# Patient Record
Sex: MALE | Race: WHITE | NOT HISPANIC OR LATINO | Employment: OTHER | URBAN - METROPOLITAN AREA
[De-identification: names, ages, dates, MRNs, and addresses within clinical notes are randomized per-mention and may not be internally consistent; named-entity substitution may affect disease eponyms.]

---

## 2017-01-27 ENCOUNTER — OFFICE VISIT (OUTPATIENT)
Dept: OTOLARYNGOLOGY | Facility: CLINIC | Age: 57
End: 2017-01-27

## 2017-01-27 ENCOUNTER — OFFICE VISIT (OUTPATIENT)
Dept: FAMILY MEDICINE | Facility: CLINIC | Age: 57
End: 2017-01-27
Payer: COMMERCIAL

## 2017-01-27 VITALS
HEIGHT: 70 IN | OXYGEN SATURATION: 97 % | SYSTOLIC BLOOD PRESSURE: 117 MMHG | WEIGHT: 223 LBS | BODY MASS INDEX: 31.92 KG/M2 | DIASTOLIC BLOOD PRESSURE: 74 MMHG | HEART RATE: 120 BPM | TEMPERATURE: 98.7 F

## 2017-01-27 DIAGNOSIS — N40.1 BENIGN PROSTATIC HYPERPLASIA WITH LOWER URINARY TRACT SYMPTOMS, UNSPECIFIED MORPHOLOGY: Primary | ICD-10-CM

## 2017-01-27 DIAGNOSIS — J38.3 LEUKOPLAKIA OF VOCAL CORDS: ICD-10-CM

## 2017-01-27 DIAGNOSIS — Z85.21 HX OF LARYNGEAL CANCER: ICD-10-CM

## 2017-01-27 DIAGNOSIS — I10 HYPERTENSION GOAL BP (BLOOD PRESSURE) < 140/90: ICD-10-CM

## 2017-01-27 DIAGNOSIS — K59.00 CONSTIPATION, UNSPECIFIED CONSTIPATION TYPE: ICD-10-CM

## 2017-01-27 DIAGNOSIS — R49.0 DYSPHONIA: Primary | ICD-10-CM

## 2017-01-27 PROCEDURE — 99213 OFFICE O/P EST LOW 20 MIN: CPT | Performed by: FAMILY MEDICINE

## 2017-01-27 RX ORDER — TAMSULOSIN HYDROCHLORIDE 0.4 MG/1
0.4 CAPSULE ORAL DAILY
Qty: 30 CAPSULE | Refills: 1 | Status: SHIPPED | OUTPATIENT
Start: 2017-01-27 | End: 2017-03-24

## 2017-01-27 ASSESSMENT — PAIN SCALES - GENERAL
PAINLEVEL: NO PAIN (0)
PAINLEVEL: NO PAIN (0)

## 2017-01-27 NOTE — MR AVS SNAPSHOT
"              After Visit Summary   1/27/2017    Gary Iyer    MRN: [id number]           Patient Information     Date Of Birth          1960        Visit Information        Provider Department      1/27/2017 12:00 PM [facility]al [facility]        Today's Diagnoses     Benign prostatic hyperplasia with lower urinary tract symptoms, unspecified morphology    -  1     Constipation, unspecified constipation type         Hypertension goal BP (blood pressure) < 140/90           Care Instructions    Start the tamsulosin 0.4 mg one pill daily    See us in 1-2 months, sooner if needed     Continue other medications    Keep working on healthy diet/exercise and weight loss          Follow-ups after your visit        Who to contact     If you have questions or need follow up information about today's clinic visit or your schedule please contact [facility] directly at [phone].  Normal or non-critical lab and imaging results will be communicated to you by MyChart, letter or phone within 4 business days after the clinic has received the results. If you do not hear from us within 7 days, please contact the clinic through MyChart or phone. If you have a critical or abnormal lab result, we will notify you by phone as soon as possible.  Submit refill requests through [employer] or call your pharmacy and they will forward the refill request to us. Please allow 3 business days for your refill to be completed.          Additional Information About Your Visit        [employer] Information     The [employer] lets you send messages to your doctor, view your test results, renew your prescriptions, schedule appointments and more. To sign up, go to [email or web address] . Click on \"Log in\" on the left side of the screen, which will take you to the Welcome page. Then click on \"Sign up Now\" on the right side of the page.     You will be asked to enter the access code listed below, as well " "as some personal information. Please follow the directions to create your username and password.     Your access code is: [email or web address]  Expires: 2017  6:31 AM     Your access code will  in 90 days. If you need help or a new code, please call your [facility] clinic or [phone].        Care EveryWhere ID     This is your Care EveryWhere ID. This could be used by other organizations to access your [facility] medical records  [id number]        Your Vitals Were     Pulse Temperature Height BMI (Body Mass Index) Pulse Oximetry       120 98.7  F (37.1  C) (Oral) 5' 10\" (1.778 m) 32.00 kg/m2 97%        Blood Pressure from Last 3 Encounters:   17 117/74   16 112/64   16 133/83    Weight from Last 3 Encounters:   17 223 lb (101.152 kg)   16 227 lb (102.967 kg)   16 225 lb (102.059 kg)              Today, you had the following     No orders found for display         Today's Medication Changes          These changes are accurate as of: 17 12:12 PM.  If you have any questions, ask your nurse or doctor.               Start taking these medicines.        Dose/Directions    tamsulosin 0.4 MG capsule   Commonly known as:  FLOMAX   Used for:  Benign prostatic hyperplasia with lower urinary tract symptoms, unspecified morphology   Started by:  Finesse Beal MD        Dose:  0.4 mg   Take 1 capsule (0.4 mg) by mouth daily   Quantity:  30 capsule   Refills:  1         Stop taking these medicines if you haven't already. Please contact your care team if you have questions.     guaiFENesin-codeine 100-10 MG/5ML Soln solution   Commonly known as:  ROBITUSSIN AC   Stopped by:  Finesse Beal MD                Where to get your medicines      These medications were sent to [facility] [id number]  [place] [place] - [address]  [address]  [address], [place] [address]    Hours:  Test fax successful 02   Phone:  [phone]    - " tamsulosin 0.4 MG capsule             Primary Care Provider Office Phone # Fax #    Finesse Beal [phone]       [facility] [address] [facility] [address]        Thank you!     Thank you for choosing [facility]  for your care. Our goal is always to provide you with excellent care. Hearing back from our patients is one way we can continue to improve our services. Please take a few minutes to complete the written survey that you may receive in the mail after your visit with us. Thank you!             Your Updated Medication List - Protect others around you: Learn how to safely use, store and throw away your medicines at www.disposemymeds.org.          This list is accurate as of: 1/27/17 12:12 PM.  Always use your most recent med list.                   Brand Name Dispense Instructions for use    allopurinol 300 MG tablet    ZYLOPRIM    90 tablet    Take 1 tablet (300 mg) by mouth daily       aspirin 81 MG tablet      Take 1 tablet by mouth daily.       atorvastatin 20 MG tablet    LIPITOR    90 tablet    Take 1 tablet (20 mg) by mouth daily       blood glucose monitoring lancets     1 Box    1 each 2 times daily Use to test blood sugar 2 times daily or as directed.       blood glucose monitoring test strip    ALON CONTOUR NEXT    100 strip    1 strip by In Vitro route 2 times daily Use to test blood sugar 2 times daily or as directed.       DAILY MULTIVITAMIN PO      Take  by mouth daily.       hydrochlorothiazide 50 MG tablet    HYDRODIURIL    90 tablet    Take 1 tablet (50 mg) by mouth every morning       hydrocortisone 1 % ointment     30 g    Apply topically 2 times daily as needed       hydrocortisone 2.5 % cream    ANUSOL-HC    30 g    Place rectally 2 times daily as needed for hemorrhoids       hydrocortisone 25 MG Suppository    ANUSOL-HC    20 suppository    Place 1 suppository (25 mg) rectally 2 times daily as needed for  hemorrhoids       lisinopril 30 MG tablet    PRINIVIL,ZESTRIL    90 tablet    Take 1 tablet (30 mg) by mouth daily       metFORMIN 500 MG tablet    GLUCOPHAGE    180 tablet    Take 1 tablet (500 mg) by mouth 2 times daily (with meals)       * order for DME     1 Container    Place rectally 2 times daily 1.8 oz container of 0.2 % nifedipine suppository if possible as easier to place the medication.   Apply on the anus every 12 hours.  Just get to where the anus is tight .       * order for DME     60 suppository    Place rectally every 12 hours 0.2 % nifedipine suppository Apply on the anus every 12 hours.  Just get to where the anus is tight .       PARoxetine 20 MG tablet    PAXIL    180 tablet    Take 2 tablets (40 mg) by mouth daily       psyllium 0.52 G capsule     100 capsule    Take 1 capsule (0.52 g) by mouth daily       tamsulosin 0.4 MG capsule    FLOMAX    30 capsule    Take 1 capsule (0.4 mg) by mouth daily       triamcinolone 0.1 % cream    KENALOG    30 g    Apply topically 2 times daily as needed for irritation       vitamin D 1000 UNITS capsule      Take 1 capsule by mouth daily.       * Notice:  This list has 2 medication(s) that are the same as other medications prescribed for you. Read the directions carefully, and ask your doctor or other care provider to review them with you.

## 2017-01-27 NOTE — PROGRESS NOTES
Dear Dr. Barger:  Gary Iyer recently returned for follow-up at the [facility]. My clinic note from our visit is enclosed below.     I appreciate the ongoing opportunity to participate in this patient's care.    Please feel free to contact me with any questions.      Sincerely yours,  Mel Fournier M.D., M.P.H.  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     No voice changes, no specific concerns. His leg has gotten much better and he is out of his boot.      MEDICATIONS:     Current Outpatient Prescriptions   Medication Sig Dispense Refill     guaiFENesin-codeine (ROBITUSSIN AC) 100-10 MG/5ML SOLN solution Take 5-10 mLs by mouth every 6 hours as needed 180 mL 0     PARoxetine (PAXIL) 20 MG tablet Take 2 tablets (40 mg) by mouth daily 180 tablet 1     order for DME Place rectally 2 times daily 1.8 oz container of 0.2 % nifedipine suppository if possible as easier to place the medication.    Apply on the anus every 12 hours.  Just get to where the anus is tight . 1 Container 5     order for DME Place rectally every 12 hours 0.2 % nifedipine suppository  Apply on the anus every 12 hours.  Just get to where the anus is tight . 60 suppository 5     psyllium 0.52 G capsule Take 1 capsule (0.52 g) by mouth daily 100 capsule 3     atorvastatin (LIPITOR) 20 MG tablet Take 1 tablet (20 mg) by mouth daily 90 tablet 3     hydrocortisone (ANUSOL-HC) 25 MG suppository Place 1 suppository (25 mg) rectally 2 times daily as needed for hemorrhoids 20 suppository 1     hydrocortisone (ANUSOL-HC) 2.5 % rectal cream Place rectally 2 times daily as needed for hemorrhoids 30 g 1      triamcinolone (KENALOG) 0.1 % cream Apply topically 2 times daily as needed for irritation 30 g 1     hydrocortisone 1 % ointment Apply topically 2 times daily as needed 30 g 1     metFORMIN (GLUCOPHAGE) 500 MG tablet Take 1 tablet (500 mg) by mouth 2 times daily (with meals) 180 tablet 1     allopurinol (ZYLOPRIM) 300 MG tablet Take 1 tablet (300 mg) by mouth daily 90 tablet 3     blood glucose monitoring (ALON MICROLET) lancets 1 each 2 times daily Use to test blood sugar 2 times daily or as directed. 1 Box 3     lisinopril (PRINIVIL,ZESTRIL) 30 MG tablet Take 1 tablet (30 mg) by mouth daily 90 tablet 2     hydrochlorothiazide (HYDRODIURIL) 50 MG tablet Take 1 tablet (50 mg) by mouth every morning 90 tablet 2     blood glucose (ALON CONTOUR NEXT) test strip 1 strip by In Vitro route 2 times daily Use to test blood sugar 2 times daily or as directed. 100 strip 3     Cholecalciferol (VITAMIN D) 1000 UNITS capsule Take 1 capsule by mouth daily.       Multiple Vitamin (DAILY MULTIVITAMIN PO) Take  by mouth daily.       aspirin 81 MG tablet Take 1 tablet by mouth daily.         ALLERGIES:  No Known Allergies    NEW PMH/PSH: None    REVIEW OF SYSTEMS:  The patient completed a comprehensive 11 point review of systems (below), which was reviewed. Positives are as noted below.  Patient Supplied Answers to Review of Systems   ENT ROS 4/22/2016   Ears, Nose, Throat Nasal congestion or drainage       PHYSICAL EXAM:  General: The patient was alert and conversant, and in no acute distress.    Neck: No palpable cervical lymphadenopathy, no significant tenderness to palpation of the thyrohyoid space. No obvious thyroid abnormality.  Resp: Breathing comfortably, no stridor or stertor.  Neuro: Symmetric facial function.  Psych: Normal affect, pleasant and cooperative.  Voice/speech: Mild dysphonia characterized by breathiness, roughness and strain.      Intake scores  Last 2 Scores for Patient-Answered VHI Questionnaire  VHI  Total Score 12/2/2016 1/27/2017   VHI Total Score 9 1      Last 2 Scores for Patient-Answered EAT Questionnaire  EAT Total Score 12/2/2016 1/27/2017   EAT Total Score 0 2      Last 2 Scores for Patient-Answered CSI Questionnaire  CSI Total Score 12/2/2016 1/27/2017   CSI Total Score 5 0       Procedure:   Flexible fiberoptic laryngoscopy and laryngovideostroboscopy  Indications: This procedure was warranted to evaluate the patient's laryngeal function. Risks, benefits, and alternatives were discussed.  Description: After written informed consent was obtained, a time-out was performed to confirm patient identity, procedure, and procedure site. Topical 3% lidocaine with 0.25% phenylephrine was applied to the nasal cavities. I performed the endoscopy and no complications were apparent. Continuous and stroboscopic light were utilized to assess routine phonation and variable frequency phonation.  Performed by: Mel Fournier MD MPH  Findings: Normal nasopharynx. Normal base of tongue, valleculae, and epiglottis. Vocal fold mobility: right: normal; left: normal. Medial edges of the vocal folds: smooth and straight with the exception of scattered leukoplakia left anterior vocal fold. Mild diffuse erythema, left true vocal fold.   Glissade produced appropriate elongation. Mucosa of false vocal folds, aryepiglottic folds, piriform sinuses, and posterior glottis unremarkable. Airway was patent. No additional lesions on NBI, which did demonstrate the leukoplakic regions more clearly.    The addition of stroboscopy allowed evaluation of the mucosal wave.   Amplitude: right: normal; left: mildly decreased. Symmetry: intermittent symmetry. Closure pattern: complete and irregular. Closure plane: at glottic level. Phase distribution: normal.         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 He asked about giving it some time to improve; I explained that we would not want to wait too long, but if he prefers, we could look again in a month before making a final decision. Alternatively we could make a plan now for a return to OR for biopsies, which would reduce the risk of undiagnosed recurrence. He would like to discuss wth his wife and will call us with his decision. I appreciate the opportunity to participate in the care of this pleasant patient.

## 2017-01-27 NOTE — PATIENT INSTRUCTIONS
Plan of care  1. Mr. Iyer will discuss the findings today with his wife and follow up with either Bijal JOHNSON RN to schedule an operation or Mir GUTIERREZ to make a future appointment for 1 month from now.    Bijal Sorto RN  [phone]  [facility]   Head & Neck Surgery

## 2017-01-27 NOTE — Clinical Note
1/27/2017       RE: Gary Iyer  [address]  [place] [address]     Dear Colleague,    Thank you for referring your patient, Gary Iyer, to the [facility] EAR [facility] at [facility]. Please see a copy of my visit note below.    No notes on file    Again, thank you for allowing me to participate in the care of your patient.      Sincerely,    Mel Fournier MD

## 2017-01-27 NOTE — PATIENT INSTRUCTIONS
Start the tamsulosin 0.4 mg one pill daily    See us in 1-2 months, sooner if needed     Continue other medications    Keep working on healthy diet/exercise and weight loss

## 2017-01-27 NOTE — PROGRESS NOTES
SUBJECTIVE:                                                    Gary Iyer is a 56 year old male who presents to clinic today for the following health issues:       Getting up often in the middle of the night to urinate. He states he is having no pain or discomfort with urinating just concerned of how often he is needing to go         Problem list and histories reviewed & adjusted, as indicated.  Additional history: as documented             HPI      ROS  At least 6 months now    Some nights 4-5 x at night.      Usually at least every 2 or 2 1/2 hours    No pain or burning     Urine normal appearing     Patient thinks he is emptying    Stream not great    No increased thirst        Physical Exam    Full physical not done     Mentation and affect are fine    No tremor of speech or extremity    Went over past labs in detail    ASSESSMENT / PLAN:  (N40.1) Benign prostatic hyperplasia with lower urinary tract symptoms, unspecified morphology  (primary encounter diagnosis)  Comment: prudent to try tamsulosin; discussed in detail   Plan: tamsulosin (FLOMAX) 0.4 MG capsule        Call with problems/ questions.  Otherwise see us in 1-2 months    (K59.00) Constipation, unspecified constipation type  Comment: patient asked about stool softeners  Plan: discussed; okay to take prn     (I10) Hypertension goal BP (blood pressure) < 140/90  Comment: at goal   Plan: no change      I reviewed the patient's medications, allergies, medical history, family history, and social history.    Finesse Beal MD

## 2017-01-27 NOTE — NURSING NOTE
Chief Complaint   Patient presents with     RECHECK     laryngeal CA     Yuridia Saldivar Medical Assistant

## 2017-01-30 ENCOUNTER — TELEPHONE (OUTPATIENT)
Dept: OTOLARYNGOLOGY | Facility: CLINIC | Age: 57
End: 2017-01-30

## 2017-01-30 NOTE — TELEPHONE ENCOUNTER
RN called pt with information regarding newly scheduled appointment 2/27/17 at 12:40 with Dr. Fournier.      Bijal Sorto RN  [phone]  [facility]   Head & Neck Surgery

## 2017-01-31 DIAGNOSIS — C32.9 LARYNGEAL CANCER (H): ICD-10-CM

## 2017-01-31 DIAGNOSIS — J38.7 LEUKOPLAKIA OF LARYNX: Primary | ICD-10-CM

## 2017-02-02 NOTE — PATIENT INSTRUCTIONS
Before Your Surgery      Call your surgeon if there is any change in your health. This includes signs of a cold or flu (such as a sore throat, runny nose, cough, rash or fever).    Do not smoke, drink alcohol or take over the counter medicine (unless your surgeon or primary care doctor tells you to) for the 24 hours before and after surgery.    If you take prescribed drugs: Follow your doctor s orders about which medicines to take and which to stop until after surgery.    Eating and drinking prior to surgery: follow the instructions from your surgeon    Take a shower or bath the night before surgery. Use the soap your surgeon gave you to gently clean your skin. If you do not have soap from your surgeon, use your regular soap. Do not shave or scrub the surgery site.  Wear clean pajamas and have clean sheets on your bed.     Medications:  Metformin: Stop 24 hrs before procedure (do not take on the evening of 2/8/2017)  Lisinopril:  Do not take on the morning of the procedure  Aspirin: Recommended to hold 7 days prior to surgery    [facility]    If you have any questions regarding to your visit please contact your care team:       Team Purple:   Clinic Hours Telephone Number   [address] Dr.   7am-7pm  Monday - Thursday   7am-5pm  Fridays  [phone]  (Appointment scheduling available 24/7)    Questions about your Visit?   Team Line:  [phone]   [facility] [place] [facility] 11am-9pm Monday-Friday Saturday-Sunday- 9am-5pm   [place] - 5pm-9pm Monday-Friday Saturday-Sunday- 9am-5pm  [phone] - [place]   [phone] - [place]       What options do I have for visits at the clinic other than the traditional office visit?  To expand how we care for you, many of our providers are utilizing electronic visits (e-visits) and telephone visits, when medically appropriate, for interactions with their patients  rather than a visit in the clinic.   We also offer nurse visits for many medical concerns. Just like any other service, we will bill your insurance company for this type of visit based on time spent on the phone with your provider. Not all insurance companies cover these visits. Please check with your medical insurance if this type of visit is covered. You will be responsible for any charges that are not paid by your insurance.      E-visits via [employer]:  generally incur a $35.00 fee.  Telephone visits:  Time spent on the phone: *charged based on time that is spent on the phone in increments of 10 minutes. Estimated cost:   5-10 mins $30.00   11-20 mins. $59.00   21-30 mins. $85.00     Use [employer] (secure email communication and access to your chart) to send your primary care provider a message or make an appointment. Ask someone on your Team how to sign up for [employer].  For a Price Quote for your services, please call our Consumer Price Line at [phone].  As always, Thank you for trusting us with your health care needs!    Discharged by Renetta Ley CMA

## 2017-02-02 NOTE — PROGRESS NOTES
[facility]  [address] [place] [id number]  [phone]  Dept: [phone]    PRE-OP EVALUATION:  Today's date: 2/3/2017    Gary Iyer (: 1960) presents for pre-operative evaluation assessment as requested by Dr. Fournier.  He requires evaluation and anesthesia risk assessment prior to undergoing surgery/procedure for treatment of Leukoplakia of larynx .  Proposed procedure: laryngoscopy with microscope with possible laser excision/ablation of lesion, possible bx    Date of Surgery/ Procedure: 2017  Time of Surgery/ Procedure: 7:15am  Hospital/Surgical Facility: [facility] same day surgery  Primary Physician: Finesse Beal  Type of Anesthesia Anticipated: General    Patient has a Health Care Directive or Living Will:  NO    1. NO - Do you have a history of heart attack, stroke, stent, bypass or surgery on an artery in the head, neck, heart or legs?  2. NO - Do you ever have any pain or discomfort in your chest?  3. NO - Do you have a history of  Heart Failure?  4. NO - Are you troubled by shortness of breath when: walking on the level, up a slight hill or at night?  5. NO - Do you currently have a cold, bronchitis or other respiratory infection?  6. NO - Do you have a cough, shortness of breath or wheezing?  7. NO - Do you sometimes get pains in the calves of your legs when you walk?  8. NO - Do you or anyone in your family have previous history of blood clots?  9. NO - Do you or does anyone in your family have a serious bleeding problem such as prolonged bleeding following surgeries or cuts?  10. NO - Have you ever had problems with anemia or been told to take iron pills?  11. NO - Have you had any abnormal blood loss such as black, tarry or bloody stools, or abnormal vaginal bleeding?  12. NO - Have you ever had a blood transfusion?  13. NO - Have you or any of your relatives ever had problems with anesthesia?  14. YES - DO YOU HAVE SLEEP APNEA, EXCESSIVE SNORING OR  DAYTIME DROWSINESS? Sleep apnea  15. NO - Do you have any prosthetic heart valves?  16. NO - Do you have prosthetic joints?  17. NO - Is there any chance that you may be pregnant?      HPI:                                                      Brief HPI related to upcoming procedure:       DIABETES - Patient has a longstanding history of DiabetesType Type II . Patient is being treated with oral agents and denies significant side effects. Control has been good. Complicating factors include but are not limited to: HTN.                                                                                                             .  HYPERTENSION - Patient has longstanding history of mod-severe HTN , currently denies any symptoms referable to elevated blood pressure. Specifically denies chest pain, palpitations, dyspnea, orthopnea, PND or peripheral edema. Blood pressure readings have been in normal range. Current medication regimen is as listed below. Patient denies any side effects of medication.                                                                                                                                                                                          .  HYPERLIPIDEMIA - Patient has a long history of significant Hyperlipidemia requiring medication for treatment with recent good control. Patient reports no problems or side effects with the medication.                                                                                                                                                       .  DEPRESSION - Patient has a long history of Depression of moderate severity requiring medication for control with recent symptoms being stable..Current symptoms of depression include none.                                                                                                                                                                                    .  SLEEP PROBLEM - Patient has a  longstanding history of snoring, excessive daytime somnolence and fatigue of moderate severity. Patient has tried OTC medications with limited success.                                                                                                                                         .    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     Priority: Medium     Type 2 diabetes mellitus without complication (H) 12/23/2015     Priority: Medium     Obesity, unspecified obesity severity, unspecified obesity type 12/23/2015     Priority: Medium     Hx of laryngeal cancer 09/26/2014     Priority: Medium     Carotid stenosis 08/07/2014     Priority: Medium     Advanced directives, counseling/discussion 07/23/2013     Priority: Medium     Advance Care Planning:  Information given                Anxiety 06/13/2013     Priority: Medium     Gout, chronic 02/07/2013     Priority: Medium     Hypertension goal BP (blood pressure) < 140/90 11/30/2012     Priority: Medium     Hyperlipidemia LDL goal <70 05/04/2012     Priority: Medium      Past Medical History   Diagnosis Date     HTN (hypertension)      High cholesterol      Malignant neoplasm (H)      larynx     Anxiety      Hypertension      Obstructive sleep apnea      Multinodular goiter (nontoxic) 6/10/2013     Type 2 diabetes mellitus without complication (H) 12/23/2015     Past Surgical History   Procedure Laterality Date     Vasectomy  02/03     Head & neck surgery  1/25/11     callous removal from throat     Colonoscopy       Ent surgery  2008     Vocal cord carcinoma     Laryngoscopy with biopsy(ies)  1-25-11     Colonoscopy  6-22-12     Repeat Colonoscopy in 10 yrs.      Laser co2 laryngoscopy, complex  6/27/2013     Procedure: LASER CO2 LARYNGOSCOPY, COMPLEX;  Micro Direct Laryngoscopy With Micro Flap Excision  Of Lesion Lumenis Co2 Laser ;  Surgeon: Mel Fournier MD;  Location: [facility] OR     Orthopedic surgery  March 2016     left tibia orif     Current Outpatient Prescriptions   Medication Sig Dispense Refill     tamsulosin (FLOMAX) 0.4 MG capsule Take 1 capsule (0.4 mg) by mouth daily 30 capsule 1     PARoxetine (PAXIL) 20 MG tablet Take 2 tablets (40 mg) by mouth daily 180 tablet 1     order for DME Place rectally 2 times daily 1.8 oz container of 0.2 % nifedipine suppository if possible as easier to place the medication.    Apply on the anus every 12 hours.  Just get to where the anus is tight . 1 Container 5     order for DME Place rectally every 12 hours 0.2 % nifedipine suppository  Apply on the anus every 12 hours.  Just get to where the anus is tight . 60 suppository 5     psyllium 0.52 G capsule Take 1 capsule (0.52 g) by mouth daily 100 capsule 3     atorvastatin (LIPITOR) 20 MG tablet Take 1 tablet (20 mg) by mouth daily 90 tablet 3     hydrocortisone (ANUSOL-HC) 25 MG suppository Place 1 suppository (25 mg) rectally 2 times daily as needed for hemorrhoids 20 suppository 1     hydrocortisone (ANUSOL-HC) 2.5 % rectal cream Place rectally 2 times daily as needed for hemorrhoids 30 g 1     triamcinolone (KENALOG) 0.1 % cream Apply topically 2 times daily as needed for irritation 30 g 1     hydrocortisone 1 % ointment Apply topically 2 times daily as needed 30 g 1     metFORMIN (GLUCOPHAGE) 500 MG tablet Take 1 tablet (500 mg) by mouth 2 times daily (with meals) 180 tablet 1     allopurinol (ZYLOPRIM) 300 MG tablet Take 1 tablet (300 mg) by mouth daily 90 tablet 3     blood glucose monitoring (ALON MICROLET) lancets 1 each 2 times daily Use to test blood sugar 2 times daily or as directed. 1 Box 3     lisinopril (PRINIVIL,ZESTRIL) 30 MG tablet Take 1 tablet (30 mg) by mouth daily 90 tablet 2     hydrochlorothiazide (HYDRODIURIL) 50 MG tablet Take 1 tablet (50 mg) by mouth every morning 90 tablet 2      "blood glucose (ALON CONTOUR NEXT) test strip 1 strip by In Vitro route 2 times daily Use to test blood sugar 2 times daily or as directed. 100 strip 3     Cholecalciferol (VITAMIN D) 1000 UNITS capsule Take 1 capsule by mouth daily.       Multiple Vitamin (DAILY MULTIVITAMIN PO) Take  by mouth daily.       aspirin 81 MG tablet Take 1 tablet by mouth daily.       OTC products: None, except as noted above    No Known Allergies   Latex Allergy: NO    Social History   Substance Use Topics     Smoking status: Former Smoker -- 8 years     Quit date: 09/01/2000     Smokeless tobacco: Never Used     Alcohol Use: No      Comment: none     History   Drug Use No       REVIEW OF SYSTEMS:                                                    Constitutional, cardiovascular, pulmonary, gi and gu systems are negative, except as otherwise noted.    EXAM:                                                    /74 mmHg  Pulse 106  Temp(Src) 98.7  F (37.1  C) (Temporal)  Ht 5' 10\" (1.778 m)  Wt 231 lb 9.6 oz (105.053 kg)  BMI 33.23 kg/m2  SpO2 100%    GENERAL APPEARANCE: healthy, alert and no distress     EYES: EOMI, - PERRL     HENT: ear canals and TM's normal and nose and mouth without ulcers or lesions     NECK: no adenopathy, no asymmetry, masses, or scars and thyroid normal to palpation     RESP: lungs clear to auscultation - no rales, rhonchi or wheezes     CV: regular rates and rhythm and no murmur, click or rub -     ABDOMEN:  soft, nontender, no HSM or masses and bowel sounds normal     SKIN: no suspicious lesions or rashes     NEURO: Normal strength and tone, sensory exam grossly normal, mentation intact and speech normal     PSYCH: mentation appears normal. and affect normal/bright     LYMPHATICS: No axillary, cervical, inguinal, or supraclavicular nodes    DIAGNOSTICS:                                                      EKG: Normal Sinus Rhythm, Incomplete Right Bundle Branch Block, unchanged from previous tracings " (same as on EKG from 03/01/2016, 10/18/2013, 06/27/2013).     Labs Drawn and in Process:   Unresulted Labs Ordered in the Past 30 Days of this Admission     No orders found from 12/6/2016 to 2/4/2017.          Recent Labs   Lab Test  09/19/16   0948  03/01/16   1147  12/23/15   0910   07/25/10   1221   HGB  13.3  13.3   --    < >  15.3   PLT  286  439   --    < >  320   INR   --    --    --    --   1.00   NA  136   --   137   < >   --    POTASSIUM  4.3  4.2  4.1   < >   --    CR  0.69  0.78  0.73   < >   --    A1C  6.0   --   6.8*   < >   --     < > = values in this interval not displayed.      IMPRESSION:                                                    Reason for surgery/procedure: Vocal granuloma/Excision/Biopsy  Diagnosis/reason for consult: Vocal Granuloma.Pre OP    The proposed surgical procedure is considered LOW risk.    REVISED CARDIAC RISK INDEX  The patient has the following serious cardiovascular risks for perioperative complications such as (MI, PE, VFib and 3  AV Block):  No serious cardiac risks  INTERPRETATION: 0 risks: Class I (very low risk - 0.4% complication rate)    The patient has the following additional risks for perioperative complications:  No identified additional risks      ICD-10-CM    1. Preop general physical exam Z01.818 EKG 12-lead complete w/read - Clinics     Hemoglobin A1c     Basic metabolic panel   2. Vocal process granuloma J38.3    3. Hx of laryngeal cancer Z85.21    4. Hypertension goal BP (blood pressure) < 140/90 I10    5. Type 2 diabetes mellitus without complication, without long-term current use of insulin (H) E11.9        RECOMMENDATIONS:                                                      EKG Findings:  Has Right Bundle Branch Block; seen in EKG from 2013 (seen on EKGs on 03/01/2016, 10/18/2013, 06/27/2013)  He has no history of coronary artery disease and no suspicious symptoms.    Obstructive Sleep Apnea (or suspected sleep apnea)  Hospital staff are advised to  monitor for sleep related oxygen desaturations due to suspicion of RITO    --Patient is to take all scheduled medications on the day of surgery EXCEPT for modifications listed below.    Diabetes Medication Use    -----Hold usual  oral diabetic meds (e.g. Metformin, Actos, Glipizide) while NPO.     Anticoagulant or Antiplatelet Medication Use  ASPIRIN: Discontinue ASA 7-10 days prior to procedure to reduce bleeding risk.  It should be resumed post-operatively.      ACE Inhibitor (Lisinopril) or Angiotensin Receptor Blocker (ARB) Use  Ace inhibitor or Angiotensin Receptor Blocker (ARB) and should HOLD this medication for the 24 hours prior to surgery.    APPROVAL GIVEN to proceed with proposed procedure, without further diagnostic evaluation       Signed Electronically by: Zachariah Guerrero MD    Copy of this evaluation report is provided to requesting physician.    [place] Preop Guidelines

## 2017-02-03 ENCOUNTER — OFFICE VISIT (OUTPATIENT)
Dept: FAMILY MEDICINE | Facility: CLINIC | Age: 57
End: 2017-02-03
Payer: COMMERCIAL

## 2017-02-03 VITALS
WEIGHT: 231.6 LBS | DIASTOLIC BLOOD PRESSURE: 74 MMHG | TEMPERATURE: 98.7 F | OXYGEN SATURATION: 100 % | BODY MASS INDEX: 33.16 KG/M2 | HEART RATE: 106 BPM | HEIGHT: 70 IN | SYSTOLIC BLOOD PRESSURE: 104 MMHG

## 2017-02-03 DIAGNOSIS — Z01.818 PREOP GENERAL PHYSICAL EXAM: Primary | ICD-10-CM

## 2017-02-03 DIAGNOSIS — Z85.21 HX OF LARYNGEAL CANCER: ICD-10-CM

## 2017-02-03 DIAGNOSIS — J38.3 VOCAL PROCESS GRANULOMA: ICD-10-CM

## 2017-02-03 DIAGNOSIS — I10 HYPERTENSION GOAL BP (BLOOD PRESSURE) < 140/90: ICD-10-CM

## 2017-02-03 DIAGNOSIS — E11.9 TYPE 2 DIABETES MELLITUS WITHOUT COMPLICATION, WITHOUT LONG-TERM CURRENT USE OF INSULIN (H): ICD-10-CM

## 2017-02-03 LAB
ANION GAP SERPL CALCULATED.3IONS-SCNC: 11 MMOL/L (ref 3–14)
BUN SERPL-MCNC: 10 MG/DL (ref 7–30)
CALCIUM SERPL-MCNC: 9.4 MG/DL (ref 8.5–10.1)
CHLORIDE SERPL-SCNC: 98 MMOL/L (ref 94–109)
CO2 SERPL-SCNC: 28 MMOL/L (ref 20–32)
CREAT SERPL-MCNC: 0.67 MG/DL (ref 0.66–1.25)
GFR SERPL CREATININE-BSD FRML MDRD: ABNORMAL ML/MIN/1.7M2
GLUCOSE SERPL-MCNC: 106 MG/DL (ref 70–99)
POTASSIUM SERPL-SCNC: 4.1 MMOL/L (ref 3.4–5.3)
SODIUM SERPL-SCNC: 137 MMOL/L (ref 133–144)

## 2017-02-03 PROCEDURE — 36415 COLL VENOUS BLD VENIPUNCTURE: CPT | Performed by: FAMILY MEDICINE

## 2017-02-03 PROCEDURE — 80048 BASIC METABOLIC PNL TOTAL CA: CPT | Performed by: FAMILY MEDICINE

## 2017-02-03 PROCEDURE — 93000 ELECTROCARDIOGRAM COMPLETE: CPT | Performed by: FAMILY MEDICINE

## 2017-02-03 PROCEDURE — 99214 OFFICE O/P EST MOD 30 MIN: CPT | Performed by: FAMILY MEDICINE

## 2017-02-03 ASSESSMENT — PAIN SCALES - GENERAL: PAINLEVEL: NO PAIN (0)

## 2017-02-03 NOTE — MR AVS SNAPSHOT
After Visit Summary   2/3/2017    Gary Iyer    MRN: [id number]           Patient Information     Date Of Birth          1960        Visit Information        Provider Department      2/3/2017 9:20 AM Zachariah Guerrero MD [facility]        Today's Diagnoses     Preop general physical exam    -  1     Vocal process granuloma         Hx of laryngeal cancer         Hypertension goal BP (blood pressure) < 140/90         Type 2 diabetes mellitus without complication, without long-term current use of insulin (H)           Care Instructions      Before Your Surgery      Call your surgeon if there is any change in your health. This includes signs of a cold or flu (such as a sore throat, runny nose, cough, rash or fever).    Do not smoke, drink alcohol or take over the counter medicine (unless your surgeon or primary care doctor tells you to) for the 24 hours before and after surgery.    If you take prescribed drugs: Follow your doctor s orders about which medicines to take and which to stop until after surgery.    Eating and drinking prior to surgery: follow the instructions from your surgeon    Take a shower or bath the night before surgery. Use the soap your surgeon gave you to gently clean your skin. If you do not have soap from your surgeon, use your regular soap. Do not shave or scrub the surgery site.  Wear clean pajamas and have clean sheets on your bed.     Medications:  Metformin: Stop 24 hrs before procedure (do not take on the evening of 2/8/2017)  Lisinopril:  Do not take on the morning of the procedure  Aspirin: Recommended to hold 7 days prior to surgery    [facility]    If you have any questions regarding to your visit please contact your care team:       Team Purple:   Clinic Hours Telephone Number   [address] Dr.   7am-7pm  Monday - Thursday   7am-5pm  Fridays  [phone]  (Appointment  scheduling available 24/7)    Questions about your Visit?   Team Line:  [phone]   Urgent Care - [place] and [place] - 11am-9pm Monday-Friday Saturday-Sunday- 9am-5pm   [place] - 5pm-9pm Monday-Friday Saturday-Sunday- 9am-5pm  [phone] - [place]   [phone] - [place]       What options do I have for visits at the clinic other than the traditional office visit?  To expand how we care for you, many of our providers are utilizing electronic visits (e-visits) and telephone visits, when medically appropriate, for interactions with their patients rather than a visit in the clinic.   We also offer nurse visits for many medical concerns. Just like any other service, we will bill your insurance company for this type of visit based on time spent on the phone with your provider. Not all insurance companies cover these visits. Please check with your medical insurance if this type of visit is covered. You will be responsible for any charges that are not paid by your insurance.      E-visits via Pocketbookt:  generally incur a $35.00 fee.  Telephone visits:  Time spent on the phone: *charged based on time that is spent on the phone in increments of 10 minutes. Estimated cost:   5-10 mins $30.00   11-20 mins. $59.00   21-30 mins. $85.00     Use Dr. Tariffhart (secure email communication and access to your chart) to send your primary care provider a message or make an appointment. Ask someone on your Team how to sign up for Pocketbookt.  For a Price Quote for your services, please call our Consumer Price Line at [phone].  As always, Thank you for trusting us with your health care needs!    Discharged by Renetta Ley CMA          Follow-ups after your visit        Your next 10 appointments already scheduled     Feb 07, 2017  6:00 PM   (Arrive by 5:45 PM)   PAC PHONE RN ASSESSMENT with Becka Pac Rn   [facility] ([facility])    [address] [place] [address]  [facility] [id number] [phone]           Note: this is not an onsite visit; there is no need to come to the facility.            Feb 09, 2017   Procedure with Mel Fournier MD   [facility] ([facility])    [address] [place] [address]  5th Floor  [facility] [id number] [phone]           Located in the Clinics and Surgery Center at [address] [place] [address], [place] [address].   parking is very convenient and highly recommended.  is a $6 flat rate fee; no tips accepted.  Both  and self parkers should enter the main arrival plaza from [facility]; parking attendants will direct you based on your parking preference.            Feb 27, 2017 12:40 PM   (Arrive by 12:10 PM)   RETURN THROAT with Mel Fournier MD   [facility] ([facility])    [address] [place] [address]  4th Floor  [facility] [id number] [phone]           - This is a multidisciplinary care team visit with an ENT physician and may include a speech language pathologist. - It is important to know that if you are evaluated and/or treated by both a physician and a speech pathologist during your visit, your billing will reflect the input that you receive from both providers as separate professionals. Although most insurance plans do cover these services, we encourage you to contact your insurance company prior to your visit to determine whether there are any coverage limitations that might affect you financially. - Billing/procedure codes that are frequently associated with visits to our clinic include (but are not limited to) the ones listed below. Most patients will not need all of these items, but it may be useful to ask your insurance company's patient . 64412: Flexible fiberoptic laryngoscopy, 67740: Laryngoscopy; flexible or rigid fiberoptic, with stroboscopy, 20957: Flexible endoscopic  "evaluation of swallowing, 31040: Speech pathology evaluation, 71406: Speech pathology treatment for voice, speech, communication, 55850: Speech pathology treatment for swallowing/oral function for feeding - If you have any concerns or questions, or if you would prefer not to have a speech pathologist involved in your visit, please contact our Clinic Coordinator at [phone], at least 24 hours prior to your appointment.              Future tests that were ordered for you today     Open Future Orders        Priority Expected Expires Ordered    Hemoglobin A1c Routine  2/3/2018 2/3/2017            Who to contact     If you have questions or need follow up information about today's clinic visit or your schedule please contact [facility] directly at [phone].  Normal or non-critical lab and imaging results will be communicated to you by KeepTraxhart, letter or phone within 4 business days after the clinic has received the results. If you do not hear from us within 7 days, please contact the clinic through KeepTraxhart or phone. If you have a critical or abnormal lab result, we will notify you by phone as soon as possible.  Submit refill requests through Instapagar or call your pharmacy and they will forward the refill request to us. Please allow 3 business days for your refill to be completed.          Additional Information About Your Visit        Instapagar Information     Instapagar lets you send messages to your doctor, view your test results, renew your prescriptions, schedule appointments and more. To sign up, go to [email or web address] . Click on \"Log in\" on the left side of the screen, which will take you to the Welcome page. Then click on \"Sign up Now\" on the right side of the page.     You will be asked to enter the access code listed below, as well as some personal information. Please follow the directions to create your username and password.     Your access code is: [email or web address]  Expires: 2/22/2017  " "6:31 AM     Your access code will  in 90 days. If you need help or a new code, please call your [place] clinic or [phone].        Care EveryWhere ID     This is your Care EveryWhere ID. This could be used by other organizations to access your [place] medical records  [id number]        Your Vitals Were     Pulse Temperature Height BMI (Body Mass Index) Pulse Oximetry       106 98.7  F (37.1  C) (Temporal) 5' 10\" (1.778 m) 33.23 kg/m2 100%        Blood Pressure from Last 3 Encounters:   17 104/74   17 117/74   16 112/64    Weight from Last 3 Encounters:   17 231 lb 9.6 oz (105.053 kg)   17 223 lb (101.152 kg)   16 227 lb (102.967 kg)              We Performed the Following     Basic metabolic panel     EKG 12-lead complete w/read - Clinics        Primary Care Provider Office Phone # Fax #    Finesse Beal [phone]       [facility] [address] [facility] [address]        Thank you!     Thank you for choosing [facility]  for your care. Our goal is always to provide you with excellent care. Hearing back from our patients is one way we can continue to improve our services. Please take a few minutes to complete the written survey that you may receive in the mail after your visit with us. Thank you!             Your Updated Medication List - Protect others around you: Learn how to safely use, store and throw away your medicines at www.disposemymeds.org.          This list is accurate as of: 2/3/17 10:32 AM.  Always use your most recent med list.                   Brand Name Dispense Instructions for use    allopurinol 300 MG tablet    ZYLOPRIM    90 tablet    Take 1 tablet (300 mg) by mouth daily       aspirin 81 MG tablet      Take 1 tablet by mouth daily.       atorvastatin 20 MG tablet    LIPITOR    90 tablet    Take 1 tablet (20 mg) by mouth daily       blood glucose monitoring lancets     1 Box    1 each " 2 times daily Use to test blood sugar 2 times daily or as directed.       blood glucose monitoring test strip    ALON CONTOUR NEXT    100 strip    1 strip by In Vitro route 2 times daily Use to test blood sugar 2 times daily or as directed.       DAILY MULTIVITAMIN PO      Take  by mouth daily.       hydrochlorothiazide 50 MG tablet    HYDRODIURIL    90 tablet    Take 1 tablet (50 mg) by mouth every morning       hydrocortisone 1 % ointment     30 g    Apply topically 2 times daily as needed       hydrocortisone 2.5 % cream    ANUSOL-HC    30 g    Place rectally 2 times daily as needed for hemorrhoids       hydrocortisone 25 MG Suppository    ANUSOL-HC    20 suppository    Place 1 suppository (25 mg) rectally 2 times daily as needed for hemorrhoids       lisinopril 30 MG tablet    PRINIVIL,ZESTRIL    90 tablet    Take 1 tablet (30 mg) by mouth daily       metFORMIN 500 MG tablet    GLUCOPHAGE    180 tablet    Take 1 tablet (500 mg) by mouth 2 times daily (with meals)       * order for DME     1 Container    Place rectally 2 times daily 1.8 oz container of 0.2 % nifedipine suppository if possible as easier to place the medication.   Apply on the anus every 12 hours.  Just get to where the anus is tight .       * order for DME     60 suppository    Place rectally every 12 hours 0.2 % nifedipine suppository Apply on the anus every 12 hours.  Just get to where the anus is tight .       PARoxetine 20 MG tablet    PAXIL    180 tablet    Take 2 tablets (40 mg) by mouth daily       psyllium 0.52 G capsule     100 capsule    Take 1 capsule (0.52 g) by mouth daily       tamsulosin 0.4 MG capsule    FLOMAX    30 capsule    Take 1 capsule (0.4 mg) by mouth daily       triamcinolone 0.1 % cream    KENALOG    30 g    Apply topically 2 times daily as needed for irritation       vitamin D 1000 UNITS capsule      Take 1 capsule by mouth daily.       * Notice:  This list has 2 medication(s) that are the same as other medications  prescribed for you. Read the directions carefully, and ask your doctor or other care provider to review them with you.

## 2017-02-03 NOTE — NURSING NOTE
"Chief Complaint   Patient presents with     Pre-Op Exam       Initial /74 mmHg  Pulse 106  Temp(Src) 98.7  F (37.1  C) (Temporal)  Ht 5' 10\" (1.778 m)  Wt 231 lb 9.6 oz (105.053 kg)  BMI 33.23 kg/m2  SpO2 100% Estimated body mass index is 33.23 kg/(m^2) as calculated from the following:    Height as of this encounter: 5' 10\" (1.778 m).    Weight as of this encounter: 231 lb 9.6 oz (105.053 kg).  BP completed using cuff size: danielito Ley CMA    "

## 2017-02-03 NOTE — Clinical Note
[address]  [place] [address]    February 7, 2017    Gary Iyer  [address] [place] [address] [place] [id number]          Dear Ryan Vega is a copy of your results. Normal results.    Results for orders placed or performed in visit on 02/03/17   Basic metabolic panel   Result Value Ref Range    Sodium 137 133 - 144 mmol/L    Potassium 4.1 3.4 - 5.3 mmol/L    Chloride 98 94 - 109 mmol/L    Carbon Dioxide 28 20 - 32 mmol/L    Anion Gap 11 3 - 14 mmol/L    Glucose 106 (H) 70 - 99 mg/dL    Urea Nitrogen 10 7 - 30 mg/dL    Creatinine 0.67 0.66 - 1.25 mg/dL    GFR Estimate >90  Non  GFR Calc   >60 mL/min/1.7m2    GFR Estimate If Black >90   GFR Calc   >60 mL/min/1.7m2    Calcium 9.4 8.5 - 10.1 mg/dL       If you have any questions or concerns, please me or my clinic team at [phone].      Sincerely,        Zachariah Guerrero MD/bt

## 2017-02-06 NOTE — OR NURSING
Pt LM with periop coordinator that he had a question about Aspirin before surgery on 2/9/17. (Pre-op physical says to hold Aspirin for 7 days before surgery). I tried unsuccessfully to reach pt by phone.

## 2017-02-07 ENCOUNTER — APPOINTMENT (OUTPATIENT)
Dept: SURGERY | Facility: CLINIC | Age: 57
End: 2017-02-07

## 2017-02-08 ENCOUNTER — TELEPHONE (OUTPATIENT)
Dept: FAMILY MEDICINE | Facility: CLINIC | Age: 57
End: 2017-02-08

## 2017-02-08 ENCOUNTER — ANESTHESIA EVENT (OUTPATIENT)
Dept: SURGERY | Facility: AMBULATORY SURGERY CENTER | Age: 57
End: 2017-02-08

## 2017-02-08 DIAGNOSIS — E11.9 TYPE 2 DIABETES MELLITUS WITHOUT COMPLICATION, WITHOUT LONG-TERM CURRENT USE OF INSULIN (H): Primary | ICD-10-CM

## 2017-02-08 NOTE — TELEPHONE ENCOUNTER
DME for diabetic shoes pended.   Routed to provider to advise.     Cheri Lieberman RN  [facility]

## 2017-02-09 ENCOUNTER — HOSPITAL ENCOUNTER (OUTPATIENT)
Facility: AMBULATORY SURGERY CENTER | Age: 57
End: 2017-02-09
Attending: OTOLARYNGOLOGY

## 2017-02-09 ENCOUNTER — ANESTHESIA (OUTPATIENT)
Dept: SURGERY | Facility: AMBULATORY SURGERY CENTER | Age: 57
End: 2017-02-09

## 2017-02-09 VITALS
RESPIRATION RATE: 12 BRPM | TEMPERATURE: 97.7 F | WEIGHT: 231.6 LBS | HEIGHT: 70 IN | OXYGEN SATURATION: 93 % | SYSTOLIC BLOOD PRESSURE: 146 MMHG | DIASTOLIC BLOOD PRESSURE: 87 MMHG | BODY MASS INDEX: 33.16 KG/M2

## 2017-02-09 DIAGNOSIS — J38.3 VOCAL PROCESS GRANULOMA: Primary | ICD-10-CM

## 2017-02-09 LAB
GLUCOSE BLDC GLUCOMTR-MCNC: 138 MG/DL (ref 70–99)
GLUCOSE BLDC GLUCOMTR-MCNC: 217 MG/DL (ref 70–99)

## 2017-02-09 RX ORDER — ONDANSETRON 2 MG/ML
INJECTION INTRAMUSCULAR; INTRAVENOUS PRN
Status: DISCONTINUED | OUTPATIENT
Start: 2017-02-09 | End: 2017-02-09

## 2017-02-09 RX ORDER — HYDROCODONE BITARTRATE AND ACETAMINOPHEN 5; 325 MG/1; MG/1
2 TABLET ORAL ONCE
Status: COMPLETED | OUTPATIENT
Start: 2017-02-09 | End: 2017-02-09

## 2017-02-09 RX ORDER — ONDANSETRON 2 MG/ML
4 INJECTION INTRAMUSCULAR; INTRAVENOUS EVERY 30 MIN PRN
Status: DISCONTINUED | OUTPATIENT
Start: 2017-02-09 | End: 2017-02-10 | Stop reason: HOSPADM

## 2017-02-09 RX ORDER — LIDOCAINE 40 MG/G
CREAM TOPICAL
Status: DISCONTINUED | OUTPATIENT
Start: 2017-02-09 | End: 2017-02-09 | Stop reason: HOSPADM

## 2017-02-09 RX ORDER — SODIUM CHLORIDE, SODIUM LACTATE, POTASSIUM CHLORIDE, CALCIUM CHLORIDE 600; 310; 30; 20 MG/100ML; MG/100ML; MG/100ML; MG/100ML
INJECTION, SOLUTION INTRAVENOUS CONTINUOUS
Status: DISCONTINUED | OUTPATIENT
Start: 2017-02-09 | End: 2017-02-10 | Stop reason: HOSPADM

## 2017-02-09 RX ORDER — LABETALOL HYDROCHLORIDE 5 MG/ML
10 INJECTION, SOLUTION INTRAVENOUS
Status: DISCONTINUED | OUTPATIENT
Start: 2017-02-09 | End: 2017-02-09 | Stop reason: HOSPADM

## 2017-02-09 RX ORDER — HYDROCODONE BITARTRATE AND ACETAMINOPHEN 5; 325 MG/1; MG/1
1-2 TABLET ORAL EVERY 4 HOURS PRN
Qty: 30 TABLET | Refills: 0 | Status: SHIPPED | OUTPATIENT
Start: 2017-02-09 | End: 2017-02-27

## 2017-02-09 RX ORDER — ONDANSETRON 4 MG/1
4 TABLET, ORALLY DISINTEGRATING ORAL EVERY 30 MIN PRN
Status: DISCONTINUED | OUTPATIENT
Start: 2017-02-09 | End: 2017-02-10 | Stop reason: HOSPADM

## 2017-02-09 RX ORDER — FENTANYL CITRATE 50 UG/ML
INJECTION, SOLUTION INTRAMUSCULAR; INTRAVENOUS PRN
Status: DISCONTINUED | OUTPATIENT
Start: 2017-02-09 | End: 2017-02-09

## 2017-02-09 RX ORDER — FENTANYL CITRATE 50 UG/ML
25-50 INJECTION, SOLUTION INTRAMUSCULAR; INTRAVENOUS EVERY 5 MIN PRN
Status: DISCONTINUED | OUTPATIENT
Start: 2017-02-09 | End: 2017-02-09 | Stop reason: HOSPADM

## 2017-02-09 RX ORDER — MEPERIDINE HYDROCHLORIDE 25 MG/ML
12.5 INJECTION INTRAMUSCULAR; INTRAVENOUS; SUBCUTANEOUS
Status: DISCONTINUED | OUTPATIENT
Start: 2017-02-09 | End: 2017-02-10 | Stop reason: HOSPADM

## 2017-02-09 RX ORDER — NALOXONE HYDROCHLORIDE 0.4 MG/ML
.1-.4 INJECTION, SOLUTION INTRAMUSCULAR; INTRAVENOUS; SUBCUTANEOUS
Status: DISCONTINUED | OUTPATIENT
Start: 2017-02-09 | End: 2017-02-10 | Stop reason: HOSPADM

## 2017-02-09 RX ORDER — LIDOCAINE HYDROCHLORIDE 20 MG/ML
INJECTION, SOLUTION INFILTRATION; PERINEURAL PRN
Status: DISCONTINUED | OUTPATIENT
Start: 2017-02-09 | End: 2017-02-09

## 2017-02-09 RX ORDER — SODIUM CHLORIDE, SODIUM LACTATE, POTASSIUM CHLORIDE, CALCIUM CHLORIDE 600; 310; 30; 20 MG/100ML; MG/100ML; MG/100ML; MG/100ML
INJECTION, SOLUTION INTRAVENOUS CONTINUOUS
Status: DISCONTINUED | OUTPATIENT
Start: 2017-02-09 | End: 2017-02-09 | Stop reason: HOSPADM

## 2017-02-09 RX ORDER — LIDOCAINE HYDROCHLORIDE 40 MG/ML
SOLUTION TOPICAL PRN
Status: DISCONTINUED | OUTPATIENT
Start: 2017-02-09 | End: 2017-02-09 | Stop reason: HOSPADM

## 2017-02-09 RX ORDER — LABETALOL HYDROCHLORIDE 5 MG/ML
2.5 INJECTION, SOLUTION INTRAVENOUS ONCE
Status: COMPLETED | OUTPATIENT
Start: 2017-02-09 | End: 2017-02-09

## 2017-02-09 RX ORDER — PROPOFOL 10 MG/ML
INJECTION, EMULSION INTRAVENOUS PRN
Status: DISCONTINUED | OUTPATIENT
Start: 2017-02-09 | End: 2017-02-09

## 2017-02-09 RX ORDER — DEXAMETHASONE SODIUM PHOSPHATE 10 MG/ML
10 INJECTION, SOLUTION INTRAMUSCULAR; INTRAVENOUS ONCE
Status: DISCONTINUED | OUTPATIENT
Start: 2017-02-09 | End: 2017-02-09 | Stop reason: HOSPADM

## 2017-02-09 RX ORDER — ESMOLOL HYDROCHLORIDE 10 MG/ML
INJECTION INTRAVENOUS PRN
Status: DISCONTINUED | OUTPATIENT
Start: 2017-02-09 | End: 2017-02-09

## 2017-02-09 RX ORDER — AMPICILLIN AND SULBACTAM 2; 1 G/1; G/1
3 INJECTION, POWDER, FOR SOLUTION INTRAMUSCULAR; INTRAVENOUS
Status: COMPLETED | OUTPATIENT
Start: 2017-02-09 | End: 2017-02-09

## 2017-02-09 RX ORDER — GLYCOPYRROLATE 0.2 MG/ML
INJECTION, SOLUTION INTRAMUSCULAR; INTRAVENOUS PRN
Status: DISCONTINUED | OUTPATIENT
Start: 2017-02-09 | End: 2017-02-09

## 2017-02-09 RX ADMIN — ESMOLOL HYDROCHLORIDE 20 MG: 10 INJECTION INTRAVENOUS at 07:51

## 2017-02-09 RX ADMIN — PROPOFOL 200 MG: 10 INJECTION, EMULSION INTRAVENOUS at 07:32

## 2017-02-09 RX ADMIN — Medication 20 MG: at 07:50

## 2017-02-09 RX ADMIN — LIDOCAINE HYDROCHLORIDE 40 MG: 20 INJECTION, SOLUTION INFILTRATION; PERINEURAL at 07:32

## 2017-02-09 RX ADMIN — LABETALOL HYDROCHLORIDE 2.5 MG: 5 INJECTION, SOLUTION INTRAVENOUS at 10:15

## 2017-02-09 RX ADMIN — HYDROCODONE BITARTRATE AND ACETAMINOPHEN 2 TABLET: 5; 325 TABLET ORAL at 10:32

## 2017-02-09 RX ADMIN — ONDANSETRON 4 MG: 2 INJECTION INTRAMUSCULAR; INTRAVENOUS at 09:16

## 2017-02-09 RX ADMIN — ONDANSETRON 4 MG: 2 INJECTION INTRAMUSCULAR; INTRAVENOUS at 11:17

## 2017-02-09 RX ADMIN — AMPICILLIN AND SULBACTAM 3 G: 2; 1 INJECTION, POWDER, FOR SOLUTION INTRAMUSCULAR; INTRAVENOUS at 07:00

## 2017-02-09 RX ADMIN — SODIUM CHLORIDE, SODIUM LACTATE, POTASSIUM CHLORIDE, CALCIUM CHLORIDE: 600; 310; 30; 20 INJECTION, SOLUTION INTRAVENOUS at 07:00

## 2017-02-09 RX ADMIN — Medication 10 MG: at 08:41

## 2017-02-09 RX ADMIN — Medication 20 MG: at 08:12

## 2017-02-09 RX ADMIN — FENTANYL CITRATE 50 MCG: 50 INJECTION, SOLUTION INTRAMUSCULAR; INTRAVENOUS at 07:23

## 2017-02-09 RX ADMIN — GLYCOPYRROLATE 0.2 MG: 0.2 INJECTION, SOLUTION INTRAMUSCULAR; INTRAVENOUS at 07:48

## 2017-02-09 RX ADMIN — FENTANYL CITRATE 50 MCG: 50 INJECTION, SOLUTION INTRAMUSCULAR; INTRAVENOUS at 07:44

## 2017-02-09 RX ADMIN — Medication 0.5 MG: at 08:49

## 2017-02-09 RX ADMIN — Medication 0.5 MG: at 08:05

## 2017-02-09 RX ADMIN — Medication 20 MG: at 09:00

## 2017-02-09 RX ADMIN — Medication 30 MG: at 07:32

## 2017-02-09 NOTE — IP AVS SNAPSHOT
[facility]    [address] [place] [address]    Phone:  [phone]    Fax:  [phone]                                       After Visit Summary   2/9/2017    Gary Iyer    MRN: [id number]           After Visit Summary Signature Page     I have received my discharge instructions, and my questions have been answered. I have discussed any challenges I see with this plan with the nurse or doctor.    ..........................................................................................................................................  Patient/Patient Representative Signature      ..........................................................................................................................................  Patient Representative Print Name and Relationship to Patient    ..................................................               ................................................  Date                                            Time    ..........................................................................................................................................  Reviewed by Signature/Title    ...................................................              ..............................................  Date                                                            Time

## 2017-02-09 NOTE — OP NOTE
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  Operative intervention was recommended. After the risks, benefits, and alternatives had been discussed, the patient wished to proceed and presented for the procedure(s) listed above.    DESCRIPTION OF PROCEDURE:   The patient was brought to the operating room and placed supine. A time-out was called to verify patient identity, operative site, and planned procedure. The operative site was prepped in the usual clean fashion. Moist gauze eye pads were placed and secured. A head wrap was placed, and custom molded thermoplastic tooth guards were placed. Direct laryngoscopy was performed with an Ossoff-Pilling laryngoscope, which was placed in suspension. The vocal folds were examined with 0 and 70 degree telescopes. A saline injection was performed to lift the epithelium off the vocal ligament.    The operating microscope was then brought into position. A laser time out was called. Laser safety precautions were utilized at all times, including eye protection for the patient and staff, use of wet towels, and appropriately minimized FiO2. As needed, moistened cottonoids or laser-safe backstops were used to protect the endotracheal tube from the laser. The [employer] CO2 Accublade laser was attached to the microscope and used at  a depth setting of 1 and 8 Amos. It was first used in a line setting to carefully excise an area of raised irregular mucosa along the left anterior true vocal fold, with approximately a 1 mm margin of grossly normal tissue surrounding the lesion. Some superficial lamina was preserved, and the vocal ligament was kept intact. This was submitted to pathology oriented, with accompanying photos.    Next, the laser was used in a [place] setting to ablate a band of hypervascular thickened tissue along the posterior medial edge of the true vocal fold. This was debrided with suction and with cottonoids.    Cottonoids soaked in 1:10,000 epinephrine were sparingly used to maintain hemostasis. As needed during the procedure and at the conclusion of the procedure, the operating microscope was moved out of position and the 0 and 70 degree rigid endoscopes were again used to examine the vocal folds and confirm completion of the stated objectives of the procedure. After cottonoid and sponge counts were confirmed correct, 2 cc of 4% lidocaine were applied transglottically for laryngotracheal anesthesia. The laryngoscope and tooth guards were removed. The lips, teeth, and tongue were examined and no injuries were noted. Care of the patient was returned to Anesthesia.      FINDINGS:   Broad erythematous irregularity of left superior and medial true vocal fold; focal exophytic portion anteriorly. Tiny tortous vasculature.      SPECIMEN(S):   Left anterior vocal fold lesion    DRAINS: None    ESTIMATED BLOOD LOSS: Minimal    COMPLICATIONS: None    DISPOSITION: Stable to PACU    ATTENDING PRESENCE STATEMENT:  I was present and participating for the entire procedure from beginning to completion.

## 2017-02-09 NOTE — IP AVS SNAPSHOT
MRN:[id number]                      After Visit Summary   2/9/2017    Gary Iyer    MRN: [id number]           Thank you!     Thank you for choosing [facility] for your care. Our goal is always to provide you with excellent care. Hearing back from our patients is one way we can continue to improve our services. Please take a few minutes to complete the written survey that you may receive in the mail after you visit with us. Thank you!        Patient Information     Date Of Birth          1960        About your hospital stay     You were admitted on:  February 9, 2017 You last received care in the:  [facility]    You were discharged on:  February 9, 2017       Who to Call     For medical emergencies, please call 911.  For non-urgent questions about your medical care, please call your primary care provider or clinic, [phone]  For questions related to your surgery, please call your surgery clinic        Attending Provider     Provider    Mel Fournier MD       Primary Care Provider Office Phone # Fax #    Finesse Beal [phone]       [address] [facility] [address]        After Care Instructions     Diet Instructions       Resume pre procedure diet            Discharge Instructions       Strict voice rest- no talking, no whispering, no coughing, no throat clearing- for four days. Then, gradually resume voice use.   Patient to follow up with surgeon on 2/27/2016 as previously scheduled.                  Your next 10 appointments already scheduled     Feb 27, 2017 12:40 PM   (Arrive by 12:10 PM)   RETURN THROAT with Mel Fournier MD   [facility] ([facility])    [address] [address]   [phone]           - This is a multidisciplinary care team visit with an ENT physician and may include a speech  language pathologist. - It is important to know that if you are evaluated and/or treated by both a physician and a speech pathologist during your visit, your billing will reflect the input that you receive from both providers as separate professionals. Although most insurance plans do cover these services, we encourage you to contact your insurance company prior to your visit to determine whether there are any coverage limitations that might affect you financially. - Billing/procedure codes that are frequently associated with visits to our clinic include (but are not limited to) the ones listed below. Most patients will not need all of these items, but it may be useful to ask your insurance company's patient . 23919: Flexible fiberoptic laryngoscopy, 84502: Laryngoscopy; flexible or rigid fiberoptic, with stroboscopy, 86932: Flexible endoscopic evaluation of swallowing, 18818: Speech pathology evaluation, 17191: Speech pathology treatment for voice, speech, communication, 93837: Speech pathology treatment for swallowing/oral function for feeding - If you have any concerns or questions, or if you would prefer not to have a speech pathologist involved in your visit, please contact our Clinic Coordinator at [phone], at least 24 hours prior to your appointment.              Further instructions from your care team       [facility]  Home Care Following Anesthesia  For 24 hours after surgery:  1. Get plenty of rest.  A responsible adult must stay with you for at least 24 hours after you leave the surgery center.  2. Do not drive or use heavy equipment.  If you have weakness or tingling, don't drive or use heavy equipment until this feeling goes away.   3. Do not drink alcohol.   4. Avoid strenuous or risky activities.  Ask for help when climbing stairs.  5. You may feel lightheaded.  IF so, sit for a few minutes before standing.  Have someone help you get  up.   6. If you have nausea (feel sick to your stomach): Drink only clear liquids such as apple juice, ginger ale, broth or 7-Up.  Rest may also help.  Be sure to drink enough fluids.  Move to a regular diet as you feel able.   7. You may have a slight fever.  Call the doctor if your fever is over 100 F (37.7 C) (taken under the tongue) or lasts longer than 24 hours.  8. You may have a dry mouth, a sore throat, muscle aches or trouble sleeping. These should go away after 24 hours.  9. Do not make important or legal decisions.   If you are female and have had general anesthesia you may have received a medication that inhibits the effectiveness of oral contraceptives.  We suggest you use a backup method of contraception for the next 7 days if this applies to you.  Tips for taking pain medications  To get the best pain relief possible, remember these points:    Take pain medications as directed, before pain becomes severe.    Pain medication can upset your stomach: taking it with food may help.    Constipation is a common side effect of pain medication. Drink plenty of  fluids.    Eat foods high in fiber. Take a stool softener if recommended by your doctor or pharmacist.    Do not drink alcohol, drive or operate machinery while taking pain medications.    Ask about other ways to control pain, such as with heat, ice or relaxation.    Call a doctor for any of the followin. Signs of infection (fever, growing tenderness at the surgery site, a large amount of drainage or bleeding, severe pain, foul-smelling drainage, redness, swelling).  2. It has been over 8 to 10 hours since surgery and you are still not able to urinate (pass water).  3. Headache for over 24 hours.  4. Numbness, tingling or weakness the day after surgery (if you had spinal anesthesia).  Your doctor is:   Dr. Tuttle, ENT Otolaryngology: [phone]               Or dial [phone] and ask for the resident on call for:  ENT Otolaryngology  For  "emergency care, call the:  [facility]:  [phone] (TTY for hearing impaired: [phone])                Pending Results     Date and Time Order Name Status Description    2017 09 Surgical pathology exam In process             Admission Information        Provider Department Dept Phone    2017 Mel Fournier MD  [facility] [phone]      Your Vitals Were     Blood Pressure Temperature Respirations    159/92 mmHg 97.2  F (36.2  C) (Temporal) 14    Height Weight BMI (Body Mass Index)    1.778 m (5' 10\") 105.053 kg (231 lb 9.6 oz) 33.23 kg/m2    Pulse Oximetry          97%        MyChart Information     W[employer]I is an electronic gateway that provides easy, online access to your medical records. With WITOI, you can request a clinic appointment, read your test results, renew a prescription or communicate with your care team.     To sign up for [employer] visit the website at [email or web address]   You will be asked to enter the access code listed below, as well as some personal information. Please follow the directions to create your username and password.     Your access code is: [email or web address]  Expires: 2017  6:31 AM     Your access code will  in 90 days. If you need help or a new code, please contact your [facility] or call [phone] for assistance.        Care EveryWhere ID     This is your Care EveryWhere ID. This could be used by other organizations to access your [facility] medical records  [id number]           Review of your medicines      START taking        Dose / Directions    HYDROcodone-acetaminophen 5-325 MG per tablet   Commonly known as:  NORCO   Used for:  Vocal process granuloma        Dose:  1-2 tablet   Take 1-2 tablets by mouth every 4 hours as needed for moderate to severe pain   Quantity:  30 tablet   Refills:  0         CONTINUE these medicines which have NOT CHANGED        Dose / Directions    " allopurinol 300 MG tablet   Commonly known as:  ZYLOPRIM   Used for:  Gout, unspecified        Dose:  300 mg   Take 1 tablet (300 mg) by mouth daily   Quantity:  90 tablet   Refills:  3       aspirin 81 MG tablet        Dose:  1 tablet   Take 1 tablet by mouth daily.   Refills:  0       atorvastatin 20 MG tablet   Commonly known as:  LIPITOR   Used for:  Hyperlipidemia LDL goal <70        Dose:  20 mg   Take 1 tablet (20 mg) by mouth daily   Quantity:  90 tablet   Refills:  3       blood glucose monitoring lancets   Used for:  Type 2 diabetes mellitus without complication (H)        Dose:  1 each   1 each 2 times daily Use to test blood sugar 2 times daily or as directed.   Quantity:  1 Box   Refills:  3       blood glucose monitoring test strip   Commonly known as:  [employer] CONTOUR NEXT   Used for:  Type 2 diabetes, HbA1C goal < 8% (H)        Dose:  1 strip   1 strip by In Vitro route 2 times daily Use to test blood sugar 2 times daily or as directed.   Quantity:  100 strip   Refills:  3       DAILY MULTIVITAMIN PO        Take  by mouth daily.   Refills:  0       hydrochlorothiazide 50 MG tablet   Commonly known as:  HYDRODIURIL   Used for:  Hypertension goal BP (blood pressure) < 140/90        Dose:  50 mg   Take 1 tablet (50 mg) by mouth every morning   Quantity:  90 tablet   Refills:  2       hydrocortisone 1 % ointment   Used for:  Facial dermatitis        Apply topically 2 times daily as needed   Quantity:  30 g   Refills:  1       hydrocortisone 2.5 % cream   Commonly known as:  ANUSOL-HC   Used for:  External hemorrhoids        Place rectally 2 times daily as needed for hemorrhoids   Quantity:  30 g   Refills:  1       hydrocortisone 25 MG Suppository   Commonly known as:  ANUSOL-HC   Used for:  External hemorrhoids        Dose:  25 mg   Place 1 suppository (25 mg) rectally 2 times daily as needed for hemorrhoids   Quantity:  20 suppository   Refills:  1       lisinopril 30 MG tablet   Commonly known as:   PRINIVIL,ZESTRIL   Used for:  Hypertension goal BP (blood pressure) < 140/90        Dose:  30 mg   Take 1 tablet (30 mg) by mouth daily   Quantity:  90 tablet   Refills:  2       metFORMIN 500 MG tablet   Commonly known as:  GLUCOPHAGE   Used for:  Type 2 diabetes mellitus without complication (H)        Dose:  500 mg   Take 1 tablet (500 mg) by mouth 2 times daily (with meals)   Quantity:  180 tablet   Refills:  1       * order for DME   Used for:  Anal fissure        Place rectally 2 times daily 1.8 oz container of 0.2 % nifedipine suppository if possible as easier to place the medication.   Apply on the anus every 12 hours.  Just get to where the anus is tight .   Quantity:  1 Container   Refills:  5       * order for DME   Used for:  Anal fissure        Place rectally every 12 hours 0.2 % nifedipine suppository Apply on the anus every 12 hours.  Just get to where the anus is tight .   Quantity:  60 suppository   Refills:  5       * order for DME   Used for:  Type 2 diabetes mellitus without complication, without long-term current use of insulin (H)        Equipment being ordered: Diabetic Shoes  One pair   Quantity:  1 Device   Refills:  0       PARoxetine 20 MG tablet   Commonly known as:  PAXIL   Used for:  Anxiety        Dose:  40 mg   Take 2 tablets (40 mg) by mouth daily   Quantity:  180 tablet   Refills:  1       psyllium 0.52 G capsule   Used for:  Constipation, unspecified constipation type        Dose:  1 capsule   Take 1 capsule (0.52 g) by mouth daily   Quantity:  100 capsule   Refills:  3       tamsulosin 0.4 MG capsule   Commonly known as:  FLOMAX   Used for:  Benign prostatic hyperplasia with lower urinary tract symptoms, unspecified morphology        Dose:  0.4 mg   Take 1 capsule (0.4 mg) by mouth daily   Quantity:  30 capsule   Refills:  1       triamcinolone 0.1 % cream   Commonly known as:  KENALOG   Used for:  Dermatitis        Apply topically 2 times daily as needed for irritation    Quantity:  30 g   Refills:  1       vitamin D 1000 UNITS capsule        Dose:  1 capsule   Take 1 capsule by mouth daily.   Refills:  0       * Notice:  This list has 3 medication(s) that are the same as other medications prescribed for you. Read the directions carefully, and ask your doctor or other care provider to review them with you.         Where to get your medicines      Some of these will need a paper prescription and others can be bought over the counter. Ask your nurse if you have questions.     Bring a paper prescription for each of these medications    - HYDROcodone-acetaminophen 5-325 MG per tablet             Protect others around you: Learn how to safely use, store and throw away your medicines at www.disposemymeds.org.             Medication List: This is a list of all your medications and when to take them. Check marks below indicate your daily home schedule. Keep this list as a reference.      Medications           Morning Afternoon Evening Bedtime As Needed    allopurinol 300 MG tablet   Commonly known as:  ZYLOPRIM   Take 1 tablet (300 mg) by mouth daily                                aspirin 81 MG tablet   Take 1 tablet by mouth daily.                                atorvastatin 20 MG tablet   Commonly known as:  LIPITOR   Take 1 tablet (20 mg) by mouth daily                                blood glucose monitoring lancets   1 each 2 times daily Use to test blood sugar 2 times daily or as directed.                                blood glucose monitoring test strip   Commonly known as:  [employer] CONTOUR NEXT   1 strip by In Vitro route 2 times daily Use to test blood sugar 2 times daily or as directed.                                DAILY MULTIVITAMIN PO   Take  by mouth daily.                                hydrochlorothiazide 50 MG tablet   Commonly known as:  HYDRODIURIL   Take 1 tablet (50 mg) by mouth every morning                                HYDROcodone-acetaminophen 5-325 MG per tablet    Commonly known as:  NORCO   Take 1-2 tablets by mouth every 4 hours as needed for moderate to severe pain                                hydrocortisone 1 % ointment   Apply topically 2 times daily as needed                                hydrocortisone 2.5 % cream   Commonly known as:  ANUSOL-HC   Place rectally 2 times daily as needed for hemorrhoids                                hydrocortisone 25 MG Suppository   Commonly known as:  ANUSOL-HC   Place 1 suppository (25 mg) rectally 2 times daily as needed for hemorrhoids                                lisinopril 30 MG tablet   Commonly known as:  PRINIVIL,ZESTRIL   Take 1 tablet (30 mg) by mouth daily                                metFORMIN 500 MG tablet   Commonly known as:  GLUCOPHAGE   Take 1 tablet (500 mg) by mouth 2 times daily (with meals)                                * order for DME   Place rectally 2 times daily 1.8 oz container of 0.2 % nifedipine suppository if possible as easier to place the medication.   Apply on the anus every 12 hours.  Just get to where the anus is tight .                                * order for DME   Place rectally every 12 hours 0.2 % nifedipine suppository Apply on the anus every 12 hours.  Just get to where the anus is tight .                                * order for DME   Equipment being ordered: Diabetic Shoes  One pair                                PARoxetine 20 MG tablet   Commonly known as:  PAXIL   Take 2 tablets (40 mg) by mouth daily                                psyllium 0.52 G capsule   Take 1 capsule (0.52 g) by mouth daily                                tamsulosin 0.4 MG capsule   Commonly known as:  FLOMAX   Take 1 capsule (0.4 mg) by mouth daily                                triamcinolone 0.1 % cream   Commonly known as:  KENALOG   Apply topically 2 times daily as needed for irritation                                vitamin D 1000 UNITS capsule   Take 1 capsule by mouth daily.                                 * Notice:  This list has 3 medication(s) that are the same as other medications prescribed for you. Read the directions carefully, and ask your doctor or other care provider to review them with you.              More Information        Direct Laryngoscopy  Direct laryngoscopy is a procedure to look at the vocal cords. A laryngoscope is a rigid, hollow tube with a light attached. Using this tool, your healthcare provider can look behind your tongue and down your throat to your vocal cords. A tissue sample (biopsy) can be taken for study in a lab. Or a growth can be removed. Your healthcare provider can tell you more about your procedure depending on why it s being done. This sheet gives you general information about what to expect.    Getting ready for the procedure  Prepare for the surgery as you have been instructed. Be sure to tell your healthcare provider about all medicines you take. This includes over-the-counter medicines. It also includes herbs and other supplements. You may need to stop taking some or all of them before surgery. Your healthcare provider will let you know. Also follow any directions you re given for not eating or drinking before surgery.  The day of the procedure  The procedure takes 30 to 60 minutes. You will likely go home the same day.  Before the procedure  Here is what to expect before the procedure begins:    An IV line is put into a vein in your arm or hand. This line delivers fluids and medicines.    You will be given medicine (anesthesia) to keep you pain free during surgery. This may be general anesthesia, which puts you in a state like deep sleep. Or you may have sedation, which makes you relaxed and drowsy. Local anesthesia may also be injected or sprayed into your throat to numb it.  During the procedure  Here is what to expect during the procedure:    You will lie on your back on a table. The scope is put into your mouth and passed down into the throat.    Your healthcare  provider examines the larynx and surrounding areas with the scope. If needed, a biopsy is done using small tools put through the scope.    If a growth is found, tools can be put through the scope to remove it.  After the procedure  You will be taken to a room to wake up from the anesthesia. At first, your throat may be sore and scratchy. Your voice may be hoarse, making talking difficult. And it may be hard to swallow. You will receive medicine to control pain. When you are released to go home, have an adult family member or friend ready to drive you.  Recovering at home  Once home, follow any instructions you have been given. Be sure to:    Take pain medicine as directed.    Drink plenty of water as soon as you can swallow normally.    Use throat lozenges as advised by your healthcare provider to help ease throat soreness.    Rest your voice as instructed by your healthcare provider.  When to call your healthcare provider  After you get home, call the healthcare provider if you have any of the following:    Chest pain or trouble breathing (call 911)    Fever of 100.4 F (38 C) or higher, or as directed by your healthcare provider    Problems swallowing that prevent you from eating or drinking    Pain that does not go away even after taking pain medicine    Severe nausea or vomiting    Bloody vomit    Cough that brings up blood   Follow-up  Within a few weeks, you will see your healthcare provider for a follow-up visit. During this visit, your provider will discuss the results of the procedure. Depending on what was found, you may need further evaluation and treatment.  Risks and possible complications   The risks of this procedure include:    Bleeding    Infection    Gagging    Vomiting    Cuts in the mouth or throat    Injury to the teeth    Vocal cord injury    Breathing problems    Risks of anesthesia     8192-5234 [employer]. [address] [place] [address], [place] [address]. All rights reserved. This  information is not intended as a substitute for professional medical care. Always follow your healthcare professional's instructions.

## 2017-02-09 NOTE — DISCHARGE INSTRUCTIONS
[facility]  Home Care Following Anesthesia  For 24 hours after surgery:  1. Get plenty of rest.  A responsible adult must stay with you for at least 24 hours after you leave the surgery center.  2. Do not drive or use heavy equipment.  If you have weakness or tingling, don't drive or use heavy equipment until this feeling goes away.   3. Do not drink alcohol.   4. Avoid strenuous or risky activities.  Ask for help when climbing stairs.  5. You may feel lightheaded.  IF so, sit for a few minutes before standing.  Have someone help you get up.   6. If you have nausea (feel sick to your stomach): Drink only clear liquids such as apple juice, ginger ale, broth or 7-Up.  Rest may also help.  Be sure to drink enough fluids.  Move to a regular diet as you feel able.   7. You may have a slight fever.  Call the doctor if your fever is over 100 F (37.7 C) (taken under the tongue) or lasts longer than 24 hours.  8. You may have a dry mouth, a sore throat, muscle aches or trouble sleeping. These should go away after 24 hours.  9. Do not make important or legal decisions.   If you are female and have had general anesthesia you may have received a medication that inhibits the effectiveness of oral contraceptives.  We suggest you use a backup method of contraception for the next 7 days if this applies to you.  Tips for taking pain medications  To get the best pain relief possible, remember these points:    Take pain medications as directed, before pain becomes severe.    Pain medication can upset your stomach: taking it with food may help.    Constipation is a common side effect of pain medication. Drink plenty of  fluids.    Eat foods high in fiber. Take a stool softener if recommended by your doctor or pharmacist.    Do not drink alcohol, drive or operate machinery while taking pain medications.    Ask about other ways to control pain, such as with heat, ice or relaxation.    Call a doctor  for any of the followin. Signs of infection (fever, growing tenderness at the surgery site, a large amount of drainage or bleeding, severe pain, foul-smelling drainage, redness, swelling).  2. It has been over 8 to 10 hours since surgery and you are still not able to urinate (pass water).  3. Headache for over 24 hours.  4. Numbness, tingling or weakness the day after surgery (if you had spinal anesthesia).  Your doctor is:   Dr. Tuttle, ENT Otolaryngology: [phone]               Or dial [phone] and ask for the resident on call for:  ENT Otolaryngology  For emergency care, call the:  [facility]:  [phone] (TTY for hearing impaired: [phone])

## 2017-02-09 NOTE — BRIEF OP NOTE
[facility]    Brief Operative Note    Pre-operative diagnosis: Laryngeal Lesions  Post-operative diagnosis * No post-op diagnosis entered *  Procedure: Procedure(s):  Microdirect Laryngoscopy with Laser Excision/Ablation of Lesion(s), Biopsies - Wound Class: II-Clean Contaminated   - Wound Class: II-Clean Contaminated   - Wound Class: II-Clean Contaminated  Surgeon: Surgeon(s) and Role:  Panel 1:     * Mel Fournier MD - Primary    Panel 2:     * Mel Fournier MD - Primary  Anesthesia: General   Estimated blood loss: Minimal  Drains: None  Specimens:   ID Type Source Tests Collected by Time Destination   A : Left true vocal fold Tissue Throat SURGICAL PATHOLOGY EXAM Mel Fournier MD 2/9/2017  9:01 AM      Findings:   Mucosal irregularity along left superior vocal fold.  Complications: None.  Implants: None.    Liliana Hammond MD  Otolaryngology- Head and Neck Surgery

## 2017-02-09 NOTE — OR NURSING
Pt arrived from OR  Bp 175/85,  at bedside to evaluate. Cont to monitor. Bp and HR unchanged- 2.5mg labetalol ordered, informed of glucose 217. Will come to bedside to evaluate.

## 2017-02-09 NOTE — ANESTHESIA PREPROCEDURE EVALUATION
Anesthesia Evaluation     .        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 .    Neurologic:  - neg neurologic ROS     Cardiovascular:     (+) Dyslipidemia, hypertension----. : . . . :. . Previous cardiac testing date:results:date: results:ECG reviewed date: results:SR with incomplete RBBB date: results:          METS/Exercise Tolerance:     Hematologic:         Musculoskeletal:         GI/Hepatic:         Renal/Genitourinary:         Endo:     (+) type II DM Last HgA1c: 6.8 Not using insulin Obesity, .      Psychiatric:         Infectious Disease:         Malignancy:         Other:               Physical Exam  Normal systems: cardiovascular and dental    Airway   Mallampati: III  TM distance: >3 FB  Neck ROM: full    Dental     Cardiovascular   Rhythm and rate: regular and normal      Pulmonary    breath sounds clear to auscultation                    Anesthesia Plan      History & Physical Review  History and physical reviewed and following examination; no interval change.    ASA Status:  3 .        Plan for General and ETT with Intravenous and Propofol induction. Maintenance will be TIVA.    PONV prophylaxis:  Ondansetron (or other 5HT-3) and Dexamethasone or Solumedrol  Additional equipment: Videolaryngoscope      Postoperative Care  Postoperative pain management:  Multi-modal analgesia.      Consents  Anesthetic plan, risks, benefits and alternatives discussed with:  Patient.  Use of blood products discussed: No .   .                          .

## 2017-02-09 NOTE — LETTER
[facility]          To Whom It May Concern:    RE:  Gary Iyer was seen and treated today at our surgery center.  Due to recovery related to the procedure, he is required to be relieved from work for the next four days. Additionally, for the next two weeks, he may only return to work if provisions are made that will allow him to continue voice recovery without yelling for two weeks.     Please contact me with any questions or concerns.    Sincerely,        MD Mel Watts MD

## 2017-02-09 NOTE — ANESTHESIA CARE TRANSFER NOTE
Patient: Gary Iyer    Procedure(s):  Microdirect Laryngoscopy with Laser Excision/Ablation of Lesion(s), Biopsies - Wound Class: II-Clean Contaminated   - Wound Class: II-Clean Contaminated   - Wound Class: II-Clean Contaminated    Diagnosis: Laryngeal Lesions  Diagnosis Additional Information: No value filed.    Anesthesia Type:   General, ETT     Note:  Airway :Face Mask  Patient transferred to:PACU  Comments: Arrive PACU, Stable, Airway Intact  175/84, 127,20,98%  All questions answered.        Vitals: (Last set prior to Anesthesia Care Transfer)    CRNA VITALS  2/9/2017 0909 - 2/9/2017 0941      2/9/2017             Pulse: 122    Ht Rate: 126    SpO2: 93 %    Resp Rate (observed): (!) 1                Electronically Signed By: ESPINOZA Ramirez [facility]  February 9, 2017  9:41 AM

## 2017-02-09 NOTE — ANESTHESIA POSTPROCEDURE EVALUATION
Patient: Gary Iyer    Procedure(s):  Microdirect Laryngoscopy with Laser Excision/Ablation of Lesion(s), Biopsies - Wound Class: II-Clean Contaminated   - Wound Class: II-Clean Contaminated   - Wound Class: II-Clean Contaminated    Diagnosis:Laryngeal Lesions  Diagnosis Additional Information: No value filed.    Anesthesia Type:  General, ETT    Note:  Anesthesia Post Evaluation    Patient location during evaluation: Phase 2  Patient participation: Able to fully participate in evaluation  Level of consciousness: awake and alert  Pain management: adequate  Airway patency: patent  Cardiovascular status: acceptable  Respiratory status: acceptable  Hydration status: acceptable  PONV: none     Anesthetic complications: None          Last vitals:  Filed Vitals:    02/09/17 1015 02/09/17 1030 02/09/17 1045   BP: 143/79 142/79 146/87   Temp:  36.3  C (97.3  F) 36.5  C (97.7  F)   Resp: 12 12    SpO2: 96% 95% 93%         Electronically Signed By: Espinoza Paul DO  February 9, 2017  10:52 AM

## 2017-02-13 LAB — COPATH REPORT: NORMAL

## 2017-02-14 ENCOUNTER — TELEPHONE (OUTPATIENT)
Dept: OTOLARYNGOLOGY | Facility: CLINIC | Age: 57
End: 2017-02-14

## 2017-02-14 DIAGNOSIS — E11.9 TYPE 2 DIABETES MELLITUS WITHOUT COMPLICATION, WITHOUT LONG-TERM CURRENT USE OF INSULIN (H): Primary | ICD-10-CM

## 2017-02-14 NOTE — TELEPHONE ENCOUNTER
Emiliano Appiah,   Please let mr Jaylon know that his pathology showed severe dysplasia (i.e. Severely abnormal cells, so pre cancer) but no actual cancer, so it is both good that we took it out and good news.   You may need to talk with his wife as he is likely still on voice restrictions.     I'll plan to see him as scheduled.   Thank you!   HEATHER     RN called home and spoke to pt's wife and informed her of the pathology results.  Pt will follow up with Dr. Allen on Feb 27th.    Bijal Sorto RN  [phone]  [facility]   Head & Neck Surgery

## 2017-02-16 ENCOUNTER — TELEPHONE (OUTPATIENT)
Dept: FAMILY MEDICINE | Facility: CLINIC | Age: 57
End: 2017-02-16

## 2017-02-16 DIAGNOSIS — E11.9 TYPE 2 DIABETES MELLITUS WITHOUT COMPLICATION, WITHOUT LONG-TERM CURRENT USE OF INSULIN (H): Primary | ICD-10-CM

## 2017-02-16 NOTE — TELEPHONE ENCOUNTER
Routed to provider for new Rx for Accu-check diabetic meter and supplies.    Cheri Lieberman RN  [facility]

## 2017-02-16 NOTE — TELEPHONE ENCOUNTER
Reason for Call:  Other prescription    Detailed comments: Patients insurance will not cover Contour diabetic meter or supplies.  Insurance will cover Accu-check. Please call to advise.    Phone Number Patient can be reached at:   [place] (Pharmacy) [phone]       Best Time: anytime    Can we leave a detailed message on this number? YES    Call taken on 2/16/2017 at 2:12 PM by Nae Sherwood

## 2017-02-17 NOTE — TELEPHONE ENCOUNTER
I called pharmacy, they received this fax and will be able to fill meter and supplies as listed.  Juany Feliciano RN  [facility]

## 2017-02-20 ENCOUNTER — TELEPHONE (OUTPATIENT)
Dept: FAMILY MEDICINE | Facility: CLINIC | Age: 57
End: 2017-02-20

## 2017-02-20 NOTE — TELEPHONE ENCOUNTER
Reason for Call:  Request for results:    Name of test or procedure: EKG    Date of test of procedure: 2-3-17    Location of the test or procedure:  [facility]    OK to leave the result message on voice mail or with a family member? YES    Phone number Patient can be reached at:  Home number on file [phone] (home)    Additional comments: Patient had pre-op done with EKG that came back abnormal on 2-3-17 and wants PCP to review.  The patient states that 2011 EKG showed the same thing.  Would like to discuss with provider.    Call taken on 2/20/2017 at 10:58 AM by Mel Dave

## 2017-02-20 NOTE — TELEPHONE ENCOUNTER
Notes Recorded by Zachariah Guerrero MD on 2/3/2017 at 1:15 PM  EKG reviewed with patient will touch base with cardiology    Patient wants PCP to review this latest EKG from 2/3/17, which showed the same thing from the EKG from 2011. Is there anything new that needs to be discussed?     Routed to provider to advise.    Cheri Lieberman RN  [facility]

## 2017-02-21 ENCOUNTER — TELEPHONE (OUTPATIENT)
Dept: FAMILY MEDICINE | Facility: CLINIC | Age: 57
End: 2017-02-21

## 2017-02-21 NOTE — TELEPHONE ENCOUNTER
Called and informed patient of message as below.  He denies further symptoms at this time and will f/up as needed.    Lourdes Zapata RN  [facility]

## 2017-02-21 NOTE — TELEPHONE ENCOUNTER
Agree with Dr. Guerrero.  No change in ekg.    If patient is not having any new symptoms like new chest pain, shortness of breath, or fatigue, no need to do further heart testing.    Please inform patient    Finesse Beal MD

## 2017-02-21 NOTE — TELEPHONE ENCOUNTER
Forms received from: [employer]   Phone number listed: [phone]   Fax listed: [phone]  Date received: 2-21-17  Form description: certificate of medical necessity  Once forms are completed, please return to Vincent via fax.  Is patient requesting to be contacted when forms are completed: n/a  Form placed: provider eugene Dave

## 2017-02-27 ENCOUNTER — OFFICE VISIT (OUTPATIENT)
Dept: OTOLARYNGOLOGY | Facility: CLINIC | Age: 57
End: 2017-02-27

## 2017-02-27 DIAGNOSIS — R49.0 DYSPHONIA: Primary | ICD-10-CM

## 2017-02-27 DIAGNOSIS — C32.9 LARYNX CARCINOMA (H): ICD-10-CM

## 2017-02-27 DIAGNOSIS — J38.7 LEUKOPLAKIA OF LARYNX: ICD-10-CM

## 2017-02-27 ASSESSMENT — PAIN SCALES - GENERAL: PAINLEVEL: NO PAIN (0)

## 2017-02-27 NOTE — MR AVS SNAPSHOT
After Visit Summary   2/27/2017    Gary Iyer    MRN: [id number]           Patient Information     Date Of Birth          1960        Visit Information        Provider Department      2/27/2017 12:40 PM Misty Clark, SLP [facility]        Today's Diagnoses     Dysphonia    -  1       Follow-ups after your visit        Your next 10 appointments already scheduled     Mar 09, 2017  9:00 AM CST   New Sleep Patient with Rene Ochoa MD   [facility]    [address] [place] [address] [id number] [phone]              Who to contact     Please call your clinic at [phone] to:    Ask questions about your health    Make or cancel appointments    Discuss your medicines    Learn about your test results    Speak to your doctor   If you have compliments or concerns about an experience at your clinic, or if you wish to file a complaint, please contact [facility] Patient Relations at [phone] or email us at [email or web address]         Additional Information About Your Visit        MyChart Information     [employer] gives you secure access to your electronic health record. If you see a primary care provider, you can also send messages to your care team and make appointments. If you have questions, please call your primary care clinic.  If you do not have a primary care provider, please call [phone] and they will assist you.      JoMaJa is an electronic gateway that provides easy, online access to your medical records. With JoMaJa, you can request a clinic appointment, read your test results, renew a prescription or communicate with your care team.     To access your existing account, please contact your [facility] or call [phone] for assistance.        Care EveryWhere ID     This is your Care EveryWhere ID. This could be used by other  organizations to access your [facility] medical records  [id number]         Blood Pressure from Last 3 Encounters:   02/09/17 146/87   02/03/17 104/74   01/27/17 117/74    Weight from Last 3 Encounters:   02/09/17 105.1 kg (231 lb 9.6 oz)   02/03/17 105.1 kg (231 lb 9.6 oz)   01/27/17 101.2 kg (223 lb)              We Performed the Following     C BEHAVIORAL & QUALITATIVE ANALYSIS VOICE AND RESONANCE     SPEECH/HEARING THERAPY, INDIVIDUAL        Primary Care Provider Office Phone # Fax #    Finesse Beal [phone]       [facility] [address] [facility] [address]        Thank you!     Thank you for choosing  [facility]  for your care. Our goal is always to provide you with excellent care. Hearing back from our patients is one way we can continue to improve our services. Please take a few minutes to complete the written survey that you may receive in the mail after your visit with us. Thank you!             Your Updated Medication List - Protect others around you: Learn how to safely use, store and throw away your medicines at www.disposemymeds.org.          This list is accurate as of: 2/27/17 11:59 PM.  Always use your most recent med list.                   Brand Name Dispense Instructions for use    allopurinol 300 MG tablet    ZYLOPRIM    90 tablet    Take 1 tablet (300 mg) by mouth daily       aspirin 81 MG tablet      Take 1 tablet by mouth daily.       atorvastatin 20 MG tablet    LIPITOR    90 tablet    Take 1 tablet (20 mg) by mouth daily       blood glucose monitoring lancets     1 Box    1 each 2 times daily Use to test blood sugar 2 times daily or as directed.       blood glucose monitoring meter device kit    no brand specified    1 kit    Use to test blood sugar 2 times daily or as directed.  Accucheck meter please and all associated strips, lancets, and other supplies, 3 month supply with refills for a year.       DAILY MULTIVITAMIN PO      Take   by mouth daily.       hydrochlorothiazide 50 MG tablet    HYDRODIURIL    90 tablet    Take 1 tablet (50 mg) by mouth every morning       hydrocortisone 1 % ointment     30 g    Apply topically 2 times daily as needed       hydrocortisone 2.5 % cream    ANUSOL-HC    30 g    Place rectally 2 times daily as needed for hemorrhoids       hydrocortisone 25 MG Suppository    ANUSOL-HC    20 suppository    Place 1 suppository (25 mg) rectally 2 times daily as needed for hemorrhoids       lisinopril 30 MG tablet    PRINIVIL,ZESTRIL    90 tablet    Take 1 tablet (30 mg) by mouth daily       metFORMIN 500 MG tablet    GLUCOPHAGE    180 tablet    Take 1 tablet (500 mg) by mouth 2 times daily (with meals)       * order for DME     1 Container    Place rectally 2 times daily 1.8 oz container of 0.2 % nifedipine suppository if possible as easier to place the medication.   Apply on the anus every 12 hours.  Just get to where the anus is tight .       * order for DME     60 suppository    Place rectally every 12 hours 0.2 % nifedipine suppository Apply on the anus every 12 hours.  Just get to where the anus is tight .       * order for DME     1 Device    Equipment being ordered: Diabetic Shoes  One pair       PARoxetine 20 MG tablet    PAXIL    180 tablet    Take 2 tablets (40 mg) by mouth daily       psyllium 0.52 G capsule     100 capsule    Take 1 capsule (0.52 g) by mouth daily       tamsulosin 0.4 MG capsule    FLOMAX    30 capsule    Take 1 capsule (0.4 mg) by mouth daily       triamcinolone 0.1 % cream    KENALOG    30 g    Apply topically 2 times daily as needed for irritation       vitamin D 1000 UNITS capsule      Take 1 capsule by mouth daily.       * Notice:  This list has 3 medication(s) that are the same as other medications prescribed for you. Read the directions carefully, and ask your doctor or other care provider to review them with you.

## 2017-02-27 NOTE — LETTER
2/27/2017      RE: Gary Iyer  [address] [facility] [id number]       Dear Dr. Barger:    Gary Iyer recently returned for follow-up at the [facility]. My clinic note from our visit is enclosed below.     I appreciate the ongoing opportunity to participate in this patient's care.    Please feel free to contact me with any questions.      Sincerely yours,  Mel Fournier M.D., M.P.H.  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 Most recently we returned to the Operating Room on 02/09/2017 for an area of new irregularity of the left true vocal fold. Pathology showed severe dysplasia with negative but close margins (for moderate, but not severe dysplasia). He returns in routine follow up today. No major post-operative concerns.      MEDICATIONS:     Current Outpatient Prescriptions   Medication Sig Dispense Refill     blood glucose monitoring (NO BRAND SPECIFIED) meter device kit Use to test blood sugar 2 times daily or as directed.  Accucheck meter please and all associated strips, lancets, and other supplies, 3 month supply with refills for a year. 1 kit 0     order for DME Equipment being ordered: Diabetic Shoes    One pair 1 Device 0     tamsulosin (FLOMAX) 0.4 MG capsule Take 1 capsule (0.4 mg) by mouth daily 30 capsule 1     PARoxetine (PAXIL) 20 MG tablet Take 2 tablets (40 mg) by mouth daily 180 tablet 1     order for DME Place rectally 2 times daily 1.8 oz container of 0.2 % nifedipine suppository if possible as easier to place the medication.    Apply on the anus every 12 hours.  Just get to where the anus is tight . 1 Container 5     order for DME Place rectally every 12 hours 0.2  % nifedipine suppository  Apply on the anus every 12 hours.  Just get to where the anus is tight . 60 suppository 5     psyllium 0.52 G capsule Take 1 capsule (0.52 g) by mouth daily 100 capsule 3     atorvastatin (LIPITOR) 20 MG tablet Take 1 tablet (20 mg) by mouth daily 90 tablet 3     hydrocortisone (ANUSOL-HC) 25 MG suppository Place 1 suppository (25 mg) rectally 2 times daily as needed for hemorrhoids 20 suppository 1     hydrocortisone (ANUSOL-HC) 2.5 % rectal cream Place rectally 2 times daily as needed for hemorrhoids 30 g 1     triamcinolone (KENALOG) 0.1 % cream Apply topically 2 times daily as needed for irritation 30 g 1     hydrocortisone 1 % ointment Apply topically 2 times daily as needed 30 g 1     metFORMIN (GLUCOPHAGE) 500 MG tablet Take 1 tablet (500 mg) by mouth 2 times daily (with meals) 180 tablet 1     allopurinol (ZYLOPRIM) 300 MG tablet Take 1 tablet (300 mg) by mouth daily 90 tablet 3     blood glucose monitoring (Extreme ReachET) lancets 1 each 2 times daily Use to test blood sugar 2 times daily or as directed. 1 Box 3     lisinopril (PRINIVIL,ZESTRIL) 30 MG tablet Take 1 tablet (30 mg) by mouth daily 90 tablet 2     hydrochlorothiazide (HYDRODIURIL) 50 MG tablet Take 1 tablet (50 mg) by mouth every morning 90 tablet 2     Cholecalciferol (VITAMIN D) 1000 UNITS capsule Take 1 capsule by mouth daily.       Multiple Vitamin (DAILY MULTIVITAMIN PO) Take  by mouth daily.       aspirin 81 MG tablet Take 1 tablet by mouth daily.         ALLERGIES:  No Known Allergies    NEW PMH/PSH: None    REVIEW OF SYSTEMS:  The patient completed a comprehensive 11 point review of systems (below), which was reviewed. Positives are as noted below.  Patient Supplied Answers to Review of Systems   ENT ROS 4/22/2016   Ears, Nose, Throat Nasal congestion or drainage       PHYSICAL EXAM:  General: The patient was alert and conversant, and in no acute distress.    Oral cavity/oropharynx: No masses or lesions.  Dentition unchanged since prior. Tongue mobility and palate elevation intact and symmetric.  Neck: No palpable cervical lymphadenopathy, no significant tenderness to palpation of the thyrohyoid space. No obvious thyroid abnormality.  Resp: Breathing comfortably, no stridor or stertor.  Neuro: Symmetric facial function. Other cranial nerve function as documented above.  Psych: Normal affect, pleasant and cooperative.  Voice/speech: Mild dysphonia characterized by breathiness, roughness and strain.      Intake scores  Last 2 Scores for Patient-Answered VHI Questionnaire  VHI Total Score 1/27/2017 2/27/2017   VHI Total Score 1 1      Last 2 Scores for Patient-Answered EAT Questionnaire  EAT Total Score 1/27/2017 2/27/2017   EAT Total Score 2 0      Last 2 Scores for Patient-Answered CSI Questionnaire  CSI Total Score 1/27/2017 2/27/2017   CSI Total Score 0 0       Procedure:   Flexible fiberoptic laryngoscopy and laryngovideostroboscopy  Indications: This procedure was warranted to evaluate the patient's laryngeal function. Risks, benefits, and alternatives were discussed.  Description: After written informed consent was obtained, a time-out was performed to confirm patient identity, procedure, and procedure site. Topical 3% lidocaine with 0.25% phenylephrine was applied to the nasal cavities. I performed the endoscopy and no complications were apparent. Continuous and stroboscopic light were utilized to assess routine phonation and variable frequency phonation.  Performed by: Mel Fournier MD MPH  Findings: Normal nasopharynx. Normal base of tongue, valleculae, and epiglottis. Vocal fold mobility: right: normal; left: normal. Medial edges of the vocal folds: smooth; mild left true vocal fold erythema/leukoplakia at surgical site.   Glissade produced appropriate elongation. There was mild to moderate supraglottic recruitment with connected speech. Mucosa of false vocal folds, aryepiglottic folds, piriform sinuses,  and posterior glottis unremarkable. Airway was patent.   The addition of Narrow Band Imaging (NBI) did not show any additional abnormalities    The addition of stroboscopy allowed evaluation of the mucosal wave.   Amplitude: right: normal; left: moderately decreased. Symmetry: associated asymmetry. Closure pattern: complete. Closure plane: at glottic level. Phase distribution: normal.      Pathology  Larynx, left true vocal fold, biopsy:   - Squamous mucosa with severe dysplasia        - Margins free of high grade dysplasia, but moderate dysplasia   closely approximates the posterior margin (less than 0.1 mm)     IMPRESSION AND PLAN:   Gary Iyer returns with appropriate post-operative changes. His recent excision showed severe dysplasia with clear but close margins.     We will plan continued close surveillance. I recommended that he start speech therapy with a focus on tissue mobilization exercises. He asked many questions today about dysplasia and laryngeal cancer, which I answered to the best of my ability. He will return in two months or sooner as needed. I appreciate the opportunity to participate in the care of this pleasant patient.       Mel Fournier MD

## 2017-02-27 NOTE — PROGRESS NOTES
Dear Dr. Barger:    Gary Iyer recently returned for follow-up at the [facility]. My clinic note from our visit is enclosed below.     I appreciate the ongoing opportunity to participate in this patient's care.    Please feel free to contact me with any questions.      Sincerely yours,  Mel Fournier M.D., M.P.H.  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 Most recently we returned to the Operating Room on 02/09/2017 for an area of new irregularity of the left true vocal fold. Pathology showed severe dysplasia with negative but close margins (for moderate, but not severe dysplasia). He returns in routine follow up today. No major post-operative concerns.      MEDICATIONS:     Current Outpatient Prescriptions   Medication Sig Dispense Refill     blood glucose monitoring (NO BRAND SPECIFIED) meter device kit Use to test blood sugar 2 times daily or as directed.  Accucheck meter please and all associated strips, lancets, and other supplies, 3 month supply with refills for a year. 1 kit 0     order for DME Equipment being ordered: Diabetic Shoes    One pair 1 Device 0     tamsulosin (FLOMAX) 0.4 MG capsule Take 1 capsule (0.4 mg) by mouth daily 30 capsule 1     PARoxetine (PAXIL) 20 MG tablet Take 2 tablets (40 mg) by mouth daily 180 tablet 1     order for DME Place rectally 2 times daily 1.8 oz container of 0.2 % nifedipine suppository if possible as easier to place the medication.    Apply on the anus every 12 hours.  Just get to where the anus is tight . 1 Container 5     order for DME Place rectally every 12 hours 0.2 % nifedipine suppository  Apply on the anus every 12 hours.  Just get to where the  anus is tight . 60 suppository 5     psyllium 0.52 G capsule Take 1 capsule (0.52 g) by mouth daily 100 capsule 3     atorvastatin (LIPITOR) 20 MG tablet Take 1 tablet (20 mg) by mouth daily 90 tablet 3     hydrocortisone (ANUSOL-HC) 25 MG suppository Place 1 suppository (25 mg) rectally 2 times daily as needed for hemorrhoids 20 suppository 1     hydrocortisone (ANUSOL-HC) 2.5 % rectal cream Place rectally 2 times daily as needed for hemorrhoids 30 g 1     triamcinolone (KENALOG) 0.1 % cream Apply topically 2 times daily as needed for irritation 30 g 1     hydrocortisone 1 % ointment Apply topically 2 times daily as needed 30 g 1     metFORMIN (GLUCOPHAGE) 500 MG tablet Take 1 tablet (500 mg) by mouth 2 times daily (with meals) 180 tablet 1     allopurinol (ZYLOPRIM) 300 MG tablet Take 1 tablet (300 mg) by mouth daily 90 tablet 3     blood glucose monitoring (Quosis MICROLET) lancets 1 each 2 times daily Use to test blood sugar 2 times daily or as directed. 1 Box 3     lisinopril (PRINIVIL,ZESTRIL) 30 MG tablet Take 1 tablet (30 mg) by mouth daily 90 tablet 2     hydrochlorothiazide (HYDRODIURIL) 50 MG tablet Take 1 tablet (50 mg) by mouth every morning 90 tablet 2     Cholecalciferol (VITAMIN D) 1000 UNITS capsule Take 1 capsule by mouth daily.       Multiple Vitamin (DAILY MULTIVITAMIN PO) Take  by mouth daily.       aspirin 81 MG tablet Take 1 tablet by mouth daily.         ALLERGIES:  No Known Allergies    NEW PMH/PSH: None    REVIEW OF SYSTEMS:  The patient completed a comprehensive 11 point review of systems (below), which was reviewed. Positives are as noted below.  Patient Supplied Answers to Review of Systems   ENT ROS 4/22/2016   Ears, Nose, Throat Nasal congestion or drainage       PHYSICAL EXAM:  General: The patient was alert and conversant, and in no acute distress.    Oral cavity/oropharynx: No masses or lesions. Dentition unchanged since prior. Tongue mobility and palate elevation intact and  symmetric.  Neck: No palpable cervical lymphadenopathy, no significant tenderness to palpation of the thyrohyoid space. No obvious thyroid abnormality.  Resp: Breathing comfortably, no stridor or stertor.  Neuro: Symmetric facial function. Other cranial nerve function as documented above.  Psych: Normal affect, pleasant and cooperative.  Voice/speech: Mild dysphonia characterized by breathiness, roughness and strain.      Intake scores  Last 2 Scores for Patient-Answered VHI Questionnaire  VHI Total Score 1/27/2017 2/27/2017   VHI Total Score 1 1      Last 2 Scores for Patient-Answered EAT Questionnaire  EAT Total Score 1/27/2017 2/27/2017   EAT Total Score 2 0      Last 2 Scores for Patient-Answered CSI Questionnaire  CSI Total Score 1/27/2017 2/27/2017   CSI Total Score 0 0       Procedure:   Flexible fiberoptic laryngoscopy and laryngovideostroboscopy  Indications: This procedure was warranted to evaluate the patient's laryngeal function. Risks, benefits, and alternatives were discussed.  Description: After written informed consent was obtained, a time-out was performed to confirm patient identity, procedure, and procedure site. Topical 3% lidocaine with 0.25% phenylephrine was applied to the nasal cavities. I performed the endoscopy and no complications were apparent. Continuous and stroboscopic light were utilized to assess routine phonation and variable frequency phonation.  Performed by: Mel Fournier MD MPH  Findings: Normal nasopharynx. Normal base of tongue, valleculae, and epiglottis. Vocal fold mobility: right: normal; left: normal. Medial edges of the vocal folds: smooth; mild left true vocal fold erythema/leukoplakia at surgical site.   Glissade produced appropriate elongation. There was mild to moderate supraglottic recruitment with connected speech. Mucosa of false vocal folds, aryepiglottic folds, piriform sinuses, and posterior glottis unremarkable. Airway was patent.   The addition of Narrow  Band Imaging (NBI) did not show any additional abnormalities    The addition of stroboscopy allowed evaluation of the mucosal wave.   Amplitude: right: normal; left: moderately decreased. Symmetry: associated asymmetry. Closure pattern: complete. Closure plane: at glottic level. Phase distribution: normal.      Pathology  Larynx, left true vocal fold, biopsy:   - Squamous mucosa with severe dysplasia        - Margins free of high grade dysplasia, but moderate dysplasia   closely approximates the posterior margin (less than 0.1 mm)     IMPRESSION AND PLAN:   Gary Iyer returns with appropriate post-operative changes. His recent excision showed severe dysplasia with clear but close margins.     We will plan continued close surveillance. I recommended that he start speech therapy with a focus on tissue mobilization exercises. He asked many questions today about dysplasia and laryngeal cancer, which I answered to the best of my ability. He will return in two months or sooner as needed. I appreciate the opportunity to participate in the care of this pleasant patient.

## 2017-02-27 NOTE — PATIENT INSTRUCTIONS
Plan of Care:  1. Follow up with Dr. Fournier in 2 months     Bijal Sorto RN  [phone]  [facility]   Head & Neck Surgery

## 2017-02-27 NOTE — MR AVS SNAPSHOT
After Visit Summary   2/27/2017    Gary Iyer    MRN: [id number]           Patient Information     Date Of Birth          1960        Visit Information        Provider Department      2/27/2017 12:40 PM Mel Fournier MD [facility] Ear Nose and Throat        Today's Diagnoses     Dysphonia    -  1    Larynx carcinoma (H)        Leukoplakia of larynx          Care Instructions    Plan of Care:  1. Follow up with Dr. Fournier in 2 months     Bijal Sorto RN  [phone]  [facility]   Head & Neck Surgery             Follow-ups after your visit        Additional Services     OTOLARYNGOLOGY REFERRAL       SPEECH-LANGUAGE PATHOLOGY SERVICE(S) REQUESTED:  Evaluate and treat    Misty Clark, Ph.D., CCC/SLP  Speech-Language Pathologist  Director, [facility]  [phone]    Jasmyne Padilla M.M., M.A., CCC/SLP  Speech-Language Pathologist  [facility]  [phone]                  Your next 10 appointments already scheduled     Mar 14, 2017  8:00 PM CDT   PSG Titration with BK BED 1   [facility]    [address] [place] [id number]   [phone]            Mar 28, 2017  9:20 AM CDT   Return Sleep Patient with Rene Ochoa MD   [facility]    [address] [place] [address]   [phone]              Future tests that were ordered for you today     Open Future Orders        Priority Expected Expires Ordered    Comprehensive Sleep Study Routine  9/5/2017 3/9/2017            Who to contact     Please call your clinic at [phone] to:    Ask questions about your health    Make or cancel appointments    Discuss your medicines    Learn about your test results    Speak to your doctor   If you have compliments or concerns about an experience at your clinic, or if you wish to file a complaint,  please contact [facility] Patient Relations at [phone] or email us at [email or web address]         Additional Information About Your Visit        [employer] Information     [employer] gives you secure access to your electronic health record. If you see a primary care provider, you can also send messages to your care team and make appointments. If you have questions, please call your primary care clinic.  If you do not have a primary care provider, please call [phone] and they will assist you.      [employer] is an electronic gateway that provides easy, online access to your medical records. With [employer], you can request a clinic appointment, read your test results, renew a prescription or communicate with your care team.     To access your existing account, please contact your [facility] or call [phone] for assistance.        Care EveryWhere ID     This is your Care EveryWhere ID. This could be used by other organizations to access your [place] medical records  [id number]         Blood Pressure from Last 3 Encounters:   03/09/17 125/84   02/09/17 146/87   02/03/17 104/74    Weight from Last 3 Encounters:   03/09/17 103 kg (227 lb)   02/09/17 105.1 kg (231 lb 9.6 oz)   02/03/17 105.1 kg (231 lb 9.6 oz)              We Performed the Following     IMAGESTREAM RECORDING ORDER     LARYNGOSCOPY FLEX/RIGID W STROBOSCOPY     OTOLARYNGOLOGY REFERRAL        Primary Care Provider Office Phone # Fax #    Finesse Beal [phone]       [address] [facility] [address]        Thank you!     Thank you for choosing [facility]  for your care. Our goal is always to provide you with excellent care. Hearing back from our patients is one way we can continue to improve our services. Please take a few minutes to complete the written survey that you may receive in the mail after your  visit with us. Thank you!             Your Updated Medication List - Protect others around you: Learn how to safely use, store and throw away your medicines at www.disposemymeds.org.          This list is accurate as of: 2/27/17 11:59 PM.  Always use your most recent med list.                   Brand Name Dispense Instructions for use    allopurinol 300 MG tablet    ZYLOPRIM    90 tablet    Take 1 tablet (300 mg) by mouth daily       aspirin 81 MG tablet      Take 1 tablet by mouth daily.       atorvastatin 20 MG tablet    LIPITOR    90 tablet    Take 1 tablet (20 mg) by mouth daily       blood glucose monitoring lancets     1 Box    1 each 2 times daily Use to test blood sugar 2 times daily or as directed.       blood glucose monitoring meter device kit    no brand specified    1 kit    Use to test blood sugar 2 times daily or as directed.  Accucheck meter please and all associated strips, lancets, and other supplies, 3 month supply with refills for a year.       DAILY MULTIVITAMIN PO      Take  by mouth daily.       hydrocortisone 1 % ointment     30 g    Apply topically 2 times daily as needed       hydrocortisone 2.5 % cream    ANUSOL-HC    30 g    Place rectally 2 times daily as needed for hemorrhoids       hydrocortisone 25 MG Suppository    ANUSOL-HC    20 suppository    Place 1 suppository (25 mg) rectally 2 times daily as needed for hemorrhoids       metFORMIN 500 MG tablet    GLUCOPHAGE    180 tablet    Take 1 tablet (500 mg) by mouth 2 times daily (with meals)       * order for DME     1 Container    Place rectally 2 times daily 1.8 oz container of 0.2 % nifedipine suppository if possible as easier to place the medication.   Apply on the anus every 12 hours.  Just get to where the anus is tight .       * order for DME     60 suppository    Place rectally every 12 hours 0.2 % nifedipine suppository Apply on the anus every 12 hours.  Just get to where the anus is tight .       * order for DME     1 Device     Equipment being ordered: Diabetic Shoes  One pair       PARoxetine 20 MG tablet    PAXIL    180 tablet    Take 2 tablets (40 mg) by mouth daily       psyllium 0.52 G capsule     100 capsule    Take 1 capsule (0.52 g) by mouth daily       tamsulosin 0.4 MG capsule    FLOMAX    30 capsule    Take 1 capsule (0.4 mg) by mouth daily       triamcinolone 0.1 % cream    KENALOG    30 g    Apply topically 2 times daily as needed for irritation       vitamin D 1000 UNITS capsule      Take 1 capsule by mouth daily.       * Notice:  This list has 3 medication(s) that are the same as other medications prescribed for you. Read the directions carefully, and ask your doctor or other care provider to review them with you.

## 2017-02-27 NOTE — NURSING NOTE
Procedure: Upper aerodigestive tract endoscopy, Flexible or rigid laryngoscopy, possible stroboscopy, possible flexible endoscopic evaluation of swallowing   Reason: symptoms requiring examination   PREPROCEDURE:   Yes Patient ID verified with 2 identifiers (name and  or MRN)   Yes: Procedure and site verified with patient/designee (when able)   Yes: Accurate consent documentation in medical record   No: Marking not required. Reason: [ Procedure does not require site marking. ][ Provider is in continuous attendance with the patient from consent through completion of procedure. ][ Marking unable or refused by patient (see scanned diagram).   TIME OUT:   Yes: Time-Out performed immediately prior to starting procedure, including verbal and active participation of all team members addressing:   * Correct patient identity   * Confirmed that the correct side and site are marked   * An accurate procedure consent form   * Agreement on the procedure to be done   * Correct patient position   * Relevant images and results are properly labeled and appropriately displayed   * The need to administer antibiotics or fluids for irrigation purposes during the procedure as applicable   * Safety precations based on patient history or medication use   DURING PROCEDURE: Verification of correct person, site, and procedure occurs any time the responsibility for care of the patient is transferred to another member of the care team.   Bijal Sorto RN  P Otolaryngology/Head & Neck Surgery

## 2017-03-02 NOTE — PROGRESS NOTES
[facility] VOICE [facility]  Elijah Baker Jr., M.D., F.A.C.S.  Mel Fournier M.D., M.P.H.  Misty Clark, Ph.D., CCC/SLP  Jasmyne Padilla M.M. (voice), MMC., CCC/SLP  Zack Mcdaniels M.M. (voice), M.SWAPNIL., [facility]/SLP    [facility] VOICE [facility]  VOICE/SPEECH/BREATHING THERAPY  CLINICAL FOLLOW-UP AND LARYNGEAL EXAMINATION REPORT    Patient: Gary Iyer  Date of Service: 2/27/2017    HISTORY  PATIENT INFORMATION  Gary Iyer was seen for follow-up in conjunction with a visit to Dr. Fournier today.  Please also refer to Dr. Fournier's dictation.  He was last seen on 5/07/16.  He was not discharged at that time,  As it was anticipated that he would be seen for continuing follow-up.  Reassessment was deemed appropriate today, to assess the voice after vocal fold biopsy.    PROGRESS SINCE LAST VISIT  Mr. Iyer has had another surgical excision of a new growth on his :    biopsy was 2/9/17; pathology report showed severe dysplasia but no cancer    He followed voice conservation precautions in the weeks following surgery    He is here for routine follow-up, but Dr. Fournier's laryngeal exam suggests that he would benefit from a continuation of tissue mobilization exercises    OBJECTIVE FINDINGS  VOICE AND SPEECH EVALUATION  Mr. Iyer presents today with a voice quality that is characterized by     His typical consistent mild breathiness and mild strained/rough quality    Using gentle technique; this is likely more habitual than volitional     LARYNGEAL EXAMINATION  Dr. Fournier accomplished the endoscopic laryngeal examination today.  I was not available for the procedure, but reviewed it afterwards with Mr. Iyer.  This exam shows:    Marked erythema of the mucosa of the posterior left vocal fold  o The mucosa is more whitish at the anterior portion    Right vocal fold mucosa is normal    stroboscopy shows near normal vibration of the left vocal fold mucosa, but the mucosa is more stiff than the right vocal fold  o Glottic closure  is within normal limits    This examination shows stiffness of the vocal fold mucosa, suggesting that tissue mobilization exercises will be useful in reducing the stiffness of the mucosa.    THERAPEUTIC ACTIVITIES  Today Mr. Iyer participated in the following therapeutic activities:    Pole Ojea the protocol for a regimen of exercises using straw phonation for semi-occluded vocal tract configurations  o He was able to demonstrate pitch glides and the messa di voce exercise.  o He could demonstrate pitch changes of at least an octave; this is an improvement over his last round of functional therapy  o He learned a protocol for the daily exercise regimen  o I provided a written set of instructions    IMPRESSIONS/ RECOMMENDATIONS/ PLAN  IMPRESSIONS / RECOMMENDATIONS  Based on today s laryngeal examination, it appears that:    Mr. Iyer has post-surgical stiffness of the vocal fold mucosa; he may benefit from tissue mobilization exercises to optimize his healing    I taught Mr. Iyer these exercises today, and provided written instruction    He learned more readily than he has in the past, and seems to understand his exercise protocol     I will see Mr. Iyer along with Dr. Fournier, in two moths, for routine follow-up.    TREATMENT PLAN  I will see Mr. Iyer in 2 months, along with Dr. Fournier, to ensure consistent adherence to his therapeutic regimen.      GOALS  Long-term goal(s): In 6 months, Mr. Iyer will:  1) report a week of typical vocal activities, in which dysphonia does not exceed a level of 4 out of 10, 80% of the time  2) demonstrate a 50% imrprovement in the pliability of the left vocal fold, by stroboscopic laryngeal exam    PRIMARY ICD-10 code: R49.0 (Dysphonia)    TOTAL SERVICE TIME: 30 minutes  EVALUATION OF VOICE AND RESONANCE: (98375): 10 minutes    TREATMENT (98208): 20 minutes  NO CHARGE FACILITY FEE (01131)

## 2017-03-07 PROBLEM — E11.9 DIABETES MELLITUS, TYPE 2 (H): Chronic | Status: ACTIVE | Noted: 2017-03-07

## 2017-03-07 PROBLEM — N40.0 BENIGN PROSTATIC HYPERTROPHY: Chronic | Status: ACTIVE | Noted: 2017-03-07

## 2017-03-08 DIAGNOSIS — I10 HYPERTENSION GOAL BP (BLOOD PRESSURE) < 140/90: ICD-10-CM

## 2017-03-08 RX ORDER — HYDROCHLOROTHIAZIDE 50 MG/1
TABLET ORAL
Qty: 90 TABLET | Refills: 0 | Status: SHIPPED | OUTPATIENT
Start: 2017-03-08 | End: 2017-06-12

## 2017-03-08 RX ORDER — LISINOPRIL 30 MG/1
TABLET ORAL
Qty: 90 TABLET | Refills: 0 | Status: SHIPPED | OUTPATIENT
Start: 2017-03-08 | End: 2017-06-12

## 2017-03-08 NOTE — TELEPHONE ENCOUNTER
Prescription approved per [facility] Refill Protocol.     Lupis Aguayo RN   March 8, 2017 3:02 PM  [facility] Triage   [phone]

## 2017-03-08 NOTE — TELEPHONE ENCOUNTER
lisinopril (PRINIVIL,ZESTRIL) 30 MG tablet      Last Written Prescription Date: 6/28/16  Last Fill Quantity: 90, # refills: 2  Last Office Visit with [facility] or [facility] prescribing provider: 2/3/17       Potassium   Date Value Ref Range Status   02/03/2017 4.1 3.4 - 5.3 mmol/L Final     Creatinine   Date Value Ref Range Status   02/03/2017 0.67 0.66 - 1.25 mg/dL Final     BP Readings from Last 3 Encounters:   02/09/17 146/87   02/03/17 104/74   01/27/17 117/74     hydrochlorothiazide (HYDRODIURIL) 50 MG tablet      Last Written Prescription Date: 6/28/16  Last Fill Quantity: 90, # refills: 2  Last Office Visit with [facility] or [facility] prescribing provider: 2/3/17       Potassium   Date Value Ref Range Status   02/03/2017 4.1 3.4 - 5.3 mmol/L Final     Creatinine   Date Value Ref Range Status   02/03/2017 0.67 0.66 - 1.25 mg/dL Final     BP Readings from Last 3 Encounters:   02/09/17 146/87   02/03/17 104/74   01/27/17 117/74

## 2017-03-09 ENCOUNTER — OFFICE VISIT (OUTPATIENT)
Dept: SLEEP MEDICINE | Facility: CLINIC | Age: 57
End: 2017-03-09
Payer: COMMERCIAL

## 2017-03-09 VITALS
DIASTOLIC BLOOD PRESSURE: 84 MMHG | OXYGEN SATURATION: 97 % | SYSTOLIC BLOOD PRESSURE: 125 MMHG | BODY MASS INDEX: 32.5 KG/M2 | HEART RATE: 105 BPM | WEIGHT: 227 LBS | HEIGHT: 70 IN

## 2017-03-09 DIAGNOSIS — F51.04 PSYCHOPHYSIOLOGICAL INSOMNIA: ICD-10-CM

## 2017-03-09 DIAGNOSIS — G47.9 DISTURBANCE IN SLEEP BEHAVIOR: ICD-10-CM

## 2017-03-09 DIAGNOSIS — E66.09 NON MORBID OBESITY DUE TO EXCESS CALORIES: Chronic | ICD-10-CM

## 2017-03-09 DIAGNOSIS — G47.33 OSA (OBSTRUCTIVE SLEEP APNEA): Primary | ICD-10-CM

## 2017-03-09 DIAGNOSIS — G47.33 OBSTRUCTIVE SLEEP APNEA: Primary | Chronic | ICD-10-CM

## 2017-03-09 DIAGNOSIS — R53.81 MALAISE AND FATIGUE: ICD-10-CM

## 2017-03-09 DIAGNOSIS — R53.83 MALAISE AND FATIGUE: ICD-10-CM

## 2017-03-09 PROCEDURE — 99215 OFFICE O/P EST HI 40 MIN: CPT | Performed by: INTERNAL MEDICINE

## 2017-03-09 RX ORDER — ZOLPIDEM TARTRATE 10 MG/1
TABLET ORAL
Qty: 1 TABLET | Refills: 0 | Status: SHIPPED | OUTPATIENT
Start: 2017-03-09 | End: 2017-03-29

## 2017-03-09 NOTE — MR AVS SNAPSHOT
After Visit Summary   3/9/2017    Gary Iyer    MRN: [id number]           Patient Information     Date Of Birth          1960        Visit Information        Provider Department      3/9/2017 9:00 AM Rene Ochoa MD [facility]        Today's Diagnoses     Obstructive sleep apnea- moderate-severe (AHI 25)    -  1    Non morbid obesity due to excess calories        Psychophysiological insomnia        Disturbance in sleep behavior        Malaise and fatigue          Care Instructions    Read the book Say Good Night To Insomnia     Your BMI is Body mass index is 32.57 kg/(m^2).  Weight management is a personal decision.  If you are interested in exploring weight loss strategies, the following discussion covers the approaches that may be successful. Body mass index (BMI) is one way to tell whether you are at a healthy weight, overweight, or obese. It measures your weight in relation to your height.  A BMI of 18.5 to 24.9 is in the healthy range. A person with a BMI of 25 to 29.9 is considered overweight, and someone with a BMI of 30 or greater is considered obese. More than two-thirds of American adults are considered overweight or obese.  Being overweight or obese increases the risk for further weight gain. Excess weight may lead to heart disease and diabetes.  Creating and following plans for healthy eating and physical activity may help you improve your health.  Weight control is part of healthy lifestyle and includes exercise, emotional health, and healthy eating habits. Careful eating habits lifelong are the mainstay of weight control. Though there are significant health benefits from weight loss, long-term weight loss with diet alone may be very difficult to achieve- studies show long-term success with dietary management in less than 10% of people. Attaining a healthy weight may be especially difficult to achieve in those with severe obesity. In some cases, medications,  devices and surgical management might be considered.  What can you do?  If you are overweight or obese and are interested in methods for weight loss, you should discuss this with your provider.     Consider reducing daily calorie intake by 500 calories.     Keep a food journal.     Avoiding skipping meals, consider cutting portions instead.    Diet combined with exercise helps maintain muscle while optimizing fat loss. Strength training is particularly important for building and maintaining muscle mass. Exercise helps reduce stress, increase energy, and improves fitness. Increasing exercise without diet control, however, may not burn enough calories to loose weight.       Start walking three days a week 10-20 minutes at a time    Work towards walking thirty minutes five days a week     Eventually, increase the speed of your walking for 1-2 minutes at time    In addition, we recommend that you review healthy lifestyles and methods for weight loss available through the National Institutes of Health patient information sites:  http://win.niddk.nih.gov/publications/index.htm    And look into health and wellness programs that may be available through your health insurance provider, employer, local community center, or jenifer club.    Weight management plan: Patient was referred to their PCP to discuss a diet and exercise plan.            Follow-ups after your visit        Follow-up notes from your care team     Return in about 2 weeks (around 3/23/2017) for results.      Your next 10 appointments already scheduled     Mar 14, 2017  8:00 PM CDT   PSG Titration with BK BED 1   [facility] [place] [place]    [address] [place] [id number]   [phone]            Mar 28, 2017  9:20 AM CDT   Return Sleep Patient with Rene Ochoa MD   [facility] ([address]  "[id number]   [phone]              Future tests that were ordered for you today     Open Future Orders        Priority Expected Expires Ordered    Comprehensive Sleep Study Routine  9/5/2017 3/9/2017            Who to contact     If you have questions or need follow up information about today's clinic visit or your schedule please contact [facility] directly at [phone].  Normal or non-critical lab and imaging results will be communicated to you by [employer], letter or phone within 4 business days after the clinic has received the results. If you do not hear from us within 7 days, please contact the clinic through [employer]oca[employer] or phone. If you have a critical or abnormal lab result, we will notify you by phone as soon as possible.  Submit refill requests through [employer] or call your pharmacy and they will forward the refill request to us. Please allow 3 business days for your refill to be completed.          Additional Information About Your Visit        [employer] Information     [employer] gives you secure access to your electronic health record. If you see a primary care provider, you can also send messages to your care team and make appointments. If you have questions, please call your primary care clinic.  If you do not have a primary care provider, please call [phone] and they will assist you.        Care EveryWhere ID     This is your Care EveryWhere ID. This could be used by other organizations to access your [place] medical records  [id number]        Your Vitals Were     Pulse Height Pulse Oximetry BMI (Body Mass Index)          105 1.778 m (5' 10\") 97% 32.57 kg/m2         Blood Pressure from Last 3 Encounters:   03/09/17 125/84   02/09/17 146/87   02/03/17 104/74    Weight from Last 3 Encounters:   03/09/17 103 kg (227 lb)   02/09/17 105.1 kg (231 lb 9.6 oz)   02/03/17 105.1 kg (231 lb 9.6 oz)                 Today's Medication Changes          These changes are accurate as of: 3/9/17 " 10:22 AM.  If you have any questions, ask your nurse or doctor.               Start taking these medicines.        Dose/Directions    zolpidem 10 MG tablet   Commonly known as:  AMBIEN   Used for:  Non morbid obesity due to excess calories, Obstructive sleep apnea, Psychophysiological insomnia, Disturbance in sleep behavior, Malaise and fatigue   Started by:  Rene Ochoa MD        Take tablet by mouth 15 minutes prior to sleep, for Sleep Study   Quantity:  1 tablet   Refills:  0            Where to get your medicines      Some of these will need a paper prescription and others can be bought over the counter.  Ask your nurse if you have questions.     Bring a paper prescription for each of these medications     zolpidem 10 MG tablet                Primary Care Provider Office Phone # Fax #    Finesse Beal [phone]       [address] [address]        Thank you!     Thank you for choosing [facility]  for your care. Our goal is always to provide you with excellent care. Hearing back from our patients is one way we can continue to improve our services. Please take a few minutes to complete the written survey that you may receive in the mail after your visit with us. Thank you!             Your Updated Medication List - Protect others around you: Learn how to safely use, store and throw away your medicines at www.disposemymeds.org.          This list is accurate as of: 3/9/17 10:22 AM.  Always use your most recent med list.                   Brand Name Dispense Instructions for use    allopurinol 300 MG tablet    ZYLOPRIM    90 tablet    Take 1 tablet (300 mg) by mouth daily       aspirin 81 MG tablet      Take 1 tablet by mouth daily.       atorvastatin 20 MG tablet    LIPITOR    90 tablet    Take 1 tablet (20 mg) by mouth daily       blood glucose monitoring lancets     1 Box    1 each 2 times daily Use to test blood sugar 2  times daily or as directed.       blood glucose monitoring meter device kit    no brand specified    1 kit    Use to test blood sugar 2 times daily or as directed.  Accucheck meter please and all associated strips, lancets, and other supplies, 3 month supply with refills for a year.       DAILY MULTIVITAMIN PO      Take  by mouth daily.       hydrochlorothiazide 50 MG tablet    HYDRODIURIL    90 tablet    TAKE 1 TABLET(50 MG) BY MOUTH EVERY MORNING       hydrocortisone 1 % ointment     30 g    Apply topically 2 times daily as needed       hydrocortisone 2.5 % cream    ANUSOL-HC    30 g    Place rectally 2 times daily as needed for hemorrhoids       hydrocortisone 25 MG Suppository    ANUSOL-HC    20 suppository    Place 1 suppository (25 mg) rectally 2 times daily as needed for hemorrhoids       lisinopril 30 MG tablet    PRINIVIL,ZESTRIL    90 tablet    TAKE 1 TABLET(30 MG) BY MOUTH DAILY       metFORMIN 500 MG tablet    GLUCOPHAGE    180 tablet    Take 1 tablet (500 mg) by mouth 2 times daily (with meals)       * order for DME     1 Container    Place rectally 2 times daily 1.8 oz container of 0.2 % nifedipine suppository if possible as easier to place the medication.   Apply on the anus every 12 hours.  Just get to where the anus is tight .       * order for DME     60 suppository    Place rectally every 12 hours 0.2 % nifedipine suppository Apply on the anus every 12 hours.  Just get to where the anus is tight .       * order for DME     1 Device    Equipment being ordered: Diabetic Shoes  One pair       PARoxetine 20 MG tablet    PAXIL    180 tablet    Take 2 tablets (40 mg) by mouth daily       psyllium 0.52 G capsule     100 capsule    Take 1 capsule (0.52 g) by mouth daily       tamsulosin 0.4 MG capsule    FLOMAX    30 capsule    Take 1 capsule (0.4 mg) by mouth daily       triamcinolone 0.1 % cream    KENALOG    30 g    Apply topically 2 times daily as needed for irritation       vitamin D 1000 UNITS  capsule      Take 1 capsule by mouth daily.       zolpidem 10 MG tablet    AMBIEN    1 tablet    Take tablet by mouth 15 minutes prior to sleep, for Sleep Study       * Notice:  This list has 3 medication(s) that are the same as other medications prescribed for you. Read the directions carefully, and ask your doctor or other care provider to review them with you.

## 2017-03-09 NOTE — PATIENT INSTRUCTIONS
Read the book Say Good Night To Insomnia     Your BMI is Body mass index is 32.57 kg/(m^2).  Weight management is a personal decision.  If you are interested in exploring weight loss strategies, the following discussion covers the approaches that may be successful. Body mass index (BMI) is one way to tell whether you are at a healthy weight, overweight, or obese. It measures your weight in relation to your height.  A BMI of 18.5 to 24.9 is in the healthy range. A person with a BMI of 25 to 29.9 is considered overweight, and someone with a BMI of 30 or greater is considered obese. More than two-thirds of American adults are considered overweight or obese.  Being overweight or obese increases the risk for further weight gain. Excess weight may lead to heart disease and diabetes.  Creating and following plans for healthy eating and physical activity may help you improve your health.  Weight control is part of healthy lifestyle and includes exercise, emotional health, and healthy eating habits. Careful eating habits lifelong are the mainstay of weight control. Though there are significant health benefits from weight loss, long-term weight loss with diet alone may be very difficult to achieve- studies show long-term success with dietary management in less than 10% of people. Attaining a healthy weight may be especially difficult to achieve in those with severe obesity. In some cases, medications, devices and surgical management might be considered.  What can you do?  If you are overweight or obese and are interested in methods for weight loss, you should discuss this with your provider.     Consider reducing daily calorie intake by 500 calories.     Keep a food journal.     Avoiding skipping meals, consider cutting portions instead.    Diet combined with exercise helps maintain muscle while optimizing fat loss. Strength training is particularly important for building and maintaining muscle mass. Exercise helps reduce  stress, increase energy, and improves fitness. Increasing exercise without diet control, however, may not burn enough calories to loose weight.       Start walking three days a week 10-20 minutes at a time    Work towards walking thirty minutes five days a week     Eventually, increase the speed of your walking for 1-2 minutes at time    In addition, we recommend that you review healthy lifestyles and methods for weight loss available through the National Institutes of Health patient information sites:  http://win.niddk.nih.gov/publications/index.htm    And look into health and wellness programs that may be available through your health insurance provider, employer, local community center, or jenifer club.    Weight management plan: Patient was referred to their PCP to discuss a diet and exercise plan.

## 2017-03-09 NOTE — NURSING NOTE
"Chief Complaint   Patient presents with     Consult     Chronic Apnea       Initial There were no vitals taken for this visit. Estimated body mass index is 33.23 kg/(m^2) as calculated from the following:    Height as of 2/9/17: 1.778 m (5' 10\").    Weight as of 2/9/17: 105.1 kg (231 lb 9.6 oz).  Medication Reconciliation: complete   Neck circumference: 17.5 inches/45 cm      "

## 2017-03-09 NOTE — PROGRESS NOTES
Sleep Consultation:    Date on this visit: 3/9/2017      Primary Physician: Finesse Beal     Chief Complaint   Patient presents with     Consult     Chronic Apnea     He was diagnosed with obstructive sleep apnea, initial sleep study was done > 10 years ago. He cannot recall why first study was done, probably was done for snoring. He tried CPAP for a day or so and then quit. He had another sleep study in 2012 which showed moderate obstructive sleep apnea. He tried for 2-3 days but 'could not sleep' and again stopped using it. He does not have a DOT liscence.    Gary goes to bed at 9:00 PM during the week. He gets up at 6:00 AM with an alarm. Gary has difficulty falling asleep. He usually listens to the news on computer in bed for 60 minutes, then shuts it off. He has had problems falling asleep since he quit drinking 4 years ago. He states his mind seems overactive. He falls asleep while listening to the computer with melatonin. He wakes up >2 times a night for 10 minutes before falling back to sleep.  Gary wakes up to go to the bathroom.     On weekends, Gary goes to bed at 9:30 PM.  He gets up at 9:00 AM without an alarm. He watches the computer at 4:30 AM for 10 minutes. Patient gets an average of >7 hours of sleep per night.     Patient does have a regular bed partner. They never sleep separately.  Patient sleeps on his back << side. He denies no morning headaches or restless legs.     Sometimes at night he has rare (2-3/month) awakenings which seem to be related to left leg is kicking and body is stiff. This has been present for many years (>10 years)    Gary denies any sleep walking, sleep talking and dream enactment.    Gary denies difficulty breathing through his nose and reflux at night.      Gary has gained 10-15 pounds since sleep study 2012.  Patient describes themself as a night person. Patient's Fieldon Sleepiness score 0/24 (rescored 6) inconsistent with excessive  daytime  sleepiness.  He does have some fatigue.     Gary naps 1 times per day for 120 minutes at 10 AM. He takes no inadvertant naps.  He denies dozing while driving.  He uses 2-3 cups/day of tea. Last caffeine intake is usually before 6 PM.      Lab Results   Component Value Date    TSH 1.98 09/19/2016     Recent Labs   Lab Test  02/03/17   1043  09/19/16   0948   NA  137  136   POTASSIUM  4.1  4.3   CHLORIDE  98  99   CO2  28  29   ANIONGAP  11  8   GLC  106*  117*   BUN  10  10   CR  0.67  0.69   TRUMAN  9.4  8.9     Lab Results   Component Value Date    WBC 7.5 09/19/2016     Lab Results   Component Value Date    RBC 4.53 09/19/2016     Lab Results   Component Value Date    HGB 13.3 09/19/2016     Lab Results   Component Value Date    HCT 39.4 09/19/2016     No components found for: MCT  Lab Results   Component Value Date    MCV 87 09/19/2016     Lab Results   Component Value Date    MCH 29.4 09/19/2016     Lab Results   Component Value Date    MCHC 33.8 09/19/2016     Lab Results   Component Value Date    RDW 14.5 09/19/2016     Lab Results   Component Value Date     09/19/2016         Allergies:    No Known Allergies    Medications:    Current Outpatient Prescriptions   Medication Sig Dispense Refill     lisinopril (PRINIVIL,ZESTRIL) 30 MG tablet TAKE 1 TABLET(30 MG) BY MOUTH DAILY 90 tablet 0     hydrochlorothiazide (HYDRODIURIL) 50 MG tablet TAKE 1 TABLET(50 MG) BY MOUTH EVERY MORNING 90 tablet 0     blood glucose monitoring (NO BRAND SPECIFIED) meter device kit Use to test blood sugar 2 times daily or as directed.  Accucheck meter please and all associated strips, lancets, and other supplies, 3 month supply with refills for a year. 1 kit 0     order for DME Equipment being ordered: Diabetic Shoes    One pair 1 Device 0     tamsulosin (FLOMAX) 0.4 MG capsule Take 1 capsule (0.4 mg) by mouth daily 30 capsule 1     PARoxetine (PAXIL) 20 MG tablet Take 2 tablets (40 mg) by mouth daily 180 tablet 1     order for  DME Place rectally 2 times daily 1.8 oz container of 0.2 % nifedipine suppository if possible as easier to place the medication.    Apply on the anus every 12 hours.  Just get to where the anus is tight . 1 Container 5     order for DME Place rectally every 12 hours 0.2 % nifedipine suppository  Apply on the anus every 12 hours.  Just get to where the anus is tight . 60 suppository 5     psyllium 0.52 G capsule Take 1 capsule (0.52 g) by mouth daily 100 capsule 3     atorvastatin (LIPITOR) 20 MG tablet Take 1 tablet (20 mg) by mouth daily 90 tablet 3     hydrocortisone (ANUSOL-HC) 25 MG suppository Place 1 suppository (25 mg) rectally 2 times daily as needed for hemorrhoids 20 suppository 1     hydrocortisone (ANUSOL-HC) 2.5 % rectal cream Place rectally 2 times daily as needed for hemorrhoids 30 g 1     triamcinolone (KENALOG) 0.1 % cream Apply topically 2 times daily as needed for irritation 30 g 1     hydrocortisone 1 % ointment Apply topically 2 times daily as needed 30 g 1     metFORMIN (GLUCOPHAGE) 500 MG tablet Take 1 tablet (500 mg) by mouth 2 times daily (with meals) 180 tablet 1     allopurinol (ZYLOPRIM) 300 MG tablet Take 1 tablet (300 mg) by mouth daily 90 tablet 3     blood glucose monitoring (ALON MICROLET) lancets 1 each 2 times daily Use to test blood sugar 2 times daily or as directed. 1 Box 3     Cholecalciferol (VITAMIN D) 1000 UNITS capsule Take 1 capsule by mouth daily.       Multiple Vitamin (DAILY MULTIVITAMIN PO) Take  by mouth daily.       aspirin 81 MG tablet Take 1 tablet by mouth daily.         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  Sleep study [facility] 5/2012 (216#)  AHI 25, .9, low O2 32% (probably artifact by hypnogram). True low O2 probably upper 70s on CPAP during REM. CPAP 8cm with fragmented sleep during supine REM.   no c-pap use       Anxiety 06/13/2013     Priority: Medium     Hypertension goal BP (blood pressure) < 140/90 11/30/2012     Priority: Medium     Hyperlipidemia LDL goal <70 05/04/2012     Priority: Medium     Benign prostatic hypertrophy 03/07/2017     Priority: Low     Type 2 diabetes mellitus without complication, without long-term current use of insulin (H) 02/08/2017     Priority: Low     Dysphonia 04/24/2016     Priority: Low     Vocal process granuloma 04/24/2016     Priority: Low     Type 2 diabetes mellitus without complication (H) 12/23/2015     Priority: Low     Non morbid obesity due to excess calories 12/23/2015     Priority: Low     Carotid stenosis 08/07/2014     Priority: Low     Ultrasound 2016- Less than 50% diameter stenosis of the right ICA relative to the distal ICA diameter. Less than 50% diameter stenosis of the left ICA relative to the distal ICA diameter.       Advanced directives, counseling/discussion 07/23/2013     Priority: Low     Advance Care Planning:  Information given                Gout, chronic 02/07/2013     Priority: Low        Past Medical/Surgical History:  Past Medical History   Diagnosis Date     Multinodular goiter (nontoxic) 6/10/2013     Past Surgical History   Procedure Laterality Date     Vasectomy  02/03     Head & neck surgery  1/25/11     callous removal from throat     Colonoscopy       Ent surgery  2008     Vocal cord carcinoma     Laryngoscopy with biopsy(ies)  1-25-11     Colonoscopy  6-22-12     Repeat Colonoscopy in 10 yrs.      Laser co2 laryngoscopy, complex  6/27/2013     Procedure: LASER CO2 LARYNGOSCOPY, COMPLEX;  Micro Direct Laryngoscopy With Micro Flap Excision Of  Lesion Lumenis Co2 Laser ;  Surgeon: Mel Fournier MD;  Location: [facility] OR     Orthopedic surgery  March 2016     left tibia orif with abbi 2-2016     Laryngoscopy with microscope N/A 2/9/2017     Procedure: LARYNGOSCOPY WITH MICROSCOPE;  Surgeon: Mel Fournier MD;  Location: [facility] OR     Laryngoscopy with biopsy(ies) N/A 2/9/2017     Procedure: LARYNGOSCOPY WITH BIOPSY(IES);  Surgeon: Mel Fournier MD;  Location: [facility] OR     Laser co2 lesion oral N/A 2/9/2017     Procedure: LASER CO2 LESION ORAL;  Surgeon: Mel Fournier MD;  Location: [facility] OR       Social History:  Social History     Social History     Marital status:      Spouse name: N/A     Number of children: 3     Years of education: N/A     Occupational History     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 No     Social History Narrative       Family History:  Family History   Problem Relation Age of Onset     C.A.D. Father      Hypertension Mother      DIABETES No family hx of      CEREBROVASCULAR DISEASE No family hx of      CANCER No family hx of      Glaucoma No family hx of      Macular Degeneration No family hx of        Review of Systems:  A complete review of systems reviewed by me is negative with the exeption of what has been mentioned in the history of present illness.  CONSTITUTIONAL: NEGATIVE for weight gain/loss, fever, chills, sweats or night sweats, drug allergies.  EYES: NEGATIVE for changes in vision, blind spots, double vision.  ENT: NEGATIVE for ear pain, sore throat, sinus pain, post-nasal drip, runny nose, bloody nose  CARDIAC: NEGATIVE for fast heartbeats or  "fluttering in chest, chest pain or pressure, breathlessness when lying flat, swollen legs or swollen feet.  NEUROLOGIC: NEGATIVE headaches, weakness or numbness in the arms or legs.  DERMATOLOGIC: NEGATIVE for rashes, new moles or change in mole(s)  PULMONARY: NEGATIVE SOB at rest, SOB with activity, dry cough, productive cough, coughing up blood, wheezing or whistling when breathing.    GASTROINTESTINAL: NEGATIVE for nausea or vomitting, loose or watery stools, fat or grease in stools, constipation, abdominal pain, bowel movements black in color or blood noted.  GENITOURINARY: NEGATIVE for pain during urination, blood in urine, urinating more frequently than usual, irregular menstrual periods.  MUSCULOSKELETAL: NEGATIVE for muscle pain, bone or joint pain, swollen joints.  ENDOCRINE: NEGATIVE for increased thirst or urination, diabetes.  LYMPHATIC: NEGATIVE for swollen lymph nodes, lumps or bumps in the breasts or nipple discharge.    Physical Examination:  Vitals: /84  Pulse 105  Ht 1.778 m (5' 10\")  Wt 103 kg (227 lb)  SpO2 97%  BMI 32.57 kg/m2  BMI= Body mass index is 32.57 kg/(m^2).    [place] Total Score 3/9/2017   Total score - [place] 0     GENERAL APPEARANCE: alert and no distress  EYES: Eyes grossly normal to inspection and conjunctivae and sclerae normal  HENT: ear canals and TM's normal, nose and mouth without ulcers or lesions and oropharynx crowded  NECK: no adenopathy, no asymmetry, masses, or scars and thyroid normal to palpation  RESP: lungs clear to auscultation - no rales, rhonchi or wheezes  CV: tachycardia, distant  ABDOMEN: soft, nontender, without hepatosplenomegaly or masses  MS: scar, mild stasis left   NEURO: Normal strength and tone, mentation intact, speech normal and cranial nerves 2-12 intact  PSYCH: mentation appears normal and affect normal/bright  Mallampati Class: II.  Tonsillar Stage: 1  hidden by pillars.    Impression/Plan:    Moderate obstructive sleep apnea by sleep study " 2012. Currents symptoms of fatigue, nocturnal awakenings, leg shaking. Comorbid diabetes mellitus, hypertension. Previously intolerant of CPAP but very short trial and likely complicated by insomnia. Polysomnogram (using 4% desaturation/Medicare/2012 AASM 1B scoring rules) with all-night titration. Ambien if needed.       Sleep onset difficulties. Suspect multifactorial: delayed sleep phase syndrome, Psychophysiologic insomnia, poor sleep hygiene. We briefly discussed stimulus control, circadian rhythms, caffeine use. Recommend regular wake times, avoiding naps in morning, later bed times. Read the book Say Good Night To Insomnia       Nocturnal leg kicking/stiffness. This may be related to his obstructive sleep apnea. Nocturnal seizure disorder could also be considerered.         Literature provided regarding sleep apnea and insomnia.      He will follow up with me in approximately two weeks after his sleep study has been competed to review the results and discuss plan of care.       Polysomnography reviewed.  Obstructive sleep apnea reviewed.  Complications of untreated sleep apnea were reviewed.    Rene Ochoa I spent 75 minutes with patient. More than 1/2 this time spent counselling.     CC: Finesse Beal

## 2017-03-14 ENCOUNTER — THERAPY VISIT (OUTPATIENT)
Dept: SLEEP MEDICINE | Facility: CLINIC | Age: 57
End: 2017-03-14
Payer: COMMERCIAL

## 2017-03-14 DIAGNOSIS — E66.09 NON MORBID OBESITY DUE TO EXCESS CALORIES: Chronic | ICD-10-CM

## 2017-03-14 DIAGNOSIS — R53.81 MALAISE AND FATIGUE: ICD-10-CM

## 2017-03-14 DIAGNOSIS — G47.33 OBSTRUCTIVE SLEEP APNEA: Chronic | ICD-10-CM

## 2017-03-14 DIAGNOSIS — F51.04 PSYCHOPHYSIOLOGICAL INSOMNIA: ICD-10-CM

## 2017-03-14 DIAGNOSIS — G47.9 DISTURBANCE IN SLEEP BEHAVIOR: ICD-10-CM

## 2017-03-14 DIAGNOSIS — R53.83 MALAISE AND FATIGUE: ICD-10-CM

## 2017-03-14 PROCEDURE — 95811 POLYSOM 6/>YRS CPAP 4/> PARM: CPT | Performed by: INTERNAL MEDICINE

## 2017-03-14 NOTE — MR AVS SNAPSHOT
After Visit Summary   3/14/2017    Gary Iyer    MRN: [id number]           Patient Information     Date Of Birth          1960        Visit Information        Provider Department      3/14/2017 8:00 PM [facility] BED [facility]        Today's Diagnoses     Non morbid obesity due to excess calories        Obstructive sleep apnea- moderate-severe (AHI 25)        Psychophysiological insomnia        Disturbance in sleep behavior        Malaise and fatigue           Follow-ups after your visit        Your next 10 appointments already scheduled     Mar 28, 2017  9:20 AM CDT   Return Sleep Patient with Rene Ochoa MD   [facility] ([facility] [place]    [address] [address]   [phone]            May 05, 2017  9:40 AM CDT   (Arrive by 9:10 AM)   RETURN THROAT with Mel Fournier MD   [facility] ([facility] [place])    [address] [address] [phone]           - This is a multidisciplinary care team visit with an ENT physician and may include a speech language pathologist. - It is important to know that if you are evaluated and/or treated by both a physician and a speech pathologist during your visit, your billing will reflect the input that you receive from both providers as separate professionals. Although most insurance plans do cover these services, we encourage you to contact your insurance company prior to your visit to determine whether there are any coverage limitations that might affect you financially. - Billing/procedure codes that are frequently associated with visits to our clinic include (but are not limited to) the ones listed below. Most patients will not need all of these items, but it may be useful to ask your insurance company's patient . 09328: Flexible fiberoptic laryngoscopy, 10052:  Laryngoscopy; flexible or rigid fiberoptic, with stroboscopy, 01024: Flexible endoscopic evaluation of swallowing, 64362: Speech pathology evaluation, 47757: Speech pathology treatment for voice, speech, communication, 67878: Speech pathology treatment for swallowing/oral function for feeding - If you have any concerns or questions, or if you would prefer not to have a speech pathologist involved in your visit, please contact our Clinic Coordinator at [phone], at least 24 hours prior to your appointment.              Who to contact     If you have questions or need follow up information about today's clinic visit or your schedule please contact [facility] directly at [phone].  Normal or non-critical lab and imaging results will be communicated to you by [employer], letter or phone within 4 business days after the clinic has received the results. If you do not hear from us within 7 days, please contact the clinic through [employer] or phone. If you have a critical or abnormal lab result, we will notify you by phone as soon as possible.  Submit refill requests through [employer] or call your pharmacy and they will forward the refill request to us. Please allow 3 business days for your refill to be completed.          Additional Information About Your Visit        [employer] Information     [employer] gives you secure access to your electronic health record. If you see a primary care provider, you can also send messages to your care team and make appointments. If you have questions, please call your primary care clinic.  If you do not have a primary care provider, please call [phone] and they will assist you.        Care EveryWhere ID     This is your Care EveryWhere ID. This could be used by other organizations to access your [place] medical records  [id number]         Blood Pressure from Last 3 Encounters:   03/09/17 125/84   02/09/17 146/87   02/03/17 104/74    Weight from Last 3 Encounters:    03/09/17 103 kg (227 lb)   02/09/17 105.1 kg (231 lb 9.6 oz)   02/03/17 105.1 kg (231 lb 9.6 oz)              We Performed the Following     Comprehensive Sleep Study        Primary Care Provider Office Phone # Fax #    Finesse Beal [phone]       [facility] [address] [facility] [address]        Thank you!     Thank you for choosing [facility]  for your care. Our goal is always to provide you with excellent care. Hearing back from our patients is one way we can continue to improve our services. Please take a few minutes to complete the written survey that you may receive in the mail after your visit with us. Thank you!             Your Updated Medication List - Protect others around you: Learn how to safely use, store and throw away your medicines at www.disposemymeds.org.          This list is accurate as of: 3/14/17 11:59 PM.  Always use your most recent med list.                   Brand Name Dispense Instructions for use    allopurinol 300 MG tablet    ZYLOPRIM    90 tablet    Take 1 tablet (300 mg) by mouth daily       aspirin 81 MG tablet      Take 1 tablet by mouth daily.       atorvastatin 20 MG tablet    LIPITOR    90 tablet    Take 1 tablet (20 mg) by mouth daily       blood glucose monitoring lancets     1 Box    1 each 2 times daily Use to test blood sugar 2 times daily or as directed.       blood glucose monitoring meter device kit    no brand specified    1 kit    Use to test blood sugar 2 times daily or as directed.  Accucheck meter please and all associated strips, lancets, and other supplies, 3 month supply with refills for a year.       DAILY MULTIVITAMIN PO      Take  by mouth daily.       hydrochlorothiazide 50 MG tablet    HYDRODIURIL    90 tablet    TAKE 1 TABLET(50 MG) BY MOUTH EVERY MORNING       hydrocortisone 1 % ointment     30 g    Apply topically 2 times daily as needed       hydrocortisone 2.5 % cream    ANUSOL-HC     30 g    Place rectally 2 times daily as needed for hemorrhoids       hydrocortisone 25 MG Suppository    ANUSOL-HC    20 suppository    Place 1 suppository (25 mg) rectally 2 times daily as needed for hemorrhoids       lisinopril 30 MG tablet    PRINIVIL,ZESTRIL    90 tablet    TAKE 1 TABLET(30 MG) BY MOUTH DAILY       metFORMIN 500 MG tablet    GLUCOPHAGE    180 tablet    Take 1 tablet (500 mg) by mouth 2 times daily (with meals)       * order for DME     1 Container    Place rectally 2 times daily 1.8 oz container of 0.2 % nifedipine suppository if possible as easier to place the medication.   Apply on the anus every 12 hours.  Just get to where the anus is tight .       * order for DME     60 suppository    Place rectally every 12 hours 0.2 % nifedipine suppository Apply on the anus every 12 hours.  Just get to where the anus is tight .       * order for DME     1 Device    Equipment being ordered: Diabetic Shoes  One pair       PARoxetine 20 MG tablet    PAXIL    180 tablet    Take 2 tablets (40 mg) by mouth daily       psyllium 0.52 G capsule     100 capsule    Take 1 capsule (0.52 g) by mouth daily       tamsulosin 0.4 MG capsule    FLOMAX    30 capsule    Take 1 capsule (0.4 mg) by mouth daily       triamcinolone 0.1 % cream    KENALOG    30 g    Apply topically 2 times daily as needed for irritation       vitamin D 1000 UNITS capsule      Take 1 capsule by mouth daily.       zolpidem 10 MG tablet    AMBIEN    1 tablet    Take tablet by mouth 15 minutes prior to sleep, for Sleep Study       * Notice:  This list has 3 medication(s) that are the same as other medications prescribed for you. Read the directions carefully, and ask your doctor or other care provider to review them with you.

## 2017-03-15 NOTE — PROCEDURES
"SLEEP STUDY INTERPRETATION  TITRATION STUDY      Patient: Gary Iyer  YOB: 1960  Study Date: 3/14/2017  MRN: [id number]  Referring Provider: Finesse Beal MD  Ordering Provider: Rene Ochoa MD    Indications for Polysomnography: The patient is a 56 y old Male who is 5' 10\" and weighs 227.0 lbs.  His BMI is 32.9, Thorp sleepiness scale is 0 and neck size is 45.  A diagnostic polysomnogram PAP titration was performed for history of moderate obstructive sleep apnea by sleep study 2012. Symptoms of fatigue, nocturnal awakenings, leg shaking. Comorbid diabetes mellitus, hypertension    Polysomnogram Data:  A full night polysomnogram recorded the standard physiologic parameters including EEG, EOG, EMG, ECG, nasal and oral airflow.  Respiratory parameters of chest and abdominal movements were recorded with respiratory inductance plethysmography.  Oxygen saturation was recorded by pulse oximetry.      Treatment PSG:  Sleep Architecture: Cyclic alternating pattern of arousal with and without body movements and leg movements was seen.   The total recording time of the study was 417.7 minutes.  The total sleep time was 282.5 minutes.  Sleep latency was increased at 26.3 minutes with the use of a sleep aid.  REM latency was 227.0 minutes.  Arousal index was 46.9 arousals per hour.  Sleep efficiency was decreased at 67.6%.  Wake after sleep onset was 108.0 minutes.   The patient spent 23.0% of total sleep time in Stage N1, 54.5% in Stage N2, 13.5% in Stage N3 and 9.0% in REM.     Respiration: titration was complicated by high leaks on several different mask types.     The patient was titrated at pressures ranging from 5 cmH2O up to 15 cmH2O.  Most of titrations were done for RERAs and flow limitations. The final  pressure achieved was 15 cmH2O with a residual AHI of 0 events per hour.  REM supine was seen at 12 cm with good control of events.      Respiratory rate and pattern - was notable for normal " respiratory rate and pattern.    Sustained Sleep Associated Hypoventilation - Transcutaneous carbon dioxide monitoring was not usedSleep Associated Hypoxemia - (Greater than 5 minutes O2 sat below 89%) was not present.  Baseline oxygen saturation was 95.7%. Lowest oxygen saturation was 84.1%.  Time spent less than or equal to 88% was 0.9 minutes.  Time spent less than or equal to 89% was 1.1 minutes.         Movement Activity:     Periodic Limb Activity - There were 342 PLMs during the entire study. The PLM index was 72.6 movements per hour.  The PLM Arousal Index was 24.4 per hour.    REM EMG Activity - Excessive transient / sustained muscle activity was not present.    Nocturnal Behavior - Cyclic alternating pattern of arousal with and without whole body movements and leg movements was seen.     Bruxism - None apparent.    Cardiac Summary:  Arrhythmias were not noted.            Assessment:     Moderate obstructive sleep apnea    Cyclic alternating pattern of arousal, often associated with whole body movements, leg movements    Frequent periodic limb movements, body movements.     Recommendations:    Treatment of RITO with Auto-titrating PAP therapy with a range of 10 - 16 cmH2O.  Recommend clinical follow up with sleep management team, including review of compliance measures.    Close attention to mask fit    If sleep disruption and/or daytime fatigue persists despite adequate treatment with PAP consider a trial of treatment with gabapentin or more directed treatment for PLMs (Mirapex e.g.) or arousals (ambien/clonazepam e.g.) and check iron levels.         _____________________________________   Rene Ochoa MD         Table of Oximetry Distribution    Range(%) Time in range (min) Time in range (%) Time in or below range (min) Time in or below range (%)   0.0 - 88.0 0.9 0.2% 0.9 0.2%   0.0 - 89.0 1.1 0.3% 1.1 0.3%

## 2017-03-15 NOTE — NURSING NOTE
Completed a all night titration PSG per provider order.    Preliminary AHI 30.  A final therapeutic PAP pressure was achieved.    Supine REM was seen on therapeutic pressure.    Patient reports feeling refreshed in AM.

## 2017-03-24 DIAGNOSIS — N40.1 BENIGN PROSTATIC HYPERPLASIA WITH LOWER URINARY TRACT SYMPTOMS, UNSPECIFIED MORPHOLOGY: ICD-10-CM

## 2017-03-24 NOTE — TELEPHONE ENCOUNTER
tamsulosin (FLOMAX) 0.4 MG capsule      Last Written Prescription Date: 1/27/17  Last Fill Quantity: 30, # refills: 1  Last Office Visit with [facility], [facility] or [facility] prescribing provider: 2/3/17       BP Readings from Last 3 Encounters:   03/09/17 125/84   02/09/17 146/87   02/03/17 104/74

## 2017-03-27 RX ORDER — TAMSULOSIN HYDROCHLORIDE 0.4 MG/1
CAPSULE ORAL
Qty: 30 CAPSULE | Refills: 1 | Status: SHIPPED | OUTPATIENT
Start: 2017-03-27 | End: 2017-05-23

## 2017-03-27 NOTE — TELEPHONE ENCOUNTER
Prescription approved per [facility] Refill Protocol.  Juany Feliciano RN  [facility]

## 2017-03-29 ENCOUNTER — OFFICE VISIT (OUTPATIENT)
Dept: SLEEP MEDICINE | Facility: CLINIC | Age: 57
End: 2017-03-29
Payer: COMMERCIAL

## 2017-03-29 VITALS
BODY MASS INDEX: 32.64 KG/M2 | WEIGHT: 228 LBS | DIASTOLIC BLOOD PRESSURE: 77 MMHG | HEIGHT: 70 IN | HEART RATE: 106 BPM | OXYGEN SATURATION: 97 % | SYSTOLIC BLOOD PRESSURE: 123 MMHG

## 2017-03-29 DIAGNOSIS — G47.61 PERIODIC LIMB MOVEMENTS OF SLEEP: Chronic | ICD-10-CM

## 2017-03-29 DIAGNOSIS — Z01.818 PREOP GENERAL PHYSICAL EXAM: ICD-10-CM

## 2017-03-29 DIAGNOSIS — E66.09 NON MORBID OBESITY DUE TO EXCESS CALORIES: ICD-10-CM

## 2017-03-29 DIAGNOSIS — G47.33 OBSTRUCTIVE SLEEP APNEA: Primary | ICD-10-CM

## 2017-03-29 DIAGNOSIS — Z71.89 ADVANCED DIRECTIVES, COUNSELING/DISCUSSION: Chronic | ICD-10-CM

## 2017-03-29 DIAGNOSIS — F51.04 PSYCHOPHYSIOLOGICAL INSOMNIA: ICD-10-CM

## 2017-03-29 LAB
FERRITIN SERPL-MCNC: 46 NG/ML (ref 26–388)
HBA1C MFR BLD: 6.3 % (ref 4.3–6)

## 2017-03-29 PROCEDURE — 36415 COLL VENOUS BLD VENIPUNCTURE: CPT | Performed by: INTERNAL MEDICINE

## 2017-03-29 PROCEDURE — 82728 ASSAY OF FERRITIN: CPT | Performed by: INTERNAL MEDICINE

## 2017-03-29 PROCEDURE — 83036 HEMOGLOBIN GLYCOSYLATED A1C: CPT | Performed by: FAMILY MEDICINE

## 2017-03-29 PROCEDURE — 99214 OFFICE O/P EST MOD 30 MIN: CPT | Performed by: INTERNAL MEDICINE

## 2017-03-29 NOTE — PATIENT INSTRUCTIONS

## 2017-03-29 NOTE — PROGRESS NOTES
Sleep Study Follow-Up Visit:    Date on this visit: 3/29/2017    Gary Iyer comes in today for follow-up of his sleep study done on 3/14/17 at the [facility] for history of moderate obstructive sleep apnea by sleep study 2012, symptoms of fatigue, nocturnal awakenings, leg shaking. Comorbid diabetes mellitus, hypertension. Previously intolerant of CPAP but very short trial and likely complicated by insomnia. Sleep onset difficulties. Suspect multifactorial: delayed sleep phase syndrome, psychophysiologic insomnia, poor sleep hygiene. Nocturnal leg kicking/stiffness, this may be related to his obstructive sleep apnea or PLMS, nocturnal seizure disorder could also be considerered.        Study Date: 3/14/2017  Treatment PSG:  Sleep Architecture: Cyclic alternating pattern of arousal with and without body movements and leg movements was seen.   The total recording time of the study was 417.7 minutes. The total sleep time was 282.5 minutes. Sleep latency was increased at 26.3 minutes with the use of a sleep aid. REM latency was 227.0 minutes. Arousal index was 46.9 arousals per hour. Sleep efficiency was decreased at 67.6%. Wake after sleep onset was 108.0 minutes. The patient spent 23.0% of total sleep time in Stage N1, 54.5% in Stage N2, 13.5% in Stage N3 and 9.0% in REM.      Respiration: titration was complicated by high leaks on several different mask types.     The patient was titrated at pressures ranging from 5 cmH2O up to 15 cmH2O. Most of titrations were done for RERAs and flow limitations. The final pressure achieved was 15 cmH2O with a residual AHI of 0 events per hour. REM supine was seen at 12 cm with good control of events.     Respiratory rate and pattern - was notable for normal respiratory rate and pattern.    Sustained Sleep Associated Hypoventilation - Transcutaneous carbon dioxide monitoring was not usedSleep Associated Hypoxemia - (Greater than 5 minutes O2 sat below  89%) was not present. Baseline oxygen saturation was 95.7%. Lowest oxygen saturation was 84.1%. Time spent less than or equal to 88% was 0.9 minutes. Time spent less than or equal to 89% was 1.1 minutes.      Movement Activity:     Periodic Limb Activity - There were 342 PLMs during the entire study. The PLM index was 72.6 movements per hour. The PLM Arousal Index was 24.4 per hour.    REM EMG Activity - Excessive transient / sustained muscle activity was not present.    Nocturnal Behavior - Cyclic alternating pattern of arousal with and without whole body movements and leg movements was seen.     Bruxism - None apparent.     Cardiac Summary:  Arrhythmias were not noted.    These findings were reviewed with patient.     Past medical/surgical history, family history, social history, medications and allergies were reviewed.      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  Sleep study [facility] 5/2012 (216#)  AHI 25, .9, low O2 32% (probably artifact by hypnogram). True low O2 probably upper 70s on CPAP during REM. CPAP 8cm with fragmented sleep during supine REM.   Study Date: 3/14/2017- (227.0 lbs)- Titration was complicated by high leaks on several different mask types. The patient was titrated at pressures ranging from 5 cmH2O up to 15 cmH2O.  Most of titrations were done for RERAs and flow limitations. The final  pressure achieved was 15 cmH2O with a residual AHI of 0 events per hour.  REM supine was seen at 12 cm with good control of events. Lowest oxygen saturation was  84.1%.  Time spent less than or equal to 88% was 0.9 minutes. PLM index was 72.6 movements per hour.  The PLM Arousal Index was 24.4 per hour. Cyclic alternating pattern of arousal with and without whole body movements and leg movements was seen.        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 Less than 50% diameter stenosis of the left ICA relative to the distal ICA diameter.       Advanced directives, counseling/discussion 07/23/2013     Priority: Low     Advance Care Planning:  Information given                Gout, chronic 02/07/2013     Priority: Low        Impression/Plan:    Moderate obstructive sleep apnea  -Recommend autoCPAP 10-16 cm  -Nasal pillows with chin strap    Insomnia. Patient has mad some behavior changes and bought but not read the book Say Good Night To Insomnia      Frequent disruptive PLMS on Polysomnogram.  Check ferritin, consider trial of mirapex.      He will follow up with me in about 2 month(s).     Twenty-five minutes spent with patient, all of which were spent face-to-face counseling, consulting, coordinating plan of care.      Rene Ochoa

## 2017-03-29 NOTE — MR AVS SNAPSHOT
After Visit Summary   3/29/2017    Gary Iyer    MRN: [id number]           Patient Information     Date Of Birth          1960        Visit Information        Provider Department      3/29/2017 10:20 AM Rene Ochoa MD [facility]        Today's Diagnoses     Obstructive sleep apnea    -  1    Psychophysiological insomnia        Non morbid obesity due to excess calories        Periodic limb movements of sleep        Advanced directives, counseling/discussion          Care Instructions      Your BMI is Body mass index is 32.71 kg/(m^2).  Weight management is a personal decision.  If you are interested in exploring weight loss strategies, the following discussion covers the approaches that may be successful. Body mass index (BMI) is one way to tell whether you are at a healthy weight, overweight, or obese. It measures your weight in relation to your height.  A BMI of 18.5 to 24.9 is in the healthy range. A person with a BMI of 25 to 29.9 is considered overweight, and someone with a BMI of 30 or greater is considered obese. More than two-thirds of American adults are considered overweight or obese.  Being overweight or obese increases the risk for further weight gain. Excess weight may lead to heart disease and diabetes.  Creating and following plans for healthy eating and physical activity may help you improve your health.  Weight control is part of healthy lifestyle and includes exercise, emotional health, and healthy eating habits. Careful eating habits lifelong are the mainstay of weight control. Though there are significant health benefits from weight loss, long-term weight loss with diet alone may be very difficult to achieve- studies show long-term success with dietary management in less than 10% of people. Attaining a healthy weight may be especially difficult to achieve in those with severe obesity. In some cases, medications, devices and surgical management might be  considered.  What can you do?  If you are overweight or obese and are interested in methods for weight loss, you should discuss this with your provider.     Consider reducing daily calorie intake by 500 calories.     Keep a food journal.     Avoiding skipping meals, consider cutting portions instead.    Diet combined with exercise helps maintain muscle while optimizing fat loss. Strength training is particularly important for building and maintaining muscle mass. Exercise helps reduce stress, increase energy, and improves fitness. Increasing exercise without diet control, however, may not burn enough calories to loose weight.       Start walking three days a week 10-20 minutes at a time    Work towards walking thirty minutes five days a week     Eventually, increase the speed of your walking for 1-2 minutes at time    In addition, we recommend that you review healthy lifestyles and methods for weight loss available through the National Institutes of Health patient information sites:  http://win.niddk.nih.gov/publications/index.htm    And look into health and wellness programs that may be available through your health insurance provider, employer, local community center, or jenifer club.    Weight management plan: Patient was referred to their PCP to discuss a diet and exercise plan.            Follow-ups after your visit        Follow-up notes from your care team     Return in about 2 months (around 5/29/2017) for Routine Visit.      Your next 10 appointments already scheduled     Apr 14, 2017 10:00 AM CDT   PAP SETUP with DEN CLAYTON   [facility] ([facility])    [address] [place] [id number] [phone]            May 12, 2017 10:30 AM CDT   (Arrive by 10:00 AM)   RETURN THROAT with Mel Fournier MD   [facility] ([facility])    [address] [address]    [phone]           - This is a multidisciplinary care team visit with an ENT physician and may include a speech language pathologist. - It is important to know that if you are evaluated and/or treated by both a physician and a speech pathologist during your visit, your billing will reflect the input that you receive from both providers as separate professionals. Although most insurance plans do cover these services, we encourage you to contact your insurance company prior to your visit to determine whether there are any coverage limitations that might affect you financially. - Billing/procedure codes that are frequently associated with visits to our clinic include (but are not limited to) the ones listed below. Most patients will not need all of these items, but it may be useful to ask your insurance company's patient . 19237: Flexible fiberoptic laryngoscopy, 17716: Laryngoscopy; flexible or rigid fiberoptic, with stroboscopy, 35968: Flexible endoscopic evaluation of swallowing, 88437: Evaluation of Voice and Resonance, 62893: Speech pathology treatment for voice, speech, communication, 93448: Speech pathology treatment for swallowing/oral function for feeding - If you have any concerns or questions, or if you would prefer not to have a speech pathologist involved in your visit, please contact our Clinic Coordinator at [phone], at least 24 hours prior to your appointment.            Jun 01, 2017 10:30 AM CDT   Return Sleep Patient with Rene Ochoa MD   [facility] ([place])    [address] [place] [address]   [phone]              Future tests that were ordered for you today     Open Future Orders        Priority Expected Expires Ordered    Ferritin Routine  3/29/2018 3/29/2017            Who to contact     If you have questions or need follow up information about today's clinic visit or your  "schedule please contact [facility] directly at [phone].  Normal or non-critical lab and imaging results will be communicated to you by MyChart, letter or phone within 4 business days after the clinic has received the results. If you do not hear from us within 7 days, please contact the clinic through [employer] or phone. If you have a critical or abnormal lab result, we will notify you by phone as soon as possible.  Submit refill requests through [employer] or call your pharmacy and they will forward the refill request to us. Please allow 3 business days for your refill to be completed.          Additional Information About Your Visit        [employer] Information     [employer] gives you secure access to your electronic health record. If you see a primary care provider, you can also send messages to your care team and make appointments. If you have questions, please call your primary care clinic.  If you do not have a primary care provider, please call [phone] and they will assist you.        Care EveryWhere ID     This is your Care EveryWhere ID. This could be used by other organizations to access your [facility] medical records  [id number]        Your Vitals Were     Pulse Height Pulse Oximetry BMI (Body Mass Index)          106 1.778 m (5' 10\") 97% 32.71 kg/m2         Blood Pressure from Last 3 Encounters:   03/29/17 123/77   03/09/17 125/84   02/09/17 146/87    Weight from Last 3 Encounters:   03/29/17 103.4 kg (228 lb)   03/09/17 103 kg (227 lb)   02/09/17 105.1 kg (231 lb 9.6 oz)              We Performed the Following     Comprehensive DME          Today's Medication Changes          These changes are accurate as of: 3/29/17 10:57 AM.  If you have any questions, ask your nurse or doctor.               These medicines have changed or have updated prescriptions.        Dose/Directions    * order for DME   This may have changed:  Another medication with the same name was added. Make sure you " understand how and when to take each.   Used for:  Anal fissure        Place rectally 2 times daily 1.8 oz container of 0.2 % nifedipine suppository if possible as easier to place the medication.   Apply on the anus every 12 hours.  Just get to where the anus is tight .   Quantity:  1 Container   Refills:  5       * order for DME   This may have changed:  Another medication with the same name was added. Make sure you understand how and when to take each.   Used for:  Anal fissure        Place rectally every 12 hours 0.2 % nifedipine suppository Apply on the anus every 12 hours.  Just get to where the anus is tight .   Quantity:  60 suppository   Refills:  5       * order for DME   This may have changed:  Another medication with the same name was added. Make sure you understand how and when to take each.   Used for:  Type 2 diabetes mellitus without complication, without long-term current use of insulin (H)        Equipment being ordered: Diabetic Shoes  One pair   Quantity:  1 Device   Refills:  0       * order for DME   This may have changed:  You were already taking a medication with the same name, and this prescription was added. Make sure you understand how and when to take each.   Used for:  Obstructive sleep apnea        Autocpap 10-16 cm   Quantity:  1 Units   Refills:  0       * Notice:  This list has 4 medication(s) that are the same as other medications prescribed for you. Read the directions carefully, and ask your doctor or other care provider to review them with you.         Where to get your medicines      Information about where to get these medications is not yet available     ! Ask your nurse or doctor about these medications     order for DME                Primary Care Provider Office Phone # Fax #    Finesse Beal [phone]       [address] [address]        Thank you!     Thank you for choosing [facility]   for your care. Our goal is always to provide you with excellent care. Hearing back from our patients is one way we can continue to improve our services. Please take a few minutes to complete the written survey that you may receive in the mail after your visit with us. Thank you!             Your Updated Medication List - Protect others around you: Learn how to safely use, store and throw away your medicines at www.disposemymeds.org.          This list is accurate as of: 3/29/17 10:57 AM.  Always use your most recent med list.                   Brand Name Dispense Instructions for use    allopurinol 300 MG tablet    ZYLOPRIM    90 tablet    Take 1 tablet (300 mg) by mouth daily       aspirin 81 MG tablet      Take 1 tablet by mouth daily.       atorvastatin 20 MG tablet    LIPITOR    90 tablet    Take 1 tablet (20 mg) by mouth daily       blood glucose monitoring lancets     1 Box    1 each 2 times daily Use to test blood sugar 2 times daily or as directed.       blood glucose monitoring meter device kit    no brand specified    1 kit    Use to test blood sugar 2 times daily or as directed.  Accucheck meter please and all associated strips, lancets, and other supplies, 3 month supply with refills for a year.       DAILY MULTIVITAMIN PO      Take  by mouth daily.       hydrochlorothiazide 50 MG tablet    HYDRODIURIL    90 tablet    TAKE 1 TABLET(50 MG) BY MOUTH EVERY MORNING       hydrocortisone 1 % ointment     30 g    Apply topically 2 times daily as needed       hydrocortisone 2.5 % cream    ANUSOL-HC    30 g    Place rectally 2 times daily as needed for hemorrhoids       hydrocortisone 25 MG Suppository    ANUSOL-HC    20 suppository    Place 1 suppository (25 mg) rectally 2 times daily as needed for hemorrhoids       lisinopril 30 MG tablet    PRINIVIL,ZESTRIL    90 tablet    TAKE 1 TABLET(30 MG) BY MOUTH DAILY       metFORMIN 500 MG tablet    GLUCOPHAGE    180 tablet    Take 1 tablet (500 mg) by mouth 2 times  daily (with meals)       * order for DME     1 Container    Place rectally 2 times daily 1.8 oz container of 0.2 % nifedipine suppository if possible as easier to place the medication.   Apply on the anus every 12 hours.  Just get to where the anus is tight .       * order for DME     60 suppository    Place rectally every 12 hours 0.2 % nifedipine suppository Apply on the anus every 12 hours.  Just get to where the anus is tight .       * order for DME     1 Device    Equipment being ordered: Diabetic Shoes  One pair       * order for DME     1 Units    Autocpap 10-16 cm       PARoxetine 20 MG tablet    PAXIL    180 tablet    Take 2 tablets (40 mg) by mouth daily       psyllium 0.52 G capsule     100 capsule    Take 1 capsule (0.52 g) by mouth daily       tamsulosin 0.4 MG capsule    FLOMAX    30 capsule    TAKE 1 CAPSULE(0.4 MG) BY MOUTH DAILY       triamcinolone 0.1 % cream    KENALOG    30 g    Apply topically 2 times daily as needed for irritation       vitamin D 1000 UNITS capsule      Take 1 capsule by mouth daily.       * Notice:  This list has 4 medication(s) that are the same as other medications prescribed for you. Read the directions carefully, and ask your doctor or other care provider to review them with you.

## 2017-03-29 NOTE — NURSING NOTE
"No chief complaint on file.      Initial There were no vitals taken for this visit. Estimated body mass index is 32.57 kg/(m^2) as calculated from the following:    Height as of 3/9/17: 1.778 m (5' 10\").    Weight as of 3/9/17: 103 kg (227 lb).  Medication Reconciliation: complete    "

## 2017-03-30 PROBLEM — R79.0 LOW FERRITIN: Status: ACTIVE | Noted: 2017-03-30

## 2017-03-30 NOTE — PROGRESS NOTES
Iron levels are a bit low, this can cause restless leg symptoms .   Try feso4 (iron) 325mg daily for 4-6 months to see if leg symptoms improve.   May need to add a stool softener if iron causes constipation.   Goal ferritin is >75. Recheck in 4-6 months

## 2017-04-12 DIAGNOSIS — E11.9 TYPE 2 DIABETES MELLITUS WITHOUT COMPLICATION (H): ICD-10-CM

## 2017-04-12 NOTE — TELEPHONE ENCOUNTER
metFORMIN (GLUCOPHAGE) 500 MG tablet         Last Written Prescription Date: 9-19-16  Last Fill Quantity: 180, # refills: 1  Last Office Visit with [facility] [place] or [facility] prescribing provider:  2-3-17        BP Readings from Last 3 Encounters:   03/29/17 123/77   03/09/17 125/84   02/09/17 146/87     Lab Results   Component Value Date    MICROL 12 12/23/2015     Lab Results   Component Value Date    UMALCR 6.37 12/23/2015     Creatinine   Date Value Ref Range Status   02/03/2017 0.67 0.66 - 1.25 mg/dL Final   ]  GFR Estimate   Date Value Ref Range Status   02/03/2017 >90  Non  GFR Calc   >60 mL/min/1.7m2 Final   09/19/2016 >90  Non  GFR Calc   >60 mL/min/1.7m2 Final   03/01/2016 >90  Non  GFR Calc   >60 mL/min/1.7m2 Final     GFR Estimate If Black   Date Value Ref Range Status   02/03/2017 >90   GFR Calc   >60 mL/min/1.7m2 Final   09/19/2016 >90   GFR Calc   >60 mL/min/1.7m2 Final   03/01/2016 >90   GFR Calc   >60 mL/min/1.7m2 Final     Lab Results   Component Value Date    CHOL 164 09/19/2016     Lab Results   Component Value Date    HDL 50 09/19/2016     Lab Results   Component Value Date    LDL 69 09/19/2016     Lab Results   Component Value Date    TRIG 225 09/19/2016     Lab Results   Component Value Date    CHOLHDLRATIO 3.8 06/12/2015     Lab Results   Component Value Date    AST 27 09/19/2016     Lab Results   Component Value Date    ALT 51 09/19/2016     Lab Results   Component Value Date    A1C 6.3 03/29/2017    A1C 6.0 09/19/2016    A1C 6.8 12/23/2015    A1C 7.6 06/12/2015    A1C 6.6 03/19/2014     Potassium   Date Value Ref Range Status   02/03/2017 4.1 3.4 - 5.3 mmol/L Final

## 2017-04-14 ENCOUNTER — DOCUMENTATION ONLY (OUTPATIENT)
Dept: SLEEP MEDICINE | Facility: CLINIC | Age: 57
End: 2017-04-14
Payer: COMMERCIAL

## 2017-04-14 DIAGNOSIS — G47.61 PERIODIC LIMB MOVEMENTS OF SLEEP: ICD-10-CM

## 2017-04-14 DIAGNOSIS — R79.0 LOW FERRITIN: ICD-10-CM

## 2017-04-14 DIAGNOSIS — E66.09 NON MORBID OBESITY DUE TO EXCESS CALORIES: ICD-10-CM

## 2017-04-14 DIAGNOSIS — F51.04 PSYCHOPHYSIOLOGICAL INSOMNIA: ICD-10-CM

## 2017-04-14 DIAGNOSIS — G47.33 OBSTRUCTIVE SLEEP APNEA: ICD-10-CM

## 2017-04-14 PROCEDURE — 99207 ZZC NO BILLABLE SERVICE THIS VISIT: CPT

## 2017-04-14 NOTE — PROGRESS NOTES
Patient was offered choice of vendor and chose [facility].  Patient Gary Iyer was set up at [facility] on April 14, 2017. Patient received a Resmed AirSense 10 Auto. Pressures were set at 10-16 cm H2O.   Patient s ramp is 5 cm H2O for Auto and FLEX/EPR is EPR, 2.  Patient received a Resmed Mask name: Airfit P10  Pillow mask Size Medium, heated tubing and heated humidifier.  Patient is enrolled in the STM Program and does need to meet compliance. Patient has a follow up on 6/1/17 with Dr. Ochoa.    Tessa Palomo

## 2017-04-17 ENCOUNTER — DOCUMENTATION ONLY (OUTPATIENT)
Dept: SLEEP MEDICINE | Facility: CLINIC | Age: 57
End: 2017-04-17

## 2017-04-17 NOTE — PROGRESS NOTES
3 DAY STM VISIT    Patient contacted for 3 day STM visit  Message left for patient to return call    Current settings:  EPAP Min Auto CPAP: 10.0 (The minimum allowable pressure in cmH2O)       EPAP Max Auto CPAP: 16.0 (The maximum allowable pressure in cmH2O)       Assessment: NIghtly usage over four hours.  Action plan: Pt to have f/u 14 day STM visit.

## 2017-04-19 NOTE — PROGRESS NOTES
Pt returned my call and states things are going well.  He has no issues or concerns and is feeling benefit from therapy.

## 2017-04-24 ENCOUNTER — OFFICE VISIT (OUTPATIENT)
Dept: OTOLARYNGOLOGY | Facility: CLINIC | Age: 57
End: 2017-04-24

## 2017-04-24 VITALS — WEIGHT: 225 LBS | HEIGHT: 69 IN | BODY MASS INDEX: 33.33 KG/M2

## 2017-04-24 DIAGNOSIS — R49.0 DYSPHONIA: Primary | ICD-10-CM

## 2017-04-24 DIAGNOSIS — C32.9 LARYNX CARCINOMA (H): Primary | ICD-10-CM

## 2017-04-24 DIAGNOSIS — R49.0 DYSPHONIA: ICD-10-CM

## 2017-04-24 ASSESSMENT — PAIN SCALES - GENERAL: PAINLEVEL: NO PAIN (0)

## 2017-04-24 NOTE — PROGRESS NOTES
Dear Dr. Barger:  Gary Iyer recently returned for follow-up at the [facility]. My clinic note from our visit is enclosed below.     I appreciate the ongoing opportunity to participate in this patient's care.    Please feel free to contact me with any questions.      Sincerely yours,  Mel Fournier M.D., M.P.H.  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 Most recently we returned to the Operating Room on 02/09/2017 for an area of new irregularity of the left true vocal fold. Pathology showed severe dysplasia with negative but close margins (for moderate, but not severe dysplasia).  He returns in routine follow up today. He had a URI recently and feels he is partly but not fully better.      MEDICATIONS:     Current Outpatient Prescriptions   Medication Sig Dispense Refill     metFORMIN (GLUCOPHAGE) 500 MG tablet TAKE 1 TABLET(500 MG) BY MOUTH TWICE DAILY WITH MEALS 180 tablet 1     order for DME Autocpap 10-16 cm 1 Units 0     tamsulosin (FLOMAX) 0.4 MG capsule TAKE 1 CAPSULE(0.4 MG) BY MOUTH DAILY 30 capsule 1     lisinopril (PRINIVIL,ZESTRIL) 30 MG tablet TAKE 1 TABLET(30 MG) BY MOUTH DAILY 90 tablet 0     hydrochlorothiazide (HYDRODIURIL) 50 MG tablet TAKE 1 TABLET(50 MG) BY MOUTH EVERY MORNING 90 tablet 0     blood glucose monitoring (NO BRAND SPECIFIED) meter device kit Use to test blood sugar 2 times daily or as directed.  Accucheck meter please and all associated strips, lancets, and other supplies, 3 month supply with refills for a year. 1 kit 0     order for DME Equipment being ordered: Diabetic Shoes    One pair 1 Device 0     PARoxetine (PAXIL) 20 MG tablet Take 2 tablets (40 mg) by mouth  daily 180 tablet 1     order for DME Place rectally 2 times daily 1.8 oz container of 0.2 % nifedipine suppository if possible as easier to place the medication.    Apply on the anus every 12 hours.  Just get to where the anus is tight . 1 Container 5     order for DME Place rectally every 12 hours 0.2 % nifedipine suppository  Apply on the anus every 12 hours.  Just get to where the anus is tight . 60 suppository 5     psyllium 0.52 G capsule Take 1 capsule (0.52 g) by mouth daily 100 capsule 3     atorvastatin (LIPITOR) 20 MG tablet Take 1 tablet (20 mg) by mouth daily 90 tablet 3     hydrocortisone (ANUSOL-HC) 25 MG suppository Place 1 suppository (25 mg) rectally 2 times daily as needed for hemorrhoids 20 suppository 1     hydrocortisone (ANUSOL-HC) 2.5 % rectal cream Place rectally 2 times daily as needed for hemorrhoids 30 g 1     triamcinolone (KENALOG) 0.1 % cream Apply topically 2 times daily as needed for irritation 30 g 1     hydrocortisone 1 % ointment Apply topically 2 times daily as needed 30 g 1     allopurinol (ZYLOPRIM) 300 MG tablet Take 1 tablet (300 mg) by mouth daily 90 tablet 3     blood glucose monitoring (ALON MICROLET) lancets 1 each 2 times daily Use to test blood sugar 2 times daily or as directed. 1 Box 3     Cholecalciferol (VITAMIN D) 1000 UNITS capsule Take 1 capsule by mouth daily.       Multiple Vitamin (DAILY MULTIVITAMIN PO) Take  by mouth daily.       aspirin 81 MG tablet Take 1 tablet by mouth daily.         ALLERGIES:  No Known Allergies    NEW PMH/PSH: None    REVIEW OF SYSTEMS:  The patient completed a comprehensive 11 point review of systems (below), which was reviewed. Positives are as noted below.  Patient Supplied Answers to Review of Systems   ENT ROS 4/22/2016   Ears, Nose, Throat Nasal congestion or drainage       PHYSICAL EXAM:  General: The patient was alert and conversant, and in no acute distress.    Oral cavity/oropharynx: No masses or lesions. Dentition  unchanged since prior. Tongue mobility and palate elevation intact and symmetric.  Neck: No palpable cervical lymphadenopathy (landmarks somewhat indistinct), no significant tenderness to palpation of the thyrohyoid space. No obvious thyroid abnormality.  Resp: Breathing comfortably, no stridor or stertor.  Neuro: Symmetric facial function. Other cranial nerve function as documented above.  Psych: Normal affect, pleasant and cooperative.  Voice/speech: Mild dysphonia characterized by breathiness and roughness.      Intake scores  Last 2 Scores for Patient-Answered VHI Questionnaire  VHI Total Score 2/27/2017 4/24/2017   VHI Total Score 1 1      Last 2 Scores for Patient-Answered EAT Questionnaire  EAT Total Score 2/27/2017 4/24/2017   EAT Total Score 0 0      Last 2 Scores for Patient-Answered CSI Questionnaire  CSI Total Score 2/27/2017 4/24/2017   CSI Total Score 0 0         Procedure:   Flexible fiberoptic laryngoscopy and laryngovideostroboscopy  Indications: This procedure was warranted to evaluate the patient's laryngeal function. Risks, benefits, and alternatives were discussed.  Description: After written informed consent was obtained, a time-out was performed to confirm patient identity, procedure, and procedure site. Topical 3% lidocaine with 0.25% phenylephrine was applied to the nasal cavities. I performed the endoscopy and no complications were apparent. Continuous and stroboscopic light were utilized to assess routine phonation and variable frequency phonation.  Performed by: Mel Fournier MD MPH  SLP: Misty Clark, PhD, CCC-SLP  Findings: Normal nasopharynx. Normal base of tongue, valleculae, and epiglottis. Vocal fold mobility: right: normal; left: normal. Medial edges of the vocal folds: smooth and straight. Mild diffuse erythema left true vocal fold, somewhat less erythematous than at prior exam. Glissade produced appropriate elongation. There was moderate supraglottic recruitment with  connected speech. Mucosa of false vocal folds, aryepiglottic folds, piriform sinuses, and posterior glottis unremarkable. Airway was patent. No focal lesions on NBI.    The addition of stroboscopy allowed evaluation of the mucosal wave.   Amplitude: right: mildly decreased; left: moderately decreased. Symmetry: intermittent symmetry. Closure pattern: complete. Closure plane: at glottic level. Phase distribution: normal.      IMPRESSION AND PLAN:   Gary Iyer returns with no evidence of disease. I encouraged him to continue with tissue mobilization exercises under the guidance of Misty Clark, PhD, CCC-SLP. He will return in 2 months or sooner with concerns. I appreciate the opportunity to participate in the care of this pleasant patient.

## 2017-04-24 NOTE — NURSING NOTE
"Chief Complaint   Patient presents with     RECHECK     Follow up leukoplakia and laryngeal cancer      Height 1.753 m (5' 9\"), weight 102.1 kg (225 lb).    Davide Murphy    "

## 2017-04-24 NOTE — MR AVS SNAPSHOT
After Visit Summary   4/24/2017    Gary Iyer    MRN: [id number]           Patient Information     Date Of Birth          1960        Visit Information        Provider Department      4/24/2017 10:40 AM Mel Fournier MD [facility] Ear Nose and Throat        Today's Diagnoses     Larynx carcinoma (H)    -  1    Dysphonia          Care Instructions    1.  You were seen in the ENT Clinic today by Dr. Fournier.  If you have any questions or concerns after your appointment, please call [phone].  Press option #1 for scheduling related needs.  Press option #3 for Nurse advice.  2.  Plan is to return to clinic in 2 months for follow up.    Bijal Sorto RN  [facility] ENT   Head & Neck Surgery             Follow-ups after your visit        Additional Services     OTOLARYNGOLOGY REFERRAL       SPEECH-LANGUAGE PATHOLOGY SERVICE(S) REQUESTED:  Evaluate and treat    Misty Clark, Ph.D., CCC/SLP  Speech-Language Pathologist  Director, [facility]  [phone]    Jasmyne Padilla M.M., M.A., CCC/SLP  Speech-Language Pathologist  [facility]  [phone]                  Your next 10 appointments already scheduled     Jun 12, 2017 11:00 AM CDT   Return Sleep Patient with Rene Ochoa MD   [facility] [place] [place]    [address] [place] [id number] [phone]              Who to contact     Please call your clinic at [phone] to:    Ask questions about your health    Make or cancel appointments    Discuss your medicines    Learn about your test results    Speak to your doctor   If you have compliments or concerns about an experience at your clinic, or if you wish to file a complaint, please contact [facility] Patient Relations at [phone] or email us at [email or web address]         Additional Information About Your Visit        [place]  "Information     [facility] gives you secure access to your electronic health record. If you see a primary care provider, you can also send messages to your care team and make appointments. If you have questions, please call your primary care clinic.  If you do not have a primary care provider, please call [phone] and they will assist you.      [employer] is an electronic gateway that provides easy, online access to your medical records. With [employer], you can request a clinic appointment, read your test results, renew a prescription or communicate with your care team.     To access your existing account, please contact your [facility] or call [phone] for assistance.        Care EveryWhere ID     This is your Care EveryWhere ID. This could be used by other organizations to access your [place] medical records  [id number]        Your Vitals Were     Height BMI (Body Mass Index)                1.753 m (5' 9\") 33.23 kg/m2           Blood Pressure from Last 3 Encounters:   03/29/17 123/77   03/09/17 125/84   02/09/17 146/87    Weight from Last 3 Encounters:   04/24/17 102.1 kg (225 lb)   03/29/17 103.4 kg (228 lb)   03/09/17 103 kg (227 lb)              We Performed the Following     IMAGESTREAM RECORDING ORDER     LARYNGOSCOPY FLEX/RIGID W STROBOSCOPY     OTOLARYNGOLOGY REFERRAL        Primary Care Provider Office Phone # Fax #    Finesse Beal [phone]       [address] [address]        Thank you!     Thank you for choosing [facility] EAR NOSE AND THROAT  for your care. Our goal is always to provide you with excellent care. Hearing back from our patients is one way we can continue to improve our services. Please take a few minutes to complete the written survey that you may receive in the mail after your visit with us. Thank you!             Your Updated Medication List - Protect others around you: Learn how to " safely use, store and throw away your medicines at www.disposemymeds.org.          This list is accurate as of: 4/24/17  5:49 PM.  Always use your most recent med list.                   Brand Name Dispense Instructions for use    allopurinol 300 MG tablet    ZYLOPRIM    90 tablet    Take 1 tablet (300 mg) by mouth daily       aspirin 81 MG tablet      Take 1 tablet by mouth daily.       atorvastatin 20 MG tablet    LIPITOR    90 tablet    Take 1 tablet (20 mg) by mouth daily       blood glucose monitoring lancets     1 Box    1 each 2 times daily Use to test blood sugar 2 times daily or as directed.       blood glucose monitoring meter device kit    no brand specified    1 kit    Use to test blood sugar 2 times daily or as directed.  Accucheck meter please and all associated strips, lancets, and other supplies, 3 month supply with refills for a year.       DAILY MULTIVITAMIN PO      Take  by mouth daily.       hydrochlorothiazide 50 MG tablet    HYDRODIURIL    90 tablet    TAKE 1 TABLET(50 MG) BY MOUTH EVERY MORNING       hydrocortisone 1 % ointment     30 g    Apply topically 2 times daily as needed       hydrocortisone 2.5 % cream    ANUSOL-HC    30 g    Place rectally 2 times daily as needed for hemorrhoids       hydrocortisone 25 MG Suppository    ANUSOL-HC    20 suppository    Place 1 suppository (25 mg) rectally 2 times daily as needed for hemorrhoids       lisinopril 30 MG tablet    PRINIVIL,ZESTRIL    90 tablet    TAKE 1 TABLET(30 MG) BY MOUTH DAILY       metFORMIN 500 MG tablet    GLUCOPHAGE    180 tablet    TAKE 1 TABLET(500 MG) BY MOUTH TWICE DAILY WITH MEALS       * order for DME     1 Container    Place rectally 2 times daily 1.8 oz container of 0.2 % nifedipine suppository if possible as easier to place the medication.   Apply on the anus every 12 hours.  Just get to where the anus is tight .       * order for DME     60 suppository    Place rectally every 12 hours 0.2 % nifedipine suppository Apply  on the anus every 12 hours.  Just get to where the anus is tight .       * order for DME     1 Device    Equipment being ordered: Diabetic Shoes  One pair       * order for DME     1 Units    Autocpap 10-16 cm       PARoxetine 20 MG tablet    PAXIL    180 tablet    Take 2 tablets (40 mg) by mouth daily       psyllium 0.52 G capsule     100 capsule    Take 1 capsule (0.52 g) by mouth daily       tamsulosin 0.4 MG capsule    FLOMAX    30 capsule    TAKE 1 CAPSULE(0.4 MG) BY MOUTH DAILY       triamcinolone 0.1 % cream    KENALOG    30 g    Apply topically 2 times daily as needed for irritation       vitamin D 1000 UNITS capsule      Take 1 capsule by mouth daily.       * Notice:  This list has 4 medication(s) that are the same as other medications prescribed for you. Read the directions carefully, and ask your doctor or other care provider to review them with you.

## 2017-04-24 NOTE — MR AVS SNAPSHOT
After Visit Summary   4/24/2017    Gary Iyer    MRN: [id number]           Patient Information     Date Of Birth          1960        Visit Information        Provider Department      4/24/2017 10:40 AM Misty Clark, SLP [facility]        Today's Diagnoses     Dysphonia    -  1       Follow-ups after your visit        Your next 10 appointments already scheduled     Jun 12, 2017 11:00 AM CDT   Return Sleep Patient with Rene Ochoa MD   [facility] [place] [place]    [address] [place] [id number] [phone]              Who to contact     Please call your clinic at [phone] to:    Ask questions about your health    Make or cancel appointments    Discuss your medicines    Learn about your test results    Speak to your doctor   If you have compliments or concerns about an experience at your clinic, or if you wish to file a complaint, please contact [facility] Patient Relations at [phone] or email us at [email or web address]         Additional Information About Your Visit        MyChart Information     [facility] gives you secure access to your electronic health record. If you see a primary care provider, you can also send messages to your care team and make appointments. If you have questions, please call your primary care clinic.  If you do not have a primary care provider, please call [phone] and they will assist you.      [facility] is an electronic gateway that provides easy, online access to your medical records. With [facility], you can request a clinic appointment, read your test results, renew a prescription or communicate with your care team.     To access your existing account, please contact your [facility] or call [phone] for assistance.        Care EveryWhere ID     This is your Care EveryWhere ID. This could be used by  other organizations to access your [place] medical records  [id number]         Blood Pressure from Last 3 Encounters:   03/29/17 123/77   03/09/17 125/84   02/09/17 146/87    Weight from Last 3 Encounters:   04/24/17 102.1 kg (225 lb)   03/29/17 103.4 kg (228 lb)   03/09/17 103 kg (227 lb)              We Performed the Following     SPEECH/HEARING THERAPY, INDIVIDUAL        Primary Care Provider Office Phone # Fax #    Finesse Beal [phone]       [facility] [address] [facility] [address]        Thank you!     Thank you for choosing [facility]  for your care. Our goal is always to provide you with excellent care. Hearing back from our patients is one way we can continue to improve our services. Please take a few minutes to complete the written survey that you may receive in the mail after your visit with us. Thank you!             Your Updated Medication List - Protect others around you: Learn how to safely use, store and throw away your medicines at www.disposemymeds.org.          This list is accurate as of: 4/24/17  3:09 PM.  Always use your most recent med list.                   Brand Name Dispense Instructions for use    allopurinol 300 MG tablet    ZYLOPRIM    90 tablet    Take 1 tablet (300 mg) by mouth daily       aspirin 81 MG tablet      Take 1 tablet by mouth daily.       atorvastatin 20 MG tablet    LIPITOR    90 tablet    Take 1 tablet (20 mg) by mouth daily       blood glucose monitoring lancets     1 Box    1 each 2 times daily Use to test blood sugar 2 times daily or as directed.       blood glucose monitoring meter device kit    no brand specified    1 kit    Use to test blood sugar 2 times daily or as directed.  Accucheck meter please and all associated strips, lancets, and other supplies, 3 month supply with refills for a year.       DAILY MULTIVITAMIN PO      Take  by mouth daily.       hydrochlorothiazide 50 MG tablet    HYDRODIURIL     90 tablet    TAKE 1 TABLET(50 MG) BY MOUTH EVERY MORNING       hydrocortisone 1 % ointment     30 g    Apply topically 2 times daily as needed       hydrocortisone 2.5 % cream    ANUSOL-HC    30 g    Place rectally 2 times daily as needed for hemorrhoids       hydrocortisone 25 MG Suppository    ANUSOL-HC    20 suppository    Place 1 suppository (25 mg) rectally 2 times daily as needed for hemorrhoids       lisinopril 30 MG tablet    PRINIVIL,ZESTRIL    90 tablet    TAKE 1 TABLET(30 MG) BY MOUTH DAILY       metFORMIN 500 MG tablet    GLUCOPHAGE    180 tablet    TAKE 1 TABLET(500 MG) BY MOUTH TWICE DAILY WITH MEALS       * order for DME     1 Container    Place rectally 2 times daily 1.8 oz container of 0.2 % nifedipine suppository if possible as easier to place the medication.   Apply on the anus every 12 hours.  Just get to where the anus is tight .       * order for DME     60 suppository    Place rectally every 12 hours 0.2 % nifedipine suppository Apply on the anus every 12 hours.  Just get to where the anus is tight .       * order for DME     1 Device    Equipment being ordered: Diabetic Shoes  One pair       * order for DME     1 Units    Autocpap 10-16 cm       PARoxetine 20 MG tablet    PAXIL    180 tablet    Take 2 tablets (40 mg) by mouth daily       psyllium 0.52 G capsule     100 capsule    Take 1 capsule (0.52 g) by mouth daily       tamsulosin 0.4 MG capsule    FLOMAX    30 capsule    TAKE 1 CAPSULE(0.4 MG) BY MOUTH DAILY       triamcinolone 0.1 % cream    KENALOG    30 g    Apply topically 2 times daily as needed for irritation       vitamin D 1000 UNITS capsule      Take 1 capsule by mouth daily.       * Notice:  This list has 4 medication(s) that are the same as other medications prescribed for you. Read the directions carefully, and ask your doctor or other care provider to review them with you.

## 2017-04-24 NOTE — PROGRESS NOTES
[facility] VOICE [facility]  Elijah Baker Jr., M.D., F.A.C.S.  Mel Fournier M.D., M.P.H.  Misty Clark, Ph.D., CCC/SLP  Jasmyne Padilla M.M. (voice), M.A., CCC/SLP  Zack Mcdaniels M.M. (voice), M.A., [facility]/SLP    [facility] VOICE [facility]  VOICE/SPEECH/BREATHING THERAPY  CLINICAL FOLLOW-UP AND LARYNGEAL EXAMINATION REPORT    Patient: Gary Iyer  Date of Service: 4/24/2017    HISTORY  PATIENT INFORMATION  Gary Iyer was seen for follow-up in conjunction with a visit to Dr. Fournier today.  Please also refer to Dr. Fournier's dictation.  He was last seen on 2/27/17.    PROGRESS SINCE LAST VISIT  Mr. Iyer reports:    He has been doing his voice exercises fairly regularly, although he does not use the straw    His voice is currently affected by URI    OBJECTIVE FINDINGS  VOICE AND SPEECH EVALUATION  Mr. Iyer presents today with a voice quality that is characterized by     Mild roughness and asthenia, similar to his voice previously    Incoordination between respiration and phonation     He talks past the end of the breathstream, and ends utterances or sustained vowels with increased roughness or strain    LARYNGEAL EXAMINATION  Dr. Fournier accomplished the endoscopic laryngeal examination today.  I provided technical support, and provided the protocol of instructions for the patient.  Type of exam:   Procedure: Flexible endoscopy with chip-tip technology with stroboscopy.   This exam shows:    The erythema of the left vocal fold appears somewhat reduced compared to the last exam    Thickened secretions collect tenaciously on the vocal folds    Glottic closure during phonation is not pressed; technique seems reasonably protective of the glottis    Supraglottic constriction is minimal during speech    The addition of stroboscopy provided the following information:  o The left vocal fold is moderately stiff  o The right vocal fold vibrates normally, with good mucosal wave    Dr. Fournier and I reviewed this laryngeal exam  with Mr. Iyer today, and I provided pertinent explanations, as well as written and oral information:    He is continuing to improve after surgery, but will need continued low impact and tissue mobilization to heal optimally    Dr. Fournier has requested that I work with Mr. Iyer to ensure optimal technique for exercises    THERAPEUTIC ACTIVITIES  Today Mr. Iyer participated in the following therapeutic activities:    Practiced techniques for tissue mobilization  o The importance of using the straw for half his exercises was stressed  o We worked on expanding the pitch range of his pitch glides; this is a difficult concept for him, as he does not seem to hear the difference between low and high pitches in himself, although he can demonstrate some pitch modulation  o He had difficulty learning to increase or decrease loudness on a single pitch  o We worked on optimal inhalation and improving breathflow during the exercises; the straw was facilitating for this  o We video recorded the exercises on his phone, and I provided written instructions.    IMPRESSIONS/ RECOMMENDATIONS/ PLAN  IMPRESSIONS / RECOMMENDATIONS  Based on today s laryngeal examination, it appears that:    Mr. Iyer is improving, but ongoing therapy is warranted in order to promote optimal healing.    We accomplished a brief session of therapy today.    I will continue see Mr. Iyer as Dr. Fournier deems appropriate, most likely at his next session with her in two months.    TREATMENT PLAN    I will see Mr. Iyer in two months, along with Dr. Fournier, to work on education, modification, and carryover of therapeutic activities to more complex tasks.      GOALS  Goals remain the same.    PRIMARY ICD-10 code: R49.0 (Dysphonia)    TOTAL SERVICE TIME: 30 minutes  TREATMENT (77534): 30 minutes  NO CHARGE FACILITY FEE (74758)    Misty Clark, Ph.D., [facility]-SLP  Speech-Language Pathologist  Director, [facility]  [phone]

## 2017-04-24 NOTE — LETTER
4/24/2017      RE: Gary Iyer  [address] [address]       Dear Dr. Barger:  Gary Iyer recently returned for follow-up at the [facility]. My clinic note from our visit is enclosed below.     I appreciate the ongoing opportunity to participate in this patient's care.    Please feel free to contact me with any questions.      Sincerely yours,    Mel Fournier M.D., M.P.H.  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 Most recently we returned to the Operating Room on 02/09/2017 for an area of new irregularity of the left true vocal fold. Pathology showed severe dysplasia with negative but close margins (for moderate, but not severe dysplasia).  He returns in routine follow up today. He had a URI recently and feels he is partly but not fully better.      MEDICATIONS:     Current Outpatient Prescriptions   Medication Sig Dispense Refill     metFORMIN (GLUCOPHAGE) 500 MG tablet TAKE 1 TABLET(500 MG) BY MOUTH TWICE DAILY WITH MEALS 180 tablet 1     order for DME Autocpap 10-16 cm 1 Units 0     tamsulosin (FLOMAX) 0.4 MG capsule TAKE 1 CAPSULE(0.4 MG) BY MOUTH DAILY 30 capsule 1     lisinopril (PRINIVIL,ZESTRIL) 30 MG tablet TAKE 1 TABLET(30 MG) BY MOUTH DAILY 90 tablet 0     hydrochlorothiazide (HYDRODIURIL) 50 MG tablet TAKE 1 TABLET(50 MG) BY MOUTH EVERY MORNING 90 tablet 0     blood glucose monitoring (NO BRAND SPECIFIED) meter device kit Use to test blood sugar 2 times daily or as directed.  Accucheck meter please and all associated strips, lancets, and other supplies, 3 month supply with refills for a year. 1 kit 0     order for DME Equipment being ordered: Diabetic Shoes    One pair 1  Device 0     PARoxetine (PAXIL) 20 MG tablet Take 2 tablets (40 mg) by mouth daily 180 tablet 1     order for DME Place rectally 2 times daily 1.8 oz container of 0.2 % nifedipine suppository if possible as easier to place the medication.    Apply on the anus every 12 hours.  Just get to where the anus is tight . 1 Container 5     order for DME Place rectally every 12 hours 0.2 % nifedipine suppository  Apply on the anus every 12 hours.  Just get to where the anus is tight . 60 suppository 5     psyllium 0.52 G capsule Take 1 capsule (0.52 g) by mouth daily 100 capsule 3     atorvastatin (LIPITOR) 20 MG tablet Take 1 tablet (20 mg) by mouth daily 90 tablet 3     hydrocortisone (ANUSOL-HC) 25 MG suppository Place 1 suppository (25 mg) rectally 2 times daily as needed for hemorrhoids 20 suppository 1     hydrocortisone (ANUSOL-HC) 2.5 % rectal cream Place rectally 2 times daily as needed for hemorrhoids 30 g 1     triamcinolone (KENALOG) 0.1 % cream Apply topically 2 times daily as needed for irritation 30 g 1     hydrocortisone 1 % ointment Apply topically 2 times daily as needed 30 g 1     allopurinol (ZYLOPRIM) 300 MG tablet Take 1 tablet (300 mg) by mouth daily 90 tablet 3     blood glucose monitoring (ALON MICROLET) lancets 1 each 2 times daily Use to test blood sugar 2 times daily or as directed. 1 Box 3     Cholecalciferol (VITAMIN D) 1000 UNITS capsule Take 1 capsule by mouth daily.       Multiple Vitamin (DAILY MULTIVITAMIN PO) Take  by mouth daily.       aspirin 81 MG tablet Take 1 tablet by mouth daily.         ALLERGIES:  No Known Allergies    NEW PMH/PSH: None    REVIEW OF SYSTEMS:  The patient completed a comprehensive 11 point review of systems (below), which was reviewed. Positives are as noted below.  Patient Supplied Answers to Review of Systems   ENT ROS 4/22/2016   Ears, Nose, Throat Nasal congestion or drainage       PHYSICAL EXAM:  General: The patient was alert and conversant, and in no acute  distress.    Oral cavity/oropharynx: No masses or lesions. Dentition unchanged since prior. Tongue mobility and palate elevation intact and symmetric.  Neck: No palpable cervical lymphadenopathy (landmarks somewhat indistinct), no significant tenderness to palpation of the thyrohyoid space. No obvious thyroid abnormality.  Resp: Breathing comfortably, no stridor or stertor.  Neuro: Symmetric facial function. Other cranial nerve function as documented above.  Psych: Normal affect, pleasant and cooperative.  Voice/speech: Mild dysphonia characterized by breathiness and roughness.      Intake scores  Last 2 Scores for Patient-Answered VHI Questionnaire  VHI Total Score 2/27/2017 4/24/2017   VHI Total Score 1 1      Last 2 Scores for Patient-Answered EAT Questionnaire  EAT Total Score 2/27/2017 4/24/2017   EAT Total Score 0 0      Last 2 Scores for Patient-Answered CSI Questionnaire  CSI Total Score 2/27/2017 4/24/2017   CSI Total Score 0 0         Procedure:   Flexible fiberoptic laryngoscopy and laryngovideostroboscopy  Indications: This procedure was warranted to evaluate the patient's laryngeal function. Risks, benefits, and alternatives were discussed.  Description: After written informed consent was obtained, a time-out was performed to confirm patient identity, procedure, and procedure site. Topical 3% lidocaine with 0.25% phenylephrine was applied to the nasal cavities. I performed the endoscopy and no complications were apparent. Continuous and stroboscopic light were utilized to assess routine phonation and variable frequency phonation.  Performed by: Mel Fournier MD MPH  SLP: Misty Clark, PhD, CCC-SLP  Findings: Normal nasopharynx. Normal base of tongue, valleculae, and epiglottis. Vocal fold mobility: right: normal; left: normal. Medial edges of the vocal folds: smooth and straight. Mild diffuse erythema left true vocal fold, somewhat less erythematous than at prior exam. Glissade produced  appropriate elongation. There was moderate supraglottic recruitment with connected speech. Mucosa of false vocal folds, aryepiglottic folds, piriform sinuses, and posterior glottis unremarkable. Airway was patent. No focal lesions on NBI.    The addition of stroboscopy allowed evaluation of the mucosal wave.   Amplitude: right: mildly decreased; left: moderately decreased. Symmetry: intermittent symmetry. Closure pattern: complete. Closure plane: at glottic level. Phase distribution: normal.      IMPRESSION AND PLAN:   Gary Iyer returns with no evidence of disease. I encouraged him to continue with tissue mobilization exercises under the guidance of Misty Clark, PhD, CCC-SLP. He will return in 2 months or sooner with concerns. I appreciate the opportunity to participate in the care of this pleasant patient.         Mel Fournier MD

## 2017-04-24 NOTE — PATIENT INSTRUCTIONS
1.  You were seen in the ENT Clinic today by Dr. Fournier.  If you have any questions or concerns after your appointment, please call [phone].  Press option #1 for scheduling related needs.  Press option #3 for Nurse advice.  2.  Plan is to return to clinic in 2 months for follow up.    Bijal Sorto RN  [facility] ENT   Head & Neck Surgery

## 2017-05-01 ENCOUNTER — DOCUMENTATION ONLY (OUTPATIENT)
Dept: SLEEP MEDICINE | Facility: CLINIC | Age: 57
End: 2017-05-01

## 2017-05-01 NOTE — PROGRESS NOTES
14 DAY STM VISIT    Subjective measures:  Pt states that things are going pretty well.  He does take it off after a few hours some nights because he finds the air cold and he is sometimes congested.  I walked him through how to turn up his humidity so he will try that.    Assessment: Pt not meeting objective benchmarks for compliance  Patient failing following subjective benchmarks: dryness   Action plan: Pt to have 30 day STM visit.   Device settings:    EPAP Min Auto CPAP: 10.0 (The minimum allowable pressure in cmH2O)    EPAP Max Auto CPAP: 16.0 (The maximum allowable pressure in cmH2O)    Target IPAP (95% of Target) 14 day average (Resmed): 13.3cm H20      Objective measures: 14 day rolling measure     % compliance greater than four hours rolling average 14 days: 64.2%     95% OF Leak in litres Rolling Average 14 Days: 24.34 lpm last data upload        AHI Rolling Average 14 Day: 5.39  last data upload        Time mask on face 14 day average: 305 min         Objective measure goal  Compliance   Goal >70%  Leak   Goal < 10%  AHI  Goal < 5  Usage  Goal >240

## 2017-05-15 ENCOUNTER — DOCUMENTATION ONLY (OUTPATIENT)
Dept: SLEEP MEDICINE | Facility: CLINIC | Age: 57
End: 2017-05-15

## 2017-05-15 NOTE — PROGRESS NOTES
30 DAY [facility] VISIT    Message left for patient to return call     Assessment: Pt not meeting objective benchmarks for AHI    Action plan: 2 week STM recheck appt scheduled  Patient has a follow up visit with Dr. Ochoa on 6/12/17.   Device settings:    EPAP Min Auto CPAP: 10.0 (The minimum allowable pressure in cmH2O)    EPAP Max Auto CPAP: 16.0 (The maximum allowable pressure in cmH2O)    Target IPAP (95% of Target) 14 day average (Resmed): 14.2cm H20    Objective measures: 14 day rolling measures      % compliance greater than four hours rolling average 14 days: 71.4%     95% OF Leak in litres Rolling Average 14 Days: 22.71 lpm  last  upload     AHI Rolling Average 14 Day: 9.68 last  upload     Time mask on face 14 day average: 316 min        Objective measure goal  Compliance   Goal >70%  Leak   Goal < 10%  AHI  Goal < 5  Usage  Goal >240

## 2017-05-23 DIAGNOSIS — N40.1 BENIGN PROSTATIC HYPERPLASIA WITH LOWER URINARY TRACT SYMPTOMS, UNSPECIFIED MORPHOLOGY: ICD-10-CM

## 2017-05-23 NOTE — TELEPHONE ENCOUNTER
tamsulosin (FLOMAX) 0.4 MG capsule         Last Written Prescription Date: 3/27/17  Last Fill Quantity: 30, # refills: 1    Last Office Visit with [facility], [facility] or [facility] prescribing provider:  2/3/17   Future Office Visit:      BP Readings from Last 3 Encounters:   03/29/17 123/77   03/09/17 125/84   02/09/17 146/87

## 2017-05-24 RX ORDER — TAMSULOSIN HYDROCHLORIDE 0.4 MG/1
CAPSULE ORAL
Qty: 30 CAPSULE | Refills: 0 | Status: SHIPPED | OUTPATIENT
Start: 2017-05-24 | End: 2017-05-30

## 2017-05-24 NOTE — TELEPHONE ENCOUNTER
Prescription approved per [facility] to the [facility] Refill Protocol.    Diana Klein RN  [facility]

## 2017-05-30 ENCOUNTER — DOCUMENTATION ONLY (OUTPATIENT)
Dept: SLEEP MEDICINE | Facility: CLINIC | Age: 57
End: 2017-05-30

## 2017-05-30 DIAGNOSIS — N40.1 BENIGN PROSTATIC HYPERPLASIA WITH LOWER URINARY TRACT SYMPTOMS, UNSPECIFIED MORPHOLOGY: ICD-10-CM

## 2017-05-30 NOTE — TELEPHONE ENCOUNTER
tamsulosin (FLOMAX) 0.4 MG capsule         Last Written Prescription Date: 5-24-17  Last Fill Quantity: 30, # refills: 0    Last Office Visit with G, P or [facility] prescribing provider:  2-3-17   Future Office Visit:      BP Readings from Last 3 Encounters:   03/29/17 123/77   03/09/17 125/84   02/09/17 146/87

## 2017-05-30 NOTE — PROGRESS NOTES
STM Recheck Visit     Data only recheck    Assessment: Pt meeting objective benchmarks.     Action plan: follow up per provider request   Device settings:    EPAP Min Auto CPAP: 10.0 (The minimum allowable pressure in cmH2O)    EPAP Max Auto CPAP: 16.0 (The maximum allowable pressure in cmH2O)    Target IPAP (95% of Target) 14 day average (Resmed): 14cm H20      Objective measures: 14 day rolling measure      Compliance   (Goal >70%)  --% compliance greater than four hours rolling average 14 days: 85.7%      Leak  (Goal < 24 lpm)  --95% OF Leak in litres Rolling Average 14 Days: 27.92 lpm last data upload         AHI  (Goal < 5)  --AHI Rolling Average 14 Day: 8.08  last data upload         Usage  (Goal >240)  --Time mask on face 14 day average: 289 min

## 2017-05-31 RX ORDER — TAMSULOSIN HYDROCHLORIDE 0.4 MG/1
CAPSULE ORAL
Qty: 90 CAPSULE | Refills: 1 | Status: SHIPPED | OUTPATIENT
Start: 2017-05-31 | End: 2017-12-26

## 2017-06-12 ENCOUNTER — OFFICE VISIT (OUTPATIENT)
Dept: SLEEP MEDICINE | Facility: CLINIC | Age: 57
End: 2017-06-12
Payer: COMMERCIAL

## 2017-06-12 VITALS
SYSTOLIC BLOOD PRESSURE: 137 MMHG | BODY MASS INDEX: 32.64 KG/M2 | HEIGHT: 70 IN | HEART RATE: 110 BPM | OXYGEN SATURATION: 96 % | DIASTOLIC BLOOD PRESSURE: 85 MMHG | WEIGHT: 228 LBS

## 2017-06-12 DIAGNOSIS — G47.61 PERIODIC LIMB MOVEMENTS OF SLEEP: ICD-10-CM

## 2017-06-12 DIAGNOSIS — I10 HYPERTENSION GOAL BP (BLOOD PRESSURE) < 140/90: ICD-10-CM

## 2017-06-12 DIAGNOSIS — R79.0 LOW FERRITIN: ICD-10-CM

## 2017-06-12 DIAGNOSIS — E66.09 NON MORBID OBESITY DUE TO EXCESS CALORIES: ICD-10-CM

## 2017-06-12 DIAGNOSIS — F51.04 PSYCHOPHYSIOLOGICAL INSOMNIA: ICD-10-CM

## 2017-06-12 DIAGNOSIS — G47.33 OBSTRUCTIVE SLEEP APNEA: ICD-10-CM

## 2017-06-12 PROCEDURE — 99214 OFFICE O/P EST MOD 30 MIN: CPT | Performed by: INTERNAL MEDICINE

## 2017-06-12 RX ORDER — LISINOPRIL 30 MG/1
TABLET ORAL
Qty: 90 TABLET | Refills: 2 | Status: SHIPPED | OUTPATIENT
Start: 2017-06-12 | End: 2018-02-16

## 2017-06-12 RX ORDER — HYDROCHLOROTHIAZIDE 50 MG/1
TABLET ORAL
Qty: 90 TABLET | Refills: 2 | Status: SHIPPED | OUTPATIENT
Start: 2017-06-12 | End: 2018-02-16

## 2017-06-12 NOTE — MR AVS SNAPSHOT
After Visit Summary   6/12/2017    Gary Iyer    MRN: [id number]           Patient Information     Date Of Birth          1960        Visit Information        Provider Department      6/12/2017 11:00 AM Rene Ochoa MD [facility]        Today's Diagnoses     Obstructive sleep apnea        Low ferritin        Periodic limb movements of sleep        Psychophysiological insomnia        Non morbid obesity due to excess calories           Follow-ups after your visit        Follow-up notes from your care team     Return in about 1 year (around 6/12/2018) for Routine Visit.      Your next 10 appointments already scheduled     Jul 03, 2017 12:20 PM CDT   (Arrive by 11:50 AM)   RETURN THROAT with Mel Fournier MD   [facility] Ear Nose and Throat ([facility])    [address] [place] [id number] [phone]           - This is a multidisciplinary care team visit with an ENT physician and may include a speech language pathologist. - It is important to know that if you are evaluated and/or treated by both a physician and a speech pathologist during your visit, your billing will reflect the input that you receive from both providers as separate professionals. Although most insurance plans do cover these services, we encourage you to contact your insurance company prior to your visit to determine whether there are any coverage limitations that might affect you financially. - Billing/procedure codes that are frequently associated with visits to our clinic include (but are not limited to) the ones listed below. Most patients will not need all of these items, but it may be useful to ask your insurance company's patient . 04451: Flexible fiberoptic laryngoscopy, 01013: Laryngoscopy; flexible or rigid fiberoptic, with stroboscopy, 88031: Flexible endoscopic evaluation of swallowing, 76395: Evaluation of Voice and  "Resonance, 47961: Speech pathology treatment for voice, speech, communication, 88412: Speech pathology treatment for swallowing/oral function for feeding - If you have any concerns or questions, or if you would prefer not to have a speech pathologist involved in your visit, please contact our Clinic Coordinator at [phone], at least 24 hours prior to your appointment.              Who to contact     If you have questions or need follow up information about today's clinic visit or your schedule please contact [facility] directly at [phone].  Normal or non-critical lab and imaging results will be communicated to you by AdTotumhart, letter or phone within 4 business days after the clinic has received the results. If you do not hear from us within 7 days, please contact the clinic through [place] or phone. If you have a critical or abnormal lab result, we will notify you by phone as soon as possible.  Submit refill requests through Xercise4less or call your pharmacy and they will forward the refill request to us. Please allow 3 business days for your refill to be completed.          Additional Information About Your Visit        AdTotumhart Information     [employer]less gives you secure access to your electronic health record. If you see a primary care provider, you can also send messages to your care team and make appointments. If you have questions, please call your primary care clinic.  If you do not have a primary care provider, please call [phone] and they will assist you.        Care EveryWhere ID     This is your Care EveryWhere ID. This could be used by other organizations to access your [facility] medical records  [id number]        Your Vitals Were     Pulse Height Pulse Oximetry BMI (Body Mass Index)          110 1.778 m (5' 10\") 96% 32.71 kg/m2         Blood Pressure from Last 3 Encounters:   06/12/17 137/85   03/29/17 123/77   03/09/17 125/84    Weight from Last 3 Encounters:   06/12/17 103.4 kg " (228 lb)   04/24/17 102.1 kg (225 lb)   03/29/17 103.4 kg (228 lb)              Today, you had the following     No orders found for display         Today's Medication Changes          These changes are accurate as of: 6/12/17 11:59 PM.  If you have any questions, ask your nurse or doctor.               These medicines have changed or have updated prescriptions.        Dose/Directions    hydrochlorothiazide 50 MG tablet   Commonly known as:  HYDRODIURIL   This may have changed:  See the new instructions.   Used for:  Hypertension goal BP (blood pressure) < 140/90   Changed by:  Finesse Beal MD        TAKE 1 TABLET(50 MG) BY MOUTH EVERY MORNING   Quantity:  90 tablet   Refills:  2       lisinopril 30 MG tablet   Commonly known as:  PRINIVIL,ZESTRIL   This may have changed:  See the new instructions.   Used for:  Hypertension goal BP (blood pressure) < 140/90   Changed by:  Finesse Beal MD        TAKE 1 TABLET(30 MG) BY MOUTH DAILY   Quantity:  90 tablet   Refills:  2            Where to get your medicines      These medications were sent to [facility] [address] [place] [address] [address] [facility] [address], [facility] [address]    Hours:  Test fax successful 12/11/02   Phone:  [phone]     hydrochlorothiazide 50 MG tablet    lisinopril 30 MG tablet                Primary Care Provider Office Phone # Fax #    Finesse Beal [phone]       [place] [address] [facility] [address]        Thank you!     Thank you for choosing [facility]  for your care. Our goal is always to provide you with excellent care. Hearing back from our patients is one way we can continue to improve our services. Please take a few minutes to complete the written survey that you may receive in the mail after your visit with us. Thank you!             Your Updated Medication List - Protect others around you: Learn  how to safely use, store and throw away your medicines at www.disposemymeds.org.          This list is accurate as of: 6/12/17 11:59 PM.  Always use your most recent med list.                   Brand Name Dispense Instructions for use    allopurinol 300 MG tablet    ZYLOPRIM    90 tablet    Take 1 tablet (300 mg) by mouth daily       aspirin 81 MG tablet      Take 1 tablet by mouth daily.       atorvastatin 20 MG tablet    LIPITOR    90 tablet    Take 1 tablet (20 mg) by mouth daily       blood glucose monitoring lancets     1 Box    1 each 2 times daily Use to test blood sugar 2 times daily or as directed.       blood glucose monitoring meter device kit    no brand specified    1 kit    Use to test blood sugar 2 times daily or as directed.  Accucheck meter please and all associated strips, lancets, and other supplies, 3 month supply with refills for a year.       DAILY MULTIVITAMIN PO      Take  by mouth daily.       hydrochlorothiazide 50 MG tablet    HYDRODIURIL    90 tablet    TAKE 1 TABLET(50 MG) BY MOUTH EVERY MORNING       hydrocortisone 1 % ointment     30 g    Apply topically 2 times daily as needed       hydrocortisone 2.5 % cream    ANUSOL-HC    30 g    Place rectally 2 times daily as needed for hemorrhoids       hydrocortisone 25 MG Suppository    ANUSOL-HC    20 suppository    Place 1 suppository (25 mg) rectally 2 times daily as needed for hemorrhoids       lisinopril 30 MG tablet    PRINIVIL,ZESTRIL    90 tablet    TAKE 1 TABLET(30 MG) BY MOUTH DAILY       metFORMIN 500 MG tablet    GLUCOPHAGE    180 tablet    TAKE 1 TABLET(500 MG) BY MOUTH TWICE DAILY WITH MEALS       * order for DME     1 Container    Place rectally 2 times daily 1.8 oz container of 0.2 % nifedipine suppository if possible as easier to place the medication.   Apply on the anus every 12 hours.  Just get to where the anus is tight .       * order for DME     60 suppository    Place rectally every 12 hours 0.2 % nifedipine suppository  Apply on the anus every 12 hours.  Just get to where the anus is tight .       * order for DME     1 Device    Equipment being ordered: Diabetic Shoes  One pair       * order for DME     1 Units    Autocpap 10-16 cm       PARoxetine 20 MG tablet    PAXIL    180 tablet    Take 2 tablets (40 mg) by mouth daily       psyllium 0.52 G capsule     100 capsule    Take 1 capsule (0.52 g) by mouth daily       tamsulosin 0.4 MG capsule    FLOMAX    90 capsule    TAKE 1 CAPSULE(0.4 MG) BY MOUTH DAILY       triamcinolone 0.1 % cream    KENALOG    30 g    Apply topically 2 times daily as needed for irritation       vitamin D 1000 UNITS capsule      Take 1 capsule by mouth daily.       * Notice:  This list has 4 medication(s) that are the same as other medications prescribed for you. Read the directions carefully, and ask your doctor or other care provider to review them with you.

## 2017-06-12 NOTE — TELEPHONE ENCOUNTER
lisinopril (PRINIVIL,ZESTRIL) 30 MG tablet      Last Written Prescription Date: 3-8-17  Last Fill Quantity: 90, # refills: 0  [place] [facility] [facility] or [facility] prescribing provider: 2-3-17       Potassium   Date Value Ref Range Status   02/03/2017 4.1 3.4 - 5.3 mmol/L Final     Creatinine   Date Value Ref Range Status   02/03/2017 0.67 0.66 - 1.25 mg/dL Final     BP Readings from Last 3 Encounters:   03/29/17 123/77   03/09/17 125/84   02/09/17 146/87     hydrochlorothiazide (HYDRODIURIL) 50 MG tablet      Last Written Prescription Date: 3-8-17  Last Fill Quantity: 90, # refills: 0  Last Office Visit [place] [facility] [facility] or [facility] prescribing provider: 2-3-17       Potassium   Date Value Ref Range Status   02/03/2017 4.1 3.4 - 5.3 mmol/L Final     Creatinine   Date Value Ref Range Status   02/03/2017 0.67 0.66 - 1.25 mg/dL Final     BP Readings from Last 3 Encounters:   03/29/17 123/77   03/09/17 125/84   02/09/17 146/87

## 2017-06-12 NOTE — PROGRESS NOTES
Obstructive Sleep Apnea- PAP Follow-Up Visit:    Chief Complaint   Patient presents with     Sleep Problem     pap f/u     EPIC WAS NOT AVAILABLE FOR THIS ENCOUNTER    Gary Iyer comes in today for follow-up of their moderate-severe sleep apnea, managed with CPAP.     Titration sleep study done 3/14/17 at the [facility] for history of moderate obstructive sleep apnea by sleep study 2012, symptoms of fatigue (ESS 6), nocturnal awakenings, leg shaking. Comorbid diabetes mellitus, hypertension. Previously intolerant of CPAP but very short trial and likely complicated by insomnia. Sleep onset difficulties, suspect multifactorial: delayed sleep phase syndrome, psychophysiologic insomnia, poor sleep hygiene. Nocturnal leg kicking/stiffness, this may be related to his obstructive sleep apnea or PLMS, nocturnal seizure disorder could also be considerered.          Study Date: 3/14/2017  Sleep Architecture: Cyclic alternating pattern of arousal with and without body movements and leg movements was seen.     Respiration: titration was complicated by high leaks on several different mask types.     The patient was titrated at pressures ranging from 5 cmH2O up to 15 cmH2O. Most of titrations were done for RERAs and flow limitations. The final pressure achieved was 15 cmH2O with a residual AHI of 0 events per hour. REM supine was seen at 12 cm with good control of events.       Movement Activity:     Periodic Limb Activity - There were 342 PLMs during the entire study. The PLM index was 72.6 movements per hour. The PLM Arousal Index was 24.4 per hour.      Component      Latest Ref Rng & Units 3/29/2017   Ferritin      26 - 388 ng/mL 46       Overall, the patient rates their experience with PAP as 7 (0 poor, 10 great). The mask is comfortable. The mask is leaking, 2 nights per week. They are not snoring with the mask on. They are not having gasp arousals.  They are not having significant oral/nasal  dryness. The pressure settings are comfortable.     Patient uses nasal pillows.    Bedtime is typically 10. Usually it takes about 30 minutes to fall asleep with the mask on. Wake time is typically 5.  Patient is using PAP therapy 7 hours per night. The patient is usually getting 5 hours of sleep per night.      He often takes mask off after logging his 4 hours. He states i morning usage does not seem accurate. He may have some oral leaking at times. Flow seems 'high' at times per wife.     Patient does feel rested in the morning.    [place] Sleepiness Scale: 2/24    ResMed   CPAP 10-16 cmH2O download:  30 total days of use. 0 nonuse days. 21 days with >4 hours use.  Average use 4 hours 39 minutes per day. Median Leak 5.8 L/min. 95%ile Leak 27.4 L/min. AHI 9.2.       AHI >10 on 8/30 nights when leaks are higher        Past medical/surgical history, family history, social history, medications and allergies were reviewed.      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  Sleep study [facility] 5/2012 (216#)  AHI 25, .9, low O2 32% (probably artifact by hypnogram). True low O2 probably upper 70s on CPAP during REM. CPAP 8cm with fragmented sleep during supine REM.   Study Date: 3/14/2017- (227.0 lbs)- Titration was complicated by high leaks on several different mask types. The patient was titrated at pressures ranging from 5 cmH2O up to 15 cmH2O.  Most of titrations were done for RERAs and flow limitations. The final  pressure achieved was 15 cmH2O with a residual AHI of 0  "events per hour.  REM supine was seen at 12 cm with good control of events. Lowest oxygen saturation was 84.1%.  Time spent less than or equal to 88% was 0.9 minutes. PLM index was 72.6 movements per hour.  The PLM Arousal Index was 24.4 per hour. Cyclic alternating pattern of arousal with and without whole body movements and leg movements was seen.        Anxiety 06/13/2013     Priority: Medium     Hypertension goal BP (blood pressure) < 140/90 11/30/2012     Priority: Medium     Hyperlipidemia LDL goal <70 05/04/2012     Priority: Medium     Low ferritin 03/30/2017     Priority: Low     Periodic limb movements of sleep 03/29/2017     Priority: Low     Psychophysiological insomnia 03/09/2017     Priority: Low     Benign prostatic hypertrophy 03/07/2017     Priority: Low     Type 2 diabetes mellitus without complication, without long-term current use of insulin (H) 02/08/2017     Priority: Low     Dysphonia 04/24/2016     Priority: Low     Vocal process granuloma 04/24/2016     Priority: Low     Type 2 diabetes mellitus without complication (H) 12/23/2015     Priority: Low     Non morbid obesity due to excess calories 12/23/2015     Priority: Low     Carotid stenosis 08/07/2014     Priority: Low     Ultrasound 2016- Less than 50% diameter stenosis of the right ICA relative to the distal ICA diameter. Less than 50% diameter stenosis of the left ICA relative to the distal ICA diameter.       Advanced directives, counseling/discussion 07/23/2013     Priority: Low     Advance Care Planning:  Information given        Gout, chronic 02/07/2013     Priority: Low        /85  Pulse 110  Ht 1.778 m (5' 10\")  Wt 103.4 kg (228 lb)  SpO2 96%  BMI 32.71 kg/m2        Impression/Plan:    Moderate-severe obstructive sleep apnea  Tolerating CPAP okay, symptoms seem improved  -See DME about mask fit, likely needs a chin strap  -Use all night long unless there is a problem  -practice supine sleep     Insomnia. Not " addressed     Frequent disruptive PLMS on Polysomnogram with low ferritin. Not addressed.          Gary Iyer will follow up in about 1 year(s).     Twenty-five minutes spent with patient, all of which were spent face-to-face counseling, consulting, coordinating plan of care.

## 2017-06-14 NOTE — PROGRESS NOTES
MARTIN mailed.  Spoke with patient who is not taking Iron.  I read him the note from labs done in March 2017 which was available to him through [employer].

## 2017-06-23 ENCOUNTER — OFFICE VISIT (OUTPATIENT)
Dept: ORTHOPEDICS | Facility: CLINIC | Age: 57
End: 2017-06-23
Payer: COMMERCIAL

## 2017-06-23 ENCOUNTER — RADIANT APPOINTMENT (OUTPATIENT)
Dept: GENERAL RADIOLOGY | Facility: CLINIC | Age: 57
End: 2017-06-23
Attending: ORTHOPAEDIC SURGERY
Payer: COMMERCIAL

## 2017-06-23 VITALS
RESPIRATION RATE: 16 BRPM | SYSTOLIC BLOOD PRESSURE: 139 MMHG | DIASTOLIC BLOOD PRESSURE: 79 MMHG | HEIGHT: 70 IN | BODY MASS INDEX: 32.99 KG/M2 | WEIGHT: 230.4 LBS

## 2017-06-23 DIAGNOSIS — M25.512 ACUTE PAIN OF LEFT SHOULDER: ICD-10-CM

## 2017-06-23 DIAGNOSIS — M25.512 ACUTE PAIN OF LEFT SHOULDER: Primary | ICD-10-CM

## 2017-06-23 DIAGNOSIS — M75.42 IMPINGEMENT SYNDROME, SHOULDER, LEFT: ICD-10-CM

## 2017-06-23 DIAGNOSIS — M75.82 ROTATOR CUFF TENDONITIS, LEFT: ICD-10-CM

## 2017-06-23 PROCEDURE — 99203 OFFICE O/P NEW LOW 30 MIN: CPT | Mod: 25 | Performed by: ORTHOPAEDIC SURGERY

## 2017-06-23 PROCEDURE — 73030 X-RAY EXAM OF SHOULDER: CPT | Mod: LT

## 2017-06-23 PROCEDURE — 20610 DRAIN/INJ JOINT/BURSA W/O US: CPT | Mod: LT | Performed by: ORTHOPAEDIC SURGERY

## 2017-06-23 RX ORDER — TRIAMCINOLONE ACETONIDE 40 MG/ML
40 INJECTION, SUSPENSION INTRA-ARTICULAR; INTRAMUSCULAR ONCE
Qty: 1 ML | Refills: 0 | OUTPATIENT
Start: 2017-06-23 | End: 2017-06-23

## 2017-06-23 ASSESSMENT — PAIN SCALES - GENERAL: PAINLEVEL: SEVERE PAIN (6)

## 2017-06-23 NOTE — PROGRESS NOTES
The patient's left shoulder was prepped with betadine solution after verification of allergies. Area approximately 10 cm x 10 cm prepped in a sterile fashion. After injection, betadine removed with soap and water and band-aids applied.    4cc Lidocaine 1%  NDC 3380-5971-47, LOT -dk,  2019  3cc Bupivacaine 0.25% NDC 6868-0695-62, LOT -dk,  2017  2cc Kenalog 40 NDC 8211-9950-81, LOT [id number],   injected into patient's left subacromial space without resistance using posterolateral approach by:   Jake Turpin PA-C, CAQ (Ortho)  Supervising Physician: Feliciano Aguilar M.D., M.S.  Dept. of Orthopaedic Surgery  [facility]

## 2017-06-23 NOTE — PATIENT INSTRUCTIONS
Please remember to call and schedule a follow up appointment if one was recommended at your earliest convenience.  Orthopedics CLINIC HOURS TELEPHONE NUMBER   Dr. Jeff Jackson  Certified Medical Assistant   Monday & Wednesday   8am - 5pm  Thursday 1pm - 5pm  Friday 8am -11:30am Specialty schedulers:   [phone] to schedule your surgery.  Main Clinic:   [phone] to make an appointment with any provider.    Urgent Care locations:    [facility] Monday-Friday Closed  Saturday-Sunday 9am-5pm      Monday-Friday 12pm - 8pm  Saturday-Sunday 9am-5pm [phone]     If SURGERY has been recommended, please call our Specialty Schedulers at [phone] to schedule your procedure.    If you need a medication refill, please contact your pharmacy. Please allow 3 business days for your refill to be completed.    If an MRI or CT scan has been recommended, please call [facility] Schedulers at [phone] to schedule your appointment.  Use [employer] (secure e-mail communication and access to your chart) to send a message or to make an appointment. Please ask how you can sign up for [employer].  Your care team's suggested websites for health information:   [email or web address] : Up to date and easily searchable information on multiple topics.   [email or web address] : [place] dept of heat, public health issues in [place], N1N1

## 2017-06-23 NOTE — PROGRESS NOTES
CHIEF COMPLAINT:   Chief Complaint   Patient presents with     Shoulder Pain     Left shoulder pain. Onset: 6/19/17. NKI. Pain just came on. He was seen in 12/2014 with Babak for left shoulder pain but this is different pain. Last injection: with Dr. Hilliard 12/16/14. Had some PT at the time. Pain is on the top and anterior of shoulder and upper arm. Pain with lifting arm. No recent treatments.        HISTORY:  Gary Iyer is a 56 year old male, right -hand dominant, who is seen for left shoulder pain that started a few days ago, 6/19/2017. No injury. Original issue of the left shoulder started 3+ years ago, seen and treated by Dr. Hilliard with Physical Therapy and injection. Today he reports severe pain, rated 6/10. Had a cortisone injection at that time. Did physical therapy. Ultrasound physical therapy did help as well. Pain with abduction. No pain at rest, however feels a dull ache. He has some trouble with activities of daily living such as washing the left side of his body. No pain at night. Has had no recent treatments. Denies neck pain or numbness and tingling.     Onset: unknown  Symptoms have been worsening since that time.  Aggravated by: with abduction and other shoulder active range of motion   Relieved by: at rest  Present symptoms: pain with ADL's (dressing),  pain with overhead activities,  pain reaching out or away from body (flexion/ abduction),  Pain location: diffuse with movement  Pain severity: 6/10  Pain quality: dull, aching and sharp  Frequency of symptoms: frequently  Associated symptoms: none    Treatment up to this point: physical therapy and cortisone injection 2014, nothing recent.  Has not tried: surgery  Prior history of related problems: Yes, 3 years ago, was treated with cortisone injection and Physical Therapy with Dr. Hilliard.    Significant Orthopedic past medical history: none  Usual level of recreational activity: sedentary  Usual level of work activity: unemployed    Other  PMH:  has a past medical history of Multinodular goiter (nontoxic) (6/10/2013). He also has no past medical history of Arthritis; Asthma; Blood transfusion; Congestive heart failure, unspecified; COPD (chronic obstructive pulmonary disease) (H); Coronary artery disease; or Unspecified cerebral artery occlusion with cerebral infarction.  Patient Active Problem List   Diagnosis     Hyperlipidemia LDL goal <70     Hypertension goal BP (blood pressure) < 140/90     Gout, chronic     Anxiety     Advanced directives, counseling/discussion     Carotid stenosis     Hx of laryngeal cancer     Type 2 diabetes mellitus without complication (H)     Non morbid obesity due to excess calories     Dysphonia     Vocal process granuloma     Type 2 diabetes mellitus without complication, without long-term current use of insulin (H)     Diabetes mellitus, type 2 (H)     Obstructive sleep apnea- moderate-severe (AHI 25)     Benign prostatic hypertrophy     Psychophysiological insomnia     Periodic limb movements of sleep     Low ferritin       Surgical Hx:  has a past surgical history that includes vasectomy (02/03); Head and neck surgery (1/25/11); colonoscopy; ENT surgery (2008); Laryngoscopy with biopsy(ies) (1-25-11); colonoscopy (6-22-12); Laser CO2 laryngoscopy, complex (6/27/2013); orthopedic surgery (March 2016); Laryngoscopy with microscope (N/A, 2/9/2017); Laryngoscopy with biopsy(ies) (N/A, 2/9/2017); and Laser CO2 lesion oral (N/A, 2/9/2017).    Medications:   Current Outpatient Prescriptions:      lisinopril (PRINIVIL,ZESTRIL) 30 MG tablet, TAKE 1 TABLET(30 MG) BY MOUTH DAILY, Disp: 90 tablet, Rfl: 2     hydrochlorothiazide (HYDRODIURIL) 50 MG tablet, TAKE 1 TABLET(50 MG) BY MOUTH EVERY MORNING, Disp: 90 tablet, Rfl: 2     tamsulosin (FLOMAX) 0.4 MG capsule, TAKE 1 CAPSULE(0.4 MG) BY MOUTH DAILY, Disp: 90 capsule, Rfl: 1     metFORMIN (GLUCOPHAGE) 500 MG tablet, TAKE 1 TABLET(500 MG) BY MOUTH TWICE DAILY WITH MEALS, Disp:  180 tablet, Rfl: 1     order for DME, Autocpap 10-16 cm, Disp: 1 Units, Rfl: 0     blood glucose monitoring (NO BRAND SPECIFIED) meter device kit, Use to test blood sugar 2 times daily or as directed.  Accucheck meter please and all associated strips, lancets, and other supplies, 3 month supply with refills for a year., Disp: 1 kit, Rfl: 0     order for DME, Equipment being ordered: Diabetic Shoes  One pair, Disp: 1 Device, Rfl: 0     PARoxetine (PAXIL) 20 MG tablet, Take 2 tablets (40 mg) by mouth daily, Disp: 180 tablet, Rfl: 1     order for DME, Place rectally 2 times daily 1.8 oz container of 0.2 % nifedipine suppository if possible as easier to place the medication.   Apply on the anus every 12 hours.  Just get to where the anus is tight ., Disp: 1 Container, Rfl: 5     order for DME, Place rectally every 12 hours 0.2 % nifedipine suppository Apply on the anus every 12 hours.  Just get to where the anus is tight ., Disp: 60 suppository, Rfl: 5     psyllium 0.52 G capsule, Take 1 capsule (0.52 g) by mouth daily, Disp: 100 capsule, Rfl: 3     atorvastatin (LIPITOR) 20 MG tablet, Take 1 tablet (20 mg) by mouth daily, Disp: 90 tablet, Rfl: 3     hydrocortisone (ANUSOL-HC) 25 MG suppository, Place 1 suppository (25 mg) rectally 2 times daily as needed for hemorrhoids, Disp: 20 suppository, Rfl: 1     hydrocortisone (ANUSOL-HC) 2.5 % rectal cream, Place rectally 2 times daily as needed for hemorrhoids, Disp: 30 g, Rfl: 1     triamcinolone (KENALOG) 0.1 % cream, Apply topically 2 times daily as needed for irritation, Disp: 30 g, Rfl: 1     hydrocortisone 1 % ointment, Apply topically 2 times daily as needed, Disp: 30 g, Rfl: 1     allopurinol (ZYLOPRIM) 300 MG tablet, Take 1 tablet (300 mg) by mouth daily, Disp: 90 tablet, Rfl: 3     blood glucose monitoring (ALON MICROLET) lancets, 1 each 2 times daily Use to test blood sugar 2 times daily or as directed., Disp: 1 Box, Rfl: 3     Cholecalciferol (VITAMIN D) 1000  "UNITS capsule, Take 1 capsule by mouth daily., Disp: , Rfl:      Multiple Vitamin (DAILY MULTIVITAMIN PO), Take  by mouth daily., Disp: , Rfl:      aspirin 81 MG tablet, Take 1 tablet by mouth daily., Disp: , Rfl:     Allergies: No Known Allergies    Social Hx: school .  reports that he quit smoking about 16 years ago. He quit after 8.00 years of use. He has never used smokeless tobacco. He reports that he drinks alcohol. He reports that he does not use illicit drugs.    Family Hx: family history includes C.A.D. in his father; Hypertension in his mother. There is no history of DIABETES, CEREBROVASCULAR DISEASE, CANCER, Glaucoma, or Macular Degeneration..    REVIEW OF SYSTEMS: 10 point ROS neg other than the symptoms noted above in the HPI and PMH. Notables include  CONSTITUTIONAL:NEGATIVE for fever, chills, change in weight  INTEGUMENTARY/SKIN: NEGATIVE for worrisome rashes, moles or lesions  MUSCULOSKELETAL:See HPI above  NEURO: NEGATIVE for weakness, dizziness or paresthesias    This document serves as a record of the services and decisions personally performed and made by Feliciano Aguilar MD. It was created on his behalf by Edita Vieyra, a trained medical scribe. The creation of this document is based the provider's statements to the medical scribe.    Scribe Edita Vieyra 11:02 AM 6/23/2017     PHYSICAL EXAM:  /79  Resp 16  Ht 1.778 m (5' 10\")  Wt 104.5 kg (230 lb 6.4 oz)  BMI 33.06 kg/m2   GENERAL APPEARANCE: healthy, alert, no distress  SKIN: no suspicious lesions or rashes  NEURO: Normal strength and tone, mentation intact and speech normal  PSYCH:  mentation appears normal and affect normal, not anxious  RESPIRATORY: No increased work of breathing.  VASCULAR: Radial pulses 2+ and brisk cappillary refill     MUSCULOSKELETAL:    NECK:  There is some curvature of the upper thoracic spine.    RIGHT UPPER EXTREMITY:  Sensation intact to light touch in median, radial, ulnar and axillary nerve " distributions  Palpable 2+ radial pulse, brisk capillary refill to all fingers, wwp  Intact epl fpl fdp edc wrist flexion/extension biceps triceps deltoid    RIGHT SHOULDER:  Shoulder Inspection: no swelling, bruising, discoloration, or obvious deformity or asymmetry  Tender: nontender to palpation   Range of Motion:   Active: forward flexion L2 degrees, external rotation 60 degrees, internal rotation L2  Strength: forward flexion 5/5, External rotation 5/5   Impingement: negative   Special tests: Empty Can: Negative    LEFT UPPER EXTREMITY:  Sensation intact to light touch in median, radial, ulnar and axillary nerve distributions  Palpable 2+ radial pulse, brisk capillary refill to all fingers, wwp  Intact epl fpl fdp edc wrist flexion/extension biceps triceps deltoid    LEFT SHOULDER:  Shoulder Inspection: no swelling, bruising, discoloration, noted atrophy the deltoid muscle and supraspinatus   Tender: nontender to palpation   Range of Motion:   Active: forward flexion 160 degrees, external rotation 50 degrees, internal rotation Hip pocket  Strength: forward flexion 4+/5, External rotation 4/5  Impingement: all grade 2 positive  Special tests: Empty can: positive    X-RAY INTERPRETATION: 3 views left  shoulder obtained 6/23/2017 were reviewed personally in clinic today with the patient. On my review, mild acromio-clavicular degenerative changes.    MRI 12/22/2014 [facility]: findings consistent with impingement. Moderate tendinopathy of the supraspinatus and infraspinatus tendons including a small, 5mm nearly 50% thickness superficial partial thickness tear of the distal insertional footprint of the anterior supraspinatus tendon. Mild subacromial bursal thickening/bursitis. Findings of possible coracoid impingement.      ASSESSMENT: Gary Iyer is a 56 year old male, right -hand dominant, bursitis and impingement syndrome, rotator cuff tendinosis, cannot rule out possible rotator cuff tear.    PLAN:   *  Reviewed imaging studies with patient. Also, clinical exam findings. Consistent with bursitis and impingement. Cannot rule out small rotator cuff tear given some weakness and prior MRI findings.  * Patient elects to proceed with non surgical options at this time, as previously with cortisone injections, he had great relief.    Treatment:    * Rest  * Activity modification - avoid activities that aggravate symptoms or started symptoms at onset.  * NSAIDS - regular use for inflammation, with food, as long as no contra-indications. Please discuss with pcp if needed.  * Ice twice daily to three times daily, 15-20 minutes at a time  * heat may be beneficial prior to exercising  * Physical Therapy for strengthening, stretching and range of motion exercises of rotator cuff and periscapular stabilization.  * Tylenol as needed for pain  * Injections: cortisone injections may be beneficial to help decrease swelling and inflammation within the shoulder or bursa, and decrease pain. With decreased pain, Physical Therapy and exercises will be more effective and efficient. Patient elected to proceed.    * Return to clinic as needed  * consider MRI of the shoulder in future if symptoms persist despite the above regimen of treatment.      The information in this document, created by a scribe for me, accurately reflects the services I personally performed and the decisions made by me. I have reviewed and approved this document for accuracy.      Feliciano Aguilar M.D., M.S.  Dept. of Orthopaedic Surgery  [facility]    PROCEDURE NOTE:  The risks, perceived benefits and potential complications (including but not limited to: bleeding, infection, pain, scar, damage to adjacent structures, atrophy or necrosis of soft tissue, skin blanching, failure to relieve symptoms, worsening of symptoms, allergic reaction) of injection were discussed with the patient. Questions were addressed and answered.The patient elected to proceed.  Written informed consent was obtained. The correct procedural site was identified and confirmed. A Left Shoulder subacromial injection was performed using 2mL Kenalog-40 40mg per mL and 7mL (4mL 1% lidocaine, 3mL 0.25% marcaine)  of local anesthetic after sterile prep, to the correct procedural site. Sterile bandaid applied. This was tolerated well by the patient. No apparent complications. Did also discuss that if diabetic, recommend close monitoring of blood sugars over the next week as cortisone injections can temporarily elevate blood sugars.      Feliciano Aguilar M.D., M.S.  Dept. of Orthopaedic Surgery  [facility]

## 2017-06-23 NOTE — MR AVS SNAPSHOT
After Visit Summary   6/23/2017    Gary Iyer    MRN: [id number]           Patient Information     Date Of Birth          1960        Visit Information        Provider Department      6/23/2017 10:30 AM Feliciano Aguilar MD [facility]        Today's Diagnoses     Acute pain of left shoulder    -  1    Impingement syndrome, shoulder, left        Rotator cuff tendonitis, left          Care Instructions    Please remember to call and schedule a follow up appointment if one was recommended at your earliest convenience.  Orthopedics CLINIC HOURS TELEPHONE NUMBER   Dr. Jeff Jackson  Certified Medical Assistant   Monday & Wednesday   8am - 5pm  Thursday 1pm - 5pm  Friday 8am -11:30am Specialty schedulers:   [phone] to schedule your surgery.  Main Clinic:   [phone] to make an appointment with any provider.    Urgent Care locations:    [facility] Monday-Friday Closed  Saturday-Sunday 9am-5pm      Monday-Friday 12pm - 8pm  Saturday-Sunday 9am-5pm [phone]     If SURGERY has been recommended, please call our Specialty Schedulers at [phone] to schedule your procedure.    If you need a medication refill, please contact your pharmacy. Please allow 3 business days for your refill to be completed.    If an MRI or CT scan has been recommended, please call [facility] Schedulers at [phone] to schedule your appointment.  Use [employer] (secure e-mail communication and access to your chart) to send a message or to make an appointment. Please ask how you can sign up for [employer].  Your care team's suggested websites for health information:   [email or web address] : Up to date and easily searchable information on multiple topics.   [email or web address] : [place] dept of heatl, public health issues in [place], N1N1              Follow-ups after your visit        Additional Services     [facility] PT, HAND, AND CHIROPRACTIC  REFERRAL       **This order will print in the [facility] Scheduling Office**    Physical Therapy, Hand Therapy and Chiropractic Care are available through:    *[facility]  *[facility]  *[facility]    Call one number to schedule at any of the above locations: [phone].    Your provider has referred you to: Physical Therapy at [facility] or [facility]    Indication/Reason for Referral: Shoulder Pain  Onset of Illness: unknown  Therapy Orders: Evaluate and Treat  Special Programs: None  Special Request: None    Shirley Watson      Additional Comments for the Therapist or Chiropractor:       Please be aware that coverage of these services is subject to the terms and limitations of your health insurance plan.  Call member services at your health plan with any benefit or coverage questions.      Please bring the following to your appointment:    *Your personal calendar for scheduling future appointments  *Comfortable clothing                  Follow-up notes from your care team     Return if symptoms worsen or fail to improve.      Your next 10 appointments already scheduled     Jun 26, 2017 10:40 AM CDT   (Arrive by 10:10 AM)   RETURN THROAT with Mel Fournier MD   [facility] ([facility] [place])    [address] [place] [id number] [phone]           - This is a multidisciplinary care team visit with an ENT physician and may include a speech language pathologist. - It is important to know that if you are evaluated and/or treated by both a physician and a speech pathologist during your visit, your billing will reflect the input that you receive from both providers as separate professionals. Although most insurance plans do cover these services, we encourage you to contact your insurance company prior to your visit to determine whether there are any coverage limitations that might affect you financially.  - Billing/procedure codes that are frequently associated with visits to our clinic include (but are not limited to) the ones listed below. Most patients will not need all of these items, but it may be useful to ask your insurance company's patient . 48095: Flexible fiberoptic laryngoscopy, 45285: Laryngoscopy; flexible or rigid fiberoptic, with stroboscopy, 46351: Flexible endoscopic evaluation of swallowing, 76477: Evaluation of Voice and Resonance, 53229: Speech pathology treatment for voice, speech, communication, 15833: Speech pathology treatment for swallowing/oral function for feeding - If you have any concerns or questions, or if you would prefer not to have a speech pathologist involved in your visit, please contact our Clinic Coordinator at [phone], at least 24 hours prior to your appointment.              Who to contact     If you have questions or need follow up information about today's clinic visit or your schedule please contact [facility] directly at [phone].  Normal or non-critical lab and imaging results will be communicated to you by [employer], letter or phone within 4 business days after the clinic has received the results. If you do not hear from us within 7 days, please contact the clinic through "[employer] or phone. If you have a critical or abnormal lab result, we will notify you by phone as soon as possible.  Submit refill requests through "[employer] or call your pharmacy and they will forward the refill request to us. Please allow 3 business days for your refill to be completed.          Additional Information About Your Visit        "[employer] Information     "[employer] gives you secure access to your electronic health record. If you see a primary care provider, you can also send messages to your care team and make appointments. If you have questions, please call your primary care clinic.  If you do not have a primary care provider, please call  "[phone] and they will assist you.        Care EveryWhere ID     This is your Care EveryWhere ID. This could be used by other organizations to access your [place] [place] medical records  [id number]        Your Vitals Were     Respirations Height BMI (Body Mass Index)             16 5' 10\" (1.778 m) 33.06 kg/m2          Blood Pressure from Last 3 Encounters:   06/23/17 139/79   06/12/17 137/85   03/29/17 123/77    Weight from Last 3 Encounters:   06/23/17 230 lb 6.4 oz (104.5 kg)   06/12/17 228 lb (103.4 kg)   04/24/17 225 lb (102.1 kg)              We Performed the Following     DRAIN/INJECT LARGE JOINT/BURSA     [facility] PT, HAND, AND CHIROPRACTIC REFERRAL     TRIAMCINOLONE ACET INJ NOS          Today's Medication Changes          These changes are accurate as of: 6/23/17 11:54 AM.  If you have any questions, ask your nurse or doctor.               Start taking these medicines.        Dose/Directions    triamcinolone acetonide 40 MG/ML injection   Commonly known as:  KENALOG-40   Used for:  Impingement syndrome, shoulder, left, Acute pain of left shoulder   Started by:  Feliciano Aguilar MD        Dose:  40 mg   Inject 1 mL (40 mg) into the muscle once for 1 dose   Quantity:  1 mL   Refills:  0            Where to get your medicines      Some of these will need a paper prescription and others can be bought over the counter.  Ask your nurse if you have questions.     You don't need a prescription for these medications     triamcinolone acetonide 40 MG/ML injection                Primary Care Provider Office Phone # Fax #    Finesse Beal [phone]       [facility] [address]  [facility] [address]        Equal Access to Services     IZABEL PORTILLO AH: Deanna Wheatley, waaxda luqadaha, qaybta kaalmada adepierreyada, gregory santos. So [facility] [phone].    ATENCIÓN: Si habla español, tiene a lucero disposición servicios gratuitos de asistencia " lingüísticaGlo Alejandre al [phone].    We comply with applicable federal civil rights laws and [place] laws. We do not discriminate on the basis of race, color, national origin, age, disability sex, sexual orientation or gender identity.            Thank you!     Thank you for choosing [facility]  for your care. Our goal is always to provide you with excellent care. Hearing back from our patients is one way we can continue to improve our services. Please take a few minutes to complete the written survey that you may receive in the mail after your visit with us. Thank you!             Your Updated Medication List - Protect others around you: Learn how to safely use, store and throw away your medicines at www.disposemymeds.org.          This list is accurate as of: 6/23/17 11:54 AM.  Always use your most recent med list.                   Brand Name Dispense Instructions for use Diagnosis    allopurinol 300 MG tablet    ZYLOPRIM    90 tablet    Take 1 tablet (300 mg) by mouth daily    Gout, unspecified       aspirin 81 MG tablet      Take 1 tablet by mouth daily.        atorvastatin 20 MG tablet    LIPITOR    90 tablet    Take 1 tablet (20 mg) by mouth daily    Hyperlipidemia LDL goal <70       blood glucose monitoring lancets     1 Box    1 each 2 times daily Use to test blood sugar 2 times daily or as directed.    Type 2 diabetes mellitus without complication (H)       blood glucose monitoring meter device kit    no brand specified    1 kit    Use to test blood sugar 2 times daily or as directed.  Accucheck meter please and all associated strips, lancets, and other supplies, 3 month supply with refills for a year.    Type 2 diabetes mellitus without complication, without long-term current use of insulin (H)       DAILY MULTIVITAMIN PO      Take  by mouth daily.        hydrochlorothiazide 50 MG tablet    HYDRODIURIL    90 tablet    TAKE 1 TABLET(50 MG) BY MOUTH EVERY MORNING    Hypertension goal BP  (blood pressure) < 140/90       hydrocortisone 1 % ointment     30 g    Apply topically 2 times daily as needed    Facial dermatitis       hydrocortisone 2.5 % cream    ANUSOL-HC    30 g    Place rectally 2 times daily as needed for hemorrhoids    External hemorrhoids       hydrocortisone 25 MG Suppository    ANUSOL-HC    20 suppository    Place 1 suppository (25 mg) rectally 2 times daily as needed for hemorrhoids    External hemorrhoids       lisinopril 30 MG tablet    PRINIVIL,ZESTRIL    90 tablet    TAKE 1 TABLET(30 MG) BY MOUTH DAILY    Hypertension goal BP (blood pressure) < 140/90       metFORMIN 500 MG tablet    GLUCOPHAGE    180 tablet    TAKE 1 TABLET(500 MG) BY MOUTH TWICE DAILY WITH MEALS    Type 2 diabetes mellitus without complication (H)       * order for DME     1 Container    Place rectally 2 times daily 1.8 oz container of 0.2 % nifedipine suppository if possible as easier to place the medication.   Apply on the anus every 12 hours.  Just get to where the anus is tight .    Anal fissure       * order for DME     60 suppository    Place rectally every 12 hours 0.2 % nifedipine suppository Apply on the anus every 12 hours.  Just get to where the anus is tight .    Anal fissure       * order for DME     1 Device    Equipment being ordered: Diabetic Shoes  One pair    Type 2 diabetes mellitus without complication, without long-term current use of insulin (H)       * order for DME     1 Units    Autocpap 10-16 cm    Obstructive sleep apnea       PARoxetine 20 MG tablet    PAXIL    180 tablet    Take 2 tablets (40 mg) by mouth daily    Anxiety       psyllium 0.52 G capsule     100 capsule    Take 1 capsule (0.52 g) by mouth daily    Constipation, unspecified constipation type       tamsulosin 0.4 MG capsule    FLOMAX    90 capsule    TAKE 1 CAPSULE(0.4 MG) BY MOUTH DAILY    Benign prostatic hyperplasia with lower urinary tract symptoms, unspecified morphology       triamcinolone 0.1 % cream    KENALOG     30 g    Apply topically 2 times daily as needed for irritation    Dermatitis       triamcinolone acetonide 40 MG/ML injection    KENALOG-40    1 mL    Inject 1 mL (40 mg) into the muscle once for 1 dose    Impingement syndrome, shoulder, left, Acute pain of left shoulder       vitamin D 1000 UNITS capsule      Take 1 capsule by mouth daily.        * Notice:  This list has 4 medication(s) that are the same as other medications prescribed for you. Read the directions carefully, and ask your doctor or other care provider to review them with you.

## 2017-06-23 NOTE — LETTER
6/23/2017       RE: Gary Iyer  [address] [facility]  [place] [address]           Dear Colleague,    Thank you for referring your patient, Gary Iyer, to the [facility]. Please see a copy of my visit note below.    CHIEF COMPLAINT:   Chief Complaint   Patient presents with     Shoulder Pain     Left shoulder pain. Onset: 6/19/17. NKI. Pain just came on. He was seen in 12/2014 with Babak for left shoulder pain but this is different pain. Last injection: with Dr. Hilliard 12/16/14. Had some PT at the time. Pain is on the top and anterior of shoulder and upper arm. Pain with lifting arm. No recent treatments.        HISTORY:  Gary Iyer is a 56 year old male, right -hand dominant, who is seen for left shoulder pain that started a few days ago, 6/19/2017. No injury. Original issue of the left shoulder started 3+ years ago, seen and treated by Dr. Hilliard with Physical Therapy and injection. Today he reports severe pain, rated 6/10. Had a cortisone injection at that time. Did physical therapy. Ultrasound physical therapy did help as well. Pain with abduction. No pain at rest, however feels a dull ache. He has some trouble with activities of daily living such as washing the left side of his body. No pain at night. Has had no recent treatments. Denies neck pain or numbness and tingling.     Onset: unknown  Symptoms have been worsening since that time.  Aggravated by: with abduction and other shoulder active range of motion   Relieved by: at rest  Present symptoms: pain with ADL's (dressing),  pain with overhead activities,  pain reaching out or away from body (flexion/ abduction),  Pain location: diffuse with movement  Pain severity: 6/10  Pain quality: dull, aching and sharp  Frequency of symptoms: frequently  Associated symptoms: none    Treatment up to this point: physical therapy and cortisone injection 2014, nothing recent.  Has not tried: surgery  Prior history of related problems: Yes, 3  years ago, was treated with cortisone injection and Physical Therapy with Dr. Hilliard.    Significant Orthopedic past medical history: none  Usual level of recreational activity: sedentary  Usual level of work activity: unemployed    Other PMH:  has a past medical history of Multinodular goiter (nontoxic) (6/10/2013). He also has no past medical history of Arthritis; Asthma; Blood transfusion; Congestive heart failure, unspecified; COPD (chronic obstructive pulmonary disease) (H); Coronary artery disease; or Unspecified cerebral artery occlusion with cerebral infarction.  Patient Active Problem List   Diagnosis     Hyperlipidemia LDL goal <70     Hypertension goal BP (blood pressure) < 140/90     Gout, chronic     Anxiety     Advanced directives, counseling/discussion     Carotid stenosis     Hx of laryngeal cancer     Type 2 diabetes mellitus without complication (H)     Non morbid obesity due to excess calories     Dysphonia     Vocal process granuloma     Type 2 diabetes mellitus without complication, without long-term current use of insulin (H)     Diabetes mellitus, type 2 (H)     Obstructive sleep apnea- moderate-severe (AHI 25)     Benign prostatic hypertrophy     Psychophysiological insomnia     Periodic limb movements of sleep     Low ferritin       Surgical Hx:  has a past surgical history that includes vasectomy (02/03); Head and neck surgery (1/25/11); colonoscopy; ENT surgery (2008); Laryngoscopy with biopsy(ies) (1-25-11); colonoscopy (6-22-12); Laser CO2 laryngoscopy, complex (6/27/2013); orthopedic surgery (March 2016); Laryngoscopy with microscope (N/A, 2/9/2017); Laryngoscopy with biopsy(ies) (N/A, 2/9/2017); and Laser CO2 lesion oral (N/A, 2/9/2017).    Medications:   Current Outpatient Prescriptions:      lisinopril (PRINIVIL,ZESTRIL) 30 MG tablet, TAKE 1 TABLET(30 MG) BY MOUTH DAILY, Disp: 90 tablet, Rfl: 2     hydrochlorothiazide (HYDRODIURIL) 50 MG tablet, TAKE 1 TABLET(50 MG) BY MOUTH EVERY  MORNING, Disp: 90 tablet, Rfl: 2     tamsulosin (FLOMAX) 0.4 MG capsule, TAKE 1 CAPSULE(0.4 MG) BY MOUTH DAILY, Disp: 90 capsule, Rfl: 1     metFORMIN (GLUCOPHAGE) 500 MG tablet, TAKE 1 TABLET(500 MG) BY MOUTH TWICE DAILY WITH MEALS, Disp: 180 tablet, Rfl: 1     order for DME, Autocpap 10-16 cm, Disp: 1 Units, Rfl: 0     blood glucose monitoring (NO BRAND SPECIFIED) meter device kit, Use to test blood sugar 2 times daily or as directed.  Accucheck meter please and all associated strips, lancets, and other supplies, 3 month supply with refills for a year., Disp: 1 kit, Rfl: 0     order for DME, Equipment being ordered: Diabetic Shoes  One pair, Disp: 1 Device, Rfl: 0     PARoxetine (PAXIL) 20 MG tablet, Take 2 tablets (40 mg) by mouth daily, Disp: 180 tablet, Rfl: 1     order for DME, Place rectally 2 times daily 1.8 oz container of 0.2 % nifedipine suppository if possible as easier to place the medication.   Apply on the anus every 12 hours.  Just get to where the anus is tight ., Disp: 1 Container, Rfl: 5     order for DME, Place rectally every 12 hours 0.2 % nifedipine suppository Apply on the anus every 12 hours.  Just get to where the anus is tight ., Disp: 60 suppository, Rfl: 5     psyllium 0.52 G capsule, Take 1 capsule (0.52 g) by mouth daily, Disp: 100 capsule, Rfl: 3     atorvastatin (LIPITOR) 20 MG tablet, Take 1 tablet (20 mg) by mouth daily, Disp: 90 tablet, Rfl: 3     hydrocortisone (ANUSOL-HC) 25 MG suppository, Place 1 suppository (25 mg) rectally 2 times daily as needed for hemorrhoids, Disp: 20 suppository, Rfl: 1     hydrocortisone (ANUSOL-HC) 2.5 % rectal cream, Place rectally 2 times daily as needed for hemorrhoids, Disp: 30 g, Rfl: 1     triamcinolone (KENALOG) 0.1 % cream, Apply topically 2 times daily as needed for irritation, Disp: 30 g, Rfl: 1     hydrocortisone 1 % ointment, Apply topically 2 times daily as needed, Disp: 30 g, Rfl: 1     allopurinol (ZYLOPRIM) 300 MG tablet, Take 1 tablet  "(300 mg) by mouth daily, Disp: 90 tablet, Rfl: 3     blood glucose monitoring (ALON MICROLET) lancets, 1 each 2 times daily Use to test blood sugar 2 times daily or as directed., Disp: 1 Box, Rfl: 3     Cholecalciferol (VITAMIN D) 1000 UNITS capsule, Take 1 capsule by mouth daily., Disp: , Rfl:      Multiple Vitamin (DAILY MULTIVITAMIN PO), Take  by mouth daily., Disp: , Rfl:      aspirin 81 MG tablet, Take 1 tablet by mouth daily., Disp: , Rfl:     Allergies: No Known Allergies    Social Hx: school .  reports that he quit smoking about 16 years ago. He quit after 8.00 years of use. He has never used smokeless tobacco. He reports that he drinks alcohol. He reports that he does not use illicit drugs.    Family Hx: family history includes C.A.D. in his father; Hypertension in his mother. There is no history of DIABETES, CEREBROVASCULAR DISEASE, CANCER, Glaucoma, or Macular Degeneration..    REVIEW OF SYSTEMS: 10 point ROS neg other than the symptoms noted above in the HPI and PMH. Notables include  CONSTITUTIONAL:NEGATIVE for fever, chills, change in weight  INTEGUMENTARY/SKIN: NEGATIVE for worrisome rashes, moles or lesions  MUSCULOSKELETAL:See HPI above  NEURO: NEGATIVE for weakness, dizziness or paresthesias    This document serves as a record of the services and decisions personally performed and made by Feliciano Aguilar MD. It was created on his behalf by Edita Vieyra, a trained medical scribe. The creation of this document is based the provider's statements to the medical scribe.    Scribe Edita Vieyra 11:02 AM 6/23/2017     PHYSICAL EXAM:  /79  Resp 16  Ht 1.778 m (5' 10\")  Wt 104.5 kg (230 lb 6.4 oz)  BMI 33.06 kg/m2   GENERAL APPEARANCE: healthy, alert, no distress  SKIN: no suspicious lesions or rashes  NEURO: Normal strength and tone, mentation intact and speech normal  PSYCH:  mentation appears normal and affect normal, not anxious  RESPIRATORY: No increased work of breathing.  VASCULAR: " Radial pulses 2+ and brisk cappillary refill     MUSCULOSKELETAL:    NECK:  There is some curvature of the upper thoracic spine.    RIGHT UPPER EXTREMITY:  Sensation intact to light touch in median, radial, ulnar and axillary nerve distributions  Palpable 2+ radial pulse, brisk capillary refill to all fingers, wwp  Intact epl fpl fdp edc wrist flexion/extension biceps triceps deltoid    RIGHT SHOULDER:  Shoulder Inspection: no swelling, bruising, discoloration, or obvious deformity or asymmetry  Tender: nontender to palpation   Range of Motion:   Active: forward flexion L2 degrees, external rotation 60 degrees, internal rotation L2  Strength: forward flexion 5/5, External rotation 5/5   Impingement: negative   Special tests: Empty Can: Negative    LEFT UPPER EXTREMITY:  Sensation intact to light touch in median, radial, ulnar and axillary nerve distributions  Palpable 2+ radial pulse, brisk capillary refill to all fingers, wwp  Intact epl fpl fdp edc wrist flexion/extension biceps triceps deltoid    LEFT SHOULDER:  Shoulder Inspection: no swelling, bruising, discoloration, noted atrophy the deltoid muscle and supraspinatus   Tender: nontender to palpation   Range of Motion:   Active: forward flexion 160 degrees, external rotation 50 degrees, internal rotation Hip pocket  Strength: forward flexion 4+/5, External rotation 4/5  Impingement: all grade 2 positive  Special tests: Empty can: positive    X-RAY INTERPRETATION: 3 views left  shoulder obtained 6/23/2017 were reviewed personally in clinic today with the patient. On my review, mild acromio-clavicular degenerative changes.    MRI 12/22/2014 [facility]: findings consistent with impingement. Moderate tendinopathy of the supraspinatus and infraspinatus tendons including a small, 5mm nearly 50% thickness superficial partial thickness tear of the distal insertional footprint of the anterior supraspinatus tendon. Mild subacromial bursal thickening/bursitis.  Findings of possible coracoid impingement.      ASSESSMENT: Gary Iyer is a 56 year old male, right -hand dominant, bursitis and impingement syndrome, rotator cuff tendinosis, cannot rule out possible rotator cuff tear.    PLAN:   * Reviewed imaging studies with patient. Also, clinical exam findings. Consistent with bursitis and impingement. Cannot rule out small rotator cuff tear given some weakness and prior MRI findings.  * Patient elects to proceed with non surgical options at this time, as previously with cortisone injections, he had great relief.    Treatment:    * Rest  * Activity modification - avoid activities that aggravate symptoms or started symptoms at onset.  * NSAIDS - regular use for inflammation, with food, as long as no contra-indications. Please discuss with pcp if needed.  * Ice twice daily to three times daily, 15-20 minutes at a time  * heat may be beneficial prior to exercising  * Physical Therapy for strengthening, stretching and range of motion exercises of rotator cuff and periscapular stabilization.  * Tylenol as needed for pain  * Injections: cortisone injections may be beneficial to help decrease swelling and inflammation within the shoulder or bursa, and decrease pain. With decreased pain, Physical Therapy and exercises will be more effective and efficient. Patient elected to proceed.    * Return to clinic as needed  * consider MRI of the shoulder in future if symptoms persist despite the above regimen of treatment.      The information in this document, created by a scribe for me, accurately reflects the services I personally performed and the decisions made by me. I have reviewed and approved this document for accuracy.      Feliciano Aguilar M.D., M.S.  Dept. of Orthopaedic Surgery  [facility]    PROCEDURE NOTE:  The risks, perceived benefits and potential complications (including but not limited to: bleeding, infection, pain, scar, damage to adjacent structures,  atrophy or necrosis of soft tissue, skin blanching, failure to relieve symptoms, worsening of symptoms, allergic reaction) of injection were discussed with the patient. Questions were addressed and answered.The patient elected to proceed. Written informed consent was obtained. The correct procedural site was identified and confirmed. A Left Shoulder subacromial injection was performed using 2mL Kenalog-40 40mg per mL and 7mL (4mL 1% lidocaine, 3mL 0.25% marcaine)  of local anesthetic after sterile prep, to the correct procedural site. Sterile bandaid applied. This was tolerated well by the patient. No apparent complications. Did also discuss that if diabetic, recommend close monitoring of blood sugars over the next week as cortisone injections can temporarily elevate blood sugars.      Feliciano Aguilar M.D., M.S.  Dept. of Orthopaedic Surgery  [facility]      The patient's left shoulder was prepped with betadine solution after verification of allergies. Area approximately 10 cm x 10 cm prepped in a sterile fashion. After injection, betadine removed with soap and water and band-aids applied.    4cc Lidocaine 1%  NDC 8672-6642-73, LOT -dk,  2019  3cc Bupivacaine 0.25% NDC 0670-9119-67, LOT -dk,  [id number]  2cc Kenalog 40 NDC 3770-0855-65, LOT [id number],   injected into patient's left subacromial space without resistance using posterolateral approach by:   Jake Turpin PA-C, CAQ (Ortho)  Supervising Physician: Feliciano Aguilar M.D., M.S.  Dept. of Orthopaedic Surgery  [facility]          Again, thank you for allowing me to participate in the care of your patient.        Sincerely,              Feliciano Aguilar MD

## 2017-06-23 NOTE — NURSING NOTE
"Chief Complaint   Patient presents with     Shoulder Pain     Left shoulder pain. Onset: 6/19/17. NKI. Pain just came on. He was seen in 12/2014 with Babak for left shoulder pain but this is different pain. Last injection: with Dr. Hilliard 12/16/14. Had some PT at the time. Pain is on the top and anterior of shoulder and upper arm. Pain with lifting arm. No recent treatments.        Initial /79  Resp 16  Ht 1.778 m (5' 10\")  Wt 104.5 kg (230 lb 6.4 oz)  BMI 33.06 kg/m2 Estimated body mass index is 33.06 kg/(m^2) as calculated from the following:    Height as of this encounter: 1.778 m (5' 10\").    Weight as of this encounter: 104.5 kg (230 lb 6.4 oz).  Medication Reconciliation: felix Stahl Certified Medical Assistant    "

## 2017-06-26 ENCOUNTER — OFFICE VISIT (OUTPATIENT)
Dept: OTOLARYNGOLOGY | Facility: CLINIC | Age: 57
End: 2017-06-26

## 2017-06-26 DIAGNOSIS — R49.0 DYSPHONIA: Primary | ICD-10-CM

## 2017-06-26 DIAGNOSIS — R49.0 DYSPHONIA: ICD-10-CM

## 2017-06-26 DIAGNOSIS — C32.9 LARYNGEAL CANCER (H): Primary | ICD-10-CM

## 2017-06-26 ASSESSMENT — PAIN SCALES - GENERAL: PAINLEVEL: NO PAIN (0)

## 2017-06-26 NOTE — MR AVS SNAPSHOT
After Visit Summary   6/26/2017    Gary Iyer    MRN: [id number]           Patient Information     Date Of Birth          1960        Visit Information        Provider Department      6/26/2017 10:40 AM Mel Fournier MD [facility] Ear Nose and Throat        Today's Diagnoses     Laryngeal cancer (H)    -  1    Dysphonia          Care Instructions    1.  You were seen in the ENT Clinic today by Dr. Fournier.  If you have any questions or concerns after your appointment, please call [phone].  Press option #1 for scheduling related needs.  Press option #3 for Nurse advice.  2.  Plan is to return to clinic in 2-3 months for repeat exam.     Bijal MELENDEZ, RN  [facility]   Head & Neck Surgery               Follow-ups after your visit        Additional Services     OTOLARYNGOLOGY REFERRAL       SPEECH-LANGUAGE PATHOLOGY SERVICE(S) REQUESTED:  Evaluate and treat    Misty Clark, Ph.D., CCC/SLP  Speech-Language Pathologist  Director, [facility]  [phone]    Jasmyne Padilla M.M., M.A., CCC/SLP  Speech-Language Pathologist  [facility]  [phone]                  Your next 10 appointments already scheduled     Jun 27, 2017 11:00 AM CDT   [facility] Extremity with Ronald Sarkar, PT   [facility] ([facility] [place])    [address] [place] [id number] [phone]              Who to contact     Please call your clinic at [phone] to:    Ask questions about your health    Make or cancel appointments    Discuss your medicines    Learn about your test results    Speak to your doctor   If you have compliments or concerns about an experience at your clinic, or if you wish to file a complaint, please contact [facility] Patient Relations at [phone] or email us at [email or web address]         Additional Information About Your Visit        MyChart Information     [employer]Cloud gives you secure access to  your electronic health record. If you see a primary care provider, you can also send messages to your care team and make appointments. If you have questions, please call your primary care clinic.  If you do not have a primary care provider, please call [phone] and they will assist you.      [employer] is an electronic gateway that provides easy, online access to your medical records. With [employer], you can request a clinic appointment, read your test results, renew a prescription or communicate with your care team.     To access your existing account, please contact your [facility] or call [phone] for assistance.        Care EveryWhere ID     This is your Care EveryWhere ID. This could be used by other organizations to access your [facility] medical records  [id number]         Blood Pressure from Last 3 Encounters:   06/23/17 139/79   06/12/17 137/85   03/29/17 123/77    Weight from Last 3 Encounters:   06/23/17 104.5 kg (230 lb 6.4 oz)   06/12/17 103.4 kg (228 lb)   04/24/17 102.1 kg (225 lb)              We Performed the Following     IMAGESTREAM RECORDING ORDER     LARYNGOSCOPY FLEX/RIGID W STROBOSCOPY     OTOLARYNGOLOGY REFERRAL        Primary Care Provider Office Phone # Fax #    Finesse Beal [phone]       [address] [address]        Equal Access to Services     KIRILL PORTILLO : Hadii aad ku hadasho Soomaali, waaxda luqadaha, qaybta kaalmada adeegyada, gregory gutierrez . So [facility] [phone].    ATENCIÓN: Si habla español, tiene a lucero disposición servicios gratuitos de asistencia lingüística. Pili al [phone].    We comply with applicable federal civil rights laws and [place] laws. We do not discriminate on the basis of race, color, national origin, age, disability sex, sexual orientation or gender identity.            Thank you!     Thank you for choosing  [facility]  for your care. Our goal is always to provide you with excellent care. Hearing back from our patients is one way we can continue to improve our services. Please take a few minutes to complete the written survey that you may receive in the mail after your visit with us. Thank you!             Your Updated Medication List - Protect others around you: Learn how to safely use, store and throw away your medicines at www.disposemymeds.org.          This list is accurate as of: 6/26/17  5:38 PM.  Always use your most recent med list.                   Brand Name Dispense Instructions for use Diagnosis    allopurinol 300 MG tablet    ZYLOPRIM    90 tablet    Take 1 tablet (300 mg) by mouth daily    Gout, unspecified       aspirin 81 MG tablet      Take 1 tablet by mouth daily.        atorvastatin 20 MG tablet    LIPITOR    90 tablet    Take 1 tablet (20 mg) by mouth daily    Hyperlipidemia LDL goal <70       blood glucose monitoring lancets     1 Box    1 each 2 times daily Use to test blood sugar 2 times daily or as directed.    Type 2 diabetes mellitus without complication (H)       blood glucose monitoring meter device kit    no brand specified    1 kit    Use to test blood sugar 2 times daily or as directed.  Accucheck meter please and all associated strips, lancets, and other supplies, 3 month supply with refills for a year.    Type 2 diabetes mellitus without complication, without long-term current use of insulin (H)       DAILY MULTIVITAMIN PO      Take  by mouth daily.        hydrochlorothiazide 50 MG tablet    HYDRODIURIL    90 tablet    TAKE 1 TABLET(50 MG) BY MOUTH EVERY MORNING    Hypertension goal BP (blood pressure) < 140/90       hydrocortisone 1 % ointment     30 g    Apply topically 2 times daily as needed    Facial dermatitis       hydrocortisone 2.5 % cream    ANUSOL-HC    30 g    Place rectally 2 times daily as needed for hemorrhoids    External hemorrhoids       hydrocortisone 25 MG  Suppository    ANUSOL-HC    20 suppository    Place 1 suppository (25 mg) rectally 2 times daily as needed for hemorrhoids    External hemorrhoids       lisinopril 30 MG tablet    PRINIVIL,ZESTRIL    90 tablet    TAKE 1 TABLET(30 MG) BY MOUTH DAILY    Hypertension goal BP (blood pressure) < 140/90       metFORMIN 500 MG tablet    GLUCOPHAGE    180 tablet    TAKE 1 TABLET(500 MG) BY MOUTH TWICE DAILY WITH MEALS    Type 2 diabetes mellitus without complication (H)       * order for DME     1 Container    Place rectally 2 times daily 1.8 oz container of 0.2 % nifedipine suppository if possible as easier to place the medication.   Apply on the anus every 12 hours.  Just get to where the anus is tight .    Anal fissure       * order for DME     60 suppository    Place rectally every 12 hours 0.2 % nifedipine suppository Apply on the anus every 12 hours.  Just get to where the anus is tight .    Anal fissure       * order for DME     1 Device    Equipment being ordered: Diabetic Shoes  One pair    Type 2 diabetes mellitus without complication, without long-term current use of insulin (H)       * order for DME     1 Units    Autocpap 10-16 cm    Obstructive sleep apnea       PARoxetine 20 MG tablet    PAXIL    180 tablet    Take 2 tablets (40 mg) by mouth daily    Anxiety       psyllium 0.52 G capsule     100 capsule    Take 1 capsule (0.52 g) by mouth daily    Constipation, unspecified constipation type       tamsulosin 0.4 MG capsule    FLOMAX    90 capsule    TAKE 1 CAPSULE(0.4 MG) BY MOUTH DAILY    Benign prostatic hyperplasia with lower urinary tract symptoms, unspecified morphology       triamcinolone 0.1 % cream    KENALOG    30 g    Apply topically 2 times daily as needed for irritation    Dermatitis       vitamin D 1000 UNITS capsule      Take 1 capsule by mouth daily.        * Notice:  This list has 4 medication(s) that are the same as other medications prescribed for you. Read the directions carefully, and ask  your doctor or other care provider to review them with you.

## 2017-06-26 NOTE — LETTER
6/26/2017      RE: Gary Iyer  [address] [facility] [address]       Dear Dr Barger:  Gary Iyer recently returned for follow-up at the [facility]. My clinic note from our visit is enclosed below.     I appreciate the ongoing opportunity to participate in this patient's care.    Please feel free to contact me with any questions.      Sincerely yours,  Mel Fournier M.D., M.P.H.  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 Most recently we returned to the Operating Room on 02/09/2017 for an area of new irregularity of the left true vocal fold. Pathology showed severe dysplasia with negative but close margins (for moderate, but not severe dysplasia).  He returns in routine follow up today. No specific concerns; voice is doing well.      MEDICATIONS:     Current Outpatient Prescriptions   Medication Sig Dispense Refill     lisinopril (PRINIVIL,ZESTRIL) 30 MG tablet TAKE 1 TABLET(30 MG) BY MOUTH DAILY 90 tablet 2     hydrochlorothiazide (HYDRODIURIL) 50 MG tablet TAKE 1 TABLET(50 MG) BY MOUTH EVERY MORNING 90 tablet 2     metFORMIN (GLUCOPHAGE) 500 MG tablet TAKE 1 TABLET(500 MG) BY MOUTH TWICE DAILY WITH MEALS 180 tablet 1     order for DME Autocpap 10-16 cm 1 Units 0     blood glucose monitoring (NO BRAND SPECIFIED) meter device kit Use to test blood sugar 2 times daily or as directed.  Accucheck meter please and all associated strips, lancets, and other supplies, 3 month supply with refills for a year. 1 kit 0     order for DME Equipment being ordered: Diabetic Shoes    One pair 1 Device 0     PARoxetine (PAXIL) 20 MG tablet Take 2 tablets (40 mg) by mouth daily 180 tablet 1     order for  DME Place rectally 2 times daily 1.8 oz container of 0.2 % nifedipine suppository if possible as easier to place the medication.    Apply on the anus every 12 hours.  Just get to where the anus is tight . 1 Container 5     order for DME Place rectally every 12 hours 0.2 % nifedipine suppository  Apply on the anus every 12 hours.  Just get to where the anus is tight . 60 suppository 5     atorvastatin (LIPITOR) 20 MG tablet Take 1 tablet (20 mg) by mouth daily 90 tablet 3     triamcinolone (KENALOG) 0.1 % cream Apply topically 2 times daily as needed for irritation 30 g 1     allopurinol (ZYLOPRIM) 300 MG tablet Take 1 tablet (300 mg) by mouth daily 90 tablet 3     blood glucose monitoring (Enh[employer] DecisionsET) lancets 1 each 2 times daily Use to test blood sugar 2 times daily or as directed. 1 Box 3     Cholecalciferol (VITAMIN D) 1000 UNITS capsule Take 1 capsule by mouth daily.       aspirin 81 MG tablet Take 1 tablet by mouth daily.       tamsulosin (FLOMAX) 0.4 MG capsule TAKE 1 CAPSULE(0.4 MG) BY MOUTH DAILY (Patient not taking: Reported on 6/26/2017) 90 capsule 1     psyllium 0.52 G capsule Take 1 capsule (0.52 g) by mouth daily (Patient not taking: Reported on 6/26/2017) 100 capsule 3     hydrocortisone (ANUSOL-HC) 25 MG suppository Place 1 suppository (25 mg) rectally 2 times daily as needed for hemorrhoids (Patient not taking: Reported on 6/26/2017) 20 suppository 1     hydrocortisone (ANUSOL-HC) 2.5 % rectal cream Place rectally 2 times daily as needed for hemorrhoids (Patient not taking: Reported on 6/26/2017) 30 g 1     hydrocortisone 1 % ointment Apply topically 2 times daily as needed (Patient not taking: Reported on 6/26/2017) 30 g 1     Multiple Vitamin (DAILY MULTIVITAMIN PO) Take  by mouth daily.         ALLERGIES:  No Known Allergies    NEW PMH/PSH: None    REVIEW OF SYSTEMS:  The patient completed a comprehensive 11 point review of systems (below), which was reviewed. Positives are as noted  below.  Patient Supplied Answers to Review of Systems   ENT ROS 4/22/2016   Ears, Nose, Throat Nasal congestion or drainage       PHYSICAL EXAM:  General: The patient was alert and conversant, and in no acute distress.    Neck: No palpable cervical lymphadenopathy, no significant tenderness to palpation of the thyrohyoid space, which was narrow. No obvious thyroid abnormality.  Resp: Breathing comfortably, no stridor or stertor.  Neuro: Symmetric facial function. Other cranial nerve function as documented above.  Psych: Normal affect, pleasant and cooperative.  Voice/speech: Mild dysphonia characterized by breathiness, roughness and strain.     Intake scores  Last 2 Scores for Patient-Answered VHI Questionnaire  VHI Total Score 4/24/2017 6/26/2017   VHI Total Score 1 1      Last 2 Scores for Patient-Answered EAT Questionnaire  EAT Total Score 4/24/2017 6/26/2017   EAT Total Score 0 0      Last 2 Scores for Patient-Answered CSI Questionnaire  CSI Total Score 4/24/2017 6/26/2017   CSI Total Score 0 0       Procedure:   Flexible fiberoptic laryngoscopy and laryngovideostroboscopy  Indications: This procedure was warranted to evaluate the patient's laryngeal function. Risks, benefits, and alternatives were discussed.  Description: After written informed consent was obtained, a time-out was performed to confirm patient identity, procedure, and procedure site. Topical 3% lidocaine with 0.25% phenylephrine was applied to the nasal cavities. I performed the endoscopy and no complications were apparent. Continuous and stroboscopic light were utilized to assess routine phonation and variable frequency phonation.  Performed by: Mel Fournier MD MPH  SLP: Jasmyne Padilla MM, MA, CCC-SLP  Findings: Normal nasopharynx. Normal base of tongue, valleculae, and epiglottis. Vocal fold mobility: right: normal; left: normal. Medial edges of the vocal folds: smooth and straight. No focal mucosal lesions were observed on the true  vocal folds. Stable diffuse erythema left true vocal fold, with fibrosis anterolateral true vocal fold, no focal mucosal lesions; stable compared to prior. Glissade produced appropriate elongation. Mucosa of false vocal folds, aryepiglottic folds, piriform sinuses, and posterior glottis unremarkable. Airway was patent. No focal lesions on NBI.    The addition of stroboscopy allowed evaluation of the mucosal wave.   Amplitude: right: normal; left: mildly decreased, but visibly improved compared to prior. Symmetry: associated asymmetry. Closure pattern: complete. Closure plane: at glottic level. Phase distribution: normal.      IMPRESSION AND PLAN:   Gary Iyer returns with a slightly improved exam. We encouraged him to continue tissue mobilization exercises. He will return in 2-3 months or sooner as needed. I appreciate the opportunity to participate in the care of this pleasant patient.          Mel Fournier MD

## 2017-06-26 NOTE — NURSING NOTE
Chief Complaint   Patient presents with     RECHECK     Return Throat Dysphonia Hx: Laryngeal cancer, leukoplakia of vocal cords      Pt states no pain today.    N Rolan HOYT

## 2017-06-26 NOTE — MR AVS SNAPSHOT
After Visit Summary   6/26/2017    Gary Iyer    MRN: [id number]           Patient Information     Date Of Birth          1960        Visit Information        Provider Department      6/26/2017 10:40 AM Jasmyne Padilla, SLP [facility]        Today's Diagnoses     Dysphonia    -  1       Follow-ups after your visit        Your next 10 appointments already scheduled     Jul 10, 2017 10:05 AM CDT   [facility] Ext[facility]ity with Fanta Morataya, PT   [facility] ([facility])    [address]  [place] [id number] [phone]              Who to contact     Please call your clinic at [phone] to:    Ask questions about your health    Make or cancel appointments    Discuss your medicines    Learn about your test results    Speak to your doctor   If you have compliments or concerns about an experience at your clinic, or if you wish to file a complaint, please contact [facility] Patient Relations at [phone] or email us at [email or web address]         Additional Information About Your Visit        MyChart Information     Event[employer]at gives you secure access to your electronic health record. If you see a primary care provider, you can also send messages to your care team and make appointments. If you have questions, please call your primary care clinic.  If you do not have a primary care provider, please call [phone] and they will assist you.      Swifto is an electronic gateway that provides easy, online access to your medical records. With Swifto, you can request a clinic appointment, read your test results, renew a prescription or communicate with your care team.     To access your existing account, please contact your [facility] or call [phone] for assistance.        Care EveryWhere ID     This is your Care EveryWhere ID. This could be used by other organizations to access your [facility]  records  [id number]         Blood Pressure from Last 3 Encounters:   06/23/17 139/79   06/12/17 137/85   03/29/17 123/77    Weight from Last 3 Encounters:   06/23/17 104.5 kg (230 lb 6.4 oz)   06/12/17 103.4 kg (228 lb)   04/24/17 102.1 kg (225 lb)              We Performed the Following     C BEHAVIORAL & QUALITATIVE ANALYSIS VOICE AND RESONANCE        Primary Care Provider Office Phone # Fax #    Finesse FELIX Beal [phone]       [facility] [address] [place] [place] [address]        Equal Access to Services     [facility]: Hadii aad leeann hadasho Sodavonali, waaxda luqadaha, qaybta kaalmada adeegyada, gregory gutierrez . So [facility] [phone].    ATENCIÓN: Si habla español, tiene a lucero disposición servicios gratuitos de asistencia lingüística. [facility] [phone].    We comply with applicable federal civil rights laws and [place] laws. We do not discriminate on the basis of race, color, national origin, age, disability sex, sexual orientation or gender identity.            Thank you!     Thank you for choosing [facility]  for your care. Our goal is always to provide you with excellent care. Hearing back from our patients is one way we can continue to improve our services. Please take a few minutes to complete the written survey that you may receive in the mail after your visit with us. Thank you!             Your Updated Medication List - Protect others around you: Learn how to safely use, store and throw away your medicines at www.disposemymeds.org.          This list is accurate as of: 6/26/17 11:59 PM.  Always use your most recent med list.                   Brand Name Dispense Instructions for use Diagnosis    allopurinol 300 MG tablet    ZYLOPRIM    90 tablet    Take 1 tablet (300 mg) by mouth daily    Gout, unspecified       aspirin 81 MG tablet      Take 1 tablet by mouth daily.        atorvastatin 20 MG tablet    LIPITOR    90 tablet     Take 1 tablet (20 mg) by mouth daily    Hyperlipidemia LDL goal <70       blood glucose monitoring lancets     1 Box    1 each 2 times daily Use to test blood sugar 2 times daily or as directed.    Type 2 diabetes mellitus without complication (H)       blood glucose monitoring meter device kit    no brand specified    1 kit    Use to test blood sugar 2 times daily or as directed.  Accucheck meter please and all associated strips, lancets, and other supplies, 3 month supply with refills for a year.    Type 2 diabetes mellitus without complication, without long-term current use of insulin (H)       DAILY MULTIVITAMIN PO      Take  by mouth daily.        hydrochlorothiazide 50 MG tablet    HYDRODIURIL    90 tablet    TAKE 1 TABLET(50 MG) BY MOUTH EVERY MORNING    Hypertension goal BP (blood pressure) < 140/90       hydrocortisone 1 % ointment     30 g    Apply topically 2 times daily as needed    Facial dermatitis       hydrocortisone 2.5 % cream    ANUSOL-HC    30 g    Place rectally 2 times daily as needed for hemorrhoids    External hemorrhoids       hydrocortisone 25 MG Suppository    ANUSOL-HC    20 suppository    Place 1 suppository (25 mg) rectally 2 times daily as needed for hemorrhoids    External hemorrhoids       lisinopril 30 MG tablet    PRINIVIL,ZESTRIL    90 tablet    TAKE 1 TABLET(30 MG) BY MOUTH DAILY    Hypertension goal BP (blood pressure) < 140/90       metFORMIN 500 MG tablet    GLUCOPHAGE    180 tablet    TAKE 1 TABLET(500 MG) BY MOUTH TWICE DAILY WITH MEALS    Type 2 diabetes mellitus without complication (H)       * order for DME     1 Container    Place rectally 2 times daily 1.8 oz container of 0.2 % nifedipine suppository if possible as easier to place the medication.   Apply on the anus every 12 hours.  Just get to where the anus is tight .    Anal fissure       * order for DME     60 suppository    Place rectally every 12 hours 0.2 % nifedipine suppository Apply on the anus every 12  hours.  Just get to where the anus is tight .    Anal fissure       * order for DME     1 Device    Equipment being ordered: Diabetic Shoes  One pair    Type 2 diabetes mellitus without complication, without long-term current use of insulin (H)       * order for DME     1 Units    Autocpap 10-16 cm    Obstructive sleep apnea       PARoxetine 20 MG tablet    PAXIL    180 tablet    Take 2 tablets (40 mg) by mouth daily    Anxiety       psyllium 0.52 G capsule     100 capsule    Take 1 capsule (0.52 g) by mouth daily    Constipation, unspecified constipation type       tamsulosin 0.4 MG capsule    FLOMAX    90 capsule    TAKE 1 CAPSULE(0.4 MG) BY MOUTH DAILY    Benign prostatic hyperplasia with lower urinary tract symptoms, unspecified morphology       triamcinolone 0.1 % cream    KENALOG    30 g    Apply topically 2 times daily as needed for irritation    Dermatitis       vitamin D 1000 UNITS capsule      Take 1 capsule by mouth daily.        * Notice:  This list has 4 medication(s) that are the same as other medications prescribed for you. Read the directions carefully, and ask your doctor or other care provider to review them with you.

## 2017-06-26 NOTE — LETTER
RE: Gary Iyer  [address] [facility] [address]     Dear Colleague,    Thank you for referring your patient, Gary Iyer, to the [facility]. Please see a copy of my visit note below.    [facility]  Elijah Baker Jr., M.D., F.A.C.S.  Mel Fournier M.D., M.P.H.  Misty Clark, Ph.D., CCC/SLP  Jasmyne Padilla M.M. (voice), M.A., CCC/SLP  Zack Mcdaniels M.M. (voice), MMC., [facility]SLP    [facility]  VOICE/SPEECH/BREATHING THERAPY  CLINICAL FOLLOW-UP AND LARYNGEAL EXAMINATION REPORT    Patient: Gary Iyer  Date of Service: 6/26/2017    HISTORY  PATIENT INFORMATION  Gary Iyer was seen for follow-up in conjunction with a visit to Dr. Fournier today.  Please also refer to Dr. Fournier's dictation.  He was last seen on 4/24/17.    PROGRESS SINCE LAST VISIT  Mr. Iyer reports:    voice quality remains relatively stable    He has been doing his voice exercises fairly regularly, although he does not use the straw    He has been doing his voice exercises fairly regularly; with and without the straw.    OBJECTIVE FINDINGS  VOICE AND SPEECH EVALUATION  Mr. Iyer presents today with a voice quality that is characterized by     Mild roughness and asthenia, similar to his voice previously    Inconsistent coordination between respiration and phonation     He more consistently talks past the end of the breathstream, and ends utterances or sustained vowels with increased roughness or strain    LARYNGEAL EXAMINATION  Dr. Fournier accomplished the endoscopic laryngeal examination today.  I provided technical support, and provided the protocol of instructions for the patient.  Type of exam:   Procedure: Flexible endoscopy with chip-tip technology with stroboscopy.   This exam shows:    The erythema of the left vocal fold appears similar to the last exam    Thickened secretions intermittently collect on the vocal folds    Glottic closure  during phonation is not pressed; technique seems reasonably protective of the glottis    Supraglottic constriction is minimal during speech    The addition of stroboscopy provided the following information:    The left vocal fold is mild to moderately stiff, but present even during the production of higher pitches.    The right vocal fold vibrates normally, with good mucosal wave    Relatively good symmetry    Dr. Fournier and I reviewed this laryngeal exam with Mr. Iyer today, and I provided pertinent explanations, as well as written and oral information:    He continues to improve after surgery, but will need continued low impact and tissue mobilization for optimal healing    Dr. Fournier has requested that I work with Mr. Iyer to ensure optimal technique for exercises     IMPRESSIONS/ RECOMMENDATIONS/ PLAN  IMPRESSIONS / RECOMMENDATIONS  Based on today s laryngeal examination, it appears that:    Mr. Iyer continues to demonstrate progress, but ongoing practice of his therapeutic exercises is warranted.    We will continue see Mr. Iyer as Dr. Fournier deems appropriate, most likely at his next session with her in two months.    TREATMENT PLAN   I will see Mr. Iyer in 2-3 months, along with Dr. Fournier, to work on education, modification, and carryover of therapeutic activities to more complex tasks.      GOALS  Goals remain the same.     PRIMARY ICD-10 code: R49.0 (Dysphonia)    TOTAL SERVICE TIME: 45 minutes  EVALUATION OF VOICE AND RESONANCE: (65504): 45  NO CHARGE FACILITY FEE (12358)    Jasmyne Padilla M.M. (voice), M.A., CCC/SLP  Speech-Language Pathologist  [facility]  [phone]            Again, thank you for allowing me to participate in the care of your patient.      Sincerely,    Jasmyne Padilla, SLP

## 2017-06-26 NOTE — PROGRESS NOTES
Dear Dr Barger:  [facility] My clinic note from our visit is enclosed below.     I appreciate the ongoing opportunity to participate in this patient's care.    Please feel free to contact me with any questions.      Sincerely yours,  Mel Fournier M.D., M.P.H.  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 Most recently we returned to the Operating Room on 02/09/2017 for an area of new irregularity of the left true vocal fold. Pathology showed severe dysplasia with negative but close margins (for moderate, but not severe dysplasia).  He returns in routine follow up today. No specific concerns; voice is doing well.      MEDICATIONS:     Current Outpatient Prescriptions   Medication Sig Dispense Refill     lisinopril (PRINIVIL,ZESTRIL) 30 MG tablet TAKE 1 TABLET(30 MG) BY MOUTH DAILY 90 tablet 2     hydrochlorothiazide (HYDRODIURIL) 50 MG tablet TAKE 1 TABLET(50 MG) BY MOUTH EVERY MORNING 90 tablet 2     metFORMIN (GLUCOPHAGE) 500 MG tablet TAKE 1 TABLET(500 MG) BY MOUTH TWICE DAILY WITH MEALS 180 tablet 1     order for DME Autocpap 10-16 cm 1 Units 0     blood glucose monitoring (NO BRAND SPECIFIED) meter device kit Use to test blood sugar 2 times daily or as directed.  Accucheck meter please and all associated strips, lancets, and other supplies, 3 month supply with refills for a year. 1 kit 0     order for DME Equipment being ordered: Diabetic Shoes    One pair 1 Device 0     PARoxetine (PAXIL) 20 MG tablet Take 2 tablets (40 mg) by mouth daily 180 tablet 1     order for DME Place rectally 2 times daily 1.8 oz container of 0.2 % nifedipine suppository  if possible as easier to place the medication.    Apply on the anus every 12 hours.  Just get to where the anus is tight . 1 Container 5     order for DME Place rectally every 12 hours 0.2 % nifedipine suppository  Apply on the anus every 12 hours.  Just get to where the anus is tight . 60 suppository 5     atorvastatin (LIPITOR) 20 MG tablet Take 1 tablet (20 mg) by mouth daily 90 tablet 3     triamcinolone (KENALOG) 0.1 % cream Apply topically 2 times daily as needed for irritation 30 g 1     allopurinol (ZYLOPRIM) 300 MG tablet Take 1 tablet (300 mg) by mouth daily 90 tablet 3     blood glucose monitoring (Doutor Recomenda MICROLET) lancets 1 each 2 times daily Use to test blood sugar 2 times daily or as directed. 1 Box 3     Cholecalciferol (VITAMIN D) 1000 UNITS capsule Take 1 capsule by mouth daily.       aspirin 81 MG tablet Take 1 tablet by mouth daily.       tamsulosin (FLOMAX) 0.4 MG capsule TAKE 1 CAPSULE(0.4 MG) BY MOUTH DAILY (Patient not taking: Reported on 6/26/2017) 90 capsule 1     psyllium 0.52 G capsule Take 1 capsule (0.52 g) by mouth daily (Patient not taking: Reported on 6/26/2017) 100 capsule 3     hydrocortisone (ANUSOL-HC) 25 MG suppository Place 1 suppository (25 mg) rectally 2 times daily as needed for hemorrhoids (Patient not taking: Reported on 6/26/2017) 20 suppository 1     hydrocortisone (ANUSOL-HC) 2.5 % rectal cream Place rectally 2 times daily as needed for hemorrhoids (Patient not taking: Reported on 6/26/2017) 30 g 1     hydrocortisone 1 % ointment Apply topically 2 times daily as needed (Patient not taking: Reported on 6/26/2017) 30 g 1     Multiple Vitamin (DAILY MULTIVITAMIN PO) Take  by mouth daily.         ALLERGIES:  No Known Allergies    NEW PMH/PSH: None    REVIEW OF SYSTEMS:  The patient completed a comprehensive 11 point review of systems (below), which was reviewed. Positives are as noted below.  Patient Supplied Answers to Review of Systems   ENT ROS 4/22/2016   Ears, Nose,  Throat Nasal congestion or drainage       PHYSICAL EXAM:  General: The patient was alert and conversant, and in no acute distress.    Neck: No palpable cervical lymphadenopathy, no significant tenderness to palpation of the thyrohyoid space, which was narrow. No obvious thyroid abnormality.  Resp: Breathing comfortably, no stridor or stertor.  Neuro: Symmetric facial function. Other cranial nerve function as documented above.  Psych: Normal affect, pleasant and cooperative.  Voice/speech: Mild dysphonia characterized by breathiness, roughness and strain.     Intake scores  Last 2 Scores for Patient-Answered VHI Questionnaire  VHI Total Score 4/24/2017 6/26/2017   VHI Total Score 1 1      Last 2 Scores for Patient-Answered EAT Questionnaire  EAT Total Score 4/24/2017 6/26/2017   EAT Total Score 0 0      Last 2 Scores for Patient-Answered CSI Questionnaire  CSI Total Score 4/24/2017 6/26/2017   CSI Total Score 0 0       Procedure:   Flexible fiberoptic laryngoscopy and laryngovideostroboscopy  Indications: This procedure was warranted to evaluate the patient's laryngeal function. Risks, benefits, and alternatives were discussed.  Description: After written informed consent was obtained, a time-out was performed to confirm patient identity, procedure, and procedure site. Topical 3% lidocaine with 0.25% phenylephrine was applied to the nasal cavities. I performed the endoscopy and no complications were apparent. Continuous and stroboscopic light were utilized to assess routine phonation and variable frequency phonation.  Performed by: Mel Fournier MD MPH  SLP: Jasmyne Padilla MM, MA, CCC-SLP  Findings: Normal nasopharynx. Normal base of tongue, valleculae, and epiglottis. Vocal fold mobility: right: normal; left: normal. Medial edges of the vocal folds: smooth and straight. No focal mucosal lesions were observed on the true vocal folds. Stable diffuse erythema left true vocal fold, with fibrosis anterolateral true  vocal fold, no focal mucosal lesions; stable compared to prior. Glissade produced appropriate elongation. Mucosa of false vocal folds, aryepiglottic folds, piriform sinuses, and posterior glottis unremarkable. Airway was patent. No focal lesions on NBI.    The addition of stroboscopy allowed evaluation of the mucosal wave.   Amplitude: right: normal; left: mildly decreased, but visibly improved compared to prior. Symmetry: associated asymmetry. Closure pattern: complete. Closure plane: at glottic level. Phase distribution: normal.      IMPRESSION AND PLAN:   Gary Iyer returns with a slightly improved exam. We encouraged him to continue tissue mobilization exercises. He will return in 2-3 months or sooner as needed. I appreciate the opportunity to participate in the care of this pleasant patient.

## 2017-06-27 ENCOUNTER — THERAPY VISIT (OUTPATIENT)
Dept: PHYSICAL THERAPY | Facility: CLINIC | Age: 57
End: 2017-06-27
Payer: COMMERCIAL

## 2017-06-27 DIAGNOSIS — M25.512 ACUTE PAIN OF LEFT SHOULDER: Primary | ICD-10-CM

## 2017-06-27 PROCEDURE — 97110 THERAPEUTIC EXERCISES: CPT | Mod: GP | Performed by: PHYSICAL THERAPIST

## 2017-06-27 PROCEDURE — 97161 PT EVAL LOW COMPLEX 20 MIN: CPT | Mod: GP | Performed by: PHYSICAL THERAPIST

## 2017-06-27 PROCEDURE — 97035 APP MDLTY 1+ULTRASOUND EA 15: CPT | Mod: GP | Performed by: PHYSICAL THERAPIST

## 2017-06-27 NOTE — PROGRESS NOTES
[facility] Initial Evaluation    Subjective:    Patient is a 56 year old male presenting with rehab left shoulder hpi.   Gary Iyer is a 56 year old male with a left shoulder condition.  Condition occurred with:  Unknown cause.  Condition occurred: for unknown reasons.  This is a new condition  6/23/17 patient received MD orders for PT for left shoulder pain that started around 6/13/17 without any injury or trauma.  He notes he had similar pain about 2 years ago that got better with PT.  He had an injection on 6/23 and is improving since.    Patient reports pain:  Lateral and upper arm.    Pain is described as aching and is intermittent and reported as 3/10.  Associated symptoms:  Loss of motion/stiffness and loss of strength. Pain is the same all the time.  Symptoms are exacerbated by using arm overhead, using arm at shoulder level, using arm behind back, lifting and lying on extremity   Since onset symptoms are gradually improving.    Previous treatment includes physical therapy and other (injection).  There was significant improvement following previous treatment.  General health as reported by patient is good.  Pertinent medical history includes:  Cancer, diabetes and overweight.  Medical allergies: no.  Other surgeries include:  Orthopedic surgery and cancer surgery.  Current medications:  Anti-inflammatory and other (cholesterol, diabetes, gout).  Current occupation is School .  Patient is working in normal job without restrictions.  Primary job tasks include:  Prolonged sitting and driving.    Barriers include:  None as reported by the patient.    Red flags:  None as reported by the patient.                        Objective:  SHOULDER:    Cervical Screen: forward head and rounded shoulder posture    Shoulder:   PROM L PROM R AROM L AROM R MMT L MMT R   Flex 160 170 150 with pain 170 4/5 5/5   Abd 165 165 165 165 4/5 5/5   Full Can     4/5 5/5   IR 20 60   4/5 5/5   ER 40 80  30 80 4/5 5/5   Ext/IR   Left buttock T10       Scapulothoraic Rhythm: rounded shoulders, anterior scapular tilt    Palpation: mild tenderness about left rotator cuff humeral insertion    Special tests:   L R   Impingement     Neer's - -   Hawkin's-Abdulkadir + -   Biceps      Speed's - -   Rotator Cuff Tear     Drop Arm - -   Belly Press - -   Lift off  nt nt       System    Physical Exam    General     ROS    Assessment/Plan:      Patient is a 56 year old male with left side shoulder complaints.    Patient has the following significant findings with corresponding treatment plan.                Diagnosis 1:  Left shoulder pain    Pain -  hot/cold therapy, US, manual therapy, self management, education and home program  Decreased ROM/flexibility - manual therapy, therapeutic exercise and home program  Decreased strength - therapeutic exercise, therapeutic activities and home program  Decreased proprioception - neuro re-education, therapeutic activities and home program  Decreased function - therapeutic activities and home program  Impaired posture - neuro re-education and home program    Therapy Evaluation Codes:   1) History comprised of:   Personal factors that impact the plan of care:      None.    Comorbidity factors that impact the plan of care are:      Cancer, Diabetes and Overweight.     Medications impacting care: Anti-inflammatory and cholesterol, diabetes, gout.  2) Examination of Body Systems comprised of:   Body structures and functions that impact the plan of care:      Shoulder.   Activity limitations that impact the plan of care are:      Bathing, Dressing and Lifting.  3) Clinical presentation characteristics are:   Stable/Uncomplicated.  4) Decision-Making    Low complexity using standardized patient assessment instrument and/or measureable assessment of functional outcome.  Cumulative Therapy Evaluation is: Low complexity.    Previous and current functional limitations:  (See Goal Flow Sheet for this  information)    Short term and Long term goals: (See Goal Flow Sheet for this information)     Communication ability:  Patient appears to be able to clearly communicate and understand verbal and written communication and follow directions correctly.  Treatment Explanation - The following has been discussed with the patient:   RX ordered/plan of care  Anticipated outcomes  Possible risks and side effects  This patient would benefit from PT intervention to resume normal activities.   Rehab potential is good.    Frequency:  1 X week, once daily  Duration:  for 8 weeks  Discharge Plan:  Achieve all LTG.  Independent in home treatment program.  Reach maximal therapeutic benefit.    Please refer to the daily flowsheet for treatment today, total treatment time and time spent performing 1:1 timed codes.

## 2017-07-03 ENCOUNTER — THERAPY VISIT (OUTPATIENT)
Dept: PHYSICAL THERAPY | Facility: CLINIC | Age: 57
End: 2017-07-03
Payer: COMMERCIAL

## 2017-07-03 DIAGNOSIS — M25.512 ACUTE PAIN OF LEFT SHOULDER: Primary | ICD-10-CM

## 2017-07-03 PROCEDURE — 97110 THERAPEUTIC EXERCISES: CPT | Mod: GP | Performed by: PHYSICAL THERAPIST

## 2017-07-03 PROCEDURE — 97112 NEUROMUSCULAR REEDUCATION: CPT | Mod: GP | Performed by: PHYSICAL THERAPIST

## 2017-07-03 PROCEDURE — 97140 MANUAL THERAPY 1/> REGIONS: CPT | Mod: GP | Performed by: PHYSICAL THERAPIST

## 2017-07-04 NOTE — PROGRESS NOTES
[facility] VOICE [facility]  Elijah Baker Jr., M.D., F.A.C.S.  Mel Fournier M.D., M.P.H.  Misty Clark, Ph.D., CCC/SLP  Jasmyne Padilla M.M. (voice), M.SWAPNIL., CCC/SLP  Zack Mcdaniels M.M. (voice), M.A., [facility]/SLP    [facility] VOICE [facility]  VOICE/SPEECH/BREATHING THERAPY  CLINICAL FOLLOW-UP AND LARYNGEAL EXAMINATION REPORT    Patient: Gary Iyer  Date of Service: 6/26/2017    HISTORY  PATIENT INFORMATION  Gary Iyer was seen for follow-up in conjunction with a visit to Dr. Fournier today.  Please also refer to Dr. Fournier's dictation.  He was last seen on 4/24/17.    PROGRESS SINCE LAST VISIT  Mr. Iyer reports:    voice quality remains relatively stable    He has been doing his voice exercises fairly regularly, although he does not use the straw    OBJECTIVE FINDINGS  VOICE AND SPEECH EVALUATION  Mr. Iyer presents today with a voice quality that is characterized by     Mild roughness and asthenia, similar to his voice previously    Inconsistent coordination between respiration and phonation     He more consistently talks past the end of the breathstream, and ends utterances or sustained vowels with increased roughness or strain    LARYNGEAL EXAMINATION  Dr. Fournier accomplished the endoscopic laryngeal examination today.  I provided technical support, and provided the protocol of instructions for the patient.  Type of exam:   Procedure: Flexible endoscopy with chip-tip technology with stroboscopy.   This exam shows:    The erythema of the left vocal fold appears similar to the last exam    Thickened secretions intermittently collect on the vocal folds    Glottic closure during phonation is not pressed; technique seems reasonably protective of the glottis    Supraglottic constriction is minimal during speech    The addition of stroboscopy provided the following information:    The left vocal fold is mild to moderately stiff, but present even during the production of higher pitches.    The right vocal fold vibrates  normally, with good mucosal wave    Relatively good symmetry    Dr. Fournier and I reviewed this laryngeal exam with Mr. Iyer today, and I provided pertinent explanations, as well as written and oral information:    He continues to improve after surgery, but will need continued low impact and tissue mobilization for optimal healing    Dr. Fournier has requested that I work with Mr. Iyer to ensure optimal technique for exercises     IMPRESSIONS/ RECOMMENDATIONS/ PLAN  IMPRESSIONS / RECOMMENDATIONS  Based on today s laryngeal examination, it appears that:    Mr. Iyer continues to demonstrate progress, but ongoing practice of his therapeutic exercises is warranted.    We will continue see Mr. Iyer as Dr. Fournier deems appropriate, most likely at his next session with her in two months.    TREATMENT PLAN   I will see Mr. Iyer in 2-3 months, along with Dr. Fournier, to work on education, modification, and carryover of therapeutic activities to more complex tasks.      GOALS  Goals remain the same.     PRIMARY ICD-10 code: R49.0 (Dysphonia)    TOTAL SERVICE TIME: 45 minutes  EVALUATION OF VOICE AND RESONANCE: (90164): 45  NO CHARGE FACILITY FEE (56116)    Jasmyne Padilla M.M. (voice), M.A., CCC/SLP  Speech-Language Pathologist  [facility]  [phone]

## 2017-07-31 ENCOUNTER — THERAPY VISIT (OUTPATIENT)
Dept: PHYSICAL THERAPY | Facility: CLINIC | Age: 57
End: 2017-07-31
Payer: COMMERCIAL

## 2017-07-31 DIAGNOSIS — M25.512 ACUTE PAIN OF LEFT SHOULDER: ICD-10-CM

## 2017-07-31 PROCEDURE — 97035 APP MDLTY 1+ULTRASOUND EA 15: CPT | Mod: GP | Performed by: PHYSICAL THERAPIST

## 2017-07-31 PROCEDURE — 97110 THERAPEUTIC EXERCISES: CPT | Mod: GP | Performed by: PHYSICAL THERAPIST

## 2017-08-17 ENCOUNTER — THERAPY VISIT (OUTPATIENT)
Dept: PHYSICAL THERAPY | Facility: CLINIC | Age: 57
End: 2017-08-17
Payer: MEDICAID

## 2017-08-17 DIAGNOSIS — M25.512 ACUTE PAIN OF LEFT SHOULDER: ICD-10-CM

## 2017-08-17 PROCEDURE — 97035 APP MDLTY 1+ULTRASOUND EA 15: CPT | Mod: GP | Performed by: PHYSICAL THERAPIST

## 2017-08-17 PROCEDURE — 97112 NEUROMUSCULAR REEDUCATION: CPT | Mod: GP | Performed by: PHYSICAL THERAPIST

## 2017-08-17 PROCEDURE — 97110 THERAPEUTIC EXERCISES: CPT | Mod: GP | Performed by: PHYSICAL THERAPIST

## 2017-08-17 NOTE — LETTER
[facility]  [address]  [place] [address]  [phone]    2017    Re: Gary Iyer   :   1960  MRN:  [id number]   REFERRING PHYSICIAN:   [facility]    Date of Initial Evaluation:  17  Visits:  Rxs Used: 4  Reason for Referral:  Acute pain of left shoulder    PROGRESS  REPORT  Progress reporting period is from 17 to 17.       SUBJECTIVE  Subjective changes noted by patient:  Gary reports his shoulder feels about 85-90% of normal.  He continues to work on his HEP, which is helping.  He notes some tightness at times and also gets some pain if he moves it just wrong or sleeps on it valdez.  He does not have the pain at rest or with most activities.    Current pain level is 1/10.     Previous pain level was 3/10.   Changes in function:  Yes, see above.  Adverse reaction to treatment or activity: None    OBJECTIVE  Changes noted in objective findings:  Yes  Left shoulder AROM:    flexion 155 degrees (same as right)    abduction 155 (same as right)     ER 55      IR/EXT to T12    Left shoulder strength:    flexion 5/5 (trace weakness)     scaption 5/5 (trace weakness)     ER 5/5 (trace weakness)     IR 5/5 (trace weakness)     ASSESSMENT/PLAN  Updated problem list and treatment plan: Diagnosis 1:  Left shoulder pain    Pain -  home program  Decreased ROM/flexibility - home program  Decreased strength - home program  Decreased proprioception - home program  STG/LTGs have been met or progress has been made towards goals:  Yes (See Goal flow sheet completed today.)  Assessment of Progress: The patient's condition is improving.  The patient's condition has potential to improve.  The patient has met all of their long term goals.  Self Management Plans:  Patient has been instructed in a home treatment program.  Patient  has been instructed in self management of symptoms.    Gary continues to require the following intervention  to meet STG and LTG's:  HEP    Recommendations:  Gary will continue his home program independently and contact PT if he has setbacks over the next 2-3 months. If he has no problems during that time he will be discharged from PT.    Thank you for your referral.    INQUIRIES  Therapist:Ronald Sarkar DPT, SCS   [facility]  [address] [place] [address]  Phone: [phone]  Fax: [phone]

## 2017-08-17 NOTE — LETTER
DEPARTMENT OF HEALTH AND HUMAN SERVICES  CENTERS FOR MEDICARE & MEDICAID SERVICES    PLAN/UPDATED PLAN OF PROGRESS FOR OUTPATIENT REHABILITATION    PATIENTS NAME:  Gary Iyer     : 1960    [facility]BER:    [id number]    [facility]    [facility] NAME[facility]    MEDICAL RECORD NUMBER: [id number]     START OF CARE DATE:  SOC Date: 17   TYPE:  PT    PRIMARY/TREATMENT DIAGNOSIS: (Pertinent Medical Diagnosis)  Acute pain of left shoulder    VISITS FROM START OF CARE:  Rxs Used: 4     PROGRESS  REPORT  Progress reporting period is from 17 to 17.       SUBJECTIVE  Subjective changes noted by patient:  Gary reports his shoulder feels about 85-90% of normal.  He continues to work on his HEP, which is helping.  He notes some tightness at times and also gets some pain if he moves it just wrong or sleeps on it valdez.  He does not have the pain at rest or with most activities.    Current pain level is 1/10.     Previous pain level was 3/10.   Changes in function:  Yes, see above.  Adverse reaction to treatment or activity: None    OBJECTIVE  Changes noted in objective findings:  Yes  Left shoulder AROM:    flexion 155 degrees (same as right)    abduction 155 (same as right)     ER 55      IR/EXT to T12    Left shoulder strength:    flexion 5/5 (trace weakness)     scaption 5/5 (trace weakness)     ER 5/5 (trace weakness)     IR 5/5 (trace weakness)     ASSESSMENT/PLAN  Updated problem list and treatment plan: Diagnosis 1:  Left shoulder pain    Pain -  home program  Decreased ROM/flexibility - home program  Decreased strength - home program  Decreased proprioception - home program  STG/LTGs have been met or progress has been made towards goals:  Yes (See Goal flow sheet completed today.)  Assessment of Progress: The patient's condition is improving.  The patient's condition has potential to improve.  The patient has met all of their long term goals.  Self Management  "Plans:  Patient has been instructed in a home treatment program.  Patient  has been instructed in self management of symptoms.    Gary continues to require the following intervention to meet STG and LTG's:  HEP    Recommendations:  Gary will continue his home program independently and contact PT if he has setbacks over the next 2-3 months. If he has no problems during that time he will be discharged from PT.      Caregiver Signature/Credentials _____________________________ Date ________       Treating Provider: Ascencion Sarkar DPT   I have reviewed and certified the need for these services and plan of treatment while under my care.        PHYSICIAN'S SIGNATURE:   _________________________________________  Date___________   Jake Turpin    Certification period:  Beginning of Cert date period: 06/27/17 to  End of Cert period date: 09/24/17     Functional Level Progress Report: Please see attached \"Goal Flow sheet for Functional level.\"    ____X____ Continue Services or       ________ DC Services                Service dates: From  SOC Date: 06/27/17 date to present                         "

## 2017-08-17 NOTE — PROGRESS NOTES
Subjective:    HPI                    Objective:    System    Physical Exam    General     ROS    Assessment/Plan:      PROGRESS  REPORT    Progress reporting period is from 6/27/17 to 8/17/17.       SUBJECTIVE  Subjective changes noted by patient:  Gary reports his shoulder feels about 85-90% of normal.  He continues to work on his HEP, which is helping.  He notes some tightness at times and also gets some pain if he moves it just wrong or sleeps on it valdez.  He does not have the pain at rest or with most activities.    Current pain level is 1/10.     Previous pain level was 3/10.   Changes in function:  Yes, see above.  Adverse reaction to treatment or activity: None    OBJECTIVE  Changes noted in objective findings:  Yes  Left shoulder AROM:    flexion 155 degrees (same as right)    abduction 155 (same as right)     ER 55      IR/EXT to T12    Left shoulder strength:    flexion 5/5 (trace weakness)     scaption 5/5 (trace weakness)     ER 5/5 (trace weakness)     IR 5/5 (trace weakness)     ASSESSMENT/PLAN  Updated problem list and treatment plan: Diagnosis 1:  Left shoulder pain    Pain -  home program  Decreased ROM/flexibility - home program  Decreased strength - home program  Decreased proprioception - home program  STG/LTGs have been met or progress has been made towards goals:  Yes (See Goal flow sheet completed today.)  Assessment of Progress: The patient's condition is improving.  The patient's condition has potential to improve.  The patient has met all of their long term goals.  Self Management Plans:  Patient has been instructed in a home treatment program.  Patient  has been instructed in self management of symptoms.    Gary continues to require the following intervention to meet STG and LTG's:  HEP    Recommendations:  Gary will continue his home program independently and contact PT if he has setbacks over the next 2-3 months. If he has no problems during that time he will be discharged from  PT.    Please refer to the daily flowsheet for treatment today, total treatment time and time spent performing 1:1 timed codes.

## 2017-08-23 ENCOUNTER — OFFICE VISIT (OUTPATIENT)
Dept: FAMILY MEDICINE | Facility: CLINIC | Age: 57
End: 2017-08-23
Payer: MEDICAID

## 2017-08-23 VITALS
WEIGHT: 226 LBS | DIASTOLIC BLOOD PRESSURE: 84 MMHG | TEMPERATURE: 97.3 F | SYSTOLIC BLOOD PRESSURE: 136 MMHG | BODY MASS INDEX: 32.43 KG/M2 | HEART RATE: 98 BPM | OXYGEN SATURATION: 100 %

## 2017-08-23 DIAGNOSIS — I10 HYPERTENSION GOAL BP (BLOOD PRESSURE) < 140/90: Chronic | ICD-10-CM

## 2017-08-23 DIAGNOSIS — E11.9 TYPE 2 DIABETES MELLITUS WITHOUT COMPLICATION, WITHOUT LONG-TERM CURRENT USE OF INSULIN (H): ICD-10-CM

## 2017-08-23 DIAGNOSIS — F32.1 MODERATE MAJOR DEPRESSION (H): Primary | ICD-10-CM

## 2017-08-23 DIAGNOSIS — E78.5 HYPERLIPIDEMIA LDL GOAL <70: Chronic | ICD-10-CM

## 2017-08-23 DIAGNOSIS — F41.9 ANXIETY: Chronic | ICD-10-CM

## 2017-08-23 DIAGNOSIS — R53.83 FATIGUE, UNSPECIFIED TYPE: ICD-10-CM

## 2017-08-23 LAB
ALBUMIN SERPL-MCNC: 3.7 G/DL (ref 3.4–5)
ALP SERPL-CCNC: 61 U/L (ref 40–150)
ALT SERPL W P-5'-P-CCNC: 34 U/L (ref 0–70)
ANION GAP SERPL CALCULATED.3IONS-SCNC: 9 MMOL/L (ref 3–14)
AST SERPL W P-5'-P-CCNC: 14 U/L (ref 0–45)
BASOPHILS # BLD AUTO: 0 10E9/L (ref 0–0.2)
BASOPHILS NFR BLD AUTO: 0.4 %
BILIRUB SERPL-MCNC: 0.5 MG/DL (ref 0.2–1.3)
BUN SERPL-MCNC: 10 MG/DL (ref 7–30)
CALCIUM SERPL-MCNC: 9.2 MG/DL (ref 8.5–10.1)
CHLORIDE SERPL-SCNC: 96 MMOL/L (ref 94–109)
CHOLEST SERPL-MCNC: 178 MG/DL
CO2 SERPL-SCNC: 30 MMOL/L (ref 20–32)
CREAT SERPL-MCNC: 0.72 MG/DL (ref 0.66–1.25)
CREAT UR-MCNC: 175 MG/DL
DIFFERENTIAL METHOD BLD: ABNORMAL
EOSINOPHIL # BLD AUTO: 0.1 10E9/L (ref 0–0.7)
EOSINOPHIL NFR BLD AUTO: 1.1 %
ERYTHROCYTE [DISTWIDTH] IN BLOOD BY AUTOMATED COUNT: 15.3 % (ref 10–15)
GFR SERPL CREATININE-BSD FRML MDRD: >90 ML/MIN/1.7M2
GLUCOSE SERPL-MCNC: 104 MG/DL (ref 70–99)
HBA1C MFR BLD: 5.9 % (ref 4.3–6)
HCT VFR BLD AUTO: 41.2 % (ref 40–53)
HDLC SERPL-MCNC: 71 MG/DL
HGB BLD-MCNC: 13.9 G/DL (ref 13.3–17.7)
LDLC SERPL CALC-MCNC: 69 MG/DL
LYMPHOCYTES # BLD AUTO: 2.4 10E9/L (ref 0.8–5.3)
LYMPHOCYTES NFR BLD AUTO: 25 %
MCH RBC QN AUTO: 30.4 PG (ref 26.5–33)
MCHC RBC AUTO-ENTMCNC: 33.7 G/DL (ref 31.5–36.5)
MCV RBC AUTO: 90 FL (ref 78–100)
MICROALBUMIN UR-MCNC: 22 MG/L
MICROALBUMIN/CREAT UR: 12.69 MG/G CR (ref 0–17)
MONOCYTES # BLD AUTO: 0.9 10E9/L (ref 0–1.3)
MONOCYTES NFR BLD AUTO: 9.1 %
NEUTROPHILS # BLD AUTO: 6.1 10E9/L (ref 1.6–8.3)
NEUTROPHILS NFR BLD AUTO: 64.4 %
NONHDLC SERPL-MCNC: 107 MG/DL
PLATELET # BLD AUTO: 282 10E9/L (ref 150–450)
POTASSIUM SERPL-SCNC: 4.4 MMOL/L (ref 3.4–5.3)
PROT SERPL-MCNC: 8.4 G/DL (ref 6.8–8.8)
RBC # BLD AUTO: 4.57 10E12/L (ref 4.4–5.9)
SODIUM SERPL-SCNC: 135 MMOL/L (ref 133–144)
TRIGL SERPL-MCNC: 188 MG/DL
TSH SERPL DL<=0.005 MIU/L-ACNC: 1.96 MU/L (ref 0.4–4)
WBC # BLD AUTO: 9.4 10E9/L (ref 4–11)

## 2017-08-23 PROCEDURE — 36415 COLL VENOUS BLD VENIPUNCTURE: CPT | Performed by: FAMILY MEDICINE

## 2017-08-23 PROCEDURE — 80061 LIPID PANEL: CPT | Performed by: FAMILY MEDICINE

## 2017-08-23 PROCEDURE — 83036 HEMOGLOBIN GLYCOSYLATED A1C: CPT | Performed by: FAMILY MEDICINE

## 2017-08-23 PROCEDURE — 99214 OFFICE O/P EST MOD 30 MIN: CPT | Performed by: FAMILY MEDICINE

## 2017-08-23 PROCEDURE — 82043 UR ALBUMIN QUANTITATIVE: CPT | Performed by: FAMILY MEDICINE

## 2017-08-23 PROCEDURE — 80050 GENERAL HEALTH PANEL: CPT | Performed by: FAMILY MEDICINE

## 2017-08-23 ASSESSMENT — PAIN SCALES - GENERAL: PAINLEVEL: NO PAIN (0)

## 2017-08-23 ASSESSMENT — PATIENT HEALTH QUESTIONNAIRE - PHQ9: SUM OF ALL RESPONSES TO PHQ QUESTIONS 1-9: 16

## 2017-08-23 NOTE — LETTER
My Depression Action Plan  Name: Gary Iyer   Date of Birth 1960  Date: 8/23/2017    My doctor: Finesse Beal   My clinic: [address] [address] [phone]          GREEN    ZONE   Good Control    What it looks like:     Things are going generally well. You have normal up s and down s. You may even feel depressed from time to time, but bad moods usually last less than a day.   What you need to do:  1. Continue to care for yourself (see self care plan)  2. Check your depression survival kit and update it as needed  3. Follow your physician s recommendations including any medication.  4. Do not stop taking medication unless you consult with your physician first.           YELLOW         ZONE Getting Worse    What it looks like:     Depression is starting to interfere with your life.     It may be hard to get out of bed; you may be starting to isolate yourself from others.    Symptoms of depression are starting to last most all day and this has happened for several days.     You may have suicidal thoughts but they are not constant.   What you need to do:     1. Call your care team, your response to treatment will improve if you keep your care team informed of your progress. Yellow periods are signs an adjustment may need to be made.     2. Continue your self-care, even if you have to fake it!    3. Talk to someone in your support network    4. Open up your depression survival kit           RED    ZONE Medical Alert - Get Help    What it looks like:     Depression is seriously interfering with your life.     You may experience these or other symptoms: You can t get out of bed most days, can t work or engage in other necessary activities, you have trouble taking care of basic hygiene, or basic responsibilities, thoughts of suicide or death that will not go away, self-injurious behavior.     What you need to do:  1. Call  your care team and request a same-day appointment. If they are not available (weekends or after hours) call your local crisis line, emergency room or 911.      Electronically signed by: Elisa Chandra, August 23, 2017    Depression Self Care Plan / Survival Kit    Self-Care for Depression  Here s the deal. Your body and mind are really not as separate as most people think.  What you do and think affects how you feel and how you feel influences what you do and think. This means if you do things that people who feel good do, it will help you feel better.  Sometimes this is all it takes.  There is also a place for medication and therapy depending on how severe your depression is, so be sure to consult with your medical provider and/ or Behavioral Health Consultant if your symptoms are worsening or not improving.     In order to better manage my stress, I will:    Exercise  Get some form of exercise, every day. This will help reduce pain and release endorphins, the  feel good  chemicals in your brain. This is almost as good as taking antidepressants!  This is not the same as joining a gym and then never going! (they count on that by the way ) It can be as simple as just going for a walk or doing some gardening, anything that will get you moving.      Hygiene   Maintain good hygiene (Get out of bed in the morning, Make your bed, Brush your teeth, Take a shower, and Get dressed like you were going to work, even if you are unemployed).  If your clothes don't fit try to get ones that do.    Diet  I will strive to eat foods that are good for me, drink plenty of water, and avoid excessive sugar, caffeine, alcohol, and other mood-altering substances.  Some foods that are helpful in depression are: complex carbohydrates, B vitamins, flaxseed, fish or fish oil, fresh fruits and vegetables.    Psychotherapy  I agree to participate in Individual Therapy (if recommended).    Medication  If prescribed medications, I agree to take  them.  Missing doses can result in serious side effects.  I understand that drinking alcohol, or other illicit drug use, may cause potential side effects.  I will not stop my medication abruptly without first discussing it with my provider.    Staying Connected With Others  I will stay in touch with my friends, family members, and my primary care provider/team.    Use your imagination  Be creative.  We all have a creative side; it doesn t matter if it s oil painting, sand castles, or mud pies! This will also kick up the endorphins.    Witness Beauty  (AKA stop and smell the roses) Take a look outside, even in mid-winter. Notice colors, textures. Watch the squirrels and birds.     Service to others  Be of service to others.  There is always someone else in need.  By helping others we can  get out of ourselves  and remember the really important things.  This also provides opportunities for practicing all the other parts of the program.    Humor  Laugh and be silly!  Adjust your TV habits for less news and crime-drama and more comedy.    Control your stress  Try breathing deep, massage therapy, biofeedback, and meditation. Find time to relax each day.     My support system    Clinic Contact:  Phone number:    Contact 1:  Phone number:    Contact 2:  Phone number:    Congregation/:  Phone number:    Therapist:  Phone number:    Local crisis center:    Phone number:    Other community support:  Phone number:

## 2017-08-23 NOTE — MR AVS SNAPSHOT
After Visit Summary   8/23/2017    Gary Iyer    MRN: [id number]           Patient Information     Date Of Birth          1960        Visit Information        Provider Department      8/23/2017 10:40 AM Finesse Beal MD [facility]        Today's Diagnoses     Moderate major depression (H)    -  1    Anxiety        Hypertension goal BP (blood pressure) < 140/90        Type 2 diabetes mellitus without complication, without long-term current use of insulin (H)        Fatigue, unspecified type        Hyperlipidemia LDL goal <70          Care Instructions    Increase back to 2 pills daily on the paroxetine    Increase exercise    See counselor/ therapist; call to schedule appointment    We will send you lab results    You can make medical records available to others as part of disability process but we as clinic don't do disability determination or forms          Follow-ups after your visit        Additional Services     MENTAL HEALTH REFERRAL       Your provider has referred you to: [facility]: [facility] - Counseling (Individual/Couples/Family) - [facility] [phone]   [email or web address]/   *Patient will be contacted by [facility] scheduling partner, [facility] (P), to schedule an appointment.  Patients may also call P to schedule.  [facility] [phone] [email or web address]/ *Patient will be contacted by [facility] scheduling partner, [facility] ([facility]), to schedule an appointment. Patients may also call P to schedule.  [facility] [phone]   [email or web address]/   *Patient will be contacted by [facility] scheduling partner, [facility] (P), to schedule an appointment.   Patients may also call [facility] to schedule.    All scheduling is subject to the client's specific insurance plan & benefits, provider/location availability, and provider clinical specialities.  Please arrive 15 minutes early for your first appointment and bring your completed paperwork.    Please be aware that coverage of these services is subject to the terms and limitations of your health insurance plan.  Call member services at your health plan with any benefit or coverage questions.                  Your next 10 appointments already scheduled     Aug 28, 2017 10:40 AM CDT   (Arrive by 10:25 AM)   RETURN THROAT with Mel Fournier MD   [facility] ([facility] [place])    [address] [facility] [address] [phone]           - This is a multidisciplinary care team visit with an ENT physician and may include a speech language pathologist. - It is important to know that if you are evaluated and/or treated by both a physician and a speech pathologist during your visit, your billing will reflect the input that you receive from both providers as separate professionals. Although most insurance plans do cover these services, we encourage you to contact your insurance company prior to your visit to determine whether there are any coverage limitations that might affect you financially. - Billing/procedure codes that are frequently associated with visits to our clinic include (but are not limited to) the ones listed below. Most patients will not need all of these items, but it may be useful to ask your insurance company's patient . 83557: Flexible fiberoptic laryngoscopy, 27159: Laryngoscopy; flexible or rigid fiberoptic, with stroboscopy, 47332: Flexible endoscopic evaluation of swallowing, 93684: Evaluation of Voice and Resonance, 28320: Speech pathology treatment for voice, speech, communication, 19725: Speech pathology treatment  for swallowing/oral function for feeding - If you have any concerns or questions, or if you would prefer not to have a speech pathologist involved in your visit, please contact our Clinic Coordinator at [phone], at least 24 hours prior to your appointment.              Who to contact     If you have questions or need follow up information about today's clinic visit or your schedule please contact [facility] directly at [phone].  Normal or non-critical lab and imaging results will be communicated to you by [employer] [facility], letter or phone within 4 business days after the clinic has received the results. If you do not hear from us within 7 days, please contact the clinic through [facility] or phone. If you have a critical or abnormal lab result, we will notify you by phone as soon as possible.  Submit refill requests through [facility] or call your pharmacy and they will forward the refill request to us. Please allow 3 business days for your refill to be completed.          Additional Information About Your Visit        [employer] [facility] Information     [facility] gives you secure access to your electronic health record. If you see a primary care provider, you can also send messages to your care team and make appointments. If you have questions, please call your primary care clinic.  If you do not have a primary care provider, please call [phone] and they will assist you.        Care EveryWhere ID     This is your Care EveryWhere ID. This could be used by other organizations to access your [facility] medical records  [id number]        Your Vitals Were     Pulse Temperature Pulse Oximetry BMI (Body Mass Index)          98 97.3  F (36.3  C) (Oral) 100% 32.43 kg/m2         Blood Pressure from Last 3 Encounters:   08/23/17 136/84   06/23/17 139/79   06/12/17 137/85    Weight from Last 3 Encounters:   08/23/17 226 lb (102.5 kg)   06/23/17 230 lb 6.4 oz (104.5 kg)   06/12/17 228 lb (103.4 kg)              We  Performed the Following     Albumin Random Urine Quantitative with Creat Ratio     CBC with platelets differential     Comprehensive metabolic panel     Hemoglobin A1c     Lipid panel reflex to direct LDL     MENTAL HEALTH REFERRAL     TSH with free T4 reflex        Primary Care Provider Office Phone # Fax #    Finesse Beal [phone]       [address] [facility] [address]        Equal Access to Services     KIRILL PORTILLO : Hadii aad ku hadasho Soomaali, waaxda luqadaha, qaybta kaalmada adeegyada, waxay antionettein hayaan adeeg kharash lachadn . So [facility] [phone].    ATENCIÓN: Si habla español, tiene a lucero disposición servicios gratuitos de asistencia lingüística. Llame al [phone].    We comply with applicable federal civil rights laws and [place] laws. We do not discriminate on the basis of race, color, national origin, age, disability sex, sexual orientation or gender identity.            Thank you!     Thank you for choosing [facility]  for your care. Our goal is always to provide you with excellent care. Hearing back from our patients is one way we can continue to improve our services. Please take a few minutes to complete the written survey that you may receive in the mail after your visit with us. Thank you!             Your Updated Medication List - Protect others around you: Learn how to safely use, store and throw away your medicines at www.disposemymeds.org.          This list is accurate as of: 8/23/17 11:26 AM.  Always use your most recent med list.                   Brand Name Dispense Instructions for use Diagnosis    allopurinol 300 MG tablet    ZYLOPRIM    90 tablet    Take 1 tablet (300 mg) by mouth daily    Gout, unspecified       aspirin 81 MG tablet      Take 1 tablet by mouth daily.        atorvastatin 20 MG tablet    LIPITOR    90 tablet    Take 1 tablet (20 mg) by mouth daily    Hyperlipidemia LDL goal <70       blood glucose  monitoring lancets     1 Box    1 each 2 times daily Use to test blood sugar 2 times daily or as directed.    Type 2 diabetes mellitus without complication (H)       blood glucose monitoring meter device kit    no brand specified    1 kit    Use to test blood sugar 2 times daily or as directed.  Accucheck meter please and all associated strips, lancets, and other supplies, 3 month supply with refills for a year.    Type 2 diabetes mellitus without complication, without long-term current use of insulin (H)       DAILY MULTIVITAMIN PO      Take  by mouth daily.        hydrochlorothiazide 50 MG tablet    HYDRODIURIL    90 tablet    TAKE 1 TABLET(50 MG) BY MOUTH EVERY MORNING    Hypertension goal BP (blood pressure) < 140/90       hydrocortisone 1 % ointment     30 g    Apply topically 2 times daily as needed    Facial dermatitis       hydrocortisone 2.5 % cream    ANUSOL-HC    30 g    Place rectally 2 times daily as needed for hemorrhoids    External hemorrhoids       hydrocortisone 25 MG Suppository    ANUSOL-HC    20 suppository    Place 1 suppository (25 mg) rectally 2 times daily as needed for hemorrhoids    External hemorrhoids       lisinopril 30 MG tablet    PRINIVIL,ZESTRIL    90 tablet    TAKE 1 TABLET(30 MG) BY MOUTH DAILY    Hypertension goal BP (blood pressure) < 140/90       metFORMIN 500 MG tablet    GLUCOPHAGE    180 tablet    TAKE 1 TABLET(500 MG) BY MOUTH TWICE DAILY WITH MEALS    Type 2 diabetes mellitus without complication (H)       * order for DME     1 Container    Place rectally 2 times daily 1.8 oz container of 0.2 % nifedipine suppository if possible as easier to place the medication.   Apply on the anus every 12 hours.  Just get to where the anus is tight .    Anal fissure       * order for DME     1 Units    Autocpap 10-16 cm    Obstructive sleep apnea       PARoxetine 20 MG tablet    PAXIL    180 tablet    Take 2 tablets (40 mg) by mouth daily    Anxiety       psyllium 0.52 G capsule     100  capsule    Take 1 capsule (0.52 g) by mouth daily    Constipation, unspecified constipation type       tamsulosin 0.4 MG capsule    FLOMAX    90 capsule    TAKE 1 CAPSULE(0.4 MG) BY MOUTH DAILY    Benign prostatic hyperplasia with lower urinary tract symptoms, unspecified morphology       triamcinolone 0.1 % cream    KENALOG    30 g    Apply topically 2 times daily as needed for irritation    Dermatitis       vitamin D 1000 UNITS capsule      Take 1 capsule by mouth daily.        * Notice:  This list has 2 medication(s) that are the same as other medications prescribed for you. Read the directions carefully, and ask your doctor or other care provider to review them with you.

## 2017-08-23 NOTE — NURSING NOTE
"Chief Complaint   Patient presents with     Dizziness     Health Maintenance       Initial /90 (BP Location: Right arm, Patient Position: Chair, Cuff Size: Adult Regular)  Pulse 98  Temp 97.3  F (36.3  C) (Oral)  Wt 226 lb (102.5 kg)  SpO2 100%  BMI 32.43 kg/m2 Estimated body mass index is 32.43 kg/(m^2) as calculated from the following:    Height as of 6/23/17: 5' 10\" (1.778 m).    Weight as of this encounter: 226 lb (102.5 kg).  Medication Reconciliation: complete   Elisa Chandra CMA      "

## 2017-08-23 NOTE — PATIENT INSTRUCTIONS
Increase back to 2 pills daily on the paroxetine    Increase exercise    See counselor/ therapist; call to schedule appointment    We will send you lab results    You can make medical records available to others as part of disability process but we as clinic don't do disability determination or forms

## 2017-08-24 NOTE — PROGRESS NOTES
The triglycerides are a little high, but better than last time we checked.    Other labs are all good.    Finesse Beal MD

## 2017-08-28 ENCOUNTER — OFFICE VISIT (OUTPATIENT)
Dept: OTOLARYNGOLOGY | Facility: CLINIC | Age: 57
End: 2017-08-28

## 2017-08-28 ENCOUNTER — RADIANT APPOINTMENT (OUTPATIENT)
Dept: CT IMAGING | Facility: CLINIC | Age: 57
End: 2017-08-28
Attending: OTOLARYNGOLOGY
Payer: MEDICAID

## 2017-08-28 VITALS — WEIGHT: 226 LBS | BODY MASS INDEX: 32.35 KG/M2 | HEIGHT: 70 IN

## 2017-08-28 DIAGNOSIS — C32.9 LARYNGEAL CANCER (H): Primary | ICD-10-CM

## 2017-08-28 DIAGNOSIS — R49.0 DYSPHONIA: ICD-10-CM

## 2017-08-28 DIAGNOSIS — J38.3 LEUKOPLAKIA OF VOCAL CORDS: ICD-10-CM

## 2017-08-28 DIAGNOSIS — R49.0 DYSPHONIA: Primary | ICD-10-CM

## 2017-08-28 DIAGNOSIS — R59.1 LYMPHADENOPATHY: ICD-10-CM

## 2017-08-28 DIAGNOSIS — C32.9 LARYNGEAL CANCER (H): ICD-10-CM

## 2017-08-28 PROCEDURE — 70491 CT SOFT TISSUE NECK W/DYE: CPT | Mod: TC

## 2017-08-28 RX ORDER — IOPAMIDOL 755 MG/ML
80 INJECTION, SOLUTION INTRAVASCULAR ONCE
Status: COMPLETED | OUTPATIENT
Start: 2017-08-28 | End: 2017-08-28

## 2017-08-28 RX ADMIN — IOPAMIDOL 80 ML: 755 INJECTION, SOLUTION INTRAVASCULAR at 13:49

## 2017-08-28 ASSESSMENT — PAIN SCALES - GENERAL: PAINLEVEL: NO PAIN (0)

## 2017-08-28 NOTE — LETTER
8/28/2017       RE: Gary Iyer  [address] [address]     Dear Colleague,    Thank you for referring your patient, Gary Iyer, to the [facility] at [facility]. Please see a copy of my visit note below.    [facility]  Elijah Baker Jr., M.D., F.A.C.S.  Mel Fournier M.D., M.P.H.  Misty Clark, Ph.D., CCC/SLP  Jasmyne Padilla M.M. (voice), M.A., CCC/SLP  Zack Mcdaniels M.M. (voice), M.A., CCC/SLP    [facility]  VOICE EVALUATION/LARYNGEAL EXAMINATION REPORT    Patient: Gary Iyer  Date of Service: 8/28/2017    HISTORY  PATIENT INFORMATION  Gary Iyer was seen for brief consultation in conjunction with a visit to Dr. Fournier today.  Please refer to Dr. Fournier s dictation for a more complete history and impressions.  I assisted with the evaluation, but we agreed that based on his current symptoms, intervention by speech-language pathology was not necessary today.     DIAGNOSIS/REASON FOR REFERRAL  Dysphonia/ Evaluate, perform laryngeal exam, treat as appropriate    No charge for today s session; charges will be billed at the completion of the evaluation  NO CHARGE FACILITY FEE (16053)    PRIMARY ICD-10 code: R49.0 (Dysphonia)    Jasmyne Padilla M.M. (voice), M.A., CCC/SLP  Speech-Language Pathologist  [facility]  [phone]

## 2017-08-28 NOTE — LETTER
[facility] VOICE [facility]  Elijah Baker Jr., M.D., F.A.C.S.  Mel Fournier M.D., M.P.H.  Misty Clark, Ph.D., CCC/SLP  Jasmyne Padilla M.M. (voice), M.SWAPNIL., CCC/SLP  Zack Mcdaniels M.M. (voice), M.A., CCC/SLP    Date of Visit: 8/28/17    To Whom it May Concern:    RE: Gary Iyer        This letter is in support of accommodation required by my patient, Gary Iyer, who is under the care of Dr. Fournier at [facility] in the Department of Otolaryngology.  The patient was last seen by me on 8/28/17 and was determined to be vocally safe to return to work.       Specific recommendations that are medically necessary Gary Iyer to complete include:    Limit voice use at work and at home.    No projecting or talking over noise.      The next scheduled appointment will be in 2-3 months.  We will provide an update with modifications to the current voice recommendations and restrictions at that time.    Thank you for your consideration in this matter.  If you have any questions, please do not hesitate to contact us at [phone].    Sincerely,     Mel Fournier MD

## 2017-08-28 NOTE — PROGRESS NOTES
[facility]  Elijah Baker Jr., M.D., F.A.C.S.  Mel Fournier M.D., M.P.H.  Misty Clark, Ph.D., CCC/SLP  Jasmyne Padilla M.M. (voice), M.SWAPNIL., CCC/SLP  Zack Mcdaniels M.M. (voice), M.A., [facility]/SLP    [facility]  VOICE EVALUATION/LARYNGEAL EXAMINATION REPORT    Patient: Gary Iyer  Date of Service: 8/28/2017    HISTORY  PATIENT INFORMATION  Gary Iyer was seen for brief consultation in conjunction with a visit to Dr. Fournier today.  Please refer to Dr. Fournier s dictation for a more complete history and impressions.  I assisted with the evaluation, but we agreed that based on his current symptoms, intervention by speech-language pathology was not necessary today.     DIAGNOSIS/REASON FOR REFERRAL  Dysphonia/ Evaluate, perform laryngeal exam, treat as appropriate    No charge for today s session; charges will be billed at the completion of the evaluation  NO CHARGE FACILITY FEE (87325)    PRIMARY ICD-10 code: R49.0 (Dysphonia)    Jasmyne Padilla M.M. (voice), M.A., CCC/SLP  Speech-Language Pathologist  [facility]  [phone]

## 2017-08-28 NOTE — MR AVS SNAPSHOT
After Visit Summary   8/28/2017    Gary Iyer    MRN: [id number]           Patient Information     Date Of Birth          1960        Visit Information        Provider Department      8/28/2017 10:40 AM Jasmyne Padilla, SLP [facility]        Today's Diagnoses     Dysphonia    -  1       Follow-ups after your visit        Your next 10 appointments already scheduled     Aug 30, 2017  1:40 PM CDT   SHORT with Finesse Beal MD   [facility] ([facility])    [address] [place] [id number] [phone]              Who to contact     Please call your clinic at [phone] to:    Ask questions about your health    Make or cancel appointments    Discuss your medicines    Learn about your test results    Speak to your doctor   If you have compliments or concerns about an experience at your clinic, or if you wish to file a complaint, please contact [facility] Patient Relations at [phone] or email us at [email or web address]         Additional Information About Your Visit        MyChart Information     [employer] gives you secure access to your electronic health record. If you see a primary care provider, you can also send messages to your care team and make appointments. If you have questions, please call your primary care clinic.  If you do not have a primary care provider, please call [phone] and they will assist you.      [employer] is an electronic gateway that provides easy, online access to your medical records. With [employer], you can request a clinic appointment, read your test results, renew a prescription or communicate with your care team.     To access your existing account, please contact your [facility] or call [phone] for assistance.        Care EveryWhere ID     This is your Care EveryWhere ID. This could be used by other  organizations to access your [place] medical records  [id number]         Blood Pressure from Last 3 Encounters:   08/23/17 136/84   06/23/17 139/79   06/12/17 137/85    Weight from Last 3 Encounters:   08/28/17 102.5 kg (226 lb)   08/23/17 102.5 kg (226 lb)   06/23/17 104.5 kg (230 lb 6.4 oz)              Today, you had the following     No orders found for display       Primary Care Provider Office Phone # Fax #    Finesse Beal [phone]       [address] [facility] [address]        Equal Access to Services     [facility]: Hadii sloan bradshaw hadasho Soshelli, waaxda luqadaha, qaybta kaalmada evitada, gregory gutierrez . So [facility] [phone].    ATENCIÓN: Si habla español, tiene a lucero disposición servicios gratuitos de asistencia lingüística. Ll[facility] [phone].    We comply with applicable federal civil rights laws and [place] laws. We do not discriminate on the basis of race, color, national origin, age, disability sex, sexual orientation or gender identity.            Thank you!     Thank you for choosing [facility]  for your care. Our goal is always to provide you with excellent care. Hearing back from our patients is one way we can continue to improve our services. Please take a few minutes to complete the written survey that you may receive in the mail after your visit with us. Thank you!             Your Updated Medication List - Protect others around you: Learn how to safely use, store and throw away your medicines at www.disposemymeds.org.          This list is accurate as of: 8/28/17  4:07 PM.  Always use your most recent med list.                   Brand Name Dispense Instructions for use Diagnosis    allopurinol 300 MG tablet    ZYLOPRIM    90 tablet    Take 1 tablet (300 mg) by mouth daily    Gout, unspecified       aspirin 81 MG tablet      Take 1 tablet by mouth daily.        atorvastatin 20 MG tablet    LIPITOR    90 tablet    Take  1 tablet (20 mg) by mouth daily    Hyperlipidemia LDL goal <70       blood glucose monitoring lancets     1 Box    1 each 2 times daily Use to test blood sugar 2 times daily or as directed.    Type 2 diabetes mellitus without complication (H)       blood glucose monitoring meter device kit    no brand specified    1 kit    Use to test blood sugar 2 times daily or as directed.  Accucheck meter please and all associated strips, lancets, and other supplies, 3 month supply with refills for a year.    Type 2 diabetes mellitus without complication, without long-term current use of insulin (H)       DAILY MULTIVITAMIN PO      Take  by mouth daily.        hydrochlorothiazide 50 MG tablet    HYDRODIURIL    90 tablet    TAKE 1 TABLET(50 MG) BY MOUTH EVERY MORNING    Hypertension goal BP (blood pressure) < 140/90       hydrocortisone 1 % ointment     30 g    Apply topically 2 times daily as needed    Facial dermatitis       hydrocortisone 2.5 % cream    ANUSOL-HC    30 g    Place rectally 2 times daily as needed for hemorrhoids    External hemorrhoids       hydrocortisone 25 MG Suppository    ANUSOL-HC    20 suppository    Place 1 suppository (25 mg) rectally 2 times daily as needed for hemorrhoids    External hemorrhoids       lisinopril 30 MG tablet    PRINIVIL,ZESTRIL    90 tablet    TAKE 1 TABLET(30 MG) BY MOUTH DAILY    Hypertension goal BP (blood pressure) < 140/90       metFORMIN 500 MG tablet    GLUCOPHAGE    180 tablet    TAKE 1 TABLET(500 MG) BY MOUTH TWICE DAILY WITH MEALS    Type 2 diabetes mellitus without complication (H)       * order for DME     1 Container    Place rectally 2 times daily 1.8 oz container of 0.2 % nifedipine suppository if possible as easier to place the medication.   Apply on the anus every 12 hours.  Just get to where the anus is tight .    Anal fissure       * order for DME     1 Units    Autocpap 10-16 cm    Obstructive sleep apnea       PARoxetine 20 MG tablet    PAXIL    180 tablet     Take 2 tablets (40 mg) by mouth daily    Anxiety       psyllium 0.52 G capsule     100 capsule    Take 1 capsule (0.52 g) by mouth daily    Constipation, unspecified constipation type       tamsulosin 0.4 MG capsule    FLOMAX    90 capsule    TAKE 1 CAPSULE(0.4 MG) BY MOUTH DAILY    Benign prostatic hyperplasia with lower urinary tract symptoms, unspecified morphology       triamcinolone 0.1 % cream    KENALOG    30 g    Apply topically 2 times daily as needed for irritation    Dermatitis       vitamin D 1000 UNITS capsule      Take 1 capsule by mouth daily.        * Notice:  This list has 2 medication(s) that are the same as other medications prescribed for you. Read the directions carefully, and ask your doctor or other care provider to review them with you.

## 2017-08-28 NOTE — PATIENT INSTRUCTIONS
1.  You were seen in the ENT Clinic today by Dr. Fournier.  If you have any questions or concerns after your appointment, please call [phone].  Press option #1 for scheduling related needs.  Press option #3 for Nurse advice.  2.  Plan is to return to clinic in 2-3 months.  Pt prefers to call and schedule follow up appt.  3.  Pt should schedule CT scan of the neck.  Pt would like to have this scheduled at [facility].  Pt would prefer to call to schedule scan.  RN provided pt with scheduling information.      Bijal LINARESN, RN  [facility] ENT   Head & Neck Surgery     Imaging Services Appointments    [facility]  [phone]

## 2017-08-28 NOTE — LETTER
8/28/2017      RE: Gary Iyer  [address] [address]       Dear Colleague:    Gary Iyer recently returned for follow-up at the [facility]. My clinic note from our visit is enclosed below.     I appreciate the ongoing opportunity to participate in this patient's care.    Please feel free to contact me with any questions.      Sincerely yours,  Mel Fournier M.D., M.P.H.  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 Most recently we returned to the Operating Room on 02/09/2017 for an area of new irregularity of the left true vocal fold. Pathology showed severe dysplasia with negative but close margins (for moderate, but not severe dysplasia).  He returns in routine follow up today. His voice is stable, and he has no specific concerns. He would like to get back to work full time if his laryngeal exam looks good.    MEDICATIONS:     Current Outpatient Prescriptions   Medication Sig Dispense Refill     lisinopril (PRINIVIL,ZESTRIL) 30 MG tablet TAKE 1 TABLET(30 MG) BY MOUTH DAILY 90 tablet 2     hydrochlorothiazide (HYDRODIURIL) 50 MG tablet TAKE 1 TABLET(50 MG) BY MOUTH EVERY MORNING 90 tablet 2     tamsulosin (FLOMAX) 0.4 MG capsule TAKE 1 CAPSULE(0.4 MG) BY MOUTH DAILY 90 capsule 1     metFORMIN (GLUCOPHAGE) 500 MG tablet TAKE 1 TABLET(500 MG) BY MOUTH TWICE DAILY WITH MEALS 180 tablet 1     order for DME Autocpap 10-16 cm 1 Units 0     blood glucose monitoring (NO BRAND SPECIFIED) meter device kit Use to test blood sugar 2 times daily or as directed.  Accucheck meter please and all associated strips, lancets, and other supplies, 3 month supply with refills for a year. 1 kit 0      PARoxetine (PAXIL) 20 MG tablet Take 2 tablets (40 mg) by mouth daily 180 tablet 1     order for DME Place rectally 2 times daily 1.8 oz container of 0.2 % nifedipine suppository if possible as easier to place the medication.    Apply on the anus every 12 hours.  Just get to where the anus is tight . 1 Container 5     psyllium 0.52 G capsule Take 1 capsule (0.52 g) by mouth daily 100 capsule 3     atorvastatin (LIPITOR) 20 MG tablet Take 1 tablet (20 mg) by mouth daily 90 tablet 3     hydrocortisone (ANUSOL-HC) 25 MG suppository Place 1 suppository (25 mg) rectally 2 times daily as needed for hemorrhoids 20 suppository 1     hydrocortisone (ANUSOL-HC) 2.5 % rectal cream Place rectally 2 times daily as needed for hemorrhoids 30 g 1     triamcinolone (KENALOG) 0.1 % cream Apply topically 2 times daily as needed for irritation 30 g 1     hydrocortisone 1 % ointment Apply topically 2 times daily as needed 30 g 1     allopurinol (ZYLOPRIM) 300 MG tablet Take 1 tablet (300 mg) by mouth daily 90 tablet 3     blood glucose monitoring (ALON MICROLET) lancets 1 each 2 times daily Use to test blood sugar 2 times daily or as directed. 1 Box 3     Cholecalciferol (VITAMIN D) 1000 UNITS capsule Take 1 capsule by mouth daily.       Multiple Vitamin (DAILY MULTIVITAMIN PO) Take  by mouth daily.       aspirin 81 MG tablet Take 1 tablet by mouth daily.         ALLERGIES:  No Known Allergies    NEW PMH/PSH: None    REVIEW OF SYSTEMS:  The patient completed a comprehensive 11 point review of systems (below), which was reviewed. Positives are as noted below.  Patient Supplied Answers to Review of Systems   ENT ROS 4/22/2016   Ears, Nose, Throat Nasal congestion or drainage       PHYSICAL EXAM:  General: The patient was alert and conversant, and in no acute distress.    Neck: Possible shotty lymphadenopathy right neck level II/III, though palpation limited by habitus; no significant tenderness to palpation of the thyrohyoid space,  which was narrow.  Resp: Breathing comfortably, no stridor or stertor.  Neuro: Symmetric facial function. Other cranial nerve function as documented above.  Psych: Normal affect, pleasant and cooperative.  Voice/speech: Mild dysphonia characterized by breathiness, roughness and strain.     Intake scores  Last 2 Scores for Patient-Answered VHI Questionnaire  VHI Total Score 6/26/2017 8/28/2017   VHI Total Score 1 15      Last 2 Scores for Patient-Answered EAT Questionnaire  EAT Total Score 6/26/2017 8/28/2017   EAT Total Score 0 0        Last 2 Scores for Patient-Answered CSI Questionnaire  CSI Total Score 6/26/2017 8/28/2017   CSI Total Score 0 10         Procedure:   Flexible fiberoptic laryngoscopy and laryngovideostroboscopy  Indications: This procedure was warranted to evaluate the patient's laryngeal anatomy and function. Risks, benefits, and alternatives were discussed.  Description: After written informed consent was obtained, a time-out was performed to confirm patient identity, procedure, and procedure site. Topical 3% lidocaine with 0.25% phenylephrine was applied to the nasal cavities. I performed the endoscopy and no complications were apparent. Continuous and stroboscopic light were utilized to assess routine phonation and variable frequency phonation.  Performed by: Mel Fournier MD MPH  SLP: Jasmyne Padilla MM, MA, CCC-SLP  Findings: Normal nasopharynx. Normal base of tongue, valleculae, and epiglottis. Vocal fold mobility: right: normal; left: normal. Medial edges of the vocal folds: smooth and straight. 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 Glissade produced appropriate elongation. Mucosa of false vocal folds, aryepiglottic folds, piriform sinuses, and posterior glottis unremarkable. Airway was patent. No further findings on NBI, which did identify area on left superior vocal fold.    The addition of stroboscopy allowed evaluation of the  mucosal wave.   Amplitude: right: normal; left: mildly decreased. Symmetry: intermittent symmetry. Closure pattern: complete. Closure plane: at glottic level. Phase distribution: normal.      IMPRESSION AND PLAN:   Gary Iyer returns with an essentially stable laryngeal exam. There is decreased erythema of the left true vocal fold, and persistent superior surface leukoplakia which will require ongoing surveillance. He has some possible shotty lymphadenopathy of the right neck, and has not been ill recently, so as a precaution we will check a CT neck with contrast given his cancer history. Otherwise I will see him back in two to three months or sooner with any voice concerns. He is vocally safe to return to work. I appreciate the opportunity to participate in the care of this pleasant patient.        Mel Fournier MD

## 2017-08-28 NOTE — NURSING NOTE
Chief Complaint   Patient presents with     RECHECK     dysphonia     Yuridia Saldivar Medical Assistant

## 2017-08-28 NOTE — PROGRESS NOTES
Dear Colleague:    Gary Iyer recently returned for follow-up at the [facility]. My clinic note from our visit is enclosed below.     I appreciate the ongoing opportunity to participate in this patient's care.    Please feel free to contact me with any questions.      Sincerely yours,  Mel Fournier M.D., M.P.H.  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 Most recently we returned to the Operating Room on 02/09/2017 for an area of new irregularity of the left true vocal fold. Pathology showed severe dysplasia with negative but close margins (for moderate, but not severe dysplasia).  He returns in routine follow up today. His voice is stable, and he has no specific concerns. He would like to get back to work full time if his laryngeal exam looks good.    MEDICATIONS:     Current Outpatient Prescriptions   Medication Sig Dispense Refill     lisinopril (PRINIVIL,ZESTRIL) 30 MG tablet TAKE 1 TABLET(30 MG) BY MOUTH DAILY 90 tablet 2     hydrochlorothiazide (HYDRODIURIL) 50 MG tablet TAKE 1 TABLET(50 MG) BY MOUTH EVERY MORNING 90 tablet 2     tamsulosin (FLOMAX) 0.4 MG capsule TAKE 1 CAPSULE(0.4 MG) BY MOUTH DAILY 90 capsule 1     metFORMIN (GLUCOPHAGE) 500 MG tablet TAKE 1 TABLET(500 MG) BY MOUTH TWICE DAILY WITH MEALS 180 tablet 1     order for DME Autocpap 10-16 cm 1 Units 0     blood glucose monitoring (NO BRAND SPECIFIED) meter device kit Use to test blood sugar 2 times daily or as directed.  Accucheck meter please and all associated strips, lancets, and other supplies, 3 month supply with refills for a year. 1 kit 0     PARoxetine (PAXIL) 20 MG tablet Take 2 tablets (40 mg) by mouth daily 180 tablet 1      order for DME Place rectally 2 times daily 1.8 oz container of 0.2 % nifedipine suppository if possible as easier to place the medication.    Apply on the anus every 12 hours.  Just get to where the anus is tight . 1 Container 5     psyllium 0.52 G capsule Take 1 capsule (0.52 g) by mouth daily 100 capsule 3     atorvastatin (LIPITOR) 20 MG tablet Take 1 tablet (20 mg) by mouth daily 90 tablet 3     hydrocortisone (ANUSOL-HC) 25 MG suppository Place 1 suppository (25 mg) rectally 2 times daily as needed for hemorrhoids 20 suppository 1     hydrocortisone (ANUSOL-HC) 2.5 % rectal cream Place rectally 2 times daily as needed for hemorrhoids 30 g 1     triamcinolone (KENALOG) 0.1 % cream Apply topically 2 times daily as needed for irritation 30 g 1     hydrocortisone 1 % ointment Apply topically 2 times daily as needed 30 g 1     allopurinol (ZYLOPRIM) 300 MG tablet Take 1 tablet (300 mg) by mouth daily 90 tablet 3     blood glucose monitoring (ALON MICROLET) lancets 1 each 2 times daily Use to test blood sugar 2 times daily or as directed. 1 Box 3     Cholecalciferol (VITAMIN D) 1000 UNITS capsule Take 1 capsule by mouth daily.       Multiple Vitamin (DAILY MULTIVITAMIN PO) Take  by mouth daily.       aspirin 81 MG tablet Take 1 tablet by mouth daily.         ALLERGIES:  No Known Allergies    NEW PMH/PSH: None    REVIEW OF SYSTEMS:  The patient completed a comprehensive 11 point review of systems (below), which was reviewed. Positives are as noted below.  Patient Supplied Answers to Review of Systems   ENT ROS 4/22/2016   Ears, Nose, Throat Nasal congestion or drainage       PHYSICAL EXAM:  General: The patient was alert and conversant, and in no acute distress.    Neck: Possible shotty lymphadenopathy right neck level II/III, though palpation limited by habitus; no significant tenderness to palpation of the thyrohyoid space, which was narrow.  Resp: Breathing comfortably, no stridor or stertor.  Neuro:  Symmetric facial function. Other cranial nerve function as documented above.  Psych: Normal affect, pleasant and cooperative.  Voice/speech: Mild dysphonia characterized by breathiness, roughness and strain.     Intake scores  Last 2 Scores for Patient-Answered VHI Questionnaire  VHI Total Score 6/26/2017 8/28/2017   VHI Total Score 1 15      Last 2 Scores for Patient-Answered EAT Questionnaire  EAT Total Score 6/26/2017 8/28/2017   EAT Total Score 0 0        Last 2 Scores for Patient-Answered CSI Questionnaire  CSI Total Score 6/26/2017 8/28/2017   CSI Total Score 0 10         Procedure:   Flexible fiberoptic laryngoscopy and laryngovideostroboscopy  Indications: This procedure was warranted to evaluate the patient's laryngeal anatomy and function. Risks, benefits, and alternatives were discussed.  Description: After written informed consent was obtained, a time-out was performed to confirm patient identity, procedure, and procedure site. Topical 3% lidocaine with 0.25% phenylephrine was applied to the nasal cavities. I performed the endoscopy and no complications were apparent. Continuous and stroboscopic light were utilized to assess routine phonation and variable frequency phonation.  Performed by: Mel Fournier MD MPH  SLP: Jasmyne Padilla MM, MA, CCC-SLP  Findings: Normal nasopharynx. Normal base of tongue, valleculae, and epiglottis. Vocal fold mobility: right: normal; left: normal. Medial edges of the vocal folds: smooth and straight. 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 Glissade produced appropriate elongation. Mucosa of false vocal folds, aryepiglottic folds, piriform sinuses, and posterior glottis unremarkable. Airway was patent. No further findings on NBI, which did identify area on left superior vocal fold.    The addition of stroboscopy allowed evaluation of the mucosal wave.   Amplitude: right: normal; left: mildly decreased. Symmetry:  intermittent symmetry. Closure pattern: complete. Closure plane: at glottic level. Phase distribution: normal.          IMPRESSION AND PLAN:   Gary Iyer returns with an essentially stable laryngeal exam. There is decreased erythema of the left true vocal fold, and persistent superior surface leukoplakia which will require ongoing surveillance. He has some possible shotty lymphadenopathy of the right neck, and has not been ill recently, so as a precaution we will check a CT neck with contrast given his cancer history. Otherwise I will see him back in two to three months or sooner with any voice concerns. He is vocally safe to return to work. I appreciate the opportunity to participate in the care of this pleasant patient.

## 2017-08-28 NOTE — LETTER
[facility]  Elijah Baker Jr., M.D., F.A.C.S.  Mel Fournier M.D., M.P.H.  Misty Clark, Ph.D., CCC/SLP  Jasmyne Padilla M.M. (voice), M.SWAPNIL., CCC/SLP  Zack Mcdaniels M.M. (voice), M.A., CCC/SLP    Date of Visit: 8/28/17    To Whom it May Concern:    RE: Gary Iyer        This letter is in support of accommodation required by my patient, Gary Iyer, who is under the care of Dr. Fournier at [facility] in the Department of Otolaryngology.  The patient was last seen by me on 8/28/17 and was determined to be vocally safe to return to work.       Specific recommendations that are medically necessary Gary Iyer to complete include:    Limit voice use at work and at home.    No projecting or talking over noise.    If available, use amplification system on bus.       The next scheduled appointment will be in 2-3 months.  We will provide an update with modifications to the current voice recommendations and restrictions at that time.    Thank you for your consideration in this matter.  If you have any questions, please do not hesitate to contact us at [phone].    Sincerely,           Mel Fournier MD

## 2017-08-28 NOTE — MR AVS SNAPSHOT
After Visit Summary   8/28/2017    Gary Iyer    MRN: [id number]           Patient Information     Date Of Birth          1960        Visit Information        Provider Department      8/28/2017 10:40 AM Mel Fournier MD [facility] Ear Nose and Throat        Today's Diagnoses     Laryngeal cancer (H)    -  1    Dysphonia          Care Instructions    1.  You were seen in the ENT Clinic today by Dr. Fournier.  If you have any questions or concerns after your appointment, please call [phone].  Press option #1 for scheduling related needs.  Press option #3 for Nurse advice.  2.  Plan is to return to clinic in 2-3 months.    3.  Pt should schedule CT scan of the neck.  Pt would like to have this scheduled at [facility].      Bijal LINARESN, RN  [facility] ENT   Head & Neck Surgery     Imaging Services Appointments    [facility]  [phone]              Follow-ups after your visit        Your next 10 appointments already scheduled     Aug 30, 2017  1:40 PM CDT   SHORT with Finesse Beal MD   [facility]    [address] [place] [id number] [phone]              Future tests that were ordered for you today     Open Future Orders        Priority Expected Expires Ordered    CT Soft tissue neck w contrast Routine  8/28/2018 8/28/2017            Who to contact     Please call your clinic at [phone] to:    Ask questions about your health    Make or cancel appointments    Discuss your medicines    Learn about your test results    Speak to your doctor   If you have compliments or concerns about an experience at your clinic, or if you wish to file a complaint, please contact [facility] Patient Relations at [phone] or email us at  [email or web address]         Additional Information About Your Visit        Taazhart Information     Taazhart gives you secure access to your electronic health record. If you see a primary care provider, you can also send messages to your care team and make appointments. If you have questions, please call your primary care clinic.  If you do not have a primary care provider, please call [phone] and they will assist you.      Pumpic is an electronic gateway that provides easy, online access to your medical records. With Pumpic, you can request a clinic appointment, read your test results, renew a prescription or communicate with your care team.     To access your existing account, please contact your [facility] or call [phone] for assistance.        Care EveryWhere ID     This is your Care EveryWhere ID. This could be used by other organizations to access your [place] medical records  [id number]        Your Vitals Were     Height BMI (Body Mass Index)                1.778 m (5' 10\") 32.43 kg/m2           Blood Pressure from Last 3 Encounters:   08/23/17 136/84   06/23/17 139/79   06/12/17 137/85    Weight from Last 3 Encounters:   08/28/17 102.5 kg (226 lb)   08/23/17 102.5 kg (226 lb)   06/23/17 104.5 kg (230 lb 6.4 oz)              We Performed the Following     IMAGESTREAM RECORDING ORDER        Primary Care Provider Office Phone # Fax #    Finesse Beal [phone]       [address] [address]        Equal Access to Services     IZABEL PORTILLO : Hadii aad ku hadasho Soomaali, waaxda luqadaha, qaybta kaalmada adeegyada, gregory gutierrez . So [facility] [phone].    ATENCIÓN: Si habla español, tiene a lucero disposición servicios gratuitos de asistencia lingüística. Llame al [phone].    We comply with applicable federal civil rights laws and [place] laws. We do not discriminate on the basis " of race, color, national origin, age, disability sex, sexual orientation or gender identity.            Thank you!     Thank you for choosing [facility] EAR NOSE AND THROAT  for your care. Our goal is always to provide you with excellent care. Hearing back from our patients is one way we can continue to improve our services. Please take a few minutes to complete the written survey that you may receive in the mail after your visit with us. Thank you!             Your Updated Medication List - Protect others around you: Learn how to safely use, store and throw away your medicines at www.disposemymeds.org.          This list is accurate as of: 8/28/17 11:46 AM.  Always use your most recent med list.                   Brand Name Dispense Instructions for use Diagnosis    allopurinol 300 MG tablet    ZYLOPRIM    90 tablet    Take 1 tablet (300 mg) by mouth daily    Gout, unspecified       aspirin 81 MG tablet      Take 1 tablet by mouth daily.        atorvastatin 20 MG tablet    LIPITOR    90 tablet    Take 1 tablet (20 mg) by mouth daily    Hyperlipidemia LDL goal <70       blood glucose monitoring lancets     1 Box    1 each 2 times daily Use to test blood sugar 2 times daily or as directed.    Type 2 diabetes mellitus without complication (H)       blood glucose monitoring meter device kit    no brand specified    1 kit    Use to test blood sugar 2 times daily or as directed.  Accucheck meter please and all associated strips, lancets, and other supplies, 3 month supply with refills for a year.    Type 2 diabetes mellitus without complication, without long-term current use of insulin (H)       DAILY MULTIVITAMIN PO      Take  by mouth daily.        hydrochlorothiazide 50 MG tablet    HYDRODIURIL    90 tablet    TAKE 1 TABLET(50 MG) BY MOUTH EVERY MORNING    Hypertension goal BP (blood pressure) < 140/90       hydrocortisone 1 % ointment     30 g    Apply topically 2 times daily as needed    Facial dermatitis        hydrocortisone 2.5 % cream    ANUSOL-HC    30 g    Place rectally 2 times daily as needed for hemorrhoids    External hemorrhoids       hydrocortisone 25 MG Suppository    ANUSOL-HC    20 suppository    Place 1 suppository (25 mg) rectally 2 times daily as needed for hemorrhoids    External hemorrhoids       lisinopril 30 MG tablet    PRINIVIL,ZESTRIL    90 tablet    TAKE 1 TABLET(30 MG) BY MOUTH DAILY    Hypertension goal BP (blood pressure) < 140/90       metFORMIN 500 MG tablet    GLUCOPHAGE    180 tablet    TAKE 1 TABLET(500 MG) BY MOUTH TWICE DAILY WITH MEALS    Type 2 diabetes mellitus without complication (H)       * order for DME     1 Container    Place rectally 2 times daily 1.8 oz container of 0.2 % nifedipine suppository if possible as easier to place the medication.   Apply on the anus every 12 hours.  Just get to where the anus is tight .    Anal fissure       * order for DME     1 Units    Autocpap 10-16 cm    Obstructive sleep apnea       PARoxetine 20 MG tablet    PAXIL    180 tablet    Take 2 tablets (40 mg) by mouth daily    Anxiety       psyllium 0.52 G capsule     100 capsule    Take 1 capsule (0.52 g) by mouth daily    Constipation, unspecified constipation type       tamsulosin 0.4 MG capsule    FLOMAX    90 capsule    TAKE 1 CAPSULE(0.4 MG) BY MOUTH DAILY    Benign prostatic hyperplasia with lower urinary tract symptoms, unspecified morphology       triamcinolone 0.1 % cream    KENALOG    30 g    Apply topically 2 times daily as needed for irritation    Dermatitis       vitamin D 1000 UNITS capsule      Take 1 capsule by mouth daily.        * Notice:  This list has 2 medication(s) that are the same as other medications prescribed for you. Read the directions carefully, and ask your doctor or other care provider to review them with you.

## 2017-08-29 ENCOUNTER — TELEPHONE (OUTPATIENT)
Dept: OTOLARYNGOLOGY | Facility: CLINIC | Age: 57
End: 2017-08-29

## 2017-08-29 NOTE — TELEPHONE ENCOUNTER
RN calling pt with results of CT soft tissue neck scan.  The scan does not look concerning for cancer.  Pt's thyroid nodules have increased in size and Dr. Fournier recommends following up with pt's PCP regarding these findings.  Pt states his PCP is within the [employer] system so he will be able to see the CT report.   Pt will follow up with Dr. Fournier in 2-3 months.      IMPRESSION:      1. Multiple thyroid gland nodules, some of them partially visualized  and extending into the mediastinum. The isthmus nodule has increased  in size since 9/29/2014.  2. No other masses visualized within the neck. No glottic,  supraglottic, or infraglottic mass is identified noting that a mucosal  lesion cannot be excluded.  3. No cervical lymphadenopathy.    Bijal LINARESN, RN  [phone]  [facility] ENT   Head & Neck Surgery   8/29/2017 2:22 PM

## 2017-08-30 ENCOUNTER — OFFICE VISIT (OUTPATIENT)
Dept: FAMILY MEDICINE | Facility: CLINIC | Age: 57
End: 2017-08-30
Payer: MEDICAID

## 2017-08-30 VITALS
SYSTOLIC BLOOD PRESSURE: 131 MMHG | WEIGHT: 228 LBS | TEMPERATURE: 99 F | DIASTOLIC BLOOD PRESSURE: 82 MMHG | BODY MASS INDEX: 32.71 KG/M2 | HEART RATE: 109 BPM

## 2017-08-30 DIAGNOSIS — N40.1 BENIGN PROSTATIC HYPERPLASIA WITH LOWER URINARY TRACT SYMPTOMS, SYMPTOM DETAILS UNSPECIFIED: ICD-10-CM

## 2017-08-30 DIAGNOSIS — E04.2 MULTIPLE THYROID NODULES: ICD-10-CM

## 2017-08-30 DIAGNOSIS — I95.1 ORTHOSTATIC HYPOTENSION: Primary | ICD-10-CM

## 2017-08-30 PROCEDURE — 99213 OFFICE O/P EST LOW 20 MIN: CPT | Performed by: FAMILY MEDICINE

## 2017-08-30 NOTE — PATIENT INSTRUCTIONS
Overall exam is reassuring    Do increase fluid intake    Be careful with changing body positions    Increase overall exercise/ activity     If symptoms worsen/ change, return to clinic    Call for endocrinology consult/appt

## 2017-08-30 NOTE — NURSING NOTE
"Chief Complaint   Patient presents with     Dizziness     offf and on and CT discussion        Initial /82  Pulse 109  Temp 99  F (37.2  C) (Oral)  Wt 228 lb (103.4 kg)  BMI 32.71 kg/m2 Estimated body mass index is 32.71 kg/(m^2) as calculated from the following:    Height as of 8/28/17: 5' 10\" (1.778 m).    Weight as of this encounter: 228 lb (103.4 kg).  Medication Reconciliation: complete'Alexis Lopez MA    "

## 2017-08-30 NOTE — PROGRESS NOTES
SUBJECTIVE:   Gary Iyer is a 57 year old male who presents to clinic today for the following health issues:        Dizziness off and on   Onset: x months     Description:   Do you feel faint:  no   Does it feel like the surroundings (bed, room) are moving: no   Unsteady/off balance: YES  Have you passed out or fallen: no     Intensity: 6-7/10    Progression of Symptoms:  same    Accompanying Signs & Symptoms:  Heart palpitations: no   Nausea, vomiting: no   Weakness in arms or legs: no   Fatigue: no   Vision or speech changes: no   Ringing in ears (Tinnitus): no   Hearing Loss: no     History:   Head trauma/concussion hx: no   Previous similar symptoms: no   Recent bleeding history: no     Precipitating factors:   Worse with activity or head movement: no   Any new medications (BP?): no   Alcohol/drug abuse/withdrawal: no     Alleviating factors:   Does staying in a fixed position give relief:  YES    Therapies Tried and outcome: none      CT results discussion            Problem list and histories reviewed & adjusted, as indicated.  Additional history: as documented         Reviewed and updated as needed this visit by clinical staffTobacco  Allergies  Meds  Med Hx  Surg Hx  Fam Hx  Soc Hx      Reviewed and updated as needed this visit by Provider          if sitting for a long time    Then gets up, walks and feels dizzy    Sometimes has to grab something    Has not fallen but almost    Tries to stay well hydrated    Physical Exam   Constitutional: He is oriented to person, place, and time and well-developed, well-nourished, and in no distress. No distress.   HENT:   Head: Normocephalic and atraumatic.   Right Ear: Tympanic membrane, external ear and ear canal normal.   Left Ear: Tympanic membrane, external ear and ear canal normal.   Nose: Nose normal.   Mouth/Throat: Oropharynx is clear and moist.   Eyes: Conjunctivae and EOM are normal. Pupils are equal, round, and reactive to light.   Neck: Neck  supple. Carotid bruit is not present.   Cardiovascular: Normal rate, regular rhythm, normal heart sounds and intact distal pulses.  Exam reveals no gallop and no friction rub.    No murmur heard.  Pulmonary/Chest: Effort normal and breath sounds normal. No respiratory distress. He has no wheezes. He has no rales.   Musculoskeletal: He exhibits no edema.   Lymphadenopathy:     He has no cervical adenopathy.   Neurological: He is alert and oriented to person, place, and time.   Skin: He is not diaphoretic.   Psychiatric: Mood and affect normal.   Sensation/strength in extremities normal.  Romberg, finger nose test, standing on one leg test, heel toe walk all normal.  Cranial nerves normal.    ASSESSMENT / PLAN:  (I95.1) Orthostatic hypotension  (primary encounter diagnosis)  Comment: discussed in detail.  Overall exam is very reassuring.  Plan: stay well hydrated.  Be careful when changing positions etc.  Follow up prn if not resolving.     (N40.1) Benign prostatic hyperplasia with lower urinary tract symptoms, symptom details unspecified  Comment: on the flomax   Plan: continue     (E04.2) Multiple thyroid nodules  Comment: discussed in detail.    Plan: ENDOCRINOLOGY ADULT REFERRAL        Patient to schedule with endocrinology       I reviewed the patient's medications, allergies, medical history, family history, and social history.    Finesse Beal MD

## 2017-08-30 NOTE — MR AVS SNAPSHOT
After Visit Summary   8/30/2017    Gary Iyer    MRN: [id number]           Patient Information     Date Of Birth          1960        Visit Information        Provider Department      8/30/2017 1:40 PM Finesse Beal MD [facility]'s Diagnoses     Orthostatic hypotension    -  1    Benign prostatic hyperplasia with lower urinary tract symptoms, symptom details unspecified        Multiple thyroid nodules          Care Instructions    Overall exam is reassuring    Do increase fluid intake    Be careful with changing body positions    Increase overall exercise/ activity     If symptoms worsen/ change, return to clinic    Call for endocrinology consult/appt                Follow-ups after your visit        Additional Services     ENDOCRINOLOGY ADULT REFERRAL       Your provider has referred you to: FMG: [facility] [phone]   [email or web address]  FMG: [facility] [place] [phone]   [email or web address]  FMG: [facility] [phone]   [email or web address]  UMP: End[facility]ology [facility] Diabetes Clinic [facility] [phone]   [email or web address]  UMP: [facility] [place] [phone]   [email or web address]  [facility]N: Endocrinology Clinic [facility] [phone]   [email or web address]  [facility] [place] [phone]  [place] [phone]  [place] [phone]  [facility] [phone]  [place] [phone]  [place] [phone]      Please be aware that coverage of these services is subject to the terms and limitations of your health insurance plan.  Call member services at your health  plan with any benefit or coverage questions.      Please bring the following to your appointment:    >>   Any x-rays, CTs or MRIs which have been performed.  Contact the facility where they were done to arrange for  prior to your scheduled appointment.    >>   List of current medications   >>   This referral request   >>   Any documents/labs given to you for this referral                  Who to contact     If you have questions or need follow up information about today's clinic visit or your schedule please contact [facility] directly at [phone].  Normal or non-critical lab and imaging results will be communicated to you by We[employer], letter or phone within 4 business days after the clinic has received the results. If you do not hear from us within 7 days, please contact the clinic through [employer] or phone. If you have a critical or abnormal lab result, we will notify you by phone as soon as possible.  Submit refill requests through "[employer] or call your pharmacy and they will forward the refill request to us. Please allow 3 business days for your refill to be completed.          Additional Information About Your Visit        Wealthfronthart Information     "[employer] gives you secure access to your electronic health record. If you see a primary care provider, you can also send messages to your care team and make appointments. If you have questions, please call your primary care clinic.  If you do not have a primary care provider, please call [phone] and they will assist you.        Care EveryWhere ID     This is your Care EveryWhere ID. This could be used by other organizations to access your [place] medical records  [id number]        Your Vitals Were     Pulse Temperature BMI (Body Mass Index)             109 99  F (37.2  C) (Oral) 32.71 kg/m2          Blood Pressure from Last 3 Encounters:   08/30/17 131/82   08/23/17 136/84   06/23/17 139/79    Weight from Last 3 Encounters:    08/30/17 228 lb (103.4 kg)   08/28/17 226 lb (102.5 kg)   08/23/17 226 lb (102.5 kg)              We Performed the Following     ENDOCRINOLOGY ADULT REFERRAL        Primary Care Provider Office Phone # Fax #    Finesse Beal [phone]       [address] [facility] [address]        Equal Access to Services     [facility] : Hadii aad ku hadasho Soomaali, waaxda luqadaha, qaybta kaalmada adeegyada, waxay idiin hayaan adeeg khlonnysh la'aan . So [facility] [phone].    ATENCIÓN: Si habla español, tiene a lucero disposición servicios gratuitos de asistencia lingüística. Llame al [phone].    We comply with applicable federal civil rights laws and [place] laws. We do not discriminate on the basis of race, color, national origin, age, disability sex, sexual orientation or gender identity.            Thank you!     Thank you for choosing [facility]  for your care. Our goal is always to provide you with excellent care. Hearing back from our patients is one way we can continue to improve our services. Please take a few minutes to complete the written survey that you may receive in the mail after your visit with us. Thank you!             Your Updated Medication List - Protect others around you: Learn how to safely use, store and throw away your medicines at www.disposemymeds.org.          This list is accurate as of: 8/30/17  2:21 PM.  Always use your most recent med list.                   Brand Name Dispense Instructions for use Diagnosis    allopurinol 300 MG tablet    ZYLOPRIM    90 tablet    Take 1 tablet (300 mg) by mouth daily    Gout, unspecified       aspirin 81 MG tablet      Take 1 tablet by mouth daily.        atorvastatin 20 MG tablet    LIPITOR    90 tablet    Take 1 tablet (20 mg) by mouth daily    Hyperlipidemia LDL goal <70       blood glucose monitoring lancets     1 Box    1 each 2 times daily Use to test blood sugar 2 times daily or as directed.     Type 2 diabetes mellitus without complication (H)       blood glucose monitoring meter device kit    no brand specified    1 kit    Use to test blood sugar 2 times daily or as directed.  Accucheck meter please and all associated strips, lancets, and other supplies, 3 month supply with refills for a year.    Type 2 diabetes mellitus without complication, without long-term current use of insulin (H)       DAILY MULTIVITAMIN PO      Take  by mouth daily.        hydrochlorothiazide 50 MG tablet    HYDRODIURIL    90 tablet    TAKE 1 TABLET(50 MG) BY MOUTH EVERY MORNING    Hypertension goal BP (blood pressure) < 140/90       hydrocortisone 1 % ointment     30 g    Apply topically 2 times daily as needed    Facial dermatitis       hydrocortisone 2.5 % cream    ANUSOL-HC    30 g    Place rectally 2 times daily as needed for hemorrhoids    External hemorrhoids       hydrocortisone 25 MG Suppository    ANUSOL-HC    20 suppository    Place 1 suppository (25 mg) rectally 2 times daily as needed for hemorrhoids    External hemorrhoids       lisinopril 30 MG tablet    PRINIVIL,ZESTRIL    90 tablet    TAKE 1 TABLET(30 MG) BY MOUTH DAILY    Hypertension goal BP (blood pressure) < 140/90       metFORMIN 500 MG tablet    GLUCOPHAGE    180 tablet    TAKE 1 TABLET(500 MG) BY MOUTH TWICE DAILY WITH MEALS    Type 2 diabetes mellitus without complication (H)       * order for DME     1 Container    Place rectally 2 times daily 1.8 oz container of 0.2 % nifedipine suppository if possible as easier to place the medication.   Apply on the anus every 12 hours.  Just get to where the anus is tight .    Anal fissure       * order for DME     1 Units    Autocpap 10-16 cm    Obstructive sleep apnea       PARoxetine 20 MG tablet    PAXIL    180 tablet    Take 2 tablets (40 mg) by mouth daily    Anxiety       psyllium 0.52 G capsule     100 capsule    Take 1 capsule (0.52 g) by mouth daily    Constipation, unspecified constipation type        tamsulosin 0.4 MG capsule    FLOMAX    90 capsule    TAKE 1 CAPSULE(0.4 MG) BY MOUTH DAILY    Benign prostatic hyperplasia with lower urinary tract symptoms, unspecified morphology       triamcinolone 0.1 % cream    KENALOG    30 g    Apply topically 2 times daily as needed for irritation    Dermatitis       vitamin D 1000 UNITS capsule      Take 1 capsule by mouth daily.        * Notice:  This list has 2 medication(s) that are the same as other medications prescribed for you. Read the directions carefully, and ask your doctor or other care provider to review them with you.

## 2017-08-31 ENCOUNTER — TELEPHONE (OUTPATIENT)
Dept: ENDOCRINOLOGY | Facility: CLINIC | Age: 57
End: 2017-08-31

## 2017-08-31 NOTE — TELEPHONE ENCOUNTER
----- Message from Philly Hernandez sent at 8/31/2017  2:21 PM CDT -----  Regarding: New Referral for Thyroid Nodules  Contact: [phone]  This is a new pt referred by Dr Beal from [facility] for Multiple thyroid nodules.  Please call the pt at [phone] and if no answer, please call his   Cell [phone]    Thanks - Philly    Please DO NOT send this message and/or reply back to sender.  Call Center Representatives DO NOT respond to messages.

## 2017-09-05 ENCOUNTER — TELEPHONE (OUTPATIENT)
Dept: FAMILY MEDICINE | Facility: CLINIC | Age: 57
End: 2017-09-05

## 2017-09-05 ENCOUNTER — TELEPHONE (OUTPATIENT)
Dept: ENDOCRINOLOGY | Facility: CLINIC | Age: 57
End: 2017-09-05

## 2017-09-05 NOTE — TELEPHONE ENCOUNTER
Patient called requesting we fax the order, relative labs and last office visit to [facility] - Endocrinology @ [phone].  Dr. Beal referred patient.

## 2017-09-05 NOTE — TELEPHONE ENCOUNTER
Patient also stated that he also needs these document faxed over to another clinic at [phone], patient also stated that both of the clinics these also need the CT results sent to them as well. Please call back when everything is faxed over, okay to leave message.    Isabela Torrez, Patient Representative

## 2017-09-05 NOTE — TELEPHONE ENCOUNTER
----- Message from Rebeca Ramsay RN sent at 9/4/2017  7:25 PM CDT -----  Regarding: FW: New Referral for Thyroid Nodules  Contact: [phone]  Please contact Gary to schedule with [facility] Thursday AM this week.  ----- Message -----     From: Cheryl Ac MD     Sent: 9/1/2017   7:03 PM       To: Rebeca Ramsay RN, #  Subject: RE: New Referral for Thyroid Nodules             See if he can see Dr Granger on Thursday morning 9/7/17 in his Thursday AM biopsy clinic.   Cheryl Ac  ----- Message -----     From: Rebeca Ramsay RN     Sent: 8/31/2017   3:28 PM       To: Cheryl Ac MD  Subject: FW: New Referral for Thyroid Nodules             New referral thyroid  Nodules had seen [facility] in [place] in the past .  ----- Message -----     From: Philly Hernandez     Sent: 8/31/2017   2:21 PM       To: [facility] Triage-  Subject: New Referral for Thyroid Nodules                 This is a new pt referred by Dr Beal from [facility] for Multiple thyroid nodules.  Please call the pt at [phone] and if no answer, please call his   Cell [phone]    Thanks Faisal Fraga    Please DO NOT send this message and/or reply back to sender.  Call Center Representatives DO NOT respond to messages.

## 2017-09-06 NOTE — TELEPHONE ENCOUNTER
Endo appointment is with [facility] on 9-7-17.  No need to fax within [facility].  Spoke with patient and confirmed.  Closing encounter.

## 2017-09-07 ENCOUNTER — OFFICE VISIT (OUTPATIENT)
Dept: ENDOCRINOLOGY | Facility: CLINIC | Age: 57
End: 2017-09-07

## 2017-09-07 VITALS
BODY MASS INDEX: 32.5 KG/M2 | SYSTOLIC BLOOD PRESSURE: 133 MMHG | WEIGHT: 227 LBS | HEIGHT: 70 IN | DIASTOLIC BLOOD PRESSURE: 90 MMHG | HEART RATE: 106 BPM

## 2017-09-07 DIAGNOSIS — E04.9 RETROSTERNAL GOITER: ICD-10-CM

## 2017-09-07 DIAGNOSIS — E04.9 RETROSTERNAL GOITER: Primary | ICD-10-CM

## 2017-09-07 LAB
T4 FREE SERPL-MCNC: 0.98 NG/DL (ref 0.76–1.46)
THYROPEROXIDASE AB SERPL-ACNC: <10 IU/ML
TSH SERPL DL<=0.005 MIU/L-ACNC: 1.94 MU/L (ref 0.4–4)

## 2017-09-07 PROCEDURE — 86376 MICROSOMAL ANTIBODY EACH: CPT | Performed by: INTERNAL MEDICINE

## 2017-09-07 ASSESSMENT — PAIN SCALES - GENERAL: PAINLEVEL: NO PAIN (0)

## 2017-09-07 NOTE — PROGRESS NOTES
"Endocrine Clinic Consult:     Reason for consult: Thyroid Nodule  Date: September 7, 2017  Referring Physician: Finesse Beal    HPI:   Gary Iyer is a 57 year old male who is seen for initial consultation.  he  has a past medical history of Diabetes (H); Multinodular goiter (nontoxic) (6/10/2013); and Sleep apnea (8yr)..     He has a history of laryngeal cancer, and during CT evaluation for FU, thyroid nodule was discovered. He saw Dr Vincent, and had a FNA of the isthmus nodule, noted to be of 2.3 cm in size. This showed benign result. However, at that time, the retrosternal mass seen on the CT scan was not assessed. CT scan report showed following  \" Large lobulated nodule along the inferior left aspect of the thyroid  gland extending into the superior mediastinum is only partially  visualized. There are calcifications within this nodule suggesting  that it is of thyroid etiology, this was also present on prior. 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 Therefore the referral was made for a consultation      Thyroid Nodule or mass  Nodule was detected incidentally on imaging study   Denies having felt a goiter, neck pain, difficulty in swallowing, difficulty in breathing and changes in voice    Risk Factors:   No history of childhood radiation to head and neck, No history of thyroid cancer in the family, No history of goiter in the family and No history of autoimmune disorders in the family.     Clinical symptom assessment  Temperature: cold intolerance No    General:  No  Tiredness / Fatigue  Sleep remains unchanged, history of sleep apnea.     Weight gain No  Neurological: Difficult with concentration / somnolence    CVS: Palpitation No  History of CAD No  Lipid disorder No    Respiratory "   Shortness of breath No    GI   Constipation No    Skin and hair:  No  Extremity Swelling No    Other ROS:   No eye symptoms  No headache, change in vision, loss of peripheral vision  Complete ROS that were obtained were negative.     Past Medical History:   Diagnosis Date     Diabetes (H)     2yr     Multinodular goiter (nontoxic) 6/10/2013     Sleep apnea 8yr     Past Surgical History:   Procedure Laterality Date     COLONOSCOPY       COLONOSCOPY  6-22-12    Repeat Colonoscopy in 10 yrs.      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     HEAD & NECK SURGERY  1/25/11    callous removal from throat     LARYNGOSCOPY WITH BIOPSY(IES)  1-25-11     LARYNGOSCOPY WITH BIOPSY(IES) N/A 2/9/2017    Procedure: LARYNGOSCOPY WITH BIOPSY(IES);  Surgeon: Mel Fournier MD;  Location: [facility] OR     LARYNGOSCOPY WITH MICROSCOPE N/A 2/9/2017    Procedure: LARYNGOSCOPY WITH MICROSCOPE;  Surgeon: Mel Fournier MD;  Location: [facility] OR     LASER CO2 LARYNGOSCOPY, COMPLEX  6/27/2013    Procedure: LASER CO2 LARYNGOSCOPY, COMPLEX;  Micro Direct Laryngoscopy With Micro Flap Excision Of Lesion Lumenis Co2 Laser ;  Surgeon: Mel Fournier MD;  Location: [facility] OR     LASER CO2 LESION ORAL N/A 2/9/2017    Procedure: LASER CO2 LESION ORAL;  Surgeon: Mel Fournier MD;  Location:  OR     ORTHOPEDIC SURGERY  March 2016    left tibia orif with abbi 2-2016     VASECTOMY  02/03     Family History   Problem Relation Age of Onset     C.A.D. 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 No     Social History Narrative      No Known Allergies  Current Outpatient Prescriptions   Medication     lisinopril (PRINIVIL,ZESTRIL) 30 MG tablet     hydrochlorothiazide (HYDRODIURIL) 50 MG tablet     tamsulosin (FLOMAX) 0.4 MG capsule     metFORMIN (GLUCOPHAGE) 500 MG tablet     order for DME     blood glucose monitoring (NO BRAND SPECIFIED) meter device kit     PARoxetine (PAXIL) 20 MG tablet     order for DME     psyllium 0.52 G capsule     atorvastatin (LIPITOR) 20 MG tablet     hydrocortisone (ANUSOL-HC) 25 MG suppository     hydrocortisone (ANUSOL-HC) 2.5 % rectal cream     triamcinolone (KENALOG) 0.1 % cream     hydrocortisone 1 % ointment     allopurinol (ZYLOPRIM) 300 MG tablet     blood glucose monitoring ([employer] MICROLET) lancets     Cholecalciferol (VITAMIN D) 1000 UNITS capsule     Multiple Vitamin (DAILY MULTIVITAMIN PO)     aspirin 81 MG tablet     No current facility-administered medications for this visit.            Exam:  /90  Pulse 106  Ht 1.778 m (5' 10\")  Wt 103 kg (227 lb)  BMI 32.57 kg/m2   Constitutional: obese male, older than stated age.   Head: Normocephalic. No masses, lesions, tenderness or abnormalities  Neck: Neck supple. No adenopathy. Thyroid is difficult to palpate but enlarged, has a low lying isthmus nodule 2 cm  Carotids without bruits.  ENT: No throat congestion, no neck nodes or sinus tenderness  Cardiovascular: regular rhythm, no tachycardia  Respiratory: Lungs clear  Gastrointestinal: Abdomen soft, non-tender. BS normal. No striae  Musculoskeletal: gait normal and normal muscle tone  Neurologic: Normal speech, reflexes normal.   Psychiatric: mentation appears normal       TSH   Date Value " Ref Range Status   08/23/2017 1.96 0.40 - 4.00 mU/L Final   09/19/2016 1.98 0.40 - 4.00 mU/L Final   06/12/2015 2.31 0.40 - 4.00 mU/L Final   03/19/2014 1.95 0.4 - 5.0 mU/L Final   06/26/2012 1.39 0.4 - 5.0 mU/L Final       No results found for: T4]    CBC RESULTS:   Recent Labs   Lab Test  08/23/17   1136   WBC  9.4   RBC  4.57   HGB  13.9   HCT  41.2   MCV  90   MCH  30.4   MCHC  33.7   RDW  15.3*   PLT  282     Recent Labs   Lab Test  08/23/17   1136  02/03/17   1043   NA  135  137   POTASSIUM  4.4  4.1   CHLORIDE  96  98   CO2  30  28   ANIONGAP  9  11   GLC  104*  106*   BUN  10  10   CR  0.72  0.67   TRUMAN  9.2  9.4     I reviewed the prior CT images. Superior mediastinal mass with deviated trachea is noted.     I discussed the images with radiologist and reviewed the images with him. Possible thyroid, with retrosternal goiter but lower extent of this is not visualized and was recommended a CT images.     I also discussed the case with Dr Ledezma and Dr Ac due to the complexity of further management plan. .     ENDO THYROID LABS-[facility] Latest Ref Rng & Units 8/23/2017 9/19/2016   TSH 0.40 - 4.00 mU/L 1.96 1.98     ENDO THYROID LABS-[facility] Latest Ref Rng & Units 6/12/2015   TSH 0.40 - 4.00 mU/L 2.31     Assessment   Gary Iyer is a 57 year old years old male who is here for evaluation of Thyroid Nodule and a retrosternal goiter that was detected on CT neck performed for laryngeal cancer history. He does not have compressive symptoms and prior FNA of the isthmus nodule was benign but this has grown is size.     Most likely, he has a nodule in isthmus and retrosternal goiter or ectopic thyroid tissue. The plan would be to FNA the isthmus nodule and follow along while retrosternal thyroid tissue most likely needs to be operated on and removed since goiters in this site can cause compression and if the compressive symptoms occur the surgery becomes very difficult.     As a first step, a formal US neck to assess  thyroid and CT chest to see extent of thyroid tissue would be done. This would be followed by FNA of isthmus and cardiothoracic surgery referral.     Thyroid Nodule- Isthmus  We discussed about thyroid nodules and management in great detail. Majority of thyroid nodules are benign. Majority of thyroid cancers are slow growing and have good prognosis but a small number tend to be aggressive.   Thyroid FNA for further evaluation of larger nodules is next best step.   Outcomes at FNA including nondiagnostic, benign, malignant and indeterminate diagnosis were discussed. The nondiagnostic and indeterminate diagnosis may necessitate repeat FNA     Following US, obtain FNA of the nodule.     Retrosternal goiter  -- CT Chest    Tomy Granger MD  [id number]  Endocrinology Service         THYROID ULTRASOUND  9/8/2017 1:20 PM      HISTORY: Nontoxic goiter, unspecified.     FINDINGS: Thyroid ultrasound demonstrates a mildly enlarged left  thyroid lobe. The right lobe measures 3.6 x 1.3 x 1.4 cm. The left  lobe measures 3.7 x 2.0 x 1.4 cm. The isthmus is normal in thickness.  Thyroid parenchyma is mildly heterogeneous in echotexture.  Retrosternal nodules cannot be visualized due to overlying bony  structures.     Thyroid nodules as follows:   Right Lobe:   1. Complex lower pole nodule measures 0.8 x 0.8 x 0.9 cm.     Isthmus:   2. Complex left isthmus nodule measures 2.6 x 1.5 x 2.3 cm.     Left Lobe: None.         IMPRESSION: Mildly enlarged left thyroid lobe with dominant isthmus  nodule is seen on prior CT. The retrosternal nodular densities seen on  prior CT are not visualized on the ultrasound due to overlying bony  structures.     MARVA EL MD    CT CHEST WITH CONTRAST 9/8/2017 12:34 PM      HISTORY: Retrosternal goiter, assess superior mediastinal mass  extension in relation to the brachiocephalic vein. Nontoxic goiter,  unspecified.      COMPARISON: Neck CT 8/28/2017.     TECHNIQUE: Axial images from the thoracic inlet  to the lung bases are  performed with additional coronal reformatted images. 80 mL of Isovue  370 are given intravenously.  Radiation dose for this scan was reduced  using automated exposure control, adjustment of the mA and/or kV  according to patient size, or iterative reconstruction technique.     FINDINGS:      Chest: Calcified granuloma is noted in the lateral left lower lobe on  series 3, image 46. The lungs are otherwise clear. No infiltrate,  consolidation or mass. No pleural or pericardial fluid. Heart is  normal in size. Esophagus is unremarkable. No enlarged axillary lymph  nodes. Large isthmus nodule in the thyroid gland is present on series  2, image 6 as described on prior neck CT exam. This measures up to 2.4  cm. Multiple mediastinal masses which demonstrate heterogeneous  enhancement are present and are below the level of the thyroid gland.  Enhancing mediastinal lymph nodes or ectopic thyroidal tissue is  possible. One of these large nodules measures 2.7 x 1.5 cm on image 11  and another more inferior and lobulated nodule measures 4.4 x 3.3 cm  on image 14. This larger nodule causes mass effect in the superior  mediastinum pushing the great vessels to the left and the trachea to  the right. No obvious local invasion. Anterior mediastinum is  unremarkable. No enlarged hilar or subcarinal lymph nodes. Calcified  subcarinal and left hilar lymph nodes are consistent with old  granulomatous disease. No enlarged axillary or internal mammary lymph  nodes. Coronary artery calcification is present. Limited images upper  abdomen are within normal limits. Bone window examination demonstrates  degenerative spine changes.         IMPRESSION:  1. Large isthmus nodule in the thyroid gland with large enhancing  nodules apparently separate from the thyroid gland in the superior  left paratracheal portion of the mediastinum as described. These are  concerning for ectopic thyroidal tissue. Metastatic disease from  other  site is possible, but with a relatively benign-appearing neck CT with  contrast performed recently. This is thought to be less likely.  2. Lungs are clear. No infiltrate, consolidation or nodule.  3. Evidence of old granulomatous disease.     MARVA EL MD

## 2017-09-07 NOTE — PATIENT INSTRUCTIONS
US thyroid and CT chest ordered for you  - please schedule this at your convenience.     Lab test today.       You have thyroid nodule and retrosternal goiter. This goiter is probably and extension of your normal thyroid gland but if it grows can cause compression of trachea or esophagus causing difficult swallowing or breathing.       ELAYNE MARIE:  [phone]

## 2017-09-07 NOTE — LETTER
"9/7/2017       RE: Gary Iyer  [address] [facility]  [place] [address]     Dear Colleague,    Thank you for referring your patient, Gary Iyer, to the [facility] ENDOCRINOLOGY at [facility]. Please see a copy of my visit note below.    Endocrine Clinic Consult:     Reason for consult: Thyroid Nodule  Date: September 7, 2017  Referring Physician: Finesse Beal    HPI:   Gary Iyer is a 57 year old male who is seen for initial consultation.  he  has a past medical history of Diabetes (H); Multinodular goiter (nontoxic) (6/10/2013); and Sleep apnea (8yr)..     He has a history of laryngeal cancer, and during CT evaluation for FU, thyroid nodule was discovered. He saw Dr Vincent, and had a FNA of the isthmus nodule, noted to be of 2.3 cm in size. This showed benign result. However, at that time, the retrosternal mass seen on the CT scan was not assessed. CT scan report showed following  \" Large lobulated nodule along the inferior left aspect of the thyroid  gland extending into the superior mediastinum is only partially  visualized. There are calcifications within this nodule suggesting  that it is of thyroid etiology, this was also present on prior. 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 Therefore the referral was made for a consultation      Thyroid Nodule or mass  Nodule was detected incidentally on imaging study   Denies having felt a goiter, neck pain, difficulty in swallowing, difficulty in breathing and changes in voice    Risk Factors:   No history of childhood radiation to head and neck, No history of thyroid cancer in the family, No history of goiter in the family and No history of autoimmune disorders in the family.     Clinical symptom " assessment  Temperature: cold intolerance No    General:  No  Tiredness / Fatigue  Sleep remains unchanged, history of sleep apnea.     Weight gain No  Neurological: Difficult with concentration / somnolence    CVS: Palpitation No  History of CAD No  Lipid disorder No    Respiratory   Shortness of breath No    GI   Constipation No    Skin and hair:  No  Extremity Swelling No    Other ROS:   No eye symptoms  No headache, change in vision, loss of peripheral vision  Complete ROS that were obtained were negative.     Past Medical History:   Diagnosis Date     Diabetes (H)     2yr     Multinodular goiter (nontoxic) 6/10/2013     Sleep apnea 8yr     Past Surgical History:   Procedure Laterality Date     COLONOSCOPY       COLONOSCOPY  6-22-12    Repeat Colonoscopy in 10 yrs.      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     HEAD & NECK SURGERY  1/25/11    callous removal from throat     LARYNGOSCOPY WITH BIOPSY(IES)  1-25-11     LARYNGOSCOPY WITH BIOPSY(IES) N/A 2/9/2017    Procedure: LARYNGOSCOPY WITH BIOPSY(IES);  Surgeon: Mel Fournier MD;  Location: [facility] OR     LARYNGOSCOPY WITH MICROSCOPE N/A 2/9/2017    Procedure: LARYNGOSCOPY WITH MICROSCOPE;  Surgeon: Mel Fournier MD;  Location: [facility] OR     LASER CO2 LARYNGOSCOPY, COMPLEX  6/27/2013    Procedure: LASER CO2 LARYNGOSCOPY, COMPLEX;  Micro Direct Laryngoscopy With Micro Flap Excision Of Lesion Lumenis Co2 Laser ;  Surgeon: Mel Fournier MD;  Location: [facility] OR     LASER CO2 LESION ORAL N/A 2/9/2017    Procedure: LASER CO2 LESION ORAL;  Surgeon: Mel Fournier MD;  Location: [facility] OR     ORTHOPEDIC SURGERY  March 2016    left tibia orif with abbi 2-2016     VASECTOMY  02/03     Family History   Problem Relation Age of Onset     C.A.D. 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 No     Social History Narrative      No Known Allergies  Current Outpatient Prescriptions   Medication     lisinopril (PRINIVIL,ZESTRIL) 30 MG tablet     hydrochlorothiazide (HYDRODIURIL) 50 MG tablet     tamsulosin (FLOMAX) 0.4 MG capsule     metFORMIN (GLUCOPHAGE) 500 MG tablet     order for DME     blood glucose monitoring (NO BRAND SPECIFIED) meter device kit     PARoxetine (PAXIL) 20 MG tablet     order for DME     psyllium 0.52 G capsule     atorvastatin (LIPITOR) 20 MG tablet     hydrocortisone (ANUSOL-HC) 25 MG suppository     hydrocortisone (ANUSOL-HC) 2.5 % rectal cream     triamcinolone (KENALOG) 0.1 % cream     hydrocortisone 1 % ointment     allopurinol (ZYLOPRIM) 300 MG tablet     blood glucose monitoring (ALON MICROLET) lancets     Cholecalciferol (VITAMIN D) 1000 UNITS capsule     Multiple Vitamin (DAILY MULTIVITAMIN PO)     aspirin 81 MG tablet     No current facility-administered medications for this visit.            Exam:  /90  Pulse 106  Ht 1.778 m (5' 10\")  Wt 103 kg (227 lb)  BMI 32.57 kg/m2   Constitutional: obese male, older than stated age.   Head: Normocephalic. No masses, lesions, tenderness or abnormalities  Neck: Neck supple. No adenopathy. Thyroid is difficult to palpate but enlarged, has a low lying isthmus nodule 2 cm  Carotids without bruits.  ENT: No throat congestion, no neck nodes or sinus tenderness  Cardiovascular: " regular rhythm, no tachycardia  Respiratory: Lungs clear  Gastrointestinal: Abdomen soft, non-tender. BS normal. No striae  Musculoskeletal: gait normal and normal muscle tone  Neurologic: Normal speech, reflexes normal.   Psychiatric: mentation appears normal       TSH   Date Value Ref Range Status   08/23/2017 1.96 0.40 - 4.00 mU/L Final   09/19/2016 1.98 0.40 - 4.00 mU/L Final   06/12/2015 2.31 0.40 - 4.00 mU/L Final   03/19/2014 1.95 0.4 - 5.0 mU/L Final   06/26/2012 1.39 0.4 - 5.0 mU/L Final       No results found for: T4]    CBC RESULTS:   Recent Labs   Lab Test  08/23/17   1136   WBC  9.4   RBC  4.57   HGB  13.9   HCT  41.2   MCV  90   MCH  30.4   MCHC  33.7   RDW  15.3*   PLT  282     Recent Labs   Lab Test  08/23/17   1136  02/03/17   1043   NA  135  137   POTASSIUM  4.4  4.1   CHLORIDE  96  98   CO2  30  28   ANIONGAP  9  11   GLC  104*  106*   BUN  10  10   CR  0.72  0.67   TRUMAN  9.2  9.4     I reviewed the prior CT images. Superior mediastinal mass with deviated trachea is noted.     I discussed the images with radiologist and reviewed the images with him. Possible thyroid, with retrosternal goiter but lower extent of this is not visualized and was recommended a CT images.     I also discussed the case with Dr Ledezma and Dr Ac due to the complexity of further management plan. .     ENDO THYROID LABS-P Latest Ref Rng & Units 8/23/2017 9/19/2016   TSH 0.40 - 4.00 mU/L 1.96 1.98     ENDO THYROID LABS-UMP Latest Ref Rng & Units 6/12/2015   TSH 0.40 - 4.00 mU/L 2.31     Assessment   aGry Iyer is a 57 year old years old male who is here for evaluation of Thyroid Nodule and a retrosternal goiter that was detected on CT neck performed for laryngeal cancer history. He does not have compressive symptoms and prior FNA of the isthmus nodule was benign but this has grown is size.     Most likely, he has a nodule in isthmus and retrosternal goiter or ectopic thyroid tissue. The plan would be to FNA the  isthmus nodule and follow along while retrosternal thyroid tissue most likely needs to be operated on and removed since goiters in this site can cause compression and if the compressive symptoms occur the surgery becomes very difficult.     As a first step, a formal US neck to assess thyroid and CT chest to see extent of thyroid tissue would be done. This would be followed by FNA of isthmus and cardiothoracic surgery referral.     Thyroid Nodule- Isthmus  We discussed about thyroid nodules and management in great detail. Majority of thyroid nodules are benign. Majority of thyroid cancers are slow growing and have good prognosis but a small number tend to be aggressive.   Thyroid FNA for further evaluation of larger nodules is next best step.   Outcomes at FNA including nondiagnostic, benign, malignant and indeterminate diagnosis were discussed. The nondiagnostic and indeterminate diagnosis may necessitate repeat FNA     Following US, obtain FNA of the nodule.     Retrosternal goiter  -- CT Chest    Tomy Granger MD  [address]  Endocrinology Service         THYROID ULTRASOUND  9/8/2017 1:20 PM      HISTORY: Nontoxic goiter, unspecified.     FINDINGS: Thyroid ultrasound demonstrates a mildly enlarged left  thyroid lobe. The right lobe measures 3.6 x 1.3 x 1.4 cm. The left  lobe measures 3.7 x 2.0 x 1.4 cm. The isthmus is normal in thickness.  Thyroid parenchyma is mildly heterogeneous in echotexture.  Retrosternal nodules cannot be visualized due to overlying bony  structures.     Thyroid nodules as follows:   Right Lobe:   1. Complex lower pole nodule measures 0.8 x 0.8 x 0.9 cm.     Isthmus:   2. Complex left isthmus nodule measures 2.6 x 1.5 x 2.3 cm.     Left Lobe: None.         IMPRESSION: Mildly enlarged left thyroid lobe with dominant isthmus  nodule is seen on prior CT. The retrosternal nodular densities seen on  prior CT are not visualized on the ultrasound due to overlying bony  structures.     MARVA EL  MD    CT CHEST WITH CONTRAST 9/8/2017 12:34 PM      HISTORY: Retrosternal goiter, assess superior mediastinal mass  extension in relation to the brachiocephalic vein. Nontoxic goiter,  unspecified.      COMPARISON: Neck CT 8/28/2017.     TECHNIQUE: Axial images from the thoracic inlet to the lung bases are  performed with additional coronal reformatted images. 80 mL of Isovue  370 are given intravenously.  Radiation dose for this scan was reduced  using automated exposure control, adjustment of the mA and/or kV  according to patient size, or iterative reconstruction technique.     FINDINGS:      Chest: Calcified granuloma is noted in the lateral left lower lobe on  series 3, image 46. The lungs are otherwise clear. No infiltrate,  consolidation or mass. No pleural or pericardial fluid. Heart is  normal in size. Esophagus is unremarkable. No enlarged axillary lymph  nodes. Large isthmus nodule in the thyroid gland is present on series  2, image 6 as described on prior neck CT exam. This measures up to 2.4  cm. Multiple mediastinal masses which demonstrate heterogeneous  enhancement are present and are below the level of the thyroid gland.  Enhancing mediastinal lymph nodes or ectopic thyroidal tissue is  possible. One of these large nodules measures 2.7 x 1.5 cm on image 11  and another more inferior and lobulated nodule measures 4.4 x 3.3 cm  on image 14. This larger nodule causes mass effect in the superior  mediastinum pushing the great vessels to the left and the trachea to  the right. No obvious local invasion. Anterior mediastinum is  unremarkable. No enlarged hilar or subcarinal lymph nodes. Calcified  subcarinal and left hilar lymph nodes are consistent with old  granulomatous disease. No enlarged axillary or internal mammary lymph  nodes. Coronary artery calcification is present. Limited images upper  abdomen are within normal limits. Bone window examination demonstrates  degenerative spine  changes.         IMPRESSION:  1. Large isthmus nodule in the thyroid gland with large enhancing  nodules apparently separate from the thyroid gland in the superior  left paratracheal portion of the mediastinum as described. These are  concerning for ectopic thyroidal tissue. Metastatic disease from other  site is possible, but with a relatively benign-appearing neck CT with  contrast performed recently. This is thought to be less likely.  2. Lungs are clear. No infiltrate, consolidation or nodule.  3. Evidence of old granulomatous disease.     MARVA EL MD        Again, thank you for allowing me to participate in the care of your patient.      Sincerely,    Tomy Granger MD

## 2017-09-07 NOTE — MR AVS SNAPSHOT
After Visit Summary   9/7/2017    Gary Iyer    MRN: [id number]           Patient Information     Date Of Birth          1960        Visit Information        Provider Department      9/7/2017 10:30 AM Tomy Granger MD [facility] Endocrinology        Today's Diagnoses     Retrosternal goiter    -  1      Care Instructions    US thyroid and CT chest ordered for you  - please schedule this at your convenience.     Lab test today.       You have thyroid nodule and retrosternal goiter. This goiter is probably and extension of your normal thyroid gland but if it grows can cause compression of trachea or esophagus causing difficult swallowing or breathing.       [place]:  [phone]          Follow-ups after your visit        Follow-up notes from your care team     Return in about 3 months (around 12/7/2017).      Your next 10 appointments already scheduled     Sep 07, 2017 12:00 PM CDT   LAB with  LAB   [facility] ([facility])    [address] [place] [address] [phone]           Patient must bring picture ID. Patient should be prepared to give a urine specimen  Please do not eat 10-12 hours before your appointment if you are coming in fasting for labs on lipids, cholesterol, or glucose (sugar). Pregnant women should follow their Care Team instructions. Water with medications is okay. Do not drink coffee or other fluids. If you have concerns about taking  your medications, please ask at office or if scheduling via [employer], send a message by clicking on Secure Messaging, Message Your Care Team.            Dec 11, 2017  2:00 PM CST   (Arrive by 1:45 PM)   RETURN ENDOCRINE with Tomy Granger MD   [facility] Endocrinology ([facility])    [address] [place] [address] [phone]              Future tests that were ordered for you today     Open Future  "Orders        Priority Expected Expires Ordered    TSH with free T4 reflex Routine  9/2/2018 9/7/2017    Thyroid peroxidase antibody Routine  9/2/2018 9/7/2017    T4 free Routine  9/2/2018 9/7/2017    US Thyroid Routine  4/5/2018 9/7/2017    CT Chest w & w/o contrast Routine  4/5/2018 9/7/2017            Who to contact     Please call your clinic at [phone] to:    Ask questions about your health    Make or cancel appointments    Discuss your medicines    Learn about your test results    Speak to your doctor   If you have compliments or concerns about an experience at your clinic, or if you wish to file a complaint, please contact [facility] Patient Relations at [phone] or email us at [email or web address]         Additional Information About Your Visit        [employer] Information     [facility] gives you secure access to your electronic health record. If you see a primary care provider, you can also send messages to your care team and make appointments. If you have questions, please call your primary care clinic.  If you do not have a primary care provider, please call [phone] and they will assist you.      [facility] is an electronic gateway that provides easy, online access to your medical records. With [facility], you can request a clinic appointment, read your test results, renew a prescription or communicate with your care team.     To access your existing account, please contact your [facility] or call [phone] for assistance.        Care EveryWhere ID     This is your Care EveryWhere ID. This could be used by other organizations to access your [facility] medical records  [id number]        Your Vitals Were     Pulse Height BMI (Body Mass Index)             106 1.778 m (5' 10\") 32.57 kg/m2          Blood Pressure from Last 3 Encounters:   09/07/17 133/90   08/30/17 131/82   08/23/17 136/84    Weight from Last 3 Encounters:   09/07/17 " 103 kg (227 lb)   08/30/17 103.4 kg (228 lb)   08/28/17 102.5 kg (226 lb)               Primary Care Provider Office Phone # Fax #    Finesse Beal [phone] [address] [facility] [address]        Equal Access to Services     KIRILL PORTILLO : Hadii aad ku hadasho Soomaali, waaxda luqadaha, qaybta kaalmada adeegyada, waxholger antionettein ushan adepierre mcmahan lamario . So [facility] [phone].    ATENCIÓN: Si habla español, tiene a lucero disposición servicios gratuitos de asistencia lingüística. [facility] [phone].    We comply with applicable federal civil rights laws and [place] laws. We do not discriminate on the basis of race, color, national origin, age, disability sex, sexual orientation or gender identity.            Thank you!     Thank you for choosing [facility] ENDOCRINOLOGY  for your care. Our goal is always to provide you with excellent care. Hearing back from our patients is one way we can continue to improve our services. Please take a few minutes to complete the written survey that you may receive in the mail after your visit with us. Thank you!             Your Updated Medication List - Protect others around you: Learn how to safely use, store and throw away your medicines at www.disposemymeds.org.          This list is accurate as of: 9/7/17 11:15 AM.  Always use your most recent med list.                   Brand Name Dispense Instructions for use Diagnosis    allopurinol 300 MG tablet    ZYLOPRIM    90 tablet    Take 1 tablet (300 mg) by mouth daily    Gout, unspecified       aspirin 81 MG tablet      Take 1 tablet by mouth daily.        atorvastatin 20 MG tablet    LIPITOR    90 tablet    Take 1 tablet (20 mg) by mouth daily    Hyperlipidemia LDL goal <70       blood glucose monitoring lancets     1 Box    1 each 2 times daily Use to test blood sugar 2 times daily or as directed.    Type 2 diabetes mellitus without complication (H)       blood glucose monitoring meter  device kit    no brand specified    1 kit    Use to test blood sugar 2 times daily or as directed.  Accucheck meter please and all associated strips, lancets, and other supplies, 3 month supply with refills for a year.    Type 2 diabetes mellitus without complication, without long-term current use of insulin (H)       DAILY MULTIVITAMIN PO      Take  by mouth daily.        hydrochlorothiazide 50 MG tablet    HYDRODIURIL    90 tablet    TAKE 1 TABLET(50 MG) BY MOUTH EVERY MORNING    Hypertension goal BP (blood pressure) < 140/90       hydrocortisone 1 % ointment     30 g    Apply topically 2 times daily as needed    Facial dermatitis       hydrocortisone 2.5 % cream    ANUSOL-HC    30 g    Place rectally 2 times daily as needed for hemorrhoids    External hemorrhoids       hydrocortisone 25 MG Suppository    ANUSOL-HC    20 suppository    Place 1 suppository (25 mg) rectally 2 times daily as needed for hemorrhoids    External hemorrhoids       lisinopril 30 MG tablet    PRINIVIL,ZESTRIL    90 tablet    TAKE 1 TABLET(30 MG) BY MOUTH DAILY    Hypertension goal BP (blood pressure) < 140/90       metFORMIN 500 MG tablet    GLUCOPHAGE    180 tablet    TAKE 1 TABLET(500 MG) BY MOUTH TWICE DAILY WITH MEALS    Type 2 diabetes mellitus without complication (H)       * order for DME     1 Container    Place rectally 2 times daily 1.8 oz container of 0.2 % nifedipine suppository if possible as easier to place the medication.   Apply on the anus every 12 hours.  Just get to where the anus is tight .    Anal fissure       * order for DME     1 Units    Autocpap 10-16 cm    Obstructive sleep apnea       PARoxetine 20 MG tablet    PAXIL    180 tablet    Take 2 tablets (40 mg) by mouth daily    Anxiety       psyllium 0.52 G capsule     100 capsule    Take 1 capsule (0.52 g) by mouth daily    Constipation, unspecified constipation type       tamsulosin 0.4 MG capsule    FLOMAX    90 capsule    TAKE 1 CAPSULE(0.4 MG) BY MOUTH DAILY     Benign prostatic hyperplasia with lower urinary tract symptoms, unspecified morphology       triamcinolone 0.1 % cream    KENALOG    30 g    Apply topically 2 times daily as needed for irritation    Dermatitis       vitamin D 1000 UNITS capsule      Take 1 capsule by mouth daily.        * Notice:  This list has 2 medication(s) that are the same as other medications prescribed for you. Read the directions carefully, and ask your doctor or other care provider to review them with you.

## 2017-09-07 NOTE — Clinical Note
Please schedule Gary for FNA of isthmus nodule in our clinic with me and a referral for RAMOS for retrosternal goiter. Thanks Ny

## 2017-09-07 NOTE — NURSING NOTE
Chief Complaint   Patient presents with     Consult     NEW PATIENT- THYROID NODULES     Danelle Barajas, KANDY  Endocrinology & Diabetes 3G

## 2017-09-08 ENCOUNTER — RADIANT APPOINTMENT (OUTPATIENT)
Dept: CT IMAGING | Facility: CLINIC | Age: 57
End: 2017-09-08
Attending: INTERNAL MEDICINE
Payer: COMMERCIAL

## 2017-09-08 ENCOUNTER — RADIANT APPOINTMENT (OUTPATIENT)
Dept: ULTRASOUND IMAGING | Facility: CLINIC | Age: 57
End: 2017-09-08
Attending: INTERNAL MEDICINE
Payer: COMMERCIAL

## 2017-09-08 DIAGNOSIS — E04.9 RETROSTERNAL GOITER: ICD-10-CM

## 2017-09-08 PROCEDURE — 76536 US EXAM OF HEAD AND NECK: CPT

## 2017-09-08 PROCEDURE — 71260 CT THORAX DX C+: CPT | Mod: TC

## 2017-09-08 RX ORDER — IOPAMIDOL 755 MG/ML
80 INJECTION, SOLUTION INTRAVASCULAR ONCE
Status: COMPLETED | OUTPATIENT
Start: 2017-09-08 | End: 2017-09-08

## 2017-09-08 RX ADMIN — IOPAMIDOL 80 ML: 755 INJECTION, SOLUTION INTRAVASCULAR at 12:33

## 2017-09-13 ENCOUNTER — OFFICE VISIT (OUTPATIENT)
Dept: FAMILY MEDICINE | Facility: CLINIC | Age: 57
End: 2017-09-13
Payer: COMMERCIAL

## 2017-09-13 VITALS — TEMPERATURE: 97.8 F | OXYGEN SATURATION: 97 % | WEIGHT: 225.75 LBS | HEART RATE: 103 BPM | BODY MASS INDEX: 32.39 KG/M2

## 2017-09-13 DIAGNOSIS — R04.0 EPISTAXIS: Primary | ICD-10-CM

## 2017-09-13 DIAGNOSIS — F41.9 ANXIETY: Chronic | ICD-10-CM

## 2017-09-13 DIAGNOSIS — E04.1 THYROID NODULE: ICD-10-CM

## 2017-09-13 LAB — HGB BLD-MCNC: 14 G/DL (ref 13.3–17.7)

## 2017-09-13 PROCEDURE — 99213 OFFICE O/P EST LOW 20 MIN: CPT | Performed by: FAMILY MEDICINE

## 2017-09-13 PROCEDURE — 85018 HEMOGLOBIN: CPT | Performed by: FAMILY MEDICINE

## 2017-09-13 PROCEDURE — 36415 COLL VENOUS BLD VENIPUNCTURE: CPT | Performed by: FAMILY MEDICINE

## 2017-09-13 ASSESSMENT — PATIENT HEALTH QUESTIONNAIRE - PHQ9: SUM OF ALL RESPONSES TO PHQ QUESTIONS 1-9: 5

## 2017-09-13 ASSESSMENT — PAIN SCALES - GENERAL: PAINLEVEL: NO PAIN (0)

## 2017-09-13 NOTE — PROGRESS NOTES
SUBJECTIVE:   Gary Iyer is a 57 year old male who presents to clinic today for the following health issues:       Nose bleeds for the last 2-3 weeks    none    Problem list and histories reviewed & adjusted, as indicated.  Additional history: as documented         Reviewed and updated as needed this visit by clinical staff  Tobacco  Allergies  Meds  Problems  Med Hx  Surg Hx  Fam Hx  Soc Hx        Reviewed and updated as needed this visit by Provider          becoming more frequent    Right side of nose     No trauma    Not sick    No fever/ chills/ cough    History of cauterization    No bleeding anwhere else    Only rare nsaid, none recently    On the low dose 81 mg asa    Has to breathe through mouth sometimes        Physical Exam   Constitutional: He is oriented to person, place, and time and well-developed, well-nourished, and in no distress. No distress.   HENT:   Head: Normocephalic and atraumatic.   Eyes: Conjunctivae are normal.   Neck: Carotid bruit is not present.   Cardiovascular: Normal rate, regular rhythm, normal heart sounds and intact distal pulses.  Exam reveals no gallop and no friction rub.    No murmur heard.  Pulmonary/Chest: Effort normal and breath sounds normal. No respiratory distress. He has no wheezes. He has no rales.   Musculoskeletal: He exhibits no edema.   Neurological: He is alert and oriented to person, place, and time.   Skin: He is not diaphoretic.   Psychiatric: Mood and affect normal.   no sinus/ submandib tenderness  Nasal mucosa moderately red and swollen bilat  Oral mucosa normal      ASSESSMENT / PLAN:  (R04.0) Epistaxis  (primary encounter diagnosis)  Comment: hemoglobin stable.  Patient will schedule ent appointment   Plan: Hemoglobin, OTOLARYNGOLOGY REFERRAL             (F41.9) Anxiety  Comment: ongoing   Plan: mood seems better today     (E04.1) Thyroid nodule  Comment: patient has not heard results from neck and chest CT scans   Plan: I gave him copies and  discussed in detail.  Overall reassuring.  Follow up with endocrinology       I reviewed the patient's medications, allergies, medical history, family history, and social history.    Finesse Beal MD

## 2017-09-13 NOTE — MR AVS SNAPSHOT
After Visit Summary   9/13/2017    Gary Iyer    MRN: [id number]           Patient Information     Date Of Birth          1960        Visit Information        Provider Department      9/13/2017 2:20 PM Finesse Beal MD [facility]        Today's Diagnoses     Epistaxis    -  1      Care Instructions    Use nasal saline spray as needed to keep it moist in the nose     Call to schedule ENT appointment           Follow-ups after your visit        Additional Services     OTOLARYNGOLOGY REFERRAL       Your provider has referred you to: FMG: [facility] [place] [phone]   [email or web address]  UMP: [facility] (Otol[facility]ology) [facility] [phone]  [email or web address]  UMP: [facility] [place] [phone]   [email or web address]    Please be aware that coverage of these services is subject to the terms and limitations of your health insurance plan.  Call member services at your health plan with any benefit or coverage questions.      Please bring the following with you to your appointment:    (1) Any X-Rays, CTs or MRIs which have been performed.  Contact the facility where they were done to arrange for  prior to your scheduled appointment.   (2) List of current medications  (3) This referral request   (4) Any documents/labs given to you for this referral                  Your next 10 appointments already scheduled     Dec 11, 2017  2:00 PM CST   (Arrive by 1:45 PM)   RETURN ENDOCRINE with Tomy Granger MD   [facility] ([facility])    [address] [place] [address]  [address] [facility] [address] [phone]              Who to contact     If you have questions or need follow up information about today's clinic visit or your  schedule please contact [facility] directly at [phone].  Normal or non-critical lab and imaging results will be communicated to you by MyChart, letter or phone within 4 business days after the clinic has received the results. If you do not hear from us within 7 days, please contact the clinic through MyChart or phone. If you have a critical or abnormal lab result, we will notify you by phone as soon as possible.  Submit refill requests through [employer] or call your pharmacy and they will forward the refill request to us. Please allow 3 business days for your refill to be completed.          Additional Information About Your Visit        [employer] Information     [employer] gives you secure access to your electronic health record. If you see a primary care provider, you can also send messages to your care team and make appointments. If you have questions, please call your primary care clinic.  If you do not have a primary care provider, please call [phone] and they will assist you.        Care EveryWhere ID     This is your Care EveryWhere ID. This could be used by other organizations to access your [place] medical records  [id number]        Your Vitals Were     Pulse Temperature Pulse Oximetry BMI (Body Mass Index)          103 97.8  F (36.6  C) (Oral) 97% 32.39 kg/m2         Blood Pressure from Last 3 Encounters:   09/07/17 133/90   08/30/17 131/82   08/23/17 136/84    Weight from Last 3 Encounters:   09/13/17 225 lb 12 oz (102.4 kg)   09/07/17 227 lb (103 kg)   08/30/17 228 lb (103.4 kg)              We Performed the Following     Hemoglobin     OTOLARYNGOLOGY REFERRAL        Primary Care Provider Office Phone # Fax #    Finesse Beal [phone]       [address] [facility] [address]        Equal Access to Services     KIRILL PORTILLO : Deanna Wheatley, ruperto bryant, gregory maradiaga.  [facility] [phone].    ATENCIÓN: Si davidson aranda, tiene a lucero disposición servicios gratuitos de asistencia lingüística. Pili henley [phone].    We comply with applicable federal civil rights laws and [place] laws. We do not discriminate on the basis of race, color, national origin, age, disability sex, sexual orientation or gender identity.            Thank you!     Thank you for choosing [facility]  for your care. Our goal is always to provide you with excellent care. Hearing back from our patients is one way we can continue to improve our services. Please take a few minutes to complete the written survey that you may receive in the mail after your visit with us. Thank you!             Your Updated Medication List - Protect others around you: Learn how to safely use, store and throw away your medicines at www.disposemymeds.org.          This list is accurate as of: 9/13/17  3:14 PM.  Always use your most recent med list.                   Brand Name Dispense Instructions for use Diagnosis    allopurinol 300 MG tablet    ZYLOPRIM    90 tablet    Take 1 tablet (300 mg) by mouth daily    Gout, unspecified       aspirin 81 MG tablet      Take 1 tablet by mouth daily.        atorvastatin 20 MG tablet    LIPITOR    90 tablet    Take 1 tablet (20 mg) by mouth daily    Hyperlipidemia LDL goal <70       blood glucose monitoring lancets     1 Box    1 each 2 times daily Use to test blood sugar 2 times daily or as directed.    Type 2 diabetes mellitus without complication (H)       blood glucose monitoring meter device kit    no brand specified    1 kit    Use to test blood sugar 2 times daily or as directed.  Accucheck meter please and all associated strips, lancets, and other supplies, 3 month supply with refills for a year.    Type 2 diabetes mellitus without complication, without long-term current use of insulin (H)       DAILY MULTIVITAMIN PO      Take  by mouth daily.         hydrochlorothiazide 50 MG tablet    HYDRODIURIL    90 tablet    TAKE 1 TABLET(50 MG) BY MOUTH EVERY MORNING    Hypertension goal BP (blood pressure) < 140/90       hydrocortisone 1 % ointment     30 g    Apply topically 2 times daily as needed    Facial dermatitis       hydrocortisone 2.5 % cream    ANUSOL-HC    30 g    Place rectally 2 times daily as needed for hemorrhoids    External hemorrhoids       hydrocortisone 25 MG Suppository    ANUSOL-HC    20 suppository    Place 1 suppository (25 mg) rectally 2 times daily as needed for hemorrhoids    External hemorrhoids       lisinopril 30 MG tablet    PRINIVIL,ZESTRIL    90 tablet    TAKE 1 TABLET(30 MG) BY MOUTH DAILY    Hypertension goal BP (blood pressure) < 140/90       metFORMIN 500 MG tablet    GLUCOPHAGE    180 tablet    TAKE 1 TABLET(500 MG) BY MOUTH TWICE DAILY WITH MEALS    Type 2 diabetes mellitus without complication (H)       * order for DME     1 Container    Place rectally 2 times daily 1.8 oz container of 0.2 % nifedipine suppository if possible as easier to place the medication.   Apply on the anus every 12 hours.  Just get to where the anus is tight .    Anal fissure       * order for DME     1 Units    Autocpap 10-16 cm    Obstructive sleep apnea       PARoxetine 20 MG tablet    PAXIL    180 tablet    Take 2 tablets (40 mg) by mouth daily    Anxiety       psyllium 0.52 G capsule     100 capsule    Take 1 capsule (0.52 g) by mouth daily    Constipation, unspecified constipation type       tamsulosin 0.4 MG capsule    FLOMAX    90 capsule    TAKE 1 CAPSULE(0.4 MG) BY MOUTH DAILY    Benign prostatic hyperplasia with lower urinary tract symptoms, unspecified morphology       triamcinolone 0.1 % cream    KENALOG    30 g    Apply topically 2 times daily as needed for irritation    Dermatitis       vitamin D 1000 UNITS capsule      Take 1 capsule by mouth daily.        * Notice:  This list has 2 medication(s) that are the same as other medications  prescribed for you. Read the directions carefully, and ask your doctor or other care provider to review them with you.

## 2017-09-13 NOTE — PATIENT INSTRUCTIONS
Use nasal saline spray as needed to keep it moist in the nose     Call to schedule ENT appointment

## 2017-09-15 NOTE — PROGRESS NOTES
Dear Gary,     Here is the report of the CT scan. 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 However, you do not have compressive features such as difficulty swallowing or difficulty in breathing. We discussed your case and all endocrinologist involved agreed that surgery may be the best step for it even if it is not causing problems because it is hard to biopsy this area, it is not easy to operate if it grows and causes symptoms.     For the thyroid nodule, since it is larger, we should plan for a biopsy to see if there is any concern there. At that time, we can also discuss the further management of the retrosternal goiter.     I will have clinic contact you for this.     Tomy Granger MD  [id number]  Endocrinology Service

## 2017-09-18 DIAGNOSIS — E11.9 TYPE 2 DIABETES, HBA1C GOAL < 8% (H): ICD-10-CM

## 2017-09-18 NOTE — TELEPHONE ENCOUNTER
metFORMIN (GLUCOPHAGE) 500 MG tablet         Last Written Prescription Date: 4-13-17  Last Fill Quantity: 180, # refills: 1  Last Office Visit with [facility] or [facility] prescribing provider:  9-13-17        BP Readings from Last 3 Encounters:   09/07/17 133/90   08/30/17 131/82   08/23/17 136/84     Lab Results   Component Value Date    MICROL 22 08/23/2017     Lab Results   Component Value Date    UMALCR 12.69 08/23/2017     Creatinine   Date Value Ref Range Status   08/23/2017 0.72 0.66 - 1.25 mg/dL Final   ]  GFR Estimate   Date Value Ref Range Status   08/23/2017 >90 >60 mL/min/1.7m2 Final     Comment:     Non  GFR Calc   02/03/2017 >90  Non  GFR Calc   >60 mL/min/1.7m2 Final   09/19/2016 >90  Non  GFR Calc   >60 mL/min/1.7m2 Final     GFR Estimate If Black   Date Value Ref Range Status   08/23/2017 >90 >60 mL/min/1.7m2 Final     Comment:      GFR Calc   02/03/2017 >90   GFR Calc   >60 mL/min/1.7m2 Final   09/19/2016 >90   GFR Calc   >60 mL/min/1.7m2 Final     Lab Results   Component Value Date    CHOL 178 08/23/2017     Lab Results   Component Value Date    HDL 71 08/23/2017     Lab Results   Component Value Date    LDL 69 08/23/2017     Lab Results   Component Value Date    TRIG 188 08/23/2017     Lab Results   Component Value Date    CHOLHDLRATIO 3.8 06/12/2015     Lab Results   Component Value Date    AST 14 08/23/2017     Lab Results   Component Value Date    ALT 34 08/23/2017     Lab Results   Component Value Date    A1C 5.9 08/23/2017    A1C 6.3 03/29/2017    A1C 6.0 09/19/2016    A1C 6.8 12/23/2015    A1C 7.6 06/12/2015     Potassium   Date Value Ref Range Status   08/23/2017 4.4 3.4 - 5.3 mmol/L Final

## 2017-09-19 NOTE — TELEPHONE ENCOUNTER
Prescription approved per [facility] Refill Protocol.  Juany Feliciano RN  [facility]

## 2017-09-21 ENCOUNTER — OFFICE VISIT (OUTPATIENT)
Dept: OTOLARYNGOLOGY | Facility: CLINIC | Age: 57
End: 2017-09-21
Payer: COMMERCIAL

## 2017-09-21 VITALS — WEIGHT: 227.6 LBS | RESPIRATION RATE: 20 BRPM | HEIGHT: 70 IN | BODY MASS INDEX: 32.58 KG/M2

## 2017-09-21 DIAGNOSIS — R04.0 EPISTAXIS: Primary | ICD-10-CM

## 2017-09-21 PROCEDURE — 99213 OFFICE O/P EST LOW 20 MIN: CPT | Mod: 25 | Performed by: OTOLARYNGOLOGY

## 2017-09-21 PROCEDURE — 30901 CONTROL OF NOSEBLEED: CPT | Performed by: OTOLARYNGOLOGY

## 2017-09-21 NOTE — PROGRESS NOTES
History of Present Illness - Gary Iyer is a 57 year old male with concerns of epistaxis. He states that it has been bleeding intermittently for about a month. Patient notes that he has gotten his nose cauterized about 3 years ago. His recent bleeding has occurred on the right side and is controlled with digital pressure. He denies any nasal surgery or use of anticoagulants.    Past Medical History -   Patient Active Problem List   Diagnosis     Hyperlipidemia LDL goal <70     Hypertension goal BP (blood pressure) < 140/90     Gout, chronic     Anxiety     Advanced directives, counseling/discussion     Carotid stenosis     Hx of laryngeal cancer     Type 2 diabetes mellitus without complication (H)     Non morbid obesity due to excess calories     Dysphonia     Vocal process granuloma     Type 2 diabetes mellitus without complication, without long-term current use of insulin (H)     Diabetes mellitus, type 2 (H)     Obstructive sleep apnea- moderate-severe (AHI 25)     Benign prostatic hypertrophy     Psychophysiological insomnia     Periodic limb movements of sleep     Low ferritin     Acute pain of left shoulder     Moderate major depression (H)       Current Medications -   Current Outpatient Prescriptions:      metFORMIN (GLUCOPHAGE) 500 MG tablet, TAKE 1 TABLET(500 MG) BY MOUTH TWICE DAILY WITH MEALS, Disp: 180 tablet, Rfl: 1     lisinopril (PRINIVIL,ZESTRIL) 30 MG tablet, TAKE 1 TABLET(30 MG) BY MOUTH DAILY, Disp: 90 tablet, Rfl: 2     hydrochlorothiazide (HYDRODIURIL) 50 MG tablet, TAKE 1 TABLET(50 MG) BY MOUTH EVERY MORNING, Disp: 90 tablet, Rfl: 2     tamsulosin (FLOMAX) 0.4 MG capsule, TAKE 1 CAPSULE(0.4 MG) BY MOUTH DAILY, Disp: 90 capsule, Rfl: 1     metFORMIN (GLUCOPHAGE) 500 MG tablet, TAKE 1 TABLET(500 MG) BY MOUTH TWICE DAILY WITH MEALS, Disp: 180 tablet, Rfl: 1     order for DME, Autocpap 10-16 cm, Disp: 1 Units, Rfl: 0     blood glucose monitoring (NO BRAND SPECIFIED) meter device kit, Use  to test blood sugar 2 times daily or as directed.  Accucheck meter please and all associated strips, lancets, and other supplies, 3 month supply with refills for a year., Disp: 1 kit, Rfl: 0     PARoxetine (PAXIL) 20 MG tablet, Take 2 tablets (40 mg) by mouth daily, Disp: 180 tablet, Rfl: 1     order for DME, Place rectally 2 times daily 1.8 oz container of 0.2 % nifedipine suppository if possible as easier to place the medication.   Apply on the anus every 12 hours.  Just get to where the anus is tight ., Disp: 1 Container, Rfl: 5     psyllium 0.52 G capsule, Take 1 capsule (0.52 g) by mouth daily, Disp: 100 capsule, Rfl: 3     atorvastatin (LIPITOR) 20 MG tablet, Take 1 tablet (20 mg) by mouth daily, Disp: 90 tablet, Rfl: 3     hydrocortisone (ANUSOL-HC) 25 MG suppository, Place 1 suppository (25 mg) rectally 2 times daily as needed for hemorrhoids, Disp: 20 suppository, Rfl: 1     hydrocortisone (ANUSOL-HC) 2.5 % rectal cream, Place rectally 2 times daily as needed for hemorrhoids, Disp: 30 g, Rfl: 1     triamcinolone (KENALOG) 0.1 % cream, Apply topically 2 times daily as needed for irritation, Disp: 30 g, Rfl: 1     hydrocortisone 1 % ointment, Apply topically 2 times daily as needed, Disp: 30 g, Rfl: 1     allopurinol (ZYLOPRIM) 300 MG tablet, Take 1 tablet (300 mg) by mouth daily, Disp: 90 tablet, Rfl: 3     blood glucose monitoring (ALON MICROLET) lancets, 1 each 2 times daily Use to test blood sugar 2 times daily or as directed., Disp: 1 Box, Rfl: 3     Cholecalciferol (VITAMIN D) 1000 UNITS capsule, Take 1 capsule by mouth daily., Disp: , Rfl:      Multiple Vitamin (DAILY MULTIVITAMIN PO), Take  by mouth daily., Disp: , Rfl:      aspirin 81 MG tablet, Take 1 tablet by mouth daily., Disp: , Rfl:     Allergies - No Known Allergies    Social History -   Social History     Social History     Marital status:      Spouse name: N/A     Number of children: 3     Years of education: N/A     Occupational  "History     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 No     Social History Narrative       Family History -   Family History   Problem Relation Age of Onset     C.A.D. Father      Hypertension Mother      DIABETES No family hx of      CEREBROVASCULAR DISEASE No family hx of      CANCER No family hx of      Glaucoma No family hx of      Macular Degeneration No family hx of        Review of Systems - As per HPI and PMHx, otherwise 7 system review of the head and neck negative. 10+ system review negative.    Physical Exam  Resp 20  Ht 1.778 m (5' 10\")  Wt 103.2 kg (227 lb 9.6 oz)  BMI 32.66 kg/m2  General - The patient is well nourished and well developed, and appears to have good nutritional status.  Alert and oriented to person and place, answers questions and cooperates with examination appropriately.   Head and Face - Normocephalic and atraumatic, with no gross asymmetry noted of the contour of the facial features.  The facial nerve is intact, with strong symmetric movements.  Voice and Breathing - The patient was breathing comfortably without the use of accessory muscles. There was no wheezing, stridor, or stertor.  The patients voice was clear and strong, and had appropriate pitch and quality.  Ears - Bilateral pinna and EACs with normal appearing overlying skin. Tympanic membrane intact with good mobility on pneumatic otoscopy bilaterally. Bony landmarks of the ossicular chain are normal. The tympanic membranes are normal in appearance. No retraction, perforation, or masses.  No fluid or purulence was seen in the external canal or the middle ear.   Eyes - Extraocular " movements intact.  Sclera were not icteric or injected, conjunctiva were pink and moist.  Mouth - Examination of the oral cavity showed pink, healthy oral mucosa. No lesions or ulcerations noted.  The tongue was mobile and midline, and the dentition were in good condition.    Throat - The walls of the oropharynx were smooth, pink, moist, symmetric, and had no lesions or ulcerations.  The tonsillar pillars and soft palate were symmetric.  The uvula was midline on elevation.  Neck - Normal midline excursion of the laryngotracheal complex during swallowing.  Full range of motion on passive movement.  Palpation of the occipital, submental, submandibular, internal jugular chain, and supraclavicular nodes did not demonstrate any abnormal lymph nodes or masses.  The carotid pulse was palpable bilaterally.  Palpation of the thyroid was soft and smooth, with no nodules or goiter appreciated.  The trachea was mobile and midline.  Nose - External contour is symmetric, no gross deflection or scars.  Nasal mucosa is pink and moist with no abnormal mucus.  The septum was midline and non-obstructive, turbinates of normal size and position.  No polyps, masses, or purulence noted on examination.        Nasal Cautery - Options were explained to the patient regarding conservative measures versus nasal cautery in the clinic today.  The patient wished to proceed with cautery.  I placed a small piece of cotton soaked in 4% liquid lidocaine in the anterior nasal cavity over the area of prominent vessels.  This was left in place for 10 minutes.  I then proceeded to remove the cotton, and applied silver nitrate to the vessels, starting distally, and working my way back to the vessels  point of entry onto the nasal mucosa.  The patient tolerated the procedure well.        A/P  - Gary Iyer is a 57 year old male with concerns of epistaxis. The patient has been cauterized today for epistaxis.  I counseled them on avoiding trauma to the nose  for the next 3 days. I advised that they can then use saline spray to keep the nose moisturized. If there is any further troublesome bleeding, I recommended they pinch the soft part of their nose with their fingers, and lean forward. If blood escapes from the front, then they should reposition their pinch to better secure the nose. Once the bleeding has been stopped with pinching, hold the pinch for 10 minutes. If bleeding persists despite this, then I recommend proceeding to the Emergency Department. However, if the bleeding is controlled, please arrange a followup appointment with my office 2 weeks or more after the day in which the nose was cauterized.        This document serves as a record of the services and decisions personally performed and made by Dr. De Grover MD. It was created on his behalf by Chrissy Ramirez, a trained medical scribe. The creation of this document is based the provider's statements to the medical scribe.  Chrissy Ramirez 12:35 PM 9/21/2017    Provider:   The information in this document, created by the medical scribe for me, accurately reflects the services I personally performed and the decisions made by me. I have reviewed and approved this document for accuracy prior to leaving the patient care area.  Dr. De Grover MD 12:35 PM 9/21/2017    Dr. De Grover MD  Otolaryngology  [facility]

## 2017-09-21 NOTE — PATIENT INSTRUCTIONS
Scheduling Information      ENT Clinic Locations Clinic Hours Telephone Number     [facility]  [address]  [place] [address]   E/O Thursday:      7:30am -- 4:00pm   To schedule/reschedule an appointment with   Dr. Grover,   please contact our   Specialty Scheduling Department at:     [phone]       [facility]  [address] [place] [address]     Monday:              12:00pm -- 4:00pm    Tuesday:               8:30am -- 4:30pm    Wednesday:        12:00pm -- 4:00pm    E/O Thursday:        8:00am - 4:30pm           Urgent Care Locations Clinic Hours Telephone Numbers     [facility]  [address] [place] [address]     Monday-Friday:     11:00am - 9:00pm    Saturday-Sunday:  9:00am - 5:00pm   [phone]     [facility]  [id number] [place] [address]. [place] [address]     Monday-Friday:      5:00pm - 9:00pm     Saturday-Sunday:  9:00am - 5:00pm   [phone]     Return in 2 weeks for re-cauterization if bleeding becomes a problem again    Avoid trauma to the nose

## 2017-09-21 NOTE — MR AVS SNAPSHOT
After Visit Summary   9/21/2017    Gary Iyer    MRN: [id number]           Patient Information     Date Of Birth          1960        Visit Information        Provider Department      9/21/2017 12:30 PM De Grover MD [facility]        Care Instructions    Scheduling Information      ENT Clinic Locations Clinic Hours Telephone Number     [facility]  [address]  [place] [address]   E/O Thursday:      7:30am -- 4:00pm   To schedule/reschedule an appointment with   Dr. Grover,   please contact our   Specialty Scheduling Department at:     [phone]       [facility]  [address] [place] [address]     Monday:              12:00pm -- 4:00pm    Tuesday:               8:30am -- 4:30pm    Wednesday:        12:00pm -- 4:00pm    E/O Thursday:        8:00am - 4:30pm           Urgent Care Locations Clinic Hours Telephone Numbers     [facility]  [address] [place] [address]     Monday-Friday:     11:00am - 9:00pm    Saturday-Sunday:  9:00am - 5:00pm   [phone]     [facility]  [id number] [place] [address]. [place] [address]     Monday-Friday:      5:00pm - 9:00pm     Saturday-Sunday:  9:00am - 5:00pm   [phone]     Return in 2 weeks for re-cauterization if bleeding becomes a problem again    Avoid trauma to the nose          Follow-ups after your visit        Your next 10 appointments already scheduled     Dec 11, 2017  2:00 PM CST   (Arrive by 1:45 PM)   RETURN ENDOCRINE with Tomy Granger MD   [facility] Endocrinology ([facility] [place])    [address]  [address] [facility] [address] [phone]              Who to contact     If you have questions or need follow up information about today's clinic visit or your schedule please contact [facility] directly at [phone].  Normal or non-critical lab and imaging results will be communicated to you by [place]  "letter or phone within 4 business days after the clinic has received the results. If you do not hear from us within 7 days, please contact the clinic through [employer] or phone. If you have a critical or abnormal lab result, we will notify you by phone as soon as possible.  Submit refill requests through [employer] or call your pharmacy and they will forward the refill request to us. Please allow 3 business days for your refill to be completed.          Additional Information About Your Visit        [employer] Information     [employer] gives you secure access to your electronic health record. If you see a primary care provider, you can also send messages to your care team and make appointments. If you have questions, please call your primary care clinic.  If you do not have a primary care provider, please call [phone] and they will assist you.        Care EveryWhere ID     This is your Care EveryWhere ID. This could be used by other organizations to access your [facility] medical records  [id number]        Your Vitals Were     Respirations Height BMI (Body Mass Index)             20 1.778 m (5' 10\") 32.66 kg/m2          Blood Pressure from Last 3 Encounters:   09/07/17 133/90   08/30/17 131/82   08/23/17 136/84    Weight from Last 3 Encounters:   09/21/17 103.2 kg (227 lb 9.6 oz)   09/13/17 102.4 kg (225 lb 12 oz)   09/07/17 103 kg (227 lb)              Today, you had the following     No orders found for display       Primary Care Provider Office Phone # Fax #    Finesse Beal [phone]       [address] [facility] [address]        Equal Access to Services     [facility]: Hadii aad ku hadasho Soomaali, waaxda luqadaha, qaybta kaalmada gregory devlin. So [facility] [phone].    ATENCIÓN: Si habla español, tiene a lucero disposición servicios gratuitos de asistencia lingüística. Llame al [phone].    We comply with applicable federal civil rights " laws and [place] laws. We do not discriminate on the basis of race, color, national origin, age, disability sex, sexual orientation or gender identity.            Thank you!     Thank you for choosing [facility]  for your care. Our goal is always to provide you with excellent care. Hearing back from our patients is one way we can continue to improve our services. Please take a few minutes to complete the written survey that you may receive in the mail after your visit with us. Thank you!             Your Updated Medication List - Protect others around you: Learn how to safely use, store and throw away your medicines at www.disposemymeds.org.          This list is accurate as of: 9/21/17 12:44 PM.  Always use your most recent med list.                   Brand Name Dispense Instructions for use Diagnosis    allopurinol 300 MG tablet    ZYLOPRIM    90 tablet    Take 1 tablet (300 mg) by mouth daily    Gout, unspecified       aspirin 81 MG tablet      Take 1 tablet by mouth daily.        atorvastatin 20 MG tablet    LIPITOR    90 tablet    Take 1 tablet (20 mg) by mouth daily    Hyperlipidemia LDL goal <70       blood glucose monitoring lancets     1 Box    1 each 2 times daily Use to test blood sugar 2 times daily or as directed.    Type 2 diabetes mellitus without complication (H)       blood glucose monitoring meter device kit    no brand specified    1 kit    Use to test blood sugar 2 times daily or as directed.  Accucheck meter please and all associated strips, lancets, and other supplies, 3 month supply with refills for a year.    Type 2 diabetes mellitus without complication, without long-term current use of insulin (H)       DAILY MULTIVITAMIN PO      Take  by mouth daily.        hydrochlorothiazide 50 MG tablet    HYDRODIURIL    90 tablet    TAKE 1 TABLET(50 MG) BY MOUTH EVERY MORNING    Hypertension goal BP (blood pressure) < 140/90       hydrocortisone 1 % ointment     30 g    Apply topically  2 times daily as needed    Facial dermatitis       hydrocortisone 2.5 % cream    ANUSOL-HC    30 g    Place rectally 2 times daily as needed for hemorrhoids    External hemorrhoids       hydrocortisone 25 MG Suppository    ANUSOL-HC    20 suppository    Place 1 suppository (25 mg) rectally 2 times daily as needed for hemorrhoids    External hemorrhoids       lisinopril 30 MG tablet    PRINIVIL,ZESTRIL    90 tablet    TAKE 1 TABLET(30 MG) BY MOUTH DAILY    Hypertension goal BP (blood pressure) < 140/90       * metFORMIN 500 MG tablet    GLUCOPHAGE    180 tablet    TAKE 1 TABLET(500 MG) BY MOUTH TWICE DAILY WITH MEALS    Type 2 diabetes mellitus without complication (H)       * metFORMIN 500 MG tablet    GLUCOPHAGE    180 tablet    TAKE 1 TABLET(500 MG) BY MOUTH TWICE DAILY WITH MEALS    Type 2 diabetes, HbA1C goal < 8% (H)       * order for DME     1 Container    Place rectally 2 times daily 1.8 oz container of 0.2 % nifedipine suppository if possible as easier to place the medication.   Apply on the anus every 12 hours.  Just get to where the anus is tight .    Anal fissure       * order for DME     1 Units    Autocpap 10-16 cm    Obstructive sleep apnea       PARoxetine 20 MG tablet    PAXIL    180 tablet    Take 2 tablets (40 mg) by mouth daily    Anxiety       psyllium 0.52 G capsule     100 capsule    Take 1 capsule (0.52 g) by mouth daily    Constipation, unspecified constipation type       tamsulosin 0.4 MG capsule    FLOMAX    90 capsule    TAKE 1 CAPSULE(0.4 MG) BY MOUTH DAILY    Benign prostatic hyperplasia with lower urinary tract symptoms, unspecified morphology       triamcinolone 0.1 % cream    KENALOG    30 g    Apply topically 2 times daily as needed for irritation    Dermatitis       vitamin D 1000 UNITS capsule      Take 1 capsule by mouth daily.        * Notice:  This list has 4 medication(s) that are the same as other medications prescribed for you. Read the directions carefully, and ask your  doctor or other care provider to review them with you.

## 2017-09-21 NOTE — NURSING NOTE
"Chief Complaint   Patient presents with     Nose Problem     Nose bleeds       Initial Resp 20  Ht 1.778 m (5' 10\")  Wt 103.2 kg (227 lb 9.6 oz)  BMI 32.66 kg/m2 Estimated body mass index is 32.66 kg/(m^2) as calculated from the following:    Height as of this encounter: 1.778 m (5' 10\").    Weight as of this encounter: 103.2 kg (227 lb 9.6 oz).  Medication Reconciliation: complete\    Paulie Silveira CMA      "

## 2017-09-27 ENCOUNTER — TELEPHONE (OUTPATIENT)
Dept: ENDOCRINOLOGY | Facility: CLINIC | Age: 57
End: 2017-09-27

## 2017-09-27 NOTE — TELEPHONE ENCOUNTER
----- Message from Renetta Dunn sent at 9/27/2017  3:33 PM CDT -----  Regarding: RE: ATTN DR FUENTES Regarding Surgery?  Contact: [phone]  Orders are in and I called him this AM and asked him to call me back to schedule.   ----- Message -----     From: Rebeca Ramsay RN     Sent: 9/27/2017   2:53 PM       To: [id number]  Subject: FW: ATTN DR FUENTES Regarding Surgery?          Do you have any  scheduling orders for  Gary?  He was to see Evasovich  And then  Have  Nodule biopsied in Thursday clinic  But I don't see  Any orders  Or  Time frame and Gary doesn't know   What he is suppose to do. ?  ----- Message -----     From: Tomy Fuentes MD     Sent: 9/26/2017   2:53 PM       To: Rebeca Ramsay RN  Subject: RE: ATTN DR FUENTES Regarding Surgery?          I spoke to Bud about him. Plan is to see Evasovich and have isthmus nodule biopsied in Thursday clinic.      Thanks  Ny  ----- Message -----     From: Rebeca Ramsay RN     Sent: 9/26/2017  11:34 AM       To: Tomy Fuentes MD  Subject: FW: ATTN DR FUENTES Regarding Surgery?          Gary is wondering if you spoke to  the other providers yet about surgery  and if so  what  Can he be told ?   ----- Message -----     From: Marianna Robert     Sent: 9/26/2017  11:14 AM       To: [facility] Triage[facility]  Subject: ATTN DR FUENTES Regarding Surgery?              Pt called back wanting to know if Dr Fuentes spoke to some other  Dr's about pt surgery.    Please call pt back @ [phone]    Thank you!    Marianna Robert  Intake representative   Call Center

## 2017-09-27 NOTE — TELEPHONE ENCOUNTER
Dr Granger left [facility] a message to call back to schedule f/u. Orders have  been entered .

## 2017-10-02 ENCOUNTER — MYC MEDICAL ADVICE (OUTPATIENT)
Dept: ENDOCRINOLOGY | Facility: CLINIC | Age: 57
End: 2017-10-02

## 2017-10-06 PROBLEM — M10.9 GOUT, UNSPECIFIED CAUSE, UNSPECIFIED CHRONICITY, UNSPECIFIED SITE: Status: ACTIVE | Noted: 2017-10-06

## 2017-10-06 PROBLEM — M10.9 GOUT: Status: RESOLVED | Noted: 2017-10-06 | Resolved: 2017-10-06

## 2017-10-06 PROBLEM — M10.9 GOUT: Status: ACTIVE | Noted: 2017-10-06

## 2017-10-09 DIAGNOSIS — E11.9 TYPE 2 DIABETES MELLITUS WITHOUT COMPLICATION (H): ICD-10-CM

## 2017-10-10 NOTE — TELEPHONE ENCOUNTER
"meformin      Last Written Prescription Date: 9/19/17 sent to [facility] in [place]  Last Fill Quantity: 180,  # refills: 1   Last Office Visit with [facility], [facility] or [facility] prescribing provider: 9/13/17                                                 \"Refusal\" routed back to pharmacy with note.  Juany Feliciano, EDGAR  [facility]      "

## 2017-10-12 ENCOUNTER — PRE VISIT (OUTPATIENT)
Dept: OTOLARYNGOLOGY | Facility: CLINIC | Age: 57
End: 2017-10-12

## 2017-10-12 NOTE — TELEPHONE ENCOUNTER
APPT INFORMATION    Date & Time:  10/23/17 at 1PM   Reason for Appt:  Retrosternal goiter   Referring Name/Clinic:  Tomy Granger - [facility] Endocrinology    Yes / No COMMENT / NOTES DATE & ACTION   Patient Contacted?  No  Referred        RECORDS CLINIC NAME  (use N/A if no records ) DATE & ACTION RECEIVED RECS & IMG? Y/N   (may include other helpful notes)   External Clinics:  n/a           Internal Clinics:  [facility] Endocrinology   yes - records and imaging in epic/pacs     [facility] ENT   yes - pt has seen Dr. Fournier - records and imaging  Epic

## 2017-10-19 ENCOUNTER — OFFICE VISIT (OUTPATIENT)
Dept: ENDOCRINOLOGY | Facility: CLINIC | Age: 57
End: 2017-10-19

## 2017-10-19 VITALS — WEIGHT: 228.6 LBS | BODY MASS INDEX: 32.73 KG/M2 | HEIGHT: 70 IN

## 2017-10-19 DIAGNOSIS — E04.1 THYROID NODULE: ICD-10-CM

## 2017-10-19 DIAGNOSIS — E04.1 THYROID NODULE: Primary | ICD-10-CM

## 2017-10-19 DIAGNOSIS — E04.9 RETROSTERNAL GOITER: ICD-10-CM

## 2017-10-19 ASSESSMENT — PAIN SCALES - GENERAL: PAINLEVEL: NO PAIN (0)

## 2017-10-19 NOTE — NURSING NOTE
Chief Complaint   Patient presents with     RECHECK     THYROID BIOPSY     Danelle Barajas, [facility]  Endocrinology & Diabetes 3G

## 2017-10-19 NOTE — PATIENT INSTRUCTIONS
Thyroid Biopsy Clinic     Aftercare instructions for thyroid needle aspiration     1. Remove Band-Aid tonight     2. You may notice mild discomfort, small bruise or swelling at the biopsy site.  This is common. You may use acetaminophen (Tylenol) for discomfort  rather than Aspirin or NSAIDs (such as Motrin or Aleve)     3. You may experience itching or mild redness and biopsy site if the topical  spray was used. This should resolve in a couple of days.     4. The biopsy results are back in about 7-10 days. Please call our clinic if you  have not heard back from us after 10 days.     5. Occasionally you may have a small amount of bleeding from the needle  puncture site. If this happens, you should lie down and apply gentle direct  pressure to the bleeding site for about ten minutes. When the bleeding has  stopped, apply another clean Band-aid. If the bleeding persists, or should  you develop a fever, trouble in breathing, excessive neck swelling, or  unusual pain or discomfort, please call us. . You can reach our clinic at  [phone] during the regular business hours and endocrinologist on-call  at [phone] during the evenings and night.     6. You should refrain from vigorous physical activity, sports, and stress for about 24 hours following your biopsy. In general, it is safe to return to work the day after the procedure. You may shower and get your neck wet the day after the biopsy unless instructed otherwise.

## 2017-10-19 NOTE — LETTER
10/19/2017     RE: Gary Iyer  [address] [address]     Dear Colleague,    Thank you for referring your patient, Gary Iyer, to the [facility] ENDOCRINOLOGY at [facility]. Please see a copy of my visit note below.    FINE NEEDLE ASPIRATION of THYROID NODULE(S):   Procedure Note: Discussed the risks of the procedure and reviewed the benefits. The complications including chances of infection, bruising and bleeding were discussed. Patient consents to the procedure.    Procedure:  The area was cleaned with iodine. US guidance was used to examine the thyroid prior to the biopsy.    3 passes were made, good sample, 3 pass appeared cystic fluid. No complications. No major bleeding.     Aspirate samples were prepared on slides (air dried and alcohol fixation) for each nodule.  Needle washings were done with RPMI solution.    Area was cleaned and dressed with bandaid.    Vital signs post procedure was 140/90    Complications: none    Pt was given instructions on follow-up. The patient should call if questions and seek emergency care if swelling in the neck or difficulty breathing.     Tomy Granger MD  [address]  Endocrinology Service  Again, thank you for allowing me to participate in the care of your patient.      Sincerely,    Tomy Granger MD

## 2017-10-19 NOTE — PROGRESS NOTES
FINE NEEDLE ASPIRATION of THYROID NODULE(S):   Procedure Note: Discussed the risks of the procedure and reviewed the benefits. The complications including chances of infection, bruising and bleeding were discussed. Patient consents to the procedure.    Procedure:  The area was cleaned with iodine. US guidance was used to examine the thyroid prior to the biopsy.    3 passes were made, good sample, 3 pass appeared cystic fluid. No complications. No major bleeding.     Aspirate samples were prepared on slides (air dried and alcohol fixation) for each nodule.  Needle washings were done with RPMI solution.    Area was cleaned and dressed with bandaid.    Vital signs post procedure was 140/90    Complications: none    Pt was given instructions on follow-up. The patient should call if questions and seek emergency care if swelling in the neck or difficulty breathing.     Tomy Granger MD  [address]  Endocrinology Service

## 2017-10-19 NOTE — LETTER
Patient:  Christ Iyer  :   1960  MRN:     [id number]        Mr.Rajeev Iyer  [address] [facility] [address]        2017    Dear ,    We are writing to inform you of your test results. The thyroid nodule was benign on FNA and therefore, with 2 benign biopsies, it is unlikely that we will need any intervention for this nodule.       Resulted Orders   Fine needle aspiration   Result Value Ref Range    Copath Report       Patient Name: CHRIST IYER  MR#: [id number]  Specimen #: [id number]  Collected: 10/19/2017  Received: 10/19/2017  Reported: 10/23/2017 11:26  Ordering Phy(s): RUPENDRA DEV THAKU FUENTES    For improved result formatting, select 'View Enhanced Report Format'  under Linked Documents section.    SPECIMEN/STAIN PROCESS:  FNA-thyroid, isthmus, nodule       Pap-Cyto x 3, Cooper's stain-cyto x 6    ----------------------------------------------------------------    CYTOLOGIC INTERPRETATION:    Thyroid, isthmus, nodule, ultrasound guided fine needle aspiration:  Benign  - Consistent with a benign nodule (includes adenomatoid nodule, colloid  nodule, etc.)  Specimen Adequacy: Satisfactory for evaluation.    The [place] implied risk of malignancy and recommended clinical  management:  Benign has a 0-3% risk of malignancy, recommended management is clinical  follow-up    I have personally reviewed all specimens and/or slides, including the  listed special stains, and used them with my medical  judgement to  determine or confirm the final diagnosis.    Electronically signed out by:  Khadra Masters M.D., [facility]    Processed and screened at [facility]    CLINICAL HISTORY:  Isthmus nodule    ,    GROSS:  FNA-thyroid, isthmus, nodule:  Received are 3 fixed slides, processed  for Pap stain, and 6 air dried slides, processed for Cooper's stain.  Afirma tube held.    MICROSCOPIC:  Microscopic examination is  performed.    Rocío Brown MD, Cytopathology Fellow, Khadra Masters MD.    CPT Codes:  A: 76748-QZAD, 70022-[id number]    TESTING LAB LOCATION:  [facility], [address]   [address]  [phone]    COLLECTION SITE:  Client[facility]  [facility]  [facility]    Resident  SXA2         Tomy Granger MD

## 2017-10-19 NOTE — MR AVS SNAPSHOT
After Visit Summary   10/19/2017    Gary Iyer    MRN: [id number]           Patient Information     Date Of Birth          1960        Visit Information        Provider Department      10/19/2017 10:30 AM Tomy Granger MD [facility]        Today's Diagnoses     Thyroid nodule    -  1    Retrosternal goiter          Care Instructions    Thyroid Biopsy Clinic     Aftercare instructions for thyroid needle aspiration     1. Remove Band-Aid tonight     2. You may notice mild discomfort, small bruise or swelling at the biopsy site.  This is common. You may use acetaminophen (Tylenol) for discomfort  rather than Aspirin or NSAIDs (such as Motrin or Aleve)     3. You may experience itching or mild redness and biopsy site if the topical  spray was used. This should resolve in a couple of days.     4. The biopsy results are back in about 7-10 days. Please call our clinic if you  have not heard back from us after 10 days.     5. Occasionally you may have a small amount of bleeding from the needle  puncture site. If this happens, you should lie down and apply gentle direct  pressure to the bleeding site for about ten minutes. When the bleeding has  stopped, apply another clean Band-aid. If the bleeding persists, or should  you develop a fever, trouble in breathing, excessive neck swelling, or  unusual pain or discomfort, please call us. . You can reach our clinic at  [phone] during the regular business hours and endocrinologist on-call  at [phone] during the evenings and night.     6. You should refrain from vigorous physical activity, sports, and stress for  about 24 hours following your biopsy. In general, it is safe to return to work  the day after the procedure. You may shower and get your neck wet the day  after the biopsy unless instructed otherwise.            Follow-ups after your visit        Follow-up notes from your care team     Return in about 6 months  (around 4/19/2018).      Your next 10 appointments already scheduled     Oct 23, 2017  1:00 PM CDT   (Arrive by 12:45 PM)   New Patient Visit with Lori Bustillos MD   [facility] Nose [facility] Th[facility] ([facility])    [address] [place] [address]  [address] [facility] [address] [phone]            Dec 11, 2017  2:00 PM CST   (Arrive by 1:45 PM)   RETURN ENDOCRINE with Tomy Granger MD   [facility] Endocrinology ([facility])    [address] [place] [address] [phone]              Future tests that were ordered for you today     Open Future Orders        Priority Expected Expires Ordered    Cytology non gyn Routine  10/20/2018 10/19/2017            Who to contact     Please call your clinic at [phone] to:    Ask questions about your health    Make or cancel appointments    Discuss your medicines    Learn about your test results    Speak to your doctor   If you have compliments or concerns about an experience at your clinic, or if you wish to file a complaint, please contact [facility] Patient Relations at [phone] or email us at [email or web address]         Additional Information About Your Visit        [employer] Information     [employer] gives you secure access to your electronic health record. If you see a primary care provider, you can also send messages to your care team and make appointments. If you have questions, please call your primary care clinic.  If you do not have a primary care provider, please call [phone] and they will assist you.      [employer] is an electronic gateway that provides easy, online access to your medical records. With [employer], you can request a clinic appointment, read your test results, renew a prescription or communicate with your care team.     To access your existing account, please contact your [facility] or  "call [phone] for assistance.        Care EveryWhere ID     This is your Care EveryWhere ID. This could be used by other organizations to access your [facility] medical records  [id number]        Your Vitals Were     Height BMI (Body Mass Index)                1.778 m (5' 10\") 32.8 kg/m2           Blood Pressure from Last 3 Encounters:   10/19/17 (P) 135/77   09/07/17 133/90   08/30/17 131/82    Weight from Last 3 Encounters:   10/19/17 103.7 kg (228 lb 9.6 oz)   09/21/17 103.2 kg (227 lb 9.6 oz)   09/13/17 102.4 kg (225 lb 12 oz)              We Performed the Following     US Biopsy Thyroid Fine Needle Aspiration        Primary Care Provider Office Phone # Fax #    Finesse Beal [phone]       [address] [facility] [address]        Equal Access to Services     KIRILL PORTILLO : Hadii sloan ku hadasho Soomaali, waaxda luqadaha, qaybta kaalmada adeegyada, gregory adanin wanda gutierrez . So [facility] [phone].    ATENCIÓN: Si obila espbebe, tiene a lucero disposición servicios gratuitos de asistencia lingüística. Llame al [phone].    We comply with applicable federal civil rights laws and [place] laws. We do not discriminate on the basis of race, color, national origin, age, disability, sex, sexual orientation, or gender identity.            Thank you!     Thank you for choosing [facility] ENDOCRINOLOGY  for your care. Our goal is always to provide you with excellent care. Hearing back from our patients is one way we can continue to improve our services. Please take a few minutes to complete the written survey that you may receive in the mail after your visit with us. Thank you!             Your Updated Medication List - Protect others around you: Learn how to safely use, store and throw away your medicines at www.disposemymeds.org.          This list is accurate as of: 10/19/17 11:40 AM.  Always use your most recent med list.                   Brand Name Dispense " Instructions for use Diagnosis    allopurinol 300 MG tablet    ZYLOPRIM    90 tablet    TAKE 1 TABLET(300 MG) BY MOUTH DAILY    Gout, unspecified cause, unspecified chronicity, unspecified site       aspirin 81 MG tablet      Take 1 tablet by mouth daily.        atorvastatin 20 MG tablet    LIPITOR    90 tablet    TAKE 1 TABLET(20 MG) BY MOUTH DAILY    Hyperlipidemia LDL goal <70       blood glucose monitoring lancets     1 Box    1 each 2 times daily Use to test blood sugar 2 times daily or as directed.    Type 2 diabetes mellitus without complication (H)       blood glucose monitoring meter device kit    no brand specified    1 kit    Use to test blood sugar 2 times daily or as directed.  Accucheck meter please and all associated strips, lancets, and other supplies, 3 month supply with refills for a year.    Type 2 diabetes mellitus without complication, without long-term current use of insulin (H)       DAILY MULTIVITAMIN PO      Take  by mouth daily.        hydrochlorothiazide 50 MG tablet    HYDRODIURIL    90 tablet    TAKE 1 TABLET(50 MG) BY MOUTH EVERY MORNING    Hypertension goal BP (blood pressure) < 140/90       hydrocortisone 1 % ointment     30 g    Apply topically 2 times daily as needed    Facial dermatitis       hydrocortisone 2.5 % cream    ANUSOL-HC    30 g    Place rectally 2 times daily as needed for hemorrhoids    External hemorrhoids       hydrocortisone 25 MG Suppository    ANUSOL-HC    20 suppository    Place 1 suppository (25 mg) rectally 2 times daily as needed for hemorrhoids    External hemorrhoids       lisinopril 30 MG tablet    PRINIVIL,ZESTRIL    90 tablet    TAKE 1 TABLET(30 MG) BY MOUTH DAILY    Hypertension goal BP (blood pressure) < 140/90       metFORMIN 500 MG tablet    GLUCOPHAGE    180 tablet    TAKE 1 TABLET(500 MG) BY MOUTH TWICE DAILY WITH MEALS    Type 2 diabetes, HbA1C goal < 8% (H)       * order for DME     1 Container    Place rectally 2 times daily 1.8 oz container of  0.2 % nifedipine suppository if possible as easier to place the medication.   Apply on the anus every 12 hours.  Just get to where the anus is tight .    Anal fissure       * order for DME     1 Units    Autocpap 10-16 cm    Obstructive sleep apnea       PARoxetine 20 MG tablet    PAXIL    180 tablet    Take 2 tablets (40 mg) by mouth daily    Anxiety       psyllium 0.52 G capsule     100 capsule    Take 1 capsule (0.52 g) by mouth daily    Constipation, unspecified constipation type       tamsulosin 0.4 MG capsule    FLOMAX    90 capsule    TAKE 1 CAPSULE(0.4 MG) BY MOUTH DAILY    Benign prostatic hyperplasia with lower urinary tract symptoms, unspecified morphology       triamcinolone 0.1 % cream    KENALOG    30 g    Apply topically 2 times daily as needed for irritation    Dermatitis       vitamin D 1000 UNITS capsule      Take 1 capsule by mouth daily.        * Notice:  This list has 2 medication(s) that are the same as other medications prescribed for you. Read the directions carefully, and ask your doctor or other care provider to review them with you.

## 2017-10-23 ENCOUNTER — OFFICE VISIT (OUTPATIENT)
Dept: OTOLARYNGOLOGY | Facility: CLINIC | Age: 57
End: 2017-10-23

## 2017-10-23 VITALS — WEIGHT: 227 LBS | BODY MASS INDEX: 33.62 KG/M2 | HEIGHT: 69 IN

## 2017-10-23 DIAGNOSIS — E04.1 THYROID NODULE: Primary | ICD-10-CM

## 2017-10-23 LAB — COPATH REPORT: NORMAL

## 2017-10-23 ASSESSMENT — PAIN SCALES - GENERAL: PAINLEVEL: NO PAIN (0)

## 2017-10-23 NOTE — PATIENT INSTRUCTIONS
Follow up is on an as needed basis. Please call our triage RN at [phone], for questions or concerns.

## 2017-10-23 NOTE — MR AVS SNAPSHOT
After Visit Summary   10/23/2017    Gary Iyer    MRN: [id number]           Patient Information     Date Of Birth          1960        Visit Information        Provider Department      10/23/2017 1:00 PM Lori Bustillos MD [facility]        Today's Diagnoses     Thyroid nodule    -  1      Care Instructions    Follow up is on an as needed basis. Please call our triage RN at [phone], for questions or concerns.            Follow-ups after your visit        Your next 10 appointments already scheduled     Dec 06, 2017 11:00 AM CST   (Arrive by 10:45 AM)   RETURN SLP VOICE with UMM Moore   [facility] Voice ([facility])    [address] [place] [address] [phone]            Dec 08, 2017 10:50 AM CST   (Arrive by 10:35 AM)   Procedure with Mel Fournier MD,  ENT PROCEDURE ROOM   [facility]ose [facility] Throat ([facility])    [address] [place] [address] [phone]           This is a multi-disciplinary care team visit as patients with your type of problem are usually seen by a team of an MD and a Speech-Language Pathologist (who is a specialist in disorders of the voice, throat, and breathing).  Please plan about 2 hours for your visit, which will likely include Laryngeal Function Studies, a Voice/Swallow/Breathing Evaluation, and an Endoscopic Laryngeal Examination to provide a comprehensive evaluation.  Please check with your insurance company to ensure you are covered for these services. - It is important to know that if you are evaluated and/or treated by both a physician and a speech pathologist during your visit, your billing will reflect the input that you receive from both providers as separate professionals. Although most insurance plans do cover these services, we encourage you to contact your insurance company prior to your visit  to determine whether there are any coverage limitations that might affect you financially. - Billing/procedure codes that are frequently associated with visits to our clinic include (but are not limited to) the ones listed below. Most patients will not need all of these items, but it may be useful to ask your insurance company's patient . 89991: Flexible fiberoptic laryngoscopy, 39944: Laryngoscopy; flexible or rigid fiberoptic, with stroboscopy, 88313: Flexible endoscopic evaluation of swallowing, 59266: Laryngeal function aerodynamic evaluation, 47796: Evaluation of Voice and Resonance, 88871: Speech pathology treatment for voice, speech, communication, 94722: Speech pathology treatment for swallowing/oral function for feeding - If you have any concerns or questions, or if you would prefer not to have a speech pathologist involved in your visit, please contact our Clinic Coordinator at [phone], at least 24 hours prior to your appointment.              Who to contact     Please call your clinic at [phone] to:    Ask questions about your health    Make or cancel appointments    Discuss your medicines    Learn about your test results    Speak to your doctor   If you have compliments or concerns about an experience at your clinic, or if you wish to file a complaint, please contact [facility] Patient Relations at [phone] or email us at [email or web address]         Additional Information About Your Visit        MyChart Information     [employer] gives you secure access to your electronic health record. If you see a primary care provider, you can also send messages to your care team and make appointments. If you have questions, please call your primary care clinic.  If you do not have a primary care provider, please call [phone] and they will assist you.      [employer] is an electronic gateway that provides easy, online access to your medical  "records. With [employer], you can request a clinic appointment, read your test results, renew a prescription or communicate with your care team.     To access your existing account, please contact your [facility] or call [phone] for assistance.        Care EveryWhere ID     This is your Care EveryWhere ID. This could be used by other organizations to access your [facility] medical records  [id number]        Your Vitals Were     Height BMI (Body Mass Index)                1.76 m (5' 9.29\") 33.24 kg/m2           Blood Pressure from Last 3 Encounters:   11/16/17 135/79   10/19/17 (P) 135/77   09/07/17 133/90    Weight from Last 3 Encounters:   11/24/17 104.8 kg (231 lb)   11/16/17 104.3 kg (230 lb)   10/23/17 103 kg (227 lb)              Today, you had the following     No orders found for display       Primary Care Provider Office Phone # Fax #    Finesse Beal [phone]       [address] [facility] [address]        Equal Access to Services     [facility]: Hadii aad ku hadasho Soomaali, waaxda luqadaha, qaybta kaalmada adeegyajoceline, gregory gutierrez . So [facility] [phone].    ATENCIÓN: Si habla español, tiene a lucero disposición servicios gratuitos de asistencia lingüística. Pili al [phone].    We comply with applicable federal civil rights laws and [place] laws. We do not discriminate on the basis of race, color, national origin, age, disability, sex, sexual orientation, or gender identity.            Thank you!     Thank you for choosing [facility] NOSE AND TH[facility]AT  for your care. Our goal is always to provide you with excellent care. Hearing back from our patients is one way we can continue to improve our services. Please take a few minutes to complete the written survey that you may receive in the mail after your visit with us. Thank you!             Your Updated Medication List - Protect others around you: Learn how " to safely use, store and throw away your medicines at www.disposemymeds.org.          This list is accurate as of: 10/23/17 11:59 PM.  Always use your most recent med list.                   Brand Name Dispense Instructions for use Diagnosis    allopurinol 300 MG tablet    ZYLOPRIM    90 tablet    TAKE 1 TABLET(300 MG) BY MOUTH DAILY    Gout, unspecified cause, unspecified chronicity, unspecified site       aspirin 81 MG tablet      Take 1 tablet by mouth daily.        atorvastatin 20 MG tablet    LIPITOR    90 tablet    TAKE 1 TABLET(20 MG) BY MOUTH DAILY    Hyperlipidemia LDL goal <70       blood glucose monitoring lancets     1 Box    1 each 2 times daily Use to test blood sugar 2 times daily or as directed.    Type 2 diabetes mellitus without complication (H)       blood glucose monitoring meter device kit    no brand specified    1 kit    Use to test blood sugar 2 times daily or as directed.  Accucheck meter please and all associated strips, lancets, and other supplies, 3 month supply with refills for a year.    Type 2 diabetes mellitus without complication, without long-term current use of insulin (H)       DAILY MULTIVITAMIN PO      Take  by mouth daily.        hydrochlorothiazide 50 MG tablet    HYDRODIURIL    90 tablet    TAKE 1 TABLET(50 MG) BY MOUTH EVERY MORNING    Hypertension goal BP (blood pressure) < 140/90       hydrocortisone 1 % ointment     30 g    Apply topically 2 times daily as needed    Facial dermatitis       hydrocortisone 2.5 % cream    ANUSOL-HC    30 g    Place rectally 2 times daily as needed for hemorrhoids    External hemorrhoids       hydrocortisone 25 MG Suppository    ANUSOL-HC    20 suppository    Place 1 suppository (25 mg) rectally 2 times daily as needed for hemorrhoids    External hemorrhoids       lisinopril 30 MG tablet    PRINIVIL,ZESTRIL    90 tablet    TAKE 1 TABLET(30 MG) BY MOUTH DAILY    Hypertension goal BP (blood pressure) < 140/90       metFORMIN 500 MG tablet     GLUCOPHAGE    180 tablet    TAKE 1 TABLET(500 MG) BY MOUTH TWICE DAILY WITH MEALS    Type 2 diabetes, HbA1C goal < 8% (H)       * order for DME     1 Container    Place rectally 2 times daily 1.8 oz container of 0.2 % nifedipine suppository if possible as easier to place the medication.   Apply on the anus every 12 hours.  Just get to where the anus is tight .    Anal fissure       * order for DME     1 Units    Autocpap 10-16 cm    Obstructive sleep apnea       PARoxetine 20 MG tablet    PAXIL    180 tablet    Take 2 tablets (40 mg) by mouth daily    Anxiety       psyllium 0.52 G capsule     100 capsule    Take 1 capsule (0.52 g) by mouth daily    Constipation, unspecified constipation type       tamsulosin 0.4 MG capsule    FLOMAX    90 capsule    TAKE 1 CAPSULE(0.4 MG) BY MOUTH DAILY    Benign prostatic hyperplasia with lower urinary tract symptoms, unspecified morphology       triamcinolone 0.1 % cream    KENALOG    30 g    Apply topically 2 times daily as needed for irritation    Dermatitis       vitamin D 1000 UNITS capsule      Take 1 capsule by mouth daily.        * Notice:  This list has 2 medication(s) that are the same as other medications prescribed for you. Read the directions carefully, and ask your doctor or other care provider to review them with you.

## 2017-10-23 NOTE — NURSING NOTE
Chief Complaint   Patient presents with     Consult     retrosternal goiter      Isaac Gomez LPN

## 2017-10-23 NOTE — LETTER
10/23/2017       RE: Gary Iyer  [address]  [place] [address]     Dear Colleague,    Thank you for referring your patient, Gary Iyer, to the [facility] at [facility]. Please see a copy of my visit note below.    REASON FOR CONSULTATION:  I was asked by Dr. Granger to see this patient for a substernal goiter.      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  He did not receive head and neck radiation.  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  Regarding the thyroid mass, he has no problems with voice quality, inspiration or swallowing.  He has no symptoms of hypo or hyperthyroidism.  He has no problems with sleeping, lying down on either side.  No head and neck radiation exposure and no thyroid carcinoma risk factors.      PAST MEDICAL HISTORY, SURGICAL HISTORY AND MEDICATIONS:  Have been reviewed and are available in the EMR.      REVIEW OF SYSTEMS:  A 10-point review of systems is pertinent for that noted in the HPI.      PHYSICAL EXAMINATION:  Examining the neck, it is actually very difficult to feel the thyroid gland.  The superior border of the thyroid gland bilaterally is palpable barely.  When the patient swallows, you can get a hint of a nodule within the isthmus but really nothing of concern.  There is no cervical lymphadenopathy.      Ultrasound-guided biopsy of this thyroid nodule was benign.      ASSESSMENT:  A 2.2 cm thyroid nodule along the mid left side to isthmus with benign biopsy.      PLAN:  The patient has no significant risk factors for thyroid carcinoma.  With this benign nodule measuring less than 4 cm and asymptomatic, I would recommend that he repeat a CT scan of the chest in 1-2  years.  There is no surgical indication at this time.         MICHAEL BEASLEY MD             D: 2017 10:30   T: 2017 13:54   MT: GEORGE      Name:     CHRIST HENNING   MRN:      [id number]        Account:      [id number]   :      1960           Service Date: 10/23/2017      Document: [id number]

## 2017-10-24 ENCOUNTER — MYC MEDICAL ADVICE (OUTPATIENT)
Dept: FAMILY MEDICINE | Facility: CLINIC | Age: 57
End: 2017-10-24

## 2017-10-24 NOTE — TELEPHONE ENCOUNTER
Patient has questions about his retrosternal goiter.     Routed to provider to advise.  Cheri Lieberman RN  [facility]

## 2017-10-26 NOTE — TELEPHONE ENCOUNTER
Patient called back.  Discussed in detail.  He saw surgeon who advised rechecking imaging in 6 months.  Patient happy with this plan.  He has no symptoms.    He will notify us if he gets any symptoms.    Finesse Beal MD

## 2017-11-16 ENCOUNTER — OFFICE VISIT (OUTPATIENT)
Dept: FAMILY MEDICINE | Facility: CLINIC | Age: 57
End: 2017-11-16
Payer: COMMERCIAL

## 2017-11-16 VITALS
DIASTOLIC BLOOD PRESSURE: 79 MMHG | SYSTOLIC BLOOD PRESSURE: 135 MMHG | HEART RATE: 108 BPM | OXYGEN SATURATION: 97 % | TEMPERATURE: 98.7 F | WEIGHT: 230 LBS | BODY MASS INDEX: 33.68 KG/M2

## 2017-11-16 DIAGNOSIS — I10 HYPERTENSION GOAL BP (BLOOD PRESSURE) < 140/90: Chronic | ICD-10-CM

## 2017-11-16 DIAGNOSIS — L91.8 SKIN TAG: Primary | ICD-10-CM

## 2017-11-16 DIAGNOSIS — E04.2 MULTIPLE THYROID NODULES: ICD-10-CM

## 2017-11-16 DIAGNOSIS — F41.9 ANXIETY: Chronic | ICD-10-CM

## 2017-11-16 PROCEDURE — 11200 RMVL SKIN TAGS UP TO&INC 15: CPT | Performed by: FAMILY MEDICINE

## 2017-11-16 PROCEDURE — 99213 OFFICE O/P EST LOW 20 MIN: CPT | Mod: 25 | Performed by: FAMILY MEDICINE

## 2017-11-16 ASSESSMENT — PAIN SCALES - GENERAL: PAINLEVEL: NO PAIN (0)

## 2017-11-16 NOTE — MR AVS SNAPSHOT
After Visit Summary   11/16/2017    Gary Iyer    MRN: [id number]           Patient Information     Date Of Birth          1960        Visit Information        Provider Department      11/16/2017 10:00 AM Finesse Beal MD [facility]        Today's Diagnoses     Skin tag    -  1      Care Instructions    Change bandages as needed today    Rest    Stay well hydrated    Talk with surgeon about recent symptoms              Follow-ups after your visit        Your next 10 appointments already scheduled     Nov 24, 2017  8:20 AM CST   (Arrive by 8:05 AM)   Return Visit with Mel Fournier MD   [facility] Ear Nose and Throat ([facility])    [address]  4th Floor  [facility] [address] [phone]           This is a multi-disciplinary care team visit as patients with your type of problem are usually seen by a team of an MD and a Speech-Language Pathologist (who is a specialist in disorders of the voice, throat, and breathing).  Please plan about 2 hours for your visit, which will likely include Laryngeal Function Studies, a Voice/Swallow/Breathing Evaluation, and an Endoscopic Laryngeal Examination to provide a comprehensive evaluation.  Please check with your insurance company to ensure you are covered for these services. - It is important to know that if you are evaluated and/or treated by both a physician and a speech pathologist during your visit, your billing will reflect the input that you receive from both providers as separate professionals. Although most insurance plans do cover these services, we encourage you to contact your insurance company prior to your visit to determine whether there are any coverage limitations that might affect you financially. - Billing/procedure codes that are frequently associated with visits to our clinic include (but are not limited to) the ones listed below. Most patients will not need  all of these items, but it may be useful to ask your insurance company's patient . 99735: Flexible fiberoptic laryngoscopy, 45349: Laryngoscopy; flexible or rigid fiberoptic, with stroboscopy, 29091: Flexible endoscopic evaluation of swallowing, 22701: Laryngeal function aerodynamic evaluation, 59459: Evaluation of Voice and Resonance, 28491: Speech pathology treatment for voice, speech, communication, 61475: Speech pathology treatment for swallowing/oral function for feeding - If you have any concerns or questions, or if you would prefer not to have a speech pathologist involved in your visit, please contact our Clinic Coordinator at [phone], at least 24 hours prior to your appointment.            Dec 11, 2017  2:00 PM CST   (Arrive by 1:45 PM)   RETURN ENDOCRINE with Tomy Granger MD   [facility] Endocrinology ([facility])    [address] [place] [address] [phone]              Who to contact     If you have questions or need follow up information about today's clinic visit or your schedule please contact [facility] directly at [phone].  Normal or non-critical lab and imaging results will be communicated to you by MyChart, letter or phone within 4 business days after the clinic has received the results. If you do not hear from us within 7 days, please contact the clinic through MyChart or phone. If you have a critical or abnormal lab result, we will notify you by phone as soon as possible.  Submit refill requests through [employer] or call your pharmacy and they will forward the refill request to us. Please allow 3 business days for your refill to be completed.          Additional Information About Your Visit        [facility] Information     [employer] gives you secure access to your electronic health record. If you see a primary care provider, you can also send messages to your care team and  make appointments. If you have questions, please call your primary care clinic.  If you do not have a primary care provider, please call [phone] and they will assist you.        Care EveryWhere ID     This is your Care EveryWhere ID. This could be used by other organizations to access your [facility] medical records  [id number]        Your Vitals Were     Pulse Temperature Pulse Oximetry BMI (Body Mass Index)          108 98.7  F (37.1  C) (Oral) 97% 33.68 kg/m2         Blood Pressure from Last 3 Encounters:   11/16/17 135/79   10/19/17 (P) 135/77   09/07/17 133/90    Weight from Last 3 Encounters:   11/16/17 230 lb (104.3 kg)   10/23/17 227 lb (103 kg)   10/19/17 228 lb 9.6 oz (103.7 kg)              We Performed the Following     REMOVAL OF SKIN TAGS, FIRST 15        Primary Care Provider Office Phone # Fax #    Finesse Beal [phone]       [address] [facility] [address]        Equal Access to Services     : Hadii aad ku hadasho Soomaali, waaxda luqadaha, qaybta kaalmada adeegyada, gregory gutierrez . So [facility] [phone].    ATENCIÓN: Si habla español, tiene a lucero disposición servicios gratuitos de asistencia lingüística. Llame al [phone].    We comply with applicable federal civil rights laws and [place] laws. We do not discriminate on the basis of race, color, national origin, age, disability, sex, sexual orientation, or gender identity.            Thank you!     Thank you for choosing [facility]  for your care. Our goal is always to provide you with excellent care. Hearing back from our patients is one way we can continue to improve our services. Please take a few minutes to complete the written survey that you may receive in the mail after your visit with us. Thank you!             Your Updated Medication List - Protect others around you: Learn how to safely use, store and throw away your medicines at  www.disposemymeds.org.          This list is accurate as of: 11/16/17 10:42 AM.  Always use your most recent med list.                   Brand Name Dispense Instructions for use Diagnosis    allopurinol 300 MG tablet    ZYLOPRIM    90 tablet    TAKE 1 TABLET(300 MG) BY MOUTH DAILY    Gout, unspecified cause, unspecified chronicity, unspecified site       aspirin 81 MG tablet      Take 1 tablet by mouth daily.        atorvastatin 20 MG tablet    LIPITOR    90 tablet    TAKE 1 TABLET(20 MG) BY MOUTH DAILY    Hyperlipidemia LDL goal <70       blood glucose monitoring lancets     1 Box    1 each 2 times daily Use to test blood sugar 2 times daily or as directed.    Type 2 diabetes mellitus without complication (H)       blood glucose monitoring meter device kit    no brand specified    1 kit    Use to test blood sugar 2 times daily or as directed.  Accucheck meter please and all associated strips, lancets, and other supplies, 3 month supply with refills for a year.    Type 2 diabetes mellitus without complication, without long-term current use of insulin (H)       DAILY MULTIVITAMIN PO      Take  by mouth daily.        hydrochlorothiazide 50 MG tablet    HYDRODIURIL    90 tablet    TAKE 1 TABLET(50 MG) BY MOUTH EVERY MORNING    Hypertension goal BP (blood pressure) < 140/90       hydrocortisone 1 % ointment     30 g    Apply topically 2 times daily as needed    Facial dermatitis       hydrocortisone 2.5 % cream    ANUSOL-HC    30 g    Place rectally 2 times daily as needed for hemorrhoids    External hemorrhoids       hydrocortisone 25 MG Suppository    ANUSOL-HC    20 suppository    Place 1 suppository (25 mg) rectally 2 times daily as needed for hemorrhoids    External hemorrhoids       lisinopril 30 MG tablet    PRINIVIL,ZESTRIL    90 tablet    TAKE 1 TABLET(30 MG) BY MOUTH DAILY    Hypertension goal BP (blood pressure) < 140/90       metFORMIN 500 MG tablet    GLUCOPHAGE    180 tablet    TAKE 1 TABLET(500 MG) BY  MOUTH TWICE DAILY WITH MEALS    Type 2 diabetes, HbA1C goal < 8% (H)       * order for DME     1 Container    Place rectally 2 times daily 1.8 oz container of 0.2 % nifedipine suppository if possible as easier to place the medication.   Apply on the anus every 12 hours.  Just get to where the anus is tight .    Anal fissure       * order for DME     1 Units    Autocpap 10-16 cm    Obstructive sleep apnea       PARoxetine 20 MG tablet    PAXIL    180 tablet    Take 2 tablets (40 mg) by mouth daily    Anxiety       psyllium 0.52 G capsule     100 capsule    Take 1 capsule (0.52 g) by mouth daily    Constipation, unspecified constipation type       tamsulosin 0.4 MG capsule    FLOMAX    90 capsule    TAKE 1 CAPSULE(0.4 MG) BY MOUTH DAILY    Benign prostatic hyperplasia with lower urinary tract symptoms, unspecified morphology       triamcinolone 0.1 % cream    KENALOG    30 g    Apply topically 2 times daily as needed for irritation    Dermatitis       vitamin D 1000 UNITS capsule      Take 1 capsule by mouth daily.        * Notice:  This list has 2 medication(s) that are the same as other medications prescribed for you. Read the directions carefully, and ask your doctor or other care provider to review them with you.

## 2017-11-16 NOTE — PROGRESS NOTES
SUBJECTIVE:   Gary Iyer is a 57 year old male who presents to clinic today for the following health issues:       Check spot on left leg that if rubbed against something it has started to hurt    none    Problem list and histories reviewed & adjusted, as indicated.  Additional history: as documented         Reviewed and updated as needed this visit by clinical staff       Reviewed and updated as needed this visit by Provider            Had episode last night in the shower of feeling faint    Had  Fast heart rate     Whole episode lasted 5-10 minutes    Physical Exam   Constitutional: He is oriented to person, place, and time and well-developed, well-nourished, and in no distress. No distress.   HENT:   Head: Normocephalic and atraumatic.   Eyes: Conjunctivae are normal.   Neck: Carotid bruit is not present.   Cardiovascular: Normal rate, regular rhythm, normal heart sounds and intact distal pulses.  Exam reveals no gallop and no friction rub.    No murmur heard.  Pulmonary/Chest: Effort normal and breath sounds normal. No respiratory distress. He has no wheezes. He has no rales.   Musculoskeletal: He exhibits no edema.   Neurological: He is alert and oriented to person, place, and time.   Skin: He is not diaphoretic.   Psychiatric: Mood and affect normal.   no sinus/ submandib tenderness present  Reviewed CT and ultrasound reports chest/ thyroid  Patient has a skin tag left upper inner thigh.  He states this has been present for years but just recently became painful.  It is moderately red and tender.  No drainage. With consent, we wiped with alcohol pads and then used small amount lidocaine with epi for local anesthesia.  Then in usual fashion used sterile instruments to snip off lesion.  Typical skin tag.  Bandage applied.    No complications.    ASSESSMENT / PLAN:  (L91.8) Skin tag  (primary encounter diagnosis)  Comment: change bandages at home as needed.   Plan: REMOVAL OF SKIN TAGS, FIRST 15         Follow up prn.     (I10) Hypertension goal BP (blood pressure) < 140/90  Comment: blood pressure okay  Plan: no change in meds. We are not dropping blood pressure too low.  Stay well hydrated.     (E04.2) Multiple thyroid nodules  Comment: patient quite concerned about this.  Wondering if last night's episode related at all.   Plan: he has appointment coming up with ENT and I also encouraged him to see the surgeon sooner than originally planned.      (F41.9) Anxiety  Comment: as above   Plan: discussed.       I reviewed the patient's medications, allergies, medical history, family history, and social history.    Finesse Beal MD

## 2017-11-16 NOTE — PATIENT INSTRUCTIONS
Change bandages as needed today    Rest    Stay well hydrated    Talk with surgeon about recent symptoms

## 2017-11-16 NOTE — NURSING NOTE
"Chief Complaint   Patient presents with     Musculoskeletal Problem     Health Maintenance       Initial /79 (BP Location: Right arm, Patient Position: Chair, Cuff Size: Adult Regular)  Pulse 108  Temp 98.7  F (37.1  C) (Oral)  Wt 230 lb (104.3 kg)  SpO2 97%  BMI 33.68 kg/m2 Estimated body mass index is 33.68 kg/(m^2) as calculated from the following:    Height as of 10/23/17: 5' 9.29\" (1.76 m).    Weight as of this encounter: 230 lb (104.3 kg).  Medication Reconciliation: complete   Elisa Chandra CMA      "

## 2017-11-20 NOTE — PROGRESS NOTES
REASON FOR CONSULTATION:  I was asked by Dr. Granger to see this patient for a substernal goiter.      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  He did not receive head and neck radiation.  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  Regarding the thyroid mass, he has no problems with voice quality, inspiration or swallowing.  He has no symptoms of hypo or hyperthyroidism.  He has no problems with sleeping, lying down on either side.  No head and neck radiation exposure and no thyroid carcinoma risk factors.      PAST MEDICAL HISTORY, SURGICAL HISTORY AND MEDICATIONS:  Have been reviewed and are available in the EMR.      REVIEW OF SYSTEMS:  A 10-point review of systems is pertinent for that noted in the HPI.      PHYSICAL EXAMINATION:  Examining the neck, it is actually very difficult to feel the thyroid gland.  The superior border of the thyroid gland bilaterally is palpable barely.  When the patient swallows, you can get a hint of a nodule within the isthmus but really nothing of concern.  There is no cervical lymphadenopathy.      Ultrasound-guided biopsy of this thyroid nodule was benign.      ASSESSMENT:  A 2.2 cm thyroid nodule along the mid left side to isthmus with benign biopsy.      PLAN:  The patient has no significant risk factors for thyroid carcinoma.  With this benign nodule measuring less than 4 cm and asymptomatic, I would recommend that he repeat a CT scan of the chest in 1-2 years.  There is no surgical indication at this time.         MICHAEL BEASLEY MD             D: 2017 10:30   T: 2017 13:54   MT: GEORGE      Name:     CHRIST HENNING   MRN:      [id number]        Account:      [id number]   :      1960           Service Date:  10/23/2017      Document: [id number]

## 2017-11-24 ENCOUNTER — OFFICE VISIT (OUTPATIENT)
Dept: OTOLARYNGOLOGY | Facility: CLINIC | Age: 57
End: 2017-11-24

## 2017-11-24 VITALS — WEIGHT: 231 LBS | HEIGHT: 69 IN | BODY MASS INDEX: 34.21 KG/M2

## 2017-11-24 DIAGNOSIS — C32.0 VOCAL CORD CANCER (H): Primary | ICD-10-CM

## 2017-11-24 DIAGNOSIS — J38.7 LEUKOPLAKIA OF LARYNX: ICD-10-CM

## 2017-11-24 DIAGNOSIS — K21.9 GASTROESOPHAGEAL REFLUX DISEASE, ESOPHAGITIS PRESENCE NOT SPECIFIED: ICD-10-CM

## 2017-11-24 DIAGNOSIS — R49.0 DYSPHONIA: ICD-10-CM

## 2017-11-24 RX ORDER — NICOTINE POLACRILEX 4 MG/1
GUM, CHEWING ORAL
Qty: 60 TABLET | Refills: 0 | Status: SHIPPED | OUTPATIENT
Start: 2017-11-24 | End: 2018-02-16

## 2017-11-24 ASSESSMENT — PAIN SCALES - GENERAL: PAINLEVEL: NO PAIN (0)

## 2017-11-24 NOTE — NURSING NOTE
Chief Complaint   Patient presents with     RECHECK     Follow up-dysphonia, leukoplakia of vocal cords     Isaac Gomez LPN

## 2017-11-24 NOTE — PATIENT INSTRUCTIONS
1.  You were seen in the ENT Clinic today by Dr. Fournier.  If you have any questions or concerns after your appointment, please call [phone].  Press option #1 for scheduling related needs.  Press option #3 for Nurse advice.  2.  Plan is to return to clinic for biopsy of leukoplakia on 12/8/17.    3. Pt should follow with PCP regarding reflux medications.      Bijal LINARESN, RN  [facility] ENT   Head & Neck Surgery

## 2017-11-24 NOTE — PROGRESS NOTES
Dear Colleague:  Gary Iyer recently returned for follow-up at the [facility]. My clinic note from our visit is enclosed below.     I appreciate the ongoing opportunity to participate in this patient's care.    Please feel free to contact me with any questions.      Sincerely yours,  Mel Fournier M.D., M.P.H.  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 Most recently we returned to the Operating Room on 02/09/2017 for an area of new irregularity of the left true vocal fold. Pathology showed severe dysplasia with negative but close margins (for moderate, but not severe dysplasia).     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 His voice has been fine. No specific concerns today. He does have a dry cough/throat-clear that he says his wife requested he bring up; he was not so aware of this. He has reflux about once a month.    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 He saw Dr Bustillos, who recommended surveillance rather than surgical excision. He states he will work with his primary care provider for imaging surveillance.        MEDICATIONS:     Current Outpatient Prescriptions   Medication Sig Dispense Refill     allopurinol (ZYLOPRIM) 300 MG tablet TAKE 1 TABLET(300 MG) BY MOUTH DAILY 90 tablet 1     atorvastatin (LIPITOR) 20 MG tablet TAKE 1 TABLET(20 MG) BY MOUTH DAILY 90 tablet 3     metFORMIN (GLUCOPHAGE) 500 MG tablet TAKE 1 TABLET(500 MG) BY MOUTH TWICE DAILY WITH MEALS 180 tablet 1     lisinopril (PRINIVIL,ZESTRIL) 30 MG tablet TAKE 1 TABLET(30 MG) BY MOUTH DAILY 90 tablet 2      hydrochlorothiazide (HYDRODIURIL) 50 MG tablet TAKE 1 TABLET(50 MG) BY MOUTH EVERY MORNING 90 tablet 2     tamsulosin (FLOMAX) 0.4 MG capsule TAKE 1 CAPSULE(0.4 MG) BY MOUTH DAILY 90 capsule 1     order for DME Autocpap 10-16 cm 1 Units 0     blood glucose monitoring (NO BRAND SPECIFIED) meter device kit Use to test blood sugar 2 times daily or as directed.  Accucheck meter please and all associated strips, lancets, and other supplies, 3 month supply with refills for a year. 1 kit 0     PARoxetine (PAXIL) 20 MG tablet Take 2 tablets (40 mg) by mouth daily 180 tablet 1     order for DME Place rectally 2 times daily 1.8 oz container of 0.2 % nifedipine suppository if possible as easier to place the medication.    Apply on the anus every 12 hours.  Just get to where the anus is tight . 1 Container 5     psyllium 0.52 G capsule Take 1 capsule (0.52 g) by mouth daily 100 capsule 3     hydrocortisone (ANUSOL-HC) 25 MG suppository Place 1 suppository (25 mg) rectally 2 times daily as needed for hemorrhoids 20 suppository 1     hydrocortisone (ANUSOL-HC) 2.5 % rectal cream Place rectally 2 times daily as needed for hemorrhoids 30 g 1     triamcinolone (KENALOG) 0.1 % cream Apply topically 2 times daily as needed for irritation 30 g 1     hydrocortisone 1 % ointment Apply topically 2 times daily as needed 30 g 1     blood glucose monitoring (ALON MICROLET) lancets 1 each 2 times daily Use to test blood sugar 2 times daily or as directed. 1 Box 3     Cholecalciferol (VITAMIN D) 1000 UNITS capsule Take 1 capsule by mouth daily.       Multiple Vitamin (DAILY MULTIVITAMIN PO) Take  by mouth daily.       aspirin 81 MG tablet Take 1 tablet by mouth daily.         ALLERGIES:  No Known Allergies    NEW PMH/PSH: As above    REVIEW OF SYSTEMS:  The patient completed a comprehensive 11 point review of systems (below), which was reviewed. Positives are as noted below.  Patient Supplied Answers to Review of Systems   ENT ROS 4/22/2016    Ears, Nose, Throat Nasal congestion or drainage       PHYSICAL EXAM:  General: The patient was alert and conversant, and in no acute distress.    Neck: No palpable cervical lymphadenopathy, no significant tenderness to palpation of the thyrohyoid region. No obvious thyroid abnormality. Landmarks somewhat indistinct due to habitus.  Resp: Breathing comfortably, no stridor or stertor.  Neuro: Symmetric facial function.  Psych: Normal affect, pleasant and cooperative.  Voice/speech: Mild dysphonia characterized by breathiness and roughness.      Intake scores  Last 2 Scores for Patient-Answered VHI Questionnaire  VHI Total Score 8/28/2017 11/24/2017   VHI Total Score 15 0      Last 2 Scores for Patient-Answered EAT Questionnaire  EAT Total Score 8/28/2017 11/24/2017   EAT Total Score 0 0        Last 2 Scores for Patient-Answered CSI Questionnaire  CSI Total Score 8/28/2017 11/24/2017   CSI Total Score 10 0           Procedure:   Flexible fiberoptic laryngoscopy and laryngovideostroboscopy  Indications: This procedure was warranted to evaluate the patient's laryngeal anatomy and function. Risks, benefits, and alternatives were discussed.  Description: After written informed consent was obtained, a time-out was performed to confirm patient identity, procedure, and procedure site. Topical 3% lidocaine with 0.25% phenylephrine was applied to the nasal cavities. I performed the endoscopy and no complications were apparent. Continuous and stroboscopic light were utilized to assess routine phonation and variable frequency phonation.  Performed by: Mel Fournier MD MPH  SLP: NA  Findings: Normal nasopharynx. Normal base of tongue, valleculae, and epiglottis. Vocal fold mobility: right: normal; left: normal. Medial edges of the vocal folds: smooth and straight. Stable thin leukoplakic/fibrotic region left superior true vocal fold. Glissade produced appropriate elongation. There was moderate supraglottic recruitment with  connected speech. Mucosa of false vocal folds, aryepiglottic folds, piriform sinuses, and posterior glottis unremarkable with the exception of increased prominence of leukoplakia along medial left arytenoid surface. Airway was patent. No focal lesions on NBI.      The addition of stroboscopy allowed evaluation of the mucosal wave.   Amplitude: right: mildly decreased; left: mildly decreased. Symmetry: intermittent symmetry. Closure pattern: complete. Closure plane: at glottic level. Phase distribution: normal.    Imaging  CT Neck 8/28/17  IMPRESSION:      1. Multiple thyroid gland nodules, some of them partially visualized  and extending into the mediastinum. The isthmus nodule has increased  in size since 9/29/2014.  2. No other masses visualized within the neck. No glottic,  supraglottic, or infraglottic mass is identified noting that a mucosal  lesion cannot be excluded.  3. No cervical lymphadenopathy.               IMPRESSION AND PLAN:   Gary Iyer returns with some interval increase in leukoplakia along the left medial arytenoid. This may be related to his chronic cough/throat-clearing, but given his history we agreed upon an in-clinic biopsy for risk stratification. I also asked him to resume speech therapy with Jasmyne Padilla MM, MA, CCC-SLP for management of his chronic cough/throat clearing. He also plans to ask his primary care provider for advice on what to do regarding his reflux. He has been on omeprazole intermittently in the past. I will see him back over the next few weeks for a biopsy. I appreciate the opportunity to participate in the care of this pleasant patient.

## 2017-11-24 NOTE — MR AVS SNAPSHOT
After Visit Summary   11/24/2017    Gary Iyer    MRN: [id number]           Patient Information     Date Of Birth          1960        Visit Information        Provider Department      11/24/2017 8:20 AM Mel Fournier MD [facility] Nose [facility] Thro[facility]        Today's Diagnoses     Vocal cord cancer (H)    -  1    Dysphonia        Leukoplakia of larynx          Care Instructions    1.  You were seen in the ENT Clinic today by Dr. Fournier.  If you have any questions or concerns after your appointment, please call [phone].  Press option #1 for scheduling related needs.  Press option #3 for Nurse advice.  2.  Plan is to return to clinic for biopsy of leukoplakia.      Bijal LINARESN, RN  [facility]   Head & Neck Surgery               Follow-ups after your visit        Your next 10 appointments already scheduled     Dec 06, 2017 11:00 AM CST   (Arrive by 10:45 AM)   RETURN SLP VOICE with UMM Moore   [facility] ([facility])    [address] [place] [id number] [phone]            Dec 11, 2017  2:00 PM CST   (Arrive by 1:45 PM)   RETURN ENDOCRINE with Tomy Granger MD   [facility] Endocrinology ([facility])    [address] [place] [id number] [phone]              Who to contact     Please call your clinic at [phone] to:    Ask questions about your health    Make or cancel appointments    Discuss your medicines    Learn about your test results    Speak to your doctor   If you have compliments or concerns about an experience at your clinic, or if you wish to file a complaint, please contact [facility] Patient Relations at [phone] or email us at [email or web address]         Additional Information About Your Visit        MyChart Information     [employer] gives you secure access to your  "electronic health record. If you see a primary care provider, you can also send messages to your care team and make appointments. If you have questions, please call your primary care clinic.  If you do not have a primary care provider, please call [phone] and they will assist you.      [employer] is an electronic gateway that provides easy, online access to your medical records. With [employer], you can request a clinic appointment, read your test results, renew a prescription or communicate with your care team.     To access your existing account, please contact your [facility] or call [phone] for assistance.        Care EveryWhere ID     This is your Care EveryWhere ID. This could be used by other organizations to access your [facility] medical records  [id number]        Your Vitals Were     Height BMI (Body Mass Index)                1.74 m (5' 8.5\") 34.61 kg/m2           Blood Pressure from Last 3 Encounters:   11/16/17 135/79   10/19/17 (P) 135/77   09/07/17 133/90    Weight from Last 3 Encounters:   11/24/17 104.8 kg (231 lb)   11/16/17 104.3 kg (230 lb)   10/23/17 103 kg (227 lb)              We Performed the Following     IMAGESTREAM RECORDING ORDER     LARYNGOSCOPY FLEX/RIGID W STROBOSCOPY        Primary Care Provider Office Phone # Fax #    Finesse Bael [phone]       [address] [facility] [address]        Equal Access to Services     : Hadii aad ku hadasho Soomaali, waaxda luqadaha, qaybta kaalmada adeegyada, gregory anthony'zee . So [facility] [phone].    ATENCIÓN: Si habla español, tiene a lucero disposición servicios gratuitos de asistencia lingüística. Llame al [phone].    We comply with applicable federal civil rights laws and [place] laws. We do not discriminate on the basis of race, color, national origin, age, disability, sex, sexual orientation, or gender identity.            Thank you!  "    Thank you for choosing [facility] E[facility] NOSE [facility] TH[facility]AT  for your care. Our goal is always to provide you with excellent care. Hearing back from our patients is one way we can continue to improve our services. Please take a few minutes to complete the written survey that you may receive in the mail after your visit with us. Thank you!             Your Updated Medication List - Protect others around you: Learn how to safely use, store and throw away your medicines at www.disposemymeds.org.          This list is accurate as of: 11/24/17  9:45 AM.  Always use your most recent med list.                   Brand Name Dispense Instructions for use Diagnosis    allopurinol 300 MG tablet    ZYLOPRIM    90 tablet    TAKE 1 TABLET(300 MG) BY MOUTH DAILY    Gout, unspecified cause, unspecified chronicity, unspecified site       aspirin 81 MG tablet      Take 1 tablet by mouth daily.        atorvastatin 20 MG tablet    LIPITOR    90 tablet    TAKE 1 TABLET(20 MG) BY MOUTH DAILY    Hyperlipidemia LDL goal <70       blood glucose monitoring lancets     1 Box    1 each 2 times daily Use to test blood sugar 2 times daily or as directed.    Type 2 diabetes mellitus without complication (H)       blood glucose monitoring meter device kit    no brand specified    1 kit    Use to test blood sugar 2 times daily or as directed.  Accucheck meter please and all associated strips, lancets, and other supplies, 3 month supply with refills for a year.    Type 2 diabetes mellitus without complication, without long-term current use of insulin (H)       DAILY MULTIVITAMIN PO      Take  by mouth daily.        hydrochlorothiazide 50 MG tablet    HYDRODIURIL    90 tablet    TAKE 1 TABLET(50 MG) BY MOUTH EVERY MORNING    Hypertension goal BP (blood pressure) < 140/90       hydrocortisone 1 % ointment     30 g    Apply topically 2 times daily as needed    Facial dermatitis       hydrocortisone 2.5 % cream    ANUSOL-HC    30 g    Place rectally 2 times  daily as needed for hemorrhoids    External hemorrhoids       hydrocortisone 25 MG Suppository    ANUSOL-HC    20 suppository    Place 1 suppository (25 mg) rectally 2 times daily as needed for hemorrhoids    External hemorrhoids       lisinopril 30 MG tablet    PRINIVIL,ZESTRIL    90 tablet    TAKE 1 TABLET(30 MG) BY MOUTH DAILY    Hypertension goal BP (blood pressure) < 140/90       metFORMIN 500 MG tablet    GLUCOPHAGE    180 tablet    TAKE 1 TABLET(500 MG) BY MOUTH TWICE DAILY WITH MEALS    Type 2 diabetes, HbA1C goal < 8% (H)       * order for DME     1 Container    Place rectally 2 times daily 1.8 oz container of 0.2 % nifedipine suppository if possible as easier to place the medication.   Apply on the anus every 12 hours.  Just get to where the anus is tight .    Anal fissure       * order for DME     1 Units    Autocpap 10-16 cm    Obstructive sleep apnea       PARoxetine 20 MG tablet    PAXIL    180 tablet    Take 2 tablets (40 mg) by mouth daily    Anxiety       psyllium 0.52 G capsule     100 capsule    Take 1 capsule (0.52 g) by mouth daily    Constipation, unspecified constipation type       tamsulosin 0.4 MG capsule    FLOMAX    90 capsule    TAKE 1 CAPSULE(0.4 MG) BY MOUTH DAILY    Benign prostatic hyperplasia with lower urinary tract symptoms, unspecified morphology       triamcinolone 0.1 % cream    KENALOG    30 g    Apply topically 2 times daily as needed for irritation    Dermatitis       vitamin D 1000 UNITS capsule      Take 1 capsule by mouth daily.        * Notice:  This list has 2 medication(s) that are the same as other medications prescribed for you. Read the directions carefully, and ask your doctor or other care provider to review them with you.

## 2017-11-24 NOTE — LETTER
11/24/2017       RE: Gary Iyer  [address] [facility] [address]       Dear Colleague:  Gary Iyer recently returned for follow-up at the [facility]. My clinic note from our visit is enclosed below.     I appreciate the ongoing opportunity to participate in this patient's care.    Please feel free to contact me with any questions.      Sincerely yours,  Mel Fournier M.D., M.P.H.  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 Most recently we returned to the Operating Room on 02/09/2017 for an area of new irregularity of the left true vocal fold. Pathology showed severe dysplasia with negative but close margins (for moderate, but not severe dysplasia).     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 His voice has been fine. No specific concerns today. He does have a dry cough/throat-clear that he says his wife requested he bring up; he was not so aware of this. He has reflux about once a month.    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 He saw Dr Bustillos, who recommended surveillance rather than surgical excision. He states he will work with his primary care provider for imaging surveillance.        MEDICATIONS:     Current Outpatient Prescriptions   Medication Sig Dispense Refill     allopurinol (ZYLOPRIM) 300 MG tablet TAKE 1 TABLET(300 MG) BY MOUTH DAILY 90 tablet 1     atorvastatin (LIPITOR) 20 MG tablet TAKE 1 TABLET(20 MG) BY MOUTH DAILY 90 tablet 3     metFORMIN (GLUCOPHAGE) 500 MG tablet TAKE 1 TABLET(500 MG) BY MOUTH TWICE DAILY WITH MEALS 180 tablet 1     lisinopril  (PRINIVIL,ZESTRIL) 30 MG tablet TAKE 1 TABLET(30 MG) BY MOUTH DAILY 90 tablet 2     hydrochlorothiazide (HYDRODIURIL) 50 MG tablet TAKE 1 TABLET(50 MG) BY MOUTH EVERY MORNING 90 tablet 2     tamsulosin (FLOMAX) 0.4 MG capsule TAKE 1 CAPSULE(0.4 MG) BY MOUTH DAILY 90 capsule 1     order for DME Autocpap 10-16 cm 1 Units 0     blood glucose monitoring (NO BRAND SPECIFIED) meter device kit Use to test blood sugar 2 times daily or as directed.  Accucheck meter please and all associated strips, lancets, and other supplies, 3 month supply with refills for a year. 1 kit 0     PARoxetine (PAXIL) 20 MG tablet Take 2 tablets (40 mg) by mouth daily 180 tablet 1     order for DME Place rectally 2 times daily 1.8 oz container of 0.2 % nifedipine suppository if possible as easier to place the medication.    Apply on the anus every 12 hours.  Just get to where the anus is tight . 1 Container 5     psyllium 0.52 G capsule Take 1 capsule (0.52 g) by mouth daily 100 capsule 3     hydrocortisone (ANUSOL-HC) 25 MG suppository Place 1 suppository (25 mg) rectally 2 times daily as needed for hemorrhoids 20 suppository 1     hydrocortisone (ANUSOL-HC) 2.5 % rectal cream Place rectally 2 times daily as needed for hemorrhoids 30 g 1     triamcinolone (KENALOG) 0.1 % cream Apply topically 2 times daily as needed for irritation 30 g 1     hydrocortisone 1 % ointment Apply topically 2 times daily as needed 30 g 1     blood glucose monitoring (ALON MICROLET) lancets 1 each 2 times daily Use to test blood sugar 2 times daily or as directed. 1 Box 3     Cholecalciferol (VITAMIN D) 1000 UNITS capsule Take 1 capsule by mouth daily.       Multiple Vitamin (DAILY MULTIVITAMIN PO) Take  by mouth daily.       aspirin 81 MG tablet Take 1 tablet by mouth daily.         ALLERGIES:  No Known Allergies    NEW PMH/PSH: As above    REVIEW OF SYSTEMS:  The patient completed a comprehensive 11 point review of systems (below), which was reviewed. Positives are  as noted below.  Patient Supplied Answers to Review of Systems   ENT ROS 4/22/2016   Ears, Nose, Throat Nasal congestion or drainage       PHYSICAL EXAM:  General: The patient was alert and conversant, and in no acute distress.    Neck: No palpable cervical lymphadenopathy, no significant tenderness to palpation of the thyrohyoid region. No obvious thyroid abnormality. Landmarks somewhat indistinct due to habitus.  Resp: Breathing comfortably, no stridor or stertor.  Neuro: Symmetric facial function.  Psych: Normal affect, pleasant and cooperative.  Voice/speech: Mild dysphonia characterized by breathiness and roughness.      Intake scores  Last 2 Scores for Patient-Answered VHI Questionnaire  VHI Total Score 8/28/2017 11/24/2017   VHI Total Score 15 0      Last 2 Scores for Patient-Answered EAT Questionnaire  EAT Total Score 8/28/2017 11/24/2017   EAT Total Score 0 0        Last 2 Scores for Patient-Answered CSI Questionnaire  CSI Total Score 8/28/2017 11/24/2017   CSI Total Score 10 0           Procedure:   Flexible fiberoptic laryngoscopy and laryngovideostroboscopy  Indications: This procedure was warranted to evaluate the patient's laryngeal anatomy and function. Risks, benefits, and alternatives were discussed.  Description: After written informed consent was obtained, a time-out was performed to confirm patient identity, procedure, and procedure site. Topical 3% lidocaine with 0.25% phenylephrine was applied to the nasal cavities. I performed the endoscopy and no complications were apparent. Continuous and stroboscopic light were utilized to assess routine phonation and variable frequency phonation.  Performed by: Mel Fournier MD MPH  SLP: NA  Findings: Normal nasopharynx. Normal base of tongue, valleculae, and epiglottis. Vocal fold mobility: right: normal; left: normal. Medial edges of the vocal folds: smooth and straight. Stable thin leukoplakic/fibrotic region left superior true vocal fold.  Glissade produced appropriate elongation. There was moderate supraglottic recruitment with connected speech. Mucosa of false vocal folds, aryepiglottic folds, piriform sinuses, and posterior glottis unremarkable with the exception of increased prominence of leukoplakia along medial left arytenoid surface. Airway was patent. No focal lesions on NBI.      The addition of stroboscopy allowed evaluation of the mucosal wave.   Amplitude: right: mildly decreased; left: mildly decreased. Symmetry: intermittent symmetry. Closure pattern: complete. Closure plane: at glottic level. Phase distribution: normal.    Imaging  CT Neck 8/28/17  IMPRESSION:      1. Multiple thyroid gland nodules, some of them partially visualized  and extending into the mediastinum. The isthmus nodule has increased  in size since 9/29/2014.  2. No other masses visualized within the neck. No glottic,  supraglottic, or infraglottic mass is identified noting that a mucosal  lesion cannot be excluded.  3. No cervical lymphadenopathy.               IMPRESSION AND PLAN:   Gary Iyer returns with some interval increase in leukoplakia along the left medial arytenoid. This may be related to his chronic cough/throat-clearing, but given his history we agreed upon an in-clinic biopsy for risk stratification. I also asked him to resume speech therapy with Jasmyne Padilla MM, MA, CCC-SLP for management of his chronic cough/throat clearing. He also plans to ask his primary care provider for advice on what to do regarding his reflux. He has been on omeprazole intermittently in the past. I will see him back over the next few weeks for a biopsy. I appreciate the opportunity to participate in the care of this pleasant patient.         Mel Fournier MD

## 2017-11-29 ENCOUNTER — TELEPHONE (OUTPATIENT)
Dept: FAMILY MEDICINE | Facility: CLINIC | Age: 57
End: 2017-11-29

## 2017-11-29 NOTE — TELEPHONE ENCOUNTER
Reason for Call:  Other appointment    Detailed comments: Patient is wondering how he can get scheduled for a functional capacity test. He states that Dr Beal had told him to do this as part of his disability.    Phone Number Patient can be reached at: Home number on file [phone] (home)    Best Time: any    Can we leave a detailed message on this number? YES    Call taken on 11/29/2017 at 10:36 AM by Cheryl Saucedo

## 2017-11-29 NOTE — TELEPHONE ENCOUNTER
Patient returned call - relayed below message from PCP - patient verbalized understanding.    Diana Klein RN  [facility]

## 2017-11-29 NOTE — TELEPHONE ENCOUNTER
Please inform patient that we don't do that through our clinics.  His  would likely know of places where it could be done.  It is not usually covered by insurance.    Finesse Beal MD

## 2017-11-29 NOTE — TELEPHONE ENCOUNTER
Called patient at [phone] (home). Left message on voicemail to return phone call to triage.  Lori Mercado RN [facility] Triage.

## 2017-12-06 ENCOUNTER — TELEPHONE (OUTPATIENT)
Dept: OTOLARYNGOLOGY | Facility: CLINIC | Age: 57
End: 2017-12-06

## 2017-12-06 ENCOUNTER — OFFICE VISIT (OUTPATIENT)
Dept: OTOLARYNGOLOGY | Facility: CLINIC | Age: 57
End: 2017-12-06

## 2017-12-06 DIAGNOSIS — R05.3 CHRONIC COUGH: ICD-10-CM

## 2017-12-06 DIAGNOSIS — R49.0 DYSPHONIA: Primary | ICD-10-CM

## 2017-12-06 DIAGNOSIS — J38.7 LESION OF LARYNX: ICD-10-CM

## 2017-12-06 NOTE — MR AVS SNAPSHOT
After Visit Summary   12/6/2017    Gary Iyer    MRN: [id number]           Patient Information     Date Of Birth          1960        Visit Information        Provider Department      12/6/2017 11:00 AM Jasmyne Padilla, SLP [facility]        Today's Diagnoses     Dysphonia    -  1    Chronic cough        Lesion of larynx           Follow-ups after your visit        Your next 10 appointments already scheduled     Dec 15, 2017  4:50 PM CST   [facility] Extremity with Travis Haro, PT   [facility] ([facility])    [address]  [place] [address]   [phone]            Dec 22, 2017 10:50 AM CST   (Arrive by 10:35 AM)   Return Visit with MD ALEX [facility] ([facility])    [address] [place] [address]  4th [facility] [id number] [phone]           This is a multi-disciplinary care team visit as patients with your type of problem are usually seen by a team of an MD and a Speech-Language Pathologist (who is a specialist in disorders of the voice, throat, and breathing).  Please plan about 2 hours for your visit, which will likely include Laryngeal Function Studies, a Voice/Swallow/Breathing Evaluation, and an Endoscopic Laryngeal Examination to provide a comprehensive evaluation.  Please check with your insurance company to ensure you are covered for these services. - It is important to know that if you are evaluated and/or treated by both a physician and a speech pathologist during your visit, your billing will reflect the input that you receive from both providers as separate professionals. Although most insurance plans do cover these services, we encourage you to contact your insurance company prior to your visit to determine whether there are any coverage limitations that might affect you financially. - Billing/procedure codes that are frequently associated with visits to our clinic include (but are  not limited to) the ones listed below. Most patients will not need all of these items, but it may be useful to ask your insurance company's patient . 73695: Flexible fiberoptic laryngoscopy, 04646: Laryngoscopy; flexible or rigid fiberoptic, with stroboscopy, 46054: Flexible endoscopic evaluation of swallowing, 97606: Laryngeal function aerodynamic evaluation, 81315: Evaluation of Voice and Resonance, 46177: Speech pathology treatment for voice, speech, communication, 29271: Speech pathology treatment for swallowing/oral function for feeding - If you have any concerns or questions, or if you would prefer not to have a speech pathologist involved in your visit, please contact our Clinic Coordinator at [phone], at least 24 hours prior to your appointment.              Who to contact     Please call your clinic at [phone] to:    Ask questions about your health    Make or cancel appointments    Discuss your medicines    Learn about your test results    Speak to your doctor   If you have compliments or concerns about an experience at your clinic, or if you wish to file a complaint, please contact [facility] Patient Relations at [phone] or email us at [email or web address]         Additional Information About Your Visit        NeuroPace Information     NeuroPace gives you secure access to your electronic health record. If you see a primary care provider, you can also send messages to your care team and make appointments. If you have questions, please call your primary care clinic.  If you do not have a primary care provider, please call [phone] and they will assist you.      NeuroPace is an electronic gateway that provides easy, online access to your medical records. With [employer]Pace, you can request a clinic appointment, read your test results, renew a prescription or communicate with your care team.     To access your existing account, please  contact your [facility] or call [phone] for assistance.        Care EveryWhere ID     This is your Care EveryWhere ID. This could be used by other organizations to access your [facility] medical records  [id number]         Blood Pressure from Last 3 Encounters:   11/16/17 135/79   10/19/17 (P) 135/77   09/07/17 133/90    Weight from Last 3 Encounters:   11/24/17 104.8 kg (231 lb)   11/16/17 104.3 kg (230 lb)   10/23/17 103 kg (227 lb)              We Performed the Following     C BEHAVIORAL & QUALITATIVE ANALYSIS VOICE AND RESONANCE     SPEECH/HEARING THERAPY, INDIVIDUAL        Primary Care Provider Office Phone # Fax #    Finesse Beal [phone]       [address] [facility] [address]        Equal Access to Services     [facility]: Hadii aad ku hadasho Soomaali, waaxda luqadaha, qaybta kaalmada adeegyada, gregory mercer hayzee gutierrez . So [facility] [phone].    ATENCIÓN: Si habla español, tiene a lucero disposición servicios gratuitos de asistencia lingüística. [facility] [phone].    We comply with applicable federal civil rights laws and [place] laws. We do not discriminate on the basis of race, color, national origin, age, disability, sex, sexual orientation, or gender identity.            Thank you!     Thank you for choosing [facility]  for your care. Our goal is always to provide you with excellent care. Hearing back from our patients is one way we can continue to improve our services. Please take a few minutes to complete the written survey that you may receive in the mail after your visit with us. Thank you!             Your Updated Medication List - Protect others around you: Learn how to safely use, store and throw away your medicines at www.disposemymeds.org.          This list is accurate as of: 12/6/17 11:59 PM.  Always use your most recent med list.                   Brand Name Dispense Instructions for use  Diagnosis    allopurinol 300 MG tablet    ZYLOPRIM    90 tablet    TAKE 1 TABLET(300 MG) BY MOUTH DAILY    Gout, unspecified cause, unspecified chronicity, unspecified site       aspirin 81 MG tablet      Take 1 tablet by mouth daily.        atorvastatin 20 MG tablet    LIPITOR    90 tablet    TAKE 1 TABLET(20 MG) BY MOUTH DAILY    Hyperlipidemia LDL goal <70       blood glucose monitoring lancets     1 Box    1 each 2 times daily Use to test blood sugar 2 times daily or as directed.    Type 2 diabetes mellitus without complication (H)       blood glucose monitoring meter device kit    no brand specified    1 kit    Use to test blood sugar 2 times daily or as directed.  Accucheck meter please and all associated strips, lancets, and other supplies, 3 month supply with refills for a year.    Type 2 diabetes mellitus without complication, without long-term current use of insulin (H)       DAILY MULTIVITAMIN PO      Take  by mouth daily.        hydrochlorothiazide 50 MG tablet    HYDRODIURIL    90 tablet    TAKE 1 TABLET(50 MG) BY MOUTH EVERY MORNING    Hypertension goal BP (blood pressure) < 140/90       hydrocortisone 1 % ointment     30 g    Apply topically 2 times daily as needed    Facial dermatitis       hydrocortisone 2.5 % cream    ANUSOL-HC    30 g    Place rectally 2 times daily as needed for hemorrhoids    External hemorrhoids       hydrocortisone 25 MG Suppository    ANUSOL-HC    20 suppository    Place 1 suppository (25 mg) rectally 2 times daily as needed for hemorrhoids    External hemorrhoids       lisinopril 30 MG tablet    PRINIVIL,ZESTRIL    90 tablet    TAKE 1 TABLET(30 MG) BY MOUTH DAILY    Hypertension goal BP (blood pressure) < 140/90       metFORMIN 500 MG tablet    GLUCOPHAGE    180 tablet    TAKE 1 TABLET(500 MG) BY MOUTH TWICE DAILY WITH MEALS    Type 2 diabetes, HbA1C goal < 8% (H)       omeprazole 20 MG tablet     60 tablet    Take 1 cap BID x 1-2 weeks; then 1 cap daily x 1-2 weeks; then 1  cap QOD x 1-2 weeks.    Dysphonia, Gastroesophageal reflux disease, esophagitis presence not specified, Leukoplakia of larynx       * order for DME     1 Container    Place rectally 2 times daily 1.8 oz container of 0.2 % nifedipine suppository if possible as easier to place the medication.   Apply on the anus every 12 hours.  Just get to where the anus is tight .    Anal fissure       * order for DME     1 Units    Autocpap 10-16 cm    Obstructive sleep apnea       PARoxetine 20 MG tablet    PAXIL    180 tablet    Take 2 tablets (40 mg) by mouth daily    Anxiety       psyllium 0.52 G capsule     100 capsule    Take 1 capsule (0.52 g) by mouth daily    Constipation, unspecified constipation type       tamsulosin 0.4 MG capsule    FLOMAX    90 capsule    TAKE 1 CAPSULE(0.4 MG) BY MOUTH DAILY    Benign prostatic hyperplasia with lower urinary tract symptoms, unspecified morphology       triamcinolone 0.1 % cream    KENALOG    30 g    Apply topically 2 times daily as needed for irritation    Dermatitis       vitamin D 1000 UNITS capsule      Take 1 capsule by mouth daily.        * Notice:  This list has 2 medication(s) that are the same as other medications prescribed for you. Read the directions carefully, and ask your doctor or other care provider to review them with you.

## 2017-12-06 NOTE — PROGRESS NOTES
[facility]  Elijah Baker Jr., M.D., F.A.C.S.  Mel Fournier M.D., M.P.H.  Misty Clark, Ph.D., CCC/SLP  Jasmyne Padilla M.M. (voice), M.A., CCC/SLP  Zack Mcdaniels M.M. (voice), M.A., CCC/SLP    Evaluation report    Clinician: Jasmyne Padilla M.M. (voice), M.A., CCC/SLP     Seen briefly in conjunction with: Dr. Fournier  on 11/24/17.  At that time, Dr. Fournier recommended that he pursue speech therapy to optimize his laryngeal efficiency and learn cough suppression strategies prior to a biopsy procedure, in order to optimize his healing and reduce phonotraumatic behaviors.     Referring physician:  Julito  Patient: Gary Iyer  Date of Visit: 12/6/2017    HISTORY  Chief complaint: Gary Iyer is a 57 year old presenting today for evaluation and treatmen of muscle tension dysphonia and cough.  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  He has completed a course of speech therapy in the past with my colleague, Dr. Misty Clark.    CURRENT SYMPTOMS INCLUDE  VOICE    Reports is WNL.    Recent laryngeal exam demonstrated moderate supraglottic hyperfunction.    COUGH/THROAT CLEARING    He is not as aware of his coughing issue, but his wife has recently reported an increase in the frequency and severity of his cough     SWALLOWING    No issue    ADDITIONAL    Denies breathing issues    OTHER PERTINENT HISTORY    Complex medical history: please also refer to Dr. Fournier's dictation.     Past Medical History:   Diagnosis Date     Diabetes (H)     2yr     Multinodular goiter (nontoxic) 6/10/2013     Sleep apnea 8yr     Past Surgical History:   Procedure Laterality Date     COLONOSCOPY       COLONOSCOPY  6-22-12    Repeat Colonoscopy in 10 yrs.      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     HEAD & NECK SURGERY  1/25/11    callous removal from throat     LARYNGOSCOPY WITH BIOPSY(IES)  1-25-11     LARYNGOSCOPY WITH  "BIOPSY(IES) N/A 2/9/2017    Procedure: LARYNGOSCOPY WITH BIOPSY(IES);  Surgeon: Mel Fournier MD;  Location: [facility] OR     LARYNGOSCOPY WITH MICROSCOPE N/A 2/9/2017    Procedure: LARYNGOSCOPY WITH MICROSCOPE;  Surgeon: Mle Fournier MD;  Location: [facility] OR     LASER CO2 LARYNGOSCOPY, COMPLEX  6/27/2013    Procedure: LASER CO2 LARYNGOSCOPY, COMPLEX;  Micro Direct Laryngoscopy With Micro Flap Excision Of Lesion Lumenis Co2 Laser ;  Surgeon: Mel Fournier MD;  Location: [facility] OR     LASER CO2 LESION ORAL N/A 2/9/2017    Procedure: LASER CO2 LESION ORAL;  Surgeon: Mel Fournier MD;  Location:  OR     ORTHOPEDIC SURGERY  March 2016    left tibia orif with abbi 2-2016     VASECTOMY  02/03       OBJECTIVE  PATIENT REPORTED MEASURES  Patient Supplied Answers To VHI Questionnaire  Voice Handicap Index (VHI-10) 12/8/2017   My voice makes it difficult for people to hear me 1   People have difficulty understanding me in a noisy room 1   My voice difficulties restrict my personal and social life.  0   I feel left out of conversations because of my voice 0   My voice problem causes me to lose income 1   I feel as though I have to strain to produce voice 0   The clarity of my voice is unpredictable 1   My voice problem upsets me 0   My voice makes me feel handicapped 2   People ask, \"What's wrong with your voice?\" 1   VHI-10 7       Patient Supplied Answers To CSI Questionnaire  Cough Severity Index (CSI) 12/9/2015   My cough is worse when I lie down 0   My coughing problem causes me to restrict my personal and social life 0   I tend to avoid places because of my cough problem 0   I feel embarrassed because of my coughing problem 0   People ask, ''What's wrong?'' because I cough a lot 0   I run out of air when I cough 0   My coughing problem affects my voice 0   My coughing problem limits my physical activity 0   My coughing problem upsets me 0   People ask me if I am sick because I cough a " lot 0   CSI Score 0       Patient Supplied Answers To EAT Questionnaire  Eating Assessment Tool (EAT-10) 12/9/2015   My swallowing problem has caused me to lose weight 0   My swallowing problem interferes with my ability to go out for meals 0   Swallowing liquids takes extra effort 0   Swallowing solids takes extra effort 0   Swallowing pills takes extra effort 0   Swallowing is painful 0   The pleasure of eating is affected by my swallowing 0   When I swallow food sticks in my throat 0   I cough when I eat 0   Swallowing is stressful 0   EAT-10 0     PERCEPTUAL EVALUATION (CPT 32705)  POSTURE / TENSION:     neck and shoulders    BREATHING:     clavicular muscle use pattern     LARYNGEAL PALPATION:     firm musculature    VOICE:    Roughness: Mild to moderate Consistent    Breathiness: WNL    Strain: WNL    Loudness    Conversational speech:  WNL    Projected speech:  WNL    Pitch:    Conversational speech:  WNL    Pitch glide: neurologically normal    Resonance:    Conversational speech:  laryngeal pharyngeal resonance    Singing vs. Speech:  n/a    CAPE-V Overall Severity:  25/100    COUGH/THROAT CLEARING:    Occasional    Dry    Locus of cough/ throat clear: sounds consistent with upper airway       Overall within the past week, where would you rate the severity of your cough (0-1= normal, 10 is most severe)    1 -  2  -  3  -  4 -  5 -  6  - 7  -  8  -  9  -  10; awareness is limited    Cough Severity Index  Please fill out this questionnaire if you experience cough as a symptom.  Please give response that indicates how frequently you have these experiences.  0 = never 1 = almost never 2 = sometimes 3 = almost always 4 = always    1. My cough is worse when I lie down. 0  2. My coughing problem causes me to restrict my personal and social life. 1  3. I tend to avoid places because of my cough problem. 0  4. I feel embarrassed because of my coughing problem. 0  5. People ask,   What s wrong?   because I cough a  lot. 0  6. I run out of air when I cough. 0  7. My coughing problem affects my voice. 0  8. My coughing problem limits my physical activity. 0  9. My coughing problem upsets me. 0  10. People ask me if I am sick because I cough a lot. 0  1/40    LARYNGEAL EXAMINATION  Please refer to Dr. Fournier's report from 11/24/17.  She has been watching an area of leukoplakia on the medial left arytenoid, and Mr. Iyer will undergo an in-clinic biopsy on 12/8/17 to determine the pathology    ASSESSMENT / PLAN  IMPRESSIONS: Gary Iyer is a 57 year old , presenting today with R49.0 (Dysphonia), R05 (Chronic Cough) and J38.7 (lesion of the larynx).    RECOMMENDATIONS:     A course of speech therapy is recommended to optimize vocal technique, promote reduced discomfort, effort and fatigue, promote reduction of vocal fold impact to help resolve the lesions, optimize surgical results and help reduce chronic cough and mucosal irritation.    He demonstrates a Good prognosis for improvement given adherence to therapeutic recommendations.     Positive indicators: positive response to therapy probes diagnosis is known to respond to treatment high level of comittment    Negative indicators: Limited awareness of current cough and voice symptoms    DURATION / FREQUENCY: 4 one-hour sessions    GOALS:  Patient goal:   To improve and maintain a healthy voice quality  To understand the problem and fix it as much as possible  To reduce his cough to acceptable levels  To resolve the vocal fold lesions    Short-term goal(s): Within the first 4 sessions, Mr. Iyer:  1. will be able to demonstrate provided cough suppression and substitution strategies from memory independently with 90% accuracy  2. will utilize silent inhalation with good low-respiratory engagement 75% of the time during therapy tasks with minimal clinician support  3. will demonstrate semi-occluded vocal tract (SOVT) exercises with at least 80% accuracy with no  clinician support    Long-term goal(s): In 6 months, Mr. Iyer will:  1. Report a week of typical vocal activities, in which dysphonia and throat discomfort do not exceed a level of 3 out of 10, 80% of the time   2. Report a week with no more than 3 episodes of coughing, that do not last more than 2 seconds  3. Report resolution of symptoms, and use of optimal voice quality and comfort to meet personal, social, and professional needs, 90% of the time during a typical week of vocal activities  This treatment plan was developed with the patient who agreed with the recommendations.    _______________________________________________________________________  THERAPY NOTE (CPT 74049)  Date of Service: 12/6/2017    SUBJECTIVE / OBJECTIVE:  Please refer to my evaluation report from today's encounter for full details regarding subjective data, patient reported measures, and diagnostic findings.    THERAPEUTIC ACTIVITIES  Instructed concepts and techniques for optimal vocal hygiene including:    Systemic hydration, including strategies for increasing daily water intake    Topical hydration - Gargling, saline nasal irrigation, humidification, steam, guaifenesin    Awareness and reduction of phonotraumatic behaviors    Moderating voice use    Substituting non-voice alternative behaviors    Avoiding cough and throat clearing    Chronic cough / throat clearing reduction therapy    Suppression and substitution strategies were instructed including    Swallowing substitution techniques    Breathing suppression techniques to reduce laryngeal tension    Low impact glottic coup and soft cough    Techniques to raise awareness of habitual throat clearing    Additionally he was instructed to keep a log of what circumstances are eliciting cough / throat clear    Exercises to promote optimal respiratory mechanics    I provided explanation of the anatomy and physiology of respiration for speech and singing; he found this to be helpful    He  "demonstrated excessive upper thoracic engagement during inhalation    Demonstrated difficulty allowing abdominal relaxation for inhalation    Practiced in a forward leaning seated posture     With clinician support, patient was able to demonstrate improved abdominal relaxation and engagement on inhalation    Optimal exhalation using inward engagement of the abdominal wall with no corresponding collapse of the upper chest cavity was trained using the pulsed \"sh\" task    Semi-Occluded Vocal Tract (SOVT) exercises instructed to reduce laryngeal tension, promote vocal fold pliability, and coordinate respiration and phonation    Straw with water resistance was found to be most facilitating     Sustained phonation, and voice vs. voiceless productions used to promote easy voicing and raise awareness of laryngeal tension    Ascending and descending glides utilized to promote vocal fold pliability    Instructed to use these exercises as a warm-up / cooldown, and to re-calibrate the voice throughout the day.    Counseling and Education    Asked many questions about the nature of his symptoms, and I answered all of these thoroughly.      Concepts of an optimal regimen for practice were instructed.  o He should use an interval schedule of practice, with brief periods of practice frequently throughout each day  o [place] concepts of volitional practice to facilitate motor learning.  o Modifications were instructed for his post-surgical period of voice rest    I provided an audio recording and handouts of today's therapeutic activities to facilitate practice.    ASSESSMENT/PLAN  PROGRESS TOWARD LONG TERM GOALS:   Minimal at this point, as this is first session, but good learning today    IMPRESSIONS: Gary Iyer is a 57 year old , presenting today with R49.0 (Dysphonia), R05 (Chronic Cough) and J38.7 (lesion of the larynx).  Today, Mr. Iyer learned several new strategies to help optimize his post-surgical " healing and reduce phonotraumatic behaviors.    PLAN: I will see Mr. Iyer in 2-3 weeks, at which time we will re-assess his post surgical status.     TOTAL SERVICE TIME: 60 minutes  EVALUATION OF VOICE AND RESONANCE: (14777): 15 minutes    TREATMENT (14897): 45 minutes  NO CHARGE FACILITY FEE (64674)    Jasmyne Padilla M.M. (voice), M.A., CCC/SLP  Speech-Language Pathologist  [facility]  [phone]

## 2017-12-06 NOTE — PROGRESS NOTES
"  Post Surgical Voice Care  This information details the specific techniques you will need to use to change your activity in order to ensure optimal healing after your vocal fold surgery.  Healing after surgery may take several weeks to months.  Your ability to increase your voice use will be determined by the results of your ongoing laryngeal examinations, and your visits with your speech-language pathologist.                    Voice Use Protocol Following Surgery:  1) 3-4 days of total voice rest (number of days provided at the time of your procedure)  *Be aware that the first couple times you attempt to speak you may not be able to produce a voice.  It is ok. Try again at your next planned opportunity and never try to add force when trying to produce sound.      Twice daily: practice breathing exercises   o Focus on taking deep, diaphragmatic breaths  o Voiceless bubble blows   2) Following total voice rest: minimal voice use that gradually increases and tissue mobilization    Use of  easy, soft  voice following surgery  o Start by taking a deep, diaphragmatic breath and sighing ( hun,   ha,  or through a straw)  o When you speak, maintain this easy, relaxed voicing at a soft volume  - It is NOT a whisper, but the softest voice you can make without whispering  - It should be EASY in your throat, with no tightness or strain  o Your voice may sound hoarse following surgery as your vocal folds heal.  Do NOT try to make your voice sound normal.  Focus on the easy feeling in your throat rather than the sound.  o If your voice becomes more hoarse, fatigues, or becomes more effortful with use, be quiet for 10-15 minutes.     Gradual increase in  easy, soft  voice use (\"Day 5\" = first day of voice use after the period of rest recommended after surgery)     Every 15 - 30 minutes  o DAY 5:  (5 seconds of talking with lots of breath flow)  o DAY 6: ( 10 seconds of talking with lots of breath flow)  o DAY 7: (about 15 " "seconds of talking with lots of breath flow)  o Continue to increase this way until you come for your follow-up appointment    Tissue mobilization exercises (cup of 1\" of water and a straw - can also use a water bottle with straw in 1' of water): practice these for at least 1 minute, 5-8 times per day  o These exercises stretch out your vocal folds to prevent stiffening or scar  1) Start by blowing air through a straw, making sure to get a deep diaphragmatic breath (with or without bubbles in 1 in. of water)  2) Then add voice, making the buzzy  kazoo  sound  - Do this on a downward sigh  - Notice how easy it feels in your throat  3) Glide your voice up and down in pitch, always going for the easy feeling  4) Sing the tune of  Happy Birthday  through the straw    3) Avoid all high-impact vocal fold behaviors during voice rest and the gradual increase in voice use.  In addition to following the protocol above, this means:    NO heavy lifting, grunting, or hard bearing down (nothing that would make your face red by pushing hard). To do this, we need to push the vocal folds together very tightly, which you will want to avoid while there is a wound on your vocal fold after surgery.    NO whispering or whistling. These are also high-impact activities on your vocal folds, and may complicate the healing process.    NO coughing, throat clearing, sneezing, etc. These activities are produced by squeezing the vocal folds together to build up pressure in the lungs and then blasting the vocal folds open again.  This is traumatic to the vocal folds, particularly when you are trying to heal after surgery.  o TO AVOID COUGHING/THROAT CLEARIN. Hard swallow  2. Sip of very hot or cold liquid  3. Suck on ice chips or hard candies (no cough drops with menthol)  4. Inhale through rounded lips, then exhale with repeated \"sh, sh sh______\"  5. Gentle, whispered  eh eh eh eh eh   6. Gargle water with out a voice.  HEAD BACK, AND THEN " "HEAD SIDE TO SIDE  7. Try these for several seconds until the need goes away.  If you absolutely have to cough/sneeze, try to do it as gently as possible  8. Wait/ \"Urge surf\" - focus on your breathing, or try some of the other strategies mentioned above  FOLLOW INSTRUCTIONS ON TIPS FOR TOPICAL HYDRATION HANDOUT FOR GARGLING AND NASAL SALINE RINSE.  ESTABLISH 3-4 STRATAGIES THAT HELP REDUCE THE URGE TO COUGH (EX: TODAY MASSAGE UNDER CHIN, VOICELESS GARGLING, GUM WERE FAVORITES.    Try to journal moments of throat clearing, when possible.      Important Reminders:  1) Stay hydrated    Drink water until your urine is pale or clear    Use steam 1-2 times per day (heat up water in a bowl or pot, put a towel over your head and breathe in the steam)    Consider getting a humidifier for your bedroom  2) Make plans to accommodate for your voice use restrictions following surgery.    Just like surgery on any other part of your body, it may take many weeks/months for your vocal folds to fully heal.  If you had surgery on your knee, you wouldn t be able to walk for a while, and you would ease back into walking rather than running a marathon the first day you are able to use your knee again.  When you have surgery on your vocal folds, you won t be able to use your voice for a while, and you will have to ease back into using your voice rather than fully resuming your vocal activities straight away.    Writing, texting, and e-mail are good alternative forms of communication.    Try to minimize environmental noise when you are using your easy, soft voice.  Position yourself an arm s length distance away from people so it s easier for them to hear you.    Are you able to take time off from work?  Are there tasks you could do at work that don t require you to use your voice?  What are other ways you could accommodate for your voice use restrictions at work?  -   -   -     Where do you anticipate encountering problems in adhering to " the post-surgery protocol?  What are potential solutions?  -   -   -   3) Make sure you have scheduled your follow-up speech therapy sessions BEFORE you go on voice rest.  You will see your speech-language pathologist at your first follow-up visit with your surgeon, but you will need further appointments, according to the schedule you determine at your pre-surgical speech therapy appointment.  Additional Tips/ Reminders:    Plan for non-verbal communication.  o Use a notebook, dry-erase board, texting, or email for immediate conversation  o Change the message on your voice mail to let people know you will not be able to talk  o Plan adequate time off from work and other commitments that require talking    Use proper vocal hygiene. Refer to vocal hygiene handout for more information    During the exercises, your voice may sound hoarse - that is okay as long as you are not forcing your voice to produce sound.    If at any time, you feel fatigue or discomfort, stop immediately and rest your voice.       Jasmyne Padilla M.M. (voice) MGloA., CCC/SLP  Speech-Language Pathologist  [facility]  [phone]

## 2017-12-06 NOTE — LETTER
12/6/2017       RE: Gary Iyer  [address] [place] [address]     Dear Colleague,    Thank you for referring your patient, Gary Iyer, to the [facility] at [facility]. Please see a copy of my visit note below.    [facility]  Elijah Baker Jr., M.D., F.A.C.S.  Mel Fournier M.D., M.P.H.  Misty Clark, Ph.D., CCC/SLP  Jasmyne Padilla M.M. (voice), M.A., CCC/SLP  Zack Mcdaniels M.M. (voice), M.A., CCC/SLP    Evaluation report    Clinician: Jasmyne Padilla M.M. (voice), M.A., CCC/SLP     Seen briefly in conjunction with: Dr. Fournier  on 11/24/17.  At that time, Dr. Fournier recommended that he pursue speech therapy to optimize his laryngeal efficiency and learn cough suppression strategies prior to a biopsy procedure, in order to optimize his healing and reduce phonotraumatic behaviors.     Referring physician:  Self  Patient: Gary Iyer  Date of Visit: 12/6/2017    HISTORY  Chief complaint: Gary Iyer is a 57 year old presenting today for evaluation and treatmen of muscle tension dysphonia and cough.  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  He has completed a course of speech therapy in the past with my colleague, Dr. Misty Clark.    CURRENT SYMPTOMS INCLUDE  VOICE    Reports is WNL.    Recent laryngeal exam demonstrated moderate supraglottic hyperfunction.    COUGH/THROAT CLEARING    He is not as aware of his coughing issue, but his wife has recently reported an increase in the frequency and severity of his cough     SWALLOWING    No issue    ADDITIONAL    Denies breathing issues    OTHER PERTINENT HISTORY    Complex medical history: please also refer to Dr. Fournier's dictation.     Past Medical History:   Diagnosis Date     Diabetes (H)     2yr     Multinodular goiter (nontoxic) 6/10/2013     Sleep apnea 8yr     Past Surgical History:   Procedure  "Laterality Date     COLONOSCOPY       COLONOSCOPY  6-22-12    Repeat Colonoscopy in 10 yrs.      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     HEAD & NECK SURGERY  1/25/11    callous removal from throat     LARYNGOSCOPY WITH BIOPSY(IES)  1-25-11     LARYNGOSCOPY WITH BIOPSY(IES) N/A 2/9/2017    Procedure: LARYNGOSCOPY WITH BIOPSY(IES);  Surgeon: Mel Fournier MD;  Location: [facility] OR     LARYNGOSCOPY WITH MICROSCOPE N/A 2/9/2017    Procedure: LARYNGOSCOPY WITH MICROSCOPE;  Surgeon: Mel Fournier MD;  Location: [facility] OR     LASER CO2 LARYNGOSCOPY, COMPLEX  6/27/2013    Procedure: LASER CO2 LARYNGOSCOPY, COMPLEX;  Micro Direct Laryngoscopy With Micro Flap Excision Of Lesion Lumenis Co2 Laser ;  Surgeon: Mel Fournier MD;  Location: [facility] OR     LASER CO2 LESION ORAL N/A 2/9/2017    Procedure: LASER CO2 LESION ORAL;  Surgeon: Mel Fournier MD;  Location: [facility] OR     ORTHOPEDIC SURGERY  March 2016    left tibia orif with abbi 2-2016     VASECTOMY  02/03       OBJECTIVE  PATIENT REPORTED MEASURES  Patient Supplied Answers To VHI Questionnaire  Voice Handicap Index (VHI-10) 12/8/2017   My voice makes it difficult for people to hear me 1   People have difficulty understanding me in a noisy room 1   My voice difficulties restrict my personal and social life.  0   I feel left out of conversations because of my voice 0   My voice problem causes me to lose income 1   I feel as though I have to strain to produce voice 0   The clarity of my voice is unpredictable 1   My voice problem upsets me 0   My voice makes me feel handicapped 2   People ask, \"What's wrong with your voice?\" 1   VHI-10 7       Patient Supplied Answers To CSI Questionnaire  Cough Severity Index (CSI) 12/9/2015   My cough is worse when I lie down 0   My coughing problem causes me to restrict my personal and social life 0   I tend to avoid places because of my cough problem 0   I feel embarrassed because of my coughing problem " 0   People ask, ''What's wrong?'' because I cough a lot 0   I run out of air when I cough 0   My coughing problem affects my voice 0   My coughing problem limits my physical activity 0   My coughing problem upsets me 0   People ask me if I am sick because I cough a lot 0   CSI Score 0       Patient Supplied Answers To EAT Questionnaire  Eating Assessment Tool (EAT-10) 12/9/2015   My swallowing problem has caused me to lose weight 0   My swallowing problem interferes with my ability to go out for meals 0   Swallowing liquids takes extra effort 0   Swallowing solids takes extra effort 0   Swallowing pills takes extra effort 0   Swallowing is painful 0   The pleasure of eating is affected by my swallowing 0   When I swallow food sticks in my throat 0   I cough when I eat 0   Swallowing is stressful 0   EAT-10 0     PERCEPTUAL EVALUATION (CPT 94357)  POSTURE / TENSION:     neck and shoulders    BREATHING:     clavicular muscle use pattern     LARYNGEAL PALPATION:     firm musculature    VOICE:    Roughness: Mild to moderate Consistent    Breathiness: WNL    Strain: WNL    Loudness    Conversational speech:  WNL    Projected speech:  WNL    Pitch:    Conversational speech:  WNL    Pitch glide: neurologically normal    Resonance:    Conversational speech:  laryngeal pharyngeal resonance    Singing vs. Speech:  n/a    CAPE-V Overall Severity:  25/100    COUGH/THROAT CLEARING:    Occasional    Dry    Locus of cough/ throat clear: sounds consistent with upper airway       Overall within the past week, where would you rate the severity of your cough (0-1= normal, 10 is most severe)    1 -  2  -  3  -  4 -  5 -  6  - 7  -  8  -  9  -  10; awareness is limited    Cough Severity Index  Please fill out this questionnaire if you experience cough as a symptom.  Please give response that indicates how frequently you have these experiences.  0 = never 1 = almost never 2 = sometimes 3 = almost always 4 = always    1. My cough is worse  when I lie down. 0  2. My coughing problem causes me to restrict my personal and social life. 1  3. I tend to avoid places because of my cough problem. 0  4. I feel embarrassed because of my coughing problem. 0  5. People ask,   What s wrong?   because I cough a lot. 0  6. I run out of air when I cough. 0  7. My coughing problem affects my voice. 0  8. My coughing problem limits my physical activity. 0  9. My coughing problem upsets me. 0  10. People ask me if I am sick because I cough a lot. 0  1/40    LARYNGEAL EXAMINATION  Please refer to Dr. Fournier's report from 11/24/17.  She has been watching an area of leukoplakia on the medial left arytenoid, and Mr. Iyer will undergo an in-clinic biopsy on 12/8/17 to determine the pathology    ASSESSMENT / PLAN  IMPRESSIONS: Gary Iyer is a 57 year old , presenting today with R49.0 (Dysphonia), R05 (Chronic Cough) and J38.7 (lesion of the larynx).    RECOMMENDATIONS:     A course of speech therapy is recommended to optimize vocal technique, promote reduced discomfort, effort and fatigue, promote reduction of vocal fold impact to help resolve the lesions, optimize surgical results and help reduce chronic cough and mucosal irritation.    He demonstrates a Good prognosis for improvement given adherence to therapeutic recommendations.     Positive indicators: positive response to therapy probes diagnosis is known to respond to treatment high level of comittment    Negative indicators: Limited awareness of current cough and voice symptoms    DURATION / FREQUENCY: 4 one-hour sessions    GOALS:  Patient goal:   To improve and maintain a healthy voice quality  To understand the problem and fix it as much as possible  To reduce his cough to acceptable levels  To resolve the vocal fold lesions    Short-term goal(s): Within the first 4 sessions, Mr. Iyer:  1. will be able to demonstrate provided cough suppression and substitution strategies from memory  independently with 90% accuracy  2. will utilize silent inhalation with good low-respiratory engagement 75% of the time during therapy tasks with minimal clinician support  3. will demonstrate semi-occluded vocal tract (SOVT) exercises with at least 80% accuracy with no clinician support    Long-term goal(s): In 6 months, Mr. Iyer will:  1. Report a week of typical vocal activities, in which dysphonia and throat discomfort do not exceed a level of 3 out of 10, 80% of the time   2. Report a week with no more than 3 episodes of coughing, that do not last more than 2 seconds  3. Report resolution of symptoms, and use of optimal voice quality and comfort to meet personal, social, and professional needs, 90% of the time during a typical week of vocal activities  This treatment plan was developed with the patient who agreed with the recommendations.    _______________________________________________________________________  THERAPY NOTE (CPT 16237)  Date of Service: 12/6/2017    SUBJECTIVE / OBJECTIVE:  Please refer to my evaluation report from today's encounter for full details regarding subjective data, patient reported measures, and diagnostic findings.    THERAPEUTIC ACTIVITIES  Instructed concepts and techniques for optimal vocal hygiene including:    Systemic hydration, including strategies for increasing daily water intake    Topical hydration - Gargling, saline nasal irrigation, humidification, steam, guaifenesin    Awareness and reduction of phonotraumatic behaviors    Moderating voice use    Substituting non-voice alternative behaviors    Avoiding cough and throat clearing    Chronic cough / throat clearing reduction therapy    Suppression and substitution strategies were instructed including    Swallowing substitution techniques    Breathing suppression techniques to reduce laryngeal tension    Low impact glottic coup and soft cough    Techniques to raise awareness of habitual throat clearing    Additionally  "he was instructed to keep a log of what circumstances are eliciting cough / throat clear    Exercises to promote optimal respiratory mechanics    I provided explanation of the anatomy and physiology of respiration for speech and singing; he found this to be helpful    He demonstrated excessive upper thoracic engagement during inhalation    Demonstrated difficulty allowing abdominal relaxation for inhalation    Practiced in a forward leaning seated posture     With clinician support, patient was able to demonstrate improved abdominal relaxation and engagement on inhalation    Optimal exhalation using inward engagement of the abdominal wall with no corresponding collapse of the upper chest cavity was trained using the pulsed \"sh\" task    Semi-Occluded Vocal Tract (SOVT) exercises instructed to reduce laryngeal tension, promote vocal fold pliability, and coordinate respiration and phonation    Straw with water resistance was found to be most facilitating     Sustained phonation, and voice vs. voiceless productions used to promote easy voicing and raise awareness of laryngeal tension    Ascending and descending glides utilized to promote vocal fold pliability    Instructed to use these exercises as a warm-up / cooldown, and to re-calibrate the voice throughout the day.    Counseling and Education    Asked many questions about the nature of his symptoms, and I answered all of these thoroughly.      Concepts of an optimal regimen for practice were instructed.  o He should use an interval schedule of practice, with brief periods of practice frequently throughout each day  o [place] concepts of volitional practice to facilitate motor learning.  o Modifications were instructed for his post-surgical period of voice rest    I provided an audio recording and handouts of today's therapeutic activities to facilitate practice.    ASSESSMENT/PLAN  PROGRESS TOWARD LONG TERM GOALS:   Minimal at this point, as this is first session, " but good learning today    IMPRESSIONS: Gary Iyer is a 57 year old , presenting today with R49.0 (Dysphonia), R05 (Chronic Cough) and J38.7 (lesion of the larynx).  Today, Mr. Iyer learned several new strategies to help optimize his post-surgical healing and reduce phonotraumatic behaviors.    PLAN: I will see Mr. Iyer in 2-3 weeks, at which time we will re-assess his post surgical status.     TOTAL SERVICE TIME: 60 minutes  EVALUATION OF VOICE AND RESONANCE: (90758): 15 minutes    TREATMENT (99045): 45 minutes  NO CHARGE FACILITY FEE (77008)    Jasmyne Padilla M.M. (voice), M.A., CCC/SLP  Speech-Language Pathologist  [facility]  [phone]                            Post Surgical Voice Care  This information details the specific techniques you will need to use to change your activity in order to ensure optimal healing after your vocal fold surgery.  Healing after surgery may take several weeks to months.  Your ability to increase your voice use will be determined by the results of your ongoing laryngeal examinations, and your visits with your speech-language pathologist.                    Voice Use Protocol Following Surgery:  1) 3-4 days of total voice rest (number of days provided at the time of your procedure)  *Be aware that the first couple times you attempt to speak you may not be able to produce a voice.  It is ok. Try again at your next planned opportunity and never try to add force when trying to produce sound.      Twice daily: practice breathing exercises   o Focus on taking deep, diaphragmatic breaths  o Voiceless bubble blows   2) Following total voice rest: minimal voice use that gradually increases and tissue mobilization    Use of  easy, soft  voice following surgery  o Start by taking a deep, diaphragmatic breath and sighing ( hun,   ha,  or through a straw)  o When you speak, maintain this easy, relaxed voicing at a soft volume  - It is NOT a whisper, but the  "softest voice you can make without whispering  - It should be EASY in your throat, with no tightness or strain  o Your voice may sound hoarse following surgery as your vocal folds heal.  Do NOT try to make your voice sound normal.  Focus on the easy feeling in your throat rather than the sound.  o If your voice becomes more hoarse, fatigues, or becomes more effortful with use, be quiet for 10-15 minutes.     Gradual increase in  easy, soft  voice use (\"Day 5\" = first day of voice use after the period of rest recommended after surgery)     Every 15 - 30 minutes  o DAY 5:  (5 seconds of talking with lots of breath flow)  o DAY 6: ( 10 seconds of talking with lots of breath flow)  o DAY 7: (about 15 seconds of talking with lots of breath flow)  o Continue to increase this way until you come for your follow-up appointment    Tissue mobilization exercises (cup of 1\" of water and a straw - can also use a water bottle with straw in 1' of water): practice these for at least 1 minute, 5-8 times per day  o These exercises stretch out your vocal folds to prevent stiffening or scar  1) Start by blowing air through a straw, making sure to get a deep diaphragmatic breath (with or without bubbles in 1 in. of water)  2) Then add voice, making the buzzy  kazoo  sound  - Do this on a downward sigh  - Notice how easy it feels in your throat  3) Glide your voice up and down in pitch, always going for the easy feeling  4) Sing the tune of  Happy Birthday  through the straw    3) Avoid all high-impact vocal fold behaviors during voice rest and the gradual increase in voice use.  In addition to following the protocol above, this means:    NO heavy lifting, grunting, or hard bearing down (nothing that would make your face red by pushing hard). To do this, we need to push the vocal folds together very tightly, which you will want to avoid while there is a wound on your vocal fold after surgery.    NO whispering or whistling. These are also " "high-impact activities on your vocal folds, and may complicate the healing process.    NO coughing, throat clearing, sneezing, etc. These activities are produced by squeezing the vocal folds together to build up pressure in the lungs and then blasting the vocal folds open again.  This is traumatic to the vocal folds, particularly when you are trying to heal after surgery.  o TO AVOID COUGHING/THROAT CLEARIN. Hard swallow  2. Sip of very hot or cold liquid  3. Suck on ice chips or hard candies (no cough drops with menthol)  4. Inhale through rounded lips, then exhale with repeated \"sh, sh sh______\"  5. Gentle, whispered  eh eh eh eh eh   6. Gargle water with out a voice.  HEAD BACK, AND THEN HEAD SIDE TO SIDE  7. Try these for several seconds until the need goes away.  If you absolutely have to cough/sneeze, try to do it as gently as possible  8. Wait/ \"Urge surf\" - focus on your breathing, or try some of the other strategies mentioned above  FOLLOW INSTRUCTIONS ON TIPS FOR TOPICAL HYDRATION HANDOUT FOR GARGLING AND NASAL SALINE RINSE.  ESTABLISH 3-4 STRATAGIES THAT HELP REDUCE THE URGE TO COUGH (EX: TODAY MASSAGE UNDER CHIN, VOICELESS GARGLING, GUM WERE FAVORITES.    Try to journal moments of throat clearing, when possible.      Important Reminders:  1) Stay hydrated    Drink water until your urine is pale or clear    Use steam 1-2 times per day (heat up water in a bowl or pot, put a towel over your head and breathe in the steam)    Consider getting a humidifier for your bedroom  2) Make plans to accommodate for your voice use restrictions following surgery.    Just like surgery on any other part of your body, it may take many weeks/months for your vocal folds to fully heal.  If you had surgery on your knee, you wouldn t be able to walk for a while, and you would ease back into walking rather than running a marathon the first day you are able to use your knee again.  When you have surgery on your vocal folds, " you won t be able to use your voice for a while, and you will have to ease back into using your voice rather than fully resuming your vocal activities straight away.    Writing, texting, and e-mail are good alternative forms of communication.    Try to minimize environmental noise when you are using your easy, soft voice.  Position yourself an arm s length distance away from people so it s easier for them to hear you.    Are you able to take time off from work?  Are there tasks you could do at work that don t require you to use your voice?  What are other ways you could accommodate for your voice use restrictions at work?  -   -   -     Where do you anticipate encountering problems in adhering to the post-surgery protocol?  What are potential solutions?  -   -   -   3) Make sure you have scheduled your follow-up speech therapy sessions BEFORE you go on voice rest.  You will see your speech-language pathologist at your first follow-up visit with your surgeon, but you will need further appointments, according to the schedule you determine at your pre-surgical speech therapy appointment.  Additional Tips/ Reminders:    Plan for non-verbal communication.  o Use a notebook, dry-erase board, texting, or email for immediate conversation  o Change the message on your voice mail to let people know you will not be able to talk  o Plan adequate time off from work and other commitments that require talking    Use proper vocal hygiene. Refer to vocal hygiene handout for more information    During the exercises, your voice may sound hoarse - that is okay as long as you are not forcing your voice to produce sound.    If at any time, you feel fatigue or discomfort, stop immediately and rest your voice.       Jasmyne Padilla M.M. (voice), M.A., CCC/SLP  Speech-Language Pathologist  [facility]  [phone]        Again, thank you for allowing me to participate in the care of your patient.      Sincerely,    Jasmyne Padilla,  SLP

## 2017-12-06 NOTE — TELEPHONE ENCOUNTER
Pt should hold ASA 81 mg prior to procedure on 12/8/17 if possible.  Pt should double check with physician who prescribes the medication.  RN will hold time for follow up appt on 12/22 @ 10:50.  This will be sent to scheduling after after biopsy on Friday in case changes are needed to be made to the plan of care.  RN left direct number for follow up.    Bijal LINARESN, RN  [phone]  [facility] ENT   Head & Neck Surgery   12/6/2017 2:50 PM

## 2017-12-08 ENCOUNTER — OFFICE VISIT (OUTPATIENT)
Dept: OTOLARYNGOLOGY | Facility: CLINIC | Age: 57
End: 2017-12-08

## 2017-12-08 DIAGNOSIS — Z85.21 HX OF LARYNGEAL CANCER: ICD-10-CM

## 2017-12-08 DIAGNOSIS — J38.7 LESION OF LARYNX: Primary | ICD-10-CM

## 2017-12-08 RX ORDER — LIDOCAINE HYDROCHLORIDE 20 MG/ML
INJECTION, SOLUTION EPIDURAL; INFILTRATION; INTRACAUDAL; PERINEURAL
Start: 2017-12-08 | End: 2018-02-16

## 2017-12-08 ASSESSMENT — PAIN SCALES - GENERAL: PAINLEVEL: NO PAIN (0)

## 2017-12-08 NOTE — PATIENT INSTRUCTIONS
1.  You were seen in the ENT Clinic today by Dr. Fournier.  If you have any questions or concerns after your appointment, please call [phone].  Press option #1 for scheduling related needs.  Press option #3 for Nurse advice.  2.  Plan is to return to clinic on 12/22.  We will call you with the results of your biopsy.      Bijal LINARESN, RN  [facility] ENT   Head & Neck Surgery

## 2017-12-08 NOTE — NURSING NOTE
Chief Complaint   Patient presents with     Procedure     biopsy leukoplakia      Isaac Gomez LPN

## 2017-12-08 NOTE — MR AVS SNAPSHOT
After Visit Summary   12/8/2017    Gary Iyer    MRN: [id number]           Patient Information     Date Of Birth          1960        Visit Information        Provider Department      12/8/2017 10:50 AM Mel Fournier MD;  ENT PROCEDURE ROOM [facility]        Today's Diagnoses     Lesion of larynx    -  1       Follow-ups after your visit        Your next 10 appointments already scheduled     Dec 15, 2017  4:50 PM CST   [facility] Ext[facility]ity with Travis Haro, PT   [facility] ([facility] [place]    [address]  [place] [address]   [phone]            Jan 22, 2018 10:40 AM CST   (Arrive by 10:25 AM)   Return Visit with Mel Fournier MD   [facility] ([facility] [place])    [address] [address] [phone]           This is a multi-disciplinary care team visit as patients with your type of problem are usually seen by a team of an MD and a Speech-Language Pathologist (who is a specialist in disorders of the voice, throat, and breathing).  Please plan about 2 hours for your visit, which will likely include Laryngeal Function Studies, a Voice/Swallow/Breathing Evaluation, and an Endoscopic Laryngeal Examination to provide a comprehensive evaluation.  Please check with your insurance company to ensure you are covered for these services. - It is important to know that if you are evaluated and/or treated by both a physician and a speech pathologist during your visit, your billing will reflect the input that you receive from both providers as separate professionals. Although most insurance plans do cover these services, we encourage you to contact your insurance company prior to your visit to determine whether there are any coverage limitations that might affect you financially. - Billing/procedure codes that are frequently associated with visits to our clinic include  (but are not limited to) the ones listed below. Most patients will not need all of these items, but it may be useful to ask your insurance company's patient . 24576: Flexible fiberoptic laryngoscopy, 31092: Laryngoscopy; flexible or rigid fiberoptic, with stroboscopy, 78496: Flexible endoscopic evaluation of swallowing, 39415: Laryngeal function aerodynamic evaluation, 57752: Evaluation of Voice and Resonance, 19792: Speech pathology treatment for voice, speech, communication, 35389: Speech pathology treatment for swallowing/oral function for feeding - If you have any concerns or questions, or if you would prefer not to have a speech pathologist involved in your visit, please contact our Clinic Coordinator at [phone], at least 24 hours prior to your appointment.              Future tests that were ordered for you today     Open Future Orders        Priority Expected Expires Ordered    Surgical pathology exam Routine  12/9/2018 12/8/2017            Who to contact     Please call your clinic at [phone] to:    Ask questions about your health    Make or cancel appointments    Discuss your medicines    Learn about your test results    Speak to your doctor   If you have compliments or concerns about an experience at your clinic, or if you wish to file a complaint, please contact [facility] Patient Relations at [phone] or email us at [email or web address]         Additional Information About Your Visit        [employer] Information     [employer] gives you secure access to your electronic health record. If you see a primary care provider, you can also send messages to your care team and make appointments. If you have questions, please call your primary care clinic.  If you do not have a primary care provider, please call [phone] and they will assist you.      [employer] is an electronic gateway that provides easy, online access to your medical  records. With [employer], you can request a clinic appointment, read your test results, renew a prescription or communicate with your care team.     To access your existing account, please contact your [facility] or call [phone] for assistance.        Care EveryWhere ID     This is your Care EveryWhere ID. This could be used by other organizations to access your [place] medical records  [id number]         Blood Pressure from Last 3 Encounters:   11/16/17 135/79   10/19/17 (P) 135/77   09/07/17 133/90    Weight from Last 3 Encounters:   11/24/17 104.8 kg (231 lb)   11/16/17 104.3 kg (230 lb)   10/23/17 103 kg (227 lb)              We Performed the Following     IMAGESTREAM RECORDING ORDER        Primary Care Provider Office Phone # Fax #    Finesse Beal [phone]       [address] [facility] [address]        Equal Access to Services     KIRILL PORTILLO : Hadii aad ku hadasho Soomaali, waaxda luqadaha, qaybta kaalmada adeegyada, waxay idiin hayaan yobani kharasinai gutierrez . So [facility] [phone].    ATENCIÓN: Si habla español, tiene a lucero disposición servicios gratuitos de asistencia lingüística. Llame al [phone].    We comply with applicable federal civil rights laws and [place] laws. We do not discriminate on the basis of race, color, national origin, age, disability, sex, sexual orientation, or gender identity.            Thank you!     Thank you for choosing [facility]SE [facility] TH[facility]  for your care. Our goal is always to provide you with excellent care. Hearing back from our patients is one way we can continue to improve our services. Please take a few minutes to complete the written survey that you may receive in the mail after your visit with us. Thank you!             Your Updated Medication List - Protect others around you: Learn how to safely use, store and throw away your medicines at www.disposemymeds.org.          This list is  accurate as of: 12/8/17  1:31 PM.  Always use your most recent med list.                   Brand Name Dispense Instructions for use Diagnosis    allopurinol 300 MG tablet    ZYLOPRIM    90 tablet    TAKE 1 TABLET(300 MG) BY MOUTH DAILY    Gout, unspecified cause, unspecified chronicity, unspecified site       aspirin 81 MG tablet      Take 1 tablet by mouth daily.        atorvastatin 20 MG tablet    LIPITOR    90 tablet    TAKE 1 TABLET(20 MG) BY MOUTH DAILY    Hyperlipidemia LDL goal <70       blood glucose monitoring lancets     1 Box    1 each 2 times daily Use to test blood sugar 2 times daily or as directed.    Type 2 diabetes mellitus without complication (H)       blood glucose monitoring meter device kit    no brand specified    1 kit    Use to test blood sugar 2 times daily or as directed.  Accucheck meter please and all associated strips, lancets, and other supplies, 3 month supply with refills for a year.    Type 2 diabetes mellitus without complication, without long-term current use of insulin (H)       DAILY MULTIVITAMIN PO      Take  by mouth daily.        hydrochlorothiazide 50 MG tablet    HYDRODIURIL    90 tablet    TAKE 1 TABLET(50 MG) BY MOUTH EVERY MORNING    Hypertension goal BP (blood pressure) < 140/90       hydrocortisone 1 % ointment     30 g    Apply topically 2 times daily as needed    Facial dermatitis       hydrocortisone 2.5 % cream    ANUSOL-HC    30 g    Place rectally 2 times daily as needed for hemorrhoids    External hemorrhoids       hydrocortisone 25 MG Suppository    ANUSOL-HC    20 suppository    Place 1 suppository (25 mg) rectally 2 times daily as needed for hemorrhoids    External hemorrhoids       lisinopril 30 MG tablet    PRINIVIL,ZESTRIL    90 tablet    TAKE 1 TABLET(30 MG) BY MOUTH DAILY    Hypertension goal BP (blood pressure) < 140/90       metFORMIN 500 MG tablet    GLUCOPHAGE    180 tablet    TAKE 1 TABLET(500 MG) BY MOUTH TWICE DAILY WITH MEALS    Type 2 diabetes,  HbA1C goal < 8% (H)       omeprazole 20 MG tablet     60 tablet    Take 1 cap BID x 1-2 weeks; then 1 cap daily x 1-2 weeks; then 1 cap QOD x 1-2 weeks.    Dysphonia, Gastroesophageal reflux disease, esophagitis presence not specified, Leukoplakia of larynx       * order for DME     1 Container    Place rectally 2 times daily 1.8 oz container of 0.2 % nifedipine suppository if possible as easier to place the medication.   Apply on the anus every 12 hours.  Just get to where the anus is tight .    Anal fissure       * order for DME     1 Units    Autocpap 10-16 cm    Obstructive sleep apnea       PARoxetine 20 MG tablet    PAXIL    180 tablet    Take 2 tablets (40 mg) by mouth daily    Anxiety       psyllium 0.52 G capsule     100 capsule    Take 1 capsule (0.52 g) by mouth daily    Constipation, unspecified constipation type       tamsulosin 0.4 MG capsule    FLOMAX    90 capsule    TAKE 1 CAPSULE(0.4 MG) BY MOUTH DAILY    Benign prostatic hyperplasia with lower urinary tract symptoms, unspecified morphology       triamcinolone 0.1 % cream    KENALOG    30 g    Apply topically 2 times daily as needed for irritation    Dermatitis       vitamin D 1000 UNITS capsule      Take 1 capsule by mouth daily.        * Notice:  This list has 2 medication(s) that are the same as other medications prescribed for you. Read the directions carefully, and ask your doctor or other care provider to review them with you.

## 2017-12-08 NOTE — LETTER
12/8/2017       RE: Gary Iyer  [address] [address]     Dear Colleague,    Thank you for referring your patient, Gary Iyer, to the [facility] E[facility] TH[facility] at [facility]. Please see a copy of my visit note below.    PROCEDURE: Flexible fiberoptic transnasal laryngoscopy with endoscopic injection, laryngeal biopsy  PREOPERATIVE DIAGNOSIS: Laryngeal leukoplakia  POSTOPERATIVE DIAGNOSIS: Same.   SURGEON: Mel Fournier MD.   ASSISTANT: Bijal Sorto RN.   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 We discussed that this was potentially phonotraumatic in origin, but given his history it was felt to be reasonable to obtain a tissue sample to evaluate further. We discussed options for intervention, and the patient opted for an in-clinic biopsy after hearing about the risks, benefits, and alternatives. The injection was performed under fiberoptic visualization, which was necessary to confirm appropriate biopsy placement.  FINDINGS: Small region of exophytic leukoplakia, left medial arytenoid. This was biopsied. Nearby flatter region of leukoplakia was attempted for biopsy, but this was not successful due to contour and patient tolerance.  DESCRIPTION OF PROCEDURE: After written informed consent was obtained, a time-out was performed to confirm patient identity, procedure, and procedure site. Two-three atomizer puffs of topical 3% lidocaine with 0.25% phenylephrine was applied to the patient's nasal cavities bilaterally, followed by temporary placement of saturated cotton to further ensure topical anesthesia. The oropharynx was sprayedas well The transnasal flexible laryngoscope was then introduced. To achieve adequate laryngeal anesthesia, a total of  18 cc of 2% lidocaine was then applied using laryngeal gargle technique. The  25G endoscopic needle was then introduced through the endoscope sheath. Approximately 0.5 cc of 1% lidocaine with 1:100,000 epinephrine was injected around the anticipated biopsy site given the patient's recent history of aspirin use as well as the commonly encountered difficulty of achieving adequate anesthesia with topical means alone. The endoscopic cup biopsy forceps were then introduced and used to take multiple samples from the area of interest. The airway remained patent.   COMPLICATIONS: None apparent.   DISPOSITION: Stable to home.   PLAN: Voice rest for 4 days followed by gradual resumption of gentle voice use. Plan ongoing surveillance. Return in 2-3 weeks.        Again, thank you for allowing me to participate in the care of your patient.      Sincerely,    Mel Fournier MD

## 2017-12-08 NOTE — NURSING NOTE
Procedure: Flexible laryngoscopy with laryngeal biopsy   Reason: Laryngeal lesions   PREPROCEDURE:   Yes Patient ID verified with 2 identifiers (name and  or MRN)   Yes: Procedure and site verified with patient/designee (when able)   Yes: Accurate consent documentation in medical record   No: Marking not required. Reason: [ Procedure does not require site marking. ][ Provider is in continuous attendance with the patient from consent through completion of procedure. ][ Marking unable or refused by patient (see scanned diagram).   TIME OUT:   Yes: Time-Out performed immediately prior to starting procedure, including verbal and active participation of all team members addressing:   * Correct patient identity   * Confirmed that the correct side and site are marked   * An accurate procedure consent form   * Agreement on the procedure to be done   * Correct patient position   * Relevant images and results are properly labeled and appropriately displayed   * The need to administer antibiotics or fluids for irrigation purposes during the procedure as applicable   * Safety precations based on patient history or medication use   DURING PROCEDURE: Verification of correct person, site, and procedure occurs any time the responsibility for care of the patient is transferred to another member of the care team.   Bijal Sorto RN  P Otolaryngology/Head & Neck Surgery

## 2017-12-11 LAB — COPATH REPORT: NORMAL

## 2017-12-11 NOTE — PROGRESS NOTES
PROCEDURE: Flexible fiberoptic transnasal laryngoscopy with endoscopic injection, laryngeal biopsy  PREOPERATIVE DIAGNOSIS: Laryngeal leukoplakia  POSTOPERATIVE DIAGNOSIS: Same.   SURGEON: Mel Fournier MD.   ASSISTANT: Bijal Sorto RN.   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 We discussed that this was potentially phonotraumatic in origin, but given his history it was felt to be reasonable to obtain a tissue sample to evaluate further. We discussed options for intervention, and the patient opted for an in-clinic biopsy after hearing about the risks, benefits, and alternatives. The injection was performed under fiberoptic visualization, which was necessary to confirm appropriate biopsy placement.  FINDINGS: Small region of exophytic leukoplakia, left medial arytenoid. This was biopsied. Nearby flatter region of leukoplakia was attempted for biopsy, but this was not successful due to contour and patient tolerance.  DESCRIPTION OF PROCEDURE: After written informed consent was obtained, a time-out was performed to confirm patient identity, procedure, and procedure site. Two-three atomizer puffs of topical 3% lidocaine with 0.25% phenylephrine was applied to the patient's nasal cavities bilaterally, followed by temporary placement of saturated cotton to further ensure topical anesthesia. The oropharynx was sprayedas well The transnasal flexible laryngoscope was then introduced. To achieve adequate laryngeal anesthesia, a total of  18 cc of 2% lidocaine was then applied using laryngeal gargle technique. The 25G endoscopic needle was then introduced through the endoscope sheath. Approximately 0.5 cc of 1% lidocaine with 1:100,000 epinephrine was injected around the anticipated biopsy site given the patient's recent history of aspirin use as well as the commonly encountered difficulty of  achieving adequate anesthesia with topical means alone. The endoscopic cup biopsy forceps were then introduced and used to take multiple samples from the area of interest. The airway remained patent.   COMPLICATIONS: None apparent.   DISPOSITION: Stable to home.   PLAN: Voice rest for 4 days followed by gradual resumption of gentle voice use. Plan ongoing surveillance. Return in 2-3 weeks.

## 2017-12-12 ENCOUNTER — TELEPHONE (OUTPATIENT)
Dept: OTOLARYNGOLOGY | Facility: CLINIC | Age: 57
End: 2017-12-12

## 2017-12-12 PROBLEM — J38.7 LESION OF LARYNX: Status: ACTIVE | Noted: 2017-12-12

## 2017-12-12 NOTE — TELEPHONE ENCOUNTER
RN calling pt to discuss pathology results from in clinic biopsy.  Biopsy showed no signs of malignancy.  Tissue did look inflamed so pt was encourage to avoid coughing/throat clearing or other activities that would cause phonotrauma.     Bijal LINARESN, RN  [phone]  [facility]   Head & Neck Surgery   12/12/2017 8:06 AM

## 2017-12-15 ENCOUNTER — THERAPY VISIT (OUTPATIENT)
Dept: PHYSICAL THERAPY | Facility: CLINIC | Age: 57
End: 2017-12-15
Payer: COMMERCIAL

## 2017-12-15 DIAGNOSIS — M25.512 ACUTE PAIN OF LEFT SHOULDER: ICD-10-CM

## 2017-12-15 PROCEDURE — 97110 THERAPEUTIC EXERCISES: CPT | Mod: GP | Performed by: PHYSICAL THERAPIST

## 2017-12-15 PROCEDURE — 97035 APP MDLTY 1+ULTRASOUND EA 15: CPT | Mod: GP | Performed by: PHYSICAL THERAPIST

## 2017-12-15 NOTE — LETTER
DEPARTMENT OF HEALTH AND HUMAN SERVICES  CENTERS FOR MEDICARE & MEDICAID SERVICES    PLAN/UPDATED PLAN OF PROGRESS FOR OUTPATIENT REHABILITATION    PATIENTS NAME:  Gary Iyer     : 1960    [facility]BER:    [id number]    [facility]    [facility]    MEDICAL RECORD NUMBER: [id number]     START OF CARE DATE:  SOC Date: 17   TYPE:  PT    PRIMARY/TREATMENT DIAGNOSIS: (Pertinent Medical Diagnosis)  Acute pain of left shoulder    VISITS FROM START OF CARE:  Rxs Used: 5     PROGRESS  REPORT  Progress reporting period is from 17 to 12/15/17.       SUBJECTIVE  Subjective: Gary reports that he is doing okay but that his left shoulder is acting up again. He does not know why it hurts again but is having 6/10 pain with rasing his arm overhead and behind back.    Current Pain level: 4/10.     Initial Pain level: 3/10.   Changes in function:  Yes (See Goal flowsheet attached for changes in current functional level)  Adverse reaction to treatment or activity: None    OBJECTIVE  Changes noted in objective findings:  Yes,   Objective: Shoulder AROM: flexion 145 on R, 140 on L; IR and extension T10 on the L, L2 on the R. SHoulder strength 5-/5 for L shoulder flexion, abd, and ER; 4+/5 for R shoulder flexion, abd, and ER.     ASSESSMENT/PLAN  Updated problem list and treatment plan: Diagnosis 1:  L shoulder pain  Pain -  hot/cold therapy, US, electric stimulation, manual therapy, splint/taping/bracing/orthotics, self management, education and home program  Decreased ROM/flexibility - manual therapy, therapeutic exercise, therapeutic activity and home program  Decreased strength - therapeutic exercise, therapeutic activities and home program  Impaired muscle performance - electric stimulation, neuro re-education and home program  Decreased function - therapeutic activities and home program  Impaired posture - neuro re-education, therapeutic activities and home  "program  STG/LTGs have been met or progress has been made towards goals:  The patient had made significant progress towards all goals but had an exacerbation of symptoms.  Assessment of Progress: The patient's condition has potential to improve.  The patient has had set backs in their progress.  The patient's condition has exacerbated.  The patient did not return to therapy for an extended period of time due to progress made but returned now due to set backs.  Self Management Plans:  Patient has been instructed in a home treatment program.  Patient is independent in a home treatment program.  I have re-evaluated this patient and find that the nature, scope, duration and intensity of the therapy is appropriate for the medical condition of the patient.  Gary continues to require the following intervention to meet STG and LTG's:  PT    Recommendations:  This patient would benefit from continued therapy.     Frequency:  1 X week, once daily  Duration:  for 6 weeks    Caregiver Signature/Credentials _____________________________ Date ________       Treating Provider: MICHAEL Cisneros     I have reviewed and certified the need for these services and plan of treatment while under my care.        PHYSICIAN'S SIGNATURE:   _________________________________________  Date___________   Jake Turpin MD  Certification period:  Beginning of Cert date period: 12/21/17 to  End of Cert period date: 03/21/18     Functional Level Progress Report: Please see attached \"Goal Flow sheet for Functional level.\"    ____X____ Continue Services or       ________ DC Services                Service dates: From  SOC Date: 09/25/17 date to present                         "

## 2017-12-15 NOTE — LETTER
[facility]  [address]  [place] [address]  [phone]    2017    Re: Gary Iyer   :   1960  MRN:  [id number]   REFERRING PHYSICIAN:   [facility]    Date of Initial Evaluation:  17  Visits:  Rxs Used: 5  Reason for Referral:  Acute pain of left shoulder    PROGRESS  REPORT  Progress reporting period is from 17 to 12/15/17.       SUBJECTIVE  Subjective: Gary reports that he is doing okay but that his left shoulder is acting up again. He does not know why it hurts again but is having 6/10 pain with rasing his arm overhead and behind back.    Current Pain level: 4/10.     Initial Pain level: 3/10.   Changes in function:  Yes (See Goal flowsheet attached for changes in current functional level)  Adverse reaction to treatment or activity: None    OBJECTIVE  Changes noted in objective findings:  Yes,   Objective: Shoulder AROM: flexion 145 on R, 140 on L; IR and extension T10 on the L, L2 on the R. SHoulder strength 5-/5 for L shoulder flexion, abd, and ER; 4+/5 for R shoulder flexion, abd, and ER.     ASSESSMENT/PLAN  Updated problem list and treatment plan: Diagnosis 1:  L shoulder pain  Pain -  hot/cold therapy, US, electric stimulation, manual therapy, splint/taping/bracing/orthotics, self management, education and home program  Decreased ROM/flexibility - manual therapy, therapeutic exercise, therapeutic activity and home program  Decreased strength - therapeutic exercise, therapeutic activities and home program  Impaired muscle performance - electric stimulation, neuro re-education and home program  Decreased function - therapeutic activities and home program  Impaired posture - neuro re-education, therapeutic activities and home program  STG/LTGs have been met or progress has been made towards goals:  The patient had made significant progress towards all goals but had an exacerbation of symptoms.  Assessment of  Progress: The patient's condition has potential to improve.  The patient has had set backs in their progress.  The patient's condition has exacerbated.  The patient did not return to therapy for an extended period of time due to progress made but returned now due to set backs.    Self Management Plans:  Patient has been instructed in a home treatment program.  Patient is independent in a home treatment program.  I have re-evaluated this patient and find that the nature, scope, duration and intensity of the therapy is appropriate for the medical condition of the patient.  Gary continues to require the following intervention to meet STG and LTG's:  PT    Recommendations:  This patient would benefit from continued therapy.     Frequency:  1 X week, once daily  Duration:  for 6 weeks    Thank you for your referral.    INQUIRIES  Therapist: Travis Haro DPT PHYSICAL THERAPY  [address] [place] [address]  Phone: [phone]  Fax: [phone]

## 2017-12-16 NOTE — PROGRESS NOTES
[facility] Evaluation  Subjective:    HPI                    Objective:    System    Physical Exam    General     ROS    Assessment/Plan:      PROGRESS  REPORT    Progress reporting period is from 8/17/17 to 12/15/17.       SUBJECTIVE  Subjective: Gary reports that he is doing okay but that his left shoulder is acting up again. He does not know why it hurts again but is having 6/10 pain with rasing his arm overhead and behind back.    Current Pain level: 4/10.     Initial Pain level: 3/10.   Changes in function:  Yes (See Goal flowsheet attached for changes in current functional level)  Adverse reaction to treatment or activity: None    OBJECTIVE  Changes noted in objective findings:  Yes,   Objective: Shoulder AROM: flexion 145 on R, 140 on L; IR and extension T10 on the L, L2 on the R. SHoulder strength 5-/5 for L shoulder flexion, abd, and ER; 4+/5 for R shoulder flexion, abd, and ER.     ASSESSMENT/PLAN  Updated problem list and treatment plan: Diagnosis 1:  L shoulder pain  Pain -  hot/cold therapy, US, electric stimulation, manual therapy, splint/taping/bracing/orthotics, self management, education and home program  Decreased ROM/flexibility - manual therapy, therapeutic exercise, therapeutic activity and home program  Decreased strength - therapeutic exercise, therapeutic activities and home program  Impaired muscle performance - electric stimulation, neuro re-education and home program  Decreased function - therapeutic activities and home program  Impaired posture - neuro re-education, therapeutic activities and home program  STG/LTGs have been met or progress has been made towards goals:  The patient had made significant progress towards all goals but had an exacerbation of symptoms.  Assessment of Progress: The patient's condition has potential to improve.  The patient has had set backs in their progress.  The patient's condition has exacerbated.  The patient did not return to  therapy for an extended period of time due to progress made but returned now due to set backs.    Self Management Plans:  Patient has been instructed in a home treatment program.  Patient is independent in a home treatment program.  I have re-evaluated this patient and find that the nature, scope, duration and intensity of the therapy is appropriate for the medical condition of the patient.  Gary continues to require the following intervention to meet STG and LTG's:  PT    Recommendations:  This patient would benefit from continued therapy.     Frequency:  1 X week, once daily  Duration:  for 6 weeks          Please refer to the daily flowsheet for treatment today, total treatment time and time spent performing 1:1 timed codes.

## 2017-12-22 ENCOUNTER — OFFICE VISIT (OUTPATIENT)
Dept: OTOLARYNGOLOGY | Facility: CLINIC | Age: 57
End: 2017-12-22
Payer: COMMERCIAL

## 2017-12-22 DIAGNOSIS — R49.0 DYSPHONIA: Primary | ICD-10-CM

## 2017-12-22 DIAGNOSIS — J38.7 LESION OF LARYNX: ICD-10-CM

## 2017-12-22 DIAGNOSIS — J38.7 LEUKOPLAKIA OF LARYNX: Primary | ICD-10-CM

## 2017-12-22 DIAGNOSIS — R05.9 COUGH: ICD-10-CM

## 2017-12-22 NOTE — MR AVS SNAPSHOT
After Visit Summary   12/22/2017    Gary Iyer    MRN: [id number]           Patient Information     Date Of Birth          1960        Visit Information        Provider Department      12/22/2017 10:50 AM Mel Fournier MD  [facility]        Today's Diagnoses     Leukoplakia of larynx    -  1    Cough          Care Instructions    1.  You were seen in the ENT Clinic today by Dr. Fournier.  If you have any questions or concerns after your appointment, please call [phone].  Press option #1 for scheduling related needs.  Press option #3 for Nurse advice.  2.  Plan is to return to clinic in 2 months.  3. Please resume speech therapy at the [facility].     Bijal MELENDEZ, RN  [facility] ENT   Head & Neck Surgery               Follow-ups after your visit        Additional Services     OTOLARYNGOLOGY REFERRAL       SPEECH-LANGUAGE PATHOLOGY SERVICE(S) REQUESTED:  Evaluate and treat    Misty Clark, Ph.D., CCC/SLP  Speech-Language Pathologist  Director, [facility]  [phone]    Jasmyne Padilla M.M., M.A., CCC/SLP  Speech-Language Pathologist  [facility]  [phone]                  Your next 10 appointments already scheduled     Dec 29, 2017 12:50 PM CST   [facility] with Travis Haro, PT   [facility] ([facility] [place])    [address] [place] [id number] [phone]              Who to contact     Please call your clinic at [phone] to:    Ask questions about your health    Make or cancel appointments    Discuss your medicines    Learn about your test results    Speak to your doctor   If you have compliments or concerns about an experience at your clinic, or if you wish to file a complaint, please contact [facility] Patient Relations at [phone] or email us at [email or web address]         Additional Information About Your  Visit        [employer] Information     [employer] gives you secure access to your electronic health record. If you see a primary care provider, you can also send messages to your care team and make appointments. If you have questions, please call your primary care clinic.  If you do not have a primary care provider, please call [phone] and they will assist you.      [employer] is an electronic gateway that provides easy, online access to your medical records. With [employer], you can request a clinic appointment, read your test results, renew a prescription or communicate with your care team.     To access your existing account, please contact your [facility] or call [phone] for assistance.        Care EveryWhere ID     This is your Care EveryWhere ID. This could be used by other organizations to access your [place] medical records  [id number]         Blood Pressure from Last 3 Encounters:   11/16/17 135/79   10/19/17 (P) 135/77   09/07/17 133/90    Weight from Last 3 Encounters:   11/24/17 104.8 kg (231 lb)   11/16/17 104.3 kg (230 lb)   10/23/17 103 kg (227 lb)              We Performed the Following     IMAGESTREAM RECORDING ORDER     LARYNGOSCOPY FLEX/RIGID W STROBOSCOPY     OTOLARYNGOLOGY REFERRAL        Primary Care Provider Office Phone # Fax #    Finesse Beal [phone]       [address] [facility] [address]        Equal Access to Services     [facility]: Hadii aad ku hadasho Soomaali, waaxda luqadaha, qaybta kaalmada adeegyada, gregory gutierrez . So [facility] [phone].    ATENCIÓN: Si habla español, tiene a lucero disposición servicios gratuitos de asistencia lingüística. Llame al [phone].    We comply with applicable federal civil rights laws and [place] laws. We do not discriminate on the basis of race, color, national origin, age, disability, sex, sexual orientation, or gender identity.            Thank you!      Thank you for choosing [facility]  for your care. Our goal is always to provide you with excellent care. Hearing back from our patients is one way we can continue to improve our services. Please take a few minutes to complete the written survey that you may receive in the mail after your visit with us. Thank you!             Your Updated Medication List - Protect others around you: Learn how to safely use, store and throw away your medicines at www.disposemymeds.org.          This list is accurate as of: 12/22/17 11:59 PM.  Always use your most recent med list.                   Brand Name Dispense Instructions for use Diagnosis    allopurinol 300 MG tablet    ZYLOPRIM    90 tablet    TAKE 1 TABLET(300 MG) BY MOUTH DAILY    Gout, unspecified cause, unspecified chronicity, unspecified site       aspirin 81 MG tablet      Take 1 tablet by mouth daily.        atorvastatin 20 MG tablet    LIPITOR    90 tablet    TAKE 1 TABLET(20 MG) BY MOUTH DAILY    Hyperlipidemia LDL goal <70       blood glucose monitoring lancets     1 Box    1 each 2 times daily Use to test blood sugar 2 times daily or as directed.    Type 2 diabetes mellitus without complication (H)       blood glucose monitoring meter device kit    no brand specified    1 kit    Use to test blood sugar 2 times daily or as directed.  Accucheck meter please and all associated strips, lancets, and other supplies, 3 month supply with refills for a year.    Type 2 diabetes mellitus without complication, without long-term current use of insulin (H)       DAILY MULTIVITAMIN PO      Take  by mouth daily.        hydrochlorothiazide 50 MG tablet    HYDRODIURIL    90 tablet    TAKE 1 TABLET(50 MG) BY MOUTH EVERY MORNING    Hypertension goal BP (blood pressure) < 140/90       hydrocortisone 1 % ointment     30 g    Apply topically 2 times daily as needed    Facial dermatitis       hydrocortisone 2.5 % cream    ANUSOL-HC    30 g    Place rectally 2 times  daily as needed for hemorrhoids    External hemorrhoids       hydrocortisone 25 MG Suppository    ANUSOL-HC    20 suppository    Place 1 suppository (25 mg) rectally 2 times daily as needed for hemorrhoids    External hemorrhoids       lidocaine (PF) 2 % Soln injection    XYLOCAINE     The following medication was given:   MEDICATION:  Lidocaine 2% Soln ROUTE: Laryngeal gargle SITE: Larynx DOSE: 10 mL LOT #: [id number] : [employer]  EXPIRATION DATE: 07/21 NDC#: 49617-797-84  Was there drug waste? Yes  Amount of drug waste (mL): 10.  Reason for waste:  Single use vial  Bjial Sorto RN  12/8/2017 1:51 PM    Lesion of larynx       lidocaine-EPINEPHrine 1 %-1:655124 Soln injection     1 mL    The following medication was given:   MEDICATION:  Lidocaine with epinephrine ROUTE: laryngeal  SITE: larynx  DOSE: 4 mL LOT #: [id number] : [employer] EXPIRATION DATE: 08/19 NDC#: 16379-732-12  Was there drug waste? Yes  Amount of drug waste (mL): 16.  Reason for waste:  Single use vial  Bijal Sorto RN  12/8/2017 1:47 PM    Lesion of larynx       lisinopril 30 MG tablet    PRINIVIL,ZESTRIL    90 tablet    TAKE 1 TABLET(30 MG) BY MOUTH DAILY    Hypertension goal BP (blood pressure) < 140/90       metFORMIN 500 MG tablet    GLUCOPHAGE    180 tablet    TAKE 1 TABLET(500 MG) BY MOUTH TWICE DAILY WITH MEALS    Type 2 diabetes, HbA1C goal < 8% (H)       omeprazole 20 MG tablet     60 tablet    Take 1 cap BID x 1-2 weeks; then 1 cap daily x 1-2 weeks; then 1 cap QOD x 1-2 weeks.    Dysphonia, Gastroesophageal reflux disease, esophagitis presence not specified, Leukoplakia of larynx       * order for DME     1 Container    Place rectally 2 times daily 1.8 oz container of 0.2 % nifedipine suppository if possible as easier to place the medication.   Apply on the anus every 12 hours.  Just get to where the anus is tight .    Anal fissure       * order for DME     1 Units    Autocpap 10-16 cm    Obstructive  sleep apnea       PARoxetine 20 MG tablet    PAXIL    180 tablet    Take 2 tablets (40 mg) by mouth daily    Anxiety       psyllium 0.52 G capsule     100 capsule    Take 1 capsule (0.52 g) by mouth daily    Constipation, unspecified constipation type       tamsulosin 0.4 MG capsule    FLOMAX    90 capsule    TAKE 1 CAPSULE(0.4 MG) BY MOUTH DAILY    Benign prostatic hyperplasia with lower urinary tract symptoms, unspecified morphology       triamcinolone 0.1 % cream    KENALOG    30 g    Apply topically 2 times daily as needed for irritation    Dermatitis       vitamin D 1000 UNITS capsule      Take 1 capsule by mouth daily.        * Notice:  This list has 2 medication(s) that are the same as other medications prescribed for you. Read the directions carefully, and ask your doctor or other care provider to review them with you.

## 2017-12-22 NOTE — PROGRESS NOTES
Dear Colleague:    Gary Iyer recently returned for follow-up at the [facility]. My clinic note from our visit is enclosed below.     I appreciate the ongoing opportunity to participate in this patient's care.    Please feel free to contact me with any questions.      Sincerely yours,  Mel Fournier M.D., M.P.H.  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 Most recently we returned to the Operating Room on 02/09/2017 for an area of new irregularity of the left true vocal fold. Pathology showed severe dysplasia with negative but close margins (for moderate, but not severe dysplasia).     He returns in follow up today. 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  He followed instructions to rest his voice post-procedure. He feels he is coughing less. He does saline gargles but not nasal rinses because the latter bothered his nose.    MEDICATIONS:     Current Outpatient Prescriptions   Medication Sig Dispense Refill     lidocaine, PF, (XYLOCAINE) 2 % SOLN injection The following medication was given:     MEDICATION:  Lidocaine 2% Soln  ROUTE: Laryngeal gargle  SITE: Larynx  DOSE: 10 mL  LOT #: [id number]  : [employer]   EXPIRATION DATE: 07/21  NDC#: 29341-779-51   Was there drug waste? Yes  Amount of drug waste (mL): 10.  Reason for waste:  Single use vial    Bijal Sorto RN   12/8/2017 1:51 PM       lidocaine-EPINEPHrine 1 %-1:358192 SOLN injection The following medication was given:     MEDICATION:  Lidocaine with epinephrine  ROUTE: laryngeal   SITE: larynx   DOSE: 4 mL  LOT #: [id number]  : [employer]  EXPIRATION  DATE: 08/19  NDC#: 71845-986-86   Was there drug waste? Yes  Amount of drug waste (mL): 16.  Reason for waste:  Single use vial    Bijal Sorto RN   12/8/2017 1:47 PM 1 mL 0     omeprazole 20 MG tablet Take 1 cap BID x 1-2 weeks; then 1 cap daily x 1-2 weeks; then 1 cap QOD x 1-2 weeks. 60 tablet 0     allopurinol (ZYLOPRIM) 300 MG tablet TAKE 1 TABLET(300 MG) BY MOUTH DAILY 90 tablet 1     atorvastatin (LIPITOR) 20 MG tablet TAKE 1 TABLET(20 MG) BY MOUTH DAILY 90 tablet 3     metFORMIN (GLUCOPHAGE) 500 MG tablet TAKE 1 TABLET(500 MG) BY MOUTH TWICE DAILY WITH MEALS 180 tablet 1     lisinopril (PRINIVIL,ZESTRIL) 30 MG tablet TAKE 1 TABLET(30 MG) BY MOUTH DAILY 90 tablet 2     hydrochlorothiazide (HYDRODIURIL) 50 MG tablet TAKE 1 TABLET(50 MG) BY MOUTH EVERY MORNING 90 tablet 2     tamsulosin (FLOMAX) 0.4 MG capsule TAKE 1 CAPSULE(0.4 MG) BY MOUTH DAILY 90 capsule 1     order for DME Autocpap 10-16 cm 1 Units 0     blood glucose monitoring (NO BRAND SPECIFIED) meter device kit Use to test blood sugar 2 times daily or as directed.  Accucheck meter please and all associated strips, lancets, and other supplies, 3 month supply with refills for a year. 1 kit 0     PARoxetine (PAXIL) 20 MG tablet Take 2 tablets (40 mg) by mouth daily 180 tablet 1     order for DME Place rectally 2 times daily 1.8 oz container of 0.2 % nifedipine suppository if possible as easier to place the medication.    Apply on the anus every 12 hours.  Just get to where the anus is tight . 1 Container 5     psyllium 0.52 G capsule Take 1 capsule (0.52 g) by mouth daily 100 capsule 3     hydrocortisone (ANUSOL-HC) 25 MG suppository Place 1 suppository (25 mg) rectally 2 times daily as needed for hemorrhoids 20 suppository 1     hydrocortisone (ANUSOL-HC) 2.5 % rectal cream Place rectally 2 times daily as needed for hemorrhoids 30 g 1     triamcinolone (KENALOG) 0.1 % cream Apply topically 2 times daily as needed for irritation 30 g 1      hydrocortisone 1 % ointment Apply topically 2 times daily as needed 30 g 1     blood glucose monitoring (ALON MICROLET) lancets 1 each 2 times daily Use to test blood sugar 2 times daily or as directed. 1 Box 3     Cholecalciferol (VITAMIN D) 1000 UNITS capsule Take 1 capsule by mouth daily.       Multiple Vitamin (DAILY MULTIVITAMIN PO) Take  by mouth daily.       aspirin 81 MG tablet Take 1 tablet by mouth daily.         ALLERGIES:  No Known Allergies    NEW PMH/PSH: None    REVIEW OF SYSTEMS:  The patient completed a comprehensive 11 point review of systems (below), which was reviewed. Positives are as noted below.  Patient Supplied Answers to Review of Systems   ENT ROS 4/22/2016   Ears, Nose, Throat Nasal congestion or drainage       PHYSICAL EXAM:  General: The patient was alert and conversant, and in no acute distress.    Oral cavity/oropharynx: No masses or lesions. Dentition unchanged since prior. Tongue mobility and palate elevation intact and symmetric.  Neck: No palpable cervical lymphadenopathy, no significant tenderness to palpation of the thyrohyoid space, which was narrow. No obvious thyroid abnormality.  Resp: Breathing comfortably, no stridor or stertor.  Neuro: Symmetric facial function. Other cranial nerve function as documented above.  Psych: Normal affect, pleasant and cooperative.  Voice/speech: Mild dysphonia characterized by breathiness, roughness and strain.      Intake scores  Last 2 Scores for Patient-Answered VHI Questionnaire  VHI Total Score 12/8/2017 12/22/2017   VHI Total Score 2 0      Last 2 Scores for Patient-Answered EAT Questionnaire  EAT Total Score 12/8/2017 12/22/2017   EAT Total Score 0 0      Last 2 Scores for Patient-Answered CSI Questionnaire  CSI Total Score 12/8/2017 12/22/2017   CSI Total Score 0 0       Procedure:   Flexible fiberoptic laryngoscopy and laryngovideostroboscopy  Indications: This procedure was warranted to evaluate the patient's laryngeal anatomy and  function. Risks, benefits, and alternatives were discussed.  Description: After written informed consent was obtained, a time-out was performed to confirm patient identity, procedure, and procedure site. Topical 3% lidocaine with 0.25% phenylephrine was applied to the nasal cavities. I performed the endoscopy and no complications were apparent. Continuous and stroboscopic light were utilized to assess routine phonation and variable frequency phonation.  Performed by: Mel Fournier MD MPH  SLP: Misty Clark, PhD, CCC-SLP   Findings: Normal nasopharynx. Normal base of tongue, valleculae, and epiglottis. Vocal fold mobility: right: normal; left: normal. Medial edges of the vocal folds: smooth and straight. No focal mucosal lesions were observed on the true vocal folds. Glissade produced appropriate elongation. There was moderate to severe supraglottic recruitment with connected speech. Mucosa of false vocal folds, aryepiglottic folds, piriform sinuses, and posterior glottis unremarkable; slightly increased inflammation left medial arytenoid at biopsy site. Airway was patent.     The addition of stroboscopy allowed evaluation of the mucosal wave.   Amplitude: right: mildly decreased; left: mildly decreased. Symmetry: intermittent symmetry. Closure pattern: complete. Closure plane: at glottic level. Phase distribution: normal.      IMPRESSION AND PLAN:   Gary Iyer returns with persistent inflammation at the left medial arytenoid. Biopsy was fortunately negative, but he continues to demonstrate significant supraglottic hyperfunction that continues to irritate the area.     Given his history, if the leukoplakia persists or worsens, we will be obliged to consider repeat biopsy, but it does appear to receive significant phonotrauma, so I recommended that he return to speech therapy to learn how to reduce impact in that region. My SLP colleague Misty Clark, PhD, CCC-SLP recommended resuming straw  exercises in the meantime.    He will return in two months for a repeat exam, or sooner as needed. I appreciate the opportunity to participate in the care of this pleasant patient.

## 2017-12-22 NOTE — NURSING NOTE
Chief Complaint   Patient presents with     Abnormal Bleeding Problem     return     Yuridia Saldivar Medical Assistant

## 2017-12-22 NOTE — MR AVS SNAPSHOT
After Visit Summary   12/22/2017    Gary Iyer    MRN: [id number]           Patient Information     Date Of Birth          1960        Visit Information        Provider Department      12/22/2017 10:50 AM Misty Clark, SLP [facility]        Today's Diagnoses     Dysphonia    -  1    Lesion of larynx           Follow-ups after your visit        Your next 10 appointments already scheduled     Dec 29, 2017 12:50 PM CST   [facility] Ext[facility] with Travis Himharesh, PT   [facility] ([facility])    [address]  [place] [id number] [phone]              Who to contact     Please call your clinic at [phone] to:    Ask questions about your health    Make or cancel appointments    Discuss your medicines    Learn about your test results    Speak to your doctor   If you have compliments or concerns about an experience at your clinic, or if you wish to file a complaint, please contact [facility] Patient Relations at [phone] or email us at [email or web address]         Additional Information About Your Visit        MyChart Information     [employer] gives you secure access to your electronic health record. If you see a primary care provider, you can also send messages to your care team and make appointments. If you have questions, please call your primary care clinic.  If you do not have a primary care provider, please call [phone] and they will assist you.      [employer] is an electronic gateway that provides easy, online access to your medical records. With [employer], you can request a clinic appointment, read your test results, renew a prescription or communicate with your care team.     To access your existing account, please contact your [facility] or call [phone] for assistance.        Care EveryWhere ID     This is your Care EveryWhere ID. This could be used by other organizations to  access your [place] medical records  [id number]         Blood Pressure from Last 3 Encounters:   11/16/17 135/79   10/19/17 (P) 135/77   09/07/17 133/90    Weight from Last 3 Encounters:   11/24/17 104.8 kg (231 lb)   11/16/17 104.3 kg (230 lb)   10/23/17 103 kg (227 lb)              We Performed the Following     SPEECH/HEARING THERAPY, INDIVIDUAL        Primary Care Provider Office Phone # Fax #    Finesse Beal [phone]       [address] [facility] [address]        Equal Access to Services     [facility]: Hadii aad ku hadasho Soomaali, waaxda luqadaha, qaybta kaalmada adepierreyada, gregory gutierrez . So [facility] [phone].    ATENCIÓN: Si habla español, tiene a lucero disposición servicios gratuitos de asistencia lingüística. [facility] [phone].    We comply with applicable federal civil rights laws and [place] laws. We do not discriminate on the basis of race, color, national origin, age, disability, sex, sexual orientation, or gender identity.            Thank you!     Thank you for choosing [facility]  for your care. Our goal is always to provide you with excellent care. Hearing back from our patients is one way we can continue to improve our services. Please take a few minutes to complete the written survey that you may receive in the mail after your visit with us. Thank you!             Your Updated Medication List - Protect others around you: Learn how to safely use, store and throw away your medicines at www.disposemymeds.org.          This list is accurate as of: 12/22/17 11:59 PM.  Always use your most recent med list.                   Brand Name Dispense Instructions for use Diagnosis    allopurinol 300 MG tablet    ZYLOPRIM    90 tablet    TAKE 1 TABLET(300 MG) BY MOUTH DAILY    Gout, unspecified cause, unspecified chronicity, unspecified site       aspirin 81 MG tablet      Take 1 tablet by mouth daily.        atorvastatin 20 MG  tablet    LIPITOR    90 tablet    TAKE 1 TABLET(20 MG) BY MOUTH DAILY    Hyperlipidemia LDL goal <70       blood glucose monitoring lancets     1 Box    1 each 2 times daily Use to test blood sugar 2 times daily or as directed.    Type 2 diabetes mellitus without complication (H)       blood glucose monitoring meter device kit    no brand specified    1 kit    Use to test blood sugar 2 times daily or as directed.  Accucheck meter please and all associated strips, lancets, and other supplies, 3 month supply with refills for a year.    Type 2 diabetes mellitus without complication, without long-term current use of insulin (H)       DAILY MULTIVITAMIN PO      Take  by mouth daily.        hydrochlorothiazide 50 MG tablet    HYDRODIURIL    90 tablet    TAKE 1 TABLET(50 MG) BY MOUTH EVERY MORNING    Hypertension goal BP (blood pressure) < 140/90       hydrocortisone 1 % ointment     30 g    Apply topically 2 times daily as needed    Facial dermatitis       hydrocortisone 2.5 % cream    ANUSOL-HC    30 g    Place rectally 2 times daily as needed for hemorrhoids    External hemorrhoids       hydrocortisone 25 MG Suppository    ANUSOL-HC    20 suppository    Place 1 suppository (25 mg) rectally 2 times daily as needed for hemorrhoids    External hemorrhoids       lidocaine (PF) 2 % Soln injection    XYLOCAINE     The following medication was given:   MEDICATION:  Lidocaine 2% Soln ROUTE: Laryngeal gargle SITE: Larynx DOSE: 10 mL LOT #: [id number] : [employer]  EXPIRATION DATE: 07/21 NDC#: 24008-388-56  Was there drug waste? Yes  Amount of drug waste (mL): 10.  Reason for waste:  Single use vial  Bijal Sorto RN  12/8/2017 1:51 PM    Lesion of larynx       lidocaine-EPINEPHrine 1 %-1:595667 Soln injection     1 mL    The following medication was given:   MEDICATION:  Lidocaine with epinephrine ROUTE: laryngeal  SITE: larynx  DOSE: 4 mL LOT #: [id number] : [employer] EXPIRATION DATE: 08/19 NDC#:  86582-478-83  Was there drug waste? Yes  Amount of drug waste (mL): 16.  Reason for waste:  Single use vial  Bijal Sorto RN  12/8/2017 1:47 PM    Lesion of larynx       lisinopril 30 MG tablet    PRINIVIL,ZESTRIL    90 tablet    TAKE 1 TABLET(30 MG) BY MOUTH DAILY    Hypertension goal BP (blood pressure) < 140/90       metFORMIN 500 MG tablet    GLUCOPHAGE    180 tablet    TAKE 1 TABLET(500 MG) BY MOUTH TWICE DAILY WITH MEALS    Type 2 diabetes, HbA1C goal < 8% (H)       omeprazole 20 MG tablet     60 tablet    Take 1 cap BID x 1-2 weeks; then 1 cap daily x 1-2 weeks; then 1 cap QOD x 1-2 weeks.    Dysphonia, Gastroesophageal reflux disease, esophagitis presence not specified, Leukoplakia of larynx       * order for DME     1 Container    Place rectally 2 times daily 1.8 oz container of 0.2 % nifedipine suppository if possible as easier to place the medication.   Apply on the anus every 12 hours.  Just get to where the anus is tight .    Anal fissure       * order for DME     1 Units    Autocpap 10-16 cm    Obstructive sleep apnea       PARoxetine 20 MG tablet    PAXIL    180 tablet    Take 2 tablets (40 mg) by mouth daily    Anxiety       psyllium 0.52 G capsule     100 capsule    Take 1 capsule (0.52 g) by mouth daily    Constipation, unspecified constipation type       tamsulosin 0.4 MG capsule    FLOMAX    90 capsule    TAKE 1 CAPSULE(0.4 MG) BY MOUTH DAILY    Benign prostatic hyperplasia with lower urinary tract symptoms, unspecified morphology       triamcinolone 0.1 % cream    KENALOG    30 g    Apply topically 2 times daily as needed for irritation    Dermatitis       vitamin D 1000 UNITS capsule      Take 1 capsule by mouth daily.        * Notice:  This list has 2 medication(s) that are the same as other medications prescribed for you. Read the directions carefully, and ask your doctor or other care provider to review them with you.

## 2017-12-22 NOTE — PATIENT INSTRUCTIONS
1.  You were seen in the ENT Clinic today by Dr. Fournier.  If you have any questions or concerns after your appointment, please call [phone].  Press option #1 for scheduling related needs.  Press option #3 for Nurse advice.  2.  Plan is to return to clinic in 2 months.  3. Please resume speech therapy at the [facility] - [facility].     Bijal LINARESN, RN  [facility] ENT   Head & Neck Surgery

## 2017-12-22 NOTE — LETTER
12/22/2017      RE: Gary Iyer  [address] [address]       Dear Colleague:    Gary Iyer recently returned for follow-up at the [facility]. My clinic note from our visit is enclosed below.     I appreciate the ongoing opportunity to participate in this patient's care.    Please feel free to contact me with any questions.      Sincerely yours,  Mel Fournier M.D., M.P.H.  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 Most recently we returned to the Operating Room on 02/09/2017 for an area of new irregularity of the left true vocal fold. Pathology showed severe dysplasia with negative but close margins (for moderate, but not severe dysplasia).     He returns in follow up today. 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  He followed instructions to rest his voice post-procedure. He feels he is coughing less. He does saline gargles but not nasal rinses because the latter bothered his nose.    MEDICATIONS:     Current Outpatient Prescriptions   Medication Sig Dispense Refill     lidocaine, PF, (XYLOCAINE) 2 % SOLN injection The following medication was given:     MEDICATION:  Lidocaine 2% Soln  ROUTE: Laryngeal gargle  SITE: Larynx  DOSE: 10 mL  LOT #: [id number]  : [employer]   EXPIRATION DATE: 07/21  NDC#: 86861-323-09   Was there drug waste? Yes  Amount of drug waste (mL): 10.  Reason for waste:  Single use vial    Bijal Sorto RN   12/8/2017 1:51 PM       lidocaine-EPINEPHrine 1 %-1:[id number] SOLN injection The following medication was given:     MEDICATION:  Lidocaine with epinephrine  ROUTE: laryngeal    SITE: larynx   DOSE: 4 mL  LOT #: [id number]  : [employer]  EXPIRATION DATE: 08/19  NDC#: 02389-003-08   Was there drug waste? Yes  Amount of drug waste (mL): 16.  Reason for waste:  Single use vial    Bijal Sorto RN   12/8/2017 1:47 PM 1 mL 0     omeprazole 20 MG tablet Take 1 cap BID x 1-2 weeks; then 1 cap daily x 1-2 weeks; then 1 cap QOD x 1-2 weeks. 60 tablet 0     allopurinol (ZYLOPRIM) 300 MG tablet TAKE 1 TABLET(300 MG) BY MOUTH DAILY 90 tablet 1     atorvastatin (LIPITOR) 20 MG tablet TAKE 1 TABLET(20 MG) BY MOUTH DAILY 90 tablet 3     metFORMIN (GLUCOPHAGE) 500 MG tablet TAKE 1 TABLET(500 MG) BY MOUTH TWICE DAILY WITH MEALS 180 tablet 1     lisinopril (PRINIVIL,ZESTRIL) 30 MG tablet TAKE 1 TABLET(30 MG) BY MOUTH DAILY 90 tablet 2     hydrochlorothiazide (HYDRODIURIL) 50 MG tablet TAKE 1 TABLET(50 MG) BY MOUTH EVERY MORNING 90 tablet 2     tamsulosin (FLOMAX) 0.4 MG capsule TAKE 1 CAPSULE(0.4 MG) BY MOUTH DAILY 90 capsule 1     order for DME Autocpap 10-16 cm 1 Units 0     blood glucose monitoring (NO BRAND SPECIFIED) meter device kit Use to test blood sugar 2 times daily or as directed.  Accucheck meter please and all associated strips, lancets, and other supplies, 3 month supply with refills for a year. 1 kit 0     PARoxetine (PAXIL) 20 MG tablet Take 2 tablets (40 mg) by mouth daily 180 tablet 1     order for DME Place rectally 2 times daily 1.8 oz container of 0.2 % nifedipine suppository if possible as easier to place the medication.    Apply on the anus every 12 hours.  Just get to where the anus is tight . 1 Container 5     psyllium 0.52 G capsule Take 1 capsule (0.52 g) by mouth daily 100 capsule 3     hydrocortisone (ANUSOL-HC) 25 MG suppository Place 1 suppository (25 mg) rectally 2 times daily as needed for hemorrhoids 20 suppository 1     hydrocortisone (ANUSOL-HC) 2.5 % rectal cream Place rectally 2 times daily as needed for hemorrhoids 30 g 1     triamcinolone (KENALOG) 0.1  % cream Apply topically 2 times daily as needed for irritation 30 g 1     hydrocortisone 1 % ointment Apply topically 2 times daily as needed 30 g 1     blood glucose monitoring (ALON MICROLET) lancets 1 each 2 times daily Use to test blood sugar 2 times daily or as directed. 1 Box 3     Cholecalciferol (VITAMIN D) 1000 UNITS capsule Take 1 capsule by mouth daily.       Multiple Vitamin (DAILY MULTIVITAMIN PO) Take  by mouth daily.       aspirin 81 MG tablet Take 1 tablet by mouth daily.         ALLERGIES:  No Known Allergies    NEW PMH/PSH: None    REVIEW OF SYSTEMS:  The patient completed a comprehensive 11 point review of systems (below), which was reviewed. Positives are as noted below.  Patient Supplied Answers to Review of Systems   ENT ROS 4/22/2016   Ears, Nose, Throat Nasal congestion or drainage       PHYSICAL EXAM:  General: The patient was alert and conversant, and in no acute distress.    Oral cavity/oropharynx: No masses or lesions. Dentition unchanged since prior. Tongue mobility and palate elevation intact and symmetric.  Neck: No palpable cervical lymphadenopathy, no significant tenderness to palpation of the thyrohyoid space, which was narrow. No obvious thyroid abnormality.  Resp: Breathing comfortably, no stridor or stertor.  Neuro: Symmetric facial function. Other cranial nerve function as documented above.  Psych: Normal affect, pleasant and cooperative.  Voice/speech: Mild dysphonia characterized by breathiness, roughness and strain.      Intake scores  Last 2 Scores for Patient-Answered VHI Questionnaire  VHI Total Score 12/8/2017 12/22/2017   VHI Total Score 2 0      Last 2 Scores for Patient-Answered EAT Questionnaire  EAT Total Score 12/8/2017 12/22/2017   EAT Total Score 0 0      Last 2 Scores for Patient-Answered CSI Questionnaire  CSI Total Score 12/8/2017 12/22/2017   CSI Total Score 0 0       Procedure:   Flexible fiberoptic laryngoscopy and laryngovideostroboscopy  Indications:  This procedure was warranted to evaluate the patient's laryngeal anatomy and function. Risks, benefits, and alternatives were discussed.  Description: After written informed consent was obtained, a time-out was performed to confirm patient identity, procedure, and procedure site. Topical 3% lidocaine with 0.25% phenylephrine was applied to the nasal cavities. I performed the endoscopy and no complications were apparent. Continuous and stroboscopic light were utilized to assess routine phonation and variable frequency phonation.  Performed by: Mel Fournier MD MPH  SLP: Misty Clark, PhD, CCC-SLP   Findings: Normal nasopharynx. Normal base of tongue, valleculae, and epiglottis. Vocal fold mobility: right: normal; left: normal. Medial edges of the vocal folds: smooth and straight. No focal mucosal lesions were observed on the true vocal folds. Glissade produced appropriate elongation. There was moderate to severe supraglottic recruitment with connected speech. Mucosa of false vocal folds, aryepiglottic folds, piriform sinuses, and posterior glottis unremarkable; slightly increased inflammation left medial arytenoid at biopsy site. Airway was patent.     The addition of stroboscopy allowed evaluation of the mucosal wave.   Amplitude: right: mildly decreased; left: mildly decreased. Symmetry: intermittent symmetry. Closure pattern: complete. Closure plane: at glottic level. Phase distribution: normal.      IMPRESSION AND PLAN:   Gary Iyer returns with persistent inflammation at the left medial arytenoid. Biopsy was fortunately negative, but he continues to demonstrate significant supraglottic hyperfunction that continues to irritate the area.     Given his history, if the leukoplakia persists or worsens, we will be obliged to consider repeat biopsy, but it does appear to receive significant phonotrauma, so I recommended that he return to speech therapy to learn how to reduce impact in that region. My SLP  colleague Misty Clark, PhD, CCC-SLP recommended resuming straw exercises in the meantime.    He will return in two months for a repeat exam, or sooner as needed. I appreciate the opportunity to participate in the care of this pleasant patient.     Mel Fournier MD

## 2017-12-26 DIAGNOSIS — N40.1 BENIGN PROSTATIC HYPERPLASIA WITH LOWER URINARY TRACT SYMPTOMS: ICD-10-CM

## 2017-12-26 NOTE — PROGRESS NOTES
[facility]  Elijah Baker Jr., M.D., F.A.C.S.  Mel Fournier M.D., M.P.H.  Misty Clark, Ph.D., CCC/SLP  Jasmyne Padilla M.M. (voice), M.SWAPNIL., CCC/SLP  Zack Mcdaniels M.M. (voice), M.A., [facility]/SLP    [facility]  VOICE/SPEECH/BREATHING THERAPY  CLINICAL FOLLOW-UP AND LARYNGEAL EXAMINATION REPORT    Patient: Gary Iyer  Date of Service: 12/22/2017    HISTORY  PATIENT INFORMATION  Gary Iyer was seen for follow-up in conjunction with a visit to Dr. Fournier today.  Please also refer to 's dictation.  He was last seen on 12/6/17 by my associate Jasmyne Padilla.    PROGRESS SINCE LAST VISIT  Mr. Iyer reports:    voice quality has been the same    He has tried to follow the instructions for reduced cough and reduced vocal fold impact provided by Ms. Padilla  o He believes he is coughing less    He is here for routine follow-up for leukoplakia    OBJECTIVE FINDINGS  VOICE AND SPEECH EVALUATION  Mr. Iyer presents today with a voice quality that is characterized by     The same gentle roughness and leblanc that has characterized his voice in the past    There is no particular strain    He still has difficulty following instructions for changing pitch or volume     LARYNGEAL EXAMINATION    Endoscopic laryngeal examination was performed by:  Mel Fournier MD,   I provided technical support, and provided the protocol of instructions for the patient.  Type of exam:   Flexible endoscopy with chip-tip technology and stroboscopy    The laryngeal and pharyngeal structures were evaluated for gross appearance, mobility, function, and focal lesions / abnormalities of the associated mucosa.  Stroboscopy was warranted to evaluate closure, symmetry, and vibratory characteristics of the vocal folds.  All findings were within normal limits with the exception of the following salient features:     Continued presence of inflammation/leukoplakia on the left vocal process    Impact to the affected site on  all phonation    The left vocal fold is still mildly erythematous, but less inflamed than it has been in the past    Supraglottic constriction with speech    Stroboscopy shows the mucosal wave is mildly irregular, asymmetric, and limited in amplitude due to overall inflamation of the mucosa    Dr. Fournier and I reviewed this laryngeal exam with Mr. Iyer today, and I provided pertinent explanations, as well as written information:    It seems possible that the persistence of the leukoplakia is partly due to the persistent high impact of that area during phonation    Mr. Iyer has worked to reduce his coughing and other phonotraumatic behaviors, but talking may still be problematic    Dr. Fournier urged him to revisit speech therapy techniques      THERAPEUTIC ACTIVITIES  Today Mr. Iyer participated in the following therapeutic activities:    I provided instruction for returning to his straw exercises, as these have been the most reliable and successful for him in the past  o He practiced, and was able to reduce his roughness, ending up with a gentle, but not breathy, voice quality  o He had awareness of this, and agreed that he would try to have his voice sound like that at all times  o We worked out a regimen for frequent practice of straw exercises, to serve as a reminder.    IMPRESSIONS/ RECOMMENDATIONS/ PLAN  IMPRESSIONS / RECOMMENDATIONS  IMPRESSIONS: R49.0 (Dysphonia) in the context of J38.7 (Lesion Of The Larynx).      Based on today s laryngeal examination, it appears that:    The persistent leukoplakia on the left vocal process may be related to continued high impact tot hat area during phonation    Ongoing therapy is warranted in order to reduce impact to the vocal process    We accomplished a short session of therapy to re-introduce straw exercises; Mr. Iyer was able to use these to reduce the roughness of his voice, suggesting less impact    He understands how he should practice    I will see   Philippegiovanni as Dr. Fournier deems appropriate.    TREATMENT PLAN  I will see Mr. Iyer in 2 months, or sooner if necessary, to work on education, modification, and carryover of therapeutic activities to more complex tasks.      GOALS  Goals remain the same as the goals from 12/6 outlined by Ms. Padilla.    PRIMARY ICD-10 code:  R49.0 (Dysphonia)  SECONDARY ICD-10 code:  J38.7 (Lesion Of The Larynx)     TOTAL SERVICE TIME: 30 minutes  TREATMENT (01566): 30 minutes  NO CHARGE FACILITY FEE (87792)      Misty Clark, Ph.D., [facility]-SLP  Speech-Language Pathologist  Director, [facility]  [phone]

## 2017-12-27 ENCOUNTER — TELEPHONE (OUTPATIENT)
Dept: FAMILY MEDICINE | Facility: CLINIC | Age: 57
End: 2017-12-27

## 2017-12-27 DIAGNOSIS — R05.9 COUGH: Primary | ICD-10-CM

## 2017-12-27 NOTE — TELEPHONE ENCOUNTER
Attempt # 1  Called patient at home number.  Was call answered?  Yes, patient states yes he did request the cough syrup - has a cough - nurse can hear nasally sounding voice and sniffling on the phone. Patient states has had about a week now.     Routing to PCP to review and advise.     guaiFENesin-codeine (ROBITUSSIN AC) 100-10 MG/5ML SOLN solution (Discontinued) 180 mL 0 12/23/2016 1/27/2017 No      Sig: Take 5-10 mLs by mouth every 6 hours as needed     Class: Local Print     Route: Oral     Order: [id number]     Pharmacy cued.    Routing refill request to provider for review/approval because:  Drug not active on patient's medication list      Diana Klein RN  [facility]

## 2017-12-27 NOTE — TELEPHONE ENCOUNTER
Pt requested a refill on Guaifenesin/Codeine , #180 mls. He recd from Dr Reyes ib 12/23/16. Not sure if this is an actual request-- it is just in my refill request queue-- someone could have typed in the wrong rx #? This is no longer on patient's profile.  Thanks!  Tanisha Saucedo, [facility]  [phone]

## 2017-12-28 RX ORDER — CODEINE PHOSPHATE AND GUAIFENESIN 10; 100 MG/5ML; MG/5ML
1 SOLUTION ORAL EVERY 4 HOURS PRN
Qty: 120 ML | Refills: 0 | Status: SHIPPED | OUTPATIENT
Start: 2017-12-28 | End: 2018-02-16

## 2017-12-28 RX ORDER — TAMSULOSIN HYDROCHLORIDE 0.4 MG/1
CAPSULE ORAL
Qty: 90 CAPSULE | Refills: 1 | Status: SHIPPED | OUTPATIENT
Start: 2017-12-28 | End: 2018-02-16

## 2017-12-28 NOTE — TELEPHONE ENCOUNTER
Okayed this.  See prescription.    Follow up in clinic if symptoms not resolving.    Finesse Beal MD

## 2017-12-28 NOTE — TELEPHONE ENCOUNTER
"Attempt # 1  Called patient at home number.  Was call answered?  No, left message that \"your prescription has been sent to the [facility]\" call nurse line if has questions.     Diana Klein RN  [facility]      "

## 2017-12-28 NOTE — TELEPHONE ENCOUNTER
Prescription of guaiFENesin-codeine (ROBITUSSIN AC) 100-10 MG/5ML SOLN solution was faxed to pharmacy.

## 2017-12-28 NOTE — TELEPHONE ENCOUNTER
Requested Prescriptions   Pending Prescriptions Disp Refills     tamsulosin (FLOMAX) 0.4 MG capsule [Pharmacy Med Name: TAMSULOSIN 0.4MG CAPSULES] 90 capsule 0     Sig: TAKE 1 CAPSULE(0.4 MG) BY MOUTH DAILY    Alpha Blockers Passed    12/26/2017  3:46 AM       Passed - BP is less than 140/90    BP Readings from Last 3 Encounters:   11/16/17 135/79   10/19/17 (P) 135/77   09/07/17 133/90                Passed - Recent or future visit with authorizing provider's specialty    Patient had office visit in the last year or has a visit in the next 30 days with authorizing provider.  See chart review.              Passed - Patient does not have Tadalafil, Vardenafil, or Sildenafil on their medication list       Passed - Patient is 18 years of age or older        Last Written Prescription Date:  5-31-17  Last Fill Quantity: 90,  # refills: 1   Last Office Visit with [facility], P or [facility] prescribing provider:  11-16-17   Future Office Visit:

## 2017-12-29 ENCOUNTER — THERAPY VISIT (OUTPATIENT)
Dept: PHYSICAL THERAPY | Facility: CLINIC | Age: 57
End: 2017-12-29
Payer: COMMERCIAL

## 2017-12-29 DIAGNOSIS — M25.512 ACUTE PAIN OF LEFT SHOULDER: ICD-10-CM

## 2017-12-29 PROCEDURE — 97112 NEUROMUSCULAR REEDUCATION: CPT | Mod: GP | Performed by: PHYSICAL THERAPIST

## 2017-12-29 PROCEDURE — 97035 APP MDLTY 1+ULTRASOUND EA 15: CPT | Mod: GP | Performed by: PHYSICAL THERAPIST

## 2017-12-29 PROCEDURE — 97110 THERAPEUTIC EXERCISES: CPT | Mod: GP | Performed by: PHYSICAL THERAPIST

## 2017-12-29 NOTE — LETTER
[facility]  [address] [place] [id number] [phone]    2018    Re: Gary Iyer   :   1960  MRN:  [id number]   REFERRING PHYSICIAN:   [facility]  Date of Initial Evaluation:  12/15/17  Visits:  Rxs Used: 6  Reason for Referral:  Acute pain of left shoulder    DISCHARGE REPORT  Progress reporting period is from 12/15/17 to 17.       SUBJECTIVE  Subjective: Gary reports that his shoulder is feeling slightly better. Pain is now intermittent.    Current Pain level: 0/10.     Initial Pain level: 3/10.   Changes in function:  Yes (See Goal flowsheet attached for changes in current functional level)  Adverse reaction to treatment or activity: None    OBJECTIVE  Changes noted in objective findings:  Yes,   Objective: Rpunded shoulder and forward head posture.      ASSESSMENT/PLAN  Updated problem list and treatment plan: Diagnosis 1:  L shoulder pain  STG/LTGs have been met or progress has been made towards goals:  Yes (See Goal flow sheet completed today.)  Assessment of Progress: The patient has not returned to therapy. Current status is unknown.  Self Management Plans:  Patient has been instructed in a home treatment program.  Patient  has been instructed in self management of symptoms.    Recommendations:  Patient will be discharged from physical therapy at this time as he has not returned to therapy.    Thank you for your referral.    INQUIRIES  Therapist: Travis Haro DPT, CSCS  [facility]  [address] [place] [address]  Phone: [phone]  Fax: [phone]

## 2018-01-08 ENCOUNTER — OFFICE VISIT (OUTPATIENT)
Dept: OTOLARYNGOLOGY | Facility: CLINIC | Age: 58
End: 2018-01-08
Payer: COMMERCIAL

## 2018-01-08 DIAGNOSIS — J38.7 LESION OF LARYNX: ICD-10-CM

## 2018-01-08 DIAGNOSIS — R49.0 DYSPHONIA: Primary | ICD-10-CM

## 2018-01-08 NOTE — PROGRESS NOTES
[facility] VOICE [facility]  Elijah Baker Jr., M.D., F.A.C.S.  Mel Viramontes M.D., M.P.H.  Misty Clark, Ph.D., CCC/SLP  Jasmyne Padilla M.M. (voice), M.SWAPNIL., CCC/SLP  Zack Mcdaniels M.M. (voice), MMC., [facility]/SLP    [facility] VOICE [facility]  VOICE/SPEECH/BREATHING THERAPY PROGRESS REPORT    Patient: Gary Iyer  Date of Service: 1/8/2018  Date of Last Service: 12/22/17    I had the pleasure of seeing Mr. Iyer today, for the 3rd  therapy session (CPT code 15443) in this most recent episode of care.  He was referred by Dr. Viramontes, for medically necessary speech therapy to address a diagnosis of:  R49.0 (Dysphonia)   J38.7 (Lesion Of The Larynx).  Please refer to his initial evaluation report of 12/06, with my associate Jasmyne Padilla, for complete details regarding diagnostic findings.    PROGRESS SINCE LAST SESSION  Mr. Iyer was seen along with Dr. Viramontes on 12/22, at which time we determined that he should continue speech therapy to address voice production as well as cough suppression.    Regarding practice, Mr. Iyer reports the following:     practice of the straw exercises several times a day, most days    He thinks the straw exercises help remind him to use his voice gently    He still has difficulty understanding how to improve his airflow without holding his breath    Mr. 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 Iyer presents today with the following:  Voice quality:    Quiet but moderately rough    Inconsistent; there are brief moments of clearer quality, but  "often a gentle but coarse roughness    Pitch is good, at B2 to C3    THERAPEUTIC ACTIVITIES  Today Mr. Iyer participated in the following therapeutic activities:    Asked questions about the nature of his current vocal fold inflammation, and how it compares tot he previous granuloma  o We reviewed several of his past videos, and I provided a more thorough explanation; he found this helpful for understanding the importance of reduced vocal fold impact    In response to his questions, I provided more instruction regarding gargling, saline nasal irrigation, and cough suppression  o He has been coughing in response to eating, and needed guidance on how to avoid this, as it feels out of his control    Demonstrated his airflow technique.  o He tends to take a big breath and hold it, without letting the air flow during phonation  o appropriate redirection provided    Practiced exercises for improved airflow during phonation.  o Practiced alternating between straw exercises and gentle conversational speech  - About 40% successful with improved airflow during speech  - He admits to poor awareness of better and worse productions, but good willingness to practice  - Finally identified a \"sigh\" voice, with several good repetitions  - Good willingness to reduce loud talking and high-impact vocal behavior at home and at work    I provided an after visit summary to help facilitate practice.    IMPRESSIONS/GOALS/PLAN  Mr. Iyer had a productive session of therapy today, working on techniques/strategies/exercises that will help him achieve his goal of reducing  R49.0 (Dysphonia)   J38.7 (Lesion of the larynx)   And achieving acceptable and comfortable voice use, allowing him to meet personal and professional vocal demands and fully engage in activities of daily living.   He will continue to work on his exercises on a daily basis, and work on incorporating the techniques into his daily vocal activities.    Goals for this practice " period:     practice all exercises according to instructions    incorporate techniques into daily vocal activities    maintain vigilance for vocal technique    Plan: I will see Mr. Iyer when he returns to Dr. Fournier to ensure continued use of optimal techniques and strategies.    PRIMARY ICD-10 code:  R49.0 (Dysphonia)  SECONDARY ICD-10 code:  J38.7 (Lesion of the larynx)     TOTAL SERVICE TIME: 45 minutes  TREATMENT (55123): 45 minutes  NO CHARGE FACILITY FEE (26714)    Misty Clark, Ph.D., [facility]-SLP  Speech-Language Pathologist  Director, [facility]  [phone]

## 2018-01-08 NOTE — PROGRESS NOTES
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  You have an area of \"persistent inflammation at the left medial arytenoid.\"     It's important to reduce impact and irritation to your vocal folds.    Avoiding cough is important.    Cough:      Sip of water     Gargle  o More often, but fewer repetitions  o Make it voiceless    Swallow    Suck on lozenges     Take a liquid swallow before eating solids; go slowly and think about not coughing for each bite; alternate liquids and solids      Voice:    hum into the straw, a bunch of times per day    Use the \"sigh\" voice; let out the air; no need to take a really deep breath  Less talking overall  Less volume overall      See Dr. Fournier in mid-to-end of February; call [phone]    To call Dr. Clark with questions:  [phone]  To make an appointment with Dr. Clark: call Chrissy at [phone]             "

## 2018-01-08 NOTE — MR AVS SNAPSHOT
After Visit Summary   1/8/2018    Gary Iyer    MRN: [id number]           Patient Information     Date Of Birth          1960        Visit Information        Provider Department      1/8/2018 10:30 AM Misty Clark, SLP [facility]        Today's Diagnoses     Dysphonia    -  1    Lesion of larynx           Follow-ups after your visit        Who to contact     Please call your clinic at [phone] to:    Ask questions about your health    Make or cancel appointments    Discuss your medicines    Learn about your test results    Speak to your doctor   If you have compliments or concerns about an experience at your clinic, or if you wish to file a complaint, please contact [facility] Patient Relations at [phone] or email us at [email or web address]         Additional Information About Your Visit        MyChart Information     [employer] gives you secure access to your electronic health record. If you see a primary care provider, you can also send messages to your care team and make appointments. If you have questions, please call your primary care clinic.  If you do not have a primary care provider, please call [phone] and they will assist you.      [employer] is an electronic gateway that provides easy, online access to your medical records. With [employer], you can request a clinic appointment, read your test results, renew a prescription or communicate with your care team.     To access your existing account, please contact your [facility] or call [phone] for assistance.        Care EveryWhere ID     This is your Care EveryWhere ID. This could be used by other organizations to access your [facility] medical records  [id number]         Blood Pressure from Last 3 Encounters:   11/16/17 135/79   10/19/17 (P) 135/77   09/07/17 133/90    Weight from Last 3 Encounters:   11/24/17 104.8 kg (231 lb)   11/16/17 104.3 kg  (230 lb)   10/23/17 103 kg (227 lb)              We Performed the Following     SPEECH/HEARING THERAPY, INDIVIDUAL        Primary Care Provider Office Phone # Fax #    Finesse Beal [phone]       [address] [facility] [address]        Equal Access to Services     KIRILL PORTILLO : Hadii aad ku hadasho Soomaali, waaxda luqadaha, qaybta kaalmada adeegyada, waxay idiin haymaxinen yobani martir lamario . So [facility] [phone].    ATENCIÓN: Si habla español, tiene a lucero disposición servicios gratuitos de asistencia lingüística. [facility] [phone].    We comply with applicable federal civil rights laws and [place] laws. We do not discriminate on the basis of race, color, national origin, age, disability, sex, sexual orientation, or gender identity.            Thank you!     Thank you for choosing [facility]  for your care. Our goal is always to provide you with excellent care. Hearing back from our patients is one way we can continue to improve our services. Please take a few minutes to complete the written survey that you may receive in the mail after your visit with us. Thank you!             Your Updated Medication List - Protect others around you: Learn how to safely use, store and throw away your medicines at www.disposemymeds.org.          This list is accurate as of: 1/8/18 11:59 PM.  Always use your most recent med list.                   Brand Name Dispense Instructions for use Diagnosis    allopurinol 300 MG tablet    ZYLOPRIM    90 tablet    TAKE 1 TABLET(300 MG) BY MOUTH DAILY    Gout, unspecified cause, unspecified chronicity, unspecified site       aspirin 81 MG tablet      Take 1 tablet by mouth daily.        atorvastatin 20 MG tablet    LIPITOR    90 tablet    TAKE 1 TABLET(20 MG) BY MOUTH DAILY    Hyperlipidemia LDL goal <70       blood glucose monitoring lancets     1 Box    1 each 2 times daily Use to test blood sugar 2 times daily or as directed.    Type 2  diabetes mellitus without complication (H)       blood glucose monitoring meter device kit    no brand specified    1 kit    Use to test blood sugar 2 times daily or as directed.  Accucheck meter please and all associated strips, lancets, and other supplies, 3 month supply with refills for a year.    Type 2 diabetes mellitus without complication, without long-term current use of insulin (H)       DAILY MULTIVITAMIN PO      Take  by mouth daily.        guaiFENesin-codeine 100-10 MG/5ML Soln solution    ROBITUSSIN AC    120 mL    Take 5 mLs by mouth every 4 hours as needed for cough    Cough       hydrochlorothiazide 50 MG tablet    HYDRODIURIL    90 tablet    TAKE 1 TABLET(50 MG) BY MOUTH EVERY MORNING    Hypertension goal BP (blood pressure) < 140/90       hydrocortisone 1 % ointment     30 g    Apply topically 2 times daily as needed    Facial dermatitis       hydrocortisone 2.5 % cream    ANUSOL-HC    30 g    Place rectally 2 times daily as needed for hemorrhoids    External hemorrhoids       hydrocortisone 25 MG Suppository    ANUSOL-HC    20 suppository    Place 1 suppository (25 mg) rectally 2 times daily as needed for hemorrhoids    External hemorrhoids       lidocaine (PF) 2 % Soln injection    XYLOCAINE     The following medication was given:   MEDICATION:  Lidocaine 2% Soln ROUTE: Laryngeal gargle SITE: Larynx DOSE: 10 mL LOT #: [id number] : [email or web address]  EXPIRATION DATE: 07/21 NDC#: 60018-477-60  Was there drug waste? Yes  Amount of drug waste (mL): 10.  Reason for waste:  Single use vial  Bijal Sorto RN  12/8/2017 1:51 PM    Lesion of larynx       lidocaine-EPINEPHrine 1 %-1:[id number] Soln injection     1 mL    The following medication was given:   MEDICATION:  Lidocaine with epinephrine ROUTE: laryngeal  SITE: larynx  DOSE: 4 mL LOT #: [id number] : [email or web address] EXPIRATION DATE: 08/19 NDC#: 92670-132-54  Was there drug waste? Yes  Amount of drug waste (mL): 16.  Reason for waste:   Single use vial  Bijal Sorto RN  12/8/2017 1:47 PM    Lesion of larynx       lisinopril 30 MG tablet    PRINIVIL,ZESTRIL    90 tablet    TAKE 1 TABLET(30 MG) BY MOUTH DAILY    Hypertension goal BP (blood pressure) < 140/90       metFORMIN 500 MG tablet    GLUCOPHAGE    180 tablet    TAKE 1 TABLET(500 MG) BY MOUTH TWICE DAILY WITH MEALS    Type 2 diabetes, HbA1C goal < 8% (H)       omeprazole 20 MG tablet     60 tablet    Take 1 cap BID x 1-2 weeks; then 1 cap daily x 1-2 weeks; then 1 cap QOD x 1-2 weeks.    Dysphonia, Gastroesophageal reflux disease, esophagitis presence not specified, Leukoplakia of larynx       * order for DME     1 Container    Place rectally 2 times daily 1.8 oz container of 0.2 % nifedipine suppository if possible as easier to place the medication.   Apply on the anus every 12 hours.  Just get to where the anus is tight .    Anal fissure       * order for DME     1 Units    Autocpap 10-16 cm    Obstructive sleep apnea       PARoxetine 20 MG tablet    PAXIL    180 tablet    Take 2 tablets (40 mg) by mouth daily    Anxiety       psyllium 0.52 G capsule     100 capsule    Take 1 capsule (0.52 g) by mouth daily    Constipation, unspecified constipation type       tamsulosin 0.4 MG capsule    FLOMAX    90 capsule    TAKE 1 CAPSULE(0.4 MG) BY MOUTH DAILY    Benign prostatic hyperplasia with lower urinary tract symptoms       triamcinolone 0.1 % cream    KENALOG    30 g    Apply topically 2 times daily as needed for irritation    Dermatitis       vitamin D 1000 UNITS capsule      Take 1 capsule by mouth daily.        * Notice:  This list has 2 medication(s) that are the same as other medications prescribed for you. Read the directions carefully, and ask your doctor or other care provider to review them with you.

## 2018-02-03 DIAGNOSIS — M10.9 GOUT, UNSPECIFIED CAUSE, UNSPECIFIED CHRONICITY, UNSPECIFIED SITE: ICD-10-CM

## 2018-02-05 NOTE — TELEPHONE ENCOUNTER
"Requested Prescriptions   Pending Prescriptions Disp Refills     allopurinol (ZYLOPRIM) 300 MG tablet [Pharmacy Med Name: ALLOPURINOL 300MG TABLETS] 90 tablet 0    Last Written Prescription Date:  10/6/17  Last Fill Quantity: 90,  # refills: 1   Last Office Visit with [facility] provider:  11/16/17   Future Office Visit:      Sig: TAKE 1 TABLET(300 MG) BY MOUTH DAILY    Gout Agents Protocol Failed    2/3/2018  3:46 AM       Failed - Uric acid greater than or equal to 6 on file in past 12 months    Recent Labs   Lab Test  04/26/11   1042   URIC  5.5     If level is 6mg/dL or greater, ok to refill one time and refer to provider.          Passed - CBC on file in past 12 months    Recent Labs   Lab Test  09/13/17   1456  08/23/17   1136   WBC   --   9.4   RBC   --   4.57   HGB  14.0  13.9   HCT   --   41.2   PLT   --   282            Passed - ALT on file in past 12 months    Recent Labs   Lab Test  08/23/17   1136   ALT  34            Passed - Recent or future visit with authorizing provider's specialty    Patient had office visit in the last year or has a visit in the next 30 days with authorizing provider.  See \"Patient Info\" tab in inbasket, or \"Choose Columns\" in Meds & Orders section of the refill encounter.            Passed - Patient is age 18 or older       Passed - Normal serum creatinine on file in the past 12 months    Recent Labs   Lab Test  08/23/17   1136   CR  0.72               "

## 2018-02-06 RX ORDER — ALLOPURINOL 300 MG/1
TABLET ORAL
Qty: 90 TABLET | Refills: 0 | OUTPATIENT
Start: 2018-02-06

## 2018-02-06 NOTE — TELEPHONE ENCOUNTER
"Should not need refill until April?  I called Cecilia, on hold extended period.   \"Refusal\" routed back to pharmacy with note.      Juany Feliciano RN  [facility]            "

## 2018-02-13 ENCOUNTER — TELEPHONE (OUTPATIENT)
Dept: OTOLARYNGOLOGY | Facility: CLINIC | Age: 58
End: 2018-02-13

## 2018-02-13 NOTE — TELEPHONE ENCOUNTER
I received a call from the call center stating the pt wanted to schedule an appt with Dr. Bustillos and needs a CT before the appt.  Dr Bustillos last saw this pt in Nov and she suggested a repeat CT chest in 1-2 years.  I left Gary a message that no CT was recommended at this time and he can call back the scheduling line to schedule an appt with Dr. Bustillos.

## 2018-02-15 ENCOUNTER — OFFICE VISIT (OUTPATIENT)
Dept: OPHTHALMOLOGY | Facility: CLINIC | Age: 58
End: 2018-02-15
Payer: COMMERCIAL

## 2018-02-15 DIAGNOSIS — H52.4 PRESBYOPIA: ICD-10-CM

## 2018-02-15 DIAGNOSIS — E11.9 TYPE 2 DIABETES MELLITUS WITHOUT COMPLICATION, WITHOUT LONG-TERM CURRENT USE OF INSULIN (H): Primary | Chronic | ICD-10-CM

## 2018-02-15 PROCEDURE — 92015 DETERMINE REFRACTIVE STATE: CPT | Performed by: STUDENT IN AN ORGANIZED HEALTH CARE EDUCATION/TRAINING PROGRAM

## 2018-02-15 PROCEDURE — 92004 COMPRE OPH EXAM NEW PT 1/>: CPT | Performed by: STUDENT IN AN ORGANIZED HEALTH CARE EDUCATION/TRAINING PROGRAM

## 2018-02-15 ASSESSMENT — EXTERNAL EXAM - LEFT EYE: OS_EXAM: NORMAL

## 2018-02-15 ASSESSMENT — TONOMETRY
OD_IOP_MMHG: 19
IOP_METHOD: APPLANATION
OS_IOP_MMHG: 18

## 2018-02-15 ASSESSMENT — REFRACTION_MANIFEST
OS_ADD: +2.50
OD_SPHERE: +0.75
OS_CYLINDER: SPHERE
OD_CYLINDER: SPHERE
OD_ADD: +2.50
OS_SPHERE: +0.75

## 2018-02-15 ASSESSMENT — CONF VISUAL FIELD
OD_NORMAL: 1
OS_NORMAL: 1

## 2018-02-15 ASSESSMENT — SLIT LAMP EXAM - LIDS
COMMENTS: NORMAL
COMMENTS: NORMAL

## 2018-02-15 ASSESSMENT — EXTERNAL EXAM - RIGHT EYE: OD_EXAM: NORMAL

## 2018-02-15 ASSESSMENT — CUP TO DISC RATIO
OS_RATIO: 0.35
OD_RATIO: 0.3

## 2018-02-15 ASSESSMENT — VISUAL ACUITY
OD_SC: 20/20-1
OS_SC: 20/20-1
METHOD: SNELLEN - LINEAR

## 2018-02-15 NOTE — LETTER
2/15/2018         RE: Gary Iyer  [address]  [place] [address]        Dear Colleague,    Thank you for referring your patient, Gary Iyer, to the [facility]. No signs of diabetic retinopathy in either eye today.  Please see a copy of my visit note below.     Current Eye Medications:  no     Subjective:  Diabetic eye exam.  Pt reports he sees pretty well and uses otc readers only. No previous eye injuries or surgeries.     Lab Results   Component Value Date    A1C 5.9 08/23/2017    A1C 6.3 03/29/2017    A1C 6.0 09/19/2016    A1C 6.8 12/23/2015    A1C 7.6 06/12/2015     Assessment:  Gary Iyer is a 57 year old male who presents with:     Type 2 diabetes mellitus without complication, without long-term current use of insulin (H) Negative diabetic retinopathy        Plan:  May continue over-the-counter readers     Tessa Oliva MD  [phone]      Again, thank you for allowing me to participate in the care of your patient.        Sincerely,        Tessa Oliva MD

## 2018-02-15 NOTE — MR AVS SNAPSHOT
After Visit Summary   2/15/2018    Gary Iyer    MRN: [id number]           Patient Information     Date Of Birth          1960        Visit Information        Provider Department      2/15/2018 2:00 PM Tessa Oliva MD [facility]        Today's Diagnoses     Encounter for examination of eyes and vision with abnormal findings    -  1    Presbyopia        Type 2 diabetes mellitus without complication, without long-term current use of insulin (H)          Care Instructions    May continue over-the-counter readers     Tessa Oliva MD  [phone]    Diabetes weakens the blood vessels all over the body, including the eyes. Damage to the blood vessels in the eyes can cause swelling or bleeding into part of the eye (called the retina). This is called diabetic retinopathy (ANNA-tin-AH-puh-thee). If not treated, this disease can cause vision loss or blindness.   Symptoms may include blurred or distorted vision, but many people have no symptoms. It's important to see your eye doctor regularly to check for problems.   Early treatment and good control can help protect your vision. Here are the things you can do to help prevent vision loss:      1. Keep your blood sugar levels under tight control.      2. Bring high blood pressure under control.      3. No smoking.      4. Have yearly dilated eye exams.              Follow-ups after your visit        Follow-up notes from your care team     Return in about 1 year (around 2/15/2019) for Complete Exam.      Your next 10 appointments already scheduled     Feb 16, 2018 11:00 AM CST   [facility]YSICAL with Finesse [facility]al [facility]   [facility]    [address] [place] [id number]   [phone]            Feb 26, 2018  2:40 PM CST   (Arrive by 2:25 PM)   Return Visit with Mel Fournier MD   [facility] ([facility]  [place])    [address]  4th Floor  [facility] [address] [phone]           This is a multi-disciplinary care team visit as patients with your type of problem are usually seen by a team of an MD and a Speech-Language Pathologist (who is a specialist in disorders of the voice, throat, and breathing).  Please plan about 2 hours for your visit, which will likely include Laryngeal Function Studies, a Voice/Swallow/Breathing Evaluation, and an Endoscopic Laryngeal Examination to provide a comprehensive evaluation.  Please check with your insurance company to ensure you are covered for these services. - It is important to know that if you are evaluated and/or treated by both a physician and a speech pathologist during your visit, your billing will reflect the input that you receive from both providers as separate professionals. Although most insurance plans do cover these services, we encourage you to contact your insurance company prior to your visit to determine whether there are any coverage limitations that might affect you financially. - Billing/procedure codes that are frequently associated with visits to our clinic include (but are not limited to) the ones listed below. Most patients will not need all of these items, but it may be useful to ask your insurance company's patient . 63342: Flexible fiberoptic laryngoscopy, 95304: Laryngoscopy; flexible or rigid fiberoptic, with stroboscopy, 30361: Flexible endoscopic evaluation of swallowing, 42741: Laryngeal function aerodynamic evaluation, 24731: Evaluation of Voice and Resonance, 63447: Speech pathology treatment for voice, speech, communication, 43220: Speech pathology treatment for swallowing/oral function for feeding - If you have any concerns or questions, or if you would prefer not to have a speech pathologist involved in your visit, please contact our Clinic Coordinator at [phone], at least 24 hours prior to  your appointment.              Who to contact     If you have questions or need follow up information about today's clinic visit or your schedule please contact [facility] directly at [phone].  Normal or non-critical lab and imaging results will be communicated to you by MyChart, letter or phone within 4 business days after the clinic has received the results. If you do not hear from us within 7 days, please contact the clinic through MyChart or phone. If you have a critical or abnormal lab result, we will notify you by phone as soon as possible.  Submit refill requests through [employer] or call your pharmacy and they will forward the refill request to us. Please allow 3 business days for your refill to be completed.          Additional Information About Your Visit        [employer] Information     [employer] gives you secure access to your electronic health record. If you see a primary care provider, you can also send messages to your care team and make appointments. If you have questions, please call your primary care clinic.  If you do not have a primary care provider, please call [phone] and they will assist you.        Care EveryWhere ID     This is your Care EveryWhere ID. This could be used by other organizations to access your [place] medical records  [id number]         Blood Pressure from Last 3 Encounters:   11/16/17 135/79   10/19/17 (P) 135/77   09/07/17 133/90    Weight from Last 3 Encounters:   11/24/17 104.8 kg (231 lb)   11/16/17 104.3 kg (230 lb)   10/23/17 103 kg (227 lb)              Today, you had the following     No orders found for display       Primary Care Provider Office Phone # Fax #    Finesse Beal [phone]       [address] [facility] [address]        Equal Access to Services     [facility] LINDSE[facility] : Deanna Wheatley, ruperto bryant, gregory maradiaga. So [facility]  [phone].    ATENCIÓN: Si davidson aranda, tiene a lucero disposición servicios gratuitos de asistencia lingüística. Pili henley [phone].    We comply with applicable federal civil rights laws and [place] laws. We do not discriminate on the basis of race, color, national origin, age, disability, sex, sexual orientation, or gender identity.            Thank you!     Thank you for choosing [facility]  for your care. Our goal is always to provide you with excellent care. Hearing back from our patients is one way we can continue to improve our services. Please take a few minutes to complete the written survey that you may receive in the mail after your visit with us. Thank you!             Your Updated Medication List - Protect others around you: Learn how to safely use, store and throw away your medicines at www.disposemymeds.org.          This list is accurate as of 2/15/18  2:49 PM.  Always use your most recent med list.                   Brand Name Dispense Instructions for use Diagnosis    allopurinol 300 MG tablet    ZYLOPRIM    90 tablet    TAKE 1 TABLET(300 MG) BY MOUTH DAILY    Gout, unspecified cause, unspecified chronicity, unspecified site       aspirin 81 MG tablet      Take 1 tablet by mouth daily.        atorvastatin 20 MG tablet    LIPITOR    90 tablet    TAKE 1 TABLET(20 MG) BY MOUTH DAILY    Hyperlipidemia LDL goal <70       blood glucose monitoring lancets     1 Box    1 each 2 times daily Use to test blood sugar 2 times daily or as directed.    Type 2 diabetes mellitus without complication (H)       blood glucose monitoring meter device kit    no brand specified    1 kit    Use to test blood sugar 2 times daily or as directed.  Accucheck meter please and all associated strips, lancets, and other supplies, 3 month supply with refills for a year.    Type 2 diabetes mellitus without complication, without long-term current use of insulin (H)       DAILY MULTIVITAMIN PO      Take  by mouth  daily.        guaiFENesin-codeine 100-10 MG/5ML Soln solution    ROBITUSSIN AC    120 mL    Take 5 mLs by mouth every 4 hours as needed for cough    Cough       hydrochlorothiazide 50 MG tablet    HYDRODIURIL    90 tablet    TAKE 1 TABLET(50 MG) BY MOUTH EVERY MORNING    Hypertension goal BP (blood pressure) < 140/90       hydrocortisone 1 % ointment     30 g    Apply topically 2 times daily as needed    Facial dermatitis       hydrocortisone 2.5 % cream    ANUSOL-HC    30 g    Place rectally 2 times daily as needed for hemorrhoids    External hemorrhoids       hydrocortisone 25 MG Suppository    ANUSOL-HC    20 suppository    Place 1 suppository (25 mg) rectally 2 times daily as needed for hemorrhoids    External hemorrhoids       lidocaine (PF) 2 % Soln injection    XYLOCAINE     The following medication was given:   MEDICATION:  Lidocaine 2% Soln ROUTE: Laryngeal gargle SITE: Larynx DOSE: 10 mL LOT #: [id number] : [employer]  EXPIRATION DATE: 07/21 NDC#: 37023-752-84  Was there drug waste? Yes  Amount of drug waste (mL): 10.  Reason for waste:  Single use vial  Bijal Sorto RN  12/8/2017 1:51 PM    Lesion of larynx       lidocaine-EPINEPHrine 1 %-[id number] Soln injection     1 mL    The following medication was given:   MEDICATION:  Lidocaine with epinephrine ROUTE: laryngeal  SITE: larynx  DOSE: 4 mL LOT #: [id number] : [employer] EXPIRATION DATE: 08/19 NDC#: 06567-437-14  Was there drug waste? Yes  Amount of drug waste (mL): 16.  Reason for waste:  Single use vial  Bijal Sorto RN  12/8/2017 1:47 PM    Lesion of larynx       lisinopril 30 MG tablet    PRINIVIL,ZESTRIL    90 tablet    TAKE 1 TABLET(30 MG) BY MOUTH DAILY    Hypertension goal BP (blood pressure) < 140/90       metFORMIN 500 MG tablet    GLUCOPHAGE    180 tablet    TAKE 1 TABLET(500 MG) BY MOUTH TWICE DAILY WITH MEALS    Type 2 diabetes, HbA1C goal < 8% (H)       omeprazole 20 MG tablet     60 tablet    Take 1 cap BID x  1-2 weeks; then 1 cap daily x 1-2 weeks; then 1 cap QOD x 1-2 weeks.    Dysphonia, Gastroesophageal reflux disease, esophagitis presence not specified, Leukoplakia of larynx       * order for DME     1 Container    Place rectally 2 times daily 1.8 oz container of 0.2 % nifedipine suppository if possible as easier to place the medication.   Apply on the anus every 12 hours.  Just get to where the anus is tight .    Anal fissure       * order for DME     1 Units    Autocpap 10-16 cm    Obstructive sleep apnea       PARoxetine 20 MG tablet    PAXIL    180 tablet    TAKE 2 TABLETS(40 MG) BY MOUTH DAILY    Anxiety       psyllium 0.52 G capsule     100 capsule    Take 1 capsule (0.52 g) by mouth daily    Constipation, unspecified constipation type       tamsulosin 0.4 MG capsule    FLOMAX    90 capsule    TAKE 1 CAPSULE(0.4 MG) BY MOUTH DAILY    Benign prostatic hyperplasia with lower urinary tract symptoms       triamcinolone 0.1 % cream    KENALOG    30 g    Apply topically 2 times daily as needed for irritation    Dermatitis       vitamin D 1000 UNITS capsule      Take 1 capsule by mouth daily.        * Notice:  This list has 2 medication(s) that are the same as other medications prescribed for you. Read the directions carefully, and ask your doctor or other care provider to review them with you.

## 2018-02-15 NOTE — PROGRESS NOTES
Current Eye Medications:  no     Subjective:  Diabetic eye exam.  Pt reports he sees pretty well and uses otc readers only. No previous eye injuries or surgeries.     Lab Results   Component Value Date    A1C 5.9 08/23/2017    A1C 6.3 03/29/2017    A1C 6.0 09/19/2016    A1C 6.8 12/23/2015    A1C 7.6 06/12/2015     Assessment:  Gary Iyer is a 57 year old male who presents with:     Type 2 diabetes mellitus without complication, without long-term current use of insulin (H) Negative diabetic retinopathy        Plan:  May continue over-the-counter readers     Tessa Oliva MD  [phone]

## 2018-02-15 NOTE — PATIENT INSTRUCTIONS
May continue over-the-counter readers     Tessa Oliva MD  [phone]    Diabetes weakens the blood vessels all over the body, including the eyes. Damage to the blood vessels in the eyes can cause swelling or bleeding into part of the eye (called the retina). This is called diabetic retinopathy (ANNA-tin--puh-thee). If not treated, this disease can cause vision loss or blindness.   Symptoms may include blurred or distorted vision, but many people have no symptoms. It's important to see your eye doctor regularly to check for problems.   Early treatment and good control can help protect your vision. Here are the things you can do to help prevent vision loss:      1. Keep your blood sugar levels under tight control.      2. Bring high blood pressure under control.      3. No smoking.      4. Have yearly dilated eye exams.

## 2018-02-16 ENCOUNTER — OFFICE VISIT (OUTPATIENT)
Dept: FAMILY MEDICINE | Facility: CLINIC | Age: 58
End: 2018-02-16
Payer: COMMERCIAL

## 2018-02-16 VITALS
TEMPERATURE: 98.5 F | HEIGHT: 68 IN | HEART RATE: 106 BPM | WEIGHT: 227 LBS | OXYGEN SATURATION: 96 % | SYSTOLIC BLOOD PRESSURE: 128 MMHG | DIASTOLIC BLOOD PRESSURE: 68 MMHG | BODY MASS INDEX: 34.4 KG/M2

## 2018-02-16 DIAGNOSIS — R49.0 DYSPHONIA: ICD-10-CM

## 2018-02-16 DIAGNOSIS — E55.9 VITAMIN D DEFICIENCY DISEASE: ICD-10-CM

## 2018-02-16 DIAGNOSIS — F41.9 ANXIETY: ICD-10-CM

## 2018-02-16 DIAGNOSIS — I10 HYPERTENSION GOAL BP (BLOOD PRESSURE) < 140/90: ICD-10-CM

## 2018-02-16 DIAGNOSIS — R05.9 COUGH: ICD-10-CM

## 2018-02-16 DIAGNOSIS — E78.5 HYPERLIPIDEMIA LDL GOAL <70: ICD-10-CM

## 2018-02-16 DIAGNOSIS — Z00.01 ENCOUNTER FOR PREVENTATIVE ADULT HEALTH CARE EXAM WITH ABNORMAL FINDINGS: Primary | ICD-10-CM

## 2018-02-16 DIAGNOSIS — F32.1 MODERATE MAJOR DEPRESSION (H): ICD-10-CM

## 2018-02-16 DIAGNOSIS — K21.9 GASTROESOPHAGEAL REFLUX DISEASE, ESOPHAGITIS PRESENCE NOT SPECIFIED: ICD-10-CM

## 2018-02-16 DIAGNOSIS — N40.1 BENIGN PROSTATIC HYPERPLASIA WITH WEAK URINARY STREAM: ICD-10-CM

## 2018-02-16 DIAGNOSIS — M10.9 GOUT, UNSPECIFIED CAUSE, UNSPECIFIED CHRONICITY, UNSPECIFIED SITE: ICD-10-CM

## 2018-02-16 DIAGNOSIS — R53.83 FATIGUE, UNSPECIFIED TYPE: ICD-10-CM

## 2018-02-16 DIAGNOSIS — E66.9 OBESITY, UNSPECIFIED CLASSIFICATION, UNSPECIFIED OBESITY TYPE, UNSPECIFIED WHETHER SERIOUS COMORBIDITY PRESENT: ICD-10-CM

## 2018-02-16 DIAGNOSIS — E11.9 TYPE 2 DIABETES MELLITUS WITHOUT COMPLICATION, WITHOUT LONG-TERM CURRENT USE OF INSULIN (H): ICD-10-CM

## 2018-02-16 DIAGNOSIS — Z12.5 SCREENING FOR PROSTATE CANCER: ICD-10-CM

## 2018-02-16 DIAGNOSIS — J38.7 LEUKOPLAKIA OF LARYNX: ICD-10-CM

## 2018-02-16 DIAGNOSIS — R39.12 BENIGN PROSTATIC HYPERPLASIA WITH WEAK URINARY STREAM: ICD-10-CM

## 2018-02-16 PROCEDURE — 99213 OFFICE O/P EST LOW 20 MIN: CPT | Mod: 25 | Performed by: FAMILY MEDICINE

## 2018-02-16 PROCEDURE — 99396 PREV VISIT EST AGE 40-64: CPT | Performed by: FAMILY MEDICINE

## 2018-02-16 RX ORDER — CODEINE PHOSPHATE AND GUAIFENESIN 10; 100 MG/5ML; MG/5ML
1 SOLUTION ORAL EVERY 4 HOURS PRN
Qty: 120 ML | Refills: 0 | Status: SHIPPED | OUTPATIENT
Start: 2018-02-16 | End: 2018-06-07

## 2018-02-16 RX ORDER — NICOTINE POLACRILEX 4 MG/1
GUM, CHEWING ORAL
Qty: 90 TABLET | Refills: 3 | Status: SHIPPED | OUTPATIENT
Start: 2018-02-16 | End: 2019-02-25

## 2018-02-16 RX ORDER — HYDROCHLOROTHIAZIDE 50 MG/1
TABLET ORAL
Qty: 90 TABLET | Refills: 3 | Status: SHIPPED | OUTPATIENT
Start: 2018-02-16 | End: 2019-03-15

## 2018-02-16 RX ORDER — ATORVASTATIN CALCIUM 20 MG/1
TABLET, FILM COATED ORAL
Qty: 90 TABLET | Refills: 3 | Status: SHIPPED | OUTPATIENT
Start: 2018-02-16 | End: 2019-03-15

## 2018-02-16 RX ORDER — ALLOPURINOL 300 MG/1
TABLET ORAL
Qty: 90 TABLET | Refills: 3 | Status: SHIPPED | OUTPATIENT
Start: 2018-02-16 | End: 2019-02-13

## 2018-02-16 RX ORDER — LISINOPRIL 30 MG/1
TABLET ORAL
Qty: 90 TABLET | Refills: 3 | Status: SHIPPED | OUTPATIENT
Start: 2018-02-16 | End: 2018-11-06

## 2018-02-16 RX ORDER — TAMSULOSIN HYDROCHLORIDE 0.4 MG/1
CAPSULE ORAL
Qty: 90 CAPSULE | Refills: 3 | Status: SHIPPED | OUTPATIENT
Start: 2018-02-16 | End: 2019-02-13

## 2018-02-16 RX ORDER — PAROXETINE 20 MG/1
TABLET, FILM COATED ORAL
Qty: 180 TABLET | Refills: 3 | Status: SHIPPED | OUTPATIENT
Start: 2018-02-16 | End: 2019-03-11

## 2018-02-16 ASSESSMENT — PAIN SCALES - GENERAL: PAINLEVEL: NO PAIN (0)

## 2018-02-16 NOTE — MR AVS SNAPSHOT
After Visit Summary   2/16/2018    Gary Iyer    MRN: [id number]           Patient Information     Date Of Birth          1960        Visit Information        Provider Department      2/16/2018 11:00 AM Finesse Beal MD [facility]        Today's Diagnoses     Type 2 diabetes mellitus without complication, without long-term current use of insulin (H)    -  1    Anxiety        Benign prostatic hyperplasia with weak urinary stream        Cough        Dysphonia        Gastroesophageal reflux disease, esophagitis presence not specified        Leukoplakia of larynx        Gout, unspecified cause, unspecified chronicity, unspecified site        Hyperlipidemia LDL goal <70        Hypertension goal BP (blood pressure) < 140/90        Screening for prostate cancer        Vitamin D deficiency disease        Fatigue, unspecified type          Care Instructions    Schedule fasting lab appointment     Keep working on healthy diet/exercise and weight loss    Slowly increase intensity and duration of treadmill use; if breathing or other symptoms worsen, let us know    Same meds for now              Follow-ups after your visit        Additional Services     PODIATRY/FOOT & ANKLE SURGERY REFERRAL       Your provider has referred you to: FM[facility]: [facility] [phone]   [email or web address]  FM[facility]: [facility] [phone]   [email or web address]    Please be aware that coverage of these services is subject to the terms and limitations of your health insurance plan.  Call member services at your health plan with any benefit or coverage questions.      Please bring the following to your appointment:  >>   Any x-rays, CTs or MRIs which have been performed.  Contact the facility where they were done to arrange for  prior to your scheduled appointment.    >>   List of current  medications   >>   This referral request   >>   Any documents/labs given to you for this referral                  Your next 10 appointments already scheduled     Feb 26, 2018  2:40 PM CST   (Arrive by 2:25 PM)   Return Visit with Mel Fournier MD   [facility] ([facility])    [address]  [address] [facility] [id number] [phone]           This is a multi-disciplinary care team visit as patients with your type of problem are usually seen by a team of an MD and a Speech-Language Pathologist (who is a specialist in disorders of the voice, throat, and breathing).  Please plan about 2 hours for your visit, which will likely include Laryngeal Function Studies, a Voice/Swallow/Breathing Evaluation, and an Endoscopic Laryngeal Examination to provide a comprehensive evaluation.  Please check with your insurance company to ensure you are covered for these services. - It is important to know that if you are evaluated and/or treated by both a physician and a speech pathologist during your visit, your billing will reflect the input that you receive from both providers as separate professionals. Although most insurance plans do cover these services, we encourage you to contact your insurance company prior to your visit to determine whether there are any coverage limitations that might affect you financially. - Billing/procedure codes that are frequently associated with visits to our clinic include (but are not limited to) the ones listed below. Most patients will not need all of these items, but it may be useful to ask your insurance company's patient . 33412: Flexible fiberoptic laryngoscopy, 89220: Laryngoscopy; flexible or rigid fiberoptic, with stroboscopy, 24832: Flexible endoscopic evaluation of swallowing, 87395: Laryngeal function aerodynamic evaluation, 73927: Evaluation of Voice and Resonance, 56269: Speech pathology  treatment for voice, speech, communication, 28726: Speech pathology treatment for swallowing/oral function for feeding - If you have any concerns or questions, or if you would prefer not to have a speech pathologist involved in your visit, please contact our Clinic Coordinator at [phone], at least 24 hours prior to your appointment.              Future tests that were ordered for you today     Open Future Orders        Priority Expected Expires Ordered    Lipid panel reflex to direct LDL Fasting Routine  7/16/2018 2/16/2018    Comprehensive metabolic panel Routine  7/16/2018 2/16/2018    Vitamin D Deficiency Routine  7/16/2018 2/16/2018    TSH with free T4 reflex Routine  7/16/2018 2/16/2018    Hemoglobin A1c Routine  7/16/2018 2/16/2018    Prostate spec antigen screen Routine  7/16/2018 2/16/2018    CBC with platelets differential Routine  7/16/2018 2/16/2018            Who to contact     If you have questions or need follow up information about today's clinic visit or your schedule please contact [facility] directly at [phone].  Normal or non-critical lab and imaging results will be communicated to you by [employer], letter or phone within 4 business days after the clinic has received the results. If you do not hear from us within 7 days, please contact the clinic through "[employer]t or phone. If you have a critical or abnormal lab result, we will notify you by phone as soon as possible.  Submit refill requests through [employer] or call your pharmacy and they will forward the refill request to us. Please allow 3 business days for your refill to be completed.          Additional Information About Your Visit        [employer] Information     [employer] gives you secure access to your electronic health record. If you see a primary care provider, you can also send messages to your care team and make appointments. If you have questions, please call your primary care clinic.  If you do not have a  "primary care provider, please call [phone] and they will assist you.        Care EveryWhere ID     This is your Care EveryWhere ID. This could be used by other organizations to access your [place] medical records  [id number]        Your Vitals Were     Pulse Temperature Height Pulse Oximetry BMI (Body Mass Index)       106 98.5  F (36.9  C) (Oral) 5' 8\" (1.727 m) 96% 34.52 kg/m2        Blood Pressure from Last 3 Encounters:   02/16/18 128/68   11/16/17 135/79   10/19/17 (P) 135/77    Weight from Last 3 Encounters:   02/16/18 227 lb (103 kg)   11/24/17 231 lb (104.8 kg)   11/16/17 230 lb (104.3 kg)              We Performed the Following     PODIATRY/FOOT & ANKLE SURGERY REFERRAL          Today's Medication Changes          These changes are accurate as of 2/16/18 12:11 PM.  If you have any questions, ask your nurse or doctor.               These medicines have changed or have updated prescriptions.        Dose/Directions    allopurinol 300 MG tablet   Commonly known as:  ZYLOPRIM   This may have changed:  See the new instructions.   Used for:  Gout, unspecified cause, unspecified chronicity, unspecified site   Changed by:  Finesse Beal MD        TAKE 1 TABLET(300 MG) BY MOUTH DAILY   Quantity:  90 tablet   Refills:  3       atorvastatin 20 MG tablet   Commonly known as:  LIPITOR   This may have changed:  See the new instructions.   Used for:  Hyperlipidemia LDL goal <70   Changed by:  Finesse Beal MD        TAKE 1 TABLET(20 MG) BY MOUTH DAILY   Quantity:  90 tablet   Refills:  3       metFORMIN 500 MG tablet   Commonly known as:  GLUCOPHAGE   This may have changed:  See the new instructions.   Used for:  Type 2 diabetes mellitus without complication, without long-term current use of insulin (H)   Changed by:  Finesse Beal MD        TAKE 1 TABLET(500 MG) BY MOUTH TWICE DAILY WITH MEALS   Quantity:  180 tablet   Refills:  1       omeprazole 20 MG tablet   This may have changed:  additional " instructions   Used for:  Dysphonia, Gastroesophageal reflux disease, esophagitis presence not specified, Leukoplakia of larynx   Changed by:  Finesse Beal MD        1 po daily   Quantity:  90 tablet   Refills:  3       PARoxetine 20 MG tablet   Commonly known as:  PAXIL   This may have changed:  See the new instructions.   Used for:  Anxiety   Changed by:  Finesse Beal MD        TAKE 2 TABLETS(40 MG) BY MOUTH DAILY   Quantity:  180 tablet   Refills:  3       tamsulosin 0.4 MG capsule   Commonly known as:  FLOMAX   This may have changed:  See the new instructions.   Used for:  Benign prostatic hyperplasia with weak urinary stream   Changed by:  Finesse Beal MD        TAKE 1 CAPSULE(0.4 MG) BY MOUTH DAILY   Quantity:  90 capsule   Refills:  3         Stop taking these medicines if you haven't already. Please contact your care team if you have questions.     lidocaine (PF) 2 % Soln injection   Commonly known as:  XYLOCAINE   Stopped by:  Finesse Beal MD           lidocaine-EPINEPHrine 1 %-1:919412 Soln injection   Stopped by:  Finesse Beal MD                Where to get your medicines      These medications were sent to [facility] [address] [place] [address] [address] SEC OF [address], [facility] [address]    Hours:  Test fax successful 12/11/02  KR Phone:  [phone]     allopurinol 300 MG tablet    atorvastatin 20 MG tablet    hydrochlorothiazide 50 MG tablet    lisinopril 30 MG tablet    metFORMIN 500 MG tablet    omeprazole 20 MG tablet    PARoxetine 20 MG tablet    tamsulosin 0.4 MG capsule         Some of these will need a paper prescription and others can be bought over the counter.  Ask your nurse if you have questions.     Bring a paper prescription for each of these medications     guaiFENesin-codeine 100-10 MG/5ML Soln solution                Primary Care Provider Office Phone # Fax #    Finesse Beal [phone]  [phone]       [address] [facility] [address]        Equal Access to Services     KIRILL PORTILLO : Hadii aad ku hadlunaosmany Soshelli, waaxda luqadaha, qaybta kaalmada yukokwesi, gregory sylvie ushaolivia huntleypierre calebsujata matt santos. So [facility] [phone].    ATENCIÓN: Si habla español, tiene a lucero disposición servicios gratuitos de asistencia lingüística. Llame al [phone].    We comply with applicable federal civil rights laws and [place] laws. We do not discriminate on the basis of race, color, national origin, age, disability, sex, sexual orientation, or gender identity.            Thank you!     Thank you for choosing [facility]  for your care. Our goal is always to provide you with excellent care. Hearing back from our patients is one way we can continue to improve our services. Please take a few minutes to complete the written survey that you may receive in the mail after your visit with us. Thank you!             Your Updated Medication List - Protect others around you: Learn how to safely use, store and throw away your medicines at www.disposemymeds.org.          This list is accurate as of 2/16/18 12:11 PM.  Always use your most recent med list.                   Brand Name Dispense Instructions for use Diagnosis    allopurinol 300 MG tablet    ZYLOPRIM    90 tablet    TAKE 1 TABLET(300 MG) BY MOUTH DAILY    Gout, unspecified cause, unspecified chronicity, unspecified site       aspirin 81 MG tablet      Take 1 tablet by mouth daily.        atorvastatin 20 MG tablet    LIPITOR    90 tablet    TAKE 1 TABLET(20 MG) BY MOUTH DAILY    Hyperlipidemia LDL goal <70       blood glucose monitoring lancets     1 Box    1 each 2 times daily Use to test blood sugar 2 times daily or as directed.    Type 2 diabetes mellitus without complication (H)       blood glucose monitoring meter device kit    no brand specified    1 kit    Use to test blood sugar 2 times daily or as directed.  Accucheck  meter please and all associated strips, lancets, and other supplies, 3 month supply with refills for a year.    Type 2 diabetes mellitus without complication, without long-term current use of insulin (H)       DAILY MULTIVITAMIN PO      Take  by mouth daily.        guaiFENesin-codeine 100-10 MG/5ML Soln solution    ROBITUSSIN AC    120 mL    Take 5 mLs by mouth every 4 hours as needed for cough    Cough       hydrochlorothiazide 50 MG tablet    HYDRODIURIL    90 tablet    TAKE 1 TABLET(50 MG) BY MOUTH EVERY MORNING    Hypertension goal BP (blood pressure) < 140/90       hydrocortisone 1 % ointment     30 g    Apply topically 2 times daily as needed    Facial dermatitis       hydrocortisone 2.5 % cream    ANUSOL-HC    30 g    Place rectally 2 times daily as needed for hemorrhoids    External hemorrhoids       hydrocortisone 25 MG Suppository    ANUSOL-HC    20 suppository    Place 1 suppository (25 mg) rectally 2 times daily as needed for hemorrhoids    External hemorrhoids       lisinopril 30 MG tablet    PRINIVIL,ZESTRIL    90 tablet    TAKE 1 TABLET(30 MG) BY MOUTH DAILY    Hypertension goal BP (blood pressure) < 140/90       metFORMIN 500 MG tablet    GLUCOPHAGE    180 tablet    TAKE 1 TABLET(500 MG) BY MOUTH TWICE DAILY WITH MEALS    Type 2 diabetes mellitus without complication, without long-term current use of insulin (H)       omeprazole 20 MG tablet     90 tablet    1 po daily    Dysphonia, Gastroesophageal reflux disease, esophagitis presence not specified, Leukoplakia of larynx       * order for DME     1 Container    Place rectally 2 times daily 1.8 oz container of 0.2 % nifedipine suppository if possible as easier to place the medication.   Apply on the anus every 12 hours.  Just get to where the anus is tight .    Anal fissure       * order for DME     1 Units    Autocpap 10-16 cm    Obstructive sleep apnea       PARoxetine 20 MG tablet    PAXIL    180 tablet    TAKE 2 TABLETS(40 MG) BY MOUTH DAILY     Anxiety       psyllium 0.52 G capsule     100 capsule    Take 1 capsule (0.52 g) by mouth daily    Constipation, unspecified constipation type       tamsulosin 0.4 MG capsule    FLOMAX    90 capsule    TAKE 1 CAPSULE(0.4 MG) BY MOUTH DAILY    Benign prostatic hyperplasia with weak urinary stream       triamcinolone 0.1 % cream    KENALOG    30 g    Apply topically 2 times daily as needed for irritation    Dermatitis       vitamin D 1000 UNITS capsule      Take 1 capsule by mouth daily.        * Notice:  This list has 2 medication(s) that are the same as other medications prescribed for you. Read the directions carefully, and ask your doctor or other care provider to review them with you.

## 2018-02-16 NOTE — PATIENT INSTRUCTIONS
Schedule fasting lab appointment     Keep working on healthy diet/exercise and weight loss    Slowly increase intensity and duration of treadmill use; if breathing or other symptoms worsen, let us know    Same meds for now

## 2018-02-16 NOTE — PROGRESS NOTES
SUBJECTIVE:   CC: Gary Iyer is an 57 year old male who presents for preventative health visit.     Physical   Annual:     Getting at least 3 servings of Calcium per day::  Yes    Bi-annual eye exam::  Yes    Dental care twice a year::  NO    Sleep apnea or symptoms of sleep apnea::  Sleep apnea    Diet::  Regular (no restrictions)    Frequency of exercise::  2-3 days/week    Duration of exercise::  30-45 minutes    Taking medications regularly::  Yes    Medication side effects::  None    Additional concerns today::  No                None    No chest pain    Breathing variable    Not pushing self much exercise wise    Sometimes short of breath with walking/ climbing stairs    Some treadmill, breathing okay on that        Today's PHQ-2 Score:   PHQ-2 ( 1999 [employer]) 2/16/2018   Q1: Little interest or pleasure in doing things 0   Q2: Feeling down, depressed or hopeless 0   PHQ-2 Score 0   Q1: Little interest or pleasure in doing things Not at all   Q2: Feeling down, depressed or hopeless Not at all   PHQ-2 Score 0       Abuse: Current or Past(Physical, Sexual or Emotional)- No  Do you feel safe in your environment - Yes    Social History   Substance Use Topics     Smoking status: Former Smoker     Years: 8.00     Quit date: 9/1/2000     Smokeless tobacco: Never Used     Alcohol use 0.0 oz/week      Comment: rare     Alcohol Use 2/16/2018   If you drink alcohol, do you typically have greater than 3 drinks per day OR greater than 7 drinks per week?   No   No flowsheet data found.    Last PSA:   PSA   Date Value Ref Range Status   09/19/2016 0.51 0 - 4 ug/L Final     Comment:     Assay Method:  Chemiluminescence using Siemens Vista analyzer       Reviewed orders with patient. Reviewed health maintenance and updated orders accordingly - Yes       Reviewed and updated as needed this visit by clinical staff  Tobacco  Allergies  Meds  Problems  Med Hx  Surg Hx  Fam Hx  Soc Hx          Reviewed and updated as needed  "this visit by Provider            Review of Systems  C: NEGATIVE for fever, chills, change in weight  I: NEGATIVE for worrisome rashes, moles or lesions  E: NEGATIVE for vision changes or irritation  ENT: followed by ent closely for throat  R: NEGATIVE for significant cough or SOB  CV: NEGATIVE for chest pain, palpitations or peripheral edema  GI: NEGATIVE for nausea, abdominal pain, heartburn, or change in bowel habits   male: negative for dysuria, hematuria, decreased urinary stream, erectile dysfunction, urethral discharge  M: NEGATIVE for significant arthralgias or myalgia  N: NEGATIVE for weakness, dizziness or paresthesias  P: NEGATIVE for changes in mood or affect    OBJECTIVE:   /77 (BP Location: Right arm, Patient Position: Chair, Cuff Size: Adult Regular)  Pulse 106  Temp 98.5  F (36.9  C) (Oral)  Ht 5' 8\" (1.727 m)  Wt 227 lb (103 kg)  SpO2 96%  BMI 34.52 kg/m2    Physical Exam  GENERAL: healthy, alert and no distress  HENT: ear canals and TM's normal, nose and mouth without ulcers or lesions  NECK: no adenopathy, no asymmetry, masses, or scars and thyroid normal to palpation  RESP: lungs clear to auscultation - no rales, rhonchi or wheezes  CV: regular rate and rhythm, normal S1 S2, no S3 or S4, no murmur, click or rub, no peripheral edema and peripheral pulses strong  ABDOMEN: soft, nontender, no hepatosplenomegaly, no masses and bowel sounds normal  MS: no gross musculoskeletal defects noted, no edema  SKIN: no suspicious lesions or rashes  NEURO: Normal strength and tone, mentation intact and speech normal  PSYCH: mentation appears normal, affect normal/bright  Declined genital/ rectal exam      ASSESSMENT/PLAN:   Gary was seen today for physical and health maintenance.    Diagnoses and all orders for this visit:    Encounter for preventative adult health care exam with abnormal findings    Type 2 diabetes mellitus without complication, without long-term current use of insulin (H)  -   "   PODIATRY/FOOT & ANKLE SURGERY REFERRAL  -     metFORMIN (GLUCOPHAGE) 500 MG tablet; TAKE 1 TABLET(500 MG) BY MOUTH TWICE DAILY WITH MEALS  -     Hemoglobin A1c; Future    Moderate major depression (H)    Obesity, unspecified classification, unspecified obesity type, unspecified whether serious comorbidity present    Anxiety  -     PARoxetine (PAXIL) 20 MG tablet; TAKE 2 TABLETS(40 MG) BY MOUTH DAILY    Benign prostatic hyperplasia with weak urinary stream  -     tamsulosin (FLOMAX) 0.4 MG capsule; TAKE 1 CAPSULE(0.4 MG) BY MOUTH DAILY    Cough  -     guaiFENesin-codeine (ROBITUSSIN AC) 100-10 MG/5ML SOLN solution; Take 5 mLs by mouth every 4 hours as needed for cough    Dysphonia  -     omeprazole 20 MG tablet; 1 po daily    Gastroesophageal reflux disease, esophagitis presence not specified  -     omeprazole 20 MG tablet; 1 po daily    Leukoplakia of larynx  -     omeprazole 20 MG tablet; 1 po daily    Gout, unspecified cause, unspecified chronicity, unspecified site  -     allopurinol (ZYLOPRIM) 300 MG tablet; TAKE 1 TABLET(300 MG) BY MOUTH DAILY    Hyperlipidemia LDL goal <70  -     atorvastatin (LIPITOR) 20 MG tablet; TAKE 1 TABLET(20 MG) BY MOUTH DAILY  -     Lipid panel reflex to direct LDL Fasting; Future  -     Comprehensive metabolic panel; Future    Hypertension goal BP (blood pressure) < 140/90  -     lisinopril (PRINIVIL,ZESTRIL) 30 MG tablet; TAKE 1 TABLET(30 MG) BY MOUTH DAILY  -     hydrochlorothiazide (HYDRODIURIL) 50 MG tablet; TAKE 1 TABLET(50 MG) BY MOUTH EVERY MORNING    Screening for prostate cancer  -     Prostate spec antigen screen; Future    Vitamin D deficiency disease  -     Vitamin D Deficiency; Future    Fatigue, unspecified type  -     TSH with free T4 reflex; Future  -     CBC with platelets differential; Future    Other orders  -     Cancel: FOOT EXAM  -     Cancel: Hemoglobin A1c    discussed multiple issues with patient  Wt still a significant problem  Keep working on healthy  "diet/exercise and wt loss  Refill meds  Check labs  Up to date on colonoscopy  Patient to schedule with podiatry  Due to severe ongoing throat issues, good to continue ppi  Patient will schedule fasting lab jareth  No chest pain or breathing problems on treadmill.  Advised he gradually increase this regimen and if any problems arise, let us know    COUNSELING:   Reviewed preventive health counseling, as reflected in patient instructions       Regular exercise       Healthy diet/nutrition       Vision screening       Safe sex practices/STD prevention       Colon cancer screening       Prostate cancer screening         reports that he quit smoking about 17 years ago. He quit after 8.00 years of use. He has never used smokeless tobacco.    Estimated body mass index is 34.52 kg/(m^2) as calculated from the following:    Height as of this encounter: 5' 8\" (1.727 m).    Weight as of this encounter: 227 lb (103 kg).   Weight management plan: Discussed healthy diet and exercise guidelines and patient will follow up in 6 months in clinic to re-evaluate.    Counseling Resources:  ATP IV Guidelines  Pooled Cohorts Equation Calculator  FRAX Risk Assessment  ICSI Preventive Guidelines  Dietary Guidelines for Americans, 2010  USDA's MyPlate  ASA Prophylaxis  Lung CA Screening    Finesse Beal MD  [facility]  Answers for HPI/ROS submitted by the patient on 2/16/2018   PHQ-2 Score: 0    "

## 2018-02-17 ASSESSMENT — PATIENT HEALTH QUESTIONNAIRE - PHQ9: SUM OF ALL RESPONSES TO PHQ QUESTIONS 1-9: 3

## 2018-02-19 DIAGNOSIS — R53.83 FATIGUE, UNSPECIFIED TYPE: ICD-10-CM

## 2018-02-19 DIAGNOSIS — E78.5 HYPERLIPIDEMIA LDL GOAL <70: ICD-10-CM

## 2018-02-19 DIAGNOSIS — E11.9 TYPE 2 DIABETES MELLITUS WITHOUT COMPLICATION, WITHOUT LONG-TERM CURRENT USE OF INSULIN (H): ICD-10-CM

## 2018-02-19 DIAGNOSIS — E55.9 VITAMIN D DEFICIENCY DISEASE: ICD-10-CM

## 2018-02-19 DIAGNOSIS — Z12.5 SCREENING FOR PROSTATE CANCER: ICD-10-CM

## 2018-02-19 LAB
ALBUMIN SERPL-MCNC: 3.8 G/DL (ref 3.4–5)
ALP SERPL-CCNC: 69 U/L (ref 40–150)
ALT SERPL W P-5'-P-CCNC: 71 U/L (ref 0–70)
ANION GAP SERPL CALCULATED.3IONS-SCNC: 9 MMOL/L (ref 3–14)
AST SERPL W P-5'-P-CCNC: 50 U/L (ref 0–45)
BASOPHILS # BLD AUTO: 0 10E9/L (ref 0–0.2)
BASOPHILS NFR BLD AUTO: 0.3 %
BILIRUB SERPL-MCNC: 0.6 MG/DL (ref 0.2–1.3)
BUN SERPL-MCNC: 10 MG/DL (ref 7–30)
CALCIUM SERPL-MCNC: 9.1 MG/DL (ref 8.5–10.1)
CHLORIDE SERPL-SCNC: 96 MMOL/L (ref 94–109)
CHOLEST SERPL-MCNC: 169 MG/DL
CO2 SERPL-SCNC: 29 MMOL/L (ref 20–32)
CREAT SERPL-MCNC: 0.73 MG/DL (ref 0.66–1.25)
DIFFERENTIAL METHOD BLD: NORMAL
EOSINOPHIL # BLD AUTO: 0.2 10E9/L (ref 0–0.7)
EOSINOPHIL NFR BLD AUTO: 2.1 %
ERYTHROCYTE [DISTWIDTH] IN BLOOD BY AUTOMATED COUNT: 14.4 % (ref 10–15)
GFR SERPL CREATININE-BSD FRML MDRD: >90 ML/MIN/1.7M2
GLUCOSE SERPL-MCNC: 124 MG/DL (ref 70–99)
HBA1C MFR BLD: 5.9 % (ref 4.3–6)
HCT VFR BLD AUTO: 42 % (ref 40–53)
HDLC SERPL-MCNC: 61 MG/DL
HGB BLD-MCNC: 14 G/DL (ref 13.3–17.7)
LDLC SERPL CALC-MCNC: 80 MG/DL
LYMPHOCYTES # BLD AUTO: 2.5 10E9/L (ref 0.8–5.3)
LYMPHOCYTES NFR BLD AUTO: 28.1 %
MCH RBC QN AUTO: 30.2 PG (ref 26.5–33)
MCHC RBC AUTO-ENTMCNC: 33.3 G/DL (ref 31.5–36.5)
MCV RBC AUTO: 91 FL (ref 78–100)
MONOCYTES # BLD AUTO: 0.9 10E9/L (ref 0–1.3)
MONOCYTES NFR BLD AUTO: 9.5 %
NEUTROPHILS # BLD AUTO: 5.4 10E9/L (ref 1.6–8.3)
NEUTROPHILS NFR BLD AUTO: 60 %
NONHDLC SERPL-MCNC: 108 MG/DL
PLATELET # BLD AUTO: 310 10E9/L (ref 150–450)
POTASSIUM SERPL-SCNC: 4 MMOL/L (ref 3.4–5.3)
PROT SERPL-MCNC: 8.9 G/DL (ref 6.8–8.8)
PSA SERPL-ACNC: 0.88 UG/L (ref 0–4)
RBC # BLD AUTO: 4.64 10E12/L (ref 4.4–5.9)
SODIUM SERPL-SCNC: 134 MMOL/L (ref 133–144)
TRIGL SERPL-MCNC: 141 MG/DL
TSH SERPL DL<=0.005 MIU/L-ACNC: 2.94 MU/L (ref 0.4–4)
WBC # BLD AUTO: 9 10E9/L (ref 4–11)

## 2018-02-19 PROCEDURE — 83036 HEMOGLOBIN GLYCOSYLATED A1C: CPT | Performed by: FAMILY MEDICINE

## 2018-02-19 PROCEDURE — 82306 VITAMIN D 25 HYDROXY: CPT | Performed by: FAMILY MEDICINE

## 2018-02-19 PROCEDURE — 80061 LIPID PANEL: CPT | Performed by: FAMILY MEDICINE

## 2018-02-19 PROCEDURE — 36415 COLL VENOUS BLD VENIPUNCTURE: CPT | Performed by: FAMILY MEDICINE

## 2018-02-19 PROCEDURE — G0103 PSA SCREENING: HCPCS | Performed by: FAMILY MEDICINE

## 2018-02-19 PROCEDURE — 80050 GENERAL HEALTH PANEL: CPT | Performed by: FAMILY MEDICINE

## 2018-02-20 ENCOUNTER — TELEPHONE (OUTPATIENT)
Dept: FAMILY MEDICINE | Facility: CLINIC | Age: 58
End: 2018-02-20

## 2018-02-20 DIAGNOSIS — E55.9 VITAMIN D DEFICIENCY DISEASE: Primary | ICD-10-CM

## 2018-02-20 LAB — DEPRECATED CALCIDIOL+CALCIFEROL SERPL-MC: 8 UG/L (ref 20–75)

## 2018-02-20 RX ORDER — ERGOCALCIFEROL 1.25 MG/1
CAPSULE, LIQUID FILLED ORAL
Qty: 12 CAPSULE | Refills: 0 | Status: SHIPPED | OUTPATIENT
Start: 2018-02-20 | End: 2018-05-05

## 2018-02-21 NOTE — PROGRESS NOTES
Your vitamin D level is very low.  This is likely causing some fatigue.  Advise taking a high dose prescription vitamin D pill once per week for 3 months.  I sent in a prescription for you to your pharmacy.    See us in clinic in about 3 months.    Other labs are okay.    Finesse Beal MD

## 2018-02-21 NOTE — TELEPHONE ENCOUNTER
Called patient at [phone] (home) to notify him of message below from provider. Unable to reach, left a voicemail to call back to RN triage line.    Cheri Lieberman RN  [facility]

## 2018-02-21 NOTE — TELEPHONE ENCOUNTER
Will call patient regarding below.    Routed to PCP to see if he wants to recheck level after dosage is completed.    Lori Mercado RN [facility] Triage.

## 2018-02-21 NOTE — TELEPHONE ENCOUNTER
Please call patient to inform him that his vitamin D level is very low.  This may be causing some fatigue.      He needs to take a high dose vitamin D pill once per week for 3 months.  I sent a prescription in to his pharmacy.    Other labs were okay.   Result note done.    Finesse Beal MD

## 2018-02-22 NOTE — TELEPHONE ENCOUNTER
Patient returned call - nurse relayed below message from provider. Patient verbalized understanding and agreement with plan  Diana Klein RN  [employer] [facility]

## 2018-02-22 NOTE — TELEPHONE ENCOUNTER
Attempt # 1  Called patient at home [phone]  Was call answered?  Yes, female identifies as wife of patient - nurse sees no consent so left message for patient to call [place] nurse line with number.    Diana Klein RN  [facility]

## 2018-02-26 ENCOUNTER — OFFICE VISIT (OUTPATIENT)
Dept: OTOLARYNGOLOGY | Facility: CLINIC | Age: 58
End: 2018-02-26
Payer: COMMERCIAL

## 2018-02-26 VITALS — BODY MASS INDEX: 34.56 KG/M2 | HEIGHT: 68 IN | WEIGHT: 228 LBS

## 2018-02-26 DIAGNOSIS — C32.0 VOCAL CORD CANCER (H): ICD-10-CM

## 2018-02-26 DIAGNOSIS — J38.7 LARYNGEAL HYPERFUNCTION: ICD-10-CM

## 2018-02-26 DIAGNOSIS — R49.0 DYSPHONIA: Primary | ICD-10-CM

## 2018-02-26 DIAGNOSIS — R05.3 CHRONIC COUGH: ICD-10-CM

## 2018-02-26 DIAGNOSIS — J38.7 LEUKOPLAKIA OF LARYNX: Primary | ICD-10-CM

## 2018-02-26 DIAGNOSIS — J38.7 LARYNGEAL GRANULOMA: ICD-10-CM

## 2018-02-26 ASSESSMENT — PAIN SCALES - GENERAL: PAINLEVEL: NO PAIN (0)

## 2018-02-26 NOTE — PATIENT INSTRUCTIONS
1.  You were seen in the ENT Clinic today by Dr. Fournier.  If you have any questions or concerns after your appointment, please call [phone].  Press option #1 for scheduling related needs.  Press option #3 for Nurse advice.  2.  Plan is to return to clinic in 4-6 weeks for follow up laryngeal exam.      Bijal LINARESN, RN  [facility] ENT   Head & Neck Surgery

## 2018-02-26 NOTE — MR AVS SNAPSHOT
After Visit Summary   2/26/2018    Gary Iyer    MRN: [id number]           Patient Information     Date Of Birth          1960        Visit Information        Provider Department      2/26/2018 2:40 PM Mel Fournier MD [facility] Ear Nose and Throat        Today's Diagnoses     Leukoplakia of larynx    -  1    Chronic cough        Laryngeal granuloma        Laryngeal hyperfunction        Vocal cord cancer (H)          Care Instructions    1.  You were seen in the ENT Clinic today by Dr. Fournier.  If you have any questions or concerns after your appointment, please call [phone].  Press option #1 for scheduling related needs.  Press option #3 for Nurse advice.  2.  Plan is to return to clinic in 4-6 weeks for follow up laryngeal exam.      Bijal MELENDEZ, RN  [facility] ENT   Head & Neck Surgery               Follow-ups after your visit        Your next 10 appointments already scheduled     Mar 08, 2018  5:15 PM CST   [facility] Visit with Jackson Kenny DPM   [facility]    [address] [place] [id number]   [phone]            Mar 30, 2018  8:00 AM CDT   (Arrive by 7:45 AM)   Return Visit with Mel Fournier MD   [facility] Ear Nose and Throat ([facility] [place])    [address] [place] [id number] [phone]           This is a multi-disciplinary care team visit as patients with your type of problem are usually seen by a team of an MD and a Speech-Language Pathologist (who is a specialist in disorders of the voice, throat, and breathing).  Please plan about 2 hours for your visit, which will likely include Laryngeal Function Studies, a Voice/Swallow/Breathing Evaluation, and an Endoscopic Laryngeal Examination to provide a comprehensive evaluation.  Please check with your insurance company to ensure you are covered for these services. - It is important to  know that if you are evaluated and/or treated by both a physician and a speech pathologist during your visit, your billing will reflect the input that you receive from both providers as separate professionals. Although most insurance plans do cover these services, we encourage you to contact your insurance company prior to your visit to determine whether there are any coverage limitations that might affect you financially. - Billing/procedure codes that are frequently associated with visits to our clinic include (but are not limited to) the ones listed below. Most patients will not need all of these items, but it may be useful to ask your insurance company's patient . 53500: Flexible fiberoptic laryngoscopy, 95094: Laryngoscopy; flexible or rigid fiberoptic, with stroboscopy, 72241: Flexible endoscopic evaluation of swallowing, 36739: Laryngeal function aerodynamic evaluation, 40286: Evaluation of Voice and Resonance, 60695: Speech pathology treatment for voice, speech, communication, 50606: Speech pathology treatment for swallowing/oral function for feeding - If you have any concerns or questions, or if you would prefer not to have a speech pathologist involved in your visit, please contact our Clinic Coordinator at [phone], at least 24 hours prior to your appointment.              Who to contact     Please call your clinic at [phone] to:    Ask questions about your health    Make or cancel appointments    Discuss your medicines    Learn about your test results    Speak to your doctor            Additional Information About Your Visit        [employer] Information     [employer] gives you secure access to your electronic health record. If you see a primary care provider, you can also send messages to your care team and make appointments. If you have questions, please call your primary care clinic.  If you do not have a primary care provider, please call [phone] and they will assist  "you.      [employer] is an electronic gateway that provides easy, online access to your medical records. With [employer], you can request a clinic appointment, read your test results, renew a prescription or communicate with your care team.     To access your existing account, please contact your [facility] or call [phone] for assistance.        Care EveryWhere ID     This is your Care EveryWhere ID. This could be used by other organizations to access your [facility] medical records  [id number]        Your Vitals Were     Height BMI (Body Mass Index)                1.727 m (5' 7.99\") 34.68 kg/m2           Blood Pressure from Last 3 Encounters:   02/16/18 128/68   11/16/17 135/79   10/19/17 (P) 135/77    Weight from Last 3 Encounters:   02/26/18 103.4 kg (228 lb)   02/16/18 103 kg (227 lb)   11/24/17 104.8 kg (231 lb)              We Performed the Following     IMAGESTREAM RECORDING ORDER     LARYNGOSCOPY FLEX/RIGID W STROBOSCOPY        Primary Care Provider Office Phone # Fax #    Finesse Beal [phone]       [address] [facility] [address]        Equal Access to Services     [facility] : Hadii sloan bradshaw hadasho Soomaali, waaxda luqadaha, qaybta kaalmada adeegyada, gregory santos. So [facility] [phone].    ATENCIÓN: Si habla español, tiene a lucero disposición servicios gratuitos de asistencia lingüística. Llame al [phone].    We comply with applicable federal civil rights laws and [place] laws. We do not discriminate on the basis of race, color, national origin, age, disability, sex, sexual orientation, or gender identity.            Thank you!     Thank you for choosing [facility] EAR NOSE AND THROAT  for your care. Our goal is always to provide you with excellent care. Hearing back from our patients is one way we can continue to improve our services. Please take a few minutes to complete the written survey that you may " receive in the mail after your visit with us. Thank you!             Your Updated Medication List - Protect others around you: Learn how to safely use, store and throw away your medicines at www.disposemymeds.org.          This list is accurate as of 2/26/18 11:59 PM.  Always use your most recent med list.                   Brand Name Dispense Instructions for use Diagnosis    allopurinol 300 MG tablet    ZYLOPRIM    90 tablet    TAKE 1 TABLET(300 MG) BY MOUTH DAILY    Gout, unspecified cause, unspecified chronicity, unspecified site       aspirin 81 MG tablet      Take 1 tablet by mouth daily.        atorvastatin 20 MG tablet    LIPITOR    90 tablet    TAKE 1 TABLET(20 MG) BY MOUTH DAILY    Hyperlipidemia LDL goal <70       blood glucose monitoring lancets     1 Box    1 each 2 times daily Use to test blood sugar 2 times daily or as directed.    Type 2 diabetes mellitus without complication (H)       blood glucose monitoring meter device kit    no brand specified    1 kit    Use to test blood sugar 2 times daily or as directed.  Accucheck meter please and all associated strips, lancets, and other supplies, 3 month supply with refills for a year.    Type 2 diabetes mellitus without complication, without long-term current use of insulin (H)       DAILY MULTIVITAMIN PO      Take  by mouth daily.        guaiFENesin-codeine 100-10 MG/5ML Soln solution    ROBITUSSIN AC    120 mL    Take 5 mLs by mouth every 4 hours as needed for cough    Cough       hydrochlorothiazide 50 MG tablet    HYDRODIURIL    90 tablet    TAKE 1 TABLET(50 MG) BY MOUTH EVERY MORNING    Hypertension goal BP (blood pressure) < 140/90       hydrocortisone 1 % ointment     30 g    Apply topically 2 times daily as needed    Facial dermatitis       hydrocortisone 2.5 % cream    ANUSOL-HC    30 g    Place rectally 2 times daily as needed for hemorrhoids    External hemorrhoids       hydrocortisone 25 MG Suppository    ANUSOL-HC    20 suppository    Place  1 suppository (25 mg) rectally 2 times daily as needed for hemorrhoids    External hemorrhoids       lisinopril 30 MG tablet    PRINIVIL,ZESTRIL    90 tablet    TAKE 1 TABLET(30 MG) BY MOUTH DAILY    Hypertension goal BP (blood pressure) < 140/90       metFORMIN 500 MG tablet    GLUCOPHAGE    180 tablet    TAKE 1 TABLET(500 MG) BY MOUTH TWICE DAILY WITH MEALS    Type 2 diabetes mellitus without complication, without long-term current use of insulin (H)       omeprazole 20 MG tablet     90 tablet    1 po daily    Dysphonia, Gastroesophageal reflux disease, esophagitis presence not specified, Leukoplakia of larynx       * order for DME     1 Container    Place rectally 2 times daily 1.8 oz container of 0.2 % nifedipine suppository if possible as easier to place the medication.   Apply on the anus every 12 hours.  Just get to where the anus is tight .    Anal fissure       * order for DME     1 Units    Autocpap 10-16 cm    Obstructive sleep apnea       PARoxetine 20 MG tablet    PAXIL    180 tablet    TAKE 2 TABLETS(40 MG) BY MOUTH DAILY    Anxiety       psyllium 0.52 G capsule     100 capsule    Take 1 capsule (0.52 g) by mouth daily    Constipation, unspecified constipation type       tamsulosin 0.4 MG capsule    FLOMAX    90 capsule    TAKE 1 CAPSULE(0.4 MG) BY MOUTH DAILY    Benign prostatic hyperplasia with weak urinary stream       triamcinolone 0.1 % cream    KENALOG    30 g    Apply topically 2 times daily as needed for irritation    Dermatitis       vitamin D 1000 UNITS capsule      Take 1 capsule by mouth daily.        vitamin D 72597 UNIT capsule    ERGOCALCIFEROL    12 capsule    1 po q week    Vitamin D deficiency disease       * Notice:  This list has 2 medication(s) that are the same as other medications prescribed for you. Read the directions carefully, and ask your doctor or other care provider to review them with you.

## 2018-02-26 NOTE — NURSING NOTE
Chief Complaint   Patient presents with     RECHECK     3 month f/u      Yuridia Saldivar Medical Assistant

## 2018-02-26 NOTE — LETTER
2/26/2018      RE: Gary Iyer  [address] [address]       Dear Colleague:    Gary Iyer recently returned for follow-up at the [facility]. My clinic note from our visit is enclosed below.     I appreciate the ongoing opportunity to participate in this patient's care.    Please feel free to contact me with any questions.      Sincerely yours,  Mel Fournier M.D., M.P.H.  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 Most recently we returned to the Operating Room on 02/09/2017 for an area of new irregularity of the left true vocal fold. Pathology showed severe dysplasia with negative, but close margins (for moderate, but not severe dysplasia). 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   He reports having had multiple recent upper respiratory infections with ongoing coughing. He states he has not had much reflux.      MEDICATIONS:     Current Outpatient Prescriptions   Medication Sig Dispense Refill     vitamin D (ERGOCALCIFEROL) 68893 UNIT capsule 1 po q week 12 capsule 0     PARoxetine (PAXIL) 20 MG tablet TAKE 2 TABLETS(40 MG) BY MOUTH DAILY 180 tablet 3     tamsulosin (FLOMAX) 0.4 MG capsule TAKE 1 CAPSULE(0.4 MG) BY MOUTH DAILY 90 capsule 3     guaiFENesin-codeine (ROBITUSSIN AC) 100-10 MG/5ML SOLN solution Take 5 mLs by mouth every 4 hours as needed for cough 120 mL 0     omeprazole 20 MG tablet 1 po daily 90 tablet 3     allopurinol (ZYLOPRIM) 300 MG tablet TAKE 1 TABLET(300 MG) BY MOUTH DAILY 90 tablet 3     atorvastatin (LIPITOR) 20 MG tablet TAKE 1 TABLET(20 MG) BY MOUTH DAILY 90 tablet 3     metFORMIN  (GLUCOPHAGE) 500 MG tablet TAKE 1 TABLET(500 MG) BY MOUTH TWICE DAILY WITH MEALS 180 tablet 1     lisinopril (PRINIVIL,ZESTRIL) 30 MG tablet TAKE 1 TABLET(30 MG) BY MOUTH DAILY 90 tablet 3     hydrochlorothiazide (HYDRODIURIL) 50 MG tablet TAKE 1 TABLET(50 MG) BY MOUTH EVERY MORNING 90 tablet 3     order for DME Autocpap 10-16 cm 1 Units 0     blood glucose monitoring (NO BRAND SPECIFIED) meter device kit Use to test blood sugar 2 times daily or as directed.  Accucheck meter please and all associated strips, lancets, and other supplies, 3 month supply with refills for a year. 1 kit 0     order for DME Place rectally 2 times daily 1.8 oz container of 0.2 % nifedipine suppository if possible as easier to place the medication.    Apply on the anus every 12 hours.  Just get to where the anus is tight . 1 Container 5     psyllium 0.52 G capsule Take 1 capsule (0.52 g) by mouth daily 100 capsule 3     hydrocortisone (ANUSOL-HC) 25 MG suppository Place 1 suppository (25 mg) rectally 2 times daily as needed for hemorrhoids 20 suppository 1     hydrocortisone (ANUSOL-HC) 2.5 % rectal cream Place rectally 2 times daily as needed for hemorrhoids 30 g 1     triamcinolone (KENALOG) 0.1 % cream Apply topically 2 times daily as needed for irritation 30 g 1     hydrocortisone 1 % ointment Apply topically 2 times daily as needed 30 g 1     blood glucose monitoring (ALON MICROLET) lancets 1 each 2 times daily Use to test blood sugar 2 times daily or as directed. 1 Box 3     Cholecalciferol (VITAMIN D) 1000 UNITS capsule Take 1 capsule by mouth daily.       Multiple Vitamin (DAILY MULTIVITAMIN PO) Take  by mouth daily.       aspirin 81 MG tablet Take 1 tablet by mouth daily.         ALLERGIES:  No Known Allergies    NEW PMH/PSH: None    REVIEW OF SYSTEMS:  The patient completed a comprehensive 11 point review of systems (below), which was reviewed. Positives are as noted below.  Patient Supplied Answers to Review of Systems  [facility]  ROS 4/22/2016   Ears, Nose, Throat Nasal congestion or drainage       PHYSICAL EXAM:  General: The patient was alert and conversant, and in no acute distress.    Oral cavity/oropharynx: No masses or lesions. Dentition unchanged since prior. Tongue mobility and palate elevation intact and symmetric.  Neck: No palpable cervical lymphadenopathy, no significant tenderness to palpation of the thyrohyoid space, which was mildly narrow. No obvious thyroid abnormality.  Resp: Breathing comfortably, no stridor or stertor.  Neuro: Symmetric facial function. Other cranial nerve function as documented above.  Psych: Normal affect, pleasant and cooperative.  Voice/speech: Mild dysphonia characterized by breathiness and roughness.      Intake scores  Last 2 Scores for Patient-Answered VHI Questionnaire  VHI Total Score 12/22/2017 2/26/2018   VHI Total Score 0 9      Last 2 Scores for Patient-Answered EAT Questionnaire  EAT Total Score 12/22/2017 2/26/2018   EAT Total Score 0 1      Last 2 Scores for Patient-Answered CSI Questionnaire  CSI Total Score 12/22/2017 2/26/2018   CSI Total Score 0 0       Procedure:   Flexible fiberoptic laryngoscopy and laryngovideostroboscopy  Indications: This procedure was warranted to evaluate the patient's laryngeal anatomy and function. Risks, benefits, and alternatives were discussed.  Description: After written informed consent was obtained, a time-out was performed to confirm patient identity, procedure, and procedure site. Topical 3% lidocaine with 0.25% phenylephrine was applied to the nasal cavities. I performed the endoscopy and no complications were apparent. Continuous and stroboscopic light were utilized to assess routine phonation and variable frequency phonation.  Performed by: Mel Fournier MD MPH  SLP: Misty Clark, PhD, CCC-SLP   Findings: Normal nasopharynx. Normal base of tongue, valleculae, and epiglottis. Vocal fold mobility: right: normal; left: normal. Medial edges  of the vocal folds: smooth and straight. No focal mucosal lesions were observed on the true vocal folds. Decreasing erythema of left true vocal fold. Glissade produced appropriate elongation. There was moderate supraglottic recruitment with connected speech. Mucosa of false vocal folds, aryepiglottic folds, piriform sinuses, and posterior glottis unremarkable. Airway was patent. Response to the therapy probes was good. Slight increase in prominence of left medial arytenoid prominence, is point of contact when patient coughs.    The addition of stroboscopy allowed evaluation of the mucosal wave.   Amplitude: right: mildly decreased; left: mildly decreased. Symmetry: intermittent symmetry. Closure pattern: complete. Closure plane: at glottic level. Phase distribution: normal.               IMPRESSION AND PLAN:   Gary Iyer returns with no evidence of disease of the true vocal folds. 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     He will check in with his primary care physician regarding the cough. He will return in four to six weeks for a repeat exam, sooner as needed. We discussed that if the lesion persists despite resolution of the cough, we may need to biopsy it again, perhaps in the operating room this time. Biopsying it while he has a cough would unfortunately give him a significant risk of granuloma formation/persistence.     I appreciate the opportunity to participate in the care of this pleasant patient.

## 2018-02-26 NOTE — MR AVS SNAPSHOT
After Visit Summary   2/26/2018    Gary Iyer    MRN: [id number]           Patient Information     Date Of Birth          1960        Visit Information        Provider Department      2/26/2018 2:40 PM Misty Clark, SLP [facility]        Today's Diagnoses     Dysphonia    -  1       Follow-ups after your visit        Your next 10 appointments already scheduled     Feb 27, 2018  5:00 PM CST   New Visit with Jackson Kenny DPM   [facility] ([facility])    [address] [address] [phone]              Who to contact     Please call your clinic at [phone] to:    Ask questions about your health    Make or cancel appointments    Discuss your medicines    Learn about your test results    Speak to your doctor            Additional Information About Your Visit        MyChart Information     [employer] gives you secure access to your electronic health record. If you see a primary care provider, you can also send messages to your care team and make appointments. If you have questions, please call your primary care clinic.  If you do not have a primary care provider, please call [phone] and they will assist you.      [employer] is an electronic gateway that provides easy, online access to your medical records. With [employer], you can request a clinic appointment, read your test results, renew a prescription or communicate with your care team.     To access your existing account, please contact your [facility] or call [phone] for assistance.        Care EveryWhere ID     This is your Care EveryWhere ID. This could be used by other organizations to access your [place] medical records  [id number]         Blood Pressure from Last 3 Encounters:   02/16/18 128/68   11/16/17 135/79   10/19/17 (P) 135/77    Weight from Last 3 Encounters:   02/26/18 103.4 kg (228 lb)   02/16/18 103 kg (227 lb)   11/24/17  104.8 kg (231 lb)              Today, you had the following     No orders found for display       Primary Care Provider Office Phone # Fax #    Finesse Beal [phone]       [address] [address]        Equal Access to Services     ULICESIZABEL LINDSEY : Hadii aad ku hadasho Soomaali, waaxda luqadaha, qaybta kaalmada adeegyada, waxay idiin haymaxinen adepierre mcmahan lachadolivia . So [facility] [phone].    ATENCIÓN: Si habla español, tiene a lucero disposición servicios gratuitos de asistencia lingüística. Llame al [phone].    We comply with applicable federal civil rights laws and [place] laws. We do not discriminate on the basis of race, color, national origin, age, disability, sex, sexual orientation, or gender identity.            Thank you!     Thank you for choosing [facility]  for your care. Our goal is always to provide you with excellent care. Hearing back from our patients is one way we can continue to improve our services. Please take a few minutes to complete the written survey that you may receive in the mail after your visit with us. Thank you!             Your Updated Medication List - Protect others around you: Learn how to safely use, store and throw away your medicines at www.disposemymeds.org.          This list is accurate as of 2/26/18 11:59 PM.  Always use your most recent med list.                   Brand Name Dispense Instructions for use Diagnosis    allopurinol 300 MG tablet    ZYLOPRIM    90 tablet    TAKE 1 TABLET(300 MG) BY MOUTH DAILY    Gout, unspecified cause, unspecified chronicity, unspecified site       aspirin 81 MG tablet      Take 1 tablet by mouth daily.        atorvastatin 20 MG tablet    LIPITOR    90 tablet    TAKE 1 TABLET(20 MG) BY MOUTH DAILY    Hyperlipidemia LDL goal <70       blood glucose monitoring lancets     1 Box    1 each 2 times daily Use to test blood sugar 2 times daily or as directed.    Type 2 diabetes mellitus without  complication (H)       blood glucose monitoring meter device kit    no brand specified    1 kit    Use to test blood sugar 2 times daily or as directed.  Accucheck meter please and all associated strips, lancets, and other supplies, 3 month supply with refills for a year.    Type 2 diabetes mellitus without complication, without long-term current use of insulin (H)       DAILY MULTIVITAMIN PO      Take  by mouth daily.        guaiFENesin-codeine 100-10 MG/5ML Soln solution    ROBITUSSIN AC    120 mL    Take 5 mLs by mouth every 4 hours as needed for cough    Cough       hydrochlorothiazide 50 MG tablet    HYDRODIURIL    90 tablet    TAKE 1 TABLET(50 MG) BY MOUTH EVERY MORNING    Hypertension goal BP (blood pressure) < 140/90       hydrocortisone 1 % ointment     30 g    Apply topically 2 times daily as needed    Facial dermatitis       hydrocortisone 2.5 % cream    ANUSOL-HC    30 g    Place rectally 2 times daily as needed for hemorrhoids    External hemorrhoids       hydrocortisone 25 MG Suppository    ANUSOL-HC    20 suppository    Place 1 suppository (25 mg) rectally 2 times daily as needed for hemorrhoids    External hemorrhoids       lisinopril 30 MG tablet    PRINIVIL,ZESTRIL    90 tablet    TAKE 1 TABLET(30 MG) BY MOUTH DAILY    Hypertension goal BP (blood pressure) < 140/90       metFORMIN 500 MG tablet    GLUCOPHAGE    180 tablet    TAKE 1 TABLET(500 MG) BY MOUTH TWICE DAILY WITH MEALS    Type 2 diabetes mellitus without complication, without long-term current use of insulin (H)       omeprazole 20 MG tablet     90 tablet    1 po daily    Dysphonia, Gastroesophageal reflux disease, esophagitis presence not specified, Leukoplakia of larynx       * order for DME     1 Container    Place rectally 2 times daily 1.8 oz container of 0.2 % nifedipine suppository if possible as easier to place the medication.   Apply on the anus every 12 hours.  Just get to where the anus is tight .    Anal fissure       * order  for DME     1 Units    Autocpap 10-16 cm    Obstructive sleep apnea       PARoxetine 20 MG tablet    PAXIL    180 tablet    TAKE 2 TABLETS(40 MG) BY MOUTH DAILY    Anxiety       psyllium 0.52 G capsule     100 capsule    Take 1 capsule (0.52 g) by mouth daily    Constipation, unspecified constipation type       tamsulosin 0.4 MG capsule    FLOMAX    90 capsule    TAKE 1 CAPSULE(0.4 MG) BY MOUTH DAILY    Benign prostatic hyperplasia with weak urinary stream       triamcinolone 0.1 % cream    KENALOG    30 g    Apply topically 2 times daily as needed for irritation    Dermatitis       vitamin D 1000 UNITS capsule      Take 1 capsule by mouth daily.        vitamin D 31064 UNIT capsule    ERGOCALCIFEROL    12 capsule    1 po q week    Vitamin D deficiency disease       * Notice:  This list has 2 medication(s) that are the same as other medications prescribed for you. Read the directions carefully, and ask your doctor or other care provider to review them with you.

## 2018-02-26 NOTE — PROGRESS NOTES
Dear Colleague:    Gary Iyer recently returned for follow-up at the [facility]. My clinic note from our visit is enclosed below.     I appreciate the ongoing opportunity to participate in this patient's care.    Please feel free to contact me with any questions.      Sincerely yours,  Mel Fournier M.D., M.P.H.  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 Most recently we returned to the Operating Room on 02/09/2017 for an area of new irregularity of the left true vocal fold. Pathology showed severe dysplasia with negative, but close margins (for moderate, but not severe dysplasia). 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   He reports having had multiple recent upper respiratory infections with ongoing coughing. He states he has not had much reflux.      MEDICATIONS:     Current Outpatient Prescriptions   Medication Sig Dispense Refill     vitamin D (ERGOCALCIFEROL) 49420 UNIT capsule 1 po q week 12 capsule 0     PARoxetine (PAXIL) 20 MG tablet TAKE 2 TABLETS(40 MG) BY MOUTH DAILY 180 tablet 3     tamsulosin (FLOMAX) 0.4 MG capsule TAKE 1 CAPSULE(0.4 MG) BY MOUTH DAILY 90 capsule 3     guaiFENesin-codeine (ROBITUSSIN AC) 100-10 MG/5ML SOLN solution Take 5 mLs by mouth every 4 hours as needed for cough 120 mL 0     omeprazole 20 MG tablet 1 po daily 90 tablet 3     allopurinol (ZYLOPRIM) 300 MG tablet TAKE 1 TABLET(300 MG) BY MOUTH DAILY 90 tablet 3     atorvastatin (LIPITOR) 20 MG tablet TAKE 1 TABLET(20 MG) BY MOUTH DAILY 90 tablet 3     metFORMIN (GLUCOPHAGE) 500 MG tablet TAKE 1 TABLET(500 MG) BY MOUTH TWICE DAILY WITH MEALS  180 tablet 1     lisinopril (PRINIVIL,ZESTRIL) 30 MG tablet TAKE 1 TABLET(30 MG) BY MOUTH DAILY 90 tablet 3     hydrochlorothiazide (HYDRODIURIL) 50 MG tablet TAKE 1 TABLET(50 MG) BY MOUTH EVERY MORNING 90 tablet 3     order for DME Autocpap 10-16 cm 1 Units 0     blood glucose monitoring (NO BRAND SPECIFIED) meter device kit Use to test blood sugar 2 times daily or as directed.  Accucheck meter please and all associated strips, lancets, and other supplies, 3 month supply with refills for a year. 1 kit 0     order for DME Place rectally 2 times daily 1.8 oz container of 0.2 % nifedipine suppository if possible as easier to place the medication.    Apply on the anus every 12 hours.  Just get to where the anus is tight . 1 Container 5     psyllium 0.52 G capsule Take 1 capsule (0.52 g) by mouth daily 100 capsule 3     hydrocortisone (ANUSOL-HC) 25 MG suppository Place 1 suppository (25 mg) rectally 2 times daily as needed for hemorrhoids 20 suppository 1     hydrocortisone (ANUSOL-HC) 2.5 % rectal cream Place rectally 2 times daily as needed for hemorrhoids 30 g 1     triamcinolone (KENALOG) 0.1 % cream Apply topically 2 times daily as needed for irritation 30 g 1     hydrocortisone 1 % ointment Apply topically 2 times daily as needed 30 g 1     blood glucose monitoring (ALON MICROLET) lancets 1 each 2 times daily Use to test blood sugar 2 times daily or as directed. 1 Box 3     Cholecalciferol (VITAMIN D) 1000 UNITS capsule Take 1 capsule by mouth daily.       Multiple Vitamin (DAILY MULTIVITAMIN PO) Take  by mouth daily.       aspirin 81 MG tablet Take 1 tablet by mouth daily.         ALLERGIES:  No Known Allergies    NEW PMH/PSH: None    REVIEW OF SYSTEMS:  The patient completed a comprehensive 11 point review of systems (below), which was reviewed. Positives are as noted below.  Patient Supplied Answers to Review of Systems   ENT ROS 4/22/2016   Ears, Nose, Throat Nasal congestion or drainage       PHYSICAL  EXAM:  General: The patient was alert and conversant, and in no acute distress.    Oral cavity/oropharynx: No masses or lesions. Dentition unchanged since prior. Tongue mobility and palate elevation intact and symmetric.  Neck: No palpable cervical lymphadenopathy, no significant tenderness to palpation of the thyrohyoid space, which was mildly narrow. No obvious thyroid abnormality.  Resp: Breathing comfortably, no stridor or stertor.  Neuro: Symmetric facial function. Other cranial nerve function as documented above.  Psych: Normal affect, pleasant and cooperative.  Voice/speech: Mild dysphonia characterized by breathiness and roughness.      Intake scores  Last 2 Scores for Patient-Answered VHI Questionnaire  VHI Total Score 12/22/2017 2/26/2018   VHI Total Score 0 9      Last 2 Scores for Patient-Answered EAT Questionnaire  EAT Total Score 12/22/2017 2/26/2018   EAT Total Score 0 1      Last 2 Scores for Patient-Answered CSI Questionnaire  CSI Total Score 12/22/2017 2/26/2018   CSI Total Score 0 0       Procedure:   Flexible fiberoptic laryngoscopy and laryngovideostroboscopy  Indications: This procedure was warranted to evaluate the patient's laryngeal anatomy and function. Risks, benefits, and alternatives were discussed.  Description: After written informed consent was obtained, a time-out was performed to confirm patient identity, procedure, and procedure site. Topical 3% lidocaine with 0.25% phenylephrine was applied to the nasal cavities. I performed the endoscopy and no complications were apparent. Continuous and stroboscopic light were utilized to assess routine phonation and variable frequency phonation.  Performed by: Mel Fournier MD MPH  SLP: Misty Clark, PhD, CCC-SLP   Findings: Normal nasopharynx. Normal base of tongue, valleculae, and epiglottis. Vocal fold mobility: right: normal; left: normal. Medial edges of the vocal folds: smooth and straight. No focal mucosal lesions were observed  on the true vocal folds. Decreasing erythema of left true vocal fold. Glissade produced appropriate elongation. There was moderate supraglottic recruitment with connected speech. Mucosa of false vocal folds, aryepiglottic folds, piriform sinuses, and posterior glottis unremarkable. Airway was patent. Response to the therapy probes was good. Slight increase in prominence of left medial arytenoid prominence, is point of contact when patient coughs.    The addition of stroboscopy allowed evaluation of the mucosal wave.   Amplitude: right: mildly decreased; left: mildly decreased. Symmetry: intermittent symmetry. Closure pattern: complete. Closure plane: at glottic level. Phase distribution: normal.               IMPRESSION AND PLAN:   Gary Iyer returns with no evidence of disease of the true vocal folds. 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     He will check in with his primary care physician regarding the cough. He will return in four to six weeks for a repeat exam, sooner as needed. We discussed that if the lesion persists despite resolution of the cough, we may need to biopsy it again, perhaps in the operating room this time. Biopsying it while he has a cough would unfortunately give him a significant risk of granuloma formation/persistence.     I appreciate the opportunity to participate in the care of this pleasant patient.

## 2018-02-27 NOTE — PROGRESS NOTES
[facility]  Elijah Baker Jr., M.D., F.A.C.S.  Mel Fournier M.D., M.P.H.  Misty Clark, Ph.D., CCC/SLP  Jasmyne Padilla M.M. (voice), M.A., CCC/SLP  Zack Mcdaniels M.M. (voice), M.A., [facility]/SLP    [facility]  VOICE EVALUATION/LARYNGEAL EXAMINATION REPORT    Patient: Gary Iyer  Date of Service: 2/26/2018    HISTORY  PATIENT INFORMATION  Gary Iyer was seen for brief consultation in conjunction with a visit to Dr. Fournier today.  Please refer to Dr. Fournier s dictation for a more complete history and impressions.      Mr. Iyer has been coughing due to URI lately, and his laryngeal exam indicates a worsening of his left vocal granuloma.  I assisted Dr. Fournier with the exam, and very briefly reviewed cough suppression techniques with Mr. Iyer.  I will see him as Dr. Fournier deems appropriate.    DIAGNOSIS/REASON FOR REFERRAL  Dysphonia/ Evaluate, perform laryngeal exam, treat as appropriate    No charge for today s session  NO CHARGE FACILITY FEE (49255)    PRIMARY ICD-10 code: R49.0 (Dysphonia)    Misty Clark, Ph.D., [facility]-SLP  Speech-Language Pathologist  Director, [facility] hospital  [phone]

## 2018-03-02 PROBLEM — M25.512 ACUTE PAIN OF LEFT SHOULDER: Status: RESOLVED | Noted: 2017-06-27 | Resolved: 2018-03-02

## 2018-03-02 NOTE — PROGRESS NOTES
Subjective:  HPI                    Objective:  System    Physical Exam    General     ROS    Assessment/Plan:    DISCHARGE REPORT    Progress reporting period is from 12/15/17 to 12/29/17.       SUBJECTIVE  Subjective: Gary reports that his shoulder is feeling slightly better. Pain is now intermittent.    Current Pain level: 0/10.     Initial Pain level: 3/10.   Changes in function:  Yes (See Goal flowsheet attached for changes in current functional level)  Adverse reaction to treatment or activity: None    OBJECTIVE  Changes noted in objective findings:  Yes,   Objective: Rpunded shoulder and forward head posture.      ASSESSMENT/PLAN  Updated problem list and treatment plan: Diagnosis 1:  L shoulder pain  STG/LTGs have been met or progress has been made towards goals:  Yes (See Goal flow sheet completed today.)  Assessment of Progress: The patient has not returned to therapy. Current status is unknown.  Self Management Plans:  Patient has been instructed in a home treatment program.  Patient  has been instructed in self management of symptoms.    Recommendations:  Patient will be discharged from physical therapy at this time as he has not returned to therapy.    Please refer to the daily flowsheet for treatment today, total treatment time and time spent performing 1:1 timed codes.

## 2018-03-13 ENCOUNTER — OFFICE VISIT (OUTPATIENT)
Dept: PODIATRY | Facility: CLINIC | Age: 58
End: 2018-03-13
Payer: COMMERCIAL

## 2018-03-13 VITALS — OXYGEN SATURATION: 97 % | HEART RATE: 110 BPM | BODY MASS INDEX: 34.68 KG/M2 | WEIGHT: 228 LBS

## 2018-03-13 DIAGNOSIS — E11.51 TYPE II DIABETES MELLITUS WITH PERIPHERAL ARTERY DISEASE (H): Primary | ICD-10-CM

## 2018-03-13 DIAGNOSIS — L84 CORNS AND CALLOSITIES: ICD-10-CM

## 2018-03-13 DIAGNOSIS — B35.1 DERMATOPHYTOSIS OF NAIL: ICD-10-CM

## 2018-03-13 PROCEDURE — 11056 PARNG/CUTG B9 HYPRKR LES 2-4: CPT | Mod: 51 | Performed by: PODIATRIST

## 2018-03-13 PROCEDURE — 99243 OFF/OP CNSLTJ NEW/EST LOW 30: CPT | Mod: 25 | Performed by: PODIATRIST

## 2018-03-13 PROCEDURE — 11721 DEBRIDE NAIL 6 OR MORE: CPT | Mod: 59 | Performed by: PODIATRIST

## 2018-03-13 NOTE — MR AVS SNAPSHOT
After Visit Summary   3/13/2018    Gary Iyer    MRN: [id number]           Patient Information     Date Of Birth          1960        Visit Information        Provider Department      3/13/2018 3:00 PM [facility], [facility]        Today's Diagnoses     Type II diabetes mellitus with peripheral artery disease (H)    -  1    Dermatophytosis of nail        Corns and callosities          Care Instructions    We wish you continued good healing. If you have any questions or concerns, please do not hesitate to contact us at [phone]    Please remember to call and schedule a follow up appointment if one was recommended at your earliest convenience.   PODIATRY CLINIC HOURS  TELEPHONE NUMBER    Dr. Jackson Kenny DGloPLINCOLN FAC FAS    Clinics:  [facility]    Jocelyn Olivarez [facility]   Tuesday 1PM-6PM  [place]  Wednesday 7AM-2PM  [place]  Thursday 10AM-6PM  [place]  Friday 7AM-3PM  [place]  Specialty schedulers:   [phone] to make an appointment with any Specialty Provider.        Urgent Care locations:    [facility] Monday-Friday 5 pm - 9 pm. Saturday-Sunday 9 am -5pm    Monday-Friday 11 am - 9 pm Saturday 9 am - 5 pm     Monday-Sunday 12 noon-8PM [phone]     If you need a medication refill, please contact us you may need lab work and/or a follow up visit prior to your refill (i.e. Antifungal medications).    ikeGPShart (secure e-mail communication and access to your chart) to send a message or to make an appointment.    If MRI needed please call Art Steele at [phone]        Weight management plan: Patient was referred to their PCP to discuss a diet and exercise plan.      Foot Care Tips for People with Diabetes  1. Take care of your diabetes.    Work with your care team to keep your blood glucose as close to your  target range as possible. Your target range is: _____________.  2. Check your feet every day.    Look at your bare feet every day for cuts, blisters, red spots and swelling.    Use a mirror to check the bottoms of your feet or ask a family member for help if you have trouble seeing.  3. Wash your feet every day.    Wash your feet in warm (not hot) water every day. Do not soak your feet.    Dry your feet well. Be sure to dry between the toes.  4. Keep the skin soft and smooth.    Rub a thin coat of lotion over the tops and bottoms of your feet, but not between your toes. Avoid products that contain alcohol.  5. Smooth corns and calluses gently.    If your feet are at low risk for problems, use a pumice stone to smooth corns and calluses.    Do not use over-the-counter products or sharp objects on corns or calluses.  6. If you can see and reach your toenails, trim them each week or when needed.    Cut toenails when they are soft from washing.    Trim toenails straight across and not too short. Smooth the edges with an emery board.    If you cannot see or reach your feet, ask a friend or family member to trim your toenails. You may also visit a foot care specialist or podiatrist (foot doctor).  7. Wear shoes and socks at all times.    Never walk barefoot.    Wear comfortable shoes that fit well and protect your feet. Wear cotton socks that absorb moisture.    Shop for shoes at the end of the day, when your feet are bigger. Ask the salesperson to measure your feet and make sure the shoes are properly fitted. The shoes should not pinch. Your feet should not slide around inside the shoes.    Break in new shoes slowly. Wear them one to two hours each day for the first few weeks.    Feel inside your shoes before putting them on each time. Make sure the lining is smooth and there are no objects inside.    Check your feet after removing your socks and shoes. If you see any red marks, this is a sign that your shoes don t fit  well.  8. Protect your feet from hot and cold.    Wear shoes at the beach or on hot pavement.    Wear socks at night if your feet get cold.    Use your hand or elbow to test bath water instead of your foot.    Do not use hot water bottles, heating pads or electric blankets.  9. Keep the blood flowing to your feet.    Put your feet up when sitting.    Wiggle your toes and flex and rotate your ankles for 5 minutes, two or three times a day. If you will be sitting in a plane or car, you will need to do this at least once every hour.    Do not cross your legs for long periods of time.    Do not smoke.  10. Be active every day. Plan your physical activity program with your care team.  11. Check with your health care team.    Have your doctor or nurse check your bare feet and find out if you are likely to have serious foot problems. Remember that you may not feel the pain of an injury.    Call your care team right away if you find a cut, sore, blister or bruise on your foot that does not begin to heal after one day.    Follow their advice about foot care.    Ask for a yearly foot exam with a monofilament. This is like a hairbrush with a single bristle. It is used to check the nerves in your feet.  12. Get started now.    Begin taking good care of your feet today.    Set a time every day to check your feet.  Custom-made shoes  Special shoes can be made to fit softly around sore feet or feet that have changed shape. These special shoes help protect your feet.   Medicare and other health insurance programs may pay for special shoes. Talk with your doctor (or diabetes educator) about how and where to get them.  Adapted from the National Diabetes Education Program, sponsored by the U.S. Department of Health and Human Services  National Institutes of Health and the Centers for Disease Control and Prevention.  For informational purposes only. Not to replace the advice of your health care provider.   Published by [employer]. [place] [id number] - REV 12/15.            Follow-ups after your visit        Your next 10 appointments already scheduled     Mar 30, 2018  8:00 AM CDT   (Arrive by 7:45 AM)   Return Visit with Mel Fournier MD   [facility] ([facility])    [address]  [address] [facility] [id number] [phone]           This is a multi-disciplinary care team visit as patients with your type of problem are usually seen by a team of an MD and a Speech-Language Pathologist (who is a specialist in disorders of the voice, throat, and breathing).  Please plan about 2 hours for your visit, which will likely include Laryngeal Function Studies, a Voice/Swallow/Breathing Evaluation, and an Endoscopic Laryngeal Examination to provide a comprehensive evaluation.  Please check with your insurance company to ensure you are covered for these services. - It is important to know that if you are evaluated and/or treated by both a physician and a speech pathologist during your visit, your billing will reflect the input that you receive from both providers as separate professionals. Although most insurance plans do cover these services, we encourage you to contact your insurance company prior to your visit to determine whether there are any coverage limitations that might affect you financially. - Billing/procedure codes that are frequently associated with visits to our clinic include (but are not limited to) the ones listed below. Most patients will not need all of these items, but it may be useful to ask your insurance company's patient . 61799: Flexible fiberoptic laryngoscopy, 85481: Laryngoscopy; flexible or rigid fiberoptic, with stroboscopy, 23872: Flexible endoscopic evaluation of swallowing, 38235: Laryngeal function aerodynamic evaluation, 82809: Evaluation of Voice and Resonance, 68538: Speech pathology treatment for voice, speech,  communication, 00664: Speech pathology treatment for swallowing/oral function for feeding - If you have any concerns or questions, or if you would prefer not to have a speech pathologist involved in your visit, please contact our Clinic Coordinator at [phone], at least 24 hours prior to your appointment.              Who to contact     If you have questions or need follow up information about today's clinic visit or your schedule please contact [facility] directly at [phone].  Normal or non-critical lab and imaging results will be communicated to you by Sermohart, letter or phone within 4 business days after the clinic has received the results. If you do not hear from us within 7 days, please contact the clinic through [employer] or phone. If you have a critical or abnormal lab result, we will notify you by phone as soon as possible.  Submit refill requests through [employer] or call your pharmacy and they will forward the refill request to us. Please allow 3 business days for your refill to be completed.          Additional Information About Your Visit        Sermohart Information     [employer] gives you secure access to your electronic health record. If you see a primary care provider, you can also send messages to your care team and make appointments. If you have questions, please call your primary care clinic.  If you do not have a primary care provider, please call [phone] and they will assist you.        Care EveryWhere ID     This is your Care EveryWhere ID. This could be used by other organizations to access your [place] medical records  [id number]        Your Vitals Were     Pulse Pulse Oximetry BMI (Body Mass Index)             110 97% 34.68 kg/m2          Blood Pressure from Last 3 Encounters:   02/16/18 128/68   11/16/17 135/79   10/19/17 (P) 135/77    Weight from Last 3 Encounters:   03/13/18 228 lb (103.4 kg)   02/26/18 228 lb (103.4 kg)   02/16/18 227 lb (103 kg)              We  Performed the Following     DEBRIDEMENT OF NAILS, 6 OR MORE     TRIM HYPERKERATOTIC SKIN LESION,2-4        Primary Care Provider Office Phone # Fax #    Finesse Beal [phone]       [address] [address]        Equal Access to Services     [facility] LINDESY : Hadii aad ku hadasho Soomaali, waaxda luqadaha, qaybta kaalmada adeegyada, waxay sylvie haymaxinen yobani ellislonnysinai gutierrez . So [facility] [phone].    ATENCIÓN: Si habla español, tiene a lucero disposición servicios gratuitos de asistencia lingüística. Llame al [phone].    We comply with applicable federal civil rights laws and [place] laws. We do not discriminate on the basis of race, color, national origin, age, disability, sex, sexual orientation, or gender identity.            Thank you!     Thank you for choosing [facility]  for your care. Our goal is always to provide you with excellent care. Hearing back from our patients is one way we can continue to improve our services. Please take a few minutes to complete the written survey that you may receive in the mail after your visit with us. Thank you!             Your Updated Medication List - Protect others around you: Learn how to safely use, store and throw away your medicines at www.disposemymeds.org.          This list is accurate as of 3/13/18 11:59 PM.  Always use your most recent med list.                   Brand Name Dispense Instructions for use Diagnosis    allopurinol 300 MG tablet    ZYLOPRIM    90 tablet    TAKE 1 TABLET(300 MG) BY MOUTH DAILY    Gout, unspecified cause, unspecified chronicity, unspecified site       aspirin 81 MG tablet      Take 1 tablet by mouth daily.        atorvastatin 20 MG tablet    LIPITOR    90 tablet    TAKE 1 TABLET(20 MG) BY MOUTH DAILY    Hyperlipidemia LDL goal <70       blood glucose monitoring lancets     1 Box    1 each 2 times daily Use to test blood sugar 2 times daily or as directed.    Type 2 diabetes  mellitus without complication (H)       blood glucose monitoring meter device kit    no brand specified    1 kit    Use to test blood sugar 2 times daily or as directed.  Accucheck meter please and all associated strips, lancets, and other supplies, 3 month supply with refills for a year.    Type 2 diabetes mellitus without complication, without long-term current use of insulin (H)       DAILY MULTIVITAMIN PO      Take  by mouth daily.        guaiFENesin-codeine 100-10 MG/5ML Soln solution    ROBITUSSIN AC    120 mL    Take 5 mLs by mouth every 4 hours as needed for cough    Cough       hydrochlorothiazide 50 MG tablet    HYDRODIURIL    90 tablet    TAKE 1 TABLET(50 MG) BY MOUTH EVERY MORNING    Hypertension goal BP (blood pressure) < 140/90       hydrocortisone 1 % ointment     30 g    Apply topically 2 times daily as needed    Facial dermatitis       hydrocortisone 2.5 % cream    ANUSOL-HC    30 g    Place rectally 2 times daily as needed for hemorrhoids    External hemorrhoids       hydrocortisone 25 MG Suppository    ANUSOL-HC    20 suppository    Place 1 suppository (25 mg) rectally 2 times daily as needed for hemorrhoids    External hemorrhoids       lisinopril 30 MG tablet    PRINIVIL,ZESTRIL    90 tablet    TAKE 1 TABLET(30 MG) BY MOUTH DAILY    Hypertension goal BP (blood pressure) < 140/90       metFORMIN 500 MG tablet    GLUCOPHAGE    180 tablet    TAKE 1 TABLET(500 MG) BY MOUTH TWICE DAILY WITH MEALS    Type 2 diabetes mellitus without complication, without long-term current use of insulin (H)       omeprazole 20 MG tablet     90 tablet    1 po daily    Dysphonia, Gastroesophageal reflux disease, esophagitis presence not specified, Leukoplakia of larynx       * order for DME     1 Container    Place rectally 2 times daily 1.8 oz container of 0.2 % nifedipine suppository if possible as easier to place the medication.   Apply on the anus every 12 hours.  Just get to where the anus is tight .    Anal  fissure       * order for DME     1 Units    Autocpap 10-16 cm    Obstructive sleep apnea       PARoxetine 20 MG tablet    PAXIL    180 tablet    TAKE 2 TABLETS(40 MG) BY MOUTH DAILY    Anxiety       psyllium 0.52 G capsule     100 capsule    Take 1 capsule (0.52 g) by mouth daily    Constipation, unspecified constipation type       tamsulosin 0.4 MG capsule    FLOMAX    90 capsule    TAKE 1 CAPSULE(0.4 MG) BY MOUTH DAILY    Benign prostatic hyperplasia with weak urinary stream       triamcinolone 0.1 % cream    KENALOG    30 g    Apply topically 2 times daily as needed for irritation    Dermatitis       vitamin D 1000 UNITS capsule      Take 1 capsule by mouth daily.        vitamin D 00748 UNIT capsule    ERGOCALCIFEROL    12 capsule    1 po q week    Vitamin D deficiency disease       * Notice:  This list has 2 medication(s) that are the same as other medications prescribed for you. Read the directions carefully, and ask your doctor or other care provider to review them with you.

## 2018-03-13 NOTE — PATIENT INSTRUCTIONS
We wish you continued good healing. If you have any questions or concerns, please do not hesitate to contact us at [phone]    Please remember to call and schedule a follow up appointment if one was recommended at your earliest convenience.   PODIATRY CLINIC HOURS  TELEPHONE NUMBER    Dr. Jackson Kenny D.P.M [facility]    Clinics:  [facility]    Jocelyn Olivarez [facility]   Tuesday 1PM-6PM  [place]  Wednesday 7AM-2PM  [facility]  Thursday 10AM-6PM  [place]  Friday 7AM-3PM  [place]  Specialty schedulers:   [phone] to make an appointment with any Specialty Provider.        Urgent Care locations:    [facility] Monday-Friday 5 pm - 9 pm. Saturday-Sunday 9 am -5pm    Monday-Friday 11 am - 9 pm Saturday 9 am - 5 pm     Monday-Sunday 12 noon-8PM [phone]     If you need a medication refill, please contact us you may need lab work and/or a follow up visit prior to your refill (i.e. Antifungal medications).    [employer] (secure e-mail communication and access to your chart) to send a message or to make an appointment.    If MRI needed please call Art Steele at [phone]        Weight management plan: Patient was referred to their PCP to discuss a diet and exercise plan.      Foot Care Tips for People with Diabetes  1. Take care of your diabetes.    Work with your care team to keep your blood glucose as close to your target range as possible. Your target range is: _____________.  2. Check your feet every day.    Look at your bare feet every day for cuts, blisters, red spots and swelling.    Use a mirror to check the bottoms of your feet or ask a family member for help if you have trouble seeing.  3. Wash your feet every day.    Wash your feet in warm (not hot) water every day. Do not soak your feet.    Dry your feet well. Be sure to dry between the toes.  4. Keep the  skin soft and smooth.    Rub a thin coat of lotion over the tops and bottoms of your feet, but not between your toes. Avoid products that contain alcohol.  5. Smooth corns and calluses gently.    If your feet are at low risk for problems, use a pumice stone to smooth corns and calluses.    Do not use over-the-counter products or sharp objects on corns or calluses.  6. If you can see and reach your toenails, trim them each week or when needed.    Cut toenails when they are soft from washing.    Trim toenails straight across and not too short. Smooth the edges with an emery board.    If you cannot see or reach your feet, ask a friend or family member to trim your toenails. You may also visit a foot care specialist or podiatrist (foot doctor).  7. Wear shoes and socks at all times.    Never walk barefoot.    Wear comfortable shoes that fit well and protect your feet. Wear cotton socks that absorb moisture.    Shop for shoes at the end of the day, when your feet are bigger. Ask the salesperson to measure your feet and make sure the shoes are properly fitted. The shoes should not pinch. Your feet should not slide around inside the shoes.    Break in new shoes slowly. Wear them one to two hours each day for the first few weeks.    Feel inside your shoes before putting them on each time. Make sure the lining is smooth and there are no objects inside.    Check your feet after removing your socks and shoes. If you see any red marks, this is a sign that your shoes don t fit well.  8. Protect your feet from hot and cold.    Wear shoes at the beach or on hot pavement.    Wear socks at night if your feet get cold.    Use your hand or elbow to test bath water instead of your foot.    Do not use hot water bottles, heating pads or electric blankets.  9. Keep the blood flowing to your feet.    Put your feet up when sitting.    Wiggle your toes and flex and rotate your ankles for 5 minutes, two or three times a day. If you will be  sitting in a plane or car, you will need to do this at least once every hour.    Do not cross your legs for long periods of time.    Do not smoke.  10. Be active every day. Plan your physical activity program with your care team.  11. Check with your health care team.    Have your doctor or nurse check your bare feet and find out if you are likely to have serious foot problems. Remember that you may not feel the pain of an injury.    Call your care team right away if you find a cut, sore, blister or bruise on your foot that does not begin to heal after one day.    Follow their advice about foot care.    Ask for a yearly foot exam with a monofilament. This is like a hairbrush with a single bristle. It is used to check the nerves in your feet.  12. Get started now.    Begin taking good care of your feet today.    Set a time every day to check your feet.  Custom-made shoes  Special shoes can be made to fit softly around sore feet or feet that have changed shape. These special shoes help protect your feet.   Medicare and other health insurance programs may pay for special shoes. Talk with your doctor (or diabetes educator) about how and where to get them.  Adapted from the National Diabetes Education Program, sponsored by the U.S. Department of Health and Human Services  National Institutes of Health and the Centers for Disease Control and Prevention.  For informational purposes only. Not to replace the advice of your health care provider.   Published by [employer]. [employer] [id number] - REV 12/15.

## 2018-03-13 NOTE — LETTER
3/13/2018         RE: Gary Iyer  [address] [facility] [id number]        Dear Colleague,    Thank you for referring your patient, Gary Iyer, to the [facility]. Please see a copy of my visit note below.    Subjective:    Pt is seen today in consult from Dr. Beal for a diabetic foot exam.  Patient is having a hard time trimming his nails and points to all of them.  This has been getting more difficult over the years.  Sometimes they are painful.  The pain is aggravated by activity and relieved by rest.  Patient also has calluses which are painful.  Describes it as a burning pain which is aggravated by activity and relieved by rest.  He has history of left leg trauma 2016 tibia ORIF and right foot trauma.  Primary care is Dr. Beal last seen 2/16/18.  No history of foot ulcers.  No constant numbness in feet.        ROS:  A 10-point review of systems was performed and is positive for that noted in the HPI and as seen above.  All other areas are negative.        No Known Allergies    Current Outpatient Prescriptions   Medication Sig Dispense Refill     vitamin D (ERGOCALCIFEROL) 59762 UNIT capsule 1 po q week 12 capsule 0     PARoxetine (PAXIL) 20 MG tablet TAKE 2 TABLETS(40 MG) BY MOUTH DAILY 180 tablet 3     tamsulosin (FLOMAX) 0.4 MG capsule TAKE 1 CAPSULE(0.4 MG) BY MOUTH DAILY 90 capsule 3     guaiFENesin-codeine (ROBITUSSIN AC) 100-10 MG/5ML SOLN solution Take 5 mLs by mouth every 4 hours as needed for cough 120 mL 0     omeprazole 20 MG tablet 1 po daily 90 tablet 3     allopurinol (ZYLOPRIM) 300 MG tablet TAKE 1 TABLET(300 MG) BY MOUTH DAILY 90 tablet 3     atorvastatin (LIPITOR) 20 MG tablet TAKE 1 TABLET(20 MG) BY MOUTH DAILY 90 tablet 3     metFORMIN (GLUCOPHAGE) 500 MG tablet TAKE 1 TABLET(500 MG) BY MOUTH TWICE DAILY WITH MEALS 180 tablet 1     lisinopril (PRINIVIL,ZESTRIL) 30 MG tablet TAKE 1 TABLET(30 MG) BY MOUTH DAILY 90 tablet 3     hydrochlorothiazide (HYDRODIURIL) 50  MG tablet TAKE 1 TABLET(50 MG) BY MOUTH EVERY MORNING 90 tablet 3     order for DME Autocpap 10-16 cm 1 Units 0     blood glucose monitoring (NO BRAND SPECIFIED) meter device kit Use to test blood sugar 2 times daily or as directed.  Accucheck meter please and all associated strips, lancets, and other supplies, 3 month supply with refills for a year. 1 kit 0     order for DME Place rectally 2 times daily 1.8 oz container of 0.2 % nifedipine suppository if possible as easier to place the medication.    Apply on the anus every 12 hours.  Just get to where the anus is tight . 1 Container 5     psyllium 0.52 G capsule Take 1 capsule (0.52 g) by mouth daily 100 capsule 3     hydrocortisone (ANUSOL-HC) 25 MG suppository Place 1 suppository (25 mg) rectally 2 times daily as needed for hemorrhoids 20 suppository 1     hydrocortisone (ANUSOL-HC) 2.5 % rectal cream Place rectally 2 times daily as needed for hemorrhoids 30 g 1     triamcinolone (KENALOG) 0.1 % cream Apply topically 2 times daily as needed for irritation 30 g 1     hydrocortisone 1 % ointment Apply topically 2 times daily as needed 30 g 1     blood glucose monitoring (ALON MICROLET) lancets 1 each 2 times daily Use to test blood sugar 2 times daily or as directed. 1 Box 3     Cholecalciferol (VITAMIN D) 1000 UNITS capsule Take 1 capsule by mouth daily.       Multiple Vitamin (DAILY MULTIVITAMIN PO) Take  by mouth daily.       aspirin 81 MG tablet Take 1 tablet by mouth daily.         Patient Active Problem List   Diagnosis     Hyperlipidemia LDL goal <70     Hypertension goal BP (blood pressure) < 140/90     Gout, chronic     Anxiety     Advanced directives, counseling/discussion     Carotid stenosis     Hx of laryngeal cancer     Type 2 diabetes mellitus without complication (H)     Non morbid obesity due to excess calories     Dysphonia     Vocal process granuloma     Type 2 diabetes mellitus without complication, without long-term current use of insulin (H)      Diabetes mellitus, type 2 (H)     Obstructive sleep apnea- moderate-severe (AHI 25)     Benign prostatic hypertrophy     Psychophysiological insomnia     Periodic limb movements of sleep     Low ferritin     Moderate major depression (H)     Gout, unspecified cause, unspecified chronicity, unspecified site     Chronic cough     Lesion of larynx       Past Medical History:   Diagnosis Date     Diabetes (H)     2yr     Multinodular goiter (nontoxic) 6/10/2013     Sleep apnea 8yr       Past Surgical History:   Procedure Laterality Date     COLONOSCOPY       COLONOSCOPY  6-22-12    Repeat Colonoscopy in 10 yrs.      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     HEAD & NECK SURGERY  1/25/11    callous removal from throat     LARYNGOSCOPY WITH BIOPSY(IES)  1-25-11     LARYNGOSCOPY WITH BIOPSY(IES) N/A 2/9/2017    Procedure: LARYNGOSCOPY WITH BIOPSY(IES);  Surgeon: Mel Fournier MD;  Location: [facility]     LARYNGOSCOPY WITH MICROSCOPE N/A 2/9/2017    Procedure: LARYNGOSCOPY WITH MICROSCOPE;  Surgeon: Mel Fournier MD;  Location: [facility]     LASER CO2 LARYNGOSCOPY, COMPLEX  6/27/2013    Procedure: LASER CO2 LARYNGOSCOPY, COMPLEX;  Micro Direct Laryngoscopy With Micro Flap Excision Of Lesion Lumenis Co2 Laser ;  Surgeon: Mel Fournier MD;  Location: [facility] OR     LASER CO2 LESION ORAL N/A 2/9/2017    Procedure: LASER CO2 LESION ORAL;  Surgeon: Mel Fournier MD;  Location: [facility]     ORTHOPEDIC SURGERY  March 2016    left tibia orif with abbi 2-2016     VASECTOMY  02/03       Family History   Problem Relation Age of Onset     C.A.D. Father      Hypertension Mother      DIABETES No family hx of      CEREBROVASCULAR DISEASE No family hx of      CANCER No family hx of      Glaucoma No family hx of      Macular Degeneration No family hx of        Social History   Substance Use Topics     Smoking status: Former Smoker     Years: 8.00     Quit date: 9/1/2000     Smokeless tobacco: Never Used      Alcohol use 0.0 oz/week      Comment: rare         Exam:    Vitals: Pulse 110  Wt 228 lb (103.4 kg)  SpO2 97%  BMI 34.68 kg/m2  BMI: Body mass index is 34.68 kg/(m^2).  Height: Data Unavailable    Constitutional/ general:  Pt is in no apparent distress, appears well-nourished.  Cooperative with history and physical exam.     Psych:  The patient answered questions appropriately.  Normal affect.  Seems to have reasonable expectations, in terms of treatment.     Eyes:  Visual scanning/ tracking without deficit.     Ears:  Response to auditory stimuli is normal.  negative hearing aid devices.  Auricles in proper alignment.     Lymphatic:  Popliteal lymph nodes not enlarged.     Lungs:  Non labored breathing, non labored speech. No cough.  No audible wheezing. Even, quiet breathing.       Vascular:  positive  DP pulse.  negative PT pulse.  CFT < 3 sec.  positive ankle edema.  positive varicosities.  negative pedal hair growth.    Neuro:  Alert and oriented x 3. Coordinated gait.  Light touch sensation is intact to the L4, L5, S1 distributions. No obvious deficits.  No evidence of neurological-based weakness, spasticity, or contracture in the lower extremities.  Monofilament intact on all digits    Derm:  Skin thin, shiny, atrophic, with no hair growth noted.   No erythema, ecchymosis, or cyanosis.  No foot ulcers.  Left lower leg cicatrix form past trauma.  All nails are thickened, elongated, discolored with subungual debris.  Patient has calluses on right sub first met head and right fifth toe.  No masses or breakdown in the skin bilaterally.  No erythema or ecchymosis noted bilateral.     Musculoskeletal:    Lower extremity muscle strength is normal.  Patient is ambulatory without an assistive device or brace.  No gross deformities.  Normal arch.  Right hallux first mtpj is stiff, slightly flail.          A/P  Diabetes mellitus with PAD  Onychomycosis  Calluses x 2  Bilateral LE trauma    Mycotic nails manually  debrided with a .  Calluses debrided with a fifteen blade.    Gave patient instructions on daily foot examination and wearing good shoes at all times.   Despite his history or trauma to left leg and right foot he has palpable pulses and strong DP pulse.  Instructed on wearing good shoes to protect feet.  Good control of sugars at all times.  Discussed with patient  that I do not do routine care in my practice unless patient at significant risk of limb loss.  Will refer to foot care service/nurse.  Return to clinic prn.  Thank you for allowing me participate in the care of this patient.              Jackson Kenny DPM, FACFAS           Again, thank you for allowing me to participate in the care of your patient.        Sincerely,        Jackson Kenny DPM

## 2018-03-14 NOTE — PROGRESS NOTES
Subjective:    Pt is seen today in consult from Dr. Beal for a diabetic foot exam.  Patient is having a hard time trimming his nails and points to all of them.  This has been getting more difficult over the years.  Sometimes they are painful.  The pain is aggravated by activity and relieved by rest.  Patient also has calluses which are painful.  Describes it as a burning pain which is aggravated by activity and relieved by rest.  He has history of left leg trauma 2016 tibia ORIF and right foot trauma.  Primary care is Dr. Beal last seen 2/16/18.  No history of foot ulcers.  No constant numbness in feet.        ROS:  A 10-point review of systems was performed and is positive for that noted in the HPI and as seen above.  All other areas are negative.        No Known Allergies    Current Outpatient Prescriptions   Medication Sig Dispense Refill     vitamin D (ERGOCALCIFEROL) 25169 UNIT capsule 1 po q week 12 capsule 0     PARoxetine (PAXIL) 20 MG tablet TAKE 2 TABLETS(40 MG) BY MOUTH DAILY 180 tablet 3     tamsulosin (FLOMAX) 0.4 MG capsule TAKE 1 CAPSULE(0.4 MG) BY MOUTH DAILY 90 capsule 3     guaiFENesin-codeine (ROBITUSSIN AC) 100-10 MG/5ML SOLN solution Take 5 mLs by mouth every 4 hours as needed for cough 120 mL 0     omeprazole 20 MG tablet 1 po daily 90 tablet 3     allopurinol (ZYLOPRIM) 300 MG tablet TAKE 1 TABLET(300 MG) BY MOUTH DAILY 90 tablet 3     atorvastatin (LIPITOR) 20 MG tablet TAKE 1 TABLET(20 MG) BY MOUTH DAILY 90 tablet 3     metFORMIN (GLUCOPHAGE) 500 MG tablet TAKE 1 TABLET(500 MG) BY MOUTH TWICE DAILY WITH MEALS 180 tablet 1     lisinopril (PRINIVIL,ZESTRIL) 30 MG tablet TAKE 1 TABLET(30 MG) BY MOUTH DAILY 90 tablet 3     hydrochlorothiazide (HYDRODIURIL) 50 MG tablet TAKE 1 TABLET(50 MG) BY MOUTH EVERY MORNING 90 tablet 3     order for DME Autocpap 10-16 cm 1 Units 0     blood glucose monitoring (NO BRAND SPECIFIED) meter device kit Use to test blood sugar 2 times daily or as directed.   Accucheck meter please and all associated strips, lancets, and other supplies, 3 month supply with refills for a year. 1 kit 0     order for DME Place rectally 2 times daily 1.8 oz container of 0.2 % nifedipine suppository if possible as easier to place the medication.    Apply on the anus every 12 hours.  Just get to where the anus is tight . 1 Container 5     psyllium 0.52 G capsule Take 1 capsule (0.52 g) by mouth daily 100 capsule 3     hydrocortisone (ANUSOL-HC) 25 MG suppository Place 1 suppository (25 mg) rectally 2 times daily as needed for hemorrhoids 20 suppository 1     hydrocortisone (ANUSOL-HC) 2.5 % rectal cream Place rectally 2 times daily as needed for hemorrhoids 30 g 1     triamcinolone (KENALOG) 0.1 % cream Apply topically 2 times daily as needed for irritation 30 g 1     hydrocortisone 1 % ointment Apply topically 2 times daily as needed 30 g 1     blood glucose monitoring (ALON MICROLET) lancets 1 each 2 times daily Use to test blood sugar 2 times daily or as directed. 1 Box 3     Cholecalciferol (VITAMIN D) 1000 UNITS capsule Take 1 capsule by mouth daily.       Multiple Vitamin (DAILY MULTIVITAMIN PO) Take  by mouth daily.       aspirin 81 MG tablet Take 1 tablet by mouth daily.         Patient Active Problem List   Diagnosis     Hyperlipidemia LDL goal <70     Hypertension goal BP (blood pressure) < 140/90     Gout, chronic     Anxiety     Advanced directives, counseling/discussion     Carotid stenosis     Hx of laryngeal cancer     Type 2 diabetes mellitus without complication (H)     Non morbid obesity due to excess calories     Dysphonia     Vocal process granuloma     Type 2 diabetes mellitus without complication, without long-term current use of insulin (H)     Diabetes mellitus, type 2 (H)     Obstructive sleep apnea- moderate-severe (AHI 25)     Benign prostatic hypertrophy     Psychophysiological insomnia     Periodic limb movements of sleep     Low ferritin     Moderate major  depression (H)     Gout, unspecified cause, unspecified chronicity, unspecified site     Chronic cough     Lesion of larynx       Past Medical History:   Diagnosis Date     Diabetes (H)     2yr     Multinodular goiter (nontoxic) 6/10/2013     Sleep apnea 8yr       Past Surgical History:   Procedure Laterality Date     COLONOSCOPY       COLONOSCOPY  6-22-12    Repeat Colonoscopy in 10 yrs.      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     HEAD & NECK SURGERY  1/25/11    callous removal from throat     LARYNGOSCOPY WITH BIOPSY(IES)  1-25-11     LARYNGOSCOPY WITH BIOPSY(IES) N/A 2/9/2017    Procedure: LARYNGOSCOPY WITH BIOPSY(IES);  Surgeon: Mel Fournier MD;  Location: [facility]     LARYNGOSCOPY WITH MICROSCOPE N/A 2/9/2017    Procedure: LARYNGOSCOPY WITH MICROSCOPE;  Surgeon: Mel Fournier MD;  Location: [facility] OR     LASER CO2 LARYNGOSCOPY, COMPLEX  6/27/2013    Procedure: LASER CO2 LARYNGOSCOPY, COMPLEX;  Micro Direct Laryngoscopy With Micro Flap Excision Of Lesion Lumenis Co2 Laser ;  Surgeon: Mel Fournier MD;  Location: [facility] OR     LASER CO2 LESION ORAL N/A 2/9/2017    Procedure: LASER CO2 LESION ORAL;  Surgeon: Mel Fournier MD;  Location: [facility]     ORTHOPEDIC SURGERY  March 2016    left tibia orif with abbi 2-2016     VASECTOMY  02/03       Family History   Problem Relation Age of Onset     C.A.D. Father      Hypertension Mother      DIABETES No family hx of      CEREBROVASCULAR DISEASE No family hx of      CANCER No family hx of      Glaucoma No family hx of      Macular Degeneration No family hx of        Social History   Substance Use Topics     Smoking status: Former Smoker     Years: 8.00     Quit date: 9/1/2000     Smokeless tobacco: Never Used     Alcohol use 0.0 oz/week      Comment: rare         Exam:    Vitals: Pulse 110  Wt 228 lb (103.4 kg)  SpO2 97%  BMI 34.68 kg/m2  BMI: Body mass index is 34.68 kg/(m^2).  Height: Data Unavailable    Constitutional/  general:  Pt is in no apparent distress, appears well-nourished.  Cooperative with history and physical exam.     Psych:  The patient answered questions appropriately.  Normal affect.  Seems to have reasonable expectations, in terms of treatment.     Eyes:  Visual scanning/ tracking without deficit.     Ears:  Response to auditory stimuli is normal.  negative hearing aid devices.  Auricles in proper alignment.     Lymphatic:  Popliteal lymph nodes not enlarged.     Lungs:  Non labored breathing, non labored speech. No cough.  No audible wheezing. Even, quiet breathing.       Vascular:  positive  DP pulse.  negative PT pulse.  CFT < 3 sec.  positive ankle edema.  positive varicosities.  negative pedal hair growth.    Neuro:  Alert and oriented x 3. Coordinated gait.  Light touch sensation is intact to the L4, L5, S1 distributions. No obvious deficits.  No evidence of neurological-based weakness, spasticity, or contracture in the lower extremities.  Monofilament intact on all digits    Derm:  Skin thin, shiny, atrophic, with no hair growth noted.   No erythema, ecchymosis, or cyanosis.  No foot ulcers.  Left lower leg cicatrix form past trauma.  All nails are thickened, elongated, discolored with subungual debris.  Patient has calluses on right sub first met head and right fifth toe.  No masses or breakdown in the skin bilaterally.  No erythema or ecchymosis noted bilateral.     Musculoskeletal:    Lower extremity muscle strength is normal.  Patient is ambulatory without an assistive device or brace.  No gross deformities.  Normal arch.  Right hallux first mtpj is stiff, slightly flail.          A/P  Diabetes mellitus with PAD  Onychomycosis  Calluses x 2  Bilateral LE trauma    Mycotic nails manually debrided with a .  Calluses debrided with a fifteen blade.    Gave patient instructions on daily foot examination and wearing good shoes at all times.   Despite his history or trauma to left leg and right foot  he has palpable pulses and strong DP pulse.  Instructed on wearing good shoes to protect feet.  Good control of sugars at all times.  Discussed with patient  that I do not do routine care in my practice unless patient at significant risk of limb loss.  Will refer to foot care service/nurse.  Return to clinic prn.  Thank you for allowing me participate in the care of this patient.              Jackson Kenny DPM, FACFAS

## 2018-03-30 ENCOUNTER — OFFICE VISIT (OUTPATIENT)
Dept: OTOLARYNGOLOGY | Facility: CLINIC | Age: 58
End: 2018-03-30
Payer: COMMERCIAL

## 2018-03-30 VITALS — BODY MASS INDEX: 33.9 KG/M2 | HEIGHT: 68 IN | WEIGHT: 223.7 LBS

## 2018-03-30 DIAGNOSIS — J38.7 LESION OF LARYNX: ICD-10-CM

## 2018-03-30 DIAGNOSIS — R49.0 DYSPHONIA: Primary | ICD-10-CM

## 2018-03-30 DIAGNOSIS — R05.3 CHRONIC COUGH: ICD-10-CM

## 2018-03-30 DIAGNOSIS — C32.0 VOCAL CORD CANCER (H): ICD-10-CM

## 2018-03-30 DIAGNOSIS — J38.3 VOCAL PROCESS GRANULOMA: Primary | ICD-10-CM

## 2018-03-30 ASSESSMENT — PAIN SCALES - GENERAL: PAINLEVEL: NO PAIN (0)

## 2018-03-30 NOTE — PROGRESS NOTES
[facility]  Elijah Baker Jr., M.D., F.A.C.S.  Mel Fournier M.D., M.P.H.  Misty Clark, Ph.D., CCC/SLP  Jasmyne Padilla M.M. (voice), M.A., CCC/SLP  Zack Mcdaniels M.M. (voice), M.A., [facility]/SLP    [facility]  VOICE EVALUATION/LARYNGEAL EXAMINATION REPORT    Patient: Gary Iyer  Date of Service: 3/30/2018    HISTORY  PATIENT INFORMATION  Gary Iyer was seen for brief consultation in conjunction with a visit to Dr. Fournier today.  Please refer to Dr. Fournier s dictation for a more complete history and impressions.      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  He has been coughing less, and today's exam shows the lesion is reduced, though not entirely resolved.  I provided encouragement to continue what he's doing, including some voice exercises.  No speech therapy is warranted at this time.      No charge for today s session  NO CHARGE FACILITY FEE (47249)    PRIMARY ICD-10 code: R49.0 (Dysphonia)    Misty Clark, Ph.D., [facility]-SLP  Speech-Language Pathologist  Director, [facility]  [phone]

## 2018-03-30 NOTE — MR AVS SNAPSHOT
After Visit Summary   3/30/2018    Gary Iyer    MRN: [id number]           Patient Information     Date Of Birth          1960        Visit Information        Provider Department      3/30/2018 8:00 AM Misty Clark, SLP [facility]        Today's Diagnoses     Dysphonia    -  1       Follow-ups after your visit        Who to contact     Please call your clinic at [phone] to:    Ask questions about your health    Make or cancel appointments    Discuss your medicines    Learn about your test results    Speak to your doctor            Additional Information About Your Visit        MyChart Information     [employer] gives you secure access to your electronic health record. If you see a primary care provider, you can also send messages to your care team and make appointments. If you have questions, please call your primary care clinic.  If you do not have a primary care provider, please call [phone] and they will assist you.      [employer] is an electronic gateway that provides easy, online access to your medical records. With [employer], you can request a clinic appointment, read your test results, renew a prescription or communicate with your care team.     To access your existing account, please contact your [facility] or call [phone] for assistance.        Care EveryWhere ID     This is your Care EveryWhere ID. This could be used by other organizations to access your [facility] medical records  [id number]         Blood Pressure from Last 3 Encounters:   02/16/18 128/68   11/16/17 135/79   10/19/17 (P) 135/77    Weight from Last 3 Encounters:   03/30/18 101.5 kg (223 lb 11.2 oz)   03/13/18 103.4 kg (228 lb)   02/26/18 103.4 kg (228 lb)              Today, you had the following     No orders found for display       Primary Care Provider Office Phone # Fax #    Finesse Beal [phone]       [address]  [address]        Equal Access to Services     [facility]: Hadii sloan bradshaw jamie Wheatley, walillyda luqadaha, qaybta kaalmajoceline devlin, gregory santos. So [facility] [phone].    ATENCIÓN: Si habla español, tiene a lucero disposición servicios gratuitos de asistencia lingüística. Mihirame al [phone].    We comply with applicable federal civil rights laws and [place] laws. We do not discriminate on the basis of race, color, national origin, age, disability, sex, sexual orientation, or gender identity.            Thank you!     Thank you for choosing [facility]  for your care. Our goal is always to provide you with excellent care. Hearing back from our patients is one way we can continue to improve our services. Please take a few minutes to complete the written survey that you may receive in the mail after your visit with us. Thank you!             Your Updated Medication List - Protect others around you: Learn how to safely use, store and throw away your medicines at www.disposemymeds.org.          This list is accurate as of 3/30/18 10:00 AM.  Always use your most recent med list.                   Brand Name Dispense Instructions for use Diagnosis    allopurinol 300 MG tablet    ZYLOPRIM    90 tablet    TAKE 1 TABLET(300 MG) BY MOUTH DAILY    Gout, unspecified cause, unspecified chronicity, unspecified site       aspirin 81 MG tablet      Take 1 tablet by mouth daily.        atorvastatin 20 MG tablet    LIPITOR    90 tablet    TAKE 1 TABLET(20 MG) BY MOUTH DAILY    Hyperlipidemia LDL goal <70       blood glucose monitoring lancets     1 Box    1 each 2 times daily Use to test blood sugar 2 times daily or as directed.    Type 2 diabetes mellitus without complication (H)       blood glucose monitoring meter device kit    no brand specified    1 kit    Use to test blood sugar 2 times daily or as directed.  Accucheck meter please and all associated strips, lancets, and other supplies, 3 month  supply with refills for a year.    Type 2 diabetes mellitus without complication, without long-term current use of insulin (H)       DAILY MULTIVITAMIN PO      Take  by mouth daily.        guaiFENesin-codeine 100-10 MG/5ML Soln solution    ROBITUSSIN AC    120 mL    Take 5 mLs by mouth every 4 hours as needed for cough    Cough       * hydrochlorothiazide 50 MG tablet    HYDRODIURIL    90 tablet    TAKE 1 TABLET(50 MG) BY MOUTH EVERY MORNING    Hypertension goal BP (blood pressure) < 140/90       * hydrochlorothiazide 50 MG tablet    HYDRODIURIL    90 tablet    TAKE 1 TABLET(50 MG) BY MOUTH EVERY MORNING    Hypertension goal BP (blood pressure) < 140/90       hydrocortisone 1 % ointment     30 g    Apply topically 2 times daily as needed    Facial dermatitis       hydrocortisone 2.5 % cream    ANUSOL-HC    30 g    Place rectally 2 times daily as needed for hemorrhoids    External hemorrhoids       hydrocortisone 25 MG Suppository    ANUSOL-HC    20 suppository    Place 1 suppository (25 mg) rectally 2 times daily as needed for hemorrhoids    External hemorrhoids       * lisinopril 30 MG tablet    PRINIVIL,ZESTRIL    90 tablet    TAKE 1 TABLET(30 MG) BY MOUTH DAILY    Hypertension goal BP (blood pressure) < 140/90       * lisinopril 30 MG tablet    PRINIVIL,ZESTRIL    90 tablet    TAKE 1 TABLET(30 MG) BY MOUTH DAILY    Hypertension goal BP (blood pressure) < 140/90       metFORMIN 500 MG tablet    GLUCOPHAGE    180 tablet    TAKE 1 TABLET(500 MG) BY MOUTH TWICE DAILY WITH MEALS    Type 2 diabetes mellitus without complication, without long-term current use of insulin (H)       omeprazole 20 MG tablet     90 tablet    1 po daily    Dysphonia, Gastroesophageal reflux disease, esophagitis presence not specified, Leukoplakia of larynx       * order for DME     1 Container    Place rectally 2 times daily 1.8 oz container of 0.2 % nifedipine suppository if possible as easier to place the medication.   Apply on the anus  every 12 hours.  Just get to where the anus is tight .    Anal fissure       * order for DME     1 Units    Autocpap 10-16 cm    Obstructive sleep apnea       PARoxetine 20 MG tablet    PAXIL    180 tablet    TAKE 2 TABLETS(40 MG) BY MOUTH DAILY    Anxiety       psyllium 0.52 G capsule     100 capsule    Take 1 capsule (0.52 g) by mouth daily    Constipation, unspecified constipation type       tamsulosin 0.4 MG capsule    FLOMAX    90 capsule    TAKE 1 CAPSULE(0.4 MG) BY MOUTH DAILY    Benign prostatic hyperplasia with weak urinary stream       triamcinolone 0.1 % cream    KENALOG    30 g    Apply topically 2 times daily as needed for irritation    Dermatitis       vitamin D 1000 UNITS capsule      Take 1 capsule by mouth daily.        vitamin D 99592 UNIT capsule    ERGOCALCIFEROL    12 capsule    1 po q week    Vitamin D deficiency disease       * Notice:  This list has 6 medication(s) that are the same as other medications prescribed for you. Read the directions carefully, and ask your doctor or other care provider to review them with you.

## 2018-03-30 NOTE — PATIENT INSTRUCTIONS
1.  You were seen in the ENT Clinic today by Dr. Fournier.  If you have any questions or concerns after your appointment, please call [phone].  Press option #1 for scheduling related needs.  Press option #3 for Nurse advice.  2.  Plan is to return to clinic in 3 months for repeat laryngeal exam.      Bijal LINARESN, RN  [facility]   Head & Neck Surgery

## 2018-03-30 NOTE — MR AVS SNAPSHOT
After Visit Summary   3/30/2018    Gary Iyer    MRN: [id number]           Patient Information     Date Of Birth          1960        Visit Information        Provider Department      3/30/2018 8:00 AM Mel Fournier MD [facility]        Today's Diagnoses     Vocal process granuloma    -  1    Lesion of larynx        Vocal cord cancer (H)        Chronic cough          Care Instructions    1.  You were seen in the ENT Clinic today by Dr. Fournier.  If you have any questions or concerns after your appointment, please call [phone].  Press option #1 for scheduling related needs.  Press option #3 for Nurse advice.  2.  Plan is to return to clinic in 3 months for repeat laryngeal exam.      Bijal MELENDEZ, RN  [facility]   Head & Neck Surgery               Follow-ups after your visit        Additional Services     OTOLARYNGOLOGY REFERRAL       SPEECH-LANGUAGE PATHOLOGY SERVICE(S) REQUESTED:  Evaluate and treat    Misty Clark, Ph.D., CCC/SLP  Speech-Language Pathologist  Director, [facility]  [phone]    Jasmyne Padilla M.M., M.A., CCC/SLP  Speech-Language Pathologist  [facility]  [phone]                  Follow-up notes from your care team     Return in about 3 months (around 6/30/2018).      Who to contact     Please call your clinic at [phone] to:    Ask questions about your health    Make or cancel appointments    Discuss your medicines    Learn about your test results    Speak to your doctor            Additional Information About Your Visit        [employer] Information     [employer] gives you secure access to your electronic health record. If you see a primary care provider, you can also send messages to your care team and make appointments. If you have questions, please call your primary care clinic.  If you do not have a primary care provider, please call [phone] and they will assist you.      [employer] is an  "electronic gateway that provides easy, online access to your medical records. With [employer], you can request a clinic appointment, read your test results, renew a prescription or communicate with your care team.     To access your existing account, please contact your [facility] or call [phone] for assistance.        Care EveryWhere ID     This is your Care EveryWhere ID. This could be used by other organizations to access your [facility] medical records  [id number]        Your Vitals Were     Height BMI (Body Mass Index)                1.727 m (5' 8\") 34.01 kg/m2           Blood Pressure from Last 3 Encounters:   02/16/18 128/68   11/16/17 135/79   10/19/17 (P) 135/77    Weight from Last 3 Encounters:   03/30/18 101.5 kg (223 lb 11.2 oz)   03/13/18 103.4 kg (228 lb)   02/26/18 103.4 kg (228 lb)              We Performed the Following     IMAGESTREAM RECORDING ORDER     LARYNGOSCOPY FLEX/RIGID W STROBOSCOPY     OTOLARYNGOLOGY REFERRAL        Primary Care Provider Office Phone # Fax #    Finesse Beal [phone]       [address] [address]        Equal Access to Services     KIRILL PORTILLO : Hadii aad ku hadasho Soomaali, waaxda luqadaha, qaybta kaalmada adeegyada, gregory adanin haymaxinen yobani gutierrez . So [facility] [phone].    ATENCIÓN: Si habla español, tiene a lucero disposición servicios gratuitos de asistencia lingüística. Llame al [phone].    We comply with applicable federal civil rights laws and [place] laws. We do not discriminate on the basis of race, color, national origin, age, disability, sex, sexual orientation, or gender identity.            Thank you!     Thank you for choosing [facility]  for your care. Our goal is always to provide you with excellent care. Hearing back from our patients is one way we can continue to improve our services. Please take a few minutes to complete the written survey " that you may receive in the mail after your visit with us. Thank you!             Your Updated Medication List - Protect others around you: Learn how to safely use, store and throw away your medicines at www.disposemymeds.org.          This list is accurate as of 3/30/18  8:51 AM.  Always use your most recent med list.                   Brand Name Dispense Instructions for use Diagnosis    allopurinol 300 MG tablet    ZYLOPRIM    90 tablet    TAKE 1 TABLET(300 MG) BY MOUTH DAILY    Gout, unspecified cause, unspecified chronicity, unspecified site       aspirin 81 MG tablet      Take 1 tablet by mouth daily.        atorvastatin 20 MG tablet    LIPITOR    90 tablet    TAKE 1 TABLET(20 MG) BY MOUTH DAILY    Hyperlipidemia LDL goal <70       blood glucose monitoring lancets     1 Box    1 each 2 times daily Use to test blood sugar 2 times daily or as directed.    Type 2 diabetes mellitus without complication (H)       blood glucose monitoring meter device kit    no brand specified    1 kit    Use to test blood sugar 2 times daily or as directed.  Accucheck meter please and all associated strips, lancets, and other supplies, 3 month supply with refills for a year.    Type 2 diabetes mellitus without complication, without long-term current use of insulin (H)       DAILY MULTIVITAMIN PO      Take  by mouth daily.        guaiFENesin-codeine 100-10 MG/5ML Soln solution    ROBITUSSIN AC    120 mL    Take 5 mLs by mouth every 4 hours as needed for cough    Cough       * hydrochlorothiazide 50 MG tablet    HYDRODIURIL    90 tablet    TAKE 1 TABLET(50 MG) BY MOUTH EVERY MORNING    Hypertension goal BP (blood pressure) < 140/90       * hydrochlorothiazide 50 MG tablet    HYDRODIURIL    90 tablet    TAKE 1 TABLET(50 MG) BY MOUTH EVERY MORNING    Hypertension goal BP (blood pressure) < 140/90       hydrocortisone 1 % ointment     30 g    Apply topically 2 times daily as needed    Facial dermatitis       hydrocortisone 2.5 % cream     ANUSOL-HC    30 g    Place rectally 2 times daily as needed for hemorrhoids    External hemorrhoids       hydrocortisone 25 MG Suppository    ANUSOL-HC    20 suppository    Place 1 suppository (25 mg) rectally 2 times daily as needed for hemorrhoids    External hemorrhoids       * lisinopril 30 MG tablet    PRINIVIL,ZESTRIL    90 tablet    TAKE 1 TABLET(30 MG) BY MOUTH DAILY    Hypertension goal BP (blood pressure) < 140/90       * lisinopril 30 MG tablet    PRINIVIL,ZESTRIL    90 tablet    TAKE 1 TABLET(30 MG) BY MOUTH DAILY    Hypertension goal BP (blood pressure) < 140/90       metFORMIN 500 MG tablet    GLUCOPHAGE    180 tablet    TAKE 1 TABLET(500 MG) BY MOUTH TWICE DAILY WITH MEALS    Type 2 diabetes mellitus without complication, without long-term current use of insulin (H)       omeprazole 20 MG tablet     90 tablet    1 po daily    Dysphonia, Gastroesophageal reflux disease, esophagitis presence not specified, Leukoplakia of larynx       * order for DME     1 Container    Place rectally 2 times daily 1.8 oz container of 0.2 % nifedipine suppository if possible as easier to place the medication.   Apply on the anus every 12 hours.  Just get to where the anus is tight .    Anal fissure       * order for DME     1 Units    Autocpap 10-16 cm    Obstructive sleep apnea       PARoxetine 20 MG tablet    PAXIL    180 tablet    TAKE 2 TABLETS(40 MG) BY MOUTH DAILY    Anxiety       psyllium 0.52 G capsule     100 capsule    Take 1 capsule (0.52 g) by mouth daily    Constipation, unspecified constipation type       tamsulosin 0.4 MG capsule    FLOMAX    90 capsule    TAKE 1 CAPSULE(0.4 MG) BY MOUTH DAILY    Benign prostatic hyperplasia with weak urinary stream       triamcinolone 0.1 % cream    KENALOG    30 g    Apply topically 2 times daily as needed for irritation    Dermatitis       vitamin D 1000 UNITS capsule      Take 1 capsule by mouth daily.        vitamin D 43995 UNIT capsule    ERGOCALCIFEROL    12  capsule    1 po q week    Vitamin D deficiency disease       * Notice:  This list has 6 medication(s) that are the same as other medications prescribed for you. Read the directions carefully, and ask your doctor or other care provider to review them with you.

## 2018-03-30 NOTE — PROGRESS NOTES
Dear Colleague:  Gary Iyer recently returned for follow-up at the [facility]. My clinic note from our visit is enclosed below.     I appreciate the ongoing opportunity to participate in this patient's care.    Please feel free to contact me with any questions.      Sincerely yours,  Mel Fournier M.D., M.P.H.  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 Most recently we returned to the Operating Room on 02/09/2017 for an area of new irregularity of the left true vocal fold. Pathology showed severe dysplasia with negative, but close margins (for moderate, but not severe dysplasia). 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     Over the past few visits he has continued to have irregularity in the area, in the setting of heavy ongoing coughing. He reports that his cough has improved considerably since last time, although he still has a lingering dry cough.             MEDICATIONS:     Current Outpatient Prescriptions   Medication Sig Dispense Refill     lisinopril (PRINIVIL,ZESTRIL) 30 MG tablet TAKE 1 TABLET(30 MG) BY MOUTH DAILY 90 tablet 1     hydrochlorothiazide (HYDRODIURIL) 50 MG tablet TAKE 1 TABLET(50 MG) BY MOUTH EVERY MORNING 90 tablet 1     vitamin D (ERGOCALCIFEROL) 22864 UNIT capsule 1 po q week 12 capsule 0     PARoxetine (PAXIL) 20 MG tablet TAKE 2 TABLETS(40 MG) BY MOUTH DAILY 180 tablet 3     tamsulosin (FLOMAX) 0.4 MG capsule TAKE 1 CAPSULE(0.4 MG) BY MOUTH DAILY 90 capsule 3     omeprazole 20 MG tablet 1 po daily 90 tablet 3     allopurinol (ZYLOPRIM) 300 MG tablet TAKE 1 TABLET(300 MG) BY MOUTH DAILY 90 tablet 3      atorvastatin (LIPITOR) 20 MG tablet TAKE 1 TABLET(20 MG) BY MOUTH DAILY 90 tablet 3     metFORMIN (GLUCOPHAGE) 500 MG tablet TAKE 1 TABLET(500 MG) BY MOUTH TWICE DAILY WITH MEALS 180 tablet 1     lisinopril (PRINIVIL,ZESTRIL) 30 MG tablet TAKE 1 TABLET(30 MG) BY MOUTH DAILY 90 tablet 3     hydrochlorothiazide (HYDRODIURIL) 50 MG tablet TAKE 1 TABLET(50 MG) BY MOUTH EVERY MORNING 90 tablet 3     order for DME Autocpap 10-16 cm 1 Units 0     blood glucose monitoring (NO BRAND SPECIFIED) meter device kit Use to test blood sugar 2 times daily or as directed.  Accucheck meter please and all associated strips, lancets, and other supplies, 3 month supply with refills for a year. 1 kit 0     blood glucose monitoring ([employer]) lancets 1 each 2 times daily Use to test blood sugar 2 times daily or as directed. 1 Box 3     Multiple Vitamin (DAILY MULTIVITAMIN PO) Take  by mouth daily.       aspirin 81 MG tablet Take 1 tablet by mouth daily.       guaiFENesin-codeine (ROBITUSSIN AC) 100-10 MG/5ML SOLN solution Take 5 mLs by mouth every 4 hours as needed for cough (Patient not taking: Reported on 3/30/2018) 120 mL 0     order for DME Place rectally 2 times daily 1.8 oz container of 0.2 % nifedipine suppository if possible as easier to place the medication.    Apply on the anus every 12 hours.  Just get to where the anus is tight . (Patient not taking: Reported on 3/30/2018) 1 Container 5     psyllium 0.52 G capsule Take 1 capsule (0.52 g) by mouth daily 100 capsule 3     hydrocortisone (ANUSOL-HC) 25 MG suppository Place 1 suppository (25 mg) rectally 2 times daily as needed for hemorrhoids (Patient not taking: Reported on 3/30/2018) 20 suppository 1     hydrocortisone (ANUSOL-HC) 2.5 % rectal cream Place rectally 2 times daily as needed for hemorrhoids (Patient not taking: Reported on 3/30/2018) 30 g 1     triamcinolone (KENALOG) 0.1 % cream Apply topically 2 times daily as needed for irritation (Patient not taking:  Reported on 3/30/2018) 30 g 1     hydrocortisone 1 % ointment Apply topically 2 times daily as needed (Patient not taking: Reported on 3/30/2018) 30 g 1     Cholecalciferol (VITAMIN D) 1000 UNITS capsule Take 1 capsule by mouth daily.         ALLERGIES:  No Known Allergies    NEW PMH/PSH: None    REVIEW OF SYSTEMS:  The patient completed a comprehensive 11 point review of systems (below), which was reviewed. Positives are as noted below.  Patient Supplied Answers to Review of Systems  [facility] ROS 4/22/2016   Ears, Nose, Throat Nasal congestion or drainage       PHYSICAL EXAM:  General: The patient was alert and conversant, and in no acute distress.    Neck: No palpable cervical lymphadenopathy, no significant tenderness to palpation of the thyrohyoid space, which was narrow. No obvious thyroid abnormality.  Resp: Breathing comfortably, no stridor or stertor.  Neuro: Symmetric facial function. Other cranial nerve function as documented above.  Psych: Normal affect, pleasant and cooperative.  Voice/speech: Mild dysphonia characterized by breathiness.      Intake scores  Last 2 Scores for Patient-Answered VHI Questionnaire  VHI Total Score 12/22/2017 2/26/2018   VHI Total Score 0 9      Last 2 Scores for Patient-Answered EAT Questionnaire  EAT Total Score 12/22/2017 2/26/2018   EAT Total Score 0 1      Last 2 Scores for Patient-Answered CSI Questionnaire  CSI Total Score 12/22/2017 2/26/2018   CSI Total Score 0 0       Procedure:   Flexible fiberoptic laryngoscopy and laryngovideostroboscopy  Indications: This procedure was warranted to evaluate the patient's laryngeal anatomy and function. Risks, benefits, and alternatives were discussed.  Description: After written informed consent was obtained, a time-out was performed to confirm patient identity, procedure, and procedure site. Topical 3% lidocaine with 0.25% phenylephrine was applied to the nasal cavities. I performed the endoscopy and no complications were  apparent. Continuous and stroboscopic light were utilized to assess routine phonation and variable frequency phonation.  Performed by: Mel Fournier MD MPH  SLP: Misty Clark, PhD, CCC-SLP   Findings: Normal nasopharynx. Normal base of tongue, valleculae, and epiglottis. Vocal fold mobility: right: normal; left: normal. Medial edges of the vocal folds: smooth and straight. No focal mucosal lesions were observed on the true vocal folds.  The left lateral true vocal fold midportion with mildly fibrotic region.  Glissade produced appropriate elongation. Mucosa of false vocal folds, aryepiglottic folds, piriform sinuses, and posterior glottis unremarkable with the exception of moderate interval improvement in the left medial arytenoid granuloma. Airway was patent. No focal findings on NBI.    The addition of stroboscopy allowed evaluation of the mucosal wave.   Amplitude: right: normal; left: mildly decreased. Symmetry: intermittent symmetry. Closure pattern: complete. Closure plane: at glottic level. Phase distribution: normal.             IMPRESSION AND PLAN:   Gary Iyer returns with an improving laryngeal exam.  He does continue to have a mild residual dry cough, and will discuss his lisinopril with his primary care provider.  He will return to see me in 3 months, or sooner with any concerns. I appreciate the opportunity to participate in the care of this pleasant patient.

## 2018-03-30 NOTE — NURSING NOTE
"Chief Complaint   Patient presents with     RECHECK     Follow up for vocal cord granuloma     Height 1.727 m (5' 8\"), weight 101.5 kg (223 lb 11.2 oz).    Davide Murphy    "

## 2018-03-30 NOTE — LETTER
3/30/2018      RE: Gary Iyer  [address] [id number]       Dear Colleague:  Gary Iyer recently returned for follow-up at the [facility]. My clinic note from our visit is enclosed below.     I appreciate the ongoing opportunity to participate in this patient's care.    Please feel free to contact me with any questions.      Sincerely yours,  Mel Fournier M.D., M.P.H.  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 Most recently we returned to the Operating Room on 02/09/2017 for an area of new irregularity of the left true vocal fold. Pathology showed severe dysplasia with negative, but close margins (for moderate, but not severe dysplasia). 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     Over the past few visits he has continued to have irregularity in the area, in the setting of heavy ongoing coughing. He reports that his cough has improved considerably since last time, although he still has a lingering dry cough.             MEDICATIONS:     Current Outpatient Prescriptions   Medication Sig Dispense Refill     lisinopril (PRINIVIL,ZESTRIL) 30 MG tablet TAKE 1 TABLET(30 MG) BY MOUTH DAILY 90 tablet 1     hydrochlorothiazide (HYDRODIURIL) 50 MG tablet TAKE 1 TABLET(50 MG) BY MOUTH EVERY MORNING 90 tablet 1     vitamin D (ERGOCALCIFEROL) 73041 UNIT capsule 1 po q week 12 capsule 0     PARoxetine (PAXIL) 20 MG tablet TAKE 2 TABLETS(40 MG) BY MOUTH DAILY 180 tablet 3     tamsulosin (FLOMAX) 0.4 MG capsule TAKE 1 CAPSULE(0.4 MG) BY MOUTH DAILY 90 capsule 3     omeprazole 20 MG tablet 1 po daily 90 tablet 3      allopurinol (ZYLOPRIM) 300 MG tablet TAKE 1 TABLET(300 MG) BY MOUTH DAILY 90 tablet 3     atorvastatin (LIPITOR) 20 MG tablet TAKE 1 TABLET(20 MG) BY MOUTH DAILY 90 tablet 3     metFORMIN (GLUCOPHAGE) 500 MG tablet TAKE 1 TABLET(500 MG) BY MOUTH TWICE DAILY WITH MEALS 180 tablet 1     lisinopril (PRINIVIL,ZESTRIL) 30 MG tablet TAKE 1 TABLET(30 MG) BY MOUTH DAILY 90 tablet 3     hydrochlorothiazide (HYDRODIURIL) 50 MG tablet TAKE 1 TABLET(50 MG) BY MOUTH EVERY MORNING 90 tablet 3     order for DME Autocpap 10-16 cm 1 Units 0     blood glucose monitoring (NO BRAND SPECIFIED) meter device kit Use to test blood sugar 2 times daily or as directed.  Accucheck meter please and all associated strips, lancets, and other supplies, 3 month supply with refills for a year. 1 kit 0     blood glucose monitoring (ChatterousET) lancets 1 each 2 times daily Use to test blood sugar 2 times daily or as directed. 1 Box 3     Multiple Vitamin (DAILY MULTIVITAMIN PO) Take  by mouth daily.       aspirin 81 MG tablet Take 1 tablet by mouth daily.       guaiFENesin-codeine (ROBITUSSIN AC) 100-10 MG/5ML SOLN solution Take 5 mLs by mouth every 4 hours as needed for cough (Patient not taking: Reported on 3/30/2018) 120 mL 0     order for DME Place rectally 2 times daily 1.8 oz container of 0.2 % nifedipine suppository if possible as easier to place the medication.    Apply on the anus every 12 hours.  Just get to where the anus is tight . (Patient not taking: Reported on 3/30/2018) 1 Container 5     psyllium 0.52 G capsule Take 1 capsule (0.52 g) by mouth daily 100 capsule 3     hydrocortisone (ANUSOL-HC) 25 MG suppository Place 1 suppository (25 mg) rectally 2 times daily as needed for hemorrhoids (Patient not taking: Reported on 3/30/2018) 20 suppository 1     hydrocortisone (ANUSOL-HC) 2.5 % rectal cream Place rectally 2 times daily as needed for hemorrhoids (Patient not taking: Reported on 3/30/2018) 30 g 1     triamcinolone (KENALOG) 0.1  % cream Apply topically 2 times daily as needed for irritation (Patient not taking: Reported on 3/30/2018) 30 g 1     hydrocortisone 1 % ointment Apply topically 2 times daily as needed (Patient not taking: Reported on 3/30/2018) 30 g 1     Cholecalciferol (VITAMIN D) 1000 UNITS capsule Take 1 capsule by mouth daily.         ALLERGIES:  No Known Allergies    NEW PMH/PSH: None    REVIEW OF SYSTEMS:  The patient completed a comprehensive 11 point review of systems (below), which was reviewed. Positives are as noted below.  Patient Supplied Answers to Review of Systems  [facility] ROS 4/22/2016   Ears, Nose, Throat Nasal congestion or drainage       PHYSICAL EXAM:  General: The patient was alert and conversant, and in no acute distress.    Neck: No palpable cervical lymphadenopathy, no significant tenderness to palpation of the thyrohyoid space, which was narrow. No obvious thyroid abnormality.  Resp: Breathing comfortably, no stridor or stertor.  Neuro: Symmetric facial function. Other cranial nerve function as documented above.  Psych: Normal affect, pleasant and cooperative.  Voice/speech: Mild dysphonia characterized by breathiness.      Intake scores  Last 2 Scores for Patient-Answered VHI Questionnaire  VHI Total Score 12/22/2017 2/26/2018   VHI Total Score 0 9      Last 2 Scores for Patient-Answered EAT Questionnaire  EAT Total Score 12/22/2017 2/26/2018   EAT Total Score 0 1      Last 2 Scores for Patient-Answered CSI Questionnaire  CSI Total Score 12/22/2017 2/26/2018   CSI Total Score 0 0       Procedure:   Flexible fiberoptic laryngoscopy and laryngovideostroboscopy  Indications: This procedure was warranted to evaluate the patient's laryngeal anatomy and function. Risks, benefits, and alternatives were discussed.  Description: After written informed consent was obtained, a time-out was performed to confirm patient identity, procedure, and procedure site. Topical 3% lidocaine with 0.25% phenylephrine was applied  to the nasal cavities. I performed the endoscopy and no complications were apparent. Continuous and stroboscopic light were utilized to assess routine phonation and variable frequency phonation.  Performed by: Mel Fournier MD MPH  SLP: Misty Clark, PhD, CCC-SLP   Findings: Normal nasopharynx. Normal base of tongue, valleculae, and epiglottis. Vocal fold mobility: right: normal; left: normal. Medial edges of the vocal folds: smooth and straight. No focal mucosal lesions were observed on the true vocal folds.  The left lateral true vocal fold midportion with mildly fibrotic region.  Glissade produced appropriate elongation. Mucosa of false vocal folds, aryepiglottic folds, piriform sinuses, and posterior glottis unremarkable with the exception of moderate interval improvement in the left medial arytenoid granuloma. Airway was patent. No focal findings on NBI.    The addition of stroboscopy allowed evaluation of the mucosal wave.   Amplitude: right: normal; left: mildly decreased. Symmetry: intermittent symmetry. Closure pattern: complete. Closure plane: at glottic level. Phase distribution: normal.             IMPRESSION AND PLAN:   Gary Iyer returns with an improving laryngeal exam.  He does continue to have a mild residual dry cough, and will discuss his lisinopril with his primary care provider.  He will return to see me in 3 months, or sooner with any concerns. I appreciate the opportunity to participate in the care of this pleasant patient.         Mel Fournier MD

## 2018-04-08 DIAGNOSIS — E11.9 TYPE 2 DIABETES, HBA1C GOAL < 8% (H): ICD-10-CM

## 2018-04-09 NOTE — TELEPHONE ENCOUNTER
"Requested Prescriptions   Pending Prescriptions Disp Refills     metFORMIN (GLUCOPHAGE) 500 MG tablet [Pharmacy Med Name: METFORMIN 500MG TABLETS] 180 tablet 0    Last Written Prescription Date:  2/16/18  Last Fill Quantity: 180,  # refills: 1   Last office visit: 2/16/2018 with prescribing provider:     Future Office Visit:     Sig: TAKE 1 TABLET(500 MG) BY MOUTH TWICE DAILY WITH MEALS    Biguanide Agents Passed    4/8/2018 11:08 AM       Passed - Blood pressure less than 140/90 in past 6 months    BP Readings from Last 3 Encounters:   02/16/18 128/68   11/16/17 135/79   10/19/17 (P) 135/77                Passed - Patient has documented LDL within the past 12 mos.    Recent Labs   Lab Test  02/19/18   0905   LDL  80            Passed - Patient has had a Microalbumin in the past 12 mos.    Recent Labs   Lab Test  08/23/17   1142   MICROL  22   UMALCR  12.69            Passed - Patient is age 10 or older       Passed - Patient has documented A1c within the specified period of time.    Recent Labs   Lab Test  02/19/18   0905   A1C  5.9            Passed - Patient's CR is NOT>1.4 OR Patient's EGFR is NOT<45 within past 12 mos.    Recent Labs   Lab Test  02/19/18   0905   GFRESTIMATED  >90   GFRESTBLACK  >90       Recent Labs   Lab Test  02/19/18   0905   CR  0.73            Passed - Patient does NOT have a diagnosis of CHF.       Passed - Recent (6 mo) or future (30 days) visit within the authorizing provider's specialty    Patient had office visit in the last 6 months or has a visit in the next 30 days with authorizing provider or within the authorizing provider's specialty.  See \"Patient Info\" tab in inbasket, or \"Choose Columns\" in Meds & Orders section of the refill encounter.              "

## 2018-04-30 DIAGNOSIS — L30.9 FACIAL DERMATITIS: ICD-10-CM

## 2018-04-30 DIAGNOSIS — L30.9 DERMATITIS: ICD-10-CM

## 2018-04-30 NOTE — TELEPHONE ENCOUNTER
Reason for Call:  Other call back    Detailed comments: patient calling - wanting to get a refill for his two creams that were prescribed before in the past, one was for hands (triamcinolone (KENALOG) 0.1 % cream), the other was for face (hydrocortisone 1 % ointment).     Phone Number Patient can be reached at: Home number on file [phone] (home)    Best Time: any    Can we leave a detailed message on this number? YES    Call taken on 4/30/2018 at 10:54 AM by RENE JAIN

## 2018-04-30 NOTE — TELEPHONE ENCOUNTER
"Requested Prescriptions   Pending Prescriptions Disp Refills     hydrocortisone 1 % ointment 30 g 1    Last Written Prescription Date:  9-19-16  Last Fill Quantity: 30g,  # refills: 1   Last office visit: 2/16/2018 with prescribing provider:     Future Office Visit:     Sig: Apply topically 2 times daily as needed    There is no refill protocol information for this order        triamcinolone (KENALOG) 0.1 % cream 30 g 1    Last Written Prescription Date:  9-19-16  Last Fill Quantity: 30g,  # refills: 1   Last office visit: 2/16/2018 with prescribing provider:     Future Office Visit:     Sig: Apply topically 2 times daily as needed for irritation    Topical Steroid Protocol Passed    4/30/2018 11:04 AM       Passed - Patient is age 6 or older       Passed - Authorizing prescriber's most recent note related to this medication read.       Passed - High potency steroid not ordered       Passed - Recent (12 mo) or future (30 days) visit within the authorizing provider's specialty    Patient had office visit in the last 12 months or has a visit in the next 30 days with authorizing provider or within the authorizing provider's specialty.  See \"Patient Info\" tab in inbasket, or \"Choose Columns\" in Meds & Orders section of the refill encounter.              "

## 2018-05-02 RX ORDER — TRIAMCINOLONE ACETONIDE 1 MG/G
CREAM TOPICAL 2 TIMES DAILY PRN
Qty: 30 G | Refills: 1 | Status: SHIPPED | OUTPATIENT
Start: 2018-05-02 | End: 2021-06-14

## 2018-05-02 RX ORDER — DIAPER,BRIEF,INFANT-TODD,DISP
EACH MISCELLANEOUS 2 TIMES DAILY PRN
Qty: 30 G | Refills: 1 | Status: SHIPPED | OUTPATIENT
Start: 2018-05-02 | End: 2020-04-23

## 2018-05-02 NOTE — TELEPHONE ENCOUNTER
Prescription approved per [facility] Refill Protocol.    Lourdes Zapata RN  [facility]

## 2018-06-07 ENCOUNTER — OFFICE VISIT (OUTPATIENT)
Dept: FAMILY MEDICINE | Facility: CLINIC | Age: 58
End: 2018-06-07
Payer: COMMERCIAL

## 2018-06-07 VITALS
OXYGEN SATURATION: 96 % | DIASTOLIC BLOOD PRESSURE: 66 MMHG | SYSTOLIC BLOOD PRESSURE: 113 MMHG | HEART RATE: 113 BPM | BODY MASS INDEX: 34.67 KG/M2 | TEMPERATURE: 98 F | WEIGHT: 228 LBS

## 2018-06-07 DIAGNOSIS — L21.9 SEBORRHEIC DERMATITIS: Primary | ICD-10-CM

## 2018-06-07 PROCEDURE — 99213 OFFICE O/P EST LOW 20 MIN: CPT | Performed by: FAMILY MEDICINE

## 2018-06-07 RX ORDER — KETOCONAZOLE 20 MG/G
CREAM TOPICAL 2 TIMES DAILY
Qty: 60 G | Refills: 1 | Status: SHIPPED | OUTPATIENT
Start: 2018-06-07 | End: 2022-04-13

## 2018-06-07 RX ORDER — TRIAMCINOLONE ACETONIDE 1 MG/G
CREAM TOPICAL 2 TIMES DAILY PRN
Qty: 30 G | Refills: 1 | Status: CANCELLED | OUTPATIENT
Start: 2018-06-07

## 2018-06-07 NOTE — PROGRESS NOTES
SUBJECTIVE:   Gary Iyer is a 57 year old male who presents to clinic today for the following health issues:      Concern - Itchy on parts of his body  Onset: 2 weeks    Description:   Does have a rash on his arm. Head, face, arms, legs    Intensity: moderate    Progression of Symptoms:  worsening    Accompanying Signs & Symptoms:  None    Previous history of similar problem:   Yes    Precipitating factors:   Worsened by:     Alleviating factors:  Improved by:     Therapies Tried and outcome: Hydrocortisone 1% ointment.     He has apparently seen his PCP for this and has a prescription for hydrocortisone 1% ointment to use on his face and triamcinolone 0.1% cream to use on his body.  He finds these medicines somewhat helpful at times, but they do not clear up his itching or any rash.  He denies any flaking of his scalp.  He has diabetes and other baseline health conditions as per his chart.    Problem list and histories reviewed & adjusted, as indicated.  Additional history: as documented    Patient Active Problem List   Diagnosis     Hyperlipidemia LDL goal <70     Hypertension goal BP (blood pressure) < 140/90     Gout, chronic     Anxiety     Advanced directives, counseling/discussion     Carotid stenosis     Hx of laryngeal cancer     Type 2 diabetes mellitus without complication (H)     Non morbid obesity due to excess calories     Dysphonia     Vocal process granuloma     Type 2 diabetes mellitus without complication, without long-term current use of insulin (H)     Diabetes mellitus, type 2 (H)     Obstructive sleep apnea- moderate-severe (AHI 25)     Benign prostatic hypertrophy     Psychophysiological insomnia     Periodic limb movements of sleep     Low ferritin     Moderate major depression (H)     Gout, unspecified cause, unspecified chronicity, unspecified site     Chronic cough     Lesion of larynx     Past Surgical History:   Procedure Laterality Date     COLONOSCOPY       COLONOSCOPY  6-22-12     Repeat Colonoscopy in 10 yrs.      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     HEAD & NECK SURGERY  1/25/11    callous removal from throat     LARYNGOSCOPY WITH BIOPSY(IES)  1-25-11     LARYNGOSCOPY WITH BIOPSY(IES) N/A 2/9/2017    Procedure: LARYNGOSCOPY WITH BIOPSY(IES);  Surgeon: Mel Fournier MD;  Location: [facility] OR     LARYNGOSCOPY WITH MICROSCOPE N/A 2/9/2017    Procedure: LARYNGOSCOPY WITH MICROSCOPE;  Surgeon: Mel Fournier MD;  Location: [facility] OR     LASER CO2 LARYNGOSCOPY, COMPLEX  6/27/2013    Procedure: LASER CO2 LARYNGOSCOPY, COMPLEX;  Micro Direct Laryngoscopy With Micro Flap Excision Of Lesion Lumenis Co2 Laser ;  Surgeon: Mel Fournier MD;  Location: [facility] OR     LASER CO2 LESION ORAL N/A 2/9/2017    Procedure: LASER CO2 LESION ORAL;  Surgeon: Mel Fournier MD;  Location: [facility] OR     ORTHOPEDIC SURGERY  March 2016    left tibia orif with abbi 2-2016     VASECTOMY  02/03       Social History   Substance Use Topics     Smoking status: Former Smoker     Years: 8.00     Quit date: 9/1/2000     Smokeless tobacco: Never Used     Alcohol use 0.0 oz/week      Comment: rare     Family History   Problem Relation Age of Onset     C.A.D. Father      Hypertension Mother      DIABETES No family hx of      CEREBROVASCULAR DISEASE No family hx of      CANCER No family hx of      Glaucoma No family hx of      Macular Degeneration No family hx of          Current Outpatient Prescriptions   Medication Sig Dispense Refill     allopurinol (ZYLOPRIM) 300 MG tablet TAKE 1 TABLET(300 MG) BY MOUTH DAILY 90 tablet 3     aspirin 81 MG tablet Take 1 tablet by mouth daily.       atorvastatin (LIPITOR) 20 MG tablet TAKE 1 TABLET(20 MG) BY MOUTH DAILY 90 tablet 3     blood glucose monitoring (ALON MICROLET) lancets 1 each 2 times daily Use to test blood sugar 2 times daily or as directed. 1 Box 3     blood glucose monitoring (NO BRAND SPECIFIED) meter device kit Use to test blood sugar 2  times daily or as directed.  Accucheck meter please and all associated strips, lancets, and other supplies, 3 month supply with refills for a year. 1 kit 0     Cholecalciferol (VITAMIN D) 1000 UNITS capsule Take 1 capsule by mouth daily.       hydrochlorothiazide (HYDRODIURIL) 50 MG tablet TAKE 1 TABLET(50 MG) BY MOUTH EVERY MORNING 90 tablet 3     hydrocortisone (ANUSOL-HC) 2.5 % rectal cream Place rectally 2 times daily as needed for hemorrhoids 30 g 1     hydrocortisone (ANUSOL-HC) 25 MG suppository Place 1 suppository (25 mg) rectally 2 times daily as needed for hemorrhoids 20 suppository 1     hydrocortisone 1 % ointment Apply topically 2 times daily as needed 30 g 1     ketoconazole (NIZORAL) 2 % cream Apply topically 2 times daily for up to 2 weeks as needed for rash 60 g 1     lisinopril (PRINIVIL,ZESTRIL) 30 MG tablet TAKE 1 TABLET(30 MG) BY MOUTH DAILY 90 tablet 3     metFORMIN (GLUCOPHAGE) 500 MG tablet TAKE 1 TABLET(500 MG) BY MOUTH TWICE DAILY WITH MEALS 180 tablet 1     Multiple Vitamin (DAILY MULTIVITAMIN PO) Take  by mouth daily.       omeprazole 20 MG tablet 1 po daily 90 tablet 3     order for DME Autocpap 10-16 cm 1 Units 0     order for DME Place rectally 2 times daily 1.8 oz container of 0.2 % nifedipine suppository if possible as easier to place the medication.    Apply on the anus every 12 hours.  Just get to where the anus is tight . 1 Container 5     PARoxetine (PAXIL) 20 MG tablet TAKE 2 TABLETS(40 MG) BY MOUTH DAILY 180 tablet 3     psyllium 0.52 G capsule Take 1 capsule (0.52 g) by mouth daily (Patient not taking: Reported on 6/7/2018) 100 capsule 3     tamsulosin (FLOMAX) 0.4 MG capsule TAKE 1 CAPSULE(0.4 MG) BY MOUTH DAILY 90 capsule 3     triamcinolone (KENALOG) 0.1 % cream Apply topically 2 times daily as needed for irritation 30 g 1     vitamin D (ERGOCALCIFEROL) 80922 UNIT capsule TAKE 1 CAPSULE BY MOUTH EVERY WEEK 12 capsule 1     [DISCONTINUED] hydrochlorothiazide (HYDRODIURIL) 50  MG tablet TAKE 1 TABLET(50 MG) BY MOUTH EVERY MORNING 90 tablet 1     [DISCONTINUED] lisinopril (PRINIVIL,ZESTRIL) 30 MG tablet TAKE 1 TABLET(30 MG) BY MOUTH DAILY 90 tablet 1     No Known Allergies    Reviewed and updated as needed this visit by clinical staff  Tobacco  Allergies  Meds  Med Hx  Surg Hx  Fam Hx  Soc Hx      Reviewed and updated as needed this visit by Provider         ROS:  Noncontributory except as above    OBJECTIVE:     /66 (BP Location: Right arm, Patient Position: Chair, Cuff Size: Adult Regular)  Pulse 113  Temp 98  F (36.7  C) (Oral)  Wt 228 lb (103.4 kg)  SpO2 96%  BMI 34.67 kg/m2  Body mass index is 34.67 kg/(m^2).  GENERAL: alert, no distress and obese  SKIN: He has an erythematous scaly rash in the nasolabial area of the face and adjacent to his eyebrows typical for seborrheic dermatitis.  He has a few other scattered erythematous areas in the beard area on the dorsum of his hands which also look consistent with seborrheic dermatitis.  I do not see any rash on the top of the scalp.  No flaking of the scalp.  No other obvious rash on the upper arms or legs.    Diagnostic Test Results:  none     ASSESSMENT/PLAN:       ICD-10-CM    1. Seborrheic dermatitis L21.9 DERMATOLOGY REFERRAL     ketoconazole (NIZORAL) 2 % cream     Will use Nizoral 2% cream twice a day to clear up the seborrheic dermatitis of his face and perhaps the dorsum of his hands  He could still use the cortisone ointment/cream prn for any other itching of his skin elsewhere  Discussed possible dermatology consultation if symptoms persist, or he could follow up with his PCP as an alternative    Zackary Reyes MD  [facility]

## 2018-06-07 NOTE — MR AVS SNAPSHOT
After Visit Summary   6/7/2018    Gary Iyer    MRN: [id number]           Patient Information     Date Of Birth          1960        Visit Information        Provider Department      6/7/2018 3:20 PM Zackary Reyes MD [facility]        Today's Diagnoses     Seborrheic dermatitis    -  1       Follow-ups after your visit        Additional Services     DERMATOLOGY REFERRAL       Your provider has referred you to: [facility] (2 locations) [phone]   [email or web address]    Please be aware that coverage of these services is subject to the terms and limitations of your health insurance plan.  Call member services at your health plan with any benefit or coverage questions.      Please bring the following with you to your appointment:    (1) Any X-Rays, CTs or MRIs which have been performed.  Contact the facility where they were done to arrange for  prior to your scheduled appointment.    (2) List of current medications  (3) This referral request   (4) Any documents/labs given to you for this referral                  Follow-up notes from your care team     Return if symptoms worsen or fail to improve.      Who to contact     If you have questions or need follow up information about today's clinic visit or your schedule please contact [facility] directly at [phone].  Normal or non-critical lab and imaging results will be communicated to you by MyChart, letter or phone within 4 business days after the clinic has received the results. If you do not hear from us within 7 days, please contact the clinic through MyChart or phone. If you have a critical or abnormal lab result, we will notify you by phone as soon as possible.  Submit refill requests through [facility] or call your pharmacy and they will forward the refill request to us. Please allow 3 business days for your refill to be completed.           Additional Information About Your Visit        MyChart Information     [employer] gives you secure access to your electronic health record. If you see a primary care provider, you can also send messages to your care team and make appointments. If you have questions, please call your primary care clinic.  If you do not have a primary care provider, please call [phone] and they will assist you.        Care EveryWhere ID     This is your Care EveryWhere ID. This could be used by other organizations to access your [facility] medical records  [id number]        Your Vitals Were     Pulse Temperature Pulse Oximetry BMI (Body Mass Index)          113 98  F (36.7  C) (Oral) 96% 34.67 kg/m2         Blood Pressure from Last 3 Encounters:   06/07/18 113/66   02/16/18 128/68   11/16/17 135/79    Weight from Last 3 Encounters:   06/07/18 228 lb (103.4 kg)   03/30/18 223 lb 11.2 oz (101.5 kg)   03/13/18 228 lb (103.4 kg)              We Performed the Following     DERMATOLOGY REFERRAL          Today's Medication Changes          These changes are accurate as of 6/7/18  3:59 PM.  If you have any questions, ask your nurse or doctor.               Start taking these medicines.        Dose/Directions    ketoconazole 2 % cream   Commonly known as:  NIZORAL   Used for:  Seborrheic dermatitis   Started by:  Zackary Reyes MD        Apply topically 2 times daily for up to 2 weeks as needed for rash   Quantity:  60 g   Refills:  1            Where to get your medicines      These medications were sent to [facility] [address] [place] [address] [address] [place] [address]  [address] [place] [address]    Hours:  Test fax successful 12/11/02   Phone:  [phone]     ketoconazole 2 % cream                Primary Care Provider Office Phone # Fax #    Finesse Beal [phone]       [address] [facility] [address]        Equal Access to Services     KIRILL  GAAR : Hadii aad ku jamie Wheatley, waaxda luqadaha, qaybta kamustaphada quita, gregory sylvie ushaolivia hilton graemesinai gutierrez . So [facility] [phone].    ATENCIÓN: Si habla español, tiene a lucero disposición servicios gratuitos de asistencia lingüística. Llame al [phone].    We comply with applicable federal civil rights laws and [place] laws. We do not discriminate on the basis of race, color, national origin, age, disability, sex, sexual orientation, or gender identity.            Thank you!     Thank you for choosing [facility]  for your care. Our goal is always to provide you with excellent care. Hearing back from our patients is one way we can continue to improve our services. Please take a few minutes to complete the written survey that you may receive in the mail after your visit with us. Thank you!             Your Updated Medication List - Protect others around you: Learn how to safely use, store and throw away your medicines at www.disposemymeds.org.          This list is accurate as of 6/7/18  3:59 PM.  Always use your most recent med list.                   Brand Name Dispense Instructions for use Diagnosis    allopurinol 300 MG tablet    ZYLOPRIM    90 tablet    TAKE 1 TABLET(300 MG) BY MOUTH DAILY    Gout, unspecified cause, unspecified chronicity, unspecified site       aspirin 81 MG tablet      Take 1 tablet by mouth daily.        atorvastatin 20 MG tablet    LIPITOR    90 tablet    TAKE 1 TABLET(20 MG) BY MOUTH DAILY    Hyperlipidemia LDL goal <70       blood glucose monitoring lancets     1 Box    1 each 2 times daily Use to test blood sugar 2 times daily or as directed.    Type 2 diabetes mellitus without complication (H)       blood glucose monitoring meter device kit    no brand specified    1 kit    Use to test blood sugar 2 times daily or as directed.  Accucheck meter please and all associated strips, lancets, and other supplies, 3 month supply with refills for a year.     Type 2 diabetes mellitus without complication, without long-term current use of insulin (H)       DAILY MULTIVITAMIN PO      Take  by mouth daily.        hydrochlorothiazide 50 MG tablet    HYDRODIURIL    90 tablet    TAKE 1 TABLET(50 MG) BY MOUTH EVERY MORNING    Hypertension goal BP (blood pressure) < 140/90       hydrocortisone 1 % ointment     30 g    Apply topically 2 times daily as needed    Facial dermatitis       hydrocortisone 2.5 % cream    ANUSOL-HC    30 g    Place rectally 2 times daily as needed for hemorrhoids    External hemorrhoids       hydrocortisone 25 MG Suppository    ANUSOL-HC    20 suppository    Place 1 suppository (25 mg) rectally 2 times daily as needed for hemorrhoids    External hemorrhoids       ketoconazole 2 % cream    NIZORAL    60 g    Apply topically 2 times daily for up to 2 weeks as needed for rash    Seborrheic dermatitis       lisinopril 30 MG tablet    PRINIVIL,ZESTRIL    90 tablet    TAKE 1 TABLET(30 MG) BY MOUTH DAILY    Hypertension goal BP (blood pressure) < 140/90       metFORMIN 500 MG tablet    GLUCOPHAGE    180 tablet    TAKE 1 TABLET(500 MG) BY MOUTH TWICE DAILY WITH MEALS    Type 2 diabetes mellitus without complication, without long-term current use of insulin (H)       omeprazole 20 MG tablet     90 tablet    1 po daily    Dysphonia, Gastroesophageal reflux disease, esophagitis presence not specified, Leukoplakia of larynx       * order for DME     1 Container    Place rectally 2 times daily 1.8 oz container of 0.2 % nifedipine suppository if possible as easier to place the medication.   Apply on the anus every 12 hours.  Just get to where the anus is tight .    Anal fissure       * order for DME     1 Units    Autocpap 10-16 cm    Obstructive sleep apnea       PARoxetine 20 MG tablet    PAXIL    180 tablet    TAKE 2 TABLETS(40 MG) BY MOUTH DAILY    Anxiety       psyllium 0.52 g capsule     100 capsule    Take 1 capsule (0.52 g) by mouth daily    Constipation,  unspecified constipation type       tamsulosin 0.4 MG capsule    FLOMAX    90 capsule    TAKE 1 CAPSULE(0.4 MG) BY MOUTH DAILY    Benign prostatic hyperplasia with weak urinary stream       triamcinolone 0.1 % cream    KENALOG    30 g    Apply topically 2 times daily as needed for irritation    Dermatitis       vitamin D 1000 units capsule      Take 1 capsule by mouth daily.        vitamin D 80955 UNIT capsule    ERGOCALCIFEROL    12 capsule    TAKE 1 CAPSULE BY MOUTH EVERY WEEK    Vitamin D deficiency disease       * Notice:  This list has 2 medication(s) that are the same as other medications prescribed for you. Read the directions carefully, and ask your doctor or other care provider to review them with you.

## 2018-07-09 ENCOUNTER — OFFICE VISIT (OUTPATIENT)
Dept: OTOLARYNGOLOGY | Facility: CLINIC | Age: 58
End: 2018-07-09
Payer: COMMERCIAL

## 2018-07-09 VITALS
DIASTOLIC BLOOD PRESSURE: 69 MMHG | WEIGHT: 229 LBS | BODY MASS INDEX: 34.82 KG/M2 | SYSTOLIC BLOOD PRESSURE: 117 MMHG | HEART RATE: 114 BPM | OXYGEN SATURATION: 95 %

## 2018-07-09 DIAGNOSIS — R49.0 DYSPHONIA: ICD-10-CM

## 2018-07-09 DIAGNOSIS — J38.3 VOCAL PROCESS GRANULOMA: ICD-10-CM

## 2018-07-09 DIAGNOSIS — C32.0 VOCAL CORD CANCER (H): Primary | ICD-10-CM

## 2018-07-09 ASSESSMENT — PAIN SCALES - GENERAL: PAINLEVEL: NO PAIN (0)

## 2018-07-09 NOTE — MR AVS SNAPSHOT
After Visit Summary   2018    Gary Iyer    MRN: [id number]           Patient Information     Date Of Birth          1960        Visit Information        Provider Department      2018 3:30 PM Mel Fournier MD [facility]        Today's Diagnoses     Vocal cord cancer (H)    -  1    Vocal process granuloma        Dysphonia          Care Instructions    Plan of care:  Follow up with Dr Fournier in 2-3 months  Clinic contact information:  1. To schedule an appointment call [phone], option 1  2. To talk to the Triage RN call [phone], option 1  3. If you need to speak to Tiffanie or get a message to your doctor on a Friday, call the triage RN  4. TiffanieRN: [phone]  5. Surgery scheduling:      Meera Graham: [phone]      Lorie Calvo: [phone]  6. Fax: [phone]  7. [phone]            Follow-ups after your visit        Who to contact     Please call your clinic at [phone] to:    Ask questions about your health    Make or cancel appointments    Discuss your medicines    Learn about your test results    Speak to your doctor            Additional Information About Your Visit        [employer]ber Information     [employer] gives you secure access to your electronic health record. If you see a primary care provider, you can also send messages to your care team and make appointments. If you have questions, please call your primary care clinic.  If you do not have a primary care provider, please call [phone] and they will assist you.      [employer] is an electronic gateway that provides easy, online access to your medical records. With [employer], you can request a clinic appointment, read your test results, renew a prescription or communicate with your care team.     To access your existing account, please contact your [facility] or call [phone] for assistance.        Care EveryWhere ID     This is your  Care EveryWhere ID. This could be used by other organizations to access your [facility] medical records  [id number]        Your Vitals Were     Pulse Pulse Oximetry BMI (Body Mass Index)             114 95% 34.82 kg/m2          Blood Pressure from Last 3 Encounters:   07/09/18 117/69   06/07/18 113/66   02/16/18 128/68    Weight from Last 3 Encounters:   07/09/18 103.9 kg (229 lb)   06/07/18 103.4 kg (228 lb)   03/30/18 101.5 kg (223 lb 11.2 oz)              We Performed the Following     IMAGESTREAM RECORDING ORDER     LARYNGOSCOPY FLEX/RIGID W STROBOSCOPY        Primary Care Provider Office Phone # Fax #    Finesse Beal [phone]       [address] [facility] [place] [place] [address]        Equal Access to Services     [facility]: Hadii sloan bradshaw hadasho Soshelli, waaxda luqadaha, qaybta kaalmada adepierreyajoceline, gregory gutierrez . So [facility] [phone].    ATENCIÓN: Si habla español, tiene a lucero disposición servicios gratuitos de asistencia lingüística. [facility] [phone].    We comply with applicable federal civil rights laws and [place] laws. We do not discriminate on the basis of race, color, national origin, age, disability, sex, sexual orientation, or gender identity.            Thank you!     Thank you for choosing [facility]  for your care. Our goal is always to provide you with excellent care. Hearing back from our patients is one way we can continue to improve our services. Please take a few minutes to complete the written survey that you may receive in the mail after your visit with us. Thank you!             Your Updated Medication List - Protect others around you: Learn how to safely use, store and throw away your medicines at www.disposemymeds.org.          This list is accurate as of 7/9/18  5:57 PM.  Always use your most recent med list.                   Brand Name Dispense Instructions for use Diagnosis    allopurinol 300 MG tablet     ZYLOPRIM    90 tablet    TAKE 1 TABLET(300 MG) BY MOUTH DAILY    Gout, unspecified cause, unspecified chronicity, unspecified site       aspirin 81 MG tablet      Take 1 tablet by mouth daily.        atorvastatin 20 MG tablet    LIPITOR    90 tablet    TAKE 1 TABLET(20 MG) BY MOUTH DAILY    Hyperlipidemia LDL goal <70       blood glucose monitoring lancets     1 Box    1 each 2 times daily Use to test blood sugar 2 times daily or as directed.    Type 2 diabetes mellitus without complication (H)       blood glucose monitoring meter device kit    no brand specified    1 kit    Use to test blood sugar 2 times daily or as directed.  Accucheck meter please and all associated strips, lancets, and other supplies, 3 month supply with refills for a year.    Type 2 diabetes mellitus without complication, without long-term current use of insulin (H)       DAILY MULTIVITAMIN PO      Take  by mouth daily.        hydrochlorothiazide 50 MG tablet    HYDRODIURIL    90 tablet    TAKE 1 TABLET(50 MG) BY MOUTH EVERY MORNING    Hypertension goal BP (blood pressure) < 140/90       hydrocortisone 1 % ointment     30 g    Apply topically 2 times daily as needed    Facial dermatitis       hydrocortisone 2.5 % cream    ANUSOL-HC    30 g    Place rectally 2 times daily as needed for hemorrhoids    External hemorrhoids       hydrocortisone 25 MG Suppository    ANUSOL-HC    20 suppository    Place 1 suppository (25 mg) rectally 2 times daily as needed for hemorrhoids    External hemorrhoids       ketoconazole 2 % cream    NIZORAL    60 g    Apply topically 2 times daily for up to 2 weeks as needed for rash    Seborrheic dermatitis       lisinopril 30 MG tablet    PRINIVIL,ZESTRIL    90 tablet    TAKE 1 TABLET(30 MG) BY MOUTH DAILY    Hypertension goal BP (blood pressure) < 140/90       metFORMIN 500 MG tablet    GLUCOPHAGE    180 tablet    TAKE 1 TABLET(500 MG) BY MOUTH TWICE DAILY WITH MEALS    Type 2 diabetes mellitus without complication,  without long-term current use of insulin (H)       omeprazole 20 MG tablet     90 tablet    1 po daily    Dysphonia, Gastroesophageal reflux disease, esophagitis presence not specified, Leukoplakia of larynx       * order for DME     1 Container    Place rectally 2 times daily 1.8 oz container of 0.2 % nifedipine suppository if possible as easier to place the medication.   Apply on the anus every 12 hours.  Just get to where the anus is tight .    Anal fissure       * order for DME     1 Units    Autocpap 10-16 cm    Obstructive sleep apnea       PARoxetine 20 MG tablet    PAXIL    180 tablet    TAKE 2 TABLETS(40 MG) BY MOUTH DAILY    Anxiety       psyllium 0.52 g capsule     100 capsule    Take 1 capsule (0.52 g) by mouth daily    Constipation, unspecified constipation type       tamsulosin 0.4 MG capsule    FLOMAX    90 capsule    TAKE 1 CAPSULE(0.4 MG) BY MOUTH DAILY    Benign prostatic hyperplasia with weak urinary stream       triamcinolone 0.1 % cream    KENALOG    30 g    Apply topically 2 times daily as needed for irritation    Dermatitis       vitamin D 1000 units capsule      Take 1 capsule by mouth daily.        vitamin D 69853 UNIT capsule    ERGOCALCIFEROL    12 capsule    TAKE 1 CAPSULE BY MOUTH EVERY WEEK    Vitamin D deficiency disease       * Notice:  This list has 2 medication(s) that are the same as other medications prescribed for you. Read the directions carefully, and ask your doctor or other care provider to review them with you.

## 2018-07-09 NOTE — PATIENT INSTRUCTIONS
Plan of care:  Follow up with Dr Fournier in 2-3 months  Clinic contact information:  1. To schedule an appointment call [phone], option 1  2. To talk to the Triage RN call [phone], option 1  3. If you need to speak to Tiffanie or get a message to your doctor on a Friday, call the triage RN  4. TiffanieRN: [phone]  5. Surgery scheduling:      Meera Graham: [phone]      Lorie Calvo: [phone]  6. Fax: [phone]  7. [phone]

## 2018-07-09 NOTE — PROGRESS NOTES
Dear Colleague:  Gary Iyer recently returned for follow-up at the [facility]. My clinic note from our visit is enclosed below.  Speech recognition software may have been used in the documentation below; input is reviewed before signature to the best of my ability.     I appreciate the ongoing opportunity to participate in this patient's care.    Please feel free to contact me with any questions.      Sincerely yours,  Mel Fournier M.D., M.P.H.  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 Most recently we returned to the Operating Room on 02/09/2017 for an area of new irregularity of the left true vocal fold. Pathology showed severe dysplasia with negative, but close margins (for moderate, but not severe dysplasia). 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     Over the following few visits he continued to have irregularity in the area, in the setting of heavy ongoing coughing. He reports that managing his reflux led to improvement of his cough so he did not pursue a replacement for the lisinopril.    Compared to 12 months ago, his voice problem is: about the same      MEDICATIONS:     Current Outpatient Prescriptions   Medication Sig Dispense Refill     allopurinol (ZYLOPRIM) 300 MG tablet TAKE 1 TABLET(300 MG) BY MOUTH DAILY 90 tablet 3     aspirin 81 MG tablet Take 1 tablet by mouth daily.       atorvastatin (LIPITOR) 20 MG tablet TAKE 1 TABLET(20 MG) BY MOUTH DAILY 90 tablet 3     blood glucose monitoring (ALON MICROLET) lancets 1 each 2 times daily Use to test blood sugar 2 times daily or as directed. 1 Box 3     blood  glucose monitoring (NO BRAND SPECIFIED) meter device kit Use to test blood sugar 2 times daily or as directed.  Accucheck meter please and all associated strips, lancets, and other supplies, 3 month supply with refills for a year. 1 kit 0     Cholecalciferol (VITAMIN D) 1000 UNITS capsule Take 1 capsule by mouth daily.       hydrochlorothiazide (HYDRODIURIL) 50 MG tablet TAKE 1 TABLET(50 MG) BY MOUTH EVERY MORNING 90 tablet 3     hydrocortisone (ANUSOL-HC) 2.5 % rectal cream Place rectally 2 times daily as needed for hemorrhoids 30 g 1     hydrocortisone (ANUSOL-HC) 25 MG suppository Place 1 suppository (25 mg) rectally 2 times daily as needed for hemorrhoids 20 suppository 1     hydrocortisone 1 % ointment Apply topically 2 times daily as needed 30 g 1     ketoconazole (NIZORAL) 2 % cream Apply topically 2 times daily for up to 2 weeks as needed for rash 60 g 1     lisinopril (PRINIVIL,ZESTRIL) 30 MG tablet TAKE 1 TABLET(30 MG) BY MOUTH DAILY 90 tablet 3     metFORMIN (GLUCOPHAGE) 500 MG tablet TAKE 1 TABLET(500 MG) BY MOUTH TWICE DAILY WITH MEALS 180 tablet 1     Multiple Vitamin (DAILY MULTIVITAMIN PO) Take  by mouth daily.       omeprazole 20 MG tablet 1 po daily 90 tablet 3     order for DME Autocpap 10-16 cm 1 Units 0     order for DME Place rectally 2 times daily 1.8 oz container of 0.2 % nifedipine suppository if possible as easier to place the medication.    Apply on the anus every 12 hours.  Just get to where the anus is tight . 1 Container 5     PARoxetine (PAXIL) 20 MG tablet TAKE 2 TABLETS(40 MG) BY MOUTH DAILY 180 tablet 3     psyllium 0.52 G capsule Take 1 capsule (0.52 g) by mouth daily 100 capsule 3     tamsulosin (FLOMAX) 0.4 MG capsule TAKE 1 CAPSULE(0.4 MG) BY MOUTH DAILY 90 capsule 3     triamcinolone (KENALOG) 0.1 % cream Apply topically 2 times daily as needed for irritation 30 g 1     vitamin D (ERGOCALCIFEROL) 66008 UNIT capsule TAKE 1 CAPSULE BY MOUTH EVERY WEEK 12 capsule 1        ALLERGIES:  No Known Allergies    NEW PMH/PSH: None    REVIEW OF SYSTEMS:  The patient completed a comprehensive 11 point review of systems (below), which was reviewed. Positives are as noted below.  Patient Supplied Answers to Review of Systems   ENT ROS 4/22/2016   Ears, Nose, Throat Nasal congestion or drainage       PHYSICAL EXAM:  General: The patient was alert and conversant, and in no acute distress.    Oral cavity/oropharynx: No masses or lesions. Dentition unchanged since prior. Tongue mobility and palate elevation intact and symmetric.  Neck: No palpable cervical lymphadenopathy, no significant tenderness to palpation of the thyrohyoid space, which was narrow. No obvious thyroid abnormality.  Resp: Breathing comfortably, no stridor or stertor.  Neuro: Symmetric facial function. Other cranial nerve function as documented above.  Psych: Normal affect, pleasant and cooperative.  Voice/speech: Mild dysphonia characterized by breathiness.      Intake scores  Last 2 Scores for Patient-Answered VHI Questionnaire  VHI Total Score 12/22/2017 2/26/2018   VHI Total Score 0 9      Last 2 Scores for Patient-Answered EAT Questionnaire  EAT Total Score 12/22/2017 2/26/2018   EAT Total Score 0 1      Last 2 Scores for Patient-Answered CSI Questionnaire  CSI Total Score 12/22/2017 2/26/2018   CSI Total Score 0 0       Procedure:   Flexible fiberoptic laryngoscopy and laryngovideostroboscopy  Indications: This procedure was warranted to evaluate the patient's laryngeal anatomy and function. Risks, benefits, and alternatives were discussed.  Description: After written informed consent was obtained, a time-out was performed to confirm patient identity, procedure, and procedure site. Topical 3% lidocaine with 0.25% phenylephrine was applied to the nasal cavities. I performed the endoscopy and no complications were apparent. Continuous and stroboscopic light were utilized to assess routine phonation and variable  frequency phonation.  Performed by: Mel Fournier MD MPH  SLP: Misty Clark, PhD, CCC-SLP   Findings: Normal nasopharynx. Normal base of tongue, valleculae, and epiglottis. Vocal fold mobility: right: normal; left: normal. Medial edges of the vocal folds: smooth and straight. No focal mucosal lesions were observed on the true vocal folds. Glissade produced appropriate elongation. There was mild supraglottic recruitment with connected speech. Mucosa of false vocal folds, aryepiglottic folds, piriform sinuses, and posterior glottis unremarkable with the exception of a small area of leukoplakia left medial superior arytenoid; this is decreased in size since prior. Airway was patent.    The addition of stroboscopy allowed evaluation of the mucosal wave.   Amplitude: right: normal; left: mildly decreased. Symmetry: associated asymmetry. Closure pattern: complete. Closure plane: at glottic level. Phase distribution: normal.            IMPRESSION AND PLAN:   Gary Iyer returns with a slightly improved exam, associated with a reduction in his cough. 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  This has flattened out a bit since his cough has begun to reduce, and therefore we will plan ongoing conservative management.  He will return in 2-3 months or sooner as needed.  If the area progresses in the interim, we may need to consider a biopsy, potentially in clinic versus in the operating room.  We did discuss that the area can be difficult to anesthetize, but he would prefer to avoid the operating room if at all possible.  I appreciate the opportunity to participate in the care of this pleasant patient.

## 2018-07-09 NOTE — NURSING NOTE
Chief Complaint   Patient presents with     RECHECK     3 month follow up -Dysphonia      Blood pressure 117/69, pulse 114, weight 103.9 kg (229 lb), SpO2 95 %.    Isaac Gomez LPN

## 2018-07-29 ENCOUNTER — OFFICE VISIT (OUTPATIENT)
Dept: URGENT CARE | Facility: URGENT CARE | Age: 58
End: 2018-07-29
Payer: COMMERCIAL

## 2018-07-29 ENCOUNTER — NURSE TRIAGE (OUTPATIENT)
Dept: NURSING | Facility: CLINIC | Age: 58
End: 2018-07-29

## 2018-07-29 VITALS
WEIGHT: 229 LBS | OXYGEN SATURATION: 96 % | TEMPERATURE: 97.7 F | BODY MASS INDEX: 34.82 KG/M2 | SYSTOLIC BLOOD PRESSURE: 114 MMHG | HEART RATE: 111 BPM | DIASTOLIC BLOOD PRESSURE: 75 MMHG
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
## 2018-07-29 DIAGNOSIS — Z23 NEED FOR TETANUS BOOSTER: ICD-10-CM

## 2018-07-29 PROCEDURE — 99213 OFFICE O/P EST LOW 20 MIN: CPT | Mod: 25 | Performed by: INTERNAL MEDICINE

## 2018-07-29 PROCEDURE — 90471 IMMUNIZATION ADMIN: CPT | Performed by: INTERNAL MEDICINE

## 2018-07-29 PROCEDURE — 90715 TDAP VACCINE 7 YRS/> IM: CPT | Performed by: INTERNAL MEDICINE

## 2018-07-29 NOTE — TELEPHONE ENCOUNTER
Pt called re: cat bite from last night to his right foot, has two bites, one on the top and one on the side of his foot, states that the bites are puncture marks that are probably greater than 1/8 inch but less than 1/2 inch in size. States the sites bled yesterday but was able to get the bleeding to stop and cleaned the area with hydrogen peroxide and Listerine yesterday.     Protocol and care advice reviewed.  Pt wants to go to [facility], will head in now.  Caller states understanding of the recommended disposition.   Advised to call back if further questions or concerns.      Reason for Disposition    [1] Cut (length > 1/8 inch or 3 mm) or skin tear AND[2] any animal    Additional Information    Negative: [1] Major bleeding (e.g., actively dripping or spurting) AND [2] can't be stopped    Negative: Sounds like a life-threatening emergency to the triager    Negative: Snake bite    Negative: Bite, wound, or sting from fish    Negative: [1] Any break in skin (e.g., cut, puncture or scratch) AND[2] wild animal at risk for RABIES (e.g., bat, raccoon, campbell, skunk, coyote, other carnivores)    Negative: [1] Any break in skin (e.g., cut, puncture or scratch) AND[2] dog, cat, or ferret at risk for RABIES (e.g., sick, stray, unprovoked bite, developing country)    Negative: [1] Any break in skin (e.g., cut, puncture or scratch) AND[2] monkey    Protocols used: [facility]

## 2018-07-29 NOTE — PROGRESS NOTES
SUBJECTIVE:  Gary Iyer is an 58 year old male who presents for cat bite last night.  Bit right foot.  Cat belongs to his family and has had its shots.  Today hurts to touch it, but not hurt if not touched.  Some redness around the bite.  No fevers, chills, sweats.  Hurts more if pushes on it. No medications taken for it. Has h/o DM but denies any foot problems or decreased sensation.         has a past medical history of Diabetes (H); Multinodular goiter (nontoxic) (6/10/2013); and Sleep apnea (8yr).  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 No     Social History Narrative     Family History   Problem Relation Age of Onset     C.A.D. Father      Hypertension Mother      Diabetes No family hx of      Cerebrovascular Disease No family hx of      Cancer No family hx of      Glaucoma No family hx of      Macular Degeneration No family hx of        ALLERGIES:  Review of patient's allergies indicates no known allergies.    Current Outpatient Prescriptions   Medication     allopurinol (ZYLOPRIM) 300 MG tablet     aspirin 81 MG tablet     atorvastatin (LIPITOR) 20 MG tablet     blood glucose monitoring (ALON MICROLET) lancets     blood glucose monitoring (NO BRAND SPECIFIED) meter device kit     Cholecalciferol (VITAMIN D) 1000 UNITS capsule     hydrochlorothiazide (HYDRODIURIL) 50 MG tablet     hydrocortisone (ANUSOL-HC) 2.5 % rectal cream     hydrocortisone (ANUSOL-HC) 25 MG suppository      hydrocortisone 1 % ointment     ketoconazole (NIZORAL) 2 % cream     lisinopril (PRINIVIL,ZESTRIL) 30 MG tablet     metFORMIN (GLUCOPHAGE) 500 MG tablet     Multiple Vitamin (DAILY MULTIVITAMIN PO)     omeprazole 20 MG tablet     order for DME     order for DME     PARoxetine (PAXIL) 20 MG tablet     psyllium 0.52 G capsule     tamsulosin (FLOMAX) 0.4 MG capsule     triamcinolone (KENALOG) 0.1 % cream     vitamin D (ERGOCALCIFEROL) 40580 UNIT capsule     No current facility-administered medications for this visit.          ROS:  ROS is done and is negative for general/constitutional, eye, ENT, Respiratory, cardiovascular, GI, , Skin, musculoskeletal except as noted elsewhere.  All other review of systems negative except as noted elsewhere.      OBJECTIVE:  /75  Pulse 111  Temp 97.7  F (36.5  C) (Tympanic)  Wt 229 lb (103.9 kg)  SpO2 96%  BMI 34.82 kg/m2  GENERAL APPEARANCE: Alert, in no acute distress  EYES: normal  NOSE:normal  RESP: normal and clear to auscultation  CV:regular rate and rhythm and no murmurs, clicks, or gallops  ABDOMEN: Abdomen soft, non-tender. BS normal. No masses, organomegaly  SKIN: top of right foot with two puncture wounds, c/w cat bite.  Small amount of dried blood nearby and mild erythema at puncture sites, no drainage, mildly tender.  No erythema extending from the wounds.  MUSCULOSKELETAL:Musculoskeletal normal      RESULTS  Results for orders placed or performed in visit on 02/19/18   Lipid panel reflex to direct LDL Fasting   Result Value Ref Range    Cholesterol 169 <200 mg/dL    Triglycerides 141 <150 mg/dL    HDL Cholesterol 61 >39 mg/dL    LDL Cholesterol Calculated 80 <100 mg/dL    Non HDL Cholesterol 108 <130 mg/dL   Comprehensive metabolic panel   Result Value Ref Range    Sodium 134 133 - 144 mmol/L    Potassium 4.0 3.4 - 5.3 mmol/L    Chloride 96 94 - 109 mmol/L    Carbon Dioxide 29 20 - 32 mmol/L    Anion Gap 9 3 - 14 mmol/L    Glucose 124 (H) 70 - 99 mg/dL     Urea Nitrogen 10 7 - 30 mg/dL    Creatinine 0.73 0.66 - 1.25 mg/dL    GFR Estimate >90 >60 mL/min/1.7m2    GFR Estimate If Black >90 >60 mL/min/1.7m2    Calcium 9.1 8.5 - 10.1 mg/dL    Bilirubin Total 0.6 0.2 - 1.3 mg/dL    Albumin 3.8 3.4 - 5.0 g/dL    Protein Total 8.9 (H) 6.8 - 8.8 g/dL    Alkaline Phosphatase 69 40 - 150 U/L    ALT 71 (H) 0 - 70 U/L    AST 50 (H) 0 - 45 U/L   Vitamin D Deficiency   Result Value Ref Range    Vitamin D Deficiency screening 8 (L) 20 - 75 ug/L   TSH with free T4 reflex   Result Value Ref Range    TSH 2.94 0.40 - 4.00 mU/L   Hemoglobin A1c   Result Value Ref Range    Hemoglobin A1C 5.9 4.3 - 6.0 %   Prostate spec antigen screen   Result Value Ref Range    PSA 0.88 0 - 4 ug/L   CBC with platelets differential   Result Value Ref Range    WBC 9.0 4.0 - 11.0 10e9/L    RBC Count 4.64 4.4 - 5.9 10e12/L    Hemoglobin 14.0 13.3 - 17.7 g/dL    Hematocrit 42.0 40.0 - 53.0 %    MCV 91 78 - 100 fl    MCH 30.2 26.5 - 33.0 pg    MCHC 33.3 31.5 - 36.5 g/dL    RDW 14.4 10.0 - 15.0 %    Platelet Count 310 150 - 450 10e9/L    Diff Method Automated Method     % Neutrophils 60.0 %    % Lymphocytes 28.1 %    % Monocytes 9.5 %    % Eosinophils 2.1 %    % Basophils 0.3 %    Absolute Neutrophil 5.4 1.6 - 8.3 10e9/L    Absolute Lymphocytes 2.5 0.8 - 5.3 10e9/L    Absolute Monocytes 0.9 0.0 - 1.3 10e9/L    Absolute Eosinophils 0.2 0.0 - 0.7 10e9/L    Absolute Basophils 0.0 0.0 - 0.2 10e9/L   .  No results found for this or any previous visit (from the past 48 hour(s)).    ASSESSMENT/PLAN:    ASSESSMENT / PLAN:  (W55.01XA) Cat bite, initial encounter  (primary encounter diagnosis)  Comment: will update tetanus vaccine and start augmentin.  Cat is a pet and has all its vaccinations current per pt.  As has DM, is at increased risk of infection so reviewed signs and sxs to watch for at length.  Plan: TDAP VACCINE (BOOSTRIX),         amoxicillin-clavulanate (AUGMENTIN) 875-125 MG         per tablet        Reviewed  medication instructions and side effects. Follow up if experiences side effects.. I reviewed supportive care including wound care, expected course, and signs of concern including signs of infection.  Follow up as needed or if he does not improve within 5 day(s) or if worsens in any way.  Reviewed red flag symptoms and is to go to the ER if experiences any of these.  Also advised to monitor his blood sugars more frequently while healing and on abx.    (Z23) Need for tetanus booster  Comment:   Plan: TDAP VACCINE (BOOSTRIX), VACCINE         ADMINISTRATION, INITIAL              See [facility] for orders, medications, letters, patient instructions    Mariza Nolen M.D.

## 2018-07-29 NOTE — MR AVS SNAPSHOT
After Visit Summary   7/29/2018    Gary Iyer    MRN: [id number]           Patient Information     Date Of Birth          1960        Visit Information        Provider Department      7/29/2018 12:05 PM Mariza Nolen MD [facility]        Today's Diagnoses     Cat bite, initial encounter    -  1    Need for tetanus booster           Follow-ups after your visit        Follow-up notes from your care team     Return in about 5 days (around 8/3/2018), or if symptoms worsen or fail to improve, for follow up with primary doctor.      Who to contact     If you have questions or need follow up information about today's clinic visit or your schedule please contact [facility] directly at [phone].  Normal or non-critical lab and imaging results will be communicated to you by MyChart, letter or phone within 4 business days after the clinic has received the results. If you do not hear from us within 7 days, please contact the clinic through adflyerhart or phone. If you have a critical or abnormal lab result, we will notify you by phone as soon as possible.  Submit refill requests through [employer] or call your pharmacy and they will forward the refill request to us. Please allow 3 business days for your refill to be completed.          Additional Information About Your Visit        MyChart Information     [employer] gives you secure access to your electronic health record. If you see a primary care provider, you can also send messages to your care team and make appointments. If you have questions, please call your primary care clinic.  If you do not have a primary care provider, please call [phone] and they will assist you.        Care EveryWhere ID     This is your Care EveryWhere ID. This could be used by other organizations to access your [place] medical records  [id number]        Your Vitals Were     Pulse Temperature Pulse Oximetry BMI (Body Mass Index)          111  97.7  F (36.5  C) (Tympanic) 96% 34.82 kg/m2         Blood Pressure from Last 3 Encounters:   07/29/18 114/75   07/09/18 117/69   06/07/18 113/66    Weight from Last 3 Encounters:   07/29/18 229 lb (103.9 kg)   07/09/18 229 lb (103.9 kg)   06/07/18 228 lb (103.4 kg)              We Performed the Following     TDAP VACCINE (ADACEL)     VACCINE ADMINISTRATION, INITIAL          Today's Medication Changes          These changes are accurate as of 7/29/18 12:36 PM.  If you have any questions, ask your nurse or doctor.               Start taking these medicines.        Dose/Directions    amoxicillin-clavulanate 875-125 MG per tablet   Commonly known as:  AUGMENTIN   Used for:  Cat bite, initial encounter   Started by:  Mariza Nolen MD        Dose:  1 tablet   Take 1 tablet by mouth 2 times daily   Quantity:  20 tablet   Refills:  0            Where to get your medicines      These medications were sent to [facility] [address] [place] [address] [address] [facility] [address] [facility] [address]    Hours:  Test fax successful 12/11/02  KR Phone:  [phone]     amoxicillin-clavulanate 875-125 MG per tablet                Primary Care Provider Office Phone # Fax #    Finesse Beal [phone]       [address] [facility] [address]        Equal Access to Services     [facility] AH: Hadii sloan bradshaw hadasho Soomaali, waaxda luqadaha, qaybta kaalmada adeegyada, waxay sylvie muñoz adepierre santos. So [facility] [phone].    ATENCIÓN: Si habla español, tiene a lucero disposición servicios gratuitos de asistencia lingüística. Pili al [phone].    We comply with applicable federal civil rights laws and [place] laws. We do not discriminate on the basis of race, color, national origin, age, disability, sex, sexual orientation, or gender identity.            Thank you!     Thank you for choosing [facility]  for your care. Our  goal is always to provide you with excellent care. Hearing back from our patients is one way we can continue to improve our services. Please take a few minutes to complete the written survey that you may receive in the mail after your visit with us. Thank you!             Your Updated Medication List - Protect others around you: Learn how to safely use, store and throw away your medicines at www.disposemymeds.org.          This list is accurate as of 7/29/18 12:36 PM.  Always use your most recent med list.                   Brand Name Dispense Instructions for use Diagnosis    allopurinol 300 MG tablet    ZYLOPRIM    90 tablet    TAKE 1 TABLET(300 MG) BY MOUTH DAILY    Gout, unspecified cause, unspecified chronicity, unspecified site       amoxicillin-clavulanate 875-125 MG per tablet    AUGMENTIN    20 tablet    Take 1 tablet by mouth 2 times daily    Cat bite, initial encounter       aspirin 81 MG tablet      Take 1 tablet by mouth daily.        atorvastatin 20 MG tablet    LIPITOR    90 tablet    TAKE 1 TABLET(20 MG) BY MOUTH DAILY    Hyperlipidemia LDL goal <70       blood glucose monitoring lancets     1 Box    1 each 2 times daily Use to test blood sugar 2 times daily or as directed.    Type 2 diabetes mellitus without complication (H)       blood glucose monitoring meter device kit    no brand specified    1 kit    Use to test blood sugar 2 times daily or as directed.  Accucheck meter please and all associated strips, lancets, and other supplies, 3 month supply with refills for a year.    Type 2 diabetes mellitus without complication, without long-term current use of insulin (H)       DAILY MULTIVITAMIN PO      Take  by mouth daily.        hydrochlorothiazide 50 MG tablet    HYDRODIURIL    90 tablet    TAKE 1 TABLET(50 MG) BY MOUTH EVERY MORNING    Hypertension goal BP (blood pressure) < 140/90       hydrocortisone 1 % ointment     30 g    Apply topically 2 times daily as needed    Facial dermatitis        hydrocortisone 2.5 % cream    ANUSOL-HC    30 g    Place rectally 2 times daily as needed for hemorrhoids    External hemorrhoids       hydrocortisone 25 MG Suppository    ANUSOL-HC    20 suppository    Place 1 suppository (25 mg) rectally 2 times daily as needed for hemorrhoids    External hemorrhoids       ketoconazole 2 % cream    NIZORAL    60 g    Apply topically 2 times daily for up to 2 weeks as needed for rash    Seborrheic dermatitis       lisinopril 30 MG tablet    PRINIVIL,ZESTRIL    90 tablet    TAKE 1 TABLET(30 MG) BY MOUTH DAILY    Hypertension goal BP (blood pressure) < 140/90       metFORMIN 500 MG tablet    GLUCOPHAGE    180 tablet    TAKE 1 TABLET(500 MG) BY MOUTH TWICE DAILY WITH MEALS    Type 2 diabetes mellitus without complication, without long-term current use of insulin (H)       omeprazole 20 MG tablet     90 tablet    1 po daily    Dysphonia, Gastroesophageal reflux disease, esophagitis presence not specified, Leukoplakia of larynx       * order for DME     1 Container    Place rectally 2 times daily 1.8 oz container of 0.2 % nifedipine suppository if possible as easier to place the medication.   Apply on the anus every 12 hours.  Just get to where the anus is tight .    Anal fissure       * order for DME     1 Units    Autocpap 10-16 cm    Obstructive sleep apnea       PARoxetine 20 MG tablet    PAXIL    180 tablet    TAKE 2 TABLETS(40 MG) BY MOUTH DAILY    Anxiety       psyllium 0.52 g capsule     100 capsule    Take 1 capsule (0.52 g) by mouth daily    Constipation, unspecified constipation type       tamsulosin 0.4 MG capsule    FLOMAX    90 capsule    TAKE 1 CAPSULE(0.4 MG) BY MOUTH DAILY    Benign prostatic hyperplasia with weak urinary stream       triamcinolone 0.1 % cream    KENALOG    30 g    Apply topically 2 times daily as needed for irritation    Dermatitis       vitamin D 1000 units capsule      Take 1 capsule by mouth daily.        vitamin D 12809 UNIT capsule     ERGOCALCIFEROL    12 capsule    TAKE 1 CAPSULE BY MOUTH EVERY WEEK    Vitamin D deficiency disease       * Notice:  This list has 2 medication(s) that are the same as other medications prescribed for you. Read the directions carefully, and ask your doctor or other care provider to review them with you.

## 2018-08-24 ENCOUNTER — THERAPY VISIT (OUTPATIENT)
Dept: PHYSICAL THERAPY | Facility: CLINIC | Age: 58
End: 2018-08-24
Payer: COMMERCIAL

## 2018-08-24 DIAGNOSIS — M25.512 LEFT SHOULDER PAIN, UNSPECIFIED CHRONICITY: Primary | ICD-10-CM

## 2018-08-24 PROCEDURE — 97161 PT EVAL LOW COMPLEX 20 MIN: CPT | Mod: GP | Performed by: PHYSICAL THERAPIST

## 2018-08-24 PROCEDURE — 97035 APP MDLTY 1+ULTRASOUND EA 15: CPT | Mod: GP | Performed by: PHYSICAL THERAPIST

## 2018-08-24 PROCEDURE — 97110 THERAPEUTIC EXERCISES: CPT | Mod: GP | Performed by: PHYSICAL THERAPIST

## 2018-08-24 NOTE — LETTER
"[facility]  [address]  [place] [address]  [phone]    2018    Re: Gary Iyer   :   1960  MRN:  [id number]   REFERRING PHYSICIAN:   Referred Self    [facility]    Date of Initial Evaluation:  18  Visits:  Rxs Used: 1  Reason for Referral:  Left shoulder pain, unspecified chronicity    [facility] Initial Evaluation    Subjective:  Patient is a 58 year old male presenting with rehab left shoulder hpi. The history is provided by the patient. No  was used.   Gary Iyer is a 58 year old male with a left shoulder condition.  Condition occurred with:  Unknown cause.  Condition occurred: for unknown reasons.  This is a chronic and recurrent condition  Around 18 patient developed increasing left shoulder pain.  He has had the pain off and on for a few years..    Patient reports pain:  Scapular area, lateral and posterior.    Pain is described as aching and is intermittent and reported as 7/10 and 8/10.  Associated symptoms:  Loss of strength. Pain is the same all the time.  Symptoms are exacerbated by lying on extremity, certain positions, using arm overhead and using arm behind back (mowing lawn with push mower, washing hair; lying down to sleep on either side, but worse on left than right) and relieved by rest (partial relief while lying by moving left arm overhead, but not total relief).  Since onset symptoms are unchanged.    Previous treatment includes physical therapy (patient received ~5 visits of treatment from a \"doctor\" in May, described treatment as physical therapy and did not recall name of provider; stated that no injections or surgery with treatment).    General health as reported by patient is good.  Pertinent medical history includes:  Cancer and overweight.  Medical allergies: no.  Other surgeries include:  None reported.  Current medications:  Cardiac medication and high blood " pressure medication.  Current occupation is  (previously ).  Patient is working in normal job without restrictions.  Primary job tasks include:  Prolonged sitting.    Barriers include:  None as reported by the patient.    Red flags:  None as reported by the patient.    Objective:  SHOULDER:   PROM L PROM R AROM L AROM R MMT L MMT R   Flex Pain at 160  160 160 4/5 pain 5/5   Abd 160  110 with pain 160 4-/5 pain 5/5   Full Can     5/5 pain 5/5   Empty Can         IR 70    4/5 5/5   ER 60  20 60 4-/5 5/5   Ext/IR   Reach to left buttock T10       Scapulothoraic Rhythm: poor scapular positioning at rest and poor rhythm during active motion    Palpation: painful to palpation in left infraspinatous muscle and tendon    Special tests:   L R   Impingement     Neer's + -   Hawkin's-Abdulkadir - -   Coracoid Impingement + -   Biceps      Speed's + -   Rotator Cuff Tear     Drop Arm + for pain -   Belly Press nt nt   Lift off  nt nt       Assessment/Plan:    Patient is a 58 year old male with left side shoulder complaints.    Patient has the following significant findings with corresponding treatment plan.                Diagnosis 1:  Left shoulder pain    Pain -  hot/cold therapy, US, manual therapy, self management, education and home program  Decreased ROM/flexibility - manual therapy, therapeutic exercise and home program  Decreased strength - therapeutic exercise, therapeutic activities and home program  Decreased proprioception - neuro re-education, therapeutic activities and home program  Impaired muscle performance - neuro re-education and home program  Decreased function - therapeutic activities and home program  Impaired posture - neuro re-education, therapeutic activities and home program    Therapy Evaluation Codes:   1) History comprised of:   Personal factors that impact the plan of care:      Past/current experiences.    Comorbidity factors that impact the plan of care are:      Cancer, Diabetes,  High blood pressure and Overweight.     Medications impacting care: Cardiac and High blood pressure.  2) Examination of Body Systems comprised of:   Body structures and functions that impact the plan of care:      Shoulder.   Activity limitations that impact the plan of care are:      Bathing, Dressing, Lifting, Sleeping and Laying down.  3) Clinical presentation characteristics are:   Stable/Uncomplicated.  4) Decision-Making    Low complexity using standardized patient assessment instrument and/or measureable assessment of functional outcome.    Cumulative Therapy Evaluation is: Low complexity.  Previous and current functional limitations:  (See Goal Flow Sheet for this information)    Short term and Long term goals: (See Goal Flow Sheet for this information)   Communication ability:  Patient appears to be able to clearly communicate and understand verbal and written communication and follow directions correctly.  Treatment Explanation - The following has been discussed with the patient:   RX ordered/plan of care  Anticipated outcomes  Possible risks and side effects  This patient would benefit from PT intervention to resume normal activities.   Rehab potential is good.    Frequency:  1 X week, once daily  Duration:  for 8 weeks  Discharge Plan:  Achieve all LTG.  Independent in home treatment program.  Reach maximal therapeutic benefit.    Thank you for your referral.    INQUIRIES  Therapist: Ascencion Sarkar, PT, DPT, SCS  [facility]  [address] [place] [address]  Phone: [phone]  Fax: [phone]

## 2018-08-24 NOTE — PROGRESS NOTES
"[facility] Initial Evaluation  Subjective:  Patient is a 58 year old male presenting with rehab left shoulder hpi. The history is provided by the patient. No  was used.   Gary Iyer is a 58 year old male with a left shoulder condition.  Condition occurred with:  Unknown cause.  Condition occurred: for unknown reasons.  This is a chronic and recurrent condition  Around 8/18/18 patient developed increasing left shoulder pain.  He has had the pain off and on for a few years..    Patient reports pain:  Scapular area, lateral and posterior.    Pain is described as aching and is intermittent and reported as 7/10 and 8/10.  Associated symptoms:  Loss of strength. Pain is the same all the time.  Symptoms are exacerbated by lying on extremity, certain positions, using arm overhead and using arm behind back (mowing lawn with push mower, washing hair; lying down to sleep on either side, but worse on left than right) and relieved by rest (partial relief while lying by moving left arm overhead, but not total relief).  Since onset symptoms are unchanged.    Previous treatment includes physical therapy (patient received ~5 visits of treatment from a \"doctor\" in May, described treatment as physical therapy and did not recall name of provider; stated that no injections or surgery with treatment).    General health as reported by patient is good.  Pertinent medical history includes:  Cancer and overweight.  Medical allergies: no.  Other surgeries include:  None reported.  Current medications:  Cardiac medication and high blood pressure medication.  Current occupation is  (previously ).  Patient is working in normal job without restrictions.  Primary job tasks include:  Prolonged sitting.    Barriers include:  None as reported by the patient.    Red flags:  None as reported by the patient.                        Objective:  SHOULDER:   PROM L PROM R AROM L AROM R MMT L MMT " R   Flex Pain at 160  160 160 4/5 pain 5/5   Abd 160  110 with pain 160 4-/5 pain 5/5   Full Can     5/5 pain 5/5   Empty Can         IR 70    4/5 5/5   ER 60  20 60 4-/5 5/5   Ext/IR   Reach to left buttock T10       Scapulothoraic Rhythm: poor scapular positioning at rest and poor rhythm during active motion    Palpation: painful to palpation in left infraspinatous muscle and tendon    Special tests:   L R   Impingement     Neer's + -   Hawkin's-Abdulkadir - -   Coracoid Impingement + -   Biceps      Speed's + -   Rotator Cuff Tear     Drop Arm + for pain -   Belly Press nt nt   Lift off  nt nt       System    Physical Exam    General     ROS    Assessment/Plan:    Patient is a 58 year old male with left side shoulder complaints.    Patient has the following significant findings with corresponding treatment plan.                Diagnosis 1:  Left shoulder pain    Pain -  hot/cold therapy, US, manual therapy, self management, education and home program  Decreased ROM/flexibility - manual therapy, therapeutic exercise and home program  Decreased strength - therapeutic exercise, therapeutic activities and home program  Decreased proprioception - neuro re-education, therapeutic activities and home program  Impaired muscle performance - neuro re-education and home program  Decreased function - therapeutic activities and home program  Impaired posture - neuro re-education, therapeutic activities and home program    Therapy Evaluation Codes:   1) History comprised of:   Personal factors that impact the plan of care:      Past/current experiences.    Comorbidity factors that impact the plan of care are:      Cancer, Diabetes, High blood pressure and Overweight.     Medications impacting care: Cardiac and High blood pressure.  2) Examination of Body Systems comprised of:   Body structures and functions that impact the plan of care:      Shoulder.   Activity limitations that impact the plan of care are:      Bathing, Dressing,  Lifting, Sleeping and Laying down.  3) Clinical presentation characteristics are:   Stable/Uncomplicated.  4) Decision-Making    Low complexity using standardized patient assessment instrument and/or measureable assessment of functional outcome.  Cumulative Therapy Evaluation is: Low complexity.    Previous and current functional limitations:  (See Goal Flow Sheet for this information)    Short term and Long term goals: (See Goal Flow Sheet for this information)     Communication ability:  Patient appears to be able to clearly communicate and understand verbal and written communication and follow directions correctly.  Treatment Explanation - The following has been discussed with the patient:   RX ordered/plan of care  Anticipated outcomes  Possible risks and side effects  This patient would benefit from PT intervention to resume normal activities.   Rehab potential is good.    Frequency:  1 X week, once daily  Duration:  for 8 weeks  Discharge Plan:  Achieve all LTG.  Independent in home treatment program.  Reach maximal therapeutic benefit.    Please refer to the daily flowsheet for treatment today, total treatment time and time spent performing 1:1 timed codes.

## 2018-08-28 ENCOUNTER — THERAPY VISIT (OUTPATIENT)
Dept: PHYSICAL THERAPY | Facility: CLINIC | Age: 58
End: 2018-08-28
Payer: COMMERCIAL

## 2018-08-28 DIAGNOSIS — M25.512 LEFT SHOULDER PAIN, UNSPECIFIED CHRONICITY: ICD-10-CM

## 2018-08-28 PROCEDURE — 97110 THERAPEUTIC EXERCISES: CPT | Mod: GP | Performed by: PHYSICAL THERAPIST

## 2018-08-28 PROCEDURE — 97035 APP MDLTY 1+ULTRASOUND EA 15: CPT | Mod: GP | Performed by: PHYSICAL THERAPIST

## 2018-09-12 ENCOUNTER — THERAPY VISIT (OUTPATIENT)
Dept: PHYSICAL THERAPY | Facility: CLINIC | Age: 58
End: 2018-09-12
Payer: COMMERCIAL

## 2018-09-12 DIAGNOSIS — M25.512 LEFT SHOULDER PAIN, UNSPECIFIED CHRONICITY: ICD-10-CM

## 2018-09-12 PROCEDURE — 97035 APP MDLTY 1+ULTRASOUND EA 15: CPT | Mod: GP | Performed by: PHYSICAL THERAPIST

## 2018-09-12 PROCEDURE — 97112 NEUROMUSCULAR REEDUCATION: CPT | Mod: GP | Performed by: PHYSICAL THERAPIST

## 2018-09-12 PROCEDURE — 97110 THERAPEUTIC EXERCISES: CPT | Mod: GP | Performed by: PHYSICAL THERAPIST

## 2018-09-19 ENCOUNTER — THERAPY VISIT (OUTPATIENT)
Dept: PHYSICAL THERAPY | Facility: CLINIC | Age: 58
End: 2018-09-19
Payer: COMMERCIAL

## 2018-09-19 DIAGNOSIS — M25.512 LEFT SHOULDER PAIN, UNSPECIFIED CHRONICITY: ICD-10-CM

## 2018-09-19 PROCEDURE — 97035 APP MDLTY 1+ULTRASOUND EA 15: CPT | Mod: GP | Performed by: PHYSICAL THERAPIST

## 2018-09-19 PROCEDURE — 97112 NEUROMUSCULAR REEDUCATION: CPT | Mod: GP | Performed by: PHYSICAL THERAPIST

## 2018-09-19 PROCEDURE — 97110 THERAPEUTIC EXERCISES: CPT | Mod: GP | Performed by: PHYSICAL THERAPIST

## 2018-09-26 ENCOUNTER — THERAPY VISIT (OUTPATIENT)
Dept: PHYSICAL THERAPY | Facility: CLINIC | Age: 58
End: 2018-09-26
Payer: COMMERCIAL

## 2018-09-26 DIAGNOSIS — M25.512 LEFT SHOULDER PAIN, UNSPECIFIED CHRONICITY: ICD-10-CM

## 2018-09-26 PROCEDURE — 97110 THERAPEUTIC EXERCISES: CPT | Mod: GP | Performed by: PHYSICAL THERAPIST

## 2018-09-26 PROCEDURE — 97112 NEUROMUSCULAR REEDUCATION: CPT | Mod: GP | Performed by: PHYSICAL THERAPIST

## 2018-10-01 ENCOUNTER — TELEPHONE (OUTPATIENT)
Dept: OTOLARYNGOLOGY | Facility: CLINIC | Age: 58
End: 2018-10-01

## 2018-10-01 DIAGNOSIS — E11.9 TYPE 2 DIABETES MELLITUS WITHOUT COMPLICATION, WITHOUT LONG-TERM CURRENT USE OF INSULIN (H): ICD-10-CM

## 2018-10-01 NOTE — TELEPHONE ENCOUNTER
Medication is being filled for 1 time refill only due to:  Patient needs to be seen because 6 month diabetes..   Yumiko Love RN

## 2018-10-01 NOTE — TELEPHONE ENCOUNTER
"Requested Prescriptions   Pending Prescriptions Disp Refills     metFORMIN (GLUCOPHAGE) 500 MG tablet [Pharmacy Med Name: METFORMIN 500MG TABLETS] 180 tablet 0    Last Written Prescription Date:  2-16-18  Last Fill Quantity: 180,  # refills: 1   Last office visit: 6/7/2018 with prescribing provider:  2-16-18   Future Office Visit:     Sig: TAKE 1 TABLET(500 MG) BY MOUTH TWICE DAILY WITH MEALS    Biguanide Agents Failed    10/1/2018  3:47 AM       Failed - Patient has documented A1c within the specified period of time.    If HgbA1C is 8 or greater, it needs to be on file within the past 3 months.  If less than 8, must be on file within the past 6 months.     Recent Labs   Lab Test  02/19/18   0905   A1C  5.9            Passed - Blood pressure less than 140/90 in past 6 months    BP Readings from Last 3 Encounters:   07/29/18 114/75   07/09/18 117/69   06/07/18 113/66                Passed - Patient has documented LDL within the past 12 mos.    Recent Labs   Lab Test  02/19/18   0905   LDL  80            Passed - Patient has had a Microalbumin in the past 15 mos.    Recent Labs   Lab Test  08/23/17   1142   MICROL  22   UMALCR  12.69            Passed - Patient is age 10 or older       Passed - Patient's CR is NOT>1.4 OR Patient's EGFR is NOT<45 within past 12 mos.    Recent Labs   Lab Test  02/19/18   0905   GFRESTIMATED  >90   GFRESTBLACK  >90       Recent Labs   Lab Test  02/19/18   0905   CR  0.73            Passed - Patient does NOT have a diagnosis of CHF.       Passed - Recent (6 mo) or future (30 days) visit within the authorizing provider's specialty    Patient had office visit in the last 6 months or has a visit in the next 30 days with authorizing provider or within the authorizing provider's specialty.  See \"Patient Info\" tab in inbasket, or \"Choose Columns\" in Meds & Orders section of the refill encounter.              "

## 2018-10-12 ENCOUNTER — OFFICE VISIT (OUTPATIENT)
Dept: OTOLARYNGOLOGY | Facility: CLINIC | Age: 58
End: 2018-10-12
Payer: COMMERCIAL

## 2018-10-12 VITALS — BODY MASS INDEX: 34.82 KG/M2 | WEIGHT: 229 LBS

## 2018-10-12 DIAGNOSIS — R49.0 DYSPHONIA: Primary | ICD-10-CM

## 2018-10-12 DIAGNOSIS — C32.0 VOCAL CORD CANCER (H): ICD-10-CM

## 2018-10-12 NOTE — PROGRESS NOTES
Dear Colleague:    Gary Iyer recently returned for follow-up at the [facility]. My clinic note from our visit is enclosed below.  Speech recognition software may have been used in the documentation below; input is reviewed before signature to the best of my ability.     I appreciate the ongoing opportunity to participate in this patient's care.    Please feel free to contact me with any questions.      Sincerely yours,  Mel Fournier M.D., M.P.H.  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  Most recently returned to the operating room on February 9, 2017 for an area of new irregularity of the left true vocal fold.  Pathology showed severe dysplasia with negative but close margins (for moderate, but not severe dysplasia).  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    Over the following visits, he continued to have irregularity in that area.  His coughing improved after managing his reflux.  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  We elected to continue to observe closely with the possibility of a repeat biopsy in clinic if it continued to progress.     Mr. Iyer had a good summer, his reflux symptoms are under well controlled with omeprazole 20 mg daily.  He has not noticed any cough anymore.  He denies any dysphagia, odynophagia, fevers, unintentional weight loss, head and neck masses, changes to his voice. He denies any nasal drainage or sneezing with the change in seasons.     Compared to 12 months ago, his cough has resolved.  His voice has remained  unchanged.      MEDICATIONS:     Current Outpatient Prescriptions   Medication Sig Dispense Refill     allopurinol (ZYLOPRIM) 300 MG tablet TAKE 1 TABLET(300 MG) BY MOUTH DAILY 90 tablet 3     amoxicillin-clavulanate (AUGMENTIN) 875-125 MG per tablet Take 1 tablet by mouth 2 times daily 20 tablet 0     aspirin 81 MG tablet Take 1 tablet by mouth daily.       atorvastatin (LIPITOR) 20 MG tablet TAKE 1 TABLET(20 MG) BY MOUTH DAILY 90 tablet 3     blood glucose monitoring (ALON MICROLET) lancets 1 each 2 times daily Use to test blood sugar 2 times daily or as directed. 1 Box 3     blood glucose monitoring (NO BRAND SPECIFIED) meter device kit Use to test blood sugar 2 times daily or as directed.  Accucheck meter please and all associated strips, lancets, and other supplies, 3 month supply with refills for a year. 1 kit 0     Cholecalciferol (VITAMIN D) 1000 UNITS capsule Take 1 capsule by mouth daily.       hydrochlorothiazide (HYDRODIURIL) 50 MG tablet TAKE 1 TABLET(50 MG) BY MOUTH EVERY MORNING 90 tablet 3     hydrocortisone (ANUSOL-HC) 2.5 % rectal cream Place rectally 2 times daily as needed for hemorrhoids 30 g 1     hydrocortisone (ANUSOL-HC) 25 MG suppository Place 1 suppository (25 mg) rectally 2 times daily as needed for hemorrhoids 20 suppository 1     hydrocortisone 1 % ointment Apply topically 2 times daily as needed 30 g 1     ketoconazole (NIZORAL) 2 % cream Apply topically 2 times daily for up to 2 weeks as needed for rash 60 g 1     lisinopril (PRINIVIL,ZESTRIL) 30 MG tablet TAKE 1 TABLET(30 MG) BY MOUTH DAILY 90 tablet 3     metFORMIN (GLUCOPHAGE) 500 MG tablet TAKE 1 TABLET(500 MG) BY MOUTH TWICE DAILY WITH MEALS 180 tablet 0     Multiple Vitamin (DAILY MULTIVITAMIN PO) Take  by mouth daily.       omeprazole 20 MG tablet 1 po daily 90 tablet 3     order for DME Autocpap 10-16 cm 1 Units 0     order for DME Place rectally 2 times daily 1.8 oz container of 0.2 % nifedipine suppository if possible as  easier to place the medication.    Apply on the anus every 12 hours.  Just get to where the anus is tight . 1 Container 5     PARoxetine (PAXIL) 20 MG tablet TAKE 2 TABLETS(40 MG) BY MOUTH DAILY 180 tablet 3     psyllium 0.52 G capsule Take 1 capsule (0.52 g) by mouth daily 100 capsule 3     tamsulosin (FLOMAX) 0.4 MG capsule TAKE 1 CAPSULE(0.4 MG) BY MOUTH DAILY 90 capsule 3     triamcinolone (KENALOG) 0.1 % cream Apply topically 2 times daily as needed for irritation 30 g 1     vitamin D (ERGOCALCIFEROL) 70354 UNIT capsule TAKE 1 CAPSULE BY MOUTH EVERY WEEK 12 capsule 1       ALLERGIES:  No Known Allergies    NEW PMH/PSH: None.     REVIEW OF SYSTEMS:  The patient completed a comprehensive 11 point review of systems (below), which was reviewed. Positives are as noted below.  Patient Supplied Answers to Review of Systems   ENT ROS 4/22/2016   Ears, Nose, Throat Nasal congestion or drainage       PHYSICAL EXAM:  General: The patient was alert and conversant, and in no acute distress.    Neck: No palpable cervical lymphadenopathy, no significant tenderness to palpation of the thyrohyoid space, which was narrow. No obvious thyroid abnormality.  Resp: Breathing comfortably, no stridor or stertor.  Neuro: Symmetric facial function. Other cranial nerve function as documented above.  Psych: Normal affect, pleasant and cooperative.  Voice/speech: Mild dysphonia characterized by roughness and breathiness. Frequent throat clearing.      Intake scores  Last 2 Scores for Patient-Answered VHI Questionnaire  VHI Total Score 12/22/2017 2/26/2018   VHI Total Score 0 9      Last 2 Scores for Patient-Answered EAT Questionnaire  EAT Total Score 12/22/2017 2/26/2018   EAT Total Score 0 1      Last 2 Scores for Patient-Answered CSI Questionnaire  CSI Total Score 12/22/2017 2/26/2018   CSI Total Score 0 0       Procedure:   Flexible fiberoptic laryngoscopy and laryngovideostroboscopy  Indications: This procedure was warranted to  evaluate the patient's laryngeal anatomy and function. Risks, benefits, and alternatives were discussed.  Description: After written informed consent was obtained, a time-out was performed to confirm patient identity, procedure, and procedure site. Topical 3% lidocaine with 0.25% phenylephrine was applied to the nasal cavities. I performed the endoscopy and no complications were apparent. Continuous and stroboscopic light were utilized to assess routine phonation and variable frequency phonation.  Performed by: Mel Fournier MD MPH  SLP: Misty Clark, PhD, CCC-SLP   Findings: Normal nasopharynx. Normal base of tongue, valleculae, and epiglottis. Vocal fold mobility: right: normal; left: normal. Medial edges of the vocal folds: smooth and straight. No focal mucosal lesions were observed on the true vocal folds. Mildly pink left true vocal fold, less so than at prior exams. Glissade produced appropriate elongation. There was mild to moderate supraglottic recruitment with connected speech. Mucosa of false vocal folds, aryepiglottic folds, piriform sinuses, and posterior glottis unremarkable with the exception of slightly decreased leukoplakic region left medial arytenoid. Airway was patent.   Findings as above on NBI.    The addition of stroboscopy allowed evaluation of the mucosal wave.   Amplitude: right: mildly decreased; left: mildly decreased. Symmetry: intermittent symmetry. Closure pattern: normal. Closure plane: at glottic level. Phase distribution: normal.            IMPRESSION AND PLAN:   Gary Iyer returns with no evidence of disease. Left medial arytenoid mucosal irritation persists but is slightly less than at prior. He continues to have frequent throat-clearing.     He would like to have a refresher session of speech therapy to help reduce this, and I would encourage him to have additional sessions if needed.     He will return in three months, or sooner as needed. I appreciate the  opportunity to participate in the care of this pleasant patient.

## 2018-10-12 NOTE — MR AVS SNAPSHOT
After Visit Summary   10/12/2018    Gary Iyer    MRN: [id number]           Patient Information     Date Of Birth          1960        Visit Information        Provider Department      10/12/2018 8:30 AM Misty Clark, SLP [facility]        Today's Diagnoses     Dysphonia    -  1       Follow-ups after your visit        Your next 10 appointments already scheduled     Oct 25, 2018  3:30 PM CDT   Return Visit with Feliciano Aguilar MD   [facility] ([facility])    [address]  [place] [address] [phone]              Who to contact     Please call your clinic at [phone] to:    Ask questions about your health    Make or cancel appointments    Discuss your medicines    Learn about your test results    Speak to your doctor            Additional Information About Your Visit        MyChart Information     [employer] gives you secure access to your electronic health record. If you see a primary care provider, you can also send messages to your care team and make appointments. If you have questions, please call your primary care clinic.  If you do not have a primary care provider, please call [phone] and they will assist you.      [employer] is an electronic gateway that provides easy, online access to your medical records. With [employer], you can request a clinic appointment, read your test results, renew a prescription or communicate with your care team.     To access your existing account, please contact your [facility] or call [phone] for assistance.        Care EveryWhere ID     This is your Care EveryWhere ID. This could be used by other organizations to access your [facility] medical records  [id number]         Blood Pressure from Last 3 Encounters:   07/29/18 114/75   07/09/18 117/69   06/07/18 113/66    Weight from Last 3 Encounters:   10/12/18 103.9 kg (229 lb)    07/29/18 103.9 kg (229 lb)   07/09/18 103.9 kg (229 lb)              Today, you had the following     No orders found for display       Primary Care Provider Office Phone # Fax #    Finesse Beal [phone]       [address] [facility] [address]        Equal Access to Services     [facility]: Hadii aad ku hadasho Soomaali, waaxda luqadaha, qaybta kaalmada adeegyada, waxay idiin hayaan adeeg khlonnysinai lachadn . So [facility] [phone].    ATENCIÓN: Si habla español, tiene a lucero disposición servicios gratuitos de asistencia lingüística. [facility] [phone].    We comply with applicable federal civil rights laws and [place] laws. We do not discriminate on the basis of race, color, national origin, age, disability, sex, sexual orientation, or gender identity.            Thank you!     Thank you for choosing  [facility]  for your care. Our goal is always to provide you with excellent care. Hearing back from our patients is one way we can continue to improve our services. Please take a few minutes to complete the written survey that you may receive in the mail after your visit with us. Thank you!             Your Updated Medication List - Protect others around you: Learn how to safely use, store and throw away your medicines at www.disposemymeds.org.          This list is accurate as of 10/12/18 11:59 PM.  Always use your most recent med list.                   Brand Name Dispense Instructions for use Diagnosis    allopurinol 300 MG tablet    ZYLOPRIM    90 tablet    TAKE 1 TABLET(300 MG) BY MOUTH DAILY    Gout, unspecified cause, unspecified chronicity, unspecified site       amoxicillin-clavulanate 875-125 MG per tablet    AUGMENTIN    20 tablet    Take 1 tablet by mouth 2 times daily    Cat bite, initial encounter       aspirin 81 MG tablet      Take 1 tablet by mouth daily.        atorvastatin 20 MG tablet    LIPITOR    90 tablet    TAKE 1 TABLET(20 MG) BY MOUTH DAILY     Hyperlipidemia LDL goal <70       blood glucose monitoring lancets     1 Box    1 each 2 times daily Use to test blood sugar 2 times daily or as directed.    Type 2 diabetes mellitus without complication (H)       blood glucose monitoring meter device kit    no brand specified    1 kit    Use to test blood sugar 2 times daily or as directed.  Accucheck meter please and all associated strips, lancets, and other supplies, 3 month supply with refills for a year.    Type 2 diabetes mellitus without complication, without long-term current use of insulin (H)       DAILY MULTIVITAMIN PO      Take  by mouth daily.        hydrochlorothiazide 50 MG tablet    HYDRODIURIL    90 tablet    TAKE 1 TABLET(50 MG) BY MOUTH EVERY MORNING    Hypertension goal BP (blood pressure) < 140/90       hydrocortisone 1 % ointment     30 g    Apply topically 2 times daily as needed    Facial dermatitis       hydrocortisone 2.5 % cream    ANUSOL-HC    30 g    Place rectally 2 times daily as needed for hemorrhoids    External hemorrhoids       hydrocortisone 25 MG Suppository    ANUSOL-HC    20 suppository    Place 1 suppository (25 mg) rectally 2 times daily as needed for hemorrhoids    External hemorrhoids       ketoconazole 2 % cream    NIZORAL    60 g    Apply topically 2 times daily for up to 2 weeks as needed for rash    Seborrheic dermatitis       lisinopril 30 MG tablet    PRINIVIL,ZESTRIL    90 tablet    TAKE 1 TABLET(30 MG) BY MOUTH DAILY    Hypertension goal BP (blood pressure) < 140/90       metFORMIN 500 MG tablet    GLUCOPHAGE    180 tablet    TAKE 1 TABLET(500 MG) BY MOUTH TWICE DAILY WITH MEALS    Type 2 diabetes mellitus without complication, without long-term current use of insulin (H)       omeprazole 20 MG tablet     90 tablet    1 po daily    Dysphonia, Gastroesophageal reflux disease, esophagitis presence not specified, Leukoplakia of larynx       * order for DME     1 Container    Place rectally 2 times daily 1.8 oz  container of 0.2 % nifedipine suppository if possible as easier to place the medication.   Apply on the anus every 12 hours.  Just get to where the anus is tight .    Anal fissure       * order for DME     1 Units    Autocpap 10-16 cm    Obstructive sleep apnea       PARoxetine 20 MG tablet    PAXIL    180 tablet    TAKE 2 TABLETS(40 MG) BY MOUTH DAILY    Anxiety       psyllium 0.52 g capsule     100 capsule    Take 1 capsule (0.52 g) by mouth daily    Constipation, unspecified constipation type       tamsulosin 0.4 MG capsule    FLOMAX    90 capsule    TAKE 1 CAPSULE(0.4 MG) BY MOUTH DAILY    Benign prostatic hyperplasia with weak urinary stream       triamcinolone 0.1 % cream    KENALOG    30 g    Apply topically 2 times daily as needed for irritation    Dermatitis       vitamin D 1000 units capsule      Take 1 capsule by mouth daily.        vitamin D 14186 UNIT capsule    ERGOCALCIFEROL    12 capsule    TAKE 1 CAPSULE BY MOUTH EVERY WEEK    Vitamin D deficiency disease       * Notice:  This list has 2 medication(s) that are the same as other medications prescribed for you. Read the directions carefully, and ask your doctor or other care provider to review them with you.

## 2018-10-12 NOTE — LETTER
10/12/2018      RE: Gary Iyer  [address] [address]       Dear Colleague:    Gary Iyer recently returned for follow-up at the [facility]. My clinic note from our visit is enclosed below.  Speech recognition software may have been used in the documentation below; input is reviewed before signature to the best of my ability.     I appreciate the ongoing opportunity to participate in this patient's care.    Please feel free to contact me with any questions.      Sincerely yours,  Mel Fournier M.D., M.P.H.  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  Most recently returned to the operating room on February 9, 2017 for an area of new irregularity of the left true vocal fold.  Pathology showed severe dysplasia with negative but close margins (for moderate, but not severe dysplasia).  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    Over the following visits, he continued to have irregularity in that area.  His coughing improved after managing his reflux.  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  We elected to continue to observe closely with the possibility of a repeat biopsy in clinic if it continued to progress.     Mr. Iyer had a good summer, his reflux symptoms are under well controlled with omeprazole 20 mg daily.  He has not noticed any cough anymore.  He denies any dysphagia, odynophagia, fevers, unintentional weight loss, head and neck masses, changes to his voice. He denies any nasal drainage or sneezing with the change in seasons.     Compared  to 12 months ago, his cough has resolved.  His voice has remained unchanged.      MEDICATIONS:     Current Outpatient Prescriptions   Medication Sig Dispense Refill     allopurinol (ZYLOPRIM) 300 MG tablet TAKE 1 TABLET(300 MG) BY MOUTH DAILY 90 tablet 3     amoxicillin-clavulanate (AUGMENTIN) 875-125 MG per tablet Take 1 tablet by mouth 2 times daily 20 tablet 0     aspirin 81 MG tablet Take 1 tablet by mouth daily.       atorvastatin (LIPITOR) 20 MG tablet TAKE 1 TABLET(20 MG) BY MOUTH DAILY 90 tablet 3     blood glucose monitoring (ALON MICROLET) lancets 1 each 2 times daily Use to test blood sugar 2 times daily or as directed. 1 Box 3     blood glucose monitoring (NO BRAND SPECIFIED) meter device kit Use to test blood sugar 2 times daily or as directed.  Accucheck meter please and all associated strips, lancets, and other supplies, 3 month supply with refills for a year. 1 kit 0     Cholecalciferol (VITAMIN D) 1000 UNITS capsule Take 1 capsule by mouth daily.       hydrochlorothiazide (HYDRODIURIL) 50 MG tablet TAKE 1 TABLET(50 MG) BY MOUTH EVERY MORNING 90 tablet 3     hydrocortisone (ANUSOL-HC) 2.5 % rectal cream Place rectally 2 times daily as needed for hemorrhoids 30 g 1     hydrocortisone (ANUSOL-HC) 25 MG suppository Place 1 suppository (25 mg) rectally 2 times daily as needed for hemorrhoids 20 suppository 1     hydrocortisone 1 % ointment Apply topically 2 times daily as needed 30 g 1     ketoconazole (NIZORAL) 2 % cream Apply topically 2 times daily for up to 2 weeks as needed for rash 60 g 1     lisinopril (PRINIVIL,ZESTRIL) 30 MG tablet TAKE 1 TABLET(30 MG) BY MOUTH DAILY 90 tablet 3     metFORMIN (GLUCOPHAGE) 500 MG tablet TAKE 1 TABLET(500 MG) BY MOUTH TWICE DAILY WITH MEALS 180 tablet 0     Multiple Vitamin (DAILY MULTIVITAMIN PO) Take  by mouth daily.       omeprazole 20 MG tablet 1 po daily 90 tablet 3     order for DME Autocpap 10-16 cm 1 Units 0     order for DME Place rectally 2 times daily  1.8 oz container of 0.2 % nifedipine suppository if possible as easier to place the medication.    Apply on the anus every 12 hours.  Just get to where the anus is tight . 1 Container 5     PARoxetine (PAXIL) 20 MG tablet TAKE 2 TABLETS(40 MG) BY MOUTH DAILY 180 tablet 3     psyllium 0.52 G capsule Take 1 capsule (0.52 g) by mouth daily 100 capsule 3     tamsulosin (FLOMAX) 0.4 MG capsule TAKE 1 CAPSULE(0.4 MG) BY MOUTH DAILY 90 capsule 3     triamcinolone (KENALOG) 0.1 % cream Apply topically 2 times daily as needed for irritation 30 g 1     vitamin D (ERGOCALCIFEROL) 45777 UNIT capsule TAKE 1 CAPSULE BY MOUTH EVERY WEEK 12 capsule 1       ALLERGIES:  No Known Allergies    NEW PMH/PSH: None.     REVIEW OF SYSTEMS:  The patient completed a comprehensive 11 point review of systems (below), which was reviewed. Positives are as noted below.  Patient Supplied Answers to Review of Systems   ENT ROS 4/22/2016   Ears, Nose, Throat Nasal congestion or drainage       PHYSICAL EXAM:  General: The patient was alert and conversant, and in no acute distress.    Neck: No palpable cervical lymphadenopathy, no significant tenderness to palpation of the thyrohyoid space, which was narrow. No obvious thyroid abnormality.  Resp: Breathing comfortably, no stridor or stertor.  Neuro: Symmetric facial function. Other cranial nerve function as documented above.  Psych: Normal affect, pleasant and cooperative.  Voice/speech: Mild dysphonia characterized by roughness and breathiness. Frequent throat clearing.      Intake scores  Last 2 Scores for Patient-Answered VHI Questionnaire  VHI Total Score 12/22/2017 2/26/2018   VHI Total Score 0 9      Last 2 Scores for Patient-Answered EAT Questionnaire  EAT Total Score 12/22/2017 2/26/2018   EAT Total Score 0 1      Last 2 Scores for Patient-Answered CSI Questionnaire  CSI Total Score 12/22/2017 2/26/2018   CSI Total Score 0 0       Procedure:   Flexible fiberoptic laryngoscopy and  laryngovideostroboscopy  Indications: This procedure was warranted to evaluate the patient's laryngeal anatomy and function. Risks, benefits, and alternatives were discussed.  Description: After written informed consent was obtained, a time-out was performed to confirm patient identity, procedure, and procedure site. Topical 3% lidocaine with 0.25% phenylephrine was applied to the nasal cavities. I performed the endoscopy and no complications were apparent. Continuous and stroboscopic light were utilized to assess routine phonation and variable frequency phonation.  Performed by: Mel Fournier MD MPH  SLP: Misty Clark, PhD, CCC-SLP   Findings: Normal nasopharynx. Normal base of tongue, valleculae, and epiglottis. Vocal fold mobility: right: normal; left: normal. Medial edges of the vocal folds: smooth and straight. No focal mucosal lesions were observed on the true vocal folds. Mildly pink left true vocal fold, less so than at prior exams. Glissade produced appropriate elongation. There was mild to moderate supraglottic recruitment with connected speech. Mucosa of false vocal folds, aryepiglottic folds, piriform sinuses, and posterior glottis unremarkable with the exception of slightly decreased leukoplakic region left medial arytenoid. Airway was patent.   Findings as above on NBI.    The addition of stroboscopy allowed evaluation of the mucosal wave.   Amplitude: right: mildly decreased; left: mildly decreased. Symmetry: intermittent symmetry. Closure pattern: normal. Closure plane: at glottic level. Phase distribution: normal.            IMPRESSION AND PLAN:   Gary Iyer returns with no evidence of disease. Left medial arytenoid mucosal irritation persists but is slightly less than at prior. He continues to have frequent throat-clearing.     He would like to have a refresher session of speech therapy to help reduce this, and I would encourage him to have additional sessions if needed.     He will  return in three months, or sooner as needed. I appreciate the opportunity to participate in the care of this pleasant patient.         Mel Fournier MD

## 2018-10-12 NOTE — NURSING NOTE
Chief Complaint   Patient presents with     RECHECK     history of laryngeal cancer   Weight 103.9 kg (229 lb).    Brando Yen

## 2018-10-12 NOTE — PATIENT INSTRUCTIONS
1.  You were seen in the ENT Clinic today by Dr. Fournier.  If you have any questions or concerns after your appointment, please call [phone].  Press option #1 for scheduling related needs.  Press option #3 for Nurse advice.  2.  Plan is to return to clinic as needed    EHSAN Tovar  [facility] ENT

## 2018-10-12 NOTE — MR AVS SNAPSHOT
After Visit Summary   10/12/2018    Gary Iyer    MRN: [id number]           Patient Information     Date Of Birth          1960        Visit Information        Provider Department      10/12/2018 8:00 AM Mel Fournier MD [facility]        Today's Diagnoses     Dysphonia    -  1    Vocal cord cancer (H)          Care Instructions    1.  You were seen in the ENT Clinic today by Dr. Fournier.  If you have any questions or concerns after your appointment, please call [phone].  Press option #1 for scheduling related needs.  Press option #3 for Nurse advice.  2.  Plan is to return to clinic as needed    EHSAN Tovar  [facility] ENT            Follow-ups after your visit        Your next 10 appointments already scheduled     Oct 22, 2018 11:00 AM CDT   Return Visit with Feliciano Aguilar MD   [facility]    [address]  [place] [address] [phone]              Who to contact     Please call your clinic at [phone] to:    Ask questions about your health    Make or cancel appointments    Discuss your medicines    Learn about your test results    Speak to your doctor            Additional Information About Your Visit        [employer] Information     [employer] gives you secure access to your electronic health record. If you see a primary care provider, you can also send messages to your care team and make appointments. If you have questions, please call your primary care clinic.  If you do not have a primary care provider, please call [phone] and they will assist you.      [employer] is an electronic gateway that provides easy, online access to your medical records. With [employer], you can request a clinic appointment, read your test results, renew a prescription or communicate with your care team.     To access your existing account, please contact your  [facility] or call [phone] for assistance.        Care EveryWhere ID     This is your Care EveryWhere ID. This could be used by other organizations to access your [facility] medical records  [id number]        Your Vitals Were     BMI (Body Mass Index)                   34.82 kg/m2            Blood Pressure from Last 3 Encounters:   07/29/18 114/75   07/09/18 117/69   06/07/18 113/66    Weight from Last 3 Encounters:   10/12/18 103.9 kg (229 lb)   07/29/18 103.9 kg (229 lb)   07/09/18 103.9 kg (229 lb)              We Performed the Following     IMAGESTREAM RECORDING ORDER     LARYNGOSCOPY FLEX/RIGID W STROBOSCOPY        Primary Care Provider Office Phone # Fax #    Finesse Beal [phone]       [address] [address]        Equal Access to Services     [facility]: Hadii aad ku hadasho Soomaali, waaxda luqadaha, qaybta kaalmada adeegyada, waxay antionettein hayaan yobani gutierrez . So [facility] [place] [facility] [phone].    ATENCIÓN: Si habla español, tiene a lucero disposición servicios gratuitos de asistencia lingüística. Llame al [phone].    We comply with applicable federal civil rights laws and [place] laws. We do not discriminate on the basis of race, color, national origin, age, disability, sex, sexual orientation, or gender identity.            Thank you!     Thank you for choosing [facility]  for your care. Our goal is always to provide you with excellent care. Hearing back from our patients is one way we can continue to improve our services. Please take a few minutes to complete the written survey that you may receive in the mail after your visit with us. Thank you!             Your Updated Medication List - Protect others around you: Learn how to safely use, store and throw away your medicines at www.disposemymeds.org.          This list is accurate as of 10/12/18 11:59 PM.  Always use your most recent med list.                    Brand Name Dispense Instructions for use Diagnosis    allopurinol 300 MG tablet    ZYLOPRIM    90 tablet    TAKE 1 TABLET(300 MG) BY MOUTH DAILY    Gout, unspecified cause, unspecified chronicity, unspecified site       amoxicillin-clavulanate 875-125 MG per tablet    AUGMENTIN    20 tablet    Take 1 tablet by mouth 2 times daily    Cat bite, initial encounter       aspirin 81 MG tablet      Take 1 tablet by mouth daily.        atorvastatin 20 MG tablet    LIPITOR    90 tablet    TAKE 1 TABLET(20 MG) BY MOUTH DAILY    Hyperlipidemia LDL goal <70       blood glucose monitoring lancets     1 Box    1 each 2 times daily Use to test blood sugar 2 times daily or as directed.    Type 2 diabetes mellitus without complication (H)       blood glucose monitoring meter device kit    no brand specified    1 kit    Use to test blood sugar 2 times daily or as directed.  Accucheck meter please and all associated strips, lancets, and other supplies, 3 month supply with refills for a year.    Type 2 diabetes mellitus without complication, without long-term current use of insulin (H)       DAILY MULTIVITAMIN PO      Take  by mouth daily.        hydrochlorothiazide 50 MG tablet    HYDRODIURIL    90 tablet    TAKE 1 TABLET(50 MG) BY MOUTH EVERY MORNING    Hypertension goal BP (blood pressure) < 140/90       hydrocortisone 1 % ointment     30 g    Apply topically 2 times daily as needed    Facial dermatitis       hydrocortisone 2.5 % cream    ANUSOL-HC    30 g    Place rectally 2 times daily as needed for hemorrhoids    External hemorrhoids       hydrocortisone 25 MG Suppository    ANUSOL-HC    20 suppository    Place 1 suppository (25 mg) rectally 2 times daily as needed for hemorrhoids    External hemorrhoids       ketoconazole 2 % cream    NIZORAL    60 g    Apply topically 2 times daily for up to 2 weeks as needed for rash    Seborrheic dermatitis       lisinopril 30 MG tablet    PRINIVIL,ZESTRIL    90 tablet    TAKE 1  TABLET(30 MG) BY MOUTH DAILY    Hypertension goal BP (blood pressure) < 140/90       metFORMIN 500 MG tablet    GLUCOPHAGE    180 tablet    TAKE 1 TABLET(500 MG) BY MOUTH TWICE DAILY WITH MEALS    Type 2 diabetes mellitus without complication, without long-term current use of insulin (H)       omeprazole 20 MG tablet     90 tablet    1 po daily    Dysphonia, Gastroesophageal reflux disease, esophagitis presence not specified, Leukoplakia of larynx       * order for DME     1 Container    Place rectally 2 times daily 1.8 oz container of 0.2 % nifedipine suppository if possible as easier to place the medication.   Apply on the anus every 12 hours.  Just get to where the anus is tight .    Anal fissure       * order for DME     1 Units    Autocpap 10-16 cm    Obstructive sleep apnea       PARoxetine 20 MG tablet    PAXIL    180 tablet    TAKE 2 TABLETS(40 MG) BY MOUTH DAILY    Anxiety       psyllium 0.52 g capsule     100 capsule    Take 1 capsule (0.52 g) by mouth daily    Constipation, unspecified constipation type       tamsulosin 0.4 MG capsule    FLOMAX    90 capsule    TAKE 1 CAPSULE(0.4 MG) BY MOUTH DAILY    Benign prostatic hyperplasia with weak urinary stream       triamcinolone 0.1 % cream    KENALOG    30 g    Apply topically 2 times daily as needed for irritation    Dermatitis       vitamin D 1000 units capsule      Take 1 capsule by mouth daily.        vitamin D 48234 UNIT capsule    ERGOCALCIFEROL    12 capsule    TAKE 1 CAPSULE BY MOUTH EVERY WEEK    Vitamin D deficiency disease       * Notice:  This list has 2 medication(s) that are the same as other medications prescribed for you. Read the directions carefully, and ask your doctor or other care provider to review them with you.

## 2018-10-15 NOTE — PROGRESS NOTES
[facility]  Elijah Baker Jr., M.D., F.A.C.S.  Mel Fournier M.D., M.P.H.  Misty Clark, Ph.D., CCC/SLP  Jasmyne Padilla M.M. (voice), MMC., CCC/SLP  Zack Mcdaniels M.M. (voice), M.A., [facility]/SLP    [facility]  VOICE EVALUATION/LARYNGEAL EXAMINATION REPORT    Patient: Gary Iyer  Date of Service: 10/12/2018    HISTORY  PATIENT INFORMATION  Gary Iyer was seen for brief consultation in conjunction with a visit to Dr. Fournier today.  Please refer to Dr. Fournier s dictation for a more complete history and impressions.      Mr. Iyer was seen for routine follow-up.  I have followed him in the past for management of dysphonia.  Today I assisted Dr. Fournier with the laryngeal examination, and reminded Mr. 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  He will make an appointment if he is not successful with the strategies.    DIAGNOSIS/REASON FOR REFERRAL  Dysphonia/ Evaluate, perform laryngeal exam, treat as appropriate    No charge for today s session; charges will be billed at the completion of the evaluation  NO CHARGE FACILITY FEE (31553)    PRIMARY ICD-10 code: R49.0 (Dysphonia)    Misty Clark, Ph.D., CCC-SLP  Speech-Language Pathologist  Director, [facility]  [phone]

## 2018-10-22 ENCOUNTER — OFFICE VISIT (OUTPATIENT)
Dept: ORTHOPEDICS | Facility: CLINIC | Age: 58
End: 2018-10-22
Payer: COMMERCIAL

## 2018-10-22 VITALS
OXYGEN SATURATION: 96 % | DIASTOLIC BLOOD PRESSURE: 93 MMHG | BODY MASS INDEX: 31.21 KG/M2 | SYSTOLIC BLOOD PRESSURE: 159 MMHG | HEART RATE: 111 BPM | WEIGHT: 218 LBS | HEIGHT: 70 IN

## 2018-10-22 DIAGNOSIS — M75.42 IMPINGEMENT SYNDROME, SHOULDER, LEFT: ICD-10-CM

## 2018-10-22 DIAGNOSIS — M25.512 CHRONIC LEFT SHOULDER PAIN: Primary | ICD-10-CM

## 2018-10-22 DIAGNOSIS — G89.29 CHRONIC LEFT SHOULDER PAIN: Primary | ICD-10-CM

## 2018-10-22 PROCEDURE — 99213 OFFICE O/P EST LOW 20 MIN: CPT | Performed by: ORTHOPAEDIC SURGERY

## 2018-10-22 RX ORDER — DIAZEPAM 10 MG
10 TABLET ORAL EVERY 6 HOURS PRN
Qty: 1 TABLET | Refills: 0 | Status: SHIPPED | OUTPATIENT
Start: 2018-10-22 | End: 2018-11-06

## 2018-10-22 ASSESSMENT — PAIN SCALES - GENERAL: PAINLEVEL: SEVERE PAIN (6)

## 2018-10-22 NOTE — MR AVS SNAPSHOT
After Visit Summary   10/22/2018    Gary Iyer    MRN: [id number]           Patient Information     Date Of Birth          1960        Visit Information        Provider Department      10/22/2018 11:00 AM Feliciano Aguilar MD [facility] [place]        Today's Diagnoses     Chronic left shoulder pain    -  1    Impingement syndrome, shoulder, left          Care Instructions    Patient to follow up with Primary Care provider regarding elevated blood pressure.            Follow-ups after your visit        Follow-up notes from your care team     Return if symptoms worsen or fail to improve, for clinical recheck.      Future tests that were ordered for you today     Open Future Orders        Priority Expected Expires Ordered    MR Shoulder Left w/o Contrast Routine  10/22/2019 10/22/2018            Who to contact     If you have questions or need follow up information about today's clinic visit or your schedule please contact [facility] [place] directly at [phone].  Normal or non-critical lab and imaging results will be communicated to you by MyChart, letter or phone within 4 business days after the clinic has received the results. If you do not hear from us within 7 days, please contact the clinic through [employer] or phone. If you have a critical or abnormal lab result, we will notify you by phone as soon as possible.  Submit refill requests through [facility] or call your pharmacy and they will forward the refill request to us. Please allow 3 business days for your refill to be completed.          Additional Information About Your Visit        MyChart Information     [facility] gives you secure access to your electronic health record. If you see a primary care provider, you can also send messages to your care team and make appointments. If you have questions, please call your primary care clinic.  If you do not have a primary care provider, please  "call [phone] and they will assist you.        Care EveryWhere ID     This is your Care EveryWhere ID. This could be used by other organizations to access your [facility] medical records  [id number]        Your Vitals Were     Pulse Height Pulse Oximetry BMI (Body Mass Index)          111 5' 10\" (1.778 m) 96% 31.28 kg/m2         Blood Pressure from Last 3 Encounters:   10/22/18 (!) 159/93   07/29/18 114/75   07/09/18 117/69    Weight from Last 3 Encounters:   10/22/18 218 lb (98.9 kg)   10/12/18 229 lb (103.9 kg)   07/29/18 229 lb (103.9 kg)                 Today's Medication Changes          These changes are accurate as of 10/22/18  5:07 PM.  If you have any questions, ask your nurse or doctor.               Start taking these medicines.        Dose/Directions    diazepam 10 MG tablet   Commonly known as:  VALIUM   Used for:  Chronic left shoulder pain   Started by:  Feliciano Aguilar MD        Dose:  10 mg   Take 1 tablet (10 mg) by mouth every 6 hours as needed for anxiety Take 30-60 minutes before procedure.  Do not operate a vehicle after taking this medication.   Quantity:  1 tablet   Refills:  0            Where to get your medicines      Some of these will need a paper prescription and others can be bought over the counter.  Ask your nurse if you have questions.     Bring a paper prescription for each of these medications     diazepam 10 MG tablet                Primary Care Provider Office Phone # Fax #    Finesse Beal [phone]       [address] [place] [address]        Equal Access to Services     [facility] : Hadii sloan ayala Soshelli, waaxda luqadaha, qaybta kaalmagregory gilmore. So [facility] [phone].    ATENCIÓN: Si habla español, tiene a lucero disposición servicios gratuitos de asistencia lingüística. Llame al [phone].    We comply with applicable federal civil rights laws and [place] laws. We do not " discriminate on the basis of race, color, national origin, age, disability, sex, sexual orientation, or gender identity.            Thank you!     Thank you for choosing [facility]  for your care. Our goal is always to provide you with excellent care. Hearing back from our patients is one way we can continue to improve our services. Please take a few minutes to complete the written survey that you may receive in the mail after your visit with us. Thank you!             Your Updated Medication List - Protect others around you: Learn how to safely use, store and throw away your medicines at www.disposemymeds.org.          This list is accurate as of 10/22/18  5:07 PM.  Always use your most recent med list.                   Brand Name Dispense Instructions for use Diagnosis    allopurinol 300 MG tablet    ZYLOPRIM    90 tablet    TAKE 1 TABLET(300 MG) BY MOUTH DAILY    Gout, unspecified cause, unspecified chronicity, unspecified site       amoxicillin-clavulanate 875-125 MG per tablet    AUGMENTIN    20 tablet    Take 1 tablet by mouth 2 times daily    Cat bite, initial encounter       aspirin 81 MG tablet      Take 1 tablet by mouth daily.        atorvastatin 20 MG tablet    LIPITOR    90 tablet    TAKE 1 TABLET(20 MG) BY MOUTH DAILY    Hyperlipidemia LDL goal <70       blood glucose monitoring lancets     1 Box    1 each 2 times daily Use to test blood sugar 2 times daily or as directed.    Type 2 diabetes mellitus without complication (H)       blood glucose monitoring meter device kit    no brand specified    1 kit    Use to test blood sugar 2 times daily or as directed.  Accucheck meter please and all associated strips, lancets, and other supplies, 3 month supply with refills for a year.    Type 2 diabetes mellitus without complication, without long-term current use of insulin (H)       DAILY MULTIVITAMIN PO      Take  by mouth daily.        diazepam 10 MG tablet    VALIUM    1  tablet    Take 1 tablet (10 mg) by mouth every 6 hours as needed for anxiety Take 30-60 minutes before procedure.  Do not operate a vehicle after taking this medication.    Chronic left shoulder pain       hydrochlorothiazide 50 MG tablet    HYDRODIURIL    90 tablet    TAKE 1 TABLET(50 MG) BY MOUTH EVERY MORNING    Hypertension goal BP (blood pressure) < 140/90       hydrocortisone 1 % ointment     30 g    Apply topically 2 times daily as needed    Facial dermatitis       hydrocortisone 2.5 % cream    ANUSOL-HC    30 g    Place rectally 2 times daily as needed for hemorrhoids    External hemorrhoids       hydrocortisone 25 MG Suppository    ANUSOL-HC    20 suppository    Place 1 suppository (25 mg) rectally 2 times daily as needed for hemorrhoids    External hemorrhoids       ketoconazole 2 % cream    NIZORAL    60 g    Apply topically 2 times daily for up to 2 weeks as needed for rash    Seborrheic dermatitis       lisinopril 30 MG tablet    PRINIVIL,ZESTRIL    90 tablet    TAKE 1 TABLET(30 MG) BY MOUTH DAILY    Hypertension goal BP (blood pressure) < 140/90       metFORMIN 500 MG tablet    GLUCOPHAGE    180 tablet    TAKE 1 TABLET(500 MG) BY MOUTH TWICE DAILY WITH MEALS    Type 2 diabetes mellitus without complication, without long-term current use of insulin (H)       omeprazole 20 MG tablet     90 tablet    1 po daily    Dysphonia, Gastroesophageal reflux disease, esophagitis presence not specified, Leukoplakia of larynx       * order for DME     1 Container    Place rectally 2 times daily 1.8 oz container of 0.2 % nifedipine suppository if possible as easier to place the medication.   Apply on the anus every 12 hours.  Just get to where the anus is tight .    Anal fissure       * order for DME     1 Units    Autocpap 10-16 cm    Obstructive sleep apnea       PARoxetine 20 MG tablet    PAXIL    180 tablet    TAKE 2 TABLETS(40 MG) BY MOUTH DAILY    Anxiety       psyllium 0.52 g capsule     100 capsule    Take 1  capsule (0.52 g) by mouth daily    Constipation, unspecified constipation type       tamsulosin 0.4 MG capsule    FLOMAX    90 capsule    TAKE 1 CAPSULE(0.4 MG) BY MOUTH DAILY    Benign prostatic hyperplasia with weak urinary stream       triamcinolone 0.1 % cream    KENALOG    30 g    Apply topically 2 times daily as needed for irritation    Dermatitis       vitamin D 1000 units capsule      Take 1 capsule by mouth daily.        vitamin D 01576 UNIT capsule    ERGOCALCIFEROL    12 capsule    TAKE 1 CAPSULE BY MOUTH EVERY WEEK    Vitamin D deficiency disease       * Notice:  This list has 2 medication(s) that are the same as other medications prescribed for you. Read the directions carefully, and ask your doctor or other care provider to review them with you.

## 2018-10-22 NOTE — LETTER
10/22/2018         RE: Gary Iyer  [address]  [place] [address]        Dear Colleague,    Thank you for referring your patient, Gary Iyer, to the [facility] [place]. Please see a copy of my visit note below.    CHIEF COMPLAINT:   Chief Complaint   Patient presents with     Left Shoulder - Pain     Shoulder Pain     on going left shoulder pain for more than 2-3 years, pain rate at a 6. pain starts in the back of the shoulder radiating to the side and front of the shoulder. Last cortisone shot was a few years back.       HISTORY:  Gary Iyer is a 58 year old male, right -hand dominant, who is seen for left shoulder pain that started over 1 year ago, 6/19/2017. No injury. Original issue of the left shoulder started 4+ years ago, seen and treated by Dr. Hilliard with Physical Therapy and injection. Pain used to be posterior shoulder, but now is located over the anterior and lateral shoulder. He has severe pain today, 6/10. He had an injection with us on 6/23/2017. This injection worked great, lasting until recently.      Onset: unknown  Symptoms have been worsening since that time.  Aggravated by: with abduction and other shoulder active range of motion   Relieved by: at rest  Present symptoms: pain with ADL's (dressing),  pain with overhead activities,  pain reaching out or away from body (flexion/ abduction),  Pain location: diffuse with movement  Pain severity: 6/10  Pain quality: dull, aching and sharp  Frequency of symptoms: frequently  Associated symptoms: none    Treatment up to this point: physical therapy and cortisone injection.  Has not tried: surgery  Prior history of related problems: Yes, 6/23/2018 with good relief.     Significant Orthopedic past medical history: none  Usual level of recreational activity: sedentary  Usual level of work activity: unemployed    Other PMH:  has a past medical history of Diabetes (H); Multinodular goiter (nontoxic) (6/10/2013);  and Sleep apnea (8yr). He also has no past medical history of Arthritis; Asthma; Blood transfusion; Congestive heart failure, unspecified; COPD (chronic obstructive pulmonary disease) (H); Coronary artery disease; or Unspecified cerebral artery occlusion with cerebral infarction.  Patient Active Problem List   Diagnosis     Hyperlipidemia LDL goal <70     Hypertension goal BP (blood pressure) < 140/90     Gout, chronic     Anxiety     Advanced directives, counseling/discussion     Carotid stenosis     Hx of laryngeal cancer     Type 2 diabetes mellitus without complication (H)     Non morbid obesity due to excess calories     Dysphonia     Vocal process granuloma     Type 2 diabetes mellitus without complication, without long-term current use of insulin (H)     Diabetes mellitus, type 2 (H)     Obstructive sleep apnea- moderate-severe (AHI 25)     Benign prostatic hypertrophy     Psychophysiological insomnia     Periodic limb movements of sleep     Low ferritin     Moderate major depression (H)     Gout, unspecified cause, unspecified chronicity, unspecified site     Chronic cough     Lesion of larynx     Left shoulder pain, unspecified chronicity       Surgical Hx:  has a past surgical history that includes vasectomy (02/03); Head and neck surgery (1/25/11); colonoscopy; ENT surgery (2008); Laryngoscopy with biopsy(ies) (1-25-11); colonoscopy (6-22-12); Laser CO2 laryngoscopy, complex (6/27/2013); orthopedic surgery (March 2016); Laryngoscopy with microscope (N/A, 2/9/2017); Laryngoscopy with biopsy(ies) (N/A, 2/9/2017); and Laser CO2 lesion oral (N/A, 2/9/2017).    Medications:   Current Outpatient Prescriptions:      allopurinol (ZYLOPRIM) 300 MG tablet, TAKE 1 TABLET(300 MG) BY MOUTH DAILY, Disp: 90 tablet, Rfl: 3     amoxicillin-clavulanate (AUGMENTIN) 875-125 MG per tablet, Take 1 tablet by mouth 2 times daily, Disp: 20 tablet, Rfl: 0     aspirin 81 MG tablet, Take 1 tablet by mouth daily., Disp: , Rfl:       atorvastatin (LIPITOR) 20 MG tablet, TAKE 1 TABLET(20 MG) BY MOUTH DAILY, Disp: 90 tablet, Rfl: 3     blood glucose monitoring (ALON MICROLET) lancets, 1 each 2 times daily Use to test blood sugar 2 times daily or as directed., Disp: 1 Box, Rfl: 3     blood glucose monitoring (NO BRAND SPECIFIED) meter device kit, Use to test blood sugar 2 times daily or as directed.  Accucheck meter please and all associated strips, lancets, and other supplies, 3 month supply with refills for a year., Disp: 1 kit, Rfl: 0     Cholecalciferol (VITAMIN D) 1000 UNITS capsule, Take 1 capsule by mouth daily., Disp: , Rfl:      hydrochlorothiazide (HYDRODIURIL) 50 MG tablet, TAKE 1 TABLET(50 MG) BY MOUTH EVERY MORNING, Disp: 90 tablet, Rfl: 3     hydrocortisone (ANUSOL-HC) 2.5 % rectal cream, Place rectally 2 times daily as needed for hemorrhoids, Disp: 30 g, Rfl: 1     hydrocortisone (ANUSOL-HC) 25 MG suppository, Place 1 suppository (25 mg) rectally 2 times daily as needed for hemorrhoids, Disp: 20 suppository, Rfl: 1     hydrocortisone 1 % ointment, Apply topically 2 times daily as needed, Disp: 30 g, Rfl: 1     ketoconazole (NIZORAL) 2 % cream, Apply topically 2 times daily for up to 2 weeks as needed for rash, Disp: 60 g, Rfl: 1     lisinopril (PRINIVIL,ZESTRIL) 30 MG tablet, TAKE 1 TABLET(30 MG) BY MOUTH DAILY, Disp: 90 tablet, Rfl: 3     metFORMIN (GLUCOPHAGE) 500 MG tablet, TAKE 1 TABLET(500 MG) BY MOUTH TWICE DAILY WITH MEALS, Disp: 180 tablet, Rfl: 0     Multiple Vitamin (DAILY MULTIVITAMIN PO), Take  by mouth daily., Disp: , Rfl:      omeprazole 20 MG tablet, 1 po daily, Disp: 90 tablet, Rfl: 3     order for DME, Autocpap 10-16 cm, Disp: 1 Units, Rfl: 0     order for DME, Place rectally 2 times daily 1.8 oz container of 0.2 % nifedipine suppository if possible as easier to place the medication.   Apply on the anus every 12 hours.  Just get to where the anus is tight ., Disp: 1 Container, Rfl: 5     PARoxetine (PAXIL) 20 MG  "tablet, TAKE 2 TABLETS(40 MG) BY MOUTH DAILY, Disp: 180 tablet, Rfl: 3     psyllium 0.52 G capsule, Take 1 capsule (0.52 g) by mouth daily, Disp: 100 capsule, Rfl: 3     tamsulosin (FLOMAX) 0.4 MG capsule, TAKE 1 CAPSULE(0.4 MG) BY MOUTH DAILY, Disp: 90 capsule, Rfl: 3     triamcinolone (KENALOG) 0.1 % cream, Apply topically 2 times daily as needed for irritation, Disp: 30 g, Rfl: 1     vitamin D (ERGOCALCIFEROL) 16174 UNIT capsule, TAKE 1 CAPSULE BY MOUTH EVERY WEEK, Disp: 12 capsule, Rfl: 1    Allergies: No Known Allergies    Social Hx: school .  reports that he quit smoking about 18 years ago. He has a 8.00 pack-year smoking history. He has never used smokeless tobacco. He reports that he drinks alcohol. He reports that he does not use illicit drugs.    Family Hx: family history includes C.A.D. in his father; Hypertension in his mother. There is no history of Diabetes, Cerebrovascular Disease, Cancer, Glaucoma, or Macular Degeneration..    REVIEW OF SYSTEMS:  CONSTITUTIONAL:NEGATIVE for fever, chills, change in weight  INTEGUMENTARY/SKIN: NEGATIVE for worrisome rashes, moles or lesions  MUSCULOSKELETAL:See HPI above  NEURO: NEGATIVE for weakness, dizziness or paresthesias    This document serves as a record of the services and decisions personally performed and made by Feliciano Aguilar MD. It was created on his behalf by Edita Vieyra, a trained medical scribe. The creation of this document is based the provider's statements to the medical scribe.    Scribsamantha Vieyra 11:18 AM 10/22/2018     PHYSICAL EXAM:  BP (!) 159/93  Pulse 111  Ht 5' 10\" (1.778 m)  Wt 218 lb (98.9 kg)  SpO2 96%  BMI 31.28 kg/m2   GENERAL APPEARANCE: healthy, alert, no distress  SKIN: no suspicious lesions or rashes  NEURO: Normal strength and tone, mentation intact and speech normal  PSYCH:  mentation appears normal and affect normal, not anxious  RESPIRATORY: No increased work of breathing.  VASCULAR: Radial pulses 2+ and brisk " capillary refill     MUSCULOSKELETAL:    NECK:  There is some curvature of the upper thoracic spine.    RIGHT UPPER EXTREMITY:  Sensation intact to light touch in median, radial, ulnar and axillary nerve distributions  Palpable 2+ radial pulse, brisk capillary refill to all fingers, wwp  Intact epl fpl fdp edc wrist flexion/extension biceps triceps deltoid    RIGHT SHOULDER:  Shoulder Inspection: no swelling, bruising, discoloration, or obvious deformity or asymmetry  Tender: nontender to palpation   Range of Motion:   Active: forward flexion 160+ degrees, external rotation 60 degrees, internal rotation L2  Strength: forward flexion 5/5, External rotation 5/5   Impingement: negative   Special tests: Empty Can: Negative    LEFT UPPER EXTREMITY:  Sensation intact to light touch in median, radial, ulnar and axillary nerve distributions  Palpable 2+ radial pulse, brisk capillary refill to all fingers, wwp  Intact epl fpl fdp edc wrist flexion/extension biceps triceps deltoid    LEFT SHOULDER:  Shoulder Inspection: no swelling, bruising, discoloration, noted atrophy the deltoid muscle and supraspinatus   Tender: Greater tuberosity, rhomboids, supraspinatus, infraspinatus  Range of Motion:   Active: forward flexion 130 degrees, external rotation 15 degrees, internal rotation Hip pocket   Passive: forward flexion 160, external rotation 30 degrees  Strength: forward flexion 4+/5, External rotation 4-/5  Impingement: all grade 2 positive  Special tests: Empty can: equivocal     X-RAY INTERPRETATION: no new images today.    3 views left  shoulder obtained 6/23/2017 were reviewed personally in clinic today with the patient. On my review, mild acromio-clavicular degenerative changes.    MRI 12/22/2014 [facility]: findings consistent with impingement. Moderate tendinopathy of the supraspinatus and infraspinatus tendons including a small, 5mm nearly 50% thickness superficial partial thickness tear of the distal insertional  footprint of the anterior supraspinatus tendon. Mild subacromial bursal thickening/bursitis. Findings of possible coracoid impingement.      ASSESSMENT: Gary Iyer is a 58 year old male, right -hand dominant, subacromial bursitis and impingement syndrome, rotator cuff tendinosis, cannot rule out possible rotator cuff tear.    PLAN:   * Reviewed imaging with patient. Also, clinical exam findings.  * Discussed with patient that based on physical exam findings, it is not possible to completely rule out rotator cuff tear or progression of previous tendinosis into a tear.  * Since it has been 4 years since his last MRI, consider a repeat MRI. Patient is in agreement with plan.  * A repeat MRI of the left shoulder was ordered for further evaluation.   * Referral to [facility] for open MRI.   * Prescription for valium given to take prior to procedure.    * Rest  * Activity modification - avoid activities that aggravate symptoms.  * NSAIDS - regular use for inflammation, with food, as long as no contra-indications. Please discuss with pcp if needed.  * Ice twice daily to three times daily.  * Compression wrap  * Elevation of extremity to reduce swelling  * Tylenol as needed for pain  * Patient to follow up with Primary Care provider regarding elevated blood pressure     * After having the MRI, I would like the patient to return to clinic to discuss findings, possible cortisone injection.       The information in this document, created by a scribe for me, accurately reflects the services I personally performed and the decisions made by me. I have reviewed and approved this document for accuracy.      Feliciano Aguilar M.D., M.S.  Dept. of Orthopaedic Surgery  [facility]      Again, thank you for allowing me to participate in the care of your patient.        Sincerely,        Feliciano Aguilar MD

## 2018-10-22 NOTE — PROGRESS NOTES
CHIEF COMPLAINT:   Chief Complaint   Patient presents with     Left Shoulder - Pain     Shoulder Pain     on going left shoulder pain for more than 2-3 years, pain rate at a 6. pain starts in the back of the shoulder radiating to the side and front of the shoulder. Last cortisone shot was a few years back.       HISTORY:  Gary Iyer is a 58 year old male, right -hand dominant, who is seen for left shoulder pain that started over 1 year ago, 6/19/2017. No injury. Original issue of the left shoulder started 4+ years ago, seen and treated by Dr. Hilliard with Physical Therapy and injection. Pain used to be posterior shoulder, but now is located over the anterior and lateral shoulder. He has severe pain today, 6/10. He had an injection with us on 6/23/2017. This injection worked great, lasting until recently.      Onset: unknown  Symptoms have been worsening since that time.  Aggravated by: with abduction and other shoulder active range of motion   Relieved by: at rest  Present symptoms: pain with ADL's (dressing),  pain with overhead activities,  pain reaching out or away from body (flexion/ abduction),  Pain location: diffuse with movement  Pain severity: 6/10  Pain quality: dull, aching and sharp  Frequency of symptoms: frequently  Associated symptoms: none    Treatment up to this point: physical therapy and cortisone injection.  Has not tried: surgery  Prior history of related problems: Yes, 6/23/2018 with good relief.     Significant Orthopedic past medical history: none  Usual level of recreational activity: sedentary  Usual level of work activity: unemployed    Other PMH:  has a past medical history of Diabetes (H); Multinodular goiter (nontoxic) (6/10/2013); and Sleep apnea (8yr). He also has no past medical history of Arthritis; Asthma; Blood transfusion; Congestive heart failure, unspecified; COPD (chronic obstructive pulmonary disease) (H); Coronary artery disease; or Unspecified cerebral artery occlusion  with cerebral infarction.  Patient Active Problem List   Diagnosis     Hyperlipidemia LDL goal <70     Hypertension goal BP (blood pressure) < 140/90     Gout, chronic     Anxiety     Advanced directives, counseling/discussion     Carotid stenosis     Hx of laryngeal cancer     Type 2 diabetes mellitus without complication (H)     Non morbid obesity due to excess calories     Dysphonia     Vocal process granuloma     Type 2 diabetes mellitus without complication, without long-term current use of insulin (H)     Diabetes mellitus, type 2 (H)     Obstructive sleep apnea- moderate-severe (AHI 25)     Benign prostatic hypertrophy     Psychophysiological insomnia     Periodic limb movements of sleep     Low ferritin     Moderate major depression (H)     Gout, unspecified cause, unspecified chronicity, unspecified site     Chronic cough     Lesion of larynx     Left shoulder pain, unspecified chronicity       Surgical Hx:  has a past surgical history that includes vasectomy (02/03); Head and neck surgery (1/25/11); colonoscopy; ENT surgery (2008); Laryngoscopy with biopsy(ies) (1-25-11); colonoscopy (6-22-12); Laser CO2 laryngoscopy, complex (6/27/2013); orthopedic surgery (March 2016); Laryngoscopy with microscope (N/A, 2/9/2017); Laryngoscopy with biopsy(ies) (N/A, 2/9/2017); and Laser CO2 lesion oral (N/A, 2/9/2017).    Medications:   Current Outpatient Prescriptions:      allopurinol (ZYLOPRIM) 300 MG tablet, TAKE 1 TABLET(300 MG) BY MOUTH DAILY, Disp: 90 tablet, Rfl: 3     amoxicillin-clavulanate (AUGMENTIN) 875-125 MG per tablet, Take 1 tablet by mouth 2 times daily, Disp: 20 tablet, Rfl: 0     aspirin 81 MG tablet, Take 1 tablet by mouth daily., Disp: , Rfl:      atorvastatin (LIPITOR) 20 MG tablet, TAKE 1 TABLET(20 MG) BY MOUTH DAILY, Disp: 90 tablet, Rfl: 3     blood glucose monitoring (ALON MICROLET) lancets, 1 each 2 times daily Use to test blood sugar 2 times daily or as directed., Disp: 1 Box, Rfl: 3      blood glucose monitoring (NO BRAND SPECIFIED) meter device kit, Use to test blood sugar 2 times daily or as directed.  Accucheck meter please and all associated strips, lancets, and other supplies, 3 month supply with refills for a year., Disp: 1 kit, Rfl: 0     Cholecalciferol (VITAMIN D) 1000 UNITS capsule, Take 1 capsule by mouth daily., Disp: , Rfl:      hydrochlorothiazide (HYDRODIURIL) 50 MG tablet, TAKE 1 TABLET(50 MG) BY MOUTH EVERY MORNING, Disp: 90 tablet, Rfl: 3     hydrocortisone (ANUSOL-HC) 2.5 % rectal cream, Place rectally 2 times daily as needed for hemorrhoids, Disp: 30 g, Rfl: 1     hydrocortisone (ANUSOL-HC) 25 MG suppository, Place 1 suppository (25 mg) rectally 2 times daily as needed for hemorrhoids, Disp: 20 suppository, Rfl: 1     hydrocortisone 1 % ointment, Apply topically 2 times daily as needed, Disp: 30 g, Rfl: 1     ketoconazole (NIZORAL) 2 % cream, Apply topically 2 times daily for up to 2 weeks as needed for rash, Disp: 60 g, Rfl: 1     lisinopril (PRINIVIL,ZESTRIL) 30 MG tablet, TAKE 1 TABLET(30 MG) BY MOUTH DAILY, Disp: 90 tablet, Rfl: 3     metFORMIN (GLUCOPHAGE) 500 MG tablet, TAKE 1 TABLET(500 MG) BY MOUTH TWICE DAILY WITH MEALS, Disp: 180 tablet, Rfl: 0     Multiple Vitamin (DAILY MULTIVITAMIN PO), Take  by mouth daily., Disp: , Rfl:      omeprazole 20 MG tablet, 1 po daily, Disp: 90 tablet, Rfl: 3     order for DME, Autocpap 10-16 cm, Disp: 1 Units, Rfl: 0     order for DME, Place rectally 2 times daily 1.8 oz container of 0.2 % nifedipine suppository if possible as easier to place the medication.   Apply on the anus every 12 hours.  Just get to where the anus is tight ., Disp: 1 Container, Rfl: 5     PARoxetine (PAXIL) 20 MG tablet, TAKE 2 TABLETS(40 MG) BY MOUTH DAILY, Disp: 180 tablet, Rfl: 3     psyllium 0.52 G capsule, Take 1 capsule (0.52 g) by mouth daily, Disp: 100 capsule, Rfl: 3     tamsulosin (FLOMAX) 0.4 MG capsule, TAKE 1 CAPSULE(0.4 MG) BY MOUTH DAILY, Disp: 90  "capsule, Rfl: 3     triamcinolone (KENALOG) 0.1 % cream, Apply topically 2 times daily as needed for irritation, Disp: 30 g, Rfl: 1     vitamin D (ERGOCALCIFEROL) 96098 UNIT capsule, TAKE 1 CAPSULE BY MOUTH EVERY WEEK, Disp: 12 capsule, Rfl: 1    Allergies: No Known Allergies    Social Hx: school .  reports that he quit smoking about 18 years ago. He has a 8.00 pack-year smoking history. He has never used smokeless tobacco. He reports that he drinks alcohol. He reports that he does not use illicit drugs.    Family Hx: family history includes C.A.D. in his father; Hypertension in his mother. There is no history of Diabetes, Cerebrovascular Disease, Cancer, Glaucoma, or Macular Degeneration..    REVIEW OF SYSTEMS:  CONSTITUTIONAL:NEGATIVE for fever, chills, change in weight  INTEGUMENTARY/SKIN: NEGATIVE for worrisome rashes, moles or lesions  MUSCULOSKELETAL:See HPI above  NEURO: NEGATIVE for weakness, dizziness or paresthesias    This document serves as a record of the services and decisions personally performed and made by Feliciano Aguilar MD. It was created on his behalf by Edita Vieyra, a trained medical scribe. The creation of this document is based the provider's statements to the medical scribe.    Scribe Edita Vieyra 11:18 AM 10/22/2018     PHYSICAL EXAM:  BP (!) 159/93  Pulse 111  Ht 5' 10\" (1.778 m)  Wt 218 lb (98.9 kg)  SpO2 96%  BMI 31.28 kg/m2   GENERAL APPEARANCE: healthy, alert, no distress  SKIN: no suspicious lesions or rashes  NEURO: Normal strength and tone, mentation intact and speech normal  PSYCH:  mentation appears normal and affect normal, not anxious  RESPIRATORY: No increased work of breathing.  VASCULAR: Radial pulses 2+ and brisk capillary refill     MUSCULOSKELETAL:    NECK:  There is some curvature of the upper thoracic spine.    RIGHT UPPER EXTREMITY:  Sensation intact to light touch in median, radial, ulnar and axillary nerve distributions  Palpable 2+ radial pulse, brisk " capillary refill to all fingers, wwp  Intact epl fpl fdp edc wrist flexion/extension biceps triceps deltoid    RIGHT SHOULDER:  Shoulder Inspection: no swelling, bruising, discoloration, or obvious deformity or asymmetry  Tender: nontender to palpation   Range of Motion:   Active: forward flexion 160+ degrees, external rotation 60 degrees, internal rotation L2  Strength: forward flexion 5/5, External rotation 5/5   Impingement: negative   Special tests: Empty Can: Negative    LEFT UPPER EXTREMITY:  Sensation intact to light touch in median, radial, ulnar and axillary nerve distributions  Palpable 2+ radial pulse, brisk capillary refill to all fingers, wwp  Intact epl fpl fdp edc wrist flexion/extension biceps triceps deltoid    LEFT SHOULDER:  Shoulder Inspection: no swelling, bruising, discoloration, noted atrophy the deltoid muscle and supraspinatus   Tender: Greater tuberosity, rhomboids, supraspinatus, infraspinatus  Range of Motion:   Active: forward flexion 130 degrees, external rotation 15 degrees, internal rotation Hip pocket   Passive: forward flexion 160, external rotation 30 degrees  Strength: forward flexion 4+/5, External rotation 4-/5  Impingement: all grade 2 positive  Special tests: Empty can: equivocal     X-RAY INTERPRETATION: no new images today.    3 views left  shoulder obtained 6/23/2017 were reviewed personally in clinic today with the patient. On my review, mild acromio-clavicular degenerative changes.    MRI 12/22/2014 [facility]: findings consistent with impingement. Moderate tendinopathy of the supraspinatus and infraspinatus tendons including a small, 5mm nearly 50% thickness superficial partial thickness tear of the distal insertional footprint of the anterior supraspinatus tendon. Mild subacromial bursal thickening/bursitis. Findings of possible coracoid impingement.      ASSESSMENT: Gary Iyer is a 58 year old male, right -hand dominant, subacromial bursitis and impingement  syndrome, rotator cuff tendinosis, cannot rule out possible rotator cuff tear.    PLAN:   * Reviewed imaging with patient. Also, clinical exam findings.  * Discussed with patient that based on physical exam findings, it is not possible to completely rule out rotator cuff tear or progression of previous tendinosis into a tear.  * Since it has been 4 years since his last MRI, consider a repeat MRI. Patient is in agreement with plan.  * A repeat MRI of the left shoulder was ordered for further evaluation.   * Referral to [facility] for open MRI.   * Prescription for valium given to take prior to procedure.    * Rest  * Activity modification - avoid activities that aggravate symptoms.  * NSAIDS - regular use for inflammation, with food, as long as no contra-indications. Please discuss with pcp if needed.  * Ice twice daily to three times daily.  * Compression wrap  * Elevation of extremity to reduce swelling  * Tylenol as needed for pain  * Patient to follow up with Primary Care provider regarding elevated blood pressure     * After having the MRI, I would like the patient to return to clinic to discuss findings, possible cortisone injection.       The information in this document, created by a scribe for me, accurately reflects the services I personally performed and the decisions made by me. I have reviewed and approved this document for accuracy.      Feliciano Aguilar M.D., M.S.  Dept. of Orthopaedic Surgery  [facility]

## 2018-10-30 ENCOUNTER — TRANSFERRED RECORDS (OUTPATIENT)
Dept: HEALTH INFORMATION MANAGEMENT | Facility: CLINIC | Age: 58
End: 2018-10-30

## 2018-10-31 ENCOUNTER — TELEPHONE (OUTPATIENT)
Dept: FAMILY MEDICINE | Facility: CLINIC | Age: 58
End: 2018-10-31

## 2018-10-31 NOTE — TELEPHONE ENCOUNTER
Attempt # 1  Called patient at home [phone]  Was call answered?  No answer, left message to call nurse line at [phone]    Diana Klein RN  [facility]

## 2018-10-31 NOTE — TELEPHONE ENCOUNTER
Reason for Call:  Other prescription    Detailed comments: Patient requesting for nurse to call for recommendation if patient should continue vitamin D. Please advise.     Phone Number Patient can be reached at: Home number on file [phone] (home)    Best Time: Anytime    Can we leave a detailed message on this number? YES    Call taken on 10/31/2018 at 1:24 PM by Debbie Valadez

## 2018-10-31 NOTE — TELEPHONE ENCOUNTER
Routing to PCP to review and advise.    Nurse sees Vitamin D level 2/19/2018 was at 8?  Should patient continue vitamin D ?    Diana Klein RN  [facility]

## 2018-10-31 NOTE — TELEPHONE ENCOUNTER
Advise patient see us in the next few weeks in clinic    We will check vitamin D and some other labs at that appointment.    Please inform patient    Finesse Beal MD

## 2018-11-01 NOTE — TELEPHONE ENCOUNTER
Attempt # 2  Called patient at home [phone]  Was call answered? Yes, relayed below message from provider - scheduled appointment for Tuesday 11/6/2018, Patient verbalized understanding and agreement with plan and had no further questions.       Diana Klein RN  [facility]

## 2018-11-05 ENCOUNTER — OFFICE VISIT (OUTPATIENT)
Dept: ORTHOPEDICS | Facility: CLINIC | Age: 58
End: 2018-11-05
Payer: COMMERCIAL

## 2018-11-05 VITALS
OXYGEN SATURATION: 98 % | SYSTOLIC BLOOD PRESSURE: 133 MMHG | WEIGHT: 232.3 LBS | HEIGHT: 70 IN | HEART RATE: 117 BPM | BODY MASS INDEX: 33.26 KG/M2 | DIASTOLIC BLOOD PRESSURE: 91 MMHG

## 2018-11-05 DIAGNOSIS — M25.512 CHRONIC LEFT SHOULDER PAIN: ICD-10-CM

## 2018-11-05 DIAGNOSIS — M75.42 IMPINGEMENT SYNDROME, SHOULDER, LEFT: ICD-10-CM

## 2018-11-05 DIAGNOSIS — G89.29 CHRONIC LEFT SHOULDER PAIN: ICD-10-CM

## 2018-11-05 DIAGNOSIS — M75.122 COMPLETE TEAR OF LEFT ROTATOR CUFF: Primary | ICD-10-CM

## 2018-11-05 PROCEDURE — 99213 OFFICE O/P EST LOW 20 MIN: CPT | Mod: 25 | Performed by: ORTHOPAEDIC SURGERY

## 2018-11-05 PROCEDURE — 20610 DRAIN/INJ JOINT/BURSA W/O US: CPT | Mod: LT | Performed by: ORTHOPAEDIC SURGERY

## 2018-11-05 RX ORDER — BUPIVACAINE HYDROCHLORIDE 2.5 MG/ML
3 INJECTION, SOLUTION INFILTRATION; PERINEURAL
Status: SHIPPED | OUTPATIENT
Start: 2018-11-05

## 2018-11-05 RX ORDER — LIDOCAINE HYDROCHLORIDE 10 MG/ML
4 INJECTION, SOLUTION INFILTRATION; PERINEURAL
Status: SHIPPED | OUTPATIENT
Start: 2018-11-05

## 2018-11-05 RX ORDER — TRIAMCINOLONE ACETONIDE 40 MG/ML
80 INJECTION, SUSPENSION INTRA-ARTICULAR; INTRAMUSCULAR
Status: SHIPPED | OUTPATIENT
Start: 2018-11-05

## 2018-11-05 RX ADMIN — LIDOCAINE HYDROCHLORIDE 4 ML: 10 INJECTION, SOLUTION INFILTRATION; PERINEURAL at 10:25

## 2018-11-05 RX ADMIN — BUPIVACAINE HYDROCHLORIDE 3 ML: 2.5 INJECTION, SOLUTION INFILTRATION; PERINEURAL at 10:25

## 2018-11-05 RX ADMIN — TRIAMCINOLONE ACETONIDE 80 MG: 40 INJECTION, SUSPENSION INTRA-ARTICULAR; INTRAMUSCULAR at 10:25

## 2018-11-05 ASSESSMENT — PAIN SCALES - GENERAL: PAINLEVEL: MODERATE PAIN (4)

## 2018-11-05 NOTE — LETTER
11/5/2018         RE: Gary Iyer  [address] [facility]  [place] [address]        Dear Colleague,    Thank you for referring your patient, Gary Iyer, to the [facility]. Please see a copy of my visit note below.    CHIEF COMPLAINT:   Chief Complaint   Patient presents with     Left Shoulder - Pain     Patient is here for MRI f/u. Patient states he continues to have pain on and off in his shoulder. Some days are worse than others.        HISTORY:  Gary Iyer is a 58 year old male, right -hand dominant, who is seen for follow up of left shoulder pain that started over 1 year ago, 6/19/2017. No injury. Original issue of the left shoulder started 4+ years ago; was seen and treated by Dr. Hilliard with Physical Therapy and injection. Pain used to be posterior shoulder, but now is located over the anterior and lateral shoulder. He has moderate pain today, 4/10. Continues to have pain, on and off, in the shoulder. Some days are worse than others. He had an injection with us on 6/23/2017. This injection worked great, lasting until late October 2018. Presents today to discuss recent MRI.       Onset: unknown  Symptoms have been worsening since that time.  Aggravated by: with abduction and other shoulder active range of motion   Relieved by: at rest  Present symptoms: pain with ADL's (dressing),  pain with overhead activities,  pain reaching out or away from body (flexion/ abduction),  Pain location: diffuse with movement  Pain severity: 6/10  Pain quality: dull, aching and sharp  Frequency of symptoms: frequently  Associated symptoms: none    Treatment up to this point: physical therapy and cortisone injection.  Has not tried: surgery  Prior history of related problems: Yes, 6/23/2018 with good relief.     Significant Orthopedic past medical history: none  Usual level of recreational activity: sedentary  Usual level of work activity: unemployed    Other PMH:  has a past medical  history of Diabetes (H); Multinodular goiter (nontoxic) (6/10/2013); and Sleep apnea (8yr). He also has no past medical history of Arthritis; Asthma; Blood transfusion; Congestive heart failure, unspecified; COPD (chronic obstructive pulmonary disease) (H); Coronary artery disease; or Unspecified cerebral artery occlusion with cerebral infarction.  Patient Active Problem List   Diagnosis     Hyperlipidemia LDL goal <70     Hypertension goal BP (blood pressure) < 140/90     Gout, chronic     Anxiety     Advanced directives, counseling/discussion     Carotid stenosis     Hx of laryngeal cancer     Type 2 diabetes mellitus without complication (H)     Non morbid obesity due to excess calories     Dysphonia     Vocal process granuloma     Type 2 diabetes mellitus without complication, without long-term current use of insulin (H)     Diabetes mellitus, type 2 (H)     Obstructive sleep apnea- moderate-severe (AHI 25)     Benign prostatic hypertrophy     Psychophysiological insomnia     Periodic limb movements of sleep     Low ferritin     Moderate major depression (H)     Gout, unspecified cause, unspecified chronicity, unspecified site     Chronic cough     Lesion of larynx     Left shoulder pain, unspecified chronicity       Surgical Hx:  has a past surgical history that includes vasectomy (02/03); Head and neck surgery (1/25/11); colonoscopy; ENT surgery (2008); Laryngoscopy with biopsy(ies) (1-25-11); colonoscopy (6-22-12); Laser CO2 laryngoscopy, complex (6/27/2013); orthopedic surgery (March 2016); Laryngoscopy with microscope (N/A, 2/9/2017); Laryngoscopy with biopsy(ies) (N/A, 2/9/2017); and Laser CO2 lesion oral (N/A, 2/9/2017).    Medications:   Current Outpatient Prescriptions:      allopurinol (ZYLOPRIM) 300 MG tablet, TAKE 1 TABLET(300 MG) BY MOUTH DAILY, Disp: 90 tablet, Rfl: 3     amoxicillin-clavulanate (AUGMENTIN) 875-125 MG per tablet, Take 1 tablet by mouth 2 times daily, Disp: 20 tablet, Rfl: 0      aspirin 81 MG tablet, Take 1 tablet by mouth daily., Disp: , Rfl:      atorvastatin (LIPITOR) 20 MG tablet, TAKE 1 TABLET(20 MG) BY MOUTH DAILY, Disp: 90 tablet, Rfl: 3     blood glucose monitoring (ALON MICROLET) lancets, 1 each 2 times daily Use to test blood sugar 2 times daily or as directed., Disp: 1 Box, Rfl: 3     blood glucose monitoring (NO BRAND SPECIFIED) meter device kit, Use to test blood sugar 2 times daily or as directed.  Accucheck meter please and all associated strips, lancets, and other supplies, 3 month supply with refills for a year., Disp: 1 kit, Rfl: 0     Cholecalciferol (VITAMIN D) 1000 UNITS capsule, Take 1 capsule by mouth daily., Disp: , Rfl:      diazepam (VALIUM) 10 MG tablet, Take 1 tablet (10 mg) by mouth every 6 hours as needed for anxiety Take 30-60 minutes before procedure.  Do not operate a vehicle after taking this medication., Disp: 1 tablet, Rfl: 0     hydrochlorothiazide (HYDRODIURIL) 50 MG tablet, TAKE 1 TABLET(50 MG) BY MOUTH EVERY MORNING, Disp: 90 tablet, Rfl: 3     hydrocortisone (ANUSOL-HC) 2.5 % rectal cream, Place rectally 2 times daily as needed for hemorrhoids, Disp: 30 g, Rfl: 1     hydrocortisone (ANUSOL-HC) 25 MG suppository, Place 1 suppository (25 mg) rectally 2 times daily as needed for hemorrhoids, Disp: 20 suppository, Rfl: 1     hydrocortisone 1 % ointment, Apply topically 2 times daily as needed, Disp: 30 g, Rfl: 1     ketoconazole (NIZORAL) 2 % cream, Apply topically 2 times daily for up to 2 weeks as needed for rash, Disp: 60 g, Rfl: 1     lisinopril (PRINIVIL,ZESTRIL) 30 MG tablet, TAKE 1 TABLET(30 MG) BY MOUTH DAILY, Disp: 90 tablet, Rfl: 3     metFORMIN (GLUCOPHAGE) 500 MG tablet, TAKE 1 TABLET(500 MG) BY MOUTH TWICE DAILY WITH MEALS, Disp: 180 tablet, Rfl: 0     Multiple Vitamin (DAILY MULTIVITAMIN PO), Take  by mouth daily., Disp: , Rfl:      omeprazole 20 MG tablet, 1 po daily, Disp: 90 tablet, Rfl: 3     order for DME, Autocpap 10-16 cm, Disp: 1  "Units, Rfl: 0     order for DME, Place rectally 2 times daily 1.8 oz container of 0.2 % nifedipine suppository if possible as easier to place the medication.   Apply on the anus every 12 hours.  Just get to where the anus is tight ., Disp: 1 Container, Rfl: 5     PARoxetine (PAXIL) 20 MG tablet, TAKE 2 TABLETS(40 MG) BY MOUTH DAILY, Disp: 180 tablet, Rfl: 3     psyllium 0.52 G capsule, Take 1 capsule (0.52 g) by mouth daily, Disp: 100 capsule, Rfl: 3     tamsulosin (FLOMAX) 0.4 MG capsule, TAKE 1 CAPSULE(0.4 MG) BY MOUTH DAILY, Disp: 90 capsule, Rfl: 3     triamcinolone (KENALOG) 0.1 % cream, Apply topically 2 times daily as needed for irritation, Disp: 30 g, Rfl: 1     vitamin D (ERGOCALCIFEROL) 39320 UNIT capsule, TAKE 1 CAPSULE BY MOUTH EVERY WEEK, Disp: 12 capsule, Rfl: 1    Allergies: No Known Allergies    Social Hx: school .  reports that he quit smoking about 18 years ago. He has a 8.00 pack-year smoking history. He has never used smokeless tobacco. He reports that he drinks alcohol. He reports that he does not use illicit drugs.    Family Hx: family history includes C.A.D. in his father; Hypertension in his mother. There is no history of Diabetes, Cerebrovascular Disease, Cancer, Glaucoma, or Macular Degeneration..    REVIEW OF SYSTEMS:  CONSTITUTIONAL:NEGATIVE for fever, chills, change in weight  INTEGUMENTARY/SKIN: NEGATIVE for worrisome rashes, moles or lesions  MUSCULOSKELETAL:See HPI above  NEURO: NEGATIVE for weakness, dizziness or paresthesias    This document serves as a record of the services and decisions personally performed and made by Feliciano Aguilar MD. It was created on his behalf by Edita Vieyra, a trained medical scribe. The creation of this document is based the provider's statements to the medical scribe.    Bismark Vieyra 10:08 AM 11/5/2018        PHYSICAL EXAM:  BP (!) 133/91  Pulse 117  Ht 5' 10\" (1.778 m)  Wt 232 lb 4.8 oz (105.4 kg)  SpO2 98%  BMI 33.33 kg/m2   GENERAL " APPEARANCE: healthy, alert, no distress  SKIN: no suspicious lesions or rashes  NEURO: Normal strength and tone, mentation intact and speech normal  PSYCH:  mentation appears normal and affect normal, not anxious  RESPIRATORY: No increased work of breathing.  VASCULAR: Radial pulses 2+ and brisk capillary refill     MUSCULOSKELETAL:    NECK:  There is some curvature of the upper thoracic spine.    RIGHT UPPER EXTREMITY:  Sensation intact to light touch in median, radial, ulnar and axillary nerve distributions  Palpable 2+ radial pulse, brisk capillary refill to all fingers, wwp  Intact epl fpl fdp edc wrist flexion/extension biceps triceps deltoid    RIGHT SHOULDER:  Shoulder Inspection: no swelling, bruising, discoloration, or obvious deformity or asymmetry  Tender: nontender to palpation   Range of Motion:   Active: forward flexion 160+ degrees, external rotation 60 degrees, internal rotation L2  Strength: forward flexion 5/5, External rotation 5/5   Impingement: negative   Special tests: Empty Can: Negative    LEFT UPPER EXTREMITY:  Sensation intact to light touch in median, radial, ulnar and axillary nerve distributions  Palpable 2+ radial pulse, brisk capillary refill to all fingers, wwp  Intact epl fpl fdp edc wrist flexion/extension biceps triceps deltoid    LEFT SHOULDER:  Shoulder Inspection: no swelling, bruising, discoloration, noted atrophy the deltoid muscle and supraspinatus   Tender: Greater tuberosity, rhomboids, supraspinatus, infraspinatus  Range of Motion:   Active: forward flexion 130 degrees, external rotation 15 degrees, internal rotation Hip pocket   Passive: forward flexion 160, external rotation 30 degrees  Strength: forward flexion 4+/5, External rotation 4-/5  Impingement: all grade 2 positive  Special tests: Empty can: equivocal     X-RAY INTERPRETATION: no new images today.    MRI left shoulder 10/30/2018 [facility]:  1. There is a moderate-sized bursal sided tear of the mid to posterior  supraspinatus tendon insertion. 1.4 cm AP by 1.5 cm mediolateral . This is a high-grade appearance of tear, with likely associated full-thickness involvement. Torn tendon fibers remain situated well lateral to the mid humeral head level, and without resultant muscle belly atrophy.  2. Mild to moderate AC joint DJD with borderline narrowing of the acromiohumeral interval.  3. Unremarkable, intact long head biceps tendon.  4. No evidence for glenohumeral chondromalacia/osteoarthritis.      3 views left shoulder obtained 6/23/2017 were reviewed personally in clinic today with the patient. On my review, mild acromio-clavicular degenerative changes.    MRI 12/22/2014 [facility]: findings consistent with impingement. Moderate tendinopathy of the supraspinatus and infraspinatus tendons including a small, 5mm nearly 50% thickness superficial partial thickness tear of the distal insertional footprint of the anterior supraspinatus tendon. Mild subacromial bursal thickening/bursitis. Findings of possible coracoid impingement.      ASSESSMENT: Gary Iyer is a 58 year old male, right -hand dominant, subacromial bursitis and impingement syndrome, rotator cuff tendinosis with high-grade partial thickness to full thickness rotator cuff tear.    PLAN:   * discussed with patient finding of rotator cuff tear, which appears to have progressed since prior MRI 2014, its implications and potential treatment options  * discussed various options with patient, including nonop with Physical Therapy, injections, NSAIDS, activity modification versus rotator cuff repair. Risks and benefits of each discussed in detail.  * risks of nonop treatment include continued pain, weakness, progression of tear, development of rotator cuff tear arthropathy and arthrosis, decreased range of motion and stiffness.  * Risks of surgery include, but not limited to: bleeding, infection, pain, scar, damage to adjacent structures (e.g. Nerves, blood vessels,  bone, cartilage), temporary or permanent nerve damage, recurrence of symptoms, failure to relieve pain or weakness, stiffness, post-traumatic arthritis, development of rotator cuff tear arthropathy and arthrosis, decreased range of motion and stiffness, need for further surgery, blood clots, pulmonary embolism, risks of anesthesia, death.    At this time, he'd like to try another injection today. He'd like to think about his option for surgery in the future, but for right now, he'd like to hold off on any surgery.    * Rest  * Activity modification - avoid activities that aggravate symptoms.  * NSAIDS - regular use for inflammation, with food, as long as no contra-indications. Please discuss with pcp if needed.  * Ice as needed.  * Tylenol as needed for pain  * Patient to follow up with Primary Care provider regarding elevated blood pressure     * After having the MRI, I would like the patient to return to clinic to discuss findings, possible cortisone injection.     PROCEDURE NOTE:  The risks, perceived benefits and potential complications (including but not limited to: bleeding, infection, pain, scar, damage to adjacent structures, atrophy or necrosis of soft tissue, skin blanching, failure to relieve symptoms, worsening of symptoms, allergic reaction) of injection were discussed with the patient. Questions were addressed and answered.The patient elected to proceed. Written informed consent was obtained. The correct procedural site was identified and confirmed. A LEFT shoulder subacromial injection was performed using 2mL Kenalog-40 40mg per mL and 7mL (4mL 1% lidocaine, 3mL 0.25% marcaine)  of local anesthetic after sterile prep, to the correct procedural site. Sterile bandaid applied. This was tolerated well by the patient. No apparent complications. Did also discuss that if diabetic, recommend close monitoring of blood sugars over the next week as cortisone injections can temporarily elevate blood sugars.        The information in this document, created by a scribe for me, accurately reflects the services I personally performed and the decisions made by me. I have reviewed and approved this document for accuracy.      Feliciano Aguilar M.D., M.S.  Dept. of Orthopaedic Surgery  [facility]    Large Joint Injection/Arthocentesis  Date/Time: 11/5/2018 10:25 AM  Performed by: THOMAS SOTELO  Authorized by: FELICIANO AGUILAR     Indications:  Pain  Indications comment:  Impingement  Needle Size:  22 G  Approach:  Posterolateral  Location:  Shoulder  Site:  L subacromial bursa  Medications:  80 mg triamcinolone acetonide 40 MG/ML; 3 mL bupivacaine 0.25 %; 4 mL lidocaine 1 %  Outcome:  Tolerated well, no immediate complications  Procedure discussed: discussed risks, benefits, and alternatives    Consent Given by:  Patient  Prep: patient was prepped and draped in usual sterile fashion            Again, thank you for allowing me to participate in the care of your patient.        Sincerely,        Feliciano Aguilar MD

## 2018-11-05 NOTE — MR AVS SNAPSHOT
After Visit Summary   11/5/2018    Gary Iyer    MRN: [id number]           Patient Information     Date Of Birth          1960        Visit Information        Provider Department      11/5/2018 10:00 AM Feliciano Aguilar MD [facility] [place]        Today's Diagnoses     Complete tear of left rotator cuff    -  1    Chronic left shoulder pain        Impingement syndrome, shoulder, left          Care Instructions    Patient to follow up with Primary Care provider regarding elevated blood pressure.            Follow-ups after your visit        Follow-up notes from your care team     Return if symptoms worsen or fail to improve.      Your next 10 appointments already scheduled     Nov 06, 2018 10:40 AM CST   SHORT with Finesse Beal MD   [facility]    [address] [place] [id number] [phone]              Who to contact     If you have questions or need follow up information about today's clinic visit or your schedule please contact [facility] directly at [phone].  Normal or non-critical lab and imaging results will be communicated to you by QuantuModelinghart, letter or phone within 4 business days after the clinic has received the results. If you do not hear from us within 7 days, please contact the clinic through QuantuModelinghart or phone. If you have a critical or abnormal lab result, we will notify you by phone as soon as possible.  Submit refill requests through yuback or call your pharmacy and they will forward the refill request to us. Please allow 3 business days for your refill to be completed.          Additional Information About Your Visit        MyChart Information     yuback gives you secure access to your electronic health record. If you see a primary care provider, you can also send messages to your care team and make appointments. If you  "have questions, please call your primary care clinic.  If you do not have a primary care provider, please call [phone] and they will assist you.        Care EveryWhere ID     This is your Care EveryWhere ID. This could be used by other organizations to access your [facility] medical records  [id number]        Your Vitals Were     Pulse Height Pulse Oximetry BMI (Body Mass Index)          117 5' 10\" (1.778 m) 98% 33.33 kg/m2         Blood Pressure from Last 3 Encounters:   11/05/18 (!) 133/91   10/22/18 (!) 159/93   07/29/18 114/75    Weight from Last 3 Encounters:   11/05/18 232 lb 4.8 oz (105.4 kg)   10/22/18 218 lb (98.9 kg)   10/12/18 229 lb (103.9 kg)              We Performed the Following     Large Joint Injection/Arthocentesis        Primary Care Provider Office Phone # Fax #    Finesse Beal [phone]       [address] [facility] [address]        Equal Access to Services     [facility]: Hadii aad ku hadasho Soomaali, waaxda luqadaha, qaybta kaalmada adeegyada, waxholger mercer hayzee gutierrez . So [facility] [phone].    ATENCIÓN: Si habla español, tiene a lucero disposición servicios gratuitos de asistencia lingüística. Llame al [phone].    We comply with applicable federal civil rights laws and [place] laws. We do not discriminate on the basis of race, color, national origin, age, disability, sex, sexual orientation, or gender identity.            Thank you!     Thank you for choosing [facility]  for your care. Our goal is always to provide you with excellent care. Hearing back from our patients is one way we can continue to improve our services. Please take a few minutes to complete the written survey that you may receive in the mail after your visit with us. Thank you!             Your Updated Medication List - Protect others around you: Learn how to safely use, store and throw away your medicines at " www.disposemymeds.org.          This list is accurate as of 11/5/18 10:54 AM.  Always use your most recent med list.                   Brand Name Dispense Instructions for use Diagnosis    allopurinol 300 MG tablet    ZYLOPRIM    90 tablet    TAKE 1 TABLET(300 MG) BY MOUTH DAILY    Gout, unspecified cause, unspecified chronicity, unspecified site       amoxicillin-clavulanate 875-125 MG per tablet    AUGMENTIN    20 tablet    Take 1 tablet by mouth 2 times daily    Cat bite, initial encounter       aspirin 81 MG tablet      Take 1 tablet by mouth daily.        atorvastatin 20 MG tablet    LIPITOR    90 tablet    TAKE 1 TABLET(20 MG) BY MOUTH DAILY    Hyperlipidemia LDL goal <70       blood glucose monitoring lancets     1 Box    1 each 2 times daily Use to test blood sugar 2 times daily or as directed.    Type 2 diabetes mellitus without complication (H)       blood glucose monitoring meter device kit    no brand specified    1 kit    Use to test blood sugar 2 times daily or as directed.  Accucheck meter please and all associated strips, lancets, and other supplies, 3 month supply with refills for a year.    Type 2 diabetes mellitus without complication, without long-term current use of insulin (H)       DAILY MULTIVITAMIN PO      Take  by mouth daily.        diazepam 10 MG tablet    VALIUM    1 tablet    Take 1 tablet (10 mg) by mouth every 6 hours as needed for anxiety Take 30-60 minutes before procedure.  Do not operate a vehicle after taking this medication.    Chronic left shoulder pain       hydrochlorothiazide 50 MG tablet    HYDRODIURIL    90 tablet    TAKE 1 TABLET(50 MG) BY MOUTH EVERY MORNING    Hypertension goal BP (blood pressure) < 140/90       hydrocortisone 1 % ointment     30 g    Apply topically 2 times daily as needed    Facial dermatitis       hydrocortisone 2.5 % cream    ANUSOL-HC    30 g    Place rectally 2 times daily as needed for hemorrhoids    External hemorrhoids       hydrocortisone 25  MG Suppository    ANUSOL-HC    20 suppository    Place 1 suppository (25 mg) rectally 2 times daily as needed for hemorrhoids    External hemorrhoids       ketoconazole 2 % cream    NIZORAL    60 g    Apply topically 2 times daily for up to 2 weeks as needed for rash    Seborrheic dermatitis       lisinopril 30 MG tablet    PRINIVIL,ZESTRIL    90 tablet    TAKE 1 TABLET(30 MG) BY MOUTH DAILY    Hypertension goal BP (blood pressure) < 140/90       metFORMIN 500 MG tablet    GLUCOPHAGE    180 tablet    TAKE 1 TABLET(500 MG) BY MOUTH TWICE DAILY WITH MEALS    Type 2 diabetes mellitus without complication, without long-term current use of insulin (H)       omeprazole 20 MG tablet     90 tablet    1 po daily    Dysphonia, Gastroesophageal reflux disease, esophagitis presence not specified, Leukoplakia of larynx       * order for DME     1 Container    Place rectally 2 times daily 1.8 oz container of 0.2 % nifedipine suppository if possible as easier to place the medication.   Apply on the anus every 12 hours.  Just get to where the anus is tight .    Anal fissure       * order for DME     1 Units    Autocpap 10-16 cm    Obstructive sleep apnea       PARoxetine 20 MG tablet    PAXIL    180 tablet    TAKE 2 TABLETS(40 MG) BY MOUTH DAILY    Anxiety       psyllium 0.52 g capsule     100 capsule    Take 1 capsule (0.52 g) by mouth daily    Constipation, unspecified constipation type       tamsulosin 0.4 MG capsule    FLOMAX    90 capsule    TAKE 1 CAPSULE(0.4 MG) BY MOUTH DAILY    Benign prostatic hyperplasia with weak urinary stream       triamcinolone 0.1 % cream    KENALOG    30 g    Apply topically 2 times daily as needed for irritation    Dermatitis       vitamin D 1000 units capsule      Take 1 capsule by mouth daily.        vitamin D2 27569 UNIT capsule    ERGOCALCIFEROL    12 capsule    TAKE 1 CAPSULE BY MOUTH EVERY WEEK    Vitamin D deficiency disease       * Notice:  This list has 2 medication(s) that are the same  as other medications prescribed for you. Read the directions carefully, and ask your doctor or other care provider to review them with you.

## 2018-11-05 NOTE — PROGRESS NOTES
CHIEF COMPLAINT:   Chief Complaint   Patient presents with     Left Shoulder - Pain     Patient is here for MRI f/u. Patient states he continues to have pain on and off in his shoulder. Some days are worse than others.        HISTORY:  Gary Iyer is a 58 year old male, right -hand dominant, who is seen for follow up of left shoulder pain that started over 1 year ago, 6/19/2017. No injury. Original issue of the left shoulder started 4+ years ago; was seen and treated by Dr. Hilliard with Physical Therapy and injection. Pain used to be posterior shoulder, but now is located over the anterior and lateral shoulder. He has moderate pain today, 4/10. Continues to have pain, on and off, in the shoulder. Some days are worse than others. He had an injection with us on 6/23/2017. This injection worked great, lasting until late October 2018. Presents today to discuss recent MRI.       Onset: unknown  Symptoms have been worsening since that time.  Aggravated by: with abduction and other shoulder active range of motion   Relieved by: at rest  Present symptoms: pain with ADL's (dressing),  pain with overhead activities,  pain reaching out or away from body (flexion/ abduction),  Pain location: diffuse with movement  Pain severity: 6/10  Pain quality: dull, aching and sharp  Frequency of symptoms: frequently  Associated symptoms: none    Treatment up to this point: physical therapy and cortisone injection.  Has not tried: surgery  Prior history of related problems: Yes, 6/23/2018 with good relief.     Significant Orthopedic past medical history: none  Usual level of recreational activity: sedentary  Usual level of work activity: unemployed    Other PMH:  has a past medical history of Diabetes (H); Multinodular goiter (nontoxic) (6/10/2013); and Sleep apnea (8yr). He also has no past medical history of Arthritis; Asthma; Blood transfusion; Congestive heart failure, unspecified; COPD (chronic obstructive pulmonary disease) (H);  Coronary artery disease; or Unspecified cerebral artery occlusion with cerebral infarction.  Patient Active Problem List   Diagnosis     Hyperlipidemia LDL goal <70     Hypertension goal BP (blood pressure) < 140/90     Gout, chronic     Anxiety     Advanced directives, counseling/discussion     Carotid stenosis     Hx of laryngeal cancer     Type 2 diabetes mellitus without complication (H)     Non morbid obesity due to excess calories     Dysphonia     Vocal process granuloma     Type 2 diabetes mellitus without complication, without long-term current use of insulin (H)     Diabetes mellitus, type 2 (H)     Obstructive sleep apnea- moderate-severe (AHI 25)     Benign prostatic hypertrophy     Psychophysiological insomnia     Periodic limb movements of sleep     Low ferritin     Moderate major depression (H)     Gout, unspecified cause, unspecified chronicity, unspecified site     Chronic cough     Lesion of larynx     Left shoulder pain, unspecified chronicity       Surgical Hx:  has a past surgical history that includes vasectomy (02/03); Head and neck surgery (1/25/11); colonoscopy; ENT surgery (2008); Laryngoscopy with biopsy(ies) (1-25-11); colonoscopy (6-22-12); Laser CO2 laryngoscopy, complex (6/27/2013); orthopedic surgery (March 2016); Laryngoscopy with microscope (N/A, 2/9/2017); Laryngoscopy with biopsy(ies) (N/A, 2/9/2017); and Laser CO2 lesion oral (N/A, 2/9/2017).    Medications:   Current Outpatient Prescriptions:      allopurinol (ZYLOPRIM) 300 MG tablet, TAKE 1 TABLET(300 MG) BY MOUTH DAILY, Disp: 90 tablet, Rfl: 3     amoxicillin-clavulanate (AUGMENTIN) 875-125 MG per tablet, Take 1 tablet by mouth 2 times daily, Disp: 20 tablet, Rfl: 0     aspirin 81 MG tablet, Take 1 tablet by mouth daily., Disp: , Rfl:      atorvastatin (LIPITOR) 20 MG tablet, TAKE 1 TABLET(20 MG) BY MOUTH DAILY, Disp: 90 tablet, Rfl: 3     blood glucose monitoring (ALON MICROLET) lancets, 1 each 2 times daily Use to test  blood sugar 2 times daily or as directed., Disp: 1 Box, Rfl: 3     blood glucose monitoring (NO BRAND SPECIFIED) meter device kit, Use to test blood sugar 2 times daily or as directed.  Accucheck meter please and all associated strips, lancets, and other supplies, 3 month supply with refills for a year., Disp: 1 kit, Rfl: 0     Cholecalciferol (VITAMIN D) 1000 UNITS capsule, Take 1 capsule by mouth daily., Disp: , Rfl:      diazepam (VALIUM) 10 MG tablet, Take 1 tablet (10 mg) by mouth every 6 hours as needed for anxiety Take 30-60 minutes before procedure.  Do not operate a vehicle after taking this medication., Disp: 1 tablet, Rfl: 0     hydrochlorothiazide (HYDRODIURIL) 50 MG tablet, TAKE 1 TABLET(50 MG) BY MOUTH EVERY MORNING, Disp: 90 tablet, Rfl: 3     hydrocortisone (ANUSOL-HC) 2.5 % rectal cream, Place rectally 2 times daily as needed for hemorrhoids, Disp: 30 g, Rfl: 1     hydrocortisone (ANUSOL-HC) 25 MG suppository, Place 1 suppository (25 mg) rectally 2 times daily as needed for hemorrhoids, Disp: 20 suppository, Rfl: 1     hydrocortisone 1 % ointment, Apply topically 2 times daily as needed, Disp: 30 g, Rfl: 1     ketoconazole (NIZORAL) 2 % cream, Apply topically 2 times daily for up to 2 weeks as needed for rash, Disp: 60 g, Rfl: 1     lisinopril (PRINIVIL,ZESTRIL) 30 MG tablet, TAKE 1 TABLET(30 MG) BY MOUTH DAILY, Disp: 90 tablet, Rfl: 3     metFORMIN (GLUCOPHAGE) 500 MG tablet, TAKE 1 TABLET(500 MG) BY MOUTH TWICE DAILY WITH MEALS, Disp: 180 tablet, Rfl: 0     Multiple Vitamin (DAILY MULTIVITAMIN PO), Take  by mouth daily., Disp: , Rfl:      omeprazole 20 MG tablet, 1 po daily, Disp: 90 tablet, Rfl: 3     order for DME, Autocpap 10-16 cm, Disp: 1 Units, Rfl: 0     order for DME, Place rectally 2 times daily 1.8 oz container of 0.2 % nifedipine suppository if possible as easier to place the medication.   Apply on the anus every 12 hours.  Just get to where the anus is tight ., Disp: 1 Container, Rfl:  "5     PARoxetine (PAXIL) 20 MG tablet, TAKE 2 TABLETS(40 MG) BY MOUTH DAILY, Disp: 180 tablet, Rfl: 3     psyllium 0.52 G capsule, Take 1 capsule (0.52 g) by mouth daily, Disp: 100 capsule, Rfl: 3     tamsulosin (FLOMAX) 0.4 MG capsule, TAKE 1 CAPSULE(0.4 MG) BY MOUTH DAILY, Disp: 90 capsule, Rfl: 3     triamcinolone (KENALOG) 0.1 % cream, Apply topically 2 times daily as needed for irritation, Disp: 30 g, Rfl: 1     vitamin D (ERGOCALCIFEROL) 79443 UNIT capsule, TAKE 1 CAPSULE BY MOUTH EVERY WEEK, Disp: 12 capsule, Rfl: 1    Allergies: No Known Allergies    Social Hx: school .  reports that he quit smoking about 18 years ago. He has a 8.00 pack-year smoking history. He has never used smokeless tobacco. He reports that he drinks alcohol. He reports that he does not use illicit drugs.    Family Hx: family history includes C.A.D. in his father; Hypertension in his mother. There is no history of Diabetes, Cerebrovascular Disease, Cancer, Glaucoma, or Macular Degeneration..    REVIEW OF SYSTEMS:  CONSTITUTIONAL:NEGATIVE for fever, chills, change in weight  INTEGUMENTARY/SKIN: NEGATIVE for worrisome rashes, moles or lesions  MUSCULOSKELETAL:See HPI above  NEURO: NEGATIVE for weakness, dizziness or paresthesias    This document serves as a record of the services and decisions personally performed and made by Feliciano Aguilar MD. It was created on his behalf by Edita Vieyra, a trained medical scribe. The creation of this document is based the provider's statements to the medical scribe.    Scribe Edita Vieyra 10:08 AM 11/5/2018        PHYSICAL EXAM:  BP (!) 133/91  Pulse 117  Ht 5' 10\" (1.778 m)  Wt 232 lb 4.8 oz (105.4 kg)  SpO2 98%  BMI 33.33 kg/m2   GENERAL APPEARANCE: healthy, alert, no distress  SKIN: no suspicious lesions or rashes  NEURO: Normal strength and tone, mentation intact and speech normal  PSYCH:  mentation appears normal and affect normal, not anxious  RESPIRATORY: No increased work of " breathing.  VASCULAR: Radial pulses 2+ and brisk capillary refill     MUSCULOSKELETAL:    NECK:  There is some curvature of the upper thoracic spine.    RIGHT UPPER EXTREMITY:  Sensation intact to light touch in median, radial, ulnar and axillary nerve distributions  Palpable 2+ radial pulse, brisk capillary refill to all fingers, wwp  Intact epl fpl fdp edc wrist flexion/extension biceps triceps deltoid    RIGHT SHOULDER:  Shoulder Inspection: no swelling, bruising, discoloration, or obvious deformity or asymmetry  Tender: nontender to palpation   Range of Motion:   Active: forward flexion 160+ degrees, external rotation 60 degrees, internal rotation L2  Strength: forward flexion 5/5, External rotation 5/5   Impingement: negative   Special tests: Empty Can: Negative    LEFT UPPER EXTREMITY:  Sensation intact to light touch in median, radial, ulnar and axillary nerve distributions  Palpable 2+ radial pulse, brisk capillary refill to all fingers, wwp  Intact epl fpl fdp edc wrist flexion/extension biceps triceps deltoid    LEFT SHOULDER:  Shoulder Inspection: no swelling, bruising, discoloration, noted atrophy the deltoid muscle and supraspinatus   Tender: Greater tuberosity, rhomboids, supraspinatus, infraspinatus  Range of Motion:   Active: forward flexion 130 degrees, external rotation 15 degrees, internal rotation Hip pocket   Passive: forward flexion 160, external rotation 30 degrees  Strength: forward flexion 4+/5, External rotation 4-/5  Impingement: all grade 2 positive  Special tests: Empty can: equivocal     X-RAY INTERPRETATION: no new images today.    MRI left shoulder 10/30/2018 [facility]:  1. There is a moderate-sized bursal sided tear of the mid to posterior supraspinatus tendon insertion. 1.4 cm AP by 1.5 cm mediolateral . This is a high-grade appearance of tear, with likely associated full-thickness involvement. Torn tendon fibers remain situated well lateral to the mid humeral head level, and without  resultant muscle belly atrophy.  2. Mild to moderate AC joint DJD with borderline narrowing of the acromiohumeral interval.  3. Unremarkable, intact long head biceps tendon.  4. No evidence for glenohumeral chondromalacia/osteoarthritis.      3 views left shoulder obtained 6/23/2017 were reviewed personally in clinic today with the patient. On my review, mild acromio-clavicular degenerative changes.    MRI 12/22/2014 [facility]: findings consistent with impingement. Moderate tendinopathy of the supraspinatus and infraspinatus tendons including a small, 5mm nearly 50% thickness superficial partial thickness tear of the distal insertional footprint of the anterior supraspinatus tendon. Mild subacromial bursal thickening/bursitis. Findings of possible coracoid impingement.      ASSESSMENT: Gary Iyer is a 58 year old male, right -hand dominant, subacromial bursitis and impingement syndrome, rotator cuff tendinosis with high-grade partial thickness to full thickness rotator cuff tear.    PLAN:   * discussed with patient finding of rotator cuff tear, which appears to have progressed since prior MRI 2014, its implications and potential treatment options  * discussed various options with patient, including nonop with Physical Therapy, injections, NSAIDS, activity modification versus rotator cuff repair. Risks and benefits of each discussed in detail.  * risks of nonop treatment include continued pain, weakness, progression of tear, development of rotator cuff tear arthropathy and arthrosis, decreased range of motion and stiffness.  * Risks of surgery include, but not limited to: bleeding, infection, pain, scar, damage to adjacent structures (e.g. Nerves, blood vessels, bone, cartilage), temporary or permanent nerve damage, recurrence of symptoms, failure to relieve pain or weakness, stiffness, post-traumatic arthritis, development of rotator cuff tear arthropathy and arthrosis, decreased range of motion and  stiffness, need for further surgery, blood clots, pulmonary embolism, risks of anesthesia, death.    At this time, he'd like to try another injection today. He'd like to think about his option for surgery in the future, but for right now, he'd like to hold off on any surgery.    * Rest  * Activity modification - avoid activities that aggravate symptoms.  * NSAIDS - regular use for inflammation, with food, as long as no contra-indications. Please discuss with pcp if needed.  * Ice as needed.  * Tylenol as needed for pain  * Patient to follow up with Primary Care provider regarding elevated blood pressure     * After having the MRI, I would like the patient to return to clinic to discuss findings, possible cortisone injection.     PROCEDURE NOTE:  The risks, perceived benefits and potential complications (including but not limited to: bleeding, infection, pain, scar, damage to adjacent structures, atrophy or necrosis of soft tissue, skin blanching, failure to relieve symptoms, worsening of symptoms, allergic reaction) of injection were discussed with the patient. Questions were addressed and answered.The patient elected to proceed. Written informed consent was obtained. The correct procedural site was identified and confirmed. A LEFT shoulder subacromial injection was performed using 2mL Kenalog-40 40mg per mL and 7mL (4mL 1% lidocaine, 3mL 0.25% marcaine)  of local anesthetic after sterile prep, to the correct procedural site. Sterile bandaid applied. This was tolerated well by the patient. No apparent complications. Did also discuss that if diabetic, recommend close monitoring of blood sugars over the next week as cortisone injections can temporarily elevate blood sugars.       The information in this document, created by a scribe for me, accurately reflects the services I personally performed and the decisions made by me. I have reviewed and approved this document for accuracy.      Feliciano Aguilar M.D.,  M.S.  Dept. of Orthopaedic Surgery  [facility]    Large Joint Injection/Arthocentesis  Date/Time: 11/5/2018 10:25 AM  Performed by: THOMAS SOTELO  Authorized by: GABRIELLA MATHIAS NAYELY     Indications:  Pain  Indications comment:  Impingement  Needle Size:  22 G  Approach:  Posterolateral  Location:  Shoulder  Site:  L subacromial bursa  Medications:  80 mg triamcinolone acetonide 40 MG/ML; 3 mL bupivacaine 0.25 %; 4 mL lidocaine 1 %  Outcome:  Tolerated well, no immediate complications  Procedure discussed: discussed risks, benefits, and alternatives    Consent Given by:  Patient  Prep: patient was prepped and draped in usual sterile fashion

## 2018-11-06 ENCOUNTER — OFFICE VISIT (OUTPATIENT)
Dept: FAMILY MEDICINE | Facility: CLINIC | Age: 58
End: 2018-11-06
Payer: COMMERCIAL

## 2018-11-06 VITALS
WEIGHT: 228.13 LBS | OXYGEN SATURATION: 96 % | TEMPERATURE: 98.4 F | BODY MASS INDEX: 32.73 KG/M2 | SYSTOLIC BLOOD PRESSURE: 146 MMHG | HEART RATE: 109 BPM | DIASTOLIC BLOOD PRESSURE: 70 MMHG

## 2018-11-06 DIAGNOSIS — R53.83 FATIGUE, UNSPECIFIED TYPE: ICD-10-CM

## 2018-11-06 DIAGNOSIS — I10 HYPERTENSION GOAL BP (BLOOD PRESSURE) < 140/90: Chronic | ICD-10-CM

## 2018-11-06 DIAGNOSIS — E55.9 VITAMIN D DEFICIENCY DISEASE: ICD-10-CM

## 2018-11-06 DIAGNOSIS — E11.9 TYPE 2 DIABETES MELLITUS WITHOUT COMPLICATION, WITHOUT LONG-TERM CURRENT USE OF INSULIN (H): Primary | ICD-10-CM

## 2018-11-06 DIAGNOSIS — R30.0 DYSURIA: ICD-10-CM

## 2018-11-06 DIAGNOSIS — L85.3 XEROSIS CUTIS: ICD-10-CM

## 2018-11-06 DIAGNOSIS — E66.9 OBESITY, UNSPECIFIED CLASSIFICATION, UNSPECIFIED OBESITY TYPE, UNSPECIFIED WHETHER SERIOUS COMORBIDITY PRESENT: ICD-10-CM

## 2018-11-06 DIAGNOSIS — L29.9 ITCHING: ICD-10-CM

## 2018-11-06 LAB
ALBUMIN SERPL-MCNC: 3.8 G/DL (ref 3.4–5)
ALBUMIN UR-MCNC: ABNORMAL MG/DL
ALP SERPL-CCNC: 63 U/L (ref 40–150)
ALT SERPL W P-5'-P-CCNC: 79 U/L (ref 0–70)
ANION GAP SERPL CALCULATED.3IONS-SCNC: 10 MMOL/L (ref 3–14)
APPEARANCE UR: CLEAR
AST SERPL W P-5'-P-CCNC: 46 U/L (ref 0–45)
BILIRUB SERPL-MCNC: 0.6 MG/DL (ref 0.2–1.3)
BILIRUB UR QL STRIP: NEGATIVE
BUN SERPL-MCNC: 12 MG/DL (ref 7–30)
CALCIUM SERPL-MCNC: 9.3 MG/DL (ref 8.5–10.1)
CHLORIDE SERPL-SCNC: 97 MMOL/L (ref 94–109)
CO2 SERPL-SCNC: 28 MMOL/L (ref 20–32)
COLOR UR AUTO: YELLOW
CREAT SERPL-MCNC: 0.7 MG/DL (ref 0.66–1.25)
GFR SERPL CREATININE-BSD FRML MDRD: >90 ML/MIN/1.7M2
GLUCOSE SERPL-MCNC: 141 MG/DL (ref 70–99)
GLUCOSE UR STRIP-MCNC: NEGATIVE MG/DL
HBA1C MFR BLD: 6.3 % (ref 0–5.6)
HGB UR QL STRIP: NEGATIVE
KETONES UR STRIP-MCNC: NEGATIVE MG/DL
LEUKOCYTE ESTERASE UR QL STRIP: NEGATIVE
MUCOUS THREADS #/AREA URNS LPF: PRESENT /LPF
NITRATE UR QL: NEGATIVE
PH UR STRIP: 7.5 PH (ref 5–7)
POTASSIUM SERPL-SCNC: 3.7 MMOL/L (ref 3.4–5.3)
PROT SERPL-MCNC: 8.9 G/DL (ref 6.8–8.8)
RBC #/AREA URNS AUTO: ABNORMAL /HPF
SODIUM SERPL-SCNC: 135 MMOL/L (ref 133–144)
SOURCE: ABNORMAL
SP GR UR STRIP: 1.02 (ref 1–1.03)
TSH SERPL DL<=0.005 MIU/L-ACNC: 0.77 MU/L (ref 0.4–4)
UROBILINOGEN UR STRIP-ACNC: 0.2 EU/DL (ref 0.2–1)
WBC #/AREA URNS AUTO: ABNORMAL /HPF

## 2018-11-06 PROCEDURE — 81001 URINALYSIS AUTO W/SCOPE: CPT | Performed by: FAMILY MEDICINE

## 2018-11-06 PROCEDURE — 36415 COLL VENOUS BLD VENIPUNCTURE: CPT | Performed by: FAMILY MEDICINE

## 2018-11-06 PROCEDURE — 82306 VITAMIN D 25 HYDROXY: CPT | Performed by: FAMILY MEDICINE

## 2018-11-06 PROCEDURE — 84443 ASSAY THYROID STIM HORMONE: CPT | Performed by: FAMILY MEDICINE

## 2018-11-06 PROCEDURE — 82043 UR ALBUMIN QUANTITATIVE: CPT | Performed by: FAMILY MEDICINE

## 2018-11-06 PROCEDURE — 99207 C FOOT EXAM  NO CHARGE: CPT | Mod: 25 | Performed by: FAMILY MEDICINE

## 2018-11-06 PROCEDURE — 99214 OFFICE O/P EST MOD 30 MIN: CPT | Performed by: FAMILY MEDICINE

## 2018-11-06 PROCEDURE — 80053 COMPREHEN METABOLIC PANEL: CPT | Performed by: FAMILY MEDICINE

## 2018-11-06 PROCEDURE — 83036 HEMOGLOBIN GLYCOSYLATED A1C: CPT | Performed by: FAMILY MEDICINE

## 2018-11-06 RX ORDER — AMLODIPINE BESYLATE 5 MG/1
5 TABLET ORAL DAILY
Qty: 30 TABLET | Refills: 1 | Status: SHIPPED | OUTPATIENT
Start: 2018-11-06 | End: 2019-01-02

## 2018-11-06 RX ORDER — LOSARTAN POTASSIUM 50 MG/1
50 TABLET ORAL DAILY
Qty: 30 TABLET | Refills: 1 | Status: SHIPPED | OUTPATIENT
Start: 2018-11-06 | End: 2019-01-02

## 2018-11-06 ASSESSMENT — PAIN SCALES - GENERAL: PAINLEVEL: NO PAIN (0)

## 2018-11-06 ASSESSMENT — PATIENT HEALTH QUESTIONNAIRE - PHQ9: SUM OF ALL RESPONSES TO PHQ QUESTIONS 1-9: 7

## 2018-11-06 NOTE — MR AVS SNAPSHOT
After Visit Summary   11/6/2018    Gary Iyer    MRN: [id number]           Patient Information     Date Of Birth          1960        Visit Information        Provider Department      11/6/2018 10:40 AM Finesse Beal MD [facility]        Today's Diagnoses     Type 2 diabetes mellitus without complication, without long-term current use of insulin (H)    -  1    Hypertension goal BP (blood pressure) < 140/90        Dysuria        Fatigue, unspecified type        Vitamin D deficiency disease          Care Instructions    Stop lisinopril    Start losartan instead    Also start amlodipine ( additional blood pressure med ) a few days later    Start an over the counter loratadine ( generic claritin ) or fexofenadine ( generic allegra )    Use moisturizing lotion    Try not to scratch    Increase exercise    We will send you lab results    Plan to see us in 1-2 months, sooner if needed                 Follow-ups after your visit        Who to contact     If you have questions or need follow up information about today's clinic visit or your schedule please contact [facility] directly at [phone].  Normal or non-critical lab and imaging results will be communicated to you by MyChart, letter or phone within 4 business days after the clinic has received the results. If you do not hear from us within 7 days, please contact the clinic through [employer] or phone. If you have a critical or abnormal lab result, we will notify you by phone as soon as possible.  Submit refill requests through [employer] or call your pharmacy and they will forward the refill request to us. Please allow 3 business days for your refill to be completed.          Additional Information About Your Visit        MyChart Information     [employer] gives you secure access to your electronic health record. If you see a primary care provider, you can also send messages to your care team and make  appointments. If you have questions, please call your primary care clinic.  If you do not have a primary care provider, please call [phone] and they will assist you.        Care EveryWhere ID     This is your Care EveryWhere ID. This could be used by other organizations to access your [facility] medical records  [id number]        Your Vitals Were     Pulse Temperature Pulse Oximetry BMI (Body Mass Index)          109 98.4  F (36.9  C) (Oral) 96% 32.73 kg/m2         Blood Pressure from Last 3 Encounters:   11/06/18 146/70   11/05/18 (!) 133/91   10/22/18 (!) 159/93    Weight from Last 3 Encounters:   11/06/18 228 lb 2 oz (103.5 kg)   11/05/18 232 lb 4.8 oz (105.4 kg)   10/22/18 218 lb (98.9 kg)              We Performed the Following     *UA reflex to Microscopic and Culture ([place] [facility] (except [place] and [place])     Albumin Random Urine Quantitative with Creat Ratio     Comprehensive metabolic panel     DEPRESSION ACTION PLAN (DAP)     FOOT EXAM     Hemoglobin A1c     TSH with free T4 reflex     Vitamin D Deficiency          Today's Medication Changes          These changes are accurate as of 11/6/18 11:18 AM.  If you have any questions, ask your nurse or doctor.               Start taking these medicines.        Dose/Directions    amLODIPine 5 MG tablet   Commonly known as:  NORVASC   Used for:  Hypertension goal BP (blood pressure) < 140/90   Started by:  Finesse Beal MD        Dose:  5 mg   Take 1 tablet (5 mg) by mouth daily   Quantity:  30 tablet   Refills:  1       losartan 50 MG tablet   Commonly known as:  COZAAR   Used for:  Hypertension goal BP (blood pressure) < 140/90   Started by:  Finesse Beal MD        Dose:  50 mg   Take 1 tablet (50 mg) by mouth daily   Quantity:  30 tablet   Refills:  1         Stop taking these medicines if you haven't already. Please contact your care team if you have questions.     lisinopril 30 MG tablet   Commonly known as:   PRINIVIL,ZESTRIL   Stopped by:  Finesse Beal MD                Where to get your medicines      These medications were sent to [id number] - [place] - [address]  [address], [place] [address]    Hours:  Test fax successful 12/11/02  KR Phone:  [phone]     amLODIPine 5 MG tablet    losartan 50 MG tablet                Primary Care Provider Office Phone # Fax #    Finesse Beal [phone]       [address] [facility] [address]        Equal Access to Services     [facility]: Hadii aad ku hadasho Soomaali, waaxda luqadaha, qaybta kaalmada adeegyada, waxay idiin hayaan adeeg calebarasinai lamario . So [facility] [phone].    ATENCIÓN: Si habla español, tiene a lucero disposición servicios gratuitos de asistencia lingüística. [facility] [phone].    We comply with applicable federal civil rights laws and [place] laws. We do not discriminate on the basis of race, color, national origin, age, disability, sex, sexual orientation, or gender identity.            Thank you!     Thank you for choosing [facility]  for your care. Our goal is always to provide you with excellent care. Hearing back from our patients is one way we can continue to improve our services. Please take a few minutes to complete the written survey that you may receive in the mail after your visit with us. Thank you!             Your Updated Medication List - Protect others around you: Learn how to safely use, store and throw away your medicines at www.disposemymeds.org.          This list is accurate as of 11/6/18 11:18 AM.  Always use your most recent med list.                   Brand Name Dispense Instructions for use Diagnosis    allopurinol 300 MG tablet    ZYLOPRIM    90 tablet    TAKE 1 TABLET(300 MG) BY MOUTH DAILY    Gout, unspecified cause, unspecified chronicity, unspecified site       amLODIPine 5 MG tablet     NORVASC    30 tablet    Take 1 tablet (5 mg) by mouth daily    Hypertension goal BP (blood pressure) < 140/90       aspirin 81 MG tablet      Take 1 tablet by mouth daily.        atorvastatin 20 MG tablet    LIPITOR    90 tablet    TAKE 1 TABLET(20 MG) BY MOUTH DAILY    Hyperlipidemia LDL goal <70       blood glucose monitoring lancets     1 Box    1 each 2 times daily Use to test blood sugar 2 times daily or as directed.    Type 2 diabetes mellitus without complication (H)       blood glucose monitoring meter device kit    no brand specified    1 kit    Use to test blood sugar 2 times daily or as directed.  Accucheck meter please and all associated strips, lancets, and other supplies, 3 month supply with refills for a year.    Type 2 diabetes mellitus without complication, without long-term current use of insulin (H)       DAILY MULTIVITAMIN PO      Take  by mouth daily.        hydrochlorothiazide 50 MG tablet    HYDRODIURIL    90 tablet    TAKE 1 TABLET(50 MG) BY MOUTH EVERY MORNING    Hypertension goal BP (blood pressure) < 140/90       hydrocortisone 1 % ointment     30 g    Apply topically 2 times daily as needed    Facial dermatitis       ketoconazole 2 % cream    NIZORAL    60 g    Apply topically 2 times daily for up to 2 weeks as needed for rash    Seborrheic dermatitis       losartan 50 MG tablet    COZAAR    30 tablet    Take 1 tablet (50 mg) by mouth daily    Hypertension goal BP (blood pressure) < 140/90       metFORMIN 500 MG tablet    GLUCOPHAGE    180 tablet    TAKE 1 TABLET(500 MG) BY MOUTH TWICE DAILY WITH MEALS    Type 2 diabetes mellitus without complication, without long-term current use of insulin (H)       omeprazole 20 MG tablet     90 tablet    1 po daily    Dysphonia, Gastroesophageal reflux disease, esophagitis presence not specified, Leukoplakia of larynx       * order for DME     1 Container    Place rectally 2 times daily 1.8 oz container of 0.2 % nifedipine suppository if possible as  easier to place the medication.   Apply on the anus every 12 hours.  Just get to where the anus is tight .    Anal fissure       * order for DME     1 Units    Autocpap 10-16 cm    Obstructive sleep apnea       PARoxetine 20 MG tablet    PAXIL    180 tablet    TAKE 2 TABLETS(40 MG) BY MOUTH DAILY    Anxiety       psyllium 0.52 g capsule     100 capsule    Take 1 capsule (0.52 g) by mouth daily    Constipation, unspecified constipation type       tamsulosin 0.4 MG capsule    FLOMAX    90 capsule    TAKE 1 CAPSULE(0.4 MG) BY MOUTH DAILY    Benign prostatic hyperplasia with weak urinary stream       triamcinolone 0.1 % cream    KENALOG    30 g    Apply topically 2 times daily as needed for irritation    Dermatitis       vitamin D2 37850 UNIT capsule    ERGOCALCIFEROL    12 capsule    TAKE 1 CAPSULE BY MOUTH EVERY WEEK    Vitamin D deficiency disease       * Notice:  This list has 2 medication(s) that are the same as other medications prescribed for you. Read the directions carefully, and ask your doctor or other care provider to review them with you.

## 2018-11-06 NOTE — PROGRESS NOTES
SUBJECTIVE:   Gary Iyer is a 58 year old male who presents to clinic today for the following health issues:       Itching all over  Check vitamin D  Urinary issues    none    Problem list and histories reviewed & adjusted, as indicated.  Additional history: as documented         Reviewed and updated as needed this visit by clinical staff       Reviewed and updated as needed this visit by Provider          feeling okay overall    A little burning when urinate    Most of time    Itchy all over face/ scalp, upper body especially     Not scratching much      Eating healthy diet    A little cough    Not much exercise        Physical Exam   Constitutional: He is oriented to person, place, and time and well-developed, well-nourished, and in no distress. No distress.   HENT:   Head: Normocephalic and atraumatic.   Eyes: Conjunctivae are normal.   Neck: Carotid bruit is not present.   Cardiovascular: Normal rate, regular rhythm, normal heart sounds and intact distal pulses.  Exam reveals no gallop and no friction rub.    No murmur heard.  Pulmonary/Chest: Effort normal and breath sounds normal. No respiratory distress. He has no wheezes. He has no rales.   Musculoskeletal: He exhibits no edema.   Neurological: He is alert and oriented to person, place, and time.   Skin: He is not diaphoretic.   Psychiatric: Mood and affect normal.     Foot exam normal bilat      No distinct rash present scalp/ face    Hands and arms a little dry    ASSESSMENT / PLAN:  (E11.9) Type 2 diabetes mellitus without complication, without long-term current use of insulin (H)  (primary encounter diagnosis)  Comment: check labs;   Plan: FOOT EXAM, Hemoglobin A1c, Albumin Random Urine        Quantitative with Creat Ratio, Urine         Microscopic        Currently just on metformin    (I10) Hypertension goal BP (blood pressure) < 140/90  Comment: high here, even on recheck. Getting cough, possibly from acei.  Stop that and start arb.  Then a few  days later start amlodipine.   Plan: losartan (COZAAR) 50 MG tablet, amLODIPine         (NORVASC) 5 MG tablet        See us in 1-2 months, sooner if needed.  Call with problems.     (R30.0) Dysuria  Comment: check ua   Plan: *UA reflex to Microscopic and Culture ([facility] (except [place] and         [place])             (R53.83) Fatigue, unspecified type  Comment: check labs   Plan: Comprehensive metabolic panel, TSH with free T4        reflex             (E55.9) Vitamin D deficiency disease  Comment: check level; on prescription vit d   Plan: Vitamin D Deficiency             (L85.3) Xerosis cutis  Comment: advise increase lotion usage   Plan: try not to scratch / rub    (E66.9) Obesity, unspecified classification, unspecified obesity type, unspecified whether serious comorbidity present  Comment: chronic   Plan: keep working on healthy diet/exercise and wt loss    Itching: advised over the counter loratadine or fexofenadine      I reviewed the patient's medications, allergies, medical history, family history, and social history.    Finesse Beal MD

## 2018-11-06 NOTE — PATIENT INSTRUCTIONS
Stop lisinopril    Start losartan instead    Also start amlodipine ( additional blood pressure med ) a few days later    Start an over the counter loratadine ( generic claritin ) or fexofenadine ( generic allegra )    Use moisturizing lotion    Try not to scratch    Increase exercise    We will send you lab results    Plan to see us in 1-2 months, sooner if needed

## 2018-11-07 LAB
CREAT UR-MCNC: 236 MG/DL
DEPRECATED CALCIDIOL+CALCIFEROL SERPL-MC: 33 UG/L (ref 20–75)
MICROALBUMIN UR-MCNC: 38 MG/L
MICROALBUMIN/CREAT UR: 16.19 MG/G CR (ref 0–17)

## 2018-11-08 NOTE — PROGRESS NOTES
The vitamin D level is okay.  Stay on same dose of vitamin D.    The diabetes test ( hemoglobin a1c ) is fine.    Other labs are stable.    Finesse Beal MD

## 2018-11-13 ENCOUNTER — TELEPHONE (OUTPATIENT)
Dept: ORTHOPEDICS | Facility: CLINIC | Age: 58
End: 2018-11-13

## 2018-11-13 NOTE — TELEPHONE ENCOUNTER
Reason for Call:  Other call back    Detailed comments: Patient would like a call back regarding his shoulder and further treatment or exercises to keep the strength.    Phone Number Patient can be reached at: Cell number on file:    Telephone Information:   Mobile [phone]       Best Time: Anytime    Can we leave a detailed message on this number? YES    Call taken on 11/13/2018 at 8:42 AM by Amberly Boothe

## 2018-11-14 DIAGNOSIS — G89.29 CHRONIC LEFT SHOULDER PAIN: ICD-10-CM

## 2018-11-14 DIAGNOSIS — M75.42 IMPINGEMENT SYNDROME, SHOULDER, LEFT: ICD-10-CM

## 2018-11-14 DIAGNOSIS — M75.122 COMPLETE TEAR OF LEFT ROTATOR CUFF: Primary | ICD-10-CM

## 2018-11-14 DIAGNOSIS — M25.512 CHRONIC LEFT SHOULDER PAIN: ICD-10-CM

## 2018-11-14 NOTE — TELEPHONE ENCOUNTER
I spoke to Gary and the injection we did on 11/4/18 worked great. He is not in any pain, but would like to keep it that way with some Physical Therapy. I put in a referral for him. He thanked me for calling.    Jake Turpin PA-C, [facility] (Ortho)  Supervising Physician: Feliciano Aguilar M.D., M.S.  Dept. of Orthopaedic Surgery  [facility]

## 2018-11-28 ENCOUNTER — THERAPY VISIT (OUTPATIENT)
Dept: PHYSICAL THERAPY | Facility: CLINIC | Age: 58
End: 2018-11-28
Payer: COMMERCIAL

## 2018-11-28 DIAGNOSIS — M75.42 IMPINGEMENT SYNDROME, SHOULDER, LEFT: ICD-10-CM

## 2018-11-28 DIAGNOSIS — M75.122 COMPLETE TEAR OF LEFT ROTATOR CUFF: ICD-10-CM

## 2018-11-28 DIAGNOSIS — M25.512 CHRONIC LEFT SHOULDER PAIN: ICD-10-CM

## 2018-11-28 DIAGNOSIS — G89.29 CHRONIC LEFT SHOULDER PAIN: ICD-10-CM

## 2018-11-28 PROCEDURE — 97112 NEUROMUSCULAR REEDUCATION: CPT | Mod: GP | Performed by: PHYSICAL THERAPIST

## 2018-11-28 PROCEDURE — 97110 THERAPEUTIC EXERCISES: CPT | Mod: GP | Performed by: PHYSICAL THERAPIST

## 2018-11-28 PROCEDURE — 97161 PT EVAL LOW COMPLEX 20 MIN: CPT | Mod: GP | Performed by: PHYSICAL THERAPIST

## 2018-11-28 NOTE — PROGRESS NOTES
[facility] Initial Evaluation  Subjective:  Patient is a 58 year old male presenting with rehab left shoulder hpi.   Gary Iyer is a 58 year old male with a left shoulder condition.  Condition occurred with:  Unknown cause.  Condition occurred: for unknown reasons.  This is a chronic condition  11/5/18 patient received new MD orders for PT for left shoulder home exercise program after a cortisone injection for left shoulder pain that has been recurrent over the last few years..    Patient reports pain:  In the joint.    Pain is described as aching  and reported as 1/10.  Associated symptoms:  Loss of motion/stiffness and loss of strength.   Symptoms are exacerbated by lifting, using arm overhead, using arm at shoulder level and using arm behind back and relieved by rest and other (cortisone injection).  Since onset symptoms are rapidly improving (since injection).  Special tests:  X-ray and MRI (left roator cuff tear).  Previous treatment includes physical therapy.  There was moderate improvement following previous treatment.  General health as reported by patient is good.  Pertinent medical history includes:  Cancer and overweight.  Medical allergies: no.  Other surgeries include:  None reported.  Current medications:  Cardiac medication and high blood pressure medication.  Current occupation is /.  Patient is working in normal job without restrictions.  Primary job tasks include:  Driving and prolonged sitting.        Red flags:  None as reported by the patient.                        Objective:  SHOULDER:   AROM L AROM R MMT L MMT R   Flex 165 165 5-/5 5/5   Abd 160 165 5-/5 5/5   Full Can   4+/5 5/5   IR   5/5 5/5   ER 60 50 4+/5 5/5   Ext/IR T10 T10       Scapulothoraic Rhythm: mild to moderate dyskinesis noted in left scapula with upper extremity elevation    Palpation: no tender to palpation    Special tests:   L R   Impingement     Neer's - -   Hawkin's-Abdulkadir - -    Coracoid Impingement - -   Internal impingement - -   Labral     Anterior Slide - -   Culberson's - -   Crank - -   Instability     Apprehension (anterior) - -   Relocation (anterior) - -   Anterior Load & Shift - -   Posterior Load & Shift - -   Posterior instability (with 90 degrees flex)     Multi-Directional Instability      Sulcus - -   Biceps      Speed's - -   Rotator Cuff Tear     Drop Arm - -   Belly Press - -   Lift off  - -         System    Physical Exam    General     ROS    Assessment/Plan:    Patient is a 58 year old male with left side shoulder complaints.    Patient has the following significant findings with corresponding treatment plan.                Diagnosis 1:  Left shoulder pain    Pain -  hot/cold therapy, self management, education and home program  Decreased ROM/flexibility - manual therapy, therapeutic exercise and home program  Decreased strength - therapeutic exercise, therapeutic activities and home program  Decreased proprioception - neuro re-education, therapeutic activities and home program  Decreased function - therapeutic activities and home program    Therapy Evaluation Codes:   1) History comprised of:   Personal factors that impact the plan of care:      Past/current experiences.    Comorbidity factors that impact the plan of care are:      Cancer and Overweight.     Medications impacting care: Cardiac and High blood pressure.  2) Examination of Body Systems comprised of:   Body structures and functions that impact the plan of care:      Shoulder.   Activity limitations that impact the plan of care are:      Bathing, Driving, Dressing and Lifting.  3) Clinical presentation characteristics are:   Stable/Uncomplicated.  4) Decision-Making    Low complexity using standardized patient assessment instrument and/or measureable assessment of functional outcome.  Cumulative Therapy Evaluation is: Low complexity.    Previous and current functional limitations:  (See Goal Flow Sheet for  this information)    Short term and Long term goals: (See Goal Flow Sheet for this information)     Communication ability:  Patient appears to be able to clearly communicate and understand verbal and written communication and follow directions correctly.  Treatment Explanation - The following has been discussed with the patient:   RX ordered/plan of care  Anticipated outcomes  Possible risks and side effects  This patient would benefit from PT intervention to resume normal activities.   Rehab potential is good.    Frequency:  1 X month, once daily  Duration:  for 1-2 months  Discharge Plan:  Achieve all LTG.  Independent in home treatment program.  Reach maximal therapeutic benefit.    Please refer to the daily flowsheet for treatment today, total treatment time and time spent performing 1:1 timed codes.

## 2018-12-28 ENCOUNTER — TELEPHONE (OUTPATIENT)
Dept: SURGERY | Facility: CLINIC | Age: 58
End: 2018-12-28

## 2018-12-28 DIAGNOSIS — K60.2 ANAL FISSURE: Primary | ICD-10-CM

## 2018-12-28 NOTE — TELEPHONE ENCOUNTER
Patient called the pharmacy to request a refill on his nifedipine 0.2% suppository. This medication is no longer on his medication list. It was filled at the [facility] pharmacy according to the patient. Last fill in 2017. Please follow up with patient.  Thank you  Claudia Villasenor, [facility] pharmacy

## 2019-01-02 DIAGNOSIS — I10 HYPERTENSION GOAL BP (BLOOD PRESSURE) < 140/90: Chronic | ICD-10-CM

## 2019-01-02 DIAGNOSIS — E11.9 TYPE 2 DIABETES MELLITUS WITHOUT COMPLICATION, WITHOUT LONG-TERM CURRENT USE OF INSULIN (H): ICD-10-CM

## 2019-01-02 NOTE — TELEPHONE ENCOUNTER
"Requested Prescriptions   Pending Prescriptions Disp Refills     metFORMIN (GLUCOPHAGE) 500 MG tablet [Pharmacy Med Name: METFORMIN 500MG TABLETS] 120 tablet 0    Last Written Prescription Date:  10-1-18  Last Fill Quantity: 180,  # refills: 0   Last office visit: 11/6/2018 with prescribing provider:  11-6-18   Future Office Visit:     Sig: TAKE 1 TABLET BY MOUTH TWICE DAILY WITH MEALS    Biguanide Agents Failed - 1/2/2019  3:46 AM       Failed - Blood pressure less than 140/90 in past 6 months    BP Readings from Last 3 Encounters:   11/06/18 146/70   11/05/18 (!) 133/91   10/22/18 (!) 159/93                Passed - Patient has documented LDL within the past 12 mos.    Recent Labs   Lab Test 02/19/18  0905   LDL 80            Passed - Patient has had a Microalbumin in the past 15 mos.    Recent Labs   Lab Test 11/06/18  1133   MICROL 38   UMALCR 16.19            Passed - Patient is age 10 or older       Passed - Patient has documented A1c within the specified period of time.    If HgbA1C is 8 or greater, it needs to be on file within the past 3 months.  If less than 8, must be on file within the past 6 months.     Recent Labs   Lab Test 11/06/18  1125   A1C 6.3*            Passed - Patient's CR is NOT>1.4 OR Patient's EGFR is NOT<45 within past 12 mos.    Recent Labs   Lab Test 11/06/18  1125   GFRESTIMATED >90   GFRESTBLACK >90       Recent Labs   Lab Test 11/06/18  1125   CR 0.70            Passed - Patient does NOT have a diagnosis of CHF.       Passed - Recent (6 mo) or future (30 days) visit within the authorizing provider's specialty    Patient had office visit in the last 6 months or has a visit in the next 30 days with authorizing provider or within the authorizing provider's specialty.  See \"Patient Info\" tab in inbasket, or \"Choose Columns\" in Meds & Orders section of the refill encounter.            amLODIPine (NORVASC) 5 MG tablet [Pharmacy Med Name: AMLODIPINE BESYLATE 5MG TABLETS] 30 tablet 0    Last " "Written Prescription Date:  11-6-18  Last Fill Quantity: 30,  # refills: 1   Last office visit: 11/6/2018 with prescribing provider:  11-6-18   Future Office Visit:     Sig: TAKE 1 TABLET(5 MG) BY MOUTH DAILY    Calcium Channel Blockers Protocol  Failed - 1/2/2019  3:46 AM       Failed - Blood pressure under 140/90 in past 12 months    BP Readings from Last 3 Encounters:   11/06/18 146/70   11/05/18 (!) 133/91   10/22/18 (!) 159/93                Passed - Recent (12 mo) or future (30 days) visit within the authorizing provider's specialty    Patient had office visit in the last 12 months or has a visit in the next 30 days with authorizing provider or within the authorizing provider's specialty.  See \"Patient Info\" tab in inbasket, or \"Choose Columns\" in Meds & Orders section of the refill encounter.             Passed - Patient is age 18 or older       Passed - Normal serum creatinine on file in past 12 months    Recent Labs   Lab Test 11/06/18  1125   CR 0.70             losartan (COZAAR) 50 MG tablet [Pharmacy Med Name: LOSARTAN 50MG TABLETS] 30 tablet 0    Last Written Prescription Date:  11-6-18  Last Fill Quantity: 30,  # refills: 1   Last office visit: 11/6/2018 with prescribing provider:     Future Office Visit:     Sig: TAKE 1 TABLET(50 MG) BY MOUTH DAILY    Angiotensin-II Receptors Failed - 1/2/2019  3:46 AM       Failed - Blood pressure under 140/90 in past 12 months    BP Readings from Last 3 Encounters:   11/06/18 146/70   11/05/18 (!) 133/91   10/22/18 (!) 159/93                Passed - Recent (12 mo) or future (30 days) visit within the authorizing provider's specialty    Patient had office visit in the last 12 months or has a visit in the next 30 days with authorizing provider or within the authorizing provider's specialty.  See \"Patient Info\" tab in inbasket, or \"Choose Columns\" in Meds & Orders section of the refill encounter.             Passed - Patient is age 18 or older       Passed - Normal " serum creatinine on file in past 12 months    Recent Labs   Lab Test 11/06/18  1125   CR 0.70            Passed - Normal serum potassium on file in past 12 months    Recent Labs   Lab Test 11/06/18  1125   POTASSIUM 3.7

## 2019-01-03 RX ORDER — LOSARTAN POTASSIUM 50 MG/1
TABLET ORAL
Qty: 30 TABLET | Refills: 0 | Status: SHIPPED | OUTPATIENT
Start: 2019-01-03 | End: 2019-01-14

## 2019-01-03 RX ORDER — AMLODIPINE BESYLATE 5 MG/1
TABLET ORAL
Qty: 30 TABLET | Refills: 0 | Status: SHIPPED | OUTPATIENT
Start: 2019-01-03 | End: 2019-02-01

## 2019-01-03 NOTE — TELEPHONE ENCOUNTER
Did do script but can not send to pharmacy so will fax Friday  Patient needs to follow up with me for any more refills  Zachariah Saucedo MD

## 2019-01-03 NOTE — TELEPHONE ENCOUNTER
Routing refill request to provider for review/approval because:  Failed protocol: blood pressure    Haley Gray RN

## 2019-01-03 NOTE — TELEPHONE ENCOUNTER
Okayed one month each, needs to be seen soon to recheck blood pressure etc    Please inform patient    Finesse Beal MD

## 2019-01-04 NOTE — TELEPHONE ENCOUNTER
----- Message from Zachariah Saucedo MD sent at 1/3/2019  9:20 AM CST -----  Send nifidipine to the pharmacy  Zachariah Saucedo MD    Talked to the patient yesterday that I have done the prescription but will need to be compounded and they should be calling him.  But need to see him to make sure it is nothing else. So he will follow up with me in a few weeks.   Zachariah Saucedo MD

## 2019-01-14 ENCOUNTER — OFFICE VISIT (OUTPATIENT)
Dept: FAMILY MEDICINE | Facility: CLINIC | Age: 59
End: 2019-01-14
Payer: COMMERCIAL

## 2019-01-14 VITALS
WEIGHT: 230.25 LBS | BODY MASS INDEX: 32.96 KG/M2 | HEART RATE: 108 BPM | TEMPERATURE: 98.5 F | SYSTOLIC BLOOD PRESSURE: 137 MMHG | OXYGEN SATURATION: 96 % | HEIGHT: 70 IN | DIASTOLIC BLOOD PRESSURE: 68 MMHG

## 2019-01-14 DIAGNOSIS — F43.9 STRESS: ICD-10-CM

## 2019-01-14 DIAGNOSIS — Z91.09 ENVIRONMENTAL ALLERGIES: ICD-10-CM

## 2019-01-14 DIAGNOSIS — E11.9 TYPE 2 DIABETES MELLITUS WITHOUT COMPLICATION, WITHOUT LONG-TERM CURRENT USE OF INSULIN (H): ICD-10-CM

## 2019-01-14 DIAGNOSIS — R42 DIZZINESS: Primary | ICD-10-CM

## 2019-01-14 DIAGNOSIS — I10 HYPERTENSION GOAL BP (BLOOD PRESSURE) < 140/90: Chronic | ICD-10-CM

## 2019-01-14 PROCEDURE — 99214 OFFICE O/P EST MOD 30 MIN: CPT | Performed by: FAMILY MEDICINE

## 2019-01-14 RX ORDER — FEXOFENADINE HCL 180 MG/1
180 TABLET ORAL DAILY
Qty: 90 TABLET | Refills: 3 | Status: SHIPPED | OUTPATIENT
Start: 2019-01-14 | End: 2019-03-22 | Stop reason: ALTCHOICE

## 2019-01-14 RX ORDER — LOSARTAN POTASSIUM 100 MG/1
100 TABLET ORAL DAILY
Qty: 90 TABLET | Refills: 0 | Status: SHIPPED | OUTPATIENT
Start: 2019-01-14 | End: 2019-03-11

## 2019-01-14 ASSESSMENT — PAIN SCALES - GENERAL: PAINLEVEL: NO PAIN (0)

## 2019-01-14 ASSESSMENT — MIFFLIN-ST. JEOR: SCORE: 1860.66

## 2019-01-14 NOTE — PATIENT INSTRUCTIONS
Increase losaratan to 100 mg daily    Keep other meds as is    Increase fluid intake; stay well hydrated    Eat some health snacks during the day.  Don't go too long  Without eating anything      Minimize sugar / high fructose corn syrup.     Want snacks that only raise blood sugar slowly.  Look for snacks with a low glycemic index.      Try to minimize foods with high glycemic index as they cause spikes and drops in blood sugar    See us in 2 months for recheck, sooner if needed

## 2019-01-14 NOTE — PROGRESS NOTES
SUBJECTIVE:   Gary Iyer is a 58 year old male who presents to clinic today for the following health issues:       Dizziness  Onset: 2-3 weeks    Description:   Do you feel faint:  YES  Does it feel like the surroundings (bed, room) are moving: no   Unsteady/off balance: YES  Have you passed out or fallen: YES    Intensity: moderate    Progression of Symptoms:  worsening    Accompanying Signs & Symptoms:  Heart palpitations: no   Nausea, vomiting: no   Weakness in arms or legs: no   Fatigue: YES  Vision or speech changes: no   Ringing in ears (Tinnitus): no   Hearing Loss: no     History:   Head trauma/concussion hx: no   Previous similar symptoms: YES- ongoing off and on for the past year  Recent bleeding history: no     Precipitating factors:   Worse with activity or head movement: YES  Any new medications (BP?): no   Alcohol/drug abuse/withdrawal: no     Alleviating factors:   Does staying in a fixed position give relief:  YES    Therapies Tried and outcome: none    none    Problem list and histories reviewed & adjusted, as indicated.  Additional history: as documented         Reviewed and updated as needed this visit by clinical staff  Tobacco  Allergies  Meds  Med Hx  Surg Hx  Fam Hx  Soc Hx      Reviewed and updated as needed this visit by Provider          137/84 was last reading patient had    Itching is better    Reviewed my last note    Working longer shifts now    Maybe not drinking enough liquid    Goes 4-5 hours without eating much of anything    And only a few sips of liquid    Not a lot of exercise    Mom in hospital    Father in law         Physical Exam   Constitutional: He is oriented to person, place, and time. He appears well-developed and well-nourished.   HENT:   Head: Normocephalic and atraumatic.   Right Ear: Tympanic membrane, external ear and ear canal normal.   Left Ear: Tympanic membrane, external ear and ear canal normal.   Nose: Nose normal.   Mouth/Throat: Oropharynx is  clear and moist.   Eyes: Conjunctivae and EOM are normal. Pupils are equal, round, and reactive to light.   Neck: Neck supple. Carotid bruit is not present.   Cardiovascular: Normal rate, regular rhythm, normal heart sounds and intact distal pulses.   Pulmonary/Chest: Effort normal and breath sounds normal. No respiratory distress.   Musculoskeletal: He exhibits no edema.   Neurological: He is alert and oriented to person, place, and time. No cranial nerve deficit.   Psychiatric: He has a normal mood and affect. His speech is normal and behavior is normal.     Sensation/strength in extremities normal.  Romberg, finger nose test, standing on one leg test, heel toe walk all normal.  Cranial nerves normal.    ASSESSMENT / PLAN:  (R42) Dizziness  (primary encounter diagnosis)  Comment: exam here is reassuring.    Plan: discussed in detail.  Stay well hydrated.  Don't go long time without eating.      (I10) Hypertension goal BP (blood pressure) < 140/90  Comment: increase losartan to 100 mg daily.  Blood pressure here was high even on recheck, but then at end of visit he wanted it checked a 3rd time.  Did come down some but most readings over last few months high  Plan: losartan (COZAAR) 100 MG tablet             (Z91.09) Environmental allergies  Comment: refill   Plan: fexofenadine (ALLEGRA) 180 MG tablet             (E11.9) Type 2 diabetes mellitus without complication, without long-term current use of insulin (H)  Comment: discussed glycemic index in detail, handouts given   Plan: as above     (F43.9) Stress  Comment: lots of stress recently   Plan: monitor.  Increase exercise.       I reviewed the patient's medications, allergies, medical history, family history, and social history.    Finesse Beal MD

## 2019-01-21 ENCOUNTER — OFFICE VISIT (OUTPATIENT)
Dept: OTOLARYNGOLOGY | Facility: CLINIC | Age: 59
End: 2019-01-21
Payer: COMMERCIAL

## 2019-01-21 DIAGNOSIS — R49.0 DYSPHONIA: ICD-10-CM

## 2019-01-21 DIAGNOSIS — J38.3 VOCAL PROCESS GRANULOMA: Primary | ICD-10-CM

## 2019-01-21 DIAGNOSIS — R49.0 DYSPHONIA: Primary | ICD-10-CM

## 2019-01-21 DIAGNOSIS — C32.0 VOCAL CORD CANCER (H): ICD-10-CM

## 2019-01-21 DIAGNOSIS — J38.3 VOCAL PROCESS GRANULOMA: ICD-10-CM

## 2019-01-21 NOTE — LETTER
1/21/2019      RE: Gary Iyer  [address] [facility] [address]       Dear Colleague:    Gary Iyer recently returned for follow-up at the [facility] Voice [facility]. My clinic note from our visit is enclosed below.  Speech recognition software may have been used in the documentation below; input is reviewed before signature to the best of my ability.     I appreciate the ongoing opportunity to participate in this patient's care.    Please feel free to contact me with any questions.      Sincerely yours,  Mel Fournier M.D., M.P.H.  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  Most recently, he returned to the operating room on February 9, 2017 for an area of new irregularity of the left true vocal fold.  Pathology showed severe dysplasia with negative but close margins (for moderate, but not severe dysplasia).  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    Over the following visits, he continued to have irregularity in that area.  His coughing improved after managing his reflux.  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  We elected to continue to observe closely with the possibility of a repeat biopsy in clinic if it continued to progress.     No specific changes, although he does report increased voice use lately because of illness and death in the family.      MEDICATIONS:     Current Outpatient Medications   Medication Sig Dispense Refill     allopurinol (ZYLOPRIM) 300 MG tablet TAKE 1 TABLET(300 MG) BY MOUTH DAILY 90 tablet 3     amLODIPine (NORVASC) 5 MG tablet TAKE 1 TABLET(5  MG) BY MOUTH DAILY 30 tablet 0     aspirin 81 MG tablet Take 1 tablet by mouth daily.       atorvastatin (LIPITOR) 20 MG tablet TAKE 1 TABLET(20 MG) BY MOUTH DAILY 90 tablet 3     blood glucose monitoring (ALON MICROLET) lancets 1 each 2 times daily Use to test blood sugar 2 times daily or as directed. 1 Box 3     blood glucose monitoring (NO BRAND SPECIFIED) meter device kit Use to test blood sugar 2 times daily or as directed.  Accucheck meter please and all associated strips, lancets, and other supplies, 3 month supply with refills for a year. 1 kit 0     fexofenadine (ALLEGRA) 180 MG tablet Take 1 tablet (180 mg) by mouth daily 90 tablet 3     hydrochlorothiazide (HYDRODIURIL) 50 MG tablet TAKE 1 TABLET(50 MG) BY MOUTH EVERY MORNING 90 tablet 3     hydrocortisone 1 % ointment Apply topically 2 times daily as needed 30 g 1     ketoconazole (NIZORAL) 2 % cream Apply topically 2 times daily for up to 2 weeks as needed for rash 60 g 1     losartan (COZAAR) 100 MG tablet Take 1 tablet (100 mg) by mouth daily 90 tablet 0     metFORMIN (GLUCOPHAGE) 500 MG tablet TAKE 1 TABLET BY MOUTH TWICE DAILY WITH MEALS 120 tablet 0     Multiple Vitamin (DAILY MULTIVITAMIN PO) Take  by mouth daily.       omeprazole (PRILOSEC) 20 MG DR capsule TK 1 T PO D  0     omeprazole 20 MG tablet 1 po daily 90 tablet 3     order for DME Autocpap 10-16 cm 1 Units 0     PARoxetine (PAXIL) 20 MG tablet TAKE 2 TABLETS(40 MG) BY MOUTH DAILY 180 tablet 3     psyllium 0.52 G capsule Take 1 capsule (0.52 g) by mouth daily 100 capsule 3     tamsulosin (FLOMAX) 0.4 MG capsule TAKE 1 CAPSULE(0.4 MG) BY MOUTH DAILY 90 capsule 3     triamcinolone (KENALOG) 0.1 % cream Apply topically 2 times daily as needed for irritation 30 g 1     vitamin D (ERGOCALCIFEROL) 21895 UNIT capsule TAKE 1 CAPSULE BY MOUTH EVERY WEEK 12 capsule 1       ALLERGIES:  No Known Allergies    NEW PMH/PSH: None    REVIEW OF SYSTEMS:  The patient completed a comprehensive 11 point  review of systems (below), which was reviewed. Positives are as noted below.  Patient Supplied Answers to Review of Systems   ENT ROS 4/22/2016   Ears, Nose, Throat Nasal congestion or drainage       PHYSICAL EXAM:  General: The patient was alert and conversant, and in no acute distress.    Neck: No palpable cervical lymphadenopathy, no significant tenderness to palpation of the thyrohyoid space, which was narrow; landmarks somewhat indistinct due to habitus. No obvious thyroid abnormality.  Resp: Breathing comfortably, no stridor or stertor.  Neuro: Symmetric facial function. Other cranial nerve function as documented above.  Psych: Normal affect, pleasant and cooperative.  Voice/speech: Mild dysphonia characterized by breathiness and roughness.      Intake scores  Last 2 Scores for Patient-Answered VHI Questionnaire  VHI Total Score 12/22/2017 2/26/2018   VHI Total Score 0 9      Last 2 Scores for Patient-Answered EAT Questionnaire  EAT Total Score 12/22/2017 2/26/2018   EAT Total Score 0 1      Last 2 Scores for Patient-Answered CSI Questionnaire  CSI Total Score 12/22/2017 2/26/2018   CSI Total Score 0 0       Procedure:   Flexible fiberoptic laryngoscopy and laryngovideostroboscopy  Indications: This procedure was warranted to evaluate the patient's laryngeal anatomy and function. Risks, benefits, and alternatives were discussed.  Description: After written informed consent was obtained, a time-out was performed to confirm patient identity, procedure, and procedure site. Topical 3% lidocaine with 0.25% phenylephrine was applied to the nasal cavities. I performed the endoscopy and no complications were apparent. Continuous and stroboscopic light were utilized to assess routine phonation and variable frequency phonation.  Performed by: Mel Fournier MD MPH  SLP: Misty Clark, PhD, CCC-SLP   Findings: Normal nasopharynx. Normal base of tongue, valleculae, and epiglottis. Vocal fold mobility: right:  normal; left: normal. Medial edges of the vocal folds: smooth and straight. Stable mild erythema left true vocal fold. Glissade produced appropriate elongation. There was moderate supraglottic recruitment with connected speech. Mucosa of false vocal folds, aryepiglottic folds, piriform sinuses, and posterior glottis unremarkable with the exception of increase in the arytenoid mucosal irritation on the left, which is now bilobed. Airway was patent.    Findings highlighted on NBI.    The addition of stroboscopy allowed evaluation of the mucosal wave.   Amplitude: right: mildly decreased; left: mildly decreased. Symmetry: intermittent symmetry. Closure pattern: complete. Closure plane: at glottic level. Phase distribution: normal.            IMPRESSION AND PLAN:   Gary Iyer returns with increased left posterior laryngeal irregularity. He did have some heavy voice use recently due to the death of a family member and hospitalization of another, but we do need to keep a close eye on these changes given his history. He understands the changes are likely to be benign, but we would need to biopsy to confirm.     We agreed upon a tentative plan for an in-clinic biopsy in four to six weeks. In the meantime, he will focus on reducing voice use and any phonotrauma, so that if the exam looks better at that time we can hold off on the biopsy.    I appreciate the opportunity to participate in the care of this pleasant patient.       Mel Fournier MD

## 2019-01-21 NOTE — Clinical Note
1/21/2019       RE: Gary Iyer  [address] [facility] [address]     Dear Colleague,    Thank you for referring your patient, Gary Iyer, to the [facility] EAR NOSE AND THROAT at [facility]. Please see a copy of my visit note below.    No notes on file    Again, thank you for allowing me to participate in the care of your patient.      Sincerely,    Mel Fournier MD

## 2019-01-21 NOTE — PROGRESS NOTES
Dear Colleague:    Gary Iyer recently returned for follow-up at the [facility]. My clinic note from our visit is enclosed below.  Speech recognition software may have been used in the documentation below; input is reviewed before signature to the best of my ability.     I appreciate the ongoing opportunity to participate in this patient's care.    Please feel free to contact me with any questions.      Sincerely yours,  Mel Fournier M.D., M.P.H.  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  Most recently, he returned to the operating room on February 9, 2017 for an area of new irregularity of the left true vocal fold.  Pathology showed severe dysplasia with negative but close margins (for moderate, but not severe dysplasia).  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    Over the following visits, he continued to have irregularity in that area.  His coughing improved after managing his reflux.  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  We elected to continue to observe closely with the possibility of a repeat biopsy in clinic if it continued to progress.     No specific changes, although he does report increased voice use lately because of illness and death in the family.      MEDICATIONS:     Current Outpatient Medications   Medication Sig Dispense Refill     allopurinol (ZYLOPRIM) 300 MG tablet TAKE 1 TABLET(300 MG) BY MOUTH DAILY 90 tablet 3     amLODIPine (NORVASC) 5 MG tablet TAKE 1 TABLET(5 MG) BY MOUTH DAILY 30 tablet 0     aspirin 81 MG tablet Take 1 tablet by mouth  daily.       atorvastatin (LIPITOR) 20 MG tablet TAKE 1 TABLET(20 MG) BY MOUTH DAILY 90 tablet 3     blood glucose monitoring (ALON MICROLET) lancets 1 each 2 times daily Use to test blood sugar 2 times daily or as directed. 1 Box 3     blood glucose monitoring (NO BRAND SPECIFIED) meter device kit Use to test blood sugar 2 times daily or as directed.  Accucheck meter please and all associated strips, lancets, and other supplies, 3 month supply with refills for a year. 1 kit 0     fexofenadine (ALLEGRA) 180 MG tablet Take 1 tablet (180 mg) by mouth daily 90 tablet 3     hydrochlorothiazide (HYDRODIURIL) 50 MG tablet TAKE 1 TABLET(50 MG) BY MOUTH EVERY MORNING 90 tablet 3     hydrocortisone 1 % ointment Apply topically 2 times daily as needed 30 g 1     ketoconazole (NIZORAL) 2 % cream Apply topically 2 times daily for up to 2 weeks as needed for rash 60 g 1     losartan (COZAAR) 100 MG tablet Take 1 tablet (100 mg) by mouth daily 90 tablet 0     metFORMIN (GLUCOPHAGE) 500 MG tablet TAKE 1 TABLET BY MOUTH TWICE DAILY WITH MEALS 120 tablet 0     Multiple Vitamin (DAILY MULTIVITAMIN PO) Take  by mouth daily.       omeprazole (PRILOSEC) 20 MG DR capsule TK 1 T PO D  0     omeprazole 20 MG tablet 1 po daily 90 tablet 3     order for DME Autocpap 10-16 cm 1 Units 0     PARoxetine (PAXIL) 20 MG tablet TAKE 2 TABLETS(40 MG) BY MOUTH DAILY 180 tablet 3     psyllium 0.52 G capsule Take 1 capsule (0.52 g) by mouth daily 100 capsule 3     tamsulosin (FLOMAX) 0.4 MG capsule TAKE 1 CAPSULE(0.4 MG) BY MOUTH DAILY 90 capsule 3     triamcinolone (KENALOG) 0.1 % cream Apply topically 2 times daily as needed for irritation 30 g 1     vitamin D (ERGOCALCIFEROL) 67377 UNIT capsule TAKE 1 CAPSULE BY MOUTH EVERY WEEK 12 capsule 1       ALLERGIES:  No Known Allergies    NEW PMH/PSH: None    REVIEW OF SYSTEMS:  The patient completed a comprehensive 11 point review of systems (below), which was reviewed. Positives are as noted  below.  Patient Supplied Answers to Review of Systems   ENT ROS 4/22/2016   Ears, Nose, Throat Nasal congestion or drainage       PHYSICAL EXAM:  General: The patient was alert and conversant, and in no acute distress.    Neck: No palpable cervical lymphadenopathy, no significant tenderness to palpation of the thyrohyoid space, which was narrow; landmarks somewhat indistinct due to habitus. No obvious thyroid abnormality.  Resp: Breathing comfortably, no stridor or stertor.  Neuro: Symmetric facial function. Other cranial nerve function as documented above.  Psych: Normal affect, pleasant and cooperative.  Voice/speech: Mild dysphonia characterized by breathiness and roughness.      Intake scores  Last 2 Scores for Patient-Answered VHI Questionnaire  VHI Total Score 12/22/2017 2/26/2018   VHI Total Score 0 9      Last 2 Scores for Patient-Answered EAT Questionnaire  EAT Total Score 12/22/2017 2/26/2018   EAT Total Score 0 1      Last 2 Scores for Patient-Answered CSI Questionnaire  CSI Total Score 12/22/2017 2/26/2018   CSI Total Score 0 0       Procedure:   Flexible fiberoptic laryngoscopy and laryngovideostroboscopy  Indications: This procedure was warranted to evaluate the patient's laryngeal anatomy and function. Risks, benefits, and alternatives were discussed.  Description: After written informed consent was obtained, a time-out was performed to confirm patient identity, procedure, and procedure site. Topical 3% lidocaine with 0.25% phenylephrine was applied to the nasal cavities. I performed the endoscopy and no complications were apparent. Continuous and stroboscopic light were utilized to assess routine phonation and variable frequency phonation.  Performed by: Mel Fournier MD MPH  SLP: Misty Clark, PhD, CCC-SLP   Findings: Normal nasopharynx. Normal base of tongue, valleculae, and epiglottis. Vocal fold mobility: right: normal; left: normal. Medial edges of the vocal folds: smooth and  straight. Stable mild erythema left true vocal fold. Glissade produced appropriate elongation. There was moderate supraglottic recruitment with connected speech. Mucosa of false vocal folds, aryepiglottic folds, piriform sinuses, and posterior glottis unremarkable with the exception of increase in the arytenoid mucosal irritation on the left, which is now bilobed. Airway was patent.    Findings highlighted on NBI.    The addition of stroboscopy allowed evaluation of the mucosal wave.   Amplitude: right: mildly decreased; left: mildly decreased. Symmetry: intermittent symmetry. Closure pattern: complete. Closure plane: at glottic level. Phase distribution: normal.            IMPRESSION AND PLAN:   Gary Iyer returns with increased left posterior laryngeal irregularity. He did have some heavy voice use recently due to the death of a family member and hospitalization of another, but we do need to keep a close eye on these changes given his history. He understands the changes are likely to be benign, but we would need to biopsy to confirm.     We agreed upon a tentative plan for an in-clinic biopsy in four to six weeks. In the meantime, he will focus on reducing voice use and any phonotrauma, so that if the exam looks better at that time we can hold off on the biopsy.    I appreciate the opportunity to participate in the care of this pleasant patient.

## 2019-01-21 NOTE — PATIENT INSTRUCTIONS
1.  You were seen in the ENT Clinic today by Dr. Fournier.  If you have any questions or concerns after your appointment, please call [phone].  Press option #1 for scheduling related needs.  Press option #3 for Nurse advice.  2.  Plan is to return to clinic in 4-6 weeks for a biopsy.    Arlene Gongora RN  [facility] ENT   Head & Neck Surgery

## 2019-01-26 NOTE — PROGRESS NOTES
[facility] VOICE [facility]  Elijah Baker Jr., M.D., F.A.C.S.  Mel Fournier M.D., M.P.H.  Misty Clark, Ph.D., CCC/SLP  Jasmyne Padilla M.M. (voice), M.SWAPNIL., CCC/SLP  Zack Mcdaniels M.M. (voice), M.A., [facility]/SLP    [facility] VOICE [facility]  VOICE/SPEECH/BREATHING THERAPY  CLINICAL FOLLOW-UP AND LARYNGEAL EXAMINATION REPORT    Patient: Gary Iyer  Date of Service: 2019    HISTORY  PATIENT INFORMATION  Gary Iyer was seen for follow-up in conjunction with a visit to Dr. Fournier today.  Please also refer to Dr. Fournier's dictation.  He was last seen on 10/12/18.    PROGRESS SINCE LAST VISIT  Mr. Iyer reports:    He has not been doing any coughing or throat-clearing    With further questioning, he realizes he did have increased loud voice use over the holidays, with the recent death and  of his father in law, and his mother in the hospital  o There have been many lengthy international calls, that tend to get loud    OBJECTIVE FINDINGS  VOICE AND SPEECH EVALUATION  Mr. Iyer presents today with a voice quality that is characterized by     The same soft but strained quality he has had in the past     LARYNGEAL EXAMINATION    Endoscopic laryngeal examination was performed by:  Mel Fournier MD,   I provided technical support, and provided the protocol of instructions for the patient.  Type of exam:   Flexible endoscopy with chip-tip technology and stroboscopy  This exam shows:      The bi-lobed left arytenoid granuloma is significantly larger and more inflamed    The vocal folds themselves look as good as they have recently    His supraglottic hyperfunction is similar to what it has been in previous exams    There is high impact to the arytenoid cartilages and the granuloma with most phonation    A therapy probe shows that whispering results in even more severe arytenoid squeeze    Dr. Fournier and I reviewed this laryngeal exam with Mr. Iyer today, and I provided pertinent explanations, as well as  written information:    Dr. Fournier has recommended an in-office biopsy in 4-6 weeks, unless the granuloma is reduced in size at the next examination    Mr. Iyer states he will be serious about trying to reduce his voice use and volume, and wants guidance on how to do this    THERAPEUTIC ACTIVITIES  Today Mr. Ieyr participated in the following therapeutic activities:    Practiced techniques for yawn-sigh phonation.    I provided instruction for voice use on the job and at home.    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      Based on today s laryngeal examination, it appears that:    The left arytenoid granuloma is larger and concerning  To Dr. Fournier    This may be due to increased voice use and volume in the last month    Mr. Iyer wishes to try reducing his voice use and volume substantially, in order to allow the granuloma to resolve enough to avoid an in-office biopsy in 4-06 weeks    I provided instructions for reducing the amount and volume of his voice use, as therapy probes show that whispering will increase impact to the granuloma    TREATMENT PLAN    I will see Mr. Iyer when he returns to Dr. Fournier, to determine the effects of his practicing    TOTAL SERVICE TIME: 30 minutes  TREATMENT (44575)  NO CHARGE FACILITY FEE (23346)      Misty Clark, Ph.D., [facility]-SLP  Speech-Language Pathologist  Director, [facility]  [phone]

## 2019-02-18 ENCOUNTER — TELEPHONE (OUTPATIENT)
Dept: FAMILY MEDICINE | Facility: CLINIC | Age: 59
End: 2019-02-18

## 2019-02-18 DIAGNOSIS — Z91.09 ENVIRONMENTAL ALLERGIES: Primary | ICD-10-CM

## 2019-02-18 NOTE — TELEPHONE ENCOUNTER
Reason for Call:  Other     Detailed comments: Patient calling stating that his insurance will not cover fexofenadine (ALLEGRA) 180 MG tablet. He is wondering if PCP can complete a prior authorization for this.     Phone Number Patient can be reached at: Home number on file [phone] (home)    Best Time: any    Can we leave a detailed message on this number? YES    Call taken on 2/18/2019 at 10:51 AM by Cheryl Saucedo

## 2019-02-22 NOTE — TELEPHONE ENCOUNTER
Central Prior Authorization Team   Phone: [phone]    PA Initiation    Medication:   Insurance Company: [employer] - Phone [phone] Fax [phone]  Pharmacy Filling the Rx: [facility] [address] [place] [address] - [address] [place] [address] [address]  Filling Pharmacy Phone: [phone]  Filling Pharmacy Fax: [phone]  Start Date: 2/22/2019

## 2019-02-23 NOTE — TELEPHONE ENCOUNTER
PRIOR AUTHORIZATION DENIED    Medication: FEXOFENADINE-DENIED    Denial Date: 2/22/2019    Denial Rational: PATIENT NEEDS TO TRY/FAIL CETIRIZINE HCL, LORATADINE.        Appeal Information:  IF PATIENT IS UNABLE TO TRY/FAIL ALTERNATIVE(S) PLEASE SUPPLY PA TEAM WITH A LETTER OF MEDICAL NECESSITY WITH CLINICAL REASON.

## 2019-02-25 RX ORDER — CETIRIZINE HYDROCHLORIDE 10 MG/1
10 TABLET ORAL DAILY
Qty: 90 TABLET | Refills: 3 | Status: SHIPPED | OUTPATIENT
Start: 2019-02-25 | End: 2020-02-20

## 2019-02-26 NOTE — TELEPHONE ENCOUNTER
Attempt # 1  Called patient at home number.[phone]  Was call answered?  Yes, relayed information regarding the new prescription - Patient verbalized understanding and agreement with plan and had no questions.     Diana Klein RN  [facility]

## 2019-03-01 ENCOUNTER — OFFICE VISIT (OUTPATIENT)
Dept: OTOLARYNGOLOGY | Facility: CLINIC | Age: 59
End: 2019-03-01
Payer: COMMERCIAL

## 2019-03-01 DIAGNOSIS — I10 HYPERTENSION GOAL BP (BLOOD PRESSURE) < 140/90: Chronic | ICD-10-CM

## 2019-03-01 DIAGNOSIS — J38.7 LARYNGEAL GRANULOMA: Primary | ICD-10-CM

## 2019-03-01 DIAGNOSIS — R49.0 DYSPHONIA: Primary | ICD-10-CM

## 2019-03-01 DIAGNOSIS — C32.0 VOCAL CORD CANCER (H): ICD-10-CM

## 2019-03-01 DIAGNOSIS — E11.9 TYPE 2 DIABETES MELLITUS WITHOUT COMPLICATION, WITHOUT LONG-TERM CURRENT USE OF INSULIN (H): ICD-10-CM

## 2019-03-01 RX ORDER — AMLODIPINE BESYLATE 5 MG/1
TABLET ORAL
Qty: 30 TABLET | Refills: 3 | Status: SHIPPED | OUTPATIENT
Start: 2019-03-01 | End: 2019-03-11

## 2019-03-01 NOTE — PROGRESS NOTES
Dear Colleague:    Gary Iyer recently returned for follow-up at the [facility]. My clinic note from our visit is enclosed below.  Speech recognition software may have been used in the documentation below; input is reviewed before signature to the best of my ability.     I appreciate the ongoing opportunity to participate in this patient's care.    Please feel free to contact me with any questions.      Sincerely yours,  Mel Fournier M.D., M.P.H.  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  Most recently, he returned to the operating room on February 9, 2017 for an area of new irregularity of the left true vocal fold.  Pathology showed severe dysplasia with negative but close margins (for moderate, but not severe dysplasia).  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     He returns today for a re-check and possible biopsy of a gradually worsening granuloma vs lesion of the left medial arytenoid. He has been working really hard on reducing his phonatory impact.        MEDICATIONS:     Current Outpatient Medications   Medication Sig Dispense Refill     allopurinol (ZYLOPRIM) 300 MG tablet TAKE 1 TABLET(300 MG) BY MOUTH DAILY 90 tablet 1     amLODIPine (NORVASC) 5 MG tablet TAKE 1 TABLET(5 MG) BY MOUTH DAILY 30 tablet 0     aspirin 81 MG tablet Take 1 tablet by mouth daily.       atorvastatin (LIPITOR) 20 MG tablet TAKE 1 TABLET(20 MG) BY MOUTH DAILY 90 tablet 3     blood glucose monitoring (ALON MICROLET) lancets 1 each 2 times daily Use to test blood sugar 2 times daily or as directed. 1 Box 3     blood glucose monitoring (NO BRAND  SPECIFIED) meter device kit Use to test blood sugar 2 times daily or as directed.  Accucheck meter please and all associated strips, lancets, and other supplies, 3 month supply with refills for a year. 1 kit 0     cetirizine (ZYRTEC) 10 MG tablet Take 1 tablet (10 mg) by mouth daily 90 tablet 3     fexofenadine (ALLEGRA) 180 MG tablet Take 1 tablet (180 mg) by mouth daily 90 tablet 3     hydrochlorothiazide (HYDRODIURIL) 50 MG tablet TAKE 1 TABLET(50 MG) BY MOUTH EVERY MORNING 90 tablet 3     hydrocortisone 1 % ointment Apply topically 2 times daily as needed 30 g 1     ketoconazole (NIZORAL) 2 % cream Apply topically 2 times daily for up to 2 weeks as needed for rash 60 g 1     losartan (COZAAR) 100 MG tablet Take 1 tablet (100 mg) by mouth daily 90 tablet 0     metFORMIN (GLUCOPHAGE) 500 MG tablet TAKE 1 TABLET BY MOUTH TWICE DAILY WITH MEALS 120 tablet 0     Multiple Vitamin (DAILY MULTIVITAMIN PO) Take  by mouth daily.       omeprazole (PRILOSEC) 20 MG DR capsule TAKE 1 TABLET BY MOUTH DAILY 90 capsule 2     order for DME Autocpap 10-16 cm 1 Units 0     PARoxetine (PAXIL) 20 MG tablet TAKE 2 TABLETS(40 MG) BY MOUTH DAILY 180 tablet 3     psyllium 0.52 G capsule Take 1 capsule (0.52 g) by mouth daily 100 capsule 3     tamsulosin (FLOMAX) 0.4 MG capsule TAKE 1 CAPSULE(0.4 MG) BY MOUTH DAILY 90 capsule 0     triamcinolone (KENALOG) 0.1 % cream Apply topically 2 times daily as needed for irritation 30 g 1     vitamin D (ERGOCALCIFEROL) 24800 UNIT capsule TAKE 1 CAPSULE BY MOUTH EVERY WEEK 12 capsule 1       ALLERGIES:  No Known Allergies    NEW PMH/PSH: None    REVIEW OF SYSTEMS:  The patient completed a comprehensive 11 point review of systems (below), which was reviewed. Positives are as noted below.  Patient Supplied Answers to Review of Systems   ENT ROS 4/22/2016   Ears, Nose, Throat Nasal congestion or drainage       PHYSICAL EXAM:  General: The patient was alert and conversant, and in no acute distress.     Resp: Breathing comfortably, no stridor or stertor.  Neuro: Symmetric facial function.   Psych: Normal affect, pleasant and cooperative.  Voice/speech: Mild dysphonia characterized by breathiness and roughness.      Intake scores  Last 2 Scores for Patient-Answered VHI Questionnaire  VHI Total Score 2/26/2018 3/1/2019   VHI Total Score 9 17      Last 2 Scores for Patient-Answered EAT Questionnaire  EAT Total Score 2/26/2018 3/1/2019   EAT Total Score 1 5      Last 2 Scores for Patient-Answered CSI Questionnaire  CSI Total Score 2/26/2018 3/1/2019   CSI Total Score 0 0       Procedure:   Flexible fiberoptic laryngoscopy  Indications: This procedure was warranted to evaluate the patient's laryngeal anatomy and function. Risks, benefits, and alternatives were discussed.  Description: After written informed consent was obtained, a time-out was performed to confirm patient identity, procedure, and procedure site. Topical 3% lidocaine with 0.25% phenylephrine was applied to the nasal cavities. I performed the endoscopy and no complications were apparent.  Performed by: Mel Fournier MD MPH  SLP: Misty Clark, PhD, CCC-SLP   Findings: Normal nasopharynx. Normal base of tongue, valleculae, and epiglottis. The right true vocal fold demonstrated normal mobility. The left true vocal fold demonstrated normal mobility. The medial edges of the vocal folds appeared smooth and straight. Stable mild left vocal fold erythema, no focal mucosal lesions. Glissade produced appropriate elongation. There was mild to moderate supraglottic recruitment with connected speech. Mucosa of the false vocal folds, aryepiglottic folds, piriform sinuses, and posterior glottis unremarkable. Airway was patent. 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 No focal lesions on NBI.            IMPRESSION AND PLAN:   Gary Iyer returns with interval resolution of the posterior laryngeal lesion. We will not perform a biopsy  today. He was very happy to see this.    He will return in approximately three months, or sooner as needed for ongoing surveillance. I appreciate the opportunity to participate in the care of this pleasant patient.

## 2019-03-01 NOTE — PROGRESS NOTES
[facility]  Elijah Baker Jr., M.D., F.A.C.S.  Mel Viramontes M.D., M.P.H.  Misty Clark, Ph.D., CCC/SLP  Jasmyne Padilla M.M. (voice), MMC., CCC/SLP  Zack Mcdaniels M.M. (voice), M.A., [facility]/SLP    [facility]  VOICE EVALUATION/LARYNGEAL EXAMINATION REPORT    Patient: Gary Iyer  Date of Service: 3/1/2019    HISTORY  PATIENT INFORMATION  Gary Iyer was seen for brief consultation in conjunction with a visit to Dr. Viramontes today.  Please refer to Dr. Viramontes s dictation for a more complete history and impressions.      I assisted Dr. Viramontes with the laryngeal exam today.  The area of laryngeal irritation has resolved and does not need biopsy.  Mr. Iyer seems to have done well with therapeutic suggestions.  No intervention needed today.     DIAGNOSIS/REASON FOR REFERRAL  Dysphonia/ Evaluate, perform laryngeal exam, treat as appropriate    No charge for today s session  NO CHARGE FACILITY FEE (14956)    PRIMARY ICD-10 code: R49.0 (Dysphonia)      Misty Clark, Ph.D., [facility]-SLP  Speech-Language Pathologist  Director, [facility]  [phone]

## 2019-03-01 NOTE — LETTER
3/1/2019      RE: Gary Iyer  [address] [facility] [address]       Dear Colleague:    Gary Iyer recently returned for follow-up at the [facility]. My clinic note from our visit is enclosed below.  Speech recognition software may have been used in the documentation below; input is reviewed before signature to the best of my ability.     I appreciate the ongoing opportunity to participate in this patient's care.    Please feel free to contact me with any questions.      Sincerely yours,  Mle Fournier M.D., M.P.H.  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  Most recently, he returned to the operating room on February 9, 2017 for an area of new irregularity of the left true vocal fold.  Pathology showed severe dysplasia with negative but close margins (for moderate, but not severe dysplasia).  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     He returns today for a re-check and possible biopsy of a gradually worsening granuloma vs lesion of the left medial arytenoid. He has been working really hard on reducing his phonatory impact.        MEDICATIONS:     Current Outpatient Medications   Medication Sig Dispense Refill     allopurinol (ZYLOPRIM) 300 MG tablet TAKE 1 TABLET(300 MG) BY MOUTH DAILY 90 tablet 1     amLODIPine (NORVASC) 5 MG tablet TAKE 1 TABLET(5 MG) BY MOUTH DAILY 30 tablet 0     aspirin 81 MG tablet Take 1 tablet by mouth daily.       atorvastatin (LIPITOR) 20 MG tablet TAKE 1 TABLET(20 MG) BY MOUTH DAILY 90 tablet 3     blood glucose monitoring (ALON MICROLET) lancets 1 each 2 times daily Use to test blood  sugar 2 times daily or as directed. 1 Box 3     blood glucose monitoring (NO BRAND SPECIFIED) meter device kit Use to test blood sugar 2 times daily or as directed.  Accucheck meter please and all associated strips, lancets, and other supplies, 3 month supply with refills for a year. 1 kit 0     cetirizine (ZYRTEC) 10 MG tablet Take 1 tablet (10 mg) by mouth daily 90 tablet 3     fexofenadine (ALLEGRA) 180 MG tablet Take 1 tablet (180 mg) by mouth daily 90 tablet 3     hydrochlorothiazide (HYDRODIURIL) 50 MG tablet TAKE 1 TABLET(50 MG) BY MOUTH EVERY MORNING 90 tablet 3     hydrocortisone 1 % ointment Apply topically 2 times daily as needed 30 g 1     ketoconazole (NIZORAL) 2 % cream Apply topically 2 times daily for up to 2 weeks as needed for rash 60 g 1     losartan (COZAAR) 100 MG tablet Take 1 tablet (100 mg) by mouth daily 90 tablet 0     metFORMIN (GLUCOPHAGE) 500 MG tablet TAKE 1 TABLET BY MOUTH TWICE DAILY WITH MEALS 120 tablet 0     Multiple Vitamin (DAILY MULTIVITAMIN PO) Take  by mouth daily.       omeprazole (PRILOSEC) 20 MG DR capsule TAKE 1 TABLET BY MOUTH DAILY 90 capsule 2     order for DME Autocpap 10-16 cm 1 Units 0     PARoxetine (PAXIL) 20 MG tablet TAKE 2 TABLETS(40 MG) BY MOUTH DAILY 180 tablet 3     psyllium 0.52 G capsule Take 1 capsule (0.52 g) by mouth daily 100 capsule 3     tamsulosin (FLOMAX) 0.4 MG capsule TAKE 1 CAPSULE(0.4 MG) BY MOUTH DAILY 90 capsule 0     triamcinolone (KENALOG) 0.1 % cream Apply topically 2 times daily as needed for irritation 30 g 1     vitamin D (ERGOCALCIFEROL) 79383 UNIT capsule TAKE 1 CAPSULE BY MOUTH EVERY WEEK 12 capsule 1       ALLERGIES:  No Known Allergies    NEW PMH/PSH: None    REVIEW OF SYSTEMS:  The patient completed a comprehensive 11 point review of systems (below), which was reviewed. Positives are as noted below.  Patient Supplied Answers to Review of Systems   ENT ROS 4/22/2016   Ears, Nose, Throat Nasal congestion or drainage       PHYSICAL  EXAM:  General: The patient was alert and conversant, and in no acute distress.    Resp: Breathing comfortably, no stridor or stertor.  Neuro: Symmetric facial function.   Psych: Normal affect, pleasant and cooperative.  Voice/speech: Mild dysphonia characterized by breathiness and roughness.      Intake scores  Last 2 Scores for Patient-Answered VHI Questionnaire  VHI Total Score 2/26/2018 3/1/2019   VHI Total Score 9 17      Last 2 Scores for Patient-Answered EAT Questionnaire  EAT Total Score 2/26/2018 3/1/2019   EAT Total Score 1 5      Last 2 Scores for Patient-Answered CSI Questionnaire  CSI Total Score 2/26/2018 3/1/2019   CSI Total Score 0 0       Procedure:   Flexible fiberoptic laryngoscopy  Indications: This procedure was warranted to evaluate the patient's laryngeal anatomy and function. Risks, benefits, and alternatives were discussed.  Description: After written informed consent was obtained, a time-out was performed to confirm patient identity, procedure, and procedure site. Topical 3% lidocaine with 0.25% phenylephrine was applied to the nasal cavities. I performed the endoscopy and no complications were apparent.  Performed by: Mel Fournier MD MPH  SLP: Misty Clark, PhD, CCC-SLP   Findings: Normal nasopharynx. Normal base of tongue, valleculae, and epiglottis. The right true vocal fold demonstrated normal mobility. The left true vocal fold demonstrated normal mobility. The medial edges of the vocal folds appeared smooth and straight. Stable mild left vocal fold erythema, no focal mucosal lesions. Glissade produced appropriate elongation. There was mild to moderate supraglottic recruitment with connected speech. Mucosa of the false vocal folds, aryepiglottic folds, piriform sinuses, and posterior glottis unremarkable. Airway was patent. 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 No focal lesions on NBI.            IMPRESSION AND PLAN:   Gary Iyer returns with  interval resolution of the posterior laryngeal lesion. We will not perform a biopsy today. He was very happy to see this.    He will return in approximately three months, or sooner as needed for ongoing surveillance. I appreciate the opportunity to participate in the care of this pleasant patient.         Mel Fournier MD

## 2019-03-01 NOTE — TELEPHONE ENCOUNTER
Requested Prescriptions   Pending Prescriptions Disp Refills     metFORMIN (GLUCOPHAGE) 500 MG tablet [Pharmacy Med Name: METFORMIN 500MG TABLETS] 120 tablet 0    Last Written Prescription Date:  1-3-19  Last Fill Quantity: 120,  # refills: 0   Last office visit: 1/14/2019 with prescribing provider:  1-14-19   Future Office Visit:   Next 5 appointments (look out 90 days)    Mar 11, 2019 11:00 AM CDT  SHORT with Finesse Beal MD  [facility] ([facility] [address] [place] [id number] [phone]          Sig: TAKE 1 TABLET BY MOUTH TWICE DAILY WITH MEALS    Biguanide Agents Failed - 3/1/2019  3:47 AM       Failed - Patient has documented LDL within the past 12 mos.    Recent Labs   Lab Test 02/19/18  0905   LDL 80            Passed - Blood pressure less than 140/90 in past 6 months    BP Readings from Last 3 Encounters:   01/14/19 137/68   11/06/18 146/70   11/05/18 (!) 133/91                Passed - Patient has had a Microalbumin in the past 15 mos.    Recent Labs   Lab Test 11/06/18  1133   MICROL 38   UMALCR 16.19            Passed - Patient is age 10 or older       Passed - Patient has documented A1c within the specified period of time.    If HgbA1C is 8 or greater, it needs to be on file within the past 3 months.  If less than 8, must be on file within the past 6 months.     Recent Labs   Lab Test 11/06/18  1125   A1C 6.3*            Passed - Patient's CR is NOT>1.4 OR Patient's EGFR is NOT<45 within past 12 mos.    Recent Labs   Lab Test 11/06/18  1125   GFRESTIMATED >90   GFRESTBLACK >90       Recent Labs   Lab Test 11/06/18  1125   CR 0.70            Passed - Patient does NOT have a diagnosis of CHF.       Passed - Medication is active on med list       Passed - Recent (6 mo) or future (30 days) visit within the authorizing provider's specialty    Patient had office visit in the last 6 months or has a visit in the next 30 days  "with authorizing provider or within the authorizing provider's specialty.  See \"Patient Info\" tab in inbasket, or \"Choose Columns\" in Meds & Orders section of the refill encounter.            amLODIPine (NORVASC) 5 MG tablet [Pharmacy Med Name: AMLODIPINE BESYLATE 5MG TABLETS] 30 tablet 0    Last Written Prescription Date:  2-1-19  Last Fill Quantity: 30,  # refills: 0   Last office visit: 1/14/2019 with prescribing provider:     Future Office Visit:   Next 5 appointments (look out 90 days)    Mar 11, 2019 11:00 AM CDT  SHORT with Finesse Beal MD  [facility] [address] [place] [id number] [phone]          Sig: TAKE 1 TABLET(5 MG) BY MOUTH DAILY    Calcium Channel Blockers Protocol  Passed - 3/1/2019  3:47 AM       Passed - Blood pressure under 140/90 in past 12 months    BP Readings from Last 3 Encounters:   01/14/19 137/68   11/06/18 146/70   11/05/18 (!) 133/91                Passed - Recent (12 mo) or future (30 days) visit within the authorizing provider's specialty    Patient had office visit in the last 12 months or has a visit in the next 30 days with authorizing provider or within the authorizing provider's specialty.  See \"Patient Info\" tab in inbasket, or \"Choose Columns\" in Meds & Orders section of the refill encounter.             Passed - Medication is active on med list       Passed - Patient is age 18 or older       Passed - Normal serum creatinine on file in past 12 months    Recent Labs   Lab Test 11/06/18  1125   CR 0.70               "

## 2019-03-01 NOTE — LETTER
3/1/2019       RE: Gary Iyer  [address] [address]     Dear Colleague,    Thank you for referring your patient, Gary Iyer, to the [facility] at [facility]. Please see a copy of my visit note below.    [facility]  Elijah Baker Jr., M.D., F.A.C.S.  Mel Viramontes M.D., M.P.H.  Misty Clark, Ph.D., CCC/SLP  Jasmyne Padilla M.M. (voice), M.A., CCC/SLP  Zack Mcdaniels M.M. (voice), MMC., CCC/SLP    [facility]  VOICE EVALUATION/LARYNGEAL EXAMINATION REPORT    Patient: Gary Iyer  Date of Service: 3/1/2019    HISTORY  PATIENT INFORMATION  Gary Iyer was seen for brief consultation in conjunction with a visit to Dr. Viramontes today.  Please refer to Dr. Viramontes s dictation for a more complete history and impressions.      I assisted Dr. Viramontes with the laryngeal exam today.  The area of laryngeal irritation has resolved and does not need biopsy.  Mr. Iyer seems to have done well with therapeutic suggestions.  No intervention needed today.     DIAGNOSIS/REASON FOR REFERRAL  Dysphonia/ Evaluate, perform laryngeal exam, treat as appropriate    No charge for today s session  NO CHARGE FACILITY FEE (90272)    PRIMARY ICD-10 code: R49.0 (Dysphonia)      Misty Clark, Ph.D., [facility]3]-SLP  Speech-Language Pathologist  Director, [facility]  [phone]            Again, thank you for allowing me to participate in the care of your patient.      Sincerely,    Misty Clark, SLP

## 2019-03-08 ENCOUNTER — OFFICE VISIT (OUTPATIENT)
Dept: SURGERY | Facility: CLINIC | Age: 59
End: 2019-03-08
Payer: COMMERCIAL

## 2019-03-08 VITALS
HEIGHT: 70 IN | DIASTOLIC BLOOD PRESSURE: 81 MMHG | BODY MASS INDEX: 34.65 KG/M2 | WEIGHT: 242 LBS | SYSTOLIC BLOOD PRESSURE: 144 MMHG | HEART RATE: 101 BPM

## 2019-03-08 DIAGNOSIS — K60.2 ANAL FISSURE: Primary | ICD-10-CM

## 2019-03-08 PROCEDURE — 99214 OFFICE O/P EST MOD 30 MIN: CPT | Performed by: SURGERY

## 2019-03-08 ASSESSMENT — MIFFLIN-ST. JEOR: SCORE: 1913.95

## 2019-03-08 NOTE — PROGRESS NOTES
"Dear Finesse Paiz  I have seen Gary Iyer and as you know his chief complaint is when he is constipated, then has pain.  And then bleeds a little bit. Is more the bleeding he is worried. Used the nifedipine but was in suppositories and that has helped. Gets better with in a few days of using this.   HPI:  Patient is a 58 year old male  with complaints see above      Review Of Systems  Respiratory: No shortness of breath, dyspnea on exertion, cough, or hemoptysis  Cardiovascular: negative  Gastrointestinal: constipation  Endocrine: negative and diabetes  Patient is taking fiber supplements   /81   Pulse 101   Ht 1.778 m (5' 10\")   Wt 109.8 kg (242 lb)   BMI 34.72 kg/m        Past Medical History:   Diagnosis Date     Diabetes (H)     2yr     Multinodular goiter (nontoxic) 6/10/2013     Sleep apnea 8yr       Past Surgical History:   Procedure Laterality Date     COLONOSCOPY       COLONOSCOPY  6-22-12    Repeat Colonoscopy in 10 yrs.      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     HEAD & NECK SURGERY  1/25/11    callous removal from throat     LARYNGOSCOPY WITH BIOPSY(IES)  1-25-11     LARYNGOSCOPY WITH BIOPSY(IES) N/A 2/9/2017    Procedure: LARYNGOSCOPY WITH BIOPSY(IES);  Surgeon: Mel Fournier MD;  Location: [facility]     LARYNGOSCOPY WITH MICROSCOPE N/A 2/9/2017    Procedure: LARYNGOSCOPY WITH MICROSCOPE;  Surgeon: Mel Fournier MD;  Location: [facility] OR     LASER CO2 LARYNGOSCOPY, COMPLEX  6/27/2013    Procedure: LASER CO2 LARYNGOSCOPY, COMPLEX;  Micro Direct Laryngoscopy With Micro Flap Excision Of Lesion Lumenis Co2 Laser ;  Surgeon: Mel Fournier MD;  Location: [facility] OR     LASER CO2 LESION ORAL N/A 2/9/2017    Procedure: LASER CO2 LESION ORAL;  Surgeon: Mel Fournier MD;  Location: [facility]     ORTHOPEDIC SURGERY  March 2016    left tibia orif with abbi 2-2016     VASECTOMY  02/03       Current Outpatient Medications   Medication Sig Dispense Refill     " allopurinol (ZYLOPRIM) 300 MG tablet TAKE 1 TABLET(300 MG) BY MOUTH DAILY 90 tablet 1     amLODIPine (NORVASC) 5 MG tablet TAKE 1 TABLET(5 MG) BY MOUTH DAILY 30 tablet 3     aspirin 81 MG tablet Take 1 tablet by mouth daily.       atorvastatin (LIPITOR) 20 MG tablet TAKE 1 TABLET(20 MG) BY MOUTH DAILY 90 tablet 3     blood glucose monitoring (ALON MICROLET) lancets 1 each 2 times daily Use to test blood sugar 2 times daily or as directed. 1 Box 3     blood glucose monitoring (NO BRAND SPECIFIED) meter device kit Use to test blood sugar 2 times daily or as directed.  Accucheck meter please and all associated strips, lancets, and other supplies, 3 month supply with refills for a year. 1 kit 0     cetirizine (ZYRTEC) 10 MG tablet Take 1 tablet (10 mg) by mouth daily 90 tablet 3     fexofenadine (ALLEGRA) 180 MG tablet Take 1 tablet (180 mg) by mouth daily 90 tablet 3     hydrochlorothiazide (HYDRODIURIL) 50 MG tablet TAKE 1 TABLET(50 MG) BY MOUTH EVERY MORNING 90 tablet 3     hydrocortisone 1 % ointment Apply topically 2 times daily as needed 30 g 1     ketoconazole (NIZORAL) 2 % cream Apply topically 2 times daily for up to 2 weeks as needed for rash 60 g 1     losartan (COZAAR) 100 MG tablet Take 1 tablet (100 mg) by mouth daily 90 tablet 0     metFORMIN (GLUCOPHAGE) 500 MG tablet TAKE 1 TABLET BY MOUTH TWICE DAILY WITH MEALS 120 tablet 3     Multiple Vitamin (DAILY MULTIVITAMIN PO) Take  by mouth daily.       omeprazole (PRILOSEC) 20 MG DR capsule TAKE 1 TABLET BY MOUTH DAILY 90 capsule 2     order for DME Autocpap 10-16 cm 1 Units 0     PARoxetine (PAXIL) 20 MG tablet TAKE 2 TABLETS(40 MG) BY MOUTH DAILY 180 tablet 3     psyllium 0.52 G capsule Take 1 capsule (0.52 g) by mouth daily 100 capsule 3     tamsulosin (FLOMAX) 0.4 MG capsule TAKE 1 CAPSULE(0.4 MG) BY MOUTH DAILY 90 capsule 0     triamcinolone (KENALOG) 0.1 % cream Apply topically 2 times daily as needed for irritation 30 g 1     vitamin D  (ERGOCALCIFEROL) 92579 UNIT capsule TAKE 1 CAPSULE BY MOUTH EVERY WEEK 12 capsule 1       Social History     Socioeconomic History     Marital status:      Spouse name: Not on file     Number of children: 3     Years of education: Not on file     Highest education level: Not on file   Occupational History     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   Social Needs     Financial resource strain: Not on file     Food insecurity:     Worry: Not on file     Inability: Not on file     Transportation needs:     Medical: Not on file     Non-medical: Not on file   Tobacco Use     Smoking status: Former Smoker     Packs/day: 1.00     Years: 8.00     Pack years: 8.00     Last attempt to quit: 2000     Years since quittin.5     Smokeless tobacco: Never Used   Substance and Sexual Activity     Alcohol use: Yes     Alcohol/week: 0.0 oz     Comment: rare     Drug use: No     Sexual activity: Yes     Partners: Female   Lifestyle     Physical activity:     Days per week: Not on file     Minutes per session: Not on file     Stress: Not on file   Relationships     Social connections:     Talks on phone: Not on file     Gets together: Not on file     Attends Faith service: Not on file     Active member of club or organization: Not on file     Attends meetings of clubs or organizations: Not on file     Relationship status: Not on file     Intimate partner violence:     Fear of current or ex partner: Not on file     Emotionally abused: Not on file     Physically abused: Not on file     Forced sexual activity: Not on file   Other Topics Concern     Parent/sibling w/ CABG, MI or angioplasty before 65F 55M? No   Social History Narrative     Not on file       Physical exam:  Patient able to get up on table with mild difficulty.  Head eyes, nose and mouth within normal limits.  No supraclavicular or cervical adenopathy palpated.  Thyroid within normal limits.  No carotid bruits  auscultated.  Lungs are clear to auscultation  Heart is regular rate and rhythm with no murmur or thrills noted.  No costal vertebral angle tenderness noted.  Abdomen is abdomen is soft without significant tenderness, masses, organomegaly or guarding  bowel sounds are positive and no caput medusa noted.  No obvious hernias ventral hernia noted.    Lower extremity edema is not present.    Rectal exam:tender in left side consitant with anal fissure  Can feel a small lump presumed to be healing fissure.         For your hemorrhoids try using Metamucil or it's generic equivalent 1-2 tablespoons with a large glass of water or juice every day.  Do not spend much time on the toilet.  Do not bring a magazine or book in the bathroom with you when having a bowel movement.  Sitting on the toilet for extended periods will make your hemorrhoids worse.     You can also use flax seed that is easily obtained at your grocery store. Make sure it is ground up and sprinkle 2 tablespoons on your cereal each morning and drink a large glass of water with it.  You can also mix in yogurt, and even bake in bread or muffins.   Flax seed seems to give less gas and does not have any taste.   If these are not working for you I would be glad to see you back in my clinic to discuss other options.  Call [phone] to set up an appointment.        Discussed treatment for anal discomfort.  The use of fiber and what the options are.  Keeping the area clean.  The use of steroid creams.  Also discussed use of tight fitting work out underwear that will help keep skin from rubbing together.          Discussed hemorrhoid care including fiber supplements (metamucil and flax seed ground), not spending much time on the toilet.  Also discussed anusol HC  and how to use it for swelling and discomfort.  Discussed ways to keep are clean.  I also briefly discussed hemorrhoid surgery and the discomforts with it.  Encouraged patient to do conservative treatment  first.    Discussed anal fissure treatment including fiber to soften the stool, nifedipine cream 0.2 % with prescription and how to use it every 12 hours, but patient only uses it for a few days and then does well with that.       If continues to have problems especially with some bleeding then recommend a colonoscopy.    Last one is 7 years ago.     Follow up with me to look with anoscope when doing better to look at that area to make sure it is just an anal fissure.     Time spent with the patient with greater that 50% of the time in discussion was 30 minutes.      Zachariah Saucedo MD

## 2019-03-08 NOTE — PATIENT INSTRUCTIONS
Rectal exam:tender in left side consitant with anal fissure  Can feel a small lump presumed to be healing fissure.         For your hemorrhoids try using Metamucil or it's generic equivalent 1-2 tablespoons with a large glass of water or juice every day.  Do not spend much time on the toilet.  Do not bring a magazine or book in the bathroom with you when having a bowel movement.  Sitting on the toilet for extended periods will make your hemorrhoids worse.     You can also use flax seed that is easily obtained at your grocery store. Make sure it is ground up and sprinkle 2 tablespoons on your cereal each morning and drink a large glass of water with it.  You can also mix in yogurt, and even bake in bread or muffins.   Flax seed seems to give less gas and does not have any taste.   If these are not working for you I would be glad to see you back in my clinic to discuss other options.  Call [phone] to set up an appointment.        Discussed treatment for anal discomfort.  The use of fiber and what the options are.  Keeping the area clean.  The use of steroid creams.  Also discussed use of tight fitting work out underwear that will help keep skin from rubbing together.          Discussed hemorrhoid care including fiber supplements (metamucil and flax seed ground), not spending much time on the toilet.  Also discussed anusol HC  and how to use it for swelling and discomfort.  Discussed ways to keep are clean.  I also briefly discussed hemorrhoid surgery and the discomforts with it.  Encouraged patient to do conservative treatment first.    Discussed anal fissure treatment including fiber to soften the stool, nifedipine cream 0.2 % with prescription and how to use it every 12 hours, but patient only uses it for a few days and then does well with that.     I have written a prescription for 0.2% nifedipine suppositories   Apply to the anus where the muscles are snug about every 12 hours until pain is gone.  This  helps to relax this muscle to allow your anal fissure (cut at the anus) to heal.  You must get this filled at the [facility]) pharmacy since they make it for me.    If continues to have problems especially with some bleeding then recommend a colonoscopy.    Last one is 7 years ago.   Follow up with me to look with anoscope when doing better to look at that area to make sure it is just an anal fissure.

## 2019-03-08 NOTE — LETTER
"    3/8/2019         RE: Gary Iyer  [address] [id number]        Dear Colleague,    Thank you for referring your patient, Gary Iyer, to the [facility]. Please see a copy of my visit note below.    Dear Finesse Paiz  I have seen Gary Iyer and as you know his chief complaint is when he is constipated, then has pain.  And then bleeds a little bit. Is more the bleeding he is worried. Used the nifedipine but was in suppositories and that has helped. Gets better with in a few days of using this.   HPI:  Patient is a 58 year old male  with complaints see above      Review Of Systems  Respiratory: No shortness of breath, dyspnea on exertion, cough, or hemoptysis  Cardiovascular: negative  Gastrointestinal: constipation  Endocrine: negative and diabetes  Patient is taking fiber supplements   /81   Pulse 101   Ht 1.778 m (5' 10\")   Wt 109.8 kg (242 lb)   BMI 34.72 kg/m         Past Medical History:   Diagnosis Date     Diabetes (H)     2yr     Multinodular goiter (nontoxic) 6/10/2013     Sleep apnea 8yr       Past Surgical History:   Procedure Laterality Date     COLONOSCOPY       COLONOSCOPY  6-22-12    Repeat Colonoscopy in 10 yrs.      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     HEAD & NECK SURGERY  1/25/11    callous removal from throat     LARYNGOSCOPY WITH BIOPSY(IES)  1-25-11     LARYNGOSCOPY WITH BIOPSY(IES) N/A 2/9/2017    Procedure: LARYNGOSCOPY WITH BIOPSY(IES);  Surgeon: Mel Fournier MD;  [facility]     LARYNGOSCOPY WITH MICROSCOPE N/A 2/9/2017    Procedure: LARYNGOSCOPY WITH MICROSCOPE;  Surgeon: Mel Fournier MD;  Location: [facility] OR     LASER CO2 LARYNGOSCOPY, COMPLEX  6/27/2013    Procedure: LASER CO2 LARYNGOSCOPY, COMPLEX;  Micro Direct Laryngoscopy With Micro Flap Excision Of Lesion Lumenis Co2 Laser ;  Surgeon: Mel Fournier MD;  Location: [facility] OR     LASER CO2 LESION ORAL N/A 2/9/2017    Procedure: LASER " CO2 LESION ORAL;  Surgeon: Mel Fournier MD;  Location:  OR     ORTHOPEDIC SURGERY  March 2016    left tibia orif with abbi 2-2016     VASECTOMY  02/03       Current Outpatient Medications   Medication Sig Dispense Refill     allopurinol (ZYLOPRIM) 300 MG tablet TAKE 1 TABLET(300 MG) BY MOUTH DAILY 90 tablet 1     amLODIPine (NORVASC) 5 MG tablet TAKE 1 TABLET(5 MG) BY MOUTH DAILY 30 tablet 3     aspirin 81 MG tablet Take 1 tablet by mouth daily.       atorvastatin (LIPITOR) 20 MG tablet TAKE 1 TABLET(20 MG) BY MOUTH DAILY 90 tablet 3     blood glucose monitoring (ALON MICROLET) lancets 1 each 2 times daily Use to test blood sugar 2 times daily or as directed. 1 Box 3     blood glucose monitoring (NO BRAND SPECIFIED) meter device kit Use to test blood sugar 2 times daily or as directed.  Accucheck meter please and all associated strips, lancets, and other supplies, 3 month supply with refills for a year. 1 kit 0     cetirizine (ZYRTEC) 10 MG tablet Take 1 tablet (10 mg) by mouth daily 90 tablet 3     fexofenadine (ALLEGRA) 180 MG tablet Take 1 tablet (180 mg) by mouth daily 90 tablet 3     hydrochlorothiazide (HYDRODIURIL) 50 MG tablet TAKE 1 TABLET(50 MG) BY MOUTH EVERY MORNING 90 tablet 3     hydrocortisone 1 % ointment Apply topically 2 times daily as needed 30 g 1     ketoconazole (NIZORAL) 2 % cream Apply topically 2 times daily for up to 2 weeks as needed for rash 60 g 1     losartan (COZAAR) 100 MG tablet Take 1 tablet (100 mg) by mouth daily 90 tablet 0     metFORMIN (GLUCOPHAGE) 500 MG tablet TAKE 1 TABLET BY MOUTH TWICE DAILY WITH MEALS 120 tablet 3     Multiple Vitamin (DAILY MULTIVITAMIN PO) Take  by mouth daily.       omeprazole (PRILOSEC) 20 MG DR capsule TAKE 1 TABLET BY MOUTH DAILY 90 capsule 2     order for DME Autocpap 10-16 cm 1 Units 0     PARoxetine (PAXIL) 20 MG tablet TAKE 2 TABLETS(40 MG) BY MOUTH DAILY 180 tablet 3     psyllium 0.52 G capsule Take 1 capsule (0.52 g) by mouth  daily 100 capsule 3     tamsulosin (FLOMAX) 0.4 MG capsule TAKE 1 CAPSULE(0.4 MG) BY MOUTH DAILY 90 capsule 0     triamcinolone (KENALOG) 0.1 % cream Apply topically 2 times daily as needed for irritation 30 g 1     vitamin D (ERGOCALCIFEROL) 37997 UNIT capsule TAKE 1 CAPSULE BY MOUTH EVERY WEEK 12 capsule 1       Social History     Socioeconomic History     Marital status:      Spouse name: Not on file     Number of children: 3     Years of education: Not on file     Highest education level: Not on file   Occupational History     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   Social Needs     Financial resource strain: Not on file     Food insecurity:     Worry: Not on file     Inability: Not on file     Transportation needs:     Medical: Not on file     Non-medical: Not on file   Tobacco Use     Smoking status: Former Smoker     Packs/day: 1.00     Years: 8.00     Pack years: 8.00     Last attempt to quit: 2000     Years since quittin.5     Smokeless tobacco: Never Used   Substance and Sexual Activity     Alcohol use: Yes     Alcohol/week: 0.0 oz     Comment: rare     Drug use: No     Sexual activity: Yes     Partners: Female   Lifestyle     Physical activity:     Days per week: Not on file     Minutes per session: Not on file     Stress: Not on file   Relationships     Social connections:     Talks on phone: Not on file     Gets together: Not on file     Attends Jain service: Not on file     Active member of club or organization: Not on file     Attends meetings of clubs or organizations: Not on file     Relationship status: Not on file     Intimate partner violence:     Fear of current or ex partner: Not on file     Emotionally abused: Not on file     Physically abused: Not on file     Forced sexual activity: Not on file   Other Topics Concern     Parent/sibling w/ CABG, MI or angioplasty before 65F 55M? No   Social History Narrative     Not on file       Physical  exam:  Patient able to get up on table with mild difficulty.  Head eyes, nose and mouth within normal limits.  No supraclavicular or cervical adenopathy palpated.  Thyroid within normal limits.  No carotid bruits auscultated.  Lungs are clear to auscultation  Heart is regular rate and rhythm with no murmur or thrills noted.  No costal vertebral angle tenderness noted.  Abdomen is abdomen is soft without significant tenderness, masses, organomegaly or guarding  bowel sounds are positive and no caput medusa noted.  No obvious hernias ventral hernia noted.    Lower extremity edema is not present.    Rectal exam:tender in left side consitant with anal fissure  Can feel a small lump presumed to be healing fissure.         For your hemorrhoids try using Metamucil or it's generic equivalent 1-2 tablespoons with a large glass of water or juice every day.  Do not spend much time on the toilet.  Do not bring a magazine or book in the bathroom with you when having a bowel movement.  Sitting on the toilet for extended periods will make your hemorrhoids worse.     You can also use flax seed that is easily obtained at your grocery store. Make sure it is ground up and sprinkle 2 tablespoons on your cereal each morning and drink a large glass of water with it.  You can also mix in yogurt, and even bake in bread or muffins.   Flax seed seems to give less gas and does not have any taste.   If these are not working for you I would be glad to see you back in my clinic to discuss other options.  Call [phone] to set up an appointment.        Discussed treatment for anal discomfort.  The use of fiber and what the options are.  Keeping the area clean.  The use of steroid creams.  Also discussed use of tight fitting work out underwear that will help keep skin from rubbing together.          Discussed hemorrhoid care including fiber supplements (metamucil and flax seed ground), not spending much time on the toilet.  Also discussed  anusol HC  and how to use it for swelling and discomfort.  Discussed ways to keep are clean.  I also briefly discussed hemorrhoid surgery and the discomforts with it.  Encouraged patient to do conservative treatment first.    Discussed anal fissure treatment including fiber to soften the stool, nifedipine cream 0.2 % with prescription and how to use it every 12 hours, but patient only uses it for a few days and then does well with that.           Time spent with the patient with greater that 50% of the time in discussion was 30 minutes.      Zachariah Saucedo MD    Again, thank you for allowing me to participate in the care of your patient.        Sincerely,        Zachariah Saucedo MD

## 2019-03-11 ENCOUNTER — OFFICE VISIT (OUTPATIENT)
Dept: FAMILY MEDICINE | Facility: CLINIC | Age: 59
End: 2019-03-11
Payer: COMMERCIAL

## 2019-03-11 VITALS
WEIGHT: 239.25 LBS | HEART RATE: 106 BPM | TEMPERATURE: 98.9 F | SYSTOLIC BLOOD PRESSURE: 139 MMHG | BODY MASS INDEX: 34.33 KG/M2 | DIASTOLIC BLOOD PRESSURE: 77 MMHG | OXYGEN SATURATION: 100 %

## 2019-03-11 DIAGNOSIS — I10 HYPERTENSION GOAL BP (BLOOD PRESSURE) < 140/90: Primary | Chronic | ICD-10-CM

## 2019-03-11 DIAGNOSIS — R06.09 DYSPNEA ON EXERTION: ICD-10-CM

## 2019-03-11 DIAGNOSIS — E11.9 TYPE 2 DIABETES MELLITUS WITHOUT COMPLICATION, WITHOUT LONG-TERM CURRENT USE OF INSULIN (H): ICD-10-CM

## 2019-03-11 DIAGNOSIS — F41.9 ANXIETY: ICD-10-CM

## 2019-03-11 PROCEDURE — 99214 OFFICE O/P EST MOD 30 MIN: CPT | Performed by: FAMILY MEDICINE

## 2019-03-11 RX ORDER — LOSARTAN POTASSIUM 100 MG/1
100 TABLET ORAL DAILY
Qty: 90 TABLET | Refills: 3 | Status: SHIPPED | OUTPATIENT
Start: 2019-03-11 | End: 2020-02-20

## 2019-03-11 RX ORDER — AMLODIPINE BESYLATE 5 MG/1
TABLET ORAL
Qty: 90 TABLET | Refills: 3 | Status: SHIPPED | OUTPATIENT
Start: 2019-03-11 | End: 2019-06-18

## 2019-03-11 RX ORDER — PAROXETINE 20 MG/1
TABLET, FILM COATED ORAL
Qty: 180 TABLET | Refills: 3 | Status: SHIPPED | OUTPATIENT
Start: 2019-03-11 | End: 2020-02-20

## 2019-03-11 ASSESSMENT — PAIN SCALES - GENERAL: PAINLEVEL: NO PAIN (0)

## 2019-03-11 NOTE — PROGRESS NOTES
SUBJECTIVE:   Gary Iyer is a 58 year old male who presents to clinic today for the following health issues:       Hypertension Follow-up      Outpatient blood pressures are not being checked.    Low Salt Diet: no added salt      Amount of exercise or physical activity: None    Problems taking medications regularly: No    Medication side effects: none    Diet: regular (no restrictions)        When bending down he gets short of breath    Problem list and histories reviewed & adjusted, as indicated.  Additional history: as documented     when bend over, breathing is short    When sit/ stand upright, okay    Reviewed and updated as needed this visit by clinical staff  Allergies  Meds       Reviewed and updated as needed this visit by Provider          walking does not set it off    Lightheaded and dizzy when bending over    Reviewed note from last time    Patient increased losartan to 100 mg daily    Physical Exam   Constitutional: He is oriented to person, place, and time. He appears well-developed and well-nourished.   HENT:   Head: Normocephalic and atraumatic.   Eyes: Conjunctivae are normal.   Neck: Carotid bruit is not present.   Cardiovascular: Normal rate, regular rhythm, normal heart sounds and intact distal pulses.   Pulmonary/Chest: Effort normal and breath sounds normal. No respiratory distress.   Musculoskeletal: He exhibits no edema.   Neurological: He is alert and oriented to person, place, and time. No cranial nerve deficit.   Psychiatric: He has a normal mood and affect. His speech is normal and behavior is normal.       ASSESSMENT / PLAN:  (I10) Hypertension goal BP (blood pressure) < 140/90  (primary encounter diagnosis)  Comment: blood pressure better, not ideal.  Continue same medications.   Plan: amLODIPine (NORVASC) 5 MG tablet, losartan         (COZAAR) 100 MG tablet             (R06.09) Dyspnea on exertion  Comment: prudent to rule out significant coronary artery disease given his  multiple risk factors   Plan: Echocardiogram Exercise Stress             (F41.9) Anxiety  Comment: stable on med for years   Plan: PARoxetine (PAXIL) 20 MG tablet        Refill     (E11.9) Type 2 diabetes mellitus without complication, without long-term current use of insulin (H)  Comment: in Nov hemoglobin a1c fine  Plan: no change       I reviewed the patient's medications, allergies, medical history, family history, and social history.    Finesse Beal MD

## 2019-03-11 NOTE — PATIENT INSTRUCTIONS
Stay on same blood pressure medications    Heart stress test     If this is normal, increase exercise as able    Increase fluid intake    Be careful when changing positions

## 2019-03-13 ENCOUNTER — TELEPHONE (OUTPATIENT)
Dept: FAMILY MEDICINE | Facility: CLINIC | Age: 59
End: 2019-03-13

## 2019-03-13 ENCOUNTER — ANCILLARY PROCEDURE (OUTPATIENT)
Dept: CARDIOLOGY | Facility: CLINIC | Age: 59
End: 2019-03-13
Attending: FAMILY MEDICINE
Payer: COMMERCIAL

## 2019-03-13 DIAGNOSIS — R06.09 DYSPNEA ON EXERTION: ICD-10-CM

## 2019-03-13 PROCEDURE — 40000264 ZZHC STATISTIC IV PUSH SINGLE INITIAL SUBSTANCE: Performed by: INTERNAL MEDICINE

## 2019-03-13 PROCEDURE — 93017 CV STRESS TEST TRACING ONLY: CPT | Performed by: INTERNAL MEDICINE

## 2019-03-13 PROCEDURE — 93350 STRESS TTE ONLY: CPT | Mod: 26 | Performed by: INTERNAL MEDICINE

## 2019-03-13 PROCEDURE — 93352 ADMIN ECG CONTRAST AGENT: CPT | Performed by: INTERNAL MEDICINE

## 2019-03-13 PROCEDURE — 93321 DOPPLER ECHO F-UP/LMTD STD: CPT | Mod: TC | Performed by: INTERNAL MEDICINE

## 2019-03-13 PROCEDURE — 93018 CV STRESS TEST I&R ONLY: CPT | Performed by: INTERNAL MEDICINE

## 2019-03-13 PROCEDURE — 93325 DOPPLER ECHO COLOR FLOW MAPG: CPT | Mod: 26 | Performed by: INTERNAL MEDICINE

## 2019-03-13 PROCEDURE — 93350 STRESS TTE ONLY: CPT | Mod: TC | Performed by: INTERNAL MEDICINE

## 2019-03-13 PROCEDURE — 93321 DOPPLER ECHO F-UP/LMTD STD: CPT | Mod: 26 | Performed by: INTERNAL MEDICINE

## 2019-03-13 PROCEDURE — 93325 DOPPLER ECHO COLOR FLOW MAPG: CPT | Mod: TC | Performed by: INTERNAL MEDICINE

## 2019-03-13 PROCEDURE — 93016 CV STRESS TEST SUPVJ ONLY: CPT | Performed by: INTERNAL MEDICINE

## 2019-03-13 RX ADMIN — Medication 3 ML: at 10:15

## 2019-03-14 ENCOUNTER — TELEPHONE (OUTPATIENT)
Dept: SURGERY | Facility: CLINIC | Age: 59
End: 2019-03-14

## 2019-03-14 NOTE — TELEPHONE ENCOUNTER
I tried to call patient with stress echo results    Patient not available    Finesse Beal MD    Note patient called right back, discussed    See result note also    Test normal    Finesse Beal MD

## 2019-03-14 NOTE — TELEPHONE ENCOUNTER
Patient states Suppositories are to expensive and Pharmacy advised him to use cream.  Would like a new Prescription called into pharmacy [phone].    Informed patient Dr. Saucedo is not in clinic and will check with another provider for new prescription    Trish Alejandro MA

## 2019-03-14 NOTE — TELEPHONE ENCOUNTER
Patient calling. He would like a suppository ointment called into the pharmacy.  A compound. Please call and advise.

## 2019-03-14 NOTE — RESULT ENCOUNTER NOTE
As we discussed    Good news    Heart stress test is normal    Increase exercise as able    Finesse Beal MD

## 2019-03-19 DIAGNOSIS — I10 HYPERTENSION GOAL BP (BLOOD PRESSURE) < 140/90: ICD-10-CM

## 2019-03-19 DIAGNOSIS — E78.5 HYPERLIPIDEMIA LDL GOAL <70: ICD-10-CM

## 2019-03-19 NOTE — TELEPHONE ENCOUNTER
"Requested Prescriptions   Pending Prescriptions Disp Refills     hydrochlorothiazide (HYDRODIURIL) 50 MG tablet [Pharmacy Med Name: HYDROCHLOROTHIAZIDE 50MG TABLETS] 90 tablet 0    Last Written Prescription Date:  2/16/18  Last Fill Quantity: 90,  # refills: 3   Last office visit: 3/11/2019 with prescribing provider:     Future Office Visit:     Sig: TAKE 1 TABLET(50 MG) BY MOUTH EVERY MORNING    Diuretics (Including Combos) Protocol Passed - 3/19/2019 10:27 AM       Passed - Blood pressure under 140/90 in past 12 months    BP Readings from Last 3 Encounters:   03/11/19 139/77   03/08/19 144/81   01/14/19 137/68                Passed - Recent (12 mo) or future (30 days) visit within the authorizing provider's specialty    Patient had office visit in the last 12 months or has a visit in the next 30 days with authorizing provider or within the authorizing provider's specialty.  See \"Patient Info\" tab in inbasket, or \"Choose Columns\" in Meds & Orders section of the refill encounter.             Passed - Medication is active on med list       Passed - Patient is age 18 or older       Passed - Normal serum creatinine on file in past 12 months    Recent Labs   Lab Test 11/06/18  1125   CR 0.70             Passed - Normal serum potassium on file in past 12 months    Recent Labs   Lab Test 11/06/18  1125   POTASSIUM 3.7                   Passed - Normal serum sodium on file in past 12 months    Recent Labs   Lab Test 11/06/18  1125                 atorvastatin (LIPITOR) 20 MG tablet [Pharmacy Med Name: ATORVASTATIN 20MG TABLETS] 90 tablet 0    Last Written Prescription Date:  2/16/18  Last Fill Quantity: 90,  # refills: 3   Last office visit: 3/11/2019 with prescribing provider:     Future Office Visit:     Sig: TAKE 1 TABLET(20 MG) BY MOUTH DAILY    Statins Protocol Failed - 3/19/2019 10:27 AM       Failed - LDL on file in past 12 months    Recent Labs   Lab Test 02/19/18  0905   LDL 80            Passed - No " "abnormal creatine kinase in past 12 months    No lab results found.            Passed - Recent (12 mo) or future (30 days) visit within the authorizing provider's specialty    Patient had office visit in the last 12 months or has a visit in the next 30 days with authorizing provider or within the authorizing provider's specialty.  See \"Patient Info\" tab in inbasket, or \"Choose Columns\" in Meds & Orders section of the refill encounter.             Passed - Medication is active on med list       Passed - Patient is age 18 or older          "

## 2019-03-20 RX ORDER — HYDROCHLOROTHIAZIDE 50 MG/1
TABLET ORAL
Qty: 90 TABLET | Refills: 0 | OUTPATIENT
Start: 2019-03-20

## 2019-03-20 RX ORDER — ATORVASTATIN CALCIUM 20 MG/1
TABLET, FILM COATED ORAL
Qty: 90 TABLET | Refills: 0 | OUTPATIENT
Start: 2019-03-20

## 2019-03-22 ENCOUNTER — OFFICE VISIT (OUTPATIENT)
Dept: FAMILY MEDICINE | Facility: CLINIC | Age: 59
End: 2019-03-22
Payer: COMMERCIAL

## 2019-03-22 VITALS
HEART RATE: 111 BPM | DIASTOLIC BLOOD PRESSURE: 81 MMHG | BODY MASS INDEX: 34.92 KG/M2 | TEMPERATURE: 99.2 F | SYSTOLIC BLOOD PRESSURE: 137 MMHG | WEIGHT: 243.38 LBS

## 2019-03-22 DIAGNOSIS — Z12.5 SCREENING FOR PROSTATE CANCER: ICD-10-CM

## 2019-03-22 DIAGNOSIS — R42 DIZZINESS: Primary | ICD-10-CM

## 2019-03-22 DIAGNOSIS — R53.83 FATIGUE, UNSPECIFIED TYPE: ICD-10-CM

## 2019-03-22 DIAGNOSIS — I10 HYPERTENSION GOAL BP (BLOOD PRESSURE) < 140/90: Chronic | ICD-10-CM

## 2019-03-22 DIAGNOSIS — E11.9 TYPE 2 DIABETES MELLITUS WITHOUT COMPLICATION, WITHOUT LONG-TERM CURRENT USE OF INSULIN (H): ICD-10-CM

## 2019-03-22 LAB
BASOPHILS # BLD AUTO: 0 10E9/L (ref 0–0.2)
BASOPHILS NFR BLD AUTO: 0.4 %
DIFFERENTIAL METHOD BLD: ABNORMAL
EOSINOPHIL # BLD AUTO: 0.2 10E9/L (ref 0–0.7)
EOSINOPHIL NFR BLD AUTO: 2.1 %
ERYTHROCYTE [DISTWIDTH] IN BLOOD BY AUTOMATED COUNT: 14 % (ref 10–15)
HBA1C MFR BLD: 6.6 % (ref 0–5.6)
HCT VFR BLD AUTO: 38.3 % (ref 40–53)
HGB BLD-MCNC: 12.7 G/DL (ref 13.3–17.7)
LYMPHOCYTES # BLD AUTO: 2.4 10E9/L (ref 0.8–5.3)
LYMPHOCYTES NFR BLD AUTO: 26.2 %
MCH RBC QN AUTO: 30.2 PG (ref 26.5–33)
MCHC RBC AUTO-ENTMCNC: 33.2 G/DL (ref 31.5–36.5)
MCV RBC AUTO: 91 FL (ref 78–100)
MONOCYTES # BLD AUTO: 1.2 10E9/L (ref 0–1.3)
MONOCYTES NFR BLD AUTO: 13.8 %
NEUTROPHILS # BLD AUTO: 5.2 10E9/L (ref 1.6–8.3)
NEUTROPHILS NFR BLD AUTO: 57.5 %
PLATELET # BLD AUTO: 293 10E9/L (ref 150–450)
PSA SERPL-ACNC: 0.51 UG/L (ref 0–4)
RBC # BLD AUTO: 4.2 10E12/L (ref 4.4–5.9)
WBC # BLD AUTO: 9 10E9/L (ref 4–11)

## 2019-03-22 PROCEDURE — 85025 COMPLETE CBC W/AUTO DIFF WBC: CPT | Performed by: FAMILY MEDICINE

## 2019-03-22 PROCEDURE — G0103 PSA SCREENING: HCPCS | Performed by: FAMILY MEDICINE

## 2019-03-22 PROCEDURE — 83036 HEMOGLOBIN GLYCOSYLATED A1C: CPT | Performed by: FAMILY MEDICINE

## 2019-03-22 PROCEDURE — 36415 COLL VENOUS BLD VENIPUNCTURE: CPT | Performed by: FAMILY MEDICINE

## 2019-03-22 PROCEDURE — 99214 OFFICE O/P EST MOD 30 MIN: CPT | Performed by: FAMILY MEDICINE

## 2019-03-22 ASSESSMENT — PAIN SCALES - GENERAL: PAINLEVEL: NO PAIN (0)

## 2019-03-22 ASSESSMENT — PATIENT HEALTH QUESTIONNAIRE - PHQ9: SUM OF ALL RESPONSES TO PHQ QUESTIONS 1-9: 6

## 2019-03-22 NOTE — LETTER
[address] [id number] [phone]               2019      To whom it may concern     Gary Iyer (  7-- ) was seen here in clinic today.    Okay to return to work Monday 3-25-19.             Sincerely,                       Finesse Beal MD

## 2019-03-22 NOTE — PROGRESS NOTES
SUBJECTIVE:   Gary Iyer is a 58 year old male who presents to clinic today for the following health issues:       Follow up on dizziness    none    Problem list and histories reviewed & adjusted, as indicated.  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 He appears well-developed and well-nourished.   HENT:   Head: Normocephalic and atraumatic.   Right Ear: Tympanic membrane, external ear and ear canal normal.   Left Ear: Tympanic membrane, external ear and ear canal normal.   Nose: Nose normal.   Mouth/Throat: Oropharynx is clear and moist.   Eyes: Conjunctivae are normal.   Neck: Carotid bruit is not present.   Cardiovascular: Normal rate, regular rhythm, normal heart sounds and intact distal pulses.   Pulmonary/Chest: Effort normal and breath sounds normal. No respiratory distress.   Musculoskeletal: He exhibits no edema.   Neurological: He is alert and oriented to person, place, and time. No cranial nerve deficit.   Psychiatric: He has a normal mood and affect. His speech is normal and behavior is normal.     Sensation/strength in extremities normal.  Romberg, finger nose test, standing on one leg test all normal.  A bit unsteady on heel toe walk but not bad.  Cranial nerves normal.    ASSESSMENT / PLAN:  (R42) Dizziness  (primary encounter diagnosis)  Comment: may be multifactorial.  Exam here is reassuring.    Plan: okay to return to work.  If he gets another episode,  consider neurology referral etc.     (I10) Hypertension goal BP (blood pressure) < 140/90  Comment: blood pressure okay, not dropping it too much   Plan: monitor     (E11.9) Type 2 diabetes mellitus without complication, without long-term current use of insulin (H)  Comment: check lab; patient to schedule with eye doctor   Plan: OPTOMETRY REFERRAL, Hemoglobin A1c             (Z12.5) Screening for prostate cancer  Comment: psa   Plan: Prostate spec antigen screen             (R53.83) Fatigue, unspecified type  Comment: check   Plan: CBC with platelets differential               I reviewed the patient's medications, allergies, medical history, family history, and social history.    Finesse Beal MD

## 2019-03-24 NOTE — RESULT ENCOUNTER NOTE
The diabetes test is fine ( hemoglobin a1c ).    Prostate blood test is normal.    Red blood count ( hemoglobin ) is low.  Advise taking one over the counter iron pill daily, then we could recheck in 2-3 months.    Finesse Beal MD

## 2019-04-04 ENCOUNTER — TELEPHONE (OUTPATIENT)
Dept: FAMILY MEDICINE | Facility: CLINIC | Age: 59
End: 2019-04-04

## 2019-04-04 NOTE — TELEPHONE ENCOUNTER
Attempt # 1  Called patient at home [phone]     Was call answered?  Yes, patient wondering about which OTC iron pill he should take. Nurse advised the regular OTC iron pill, patient states there are 325 mg or 500 mg, nurse advised the 325 mg for now and will send to PCP for clarification.    The diabetes test is fine ( hemoglobin a1c ).     Prostate blood test is normal.     Red blood count ( hemoglobin ) is low.  Advise taking one over the counter iron pill daily, then we could recheck in 2-3 months.     Finesse Klein RN  [facility]

## 2019-04-04 NOTE — TELEPHONE ENCOUNTER
Reason for Call:  Other call back    Detailed comments:  Patient has some questions about the iron pill he's taking please give him a call back.    Phone Number Patient can be reached at: Home number on file [phone] (home)    Best Time:  Anytime     Can we leave a detailed message on this number? YES    Call taken on 4/4/2019 at 1:01 PM by Krysten Guaman

## 2019-04-04 NOTE — TELEPHONE ENCOUNTER
Iron sulfate 325 is better abdorbed but more stomach side effects    Iron gluconate 325 mg better tolerated     Either is fine

## 2019-04-10 ENCOUNTER — OFFICE VISIT (OUTPATIENT)
Dept: FAMILY MEDICINE | Facility: CLINIC | Age: 59
End: 2019-04-10
Payer: COMMERCIAL

## 2019-04-10 VITALS
SYSTOLIC BLOOD PRESSURE: 148 MMHG | DIASTOLIC BLOOD PRESSURE: 68 MMHG | BODY MASS INDEX: 34.65 KG/M2 | OXYGEN SATURATION: 96 % | WEIGHT: 241.5 LBS | HEART RATE: 105 BPM | TEMPERATURE: 98.1 F

## 2019-04-10 DIAGNOSIS — I10 HYPERTENSION GOAL BP (BLOOD PRESSURE) < 140/90: ICD-10-CM

## 2019-04-10 DIAGNOSIS — E11.9 TYPE 2 DIABETES MELLITUS WITHOUT COMPLICATION, WITHOUT LONG-TERM CURRENT USE OF INSULIN (H): ICD-10-CM

## 2019-04-10 DIAGNOSIS — E66.9 OBESITY, UNSPECIFIED CLASSIFICATION, UNSPECIFIED OBESITY TYPE, UNSPECIFIED WHETHER SERIOUS COMORBIDITY PRESENT: ICD-10-CM

## 2019-04-10 DIAGNOSIS — R25.1 SHAKING: ICD-10-CM

## 2019-04-10 DIAGNOSIS — R42 DIZZINESS: Primary | ICD-10-CM

## 2019-04-10 PROCEDURE — 99214 OFFICE O/P EST MOD 30 MIN: CPT | Performed by: FAMILY MEDICINE

## 2019-04-10 ASSESSMENT — PAIN SCALES - GENERAL: PAINLEVEL: NO PAIN (0)

## 2019-04-10 NOTE — PATIENT INSTRUCTIONS
Stay well hydrated    Call to schedule with neurology ( possible seizures )    If symptoms get real bad, go to emergency room       Stay well hydrated    Be careful about changing positions quickly    Plan to see us in 1 to 1 1/2 months.  Will recheck the red blood cell count then.  Come in sooner if needed    Schedule eye exam

## 2019-04-10 NOTE — PROGRESS NOTES
SUBJECTIVE:   Gary Iyer is a 58 year old male who presents to clinic today for the following   health issues:       Follow up on dizziness    none    Additional history: as documented    Reviewed  and updated as needed this visit by clinical staff  Allergies  Meds         Reviewed and updated as needed this visit by Provider               2 days ago getting out of car, fell and scraped up knee    And hand     No past  History of seizures    Patient not sure if he has had seizures    Apparently another person saw him at work and thought he may have had a seizures    Patient remembers getting to point of about to fall    Not sure if he actually loses consciousness or not    Physical Exam   Constitutional: He is oriented to person, place, and time. He appears well-developed and well-nourished.   HENT:   Head: Normocephalic and atraumatic.   Mouth/Throat: Oropharynx is clear and moist.   Eyes: Pupils are equal, round, and reactive to light. Conjunctivae and EOM are normal.   Neck: Carotid bruit is not present.   Cardiovascular: Normal rate, regular rhythm, normal heart sounds and intact distal pulses.   Pulmonary/Chest: Effort normal and breath sounds normal. No respiratory distress.   Musculoskeletal: He exhibits no edema.   Neurological: He is alert and oriented to person, place, and time. No cranial nerve deficit.   Psychiatric: He has a normal mood and affect. His speech is normal and behavior is normal.     Sensation/strength in extremities normal.  Romberg, finger nose test, standing on one leg test, heel toe walk all normal.  Cranial nerves normal.    Provocative test for bppv neg    He did feel a little lightheaded when he sat up quickly from lying position      ASSESSMENT / PLAN:  (R42) Dizziness  (primary encounter diagnosis)  Comment: discussed in detail with patient.  Possible seizure?  Prudent to have patient see neurology.  If patient could look at the video footage from work showing possible  seizure activity and do a video of this on his phone, then show to neurology that would be very helpful  Plan: NEUROLOGY ADULT REFERRAL             (R25.1) Shaking  Comment: as above   Plan: NEUROLOGY ADULT REFERRAL        Patient to call and schedule    (I10) Hypertension goal BP (blood pressure) < 140/90  Comment: a little high here but did not take med this am he states   Plan: monitor    (E11.9) Type 2 diabetes mellitus without complication, without long-term current use of insulin (H)  Comment: last hemoglobin a1c fine. Patient to schedule eye exam, gave him the number to call.   Plan: as above     (E66.9) Obesity, unspecified classification, unspecified obesity type, unspecified whether serious comorbidity present  Comment: ongoing   Plan: keep working on healthy diet/exercise and wt loss      I reviewed the patient's medications, allergies, medical history, family history, and social history.    Finesse Beal MD

## 2019-04-16 ENCOUNTER — TELEPHONE (OUTPATIENT)
Dept: FAMILY MEDICINE | Facility: CLINIC | Age: 59
End: 2019-04-16

## 2019-04-16 DIAGNOSIS — M25.569 KNEE PAIN, UNSPECIFIED CHRONICITY, UNSPECIFIED LATERALITY: Primary | ICD-10-CM

## 2019-04-16 NOTE — TELEPHONE ENCOUNTER
Patient should see sports med for evaluation.    Please inform patient and give him number to call to schedule    Thanks    Finesse Beal MD

## 2019-04-16 NOTE — TELEPHONE ENCOUNTER
Reason for Call: Request for an order or referral:    Order or referral being requested: physical therapy order    Date needed: as soon as possible    Has the patient been seen by the PCP for this problem? YES    Additional comments: Patient requesting for referral for knee injury, per patient he is having difficulty walking. Please advise.     Phone number Patient can be reached at:  Home number on file [phone] (home)    Best Time:  Anytime    Can we leave a detailed message on this number?  YES    Call taken on 4/16/2019 at 2:59 PM by Debbie Valadez

## 2019-04-17 NOTE — TELEPHONE ENCOUNTER
Left message for pt regarding message below  [facility]  Team 3 Coordinator

## 2019-04-18 ENCOUNTER — TRANSFERRED RECORDS (OUTPATIENT)
Dept: HEALTH INFORMATION MANAGEMENT | Facility: CLINIC | Age: 59
End: 2019-04-18

## 2019-04-18 ENCOUNTER — OFFICE VISIT (OUTPATIENT)
Dept: OPTOMETRY | Facility: CLINIC | Age: 59
End: 2019-04-18
Payer: COMMERCIAL

## 2019-04-18 DIAGNOSIS — E11.9 TYPE 2 DIABETES MELLITUS WITHOUT RETINOPATHY (H): ICD-10-CM

## 2019-04-18 DIAGNOSIS — R40.1 SEMICOMA: Primary | ICD-10-CM

## 2019-04-18 DIAGNOSIS — R40.1 SEMICOMA: ICD-10-CM

## 2019-04-18 DIAGNOSIS — H25.13 NUCLEAR AGE-RELATED CATARACT, BOTH EYES: ICD-10-CM

## 2019-04-18 DIAGNOSIS — H52.03 HYPERMETROPIA, BILATERAL: ICD-10-CM

## 2019-04-18 DIAGNOSIS — H52.4 PRESBYOPIA: ICD-10-CM

## 2019-04-18 DIAGNOSIS — Z01.01 ENCOUNTER FOR EXAMINATION OF EYES AND VISION WITH ABNORMAL FINDINGS: Primary | ICD-10-CM

## 2019-04-18 LAB
FOLATE SERPL-MCNC: 19.7 NG/ML
PTH-INTACT SERPL-MCNC: 54 PG/ML (ref 18–80)
TSH SERPL DL<=0.005 MIU/L-ACNC: 1.99 MU/L (ref 0.4–4)

## 2019-04-18 PROCEDURE — 82746 ASSAY OF FOLIC ACID SERUM: CPT | Performed by: INTERNAL MEDICINE

## 2019-04-18 PROCEDURE — 36415 COLL VENOUS BLD VENIPUNCTURE: CPT | Performed by: INTERNAL MEDICINE

## 2019-04-18 PROCEDURE — 83970 ASSAY OF PARATHORMONE: CPT | Performed by: INTERNAL MEDICINE

## 2019-04-18 PROCEDURE — 92014 COMPRE OPH EXAM EST PT 1/>: CPT | Performed by: OPTOMETRIST

## 2019-04-18 PROCEDURE — 84443 ASSAY THYROID STIM HORMONE: CPT | Performed by: INTERNAL MEDICINE

## 2019-04-18 PROCEDURE — 82607 VITAMIN B-12: CPT | Performed by: INTERNAL MEDICINE

## 2019-04-18 PROCEDURE — 92015 DETERMINE REFRACTIVE STATE: CPT | Performed by: OPTOMETRIST

## 2019-04-18 ASSESSMENT — CONF VISUAL FIELD
OD_NORMAL: 1
OS_NORMAL: 1

## 2019-04-18 ASSESSMENT — SLIT LAMP EXAM - LIDS
COMMENTS: 1+ MEIBOMIAN GLAND DYSFUNCTION
COMMENTS: 1+ MEIBOMIAN GLAND DYSFUNCTION

## 2019-04-18 ASSESSMENT — VISUAL ACUITY
OD_SC: 20/80
OS_SC: 20/60
OD_SC: 20/20
OS_SC: 20/30
METHOD: SNELLEN - LINEAR

## 2019-04-18 ASSESSMENT — REFRACTION_WEARINGRX
OS_CYLINDER: SPHERE
OD_ADD: +2.50
OD_SPHERE: +0.75
OS_ADD: +2.50
OD_CYLINDER: SPHERE
OS_SPHERE: +0.75

## 2019-04-18 ASSESSMENT — REFRACTION_MANIFEST
OD_SPHERE: +1.00
OS_CYLINDER: SPHERE
OD_ADD: +2.50
OS_ADD: +2.50
OS_SPHERE: +1.00
OD_CYLINDER: SPHERE

## 2019-04-18 ASSESSMENT — CUP TO DISC RATIO
OD_RATIO: 0.3
OS_RATIO: 0.35

## 2019-04-18 ASSESSMENT — TONOMETRY
OD_IOP_MMHG: 19
OS_IOP_MMHG: 20
IOP_METHOD: APPLANATION

## 2019-04-18 ASSESSMENT — EXTERNAL EXAM - LEFT EYE: OS_EXAM: NORMAL

## 2019-04-18 ASSESSMENT — EXTERNAL EXAM - RIGHT EYE: OD_EXAM: NORMAL

## 2019-04-18 NOTE — PATIENT INSTRUCTIONS
Patient educated on importance of good blood sugar control.  Letter sent to primary care provider with diabetic eye exam report.     You have the start of mild cataracts.  You may notice some blurred vision or glare with night driving.  It is important that you wear good sunglasses to protect your eyes from the ultraviolet light from the sun. I recommend that you return in 1 year for an eye exam unless there are any sudden changes in your vision.       Gary was advised of today's exam findings.  Optional to fill new glasses prescription, mild glasses prescription   Okay to continue use of over the counter reading glasses. Recommend +3.50 power.   Copy of glasses Rx provided today.  Return in 1 year for diabetic eye exam, or sooner if needed.    The affects of the dilating drops last for 4- 6 hours.  You will be more sensitive to light and vision will be blurry up close.  Mydriatic sunglasses were given if needed.      Adrien Yip O.D.  [facility] [place]  [address] [place] [address]. [place]rie  [id number]    [phone]

## 2019-04-18 NOTE — LETTER
4/18/2019         RE: Gary Iyer  [address] [place] [address]        Dear Colleague,    Thank you for referring your patient, Gary Iyer, to the [facility]. Please see a copy of my visit note below.    Chief Complaint   Patient presents with     Diabetic Eye Exam     Accompanied by self  Lab Results   Component Value Date    A1C 6.6 03/22/2019    A1C 6.3 11/06/2018    A1C 5.9 02/19/2018    A1C 5.9 08/23/2017    A1C 6.3 03/29/2017       Last Eye Exam: 1 years ago  Dilated Previously: Yes    What are you currently using to see?  Readers- +2.75 or +3.00    Distance Vision Acuity: Satisfied with vision    Near Vision Acuity: Not satisfied     Eye Comfort: good  Do you use eye drops? : No  Occupation or Hobbies:     [employer]zel Espino, Optometric Tech     Medical, surgical and family histories reviewed and updated 4/18/2019.       OBJECTIVE: See Ophthalmology exam    ASSESSMENT:    ICD-10-CM    1. Encounter for examination of eyes and vision with abnormal findings Z01.01    2. Type 2 diabetes mellitus without retinopathy (H) E11.9    3. Nuclear age-related cataract, both eyes H25.13    4. Hypermetropia, bilateral H52.03    5. Presbyopia H52.4       PLAN:    Gary Iyer aware  eye exam results will be sent to Finesse Beal  Patient Instructions   Patient educated on importance of good blood sugar control.  Letter sent to primary care provider with diabetic eye exam report.     You have the start of mild cataracts.  You may notice some blurred vision or glare with night driving.  It is important that you wear good sunglasses to protect your eyes from the ultraviolet light from the sun. I recommend that you return in 1 year for an eye exam unless there are any sudden changes in your vision.       Gary was advised of today's exam findings.  Optional to fill new glasses prescription, mild glasses prescription   Okay to continue use of over the counter reading glasses.  Recommend +3.50 power.   Copy of glasses Rx provided today.  Return in 1 year for diabetic eye exam, or sooner if needed.    The affects of the dilating drops last for 4- 6 hours.  You will be more sensitive to light and vision will be blurry up close.  Mydriatic sunglasses were given if needed.      Adrien Yip O.D.  [address]  [place]  [address]    [phone]             Again, thank you for allowing me to participate in the care of your patient.        Sincerely,        Adrien Yip, OD

## 2019-04-18 NOTE — PROGRESS NOTES
Chief Complaint   Patient presents with     Diabetic Eye Exam     Accompanied by self  Lab Results   Component Value Date    A1C 6.6 03/22/2019    A1C 6.3 11/06/2018    A1C 5.9 02/19/2018    A1C 5.9 08/23/2017    A1C 6.3 03/29/2017       Last Eye Exam: 1 years ago  Dilated Previously: Yes    What are you currently using to see?  Readers- +2.75 or +3.00    Distance Vision Acuity: Satisfied with vision    Near Vision Acuity: Not satisfied     Eye Comfort: good  Do you use eye drops? : No  Occupation or Hobbies:     Itzel Espino, [employer]     Medical, surgical and family histories reviewed and updated 4/18/2019.       OBJECTIVE: See Ophthalmology exam    ASSESSMENT:    ICD-10-CM    1. Encounter for examination of eyes and vision with abnormal findings Z01.01    2. Type 2 diabetes mellitus without retinopathy (H) E11.9    3. Nuclear age-related cataract, both eyes H25.13    4. Hypermetropia, bilateral H52.03    5. Presbyopia H52.4       PLAN:    Gary Iyer aware  eye exam results will be sent to Finesse Beal  Patient Instructions   Patient educated on importance of good blood sugar control.  Letter sent to primary care provider with diabetic eye exam report.     You have the start of mild cataracts.  You may notice some blurred vision or glare with night driving.  It is important that you wear good sunglasses to protect your eyes from the ultraviolet light from the sun. I recommend that you return in 1 year for an eye exam unless there are any sudden changes in your vision.       Gary was advised of today's exam findings.  Optional to fill new glasses prescription, mild glasses prescription   Okay to continue use of over the counter reading glasses. Recommend +3.50 power.   Copy of glasses Rx provided today.  Return in 1 year for diabetic eye exam, or sooner if needed.    The affects of the dilating drops last for 4- 6 hours.  You will be more sensitive to light and vision will be  blurry up close.  Mydriatic sunglasses were given if needed.      Adrien Yip O.D.  [address]  [place]  [address]    [phone]

## 2019-04-19 ENCOUNTER — THERAPY VISIT (OUTPATIENT)
Dept: PHYSICAL THERAPY | Facility: CLINIC | Age: 59
End: 2019-04-19
Payer: COMMERCIAL

## 2019-04-19 DIAGNOSIS — M25.561 ACUTE PAIN OF RIGHT KNEE: ICD-10-CM

## 2019-04-19 LAB — VIT B12 SERPL-MCNC: 336 PG/ML (ref 193–986)

## 2019-04-19 PROCEDURE — 97161 PT EVAL LOW COMPLEX 20 MIN: CPT | Mod: GP | Performed by: PHYSICAL THERAPIST

## 2019-04-19 PROCEDURE — 97110 THERAPEUTIC EXERCISES: CPT | Mod: GP | Performed by: PHYSICAL THERAPIST

## 2019-04-19 PROCEDURE — 97035 APP MDLTY 1+ULTRASOUND EA 15: CPT | Mod: GP | Performed by: PHYSICAL THERAPIST

## 2019-04-19 ASSESSMENT — ACTIVITIES OF DAILY LIVING (ADL)
AS_A_RESULT_OF_YOUR_KNEE_INJURY,_HOW_WOULD_YOU_RATE_YOUR_CURRENT_LEVEL_OF_DAILY_ACTIVITY?: ABNORMAL
SQUAT: ACTIVITY IS SOMEWHAT DIFFICULT
PAIN: THE SYMPTOM AFFECTS MY ACTIVITY MODERATELY
RAW_SCORE: 41
STAND: ACTIVITY IS SOMEWHAT DIFFICULT
GO UP STAIRS: ACTIVITY IS FAIRLY DIFFICULT
WEAKNESS: I HAVE THE SYMPTOM BUT IT DOES NOT AFFECT MY ACTIVITY
STIFFNESS: THE SYMPTOM AFFECTS MY ACTIVITY SLIGHTLY
RISE FROM A CHAIR: ACTIVITY IS SOMEWHAT DIFFICULT
LIMPING: THE SYMPTOM AFFECTS MY ACTIVITY SLIGHTLY
GIVING WAY, BUCKLING OR SHIFTING OF KNEE: I DO NOT HAVE THE SYMPTOM
HOW_WOULD_YOU_RATE_THE_CURRENT_FUNCTION_OF_YOUR_KNEE_DURING_YOUR_USUAL_DAILY_ACTIVITIES_ON_A_SCALE_FROM_0_TO_100_WITH_100_BEING_YOUR_LEVEL_OF_KNEE_FUNCTION_PRIOR_TO_YOUR_INJURY_AND_0_BEING_THE_INABILITY_TO_PERFORM_ANY_OF_YOUR_USUAL_DAILY_ACTIVITIES?: 70
KNEE_ACTIVITY_OF_DAILY_LIVING_SCORE: 58.57
SIT WITH YOUR KNEE BENT: ACTIVITY IS FAIRLY DIFFICULT
HOW_WOULD_YOU_RATE_THE_OVERALL_FUNCTION_OF_YOUR_KNEE_DURING_YOUR_USUAL_DAILY_ACTIVITIES?: ABNORMAL
KNEE_ACTIVITY_OF_DAILY_LIVING_SUM: 41
GO DOWN STAIRS: ACTIVITY IS FAIRLY DIFFICULT
SWELLING: THE SYMPTOM AFFECTS MY ACTIVITY SLIGHTLY
WALK: ACTIVITY IS SOMEWHAT DIFFICULT
KNEEL ON THE FRONT OF YOUR KNEE: ACTIVITY IS SOMEWHAT DIFFICULT

## 2019-04-19 NOTE — PROGRESS NOTES
[facility] Initial Evaluation  Subjective:  The history is provided by the patient.   Gary Iyer is a 58 year old male with a bilateral knees condition.  Condition occurred with:  A fall/slip.  Condition occurred: in the community.  This is a new condition  4/16/19 patient received MD referral for knee pain that started 4/9/19 when he fell onto his knee when he had an episode of dizziness/light-headedness that may be related to low blood pressure. This is being looked into.    Patient reports pain:  Anterior.    Pain is described as aching and sharp and is constant and reported as 5/10.  Associated symptoms:  Loss of motion/stiffness and buckling/giving out. Pain is the same all the time.  Symptoms are exacerbated by bending/squatting, ascending stairs, descending stairs, kneeling and walking and relieved by rest and ice.  Since onset symptoms are gradually improving.        General health as reported by patient is fair.  Pertinent medical history includes:  Cancer, diabetes, high blood pressure, history of fractures and overweight.  Medical allergies: no.  Other surgeries include:  Cancer surgery and orthopedic surgery (throat; leg).  Current medications:  Anti-depressants and high blood pressure medication.  Current occupation is Aide transportation.  Patient is working in normal job without restrictions.  Primary job tasks include:  Driving.    Barriers include:  None as reported by the patient.    Red flags:  None as reported by the patient.                        Objective:  KNEE:    PROM:   L  R   Hyperextension 0 0   Extension 0 0   Flexion 135 130     Strength:   L R   HIP     Flex 5/5 4/5   Ext 5/5 4/5   Abd 5/5 4/5   KNEE     Flex 5/5 4/5   Ext 5/5 4+/5       Special tests:   L R   Anterior Drawer - -   Posterior Drawer - -   Lachman's - -   Valgus 0 degrees - -   Valgus 30 degrees - -   Varus 0 degrees - -   Varus 30 degrees - -   Shawn's - -   Appley's - -   Lateral Compression  - -   Patellar Compression - -       Palpation: tender about right patella, contusion and abrasion noted    Patellar tracking: WNL      Gait: Mild antalgia, minimized loading response on right        System    Physical Exam    General     ROS    Assessment/Plan:    Patient is a 58 year old male with right knee complaints.    Patient has the following significant findings with corresponding treatment plan.                Diagnosis 1:  Right knee pain    Pain -  hot/cold therapy, US, manual therapy, splint/taping/bracing/orthotics, self management, education and home program  Decreased ROM/flexibility - manual therapy, therapeutic exercise and home program  Decreased strength - therapeutic exercise, therapeutic activities and home program  Decreased proprioception - neuro re-education, therapeutic activities and home program  Impaired gait - gait training and home program  Decreased function - therapeutic activities and home program    Therapy Evaluation Codes:   1) History comprised of:   Personal factors that impact the plan of care:      None.    Comorbidity factors that impact the plan of care are:      Cancer, Diabetes, High blood pressure and Overweight.     Medications impacting care: Anti-depressant and High blood pressure.  2) Examination of Body Systems comprised of:   Body structures and functions that impact the plan of care:      Knee.   Activity limitations that impact the plan of care are:      Squatting/kneeling, Stairs, Standing and Walking.  3) Clinical presentation characteristics are:   Stable/Uncomplicated.  4) Decision-Making    Low complexity using standardized patient assessment instrument and/or measureable assessment of functional outcome.  Cumulative Therapy Evaluation is: Low complexity.    Previous and current functional limitations:  (See Goal Flow Sheet for this information)    Short term and Long term goals: (See Goal Flow Sheet for this information)     Communication ability:  Patient  appears to be able to clearly communicate and understand verbal and written communication and follow directions correctly.  Treatment Explanation - The following has been discussed with the patient:   RX ordered/plan of care  Anticipated outcomes  Possible risks and side effects  This patient would benefit from PT intervention to resume normal activities.   Rehab potential is good.    Frequency:  1 X week, once daily  Duration:  for 4 weeks  Discharge Plan:  Achieve all LTG.  Independent in home treatment program.  Reach maximal therapeutic benefit.    Please refer to the daily flowsheet for treatment today, total treatment time and time spent performing 1:1 timed codes.

## 2019-04-26 ENCOUNTER — THERAPY VISIT (OUTPATIENT)
Dept: PHYSICAL THERAPY | Facility: CLINIC | Age: 59
End: 2019-04-26
Payer: COMMERCIAL

## 2019-04-26 DIAGNOSIS — M25.561 ACUTE PAIN OF RIGHT KNEE: ICD-10-CM

## 2019-04-26 PROCEDURE — 97035 APP MDLTY 1+ULTRASOUND EA 15: CPT | Mod: GP | Performed by: PHYSICAL THERAPIST

## 2019-04-26 PROCEDURE — 97110 THERAPEUTIC EXERCISES: CPT | Mod: GP | Performed by: PHYSICAL THERAPIST

## 2019-04-29 ENCOUNTER — OFFICE VISIT (OUTPATIENT)
Dept: FAMILY MEDICINE | Facility: CLINIC | Age: 59
End: 2019-04-29
Payer: COMMERCIAL

## 2019-04-29 VITALS
WEIGHT: 241 LBS | TEMPERATURE: 97.4 F | OXYGEN SATURATION: 96 % | HEART RATE: 107 BPM | DIASTOLIC BLOOD PRESSURE: 68 MMHG | BODY MASS INDEX: 34.58 KG/M2 | SYSTOLIC BLOOD PRESSURE: 126 MMHG

## 2019-04-29 DIAGNOSIS — R42 DIZZINESS: ICD-10-CM

## 2019-04-29 DIAGNOSIS — I10 HYPERTENSION GOAL BP (BLOOD PRESSURE) < 140/90: Primary | ICD-10-CM

## 2019-04-29 DIAGNOSIS — E11.9 TYPE 2 DIABETES MELLITUS WITHOUT COMPLICATION, WITHOUT LONG-TERM CURRENT USE OF INSULIN (H): ICD-10-CM

## 2019-04-29 PROCEDURE — 99213 OFFICE O/P EST LOW 20 MIN: CPT | Performed by: FAMILY MEDICINE

## 2019-04-29 NOTE — PROGRESS NOTES
SUBJECTIVE:   Gary Iyer is a 58 year old male who presents to clinic today for the following   health issues:    Patient is here for follow up on results.         Additional history: as documented    Reviewed  and updated as needed this visit by clinical staff  Tobacco  Allergies  Meds  Med Hx  Surg Hx  Fam Hx  Soc Hx        Reviewed and updated as needed this visit by Provider            Not checking blood pressure at home or out of clinic            Not much walking        Decreased appetite but no wt change    No chest pain or dyspnea at rest or with exertion        Physical Exam   Constitutional: He is oriented to person, place, and time. He appears well-developed and well-nourished.   HENT:   Head: Normocephalic and atraumatic.   Eyes: Conjunctivae are normal.   Neck: Carotid bruit is not present.   Cardiovascular: Normal rate, regular rhythm, normal heart sounds and intact distal pulses.   Pulmonary/Chest: Effort normal and breath sounds normal. No respiratory distress.   Musculoskeletal: He exhibits no edema (slight ).   Neurological: He is alert and oriented to person, place, and time. No cranial nerve deficit.   Psychiatric: He has a normal mood and affect. His speech is normal and behavior is normal.     ASSESSMENT / PLAN:  (I10) Hypertension goal BP (blood pressure) < 140/90  (primary encounter diagnosis)  Comment: blood pressure okay here.  On several meds but we are not dropping it too low.   Plan: monitor.  Make sure well hydrated.     (R42) Dizziness  Comment: agree with workup as planned by neurology.  Hopefully this will all be okay.    Plan: try to gradually increase walking/ exercise.  If neurology testing all comes back okay he will let us know and then we could release him to work.     (E11.9) Type 2 diabetes mellitus without complication, without long-term current use of insulin (H)  Comment: last hemoglobin a1c fine   Plan: no change in meds       I reviewed the patient's  medications, allergies, medical history, family history, and social history.    Finesse Beal MD

## 2019-04-29 NOTE — PATIENT INSTRUCTIONS
Go through the testing as ordered by the neurologist    Stay well hydrated    Stay on same meds for now    If you feel dizzy/ lightheaded check blood pressure     Gradually try to increase walking / activity

## 2019-05-06 ENCOUNTER — TRANSFERRED RECORDS (OUTPATIENT)
Dept: HEALTH INFORMATION MANAGEMENT | Facility: CLINIC | Age: 59
End: 2019-05-06

## 2019-05-07 ENCOUNTER — TELEPHONE (OUTPATIENT)
Dept: FAMILY MEDICINE | Facility: CLINIC | Age: 59
End: 2019-05-07

## 2019-05-07 DIAGNOSIS — R39.12 BENIGN PROSTATIC HYPERPLASIA WITH WEAK URINARY STREAM: ICD-10-CM

## 2019-05-07 DIAGNOSIS — N40.1 BENIGN PROSTATIC HYPERPLASIA WITH WEAK URINARY STREAM: ICD-10-CM

## 2019-05-07 RX ORDER — TAMSULOSIN HYDROCHLORIDE 0.4 MG/1
CAPSULE ORAL
Qty: 90 CAPSULE | Refills: 0 | Status: SHIPPED | OUTPATIENT
Start: 2019-05-07 | End: 2019-07-28

## 2019-05-07 NOTE — TELEPHONE ENCOUNTER
Last OV 4/29/19    Prescription approved per [facility] [place].  Notified patient.    Lori Mercado RN [facility] Triage.

## 2019-05-07 NOTE — TELEPHONE ENCOUNTER
"Requested Prescriptions   Pending Prescriptions Disp Refills     tamsulosin (FLOMAX) 0.4 MG capsule [Pharmacy Med Name: TAMSULOSIN 0.4MG CAPSULES] 90 capsule 0     Sig: TAKE 1 CAPSULE(0.4 MG) BY MOUTH DAILY   Last Written Prescription Date:  2/14/19  Last Fill Quantity: 90,  # refills: 0   Last office visit: 4/29/2019 with prescribing provider:     Future Office Visit:        Alpha Blockers Passed - 5/7/2019 11:38 AM        Passed - Blood pressure under 140/90 in past 12 months     BP Readings from Last 3 Encounters:   04/29/19 126/68   04/10/19 148/68   03/22/19 137/81                 Passed - Recent (12 mo) or future (30 days) visit within the authorizing provider's specialty     Patient had office visit in the last 12 months or has a visit in the next 30 days with authorizing provider or within the authorizing provider's specialty.  See \"Patient Info\" tab in inbasket, or \"Choose Columns\" in Meds & Orders section of the refill encounter.              Passed - Patient does not have Tadalafil, Vardenafil, or Sildenafil on their medication list        Passed - Medication is active on med list        Passed - Patient is 18 years of age or older          "

## 2019-05-07 NOTE — TELEPHONE ENCOUNTER
Patient calling regarding refill request. He is completely out of the medication. Routing as patient calls to address.

## 2019-05-09 ENCOUNTER — THERAPY VISIT (OUTPATIENT)
Dept: PHYSICAL THERAPY | Facility: CLINIC | Age: 59
End: 2019-05-09
Payer: COMMERCIAL

## 2019-05-09 DIAGNOSIS — M25.561 ACUTE PAIN OF RIGHT KNEE: ICD-10-CM

## 2019-05-09 PROCEDURE — 97110 THERAPEUTIC EXERCISES: CPT | Mod: GP | Performed by: PHYSICAL THERAPIST

## 2019-05-10 ENCOUNTER — OFFICE VISIT (OUTPATIENT)
Dept: FAMILY MEDICINE | Facility: CLINIC | Age: 59
End: 2019-05-10
Payer: COMMERCIAL

## 2019-05-10 VITALS
DIASTOLIC BLOOD PRESSURE: 74 MMHG | WEIGHT: 241 LBS | HEART RATE: 108 BPM | BODY MASS INDEX: 34.58 KG/M2 | TEMPERATURE: 97.9 F | SYSTOLIC BLOOD PRESSURE: 114 MMHG

## 2019-05-10 DIAGNOSIS — E11.9 TYPE 2 DIABETES MELLITUS WITHOUT COMPLICATION, WITHOUT LONG-TERM CURRENT USE OF INSULIN (H): ICD-10-CM

## 2019-05-10 DIAGNOSIS — I95.1 ORTHOSTATIC HYPOTENSION: Primary | ICD-10-CM

## 2019-05-10 DIAGNOSIS — I10 HYPERTENSION GOAL BP (BLOOD PRESSURE) < 140/90: ICD-10-CM

## 2019-05-10 DIAGNOSIS — R42 DIZZY SPELLS: ICD-10-CM

## 2019-05-10 PROCEDURE — 99214 OFFICE O/P EST MOD 30 MIN: CPT | Performed by: FAMILY MEDICINE

## 2019-05-10 NOTE — PATIENT INSTRUCTIONS
Await eeg results    Go through with the autonomic testing    Monitor symptoms.  If symptoms worsen, then schedule with cardiology.    Be seen promptly if symptoms acutely worsen     Stay well hydrated    Be careful about position changes    Avoid intermittent fasting for now

## 2019-05-10 NOTE — PROGRESS NOTES
SUBJECTIVE:   Gary Iyer is a 58 year old male who presents to clinic today for the following   health issues:       Follow up on results    none    Additional history: as documented    Reviewed  and updated as needed this visit by clinical staff  Allergies  Meds         Reviewed and updated as needed this visit by Provider                 Reviewed neurology consult    Had mri brain    mra brain and neck were okay per their faxed reports    Feeling okay he states    Had the eeg done same day as the mri / mra but we don't have those results    Tests on Monday 4 days ago            Physical Exam   Constitutional: He is oriented to person, place, and time. He appears well-developed and well-nourished.   HENT:   Head: Normocephalic and atraumatic.   Eyes: Conjunctivae are normal.   Neck: Carotid bruit is not present.   Cardiovascular: Normal rate, regular rhythm, normal heart sounds and intact distal pulses.   Pulmonary/Chest: Effort normal and breath sounds normal. No respiratory distress.   Musculoskeletal: He exhibits no edema.   Neurological: He is alert and oriented to person, place, and time. No cranial nerve deficit.   Psychiatric: He has a normal mood and affect. His speech is normal and behavior is normal.       Sensation/strength in extremities normal.  Romberg, finger nose test, standing on one leg test, heel toe walk all normal.  Cranial nerves normal.    Had patient lay down on exam table and sit up quickly  Also had him stand and then bend over at waist for a bit, then stand up quickly    With both these maneuvers he did not get particularly dizzy or unsteady    ASSESSMENT / PLAN:  (I95.1) Orthostatic hypotension  (primary encounter diagnosis)  Comment: discussed in detail with patient.  Overall he is feeling better.  He had a normal stress echo recently.   Plan: CARDIOLOGY EVAL ADULT REFERRAL        Gave him an as needed cardiology referral.  If symptoms worsen then see cardiology.  Neurology had  suggested possible cardiology consult.     (I10) Hypertension goal BP (blood pressure) < 140/90  Comment: lower than typical here, but sometimes a bit high.   Plan: monitor.  No change to meds at this time.     (R42) Dizzy spells  Comment: unclear precise etiology   Plan: patient still has the eeg result pending and the autonomic testing coming up, per neurology. Await these results.  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    (E11.9) Type 2 diabetes mellitus without complication, without long-term current use of insulin (H)  Comment: last hemoglobin a1c fine 6.6  Plan: no change in med       I reviewed the patient's medications, allergies, medical history, family history, and social history.    Finesse Beal MD

## 2019-05-16 ENCOUNTER — TELEPHONE (OUTPATIENT)
Dept: FAMILY MEDICINE | Facility: CLINIC | Age: 59
End: 2019-05-16

## 2019-05-16 NOTE — TELEPHONE ENCOUNTER
Attempt # 1  Called patient at home number.[phone]   Was call answered?  No answer, left message to call nurse line at [phone]    Diana Klein RN  [facility]

## 2019-05-16 NOTE — TELEPHONE ENCOUNTER
Reason for Call:  Medication or medication refill:    Do you use a [facility]?  Name of the pharmacy and phone number for the current request:  attached below     Name of the medication requested: Iron pills     Other request: Please call pt when medication is ready for pick-up.     Can we leave a detailed message on this number? YES    Phone number patient can be reached at: Home number on file [phone] (home)    Best Time: Anytime    Call taken on 5/16/2019 at 12:26 PM by Shilpa Campos

## 2019-05-17 NOTE — TELEPHONE ENCOUNTER
Patient returned call  - left voice mail to call back [phone]    Diana Klein RN  [facility]

## 2019-05-17 NOTE — TELEPHONE ENCOUNTER
Attempt # 1  Called patient at home number.  Was call answered?  Yes, patient not sure what name of pills is but states is for low HGB - result note from Dr. Beal states advised patient to take an OTC iron supplement daily, nurse explained to patient to purchase OTC and if has difficulty finding ask pharmacist for help choosing.  Patient verbalized understanding and agreement with therese Klein RN  [facility]

## 2019-05-17 NOTE — TELEPHONE ENCOUNTER
Attempt # 2  Called patient at home number.[phone]  Was call answered?  No answer, left message to call nurse line at [phone]    Diana Klein RN  [facility]

## 2019-06-11 ENCOUNTER — TRANSFERRED RECORDS (OUTPATIENT)
Dept: HEALTH INFORMATION MANAGEMENT | Facility: CLINIC | Age: 59
End: 2019-06-11

## 2019-06-17 ENCOUNTER — TELEPHONE (OUTPATIENT)
Dept: FAMILY MEDICINE | Facility: CLINIC | Age: 59
End: 2019-06-17

## 2019-06-17 NOTE — TELEPHONE ENCOUNTER
Preferred location:  [facility] [phone]   [email or web address]     Please be aware that coverage of these services is subject to the terms and limitations of your health insurance plan.  Call member services at your health plan with any benefit or coverage questions.      Attempt # 1  Called patient at home number.  Was call answered?  Yes, relayed above referral information, Patient verbalized understanding and agreement with plan and repeated the number correctly.        Diana Klein RN  [facility]

## 2019-06-17 NOTE — TELEPHONE ENCOUNTER
Reason for Call: Request for an order or referral:    Order or referral being requested: Cardiologists    Date needed: as soon as possible    Has the patient been seen by the PCP for this problem? YES    Additional comments: PT called and could not remember who/which cardiologist PT was referred to.      Phone number Patient can be reached at:  Home number on file [phone] (home)    Best Time:  Anytime     Can we leave a detailed message on this number?  YES    Call taken on 6/17/2019 at 2:11 PM by Ashli Ritchie

## 2019-06-18 ENCOUNTER — OFFICE VISIT (OUTPATIENT)
Dept: CARDIOLOGY | Facility: CLINIC | Age: 59
End: 2019-06-18
Attending: FAMILY MEDICINE
Payer: COMMERCIAL

## 2019-06-18 VITALS — SYSTOLIC BLOOD PRESSURE: 145 MMHG | HEART RATE: 104 BPM | DIASTOLIC BLOOD PRESSURE: 80 MMHG | OXYGEN SATURATION: 95 %

## 2019-06-18 DIAGNOSIS — I10 ESSENTIAL HYPERTENSION: ICD-10-CM

## 2019-06-18 DIAGNOSIS — E78.5 HYPERLIPIDEMIA LDL GOAL <70: ICD-10-CM

## 2019-06-18 DIAGNOSIS — Z79.4 TYPE 2 DIABETES MELLITUS WITH DIABETIC AUTONOMIC NEUROPATHY, WITH LONG-TERM CURRENT USE OF INSULIN (H): ICD-10-CM

## 2019-06-18 DIAGNOSIS — I95.1 ORTHOSTATIC HYPOTENSION: Primary | ICD-10-CM

## 2019-06-18 DIAGNOSIS — E11.43 TYPE 2 DIABETES MELLITUS WITH DIABETIC AUTONOMIC NEUROPATHY, WITH LONG-TERM CURRENT USE OF INSULIN (H): ICD-10-CM

## 2019-06-18 PROCEDURE — 99204 OFFICE O/P NEW MOD 45 MIN: CPT | Performed by: INTERNAL MEDICINE

## 2019-06-18 NOTE — NURSING NOTE
"Chief Complaint   Patient presents with     Follow Up     orthostatic hypotension c/o dizziness. 3/13/19 normal Stress echo hx of HTN, HL DM2,-Per patient lightheaded at times.       Initial There were no vitals taken for this visit. Estimated body mass index is 34.58 kg/m  as calculated from the following:    Height as of 3/8/19: 1.778 m (5' 10\").    Weight as of 5/10/19: 109.3 kg (241 lb)..  BP  145/80 , p. 104,95%completed using cuff size: large    Viral Aaron L.P.N.    Orthostatic bp. Readings today per DR Can lying for 3 min.,sitting for 3 min.,and standing for 5 min. Taken every min. Viral Aaron L.P.N.      "

## 2019-06-18 NOTE — LETTER
6/18/2019      RE: Gary Iyer  [address] [facility] [address]       Dear Colleague,    Thank you for the opportunity to participate in the care of your patient, Gary Iyer, at the [facility] HEART AT [facility]. Please see a copy of my visit note below.    Referring provider: Finesse Beal MD    Chief complaint: Lightheadedness and falls    HPI: Mr. Gary Iyer is a 58 year old  male with PMH significant for HTN, DM2 and hyperlipidemia.    The patient is being seen today at request of Dr. Beal regarding recent lightheadedness spells.  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 He feels lightheaded and he has fallen a few times now the worst in April of this year resulting in injury.  He is currently on medical leave due to these events on the school bus. Patient reports more frequent symptoms over the last 8 months 1-2 episodes per week.  Since he is not working and mostly at home now (schools closed) he reports very mild symptoms like dizziness and lightheadedness when he gets up from sitting to standing position or gets up from bending down when he is showering.  He denies loss of consciousness during any of these episodes.  The patient is overall doing well from cardiac standpoint.  He denies chest pain, dyspnea on exertion, palpitations, lower extremity edema or syncope.    Patient was seen at an [facility] neurology clinic in May of this year and underwent MRI of the brain, MRA neck and lab tests including TSH, B12 and folic acid which were all unremarkable.  He recently underwent autonomic testing at [facility] which are not available to see yet.    The patient does not have prior history of cardiac disease. He has hx of diabetes  over the last 2 to 3 years.  The patient has history of hypertension for several years now.  Initially he was on hydrochlorothiazide 25 mg which was increased to 50 mg in 2010.  He was on lisinopril which caused dry cough.  Lisinopril was discontinued in January of this year and he was started on losartan 100 mg and amlodipine 5 mg (losartan and amlodipin both at the same time).      Patient is sedentary.  He has 25-pack-year history of smoking quit date in 2010.  Past medical history significant for hypertension, diabetes type 2 and hyperlipidemia.  Patient's father had sudden cardiac death at the age of 45.    Exercise stress echocardiogram 3/13/2019 showed normal baseline LV size and function with no valvular disease.  Exercise stress test was negative for inducible ischemia.  Medications, personal, family, and social history reviewed with patient and revised.    PAST MEDICAL HISTORY:  Past Medical History:   Diagnosis Date     Diabetes (H)     2yr     Hypertension      Multinodular goiter (nontoxic) 6/10/2013     Sleep apnea 8yr       CURRENT MEDICATIONS:  Current Outpatient Medications   Medication Sig Dispense Refill     allopurinol (ZYLOPRIM) 300 MG tablet TAKE 1 TABLET(300 MG) BY MOUTH DAILY 90 tablet 1     aspirin 81 MG tablet Take 1 tablet by mouth daily.       atorvastatin (LIPITOR) 20 MG tablet TAKE 1 TABLET(20 MG) BY MOUTH DAILY 90 tablet 1     blood glucose monitoring (ALON MICROLET) lancets 1 each 2 times daily Use to test blood sugar 2 times daily or as directed. 1 Box 3     blood glucose monitoring (NO BRAND SPECIFIED) meter device kit Use to test blood sugar 2 times daily or as directed.  Accucheck meter please and all associated strips, lancets, and other supplies, 3 month supply with refills for a year. 1 kit 0     cetirizine (ZYRTEC) 10 MG tablet Take 1 tablet (10 mg) by mouth daily 90 tablet 3     COMPRESSION STOCKINGS 1 each daily 20-30 mmHg pressure, waist high 1 each 1      hydrochlorothiazide (HYDRODIURIL) 50 MG tablet TAKE 1 TABLET(50 MG) BY MOUTH EVERY MORNING 90 tablet 1     hydrocortisone 1 % ointment Apply topically 2 times daily as needed 30 g 1     ketoconazole (NIZORAL) 2 % cream Apply topically 2 times daily for up to 2 weeks as needed for rash 60 g 1     losartan (COZAAR) 100 MG tablet Take 1 tablet (100 mg) by mouth daily 90 tablet 3     metFORMIN (GLUCOPHAGE) 500 MG tablet TAKE 1 TABLET BY MOUTH TWICE DAILY WITH MEALS 120 tablet 3     Multiple Vitamin (DAILY MULTIVITAMIN PO) Take  by mouth daily.       omeprazole (PRILOSEC) 20 MG DR capsule TAKE 1 TABLET BY MOUTH DAILY 90 capsule 2     order for DME Place rectally every 12 hours 1.8 oz container of 0.2 % nifedipine suppositories.   Apply on the anus every 12 hours.  Just get to where the anus is tight . 1 Container 5     order for DME Autocpap 10-16 cm 1 Units 0     PARoxetine (PAXIL) 20 MG tablet TAKE 2 TABLETS(40 MG) BY MOUTH DAILY (Patient taking differently: daily TAKE 2 TABLETS(40 MG) BY MOUTH DAILY) 180 tablet 3     psyllium 0.52 G capsule Take 1 capsule (0.52 g) by mouth daily 100 capsule 3     tamsulosin (FLOMAX) 0.4 MG capsule TAKE 1 CAPSULE(0.4 MG) BY MOUTH DAILY 90 capsule 0     triamcinolone (KENALOG) 0.1 % cream Apply topically 2 times daily as needed for irritation 30 g 1     vitamin D2 (ERGOCALCIFEROL) 61780 units (1250 mcg) capsule TAKE 1 CAPSULE BY MOUTH EVERY WEEK 12 capsule 0       PAST SURGICAL HISTORY:  Past Surgical History:   Procedure Laterality Date     COLONOSCOPY       COLONOSCOPY  6-22-12    Repeat Colonoscopy in 10 yrs.      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     HEAD & NECK SURGERY  1/25/11    callous removal from throat     LARYNGOSCOPY WITH BIOPSY(IES)  1-25-11     LARYNGOSCOPY WITH BIOPSY(IES) N/A 2/9/2017    Procedure: LARYNGOSCOPY WITH BIOPSY(IES);  Surgeon: Mel Fournier MD;  Location:  OR     LARYNGOSCOPY WITH MICROSCOPE N/A 2/9/2017    Procedure: LARYNGOSCOPY WITH  MICROSCOPE;  Surgeon: Mel Fournier MD;  Location: [facility] OR     LASER CO2 LARYNGOSCOPY, COMPLEX  2013    Procedure: LASER CO2 LARYNGOSCOPY, COMPLEX;  Micro Direct Laryngoscopy With Micro Flap Excision Of Lesion Lumenis Co2 Laser ;  Surgeon: Mel Fournier MD;  Location: [facility] OR     LASER CO2 LESION ORAL N/A 2017    Procedure: LASER CO2 LESION ORAL;  Surgeon: Mel Fournier MD;  Location: [facility] OR     ORTHOPEDIC SURGERY  2016    left tibia orif with abbi -     VASECTOMY         ALLERGIES:   No Known Allergies    FAMILY HISTORY:  Family History   Problem Relation Age of Onset     C.A.D. Father          MI age 44     Hypertension Mother      Diabetes No family hx of      Cerebrovascular Disease No family hx of      Cancer No family hx of      Glaucoma No family hx of      Macular Degeneration No family hx of          SOCIAL HISTORY:  Social History     Tobacco Use     Smoking status: Former Smoker     Packs/day: 1.00     Years: 25.00     Pack years: 25.00     Last attempt to quit: 2000     Years since quittin.8     Smokeless tobacco: Never Used   Substance Use Topics     Alcohol use: Yes     Alcohol/week: 0.0 oz     Comment: rare     Drug use: No       ROS:   Constitutional: No fever, chills, or sweats. Weight stable.   ENT: No visual disturbance, ear ache, epistaxis, sore throat.   Cardiovascular: As per HPI.   Respiratory: No cough, hemoptysis.    GI: No nausea, vomiting, hematemesis, melena, or hematochezia.   : No hematuria.   Integument: Negative.   Psychiatric: Negative.   Hematologic:  No easy bruising, no easy bleeding.  Neuro:Lightheadedness+  Endocrinology: No significant heat or cold intolerance   Musculoskeletal: No myalgia.    Exam:  /80 (BP Location: Left arm, Patient Position: Sitting, Cuff Size: Adult Large)   Pulse 104   SpO2 95%   GENERAL APPEARANCE: alert and no distress  HEENT: no icterus, no central cyanosis  LYMPH/NECK: no  adenopathy, no asymmetry, JVP not elevated, no carotid bruits.  RESPIRATORY: lungs clear to auscultation - no rales, rhonchi or wheezes, no use of accessory muscles, no retractions, respirations are unlabored, normal respiratory rate  CARDIOVASCULAR: regular rhythm, normal S1, S2, no S3 or S4 and no murmur, click or rub, precordium quiet with normal PMI.  GI: soft, non tender  EXTREMITIES: peripheral pulses normal, no edema  NEURO: alert, normal speech,and affect  VASC: Radial, dorsalis pedis and posterior tibialis pulses are normal in volumes and symmetric bilaterally.   SKIN: no ecchymoses, no rashes     I have reviewed the labs and personally reviewed the imaging below and made my comment in the assessment and plan.    Labs:  CBC RESULTS:   Lab Results   Component Value Date    WBC 9.0 03/22/2019    RBC 4.20 (L) 03/22/2019    HGB 12.7 (L) 03/22/2019    HCT 38.3 (L) 03/22/2019    MCV 91 03/22/2019    MCH 30.2 03/22/2019    MCHC 33.2 03/22/2019    RDW 14.0 03/22/2019     03/22/2019       BMP RESULTS:  Lab Results   Component Value Date     11/06/2018    POTASSIUM 3.7 11/06/2018    CHLORIDE 97 11/06/2018    CO2 28 11/06/2018    ANIONGAP 10 11/06/2018     (H) 11/06/2018    BUN 12 11/06/2018    CR 0.70 11/06/2018    GFRESTIMATED >90 11/06/2018    GFRESTBLACK >90 11/06/2018    TRUMAN 9.3 11/06/2018        INR RESULTS:  Lab Results   Component Value Date    INR 1.00 07/25/2010       Exercise Stress Echocardiogram 3/13/2019  This was a normal stress echocardiogram with no evidence of stress-induced  ischemia. Normal resting LV function with EF of approximately 60-65%; normal  response to exercise with increase to approximately 70-75%. No stress induced  regional wall motion abnormalities. No ECG evidence of ischemia. No  significant valvular disease noted on routine screening color flow Doppler and  pulsed Doppler examination. Reduced functional capacity.  No resting wallmotion abnormalities.  Limiting  Symptom (s) : fatigue  The patient did not exhibit any symptoms during exercise.  Normal blood pressure response with stress.    Assessment and Plan:   Mr. Gary Iyer is a 58 year old  male with PMH significant for HTN, DM2 and hyperlipidemia.    Lightheadedness, falls: The patient reports severe lightheadedness when he changes position from prolonged sitting to standing sometimes ending up in falls without loss of consciousness.  He is currently on medical leave since April of this year.  He works as an aid for the .  I am suspecting his symptoms are due to orthostatic hypotension since his neurology evaluation has been unremarkable so far. Orthostatic blood pressure monitoring today in clinic today showed no evidence of orthostatic hypotension but this does not rule out delayed orthostatic hypotension.  Recommended to hold amlodipine, follow-up blood pressure at home (will call clinic in 2 weeks) and wearing waist high compression stockings mainly on the days that he works on the [employer].    Recommended to continue all other current medications.    Return to clinic in 2 months.    A total of 30 minutes spent face-toface with greater than 50% of the time spent in counseling and coordinating cares of the issues above.     Please donot hesitate to contact me if you have any questions or concerns. Again, thank you for allowing me to participate in the care of your patient.    Marilynn MARS MD  [facility] Division of Cardiology  Pager [phone]

## 2019-06-18 NOTE — PATIENT INSTRUCTIONS
Thank you for coming to the [facility] @ [facility]; please note the following instructions:    1. Balwinder stocking prescription bring to a medical store in your insurance network    2.MEDICATION CHANGES TODAY:    * Hold amlodipine     3.Blood pressure log twice a day for 2 weeks then relay results to DR. Craig by My chart, fax, telephone , or at clinic     4.Dr. Marilynn Craig has requested you to follow up in 2 month; please see following pages for appointment detail information.              If you have any questions regarding your visit please contact your care team:     Cardiology  Telephone Number   Anahi MADERA, RN  Gracie RUTH,RN  Camila JACKSON, MICKY DAUGHERTY, MA  Viral NUNES, LPN   [phone]    *After hours: [phone]   For scheduling appts:     [phone] or    [phone] (select option 1)    *After hours: [phone]     For the Device Clinic (Pacemakers and ICD's)  RN's :  Tasha Jose   During business hours: [phone]    *After business hours:  [phone] (select option 4)      Normal test result notifications will be released via [employer] or mailed within 7 business days.  All other test results, will be communicated via telephone once reviewed by your cardiologist.    If you need a medication refill please contact your pharmacy.  Please allow 3 business days for your refill to be completed.    As always, thank you for trusting us with your health care needs!        Patient Education     T.E.D. Stockings  T.E.D. stockings are used to help stop blood clots from forming in the deep veins of your legs. T.E.D. stands for thrombo-embolic deterrent hose. They help prevent blood clots if you are immobile, such as following certain types of surgeries. They look like support hose. But they are specially designed to put more pressure on your foot and ankle and less pressure at the upper end of the stocking. This keeps blood from pooling in your lower legs. They are also called T.E.D.  "hose.  When blood flow slows down, clots can form. The pressure from T.E.D. stockings helps blood flow in the deep leg veins. This reduces risk for clots when you also take anti-coagulation medicine.  It's important that you have the correct size of the T.E.D. stockings for them to work. Your doctor or nurse will give you the size you need. If you are having trouble putting your stockings on by yourself, ask your doctor or supply store about using a \"sock aid.\"   Date Last Reviewed: 6/1/2016 2000-2018 [employer]. [address] [place] [address], [place] [address]. All rights reserved. This information is not intended as a substitute for professional medical care. Always follow your healthcare professional's instructions.           "

## 2019-06-18 NOTE — PROGRESS NOTES
Referring provider: Finesse Beal MD    Chief complaint: Lightheadedness and falls    HPI: Mr. Gray Iyer is a 58 year old  male with PMH significant for HTN, DM2 and hyperlipidemia.    The patient is being seen today at request of Dr. Beal regarding recent lightheadedness spells.  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 He feels lightheaded and he has fallen a few times now the worst in April of this year resulting in injury.  He is currently on medical leave due to these events on the school bus. Patient reports more frequent symptoms over the last 8 months 1-2 episodes per week.  Since he is not working and mostly at home now (schools closed) he reports very mild symptoms like dizziness and lightheadedness when he gets up from sitting to standing position or gets up from bending down when he is showering.  He denies loss of consciousness during any of these episodes.  The patient is overall doing well from cardiac standpoint.  He denies chest pain, dyspnea on exertion, palpitations, lower extremity edema or syncope.    Patient was seen at an [facility] neurology clinic in May of this year and underwent MRI of the brain, MRA neck and lab tests including TSH, B12 and folic acid which were all unremarkable.  He recently underwent autonomic testing [facility] which are not available to see yet.    The patient does not have prior history of cardiac disease. He has hx of diabetes over the last 2 to 3 years.  The patient has history of hypertension for several years now.  Initially he was on hydrochlorothiazide 25 mg which was increased to 50 mg in 2010.  He was on lisinopril which caused dry cough.  Lisinopril was discontinued in January of this year and he was started on losartan 100 mg and amlodipine 5 mg (losartan and  amlodipin both at the same time).      Patient is sedentary.  He has 25-pack-year history of smoking quit date in 2010.  Past medical history significant for hypertension, diabetes type 2 and hyperlipidemia.  Patient's father had sudden cardiac death at the age of 45.    Exercise stress echocardiogram 3/13/2019 showed normal baseline LV size and function with no valvular disease.  Exercise stress test was negative for inducible ischemia.  Medications, personal, family, and social history reviewed with patient and revised.    PAST MEDICAL HISTORY:  Past Medical History:   Diagnosis Date     Diabetes (H)     2yr     Hypertension      Multinodular goiter (nontoxic) 6/10/2013     Sleep apnea 8yr       CURRENT MEDICATIONS:  Current Outpatient Medications   Medication Sig Dispense Refill     allopurinol (ZYLOPRIM) 300 MG tablet TAKE 1 TABLET(300 MG) BY MOUTH DAILY 90 tablet 1     aspirin 81 MG tablet Take 1 tablet by mouth daily.       atorvastatin (LIPITOR) 20 MG tablet TAKE 1 TABLET(20 MG) BY MOUTH DAILY 90 tablet 1     blood glucose monitoring (ALON MICROLET) lancets 1 each 2 times daily Use to test blood sugar 2 times daily or as directed. 1 Box 3     blood glucose monitoring (NO BRAND SPECIFIED) meter device kit Use to test blood sugar 2 times daily or as directed.  Accucheck meter please and all associated strips, lancets, and other supplies, 3 month supply with refills for a year. 1 kit 0     cetirizine (ZYRTEC) 10 MG tablet Take 1 tablet (10 mg) by mouth daily 90 tablet 3     COMPRESSION STOCKINGS 1 each daily 20-30 mmHg pressure, waist high 1 each 1     hydrochlorothiazide (HYDRODIURIL) 50 MG tablet TAKE 1 TABLET(50 MG) BY MOUTH EVERY MORNING 90 tablet 1     hydrocortisone 1 % ointment Apply topically 2 times daily as needed 30 g 1     ketoconazole (NIZORAL) 2 % cream Apply topically 2 times daily for up to 2 weeks as needed for rash 60 g 1     losartan (COZAAR) 100 MG tablet Take 1 tablet (100 mg) by mouth  daily 90 tablet 3     metFORMIN (GLUCOPHAGE) 500 MG tablet TAKE 1 TABLET BY MOUTH TWICE DAILY WITH MEALS 120 tablet 3     Multiple Vitamin (DAILY MULTIVITAMIN PO) Take  by mouth daily.       omeprazole (PRILOSEC) 20 MG DR capsule TAKE 1 TABLET BY MOUTH DAILY 90 capsule 2     order for DME Place rectally every 12 hours 1.8 oz container of 0.2 % nifedipine suppositories.   Apply on the anus every 12 hours.  Just get to where the anus is tight . 1 Container 5     order for DME Autocpap 10-16 cm 1 Units 0     PARoxetine (PAXIL) 20 MG tablet TAKE 2 TABLETS(40 MG) BY MOUTH DAILY (Patient taking differently: daily TAKE 2 TABLETS(40 MG) BY MOUTH DAILY) 180 tablet 3     psyllium 0.52 G capsule Take 1 capsule (0.52 g) by mouth daily 100 capsule 3     tamsulosin (FLOMAX) 0.4 MG capsule TAKE 1 CAPSULE(0.4 MG) BY MOUTH DAILY 90 capsule 0     triamcinolone (KENALOG) 0.1 % cream Apply topically 2 times daily as needed for irritation 30 g 1     vitamin D2 (ERGOCALCIFEROL) 38695 units (1250 mcg) capsule TAKE 1 CAPSULE BY MOUTH EVERY WEEK 12 capsule 0       PAST SURGICAL HISTORY:  Past Surgical History:   Procedure Laterality Date     COLONOSCOPY       COLONOSCOPY  6-22-12    Repeat Colonoscopy in 10 yrs.      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     HEAD & NECK SURGERY  1/25/11    callous removal from throat     LARYNGOSCOPY WITH BIOPSY(IES)  1-25-11     LARYNGOSCOPY WITH BIOPSY(IES) N/A 2/9/2017    Procedure: LARYNGOSCOPY WITH BIOPSY(IES);  Surgeon: Mel Fournier MD;  Location: [facility] OR     LARYNGOSCOPY WITH MICROSCOPE N/A 2/9/2017    Procedure: LARYNGOSCOPY WITH MICROSCOPE;  Surgeon: Mel Fournier MD;  Location: [facility] OR     LASER CO2 LARYNGOSCOPY, COMPLEX  6/27/2013    Procedure: LASER CO2 LARYNGOSCOPY, COMPLEX;  Micro Direct Laryngoscopy With Micro Flap Excision Of Lesion Lumenis Co2 Laser ;  Surgeon: Mel Fournier MD;  Location: [facility] OR     LASER CO2 LESION ORAL N/A 2/9/2017    Procedure: LASER  CO2 LESION ORAL;  Surgeon: Mel Fournier MD;  Location:  OR     ORTHOPEDIC SURGERY  2016    left tibia orif with abbi 2-     VASECTOMY         ALLERGIES:   No Known Allergies    FAMILY HISTORY:  Family History   Problem Relation Age of Onset     C.A.D. Father          MI age 44     Hypertension Mother      Diabetes No family hx of      Cerebrovascular Disease No family hx of      Cancer No family hx of      Glaucoma No family hx of      Macular Degeneration No family hx of          SOCIAL HISTORY:  Social History     Tobacco Use     Smoking status: Former Smoker     Packs/day: 1.00     Years: 25.00     Pack years: 25.00     Last attempt to quit: 2000     Years since quittin.8     Smokeless tobacco: Never Used   Substance Use Topics     Alcohol use: Yes     Alcohol/week: 0.0 oz     Comment: rare     Drug use: No       ROS:   Constitutional: No fever, chills, or sweats. Weight stable.   ENT: No visual disturbance, ear ache, epistaxis, sore throat.   Cardiovascular: As per HPI.   Respiratory: No cough, hemoptysis.    GI: No nausea, vomiting, hematemesis, melena, or hematochezia.   : No hematuria.   Integument: Negative.   Psychiatric: Negative.   Hematologic:  No easy bruising, no easy bleeding.  Neuro:Lightheadedness+  Endocrinology: No significant heat or cold intolerance   Musculoskeletal: No myalgia.    Exam:  /80 (BP Location: Left arm, Patient Position: Sitting, Cuff Size: Adult Large)   Pulse 104   SpO2 95%   GENERAL APPEARANCE: alert and no distress  HEENT: no icterus, no central cyanosis  LYMPH/NECK: no adenopathy, no asymmetry, JVP not elevated, no carotid bruits.  RESPIRATORY: lungs clear to auscultation - no rales, rhonchi or wheezes, no use of accessory muscles, no retractions, respirations are unlabored, normal respiratory rate  CARDIOVASCULAR: regular rhythm, normal S1, S2, no S3 or S4 and no murmur, click or rub, precordium quiet with normal PMI.  GI:  soft, non tender  EXTREMITIES: peripheral pulses normal, no edema  NEURO: alert, normal speech,and affect  VASC: Radial, dorsalis pedis and posterior tibialis pulses are normal in volumes and symmetric bilaterally.   SKIN: no ecchymoses, no rashes     I have reviewed the labs and personally reviewed the imaging below and made my comment in the assessment and plan.    Labs:  CBC RESULTS:   Lab Results   Component Value Date    WBC 9.0 03/22/2019    RBC 4.20 (L) 03/22/2019    HGB 12.7 (L) 03/22/2019    HCT 38.3 (L) 03/22/2019    MCV 91 03/22/2019    MCH 30.2 03/22/2019    MCHC 33.2 03/22/2019    RDW 14.0 03/22/2019     03/22/2019       BMP RESULTS:  Lab Results   Component Value Date     11/06/2018    POTASSIUM 3.7 11/06/2018    CHLORIDE 97 11/06/2018    CO2 28 11/06/2018    ANIONGAP 10 11/06/2018     (H) 11/06/2018    BUN 12 11/06/2018    CR 0.70 11/06/2018    GFRESTIMATED >90 11/06/2018    GFRESTBLACK >90 11/06/2018    TRUMAN 9.3 11/06/2018        INR RESULTS:  Lab Results   Component Value Date    INR 1.00 07/25/2010       Exercise Stress Echocardiogram 3/13/2019  This was a normal stress echocardiogram with no evidence of stress-induced  ischemia. Normal resting LV function with EF of approximately 60-65%; normal  response to exercise with increase to approximately 70-75%. No stress induced  regional wall motion abnormalities. No ECG evidence of ischemia. No  significant valvular disease noted on routine screening color flow Doppler and  pulsed Doppler examination. Reduced functional capacity.  No resting wallmotion abnormalities.  Limiting Symptom (s) : fatigue  The patient did not exhibit any symptoms during exercise.  Normal blood pressure response with stress.    Assessment and Plan:   Mr. Gary Iyer is a 58 year old  male with PMH significant for HTN, DM2 and hyperlipidemia.    Lightheadedness, falls: The patient reports severe lightheadedness when he changes position from prolonged  sitting to standing sometimes ending up in falls without loss of consciousness.  He is currently on medical leave since April of this year.  He works as an aid for the .  I am suspecting his symptoms are due to orthostatic hypotension since his neurology evaluation has been unremarkable so far. Orthostatic blood pressure monitoring today in clinic today showed no evidence of orthostatic hypotension but this does not rule out delayed orthostatic hypotension.  Recommended to hold amlodipine, follow-up blood pressure at home (will call clinic in 2 weeks) and wearing waist high compression stockings mainly on the days that he works on the [employer].    Recommended to continue all other current medications.    Return to clinic in 2 months.    A total of 30 minutes spent face-toface with greater than 50% of the time spent in counseling and coordinating cares of the issues above.     Please donot hesitate to contact me if you have any questions or concerns. Again, thank you for allowing me to participate in the care of your patient.    Marilynn MARS MD  [facility] Division of Cardiology  Pager [phone]

## 2019-06-25 ENCOUNTER — TELEPHONE (OUTPATIENT)
Dept: FAMILY MEDICINE | Facility: CLINIC | Age: 59
End: 2019-06-25

## 2019-06-25 DIAGNOSIS — D64.9 ANEMIA, UNSPECIFIED TYPE: Primary | ICD-10-CM

## 2019-06-25 NOTE — TELEPHONE ENCOUNTER
I put in lab order for future hemoglobin    Patient can do a lab only appointment, need not be fasting    Please inform patient    Finesse Beal MD

## 2019-06-25 NOTE — TELEPHONE ENCOUNTER
Nurse sees lab response:  Red blood count ( hemoglobin ) is low.  Advise taking one over the counter iron pill daily, then we could recheck in 2-3 months.     Finesse Beal MD     Routing to PCP to review and advise.    Please enter lab orders for HGB per above.      Diana Klein RN  [facility]

## 2019-06-25 NOTE — TELEPHONE ENCOUNTER
Reason for Call:  Other appointment    Detailed comments: Pt would like a call back and confirm if he is due for a lab appt to check hgb.    Phone Number Patient can be reached at: Home number on file [phone] (home)    Best Time: Anytime    Can we leave a detailed message on this number? NO    Call taken on 6/25/2019 at 11:48 AM by Roula Neri

## 2019-07-01 ENCOUNTER — OFFICE VISIT (OUTPATIENT)
Dept: OTOLARYNGOLOGY | Facility: CLINIC | Age: 59
End: 2019-07-01
Payer: COMMERCIAL

## 2019-07-01 VITALS — BODY MASS INDEX: 34.5 KG/M2 | HEIGHT: 70 IN | WEIGHT: 241 LBS

## 2019-07-01 DIAGNOSIS — R49.0 DYSPHONIA: ICD-10-CM

## 2019-07-01 DIAGNOSIS — R49.0 DYSPHONIA: Primary | ICD-10-CM

## 2019-07-01 DIAGNOSIS — Z85.21 HX OF LARYNGEAL CANCER: Primary | ICD-10-CM

## 2019-07-01 ASSESSMENT — MIFFLIN-ST. JEOR: SCORE: 1909.42

## 2019-07-01 NOTE — PATIENT INSTRUCTIONS
Thank you for choosing [facility] today.    Please follow-up with  in 3 to 4 months.     If you have any further questions or concerns, call us at [phone].

## 2019-07-01 NOTE — LETTER
7/1/2019      RE: Gary Iyer  [address]  [place] [address]       Dear Colleague:    Gary Iyer recently returned for follow-up at the [facility]. My clinic note from our visit is enclosed below.  Speech recognition software may have been used in the documentation below; input is reviewed before signature to the best of my ability.     I appreciate the ongoing opportunity to participate in this patient's care.    Please feel free to contact me with any questions.      Sincerely yours,  Mel Fournier M.D., M.P.H.  , Laryngology  Director, 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  Most recently, he returned to the operating room on February 9, 2017 for an area of new irregularity of the left true vocal fold.  Pathology showed severe dysplasia with negative but close margins (for moderate, but not severe dysplasia).  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    No voice changes recently. He has temporarily stopped doing voice exercises due to challenges with blood pressure control, which have made him dizzy.      MEDICATIONS:     Current Outpatient Medications   Medication Sig Dispense Refill     allopurinol (ZYLOPRIM) 300 MG tablet TAKE 1 TABLET(300 MG) BY MOUTH DAILY 90 tablet 1     aspirin 81 MG tablet Take 1 tablet by mouth daily.       atorvastatin (LIPITOR) 20 MG tablet TAKE 1 TABLET(20 MG) BY MOUTH DAILY 90 tablet 1     blood glucose monitoring (ALON MICROLET) lancets 1 each 2 times daily Use to test blood sugar 2 times daily or as directed. 1 Box 3     blood glucose monitoring (NO BRAND SPECIFIED) meter device kit Use to test blood  sugar 2 times daily or as directed.  Accucheck meter please and all associated strips, lancets, and other supplies, 3 month supply with refills for a year. 1 kit 0     cetirizine (ZYRTEC) 10 MG tablet Take 1 tablet (10 mg) by mouth daily 90 tablet 3     COMPRESSION STOCKINGS 1 each daily 20-30 mmHg pressure, waist high 1 each 1     hydrochlorothiazide (HYDRODIURIL) 50 MG tablet TAKE 1 TABLET(50 MG) BY MOUTH EVERY MORNING 90 tablet 1     hydrocortisone 1 % ointment Apply topically 2 times daily as needed 30 g 1     ketoconazole (NIZORAL) 2 % cream Apply topically 2 times daily for up to 2 weeks as needed for rash 60 g 1     losartan (COZAAR) 100 MG tablet Take 1 tablet (100 mg) by mouth daily 90 tablet 3     metFORMIN (GLUCOPHAGE) 500 MG tablet TAKE 1 TABLET BY MOUTH TWICE DAILY WITH MEALS 120 tablet 3     Multiple Vitamin (DAILY MULTIVITAMIN PO) Take  by mouth daily.       omeprazole (PRILOSEC) 20 MG DR capsule TAKE 1 TABLET BY MOUTH DAILY 90 capsule 2     order for DME Place rectally every 12 hours 1.8 oz container of 0.2 % nifedipine suppositories.   Apply on the anus every 12 hours.  Just get to where the anus is tight . 1 Container 5     order for DME Autocpap 10-16 cm 1 Units 0     PARoxetine (PAXIL) 20 MG tablet TAKE 2 TABLETS(40 MG) BY MOUTH DAILY (Patient taking differently: daily TAKE 2 TABLETS(40 MG) BY MOUTH DAILY) 180 tablet 3     psyllium 0.52 G capsule Take 1 capsule (0.52 g) by mouth daily 100 capsule 3     tamsulosin (FLOMAX) 0.4 MG capsule TAKE 1 CAPSULE(0.4 MG) BY MOUTH DAILY 90 capsule 0     triamcinolone (KENALOG) 0.1 % cream Apply topically 2 times daily as needed for irritation 30 g 1     vitamin D2 (ERGOCALCIFEROL) 87362 units (1250 mcg) capsule TAKE 1 CAPSULE BY MOUTH EVERY WEEK 12 capsule 0       ALLERGIES:  No Known Allergies    NEW PMH/PSH: Challenges with managing blood pressure    REVIEW OF SYSTEMS:  The patient completed a comprehensive 11 point review of systems (below), which was  reviewed. Positives are as noted below.  Patient Supplied Answers to Review of Systems   ENT ROS 4/22/2016   Ears, Nose, Throat Nasal congestion or drainage       PHYSICAL EXAM:  General: The patient was alert and conversant, and in no acute distress.    Oral cavity/oropharynx: No masses or lesions. Dentition unchanged since prior. Tongue mobility and palate elevation intact and symmetric.  Neck: No palpable cervical lymphadenopathy, no significant tenderness to palpation of the thyrohyoid space, which was narrow. No obvious thyroid abnormality.  Resp: Breathing comfortably, no stridor or stertor.  Neuro: Symmetric facial function. Other cranial nerve function as documented above.  Psych: Normal affect, pleasant and cooperative.  Voice/speech: No significant dysphonia.      Intake scores  Last 2 Scores for Patient-Answered VHI Questionnaire  VHI Total Score 2/26/2018 3/1/2019   VHI Total Score 9 17      Last 2 Scores for Patient-Answered EAT Questionnaire  EAT Total Score 2/26/2018 3/1/2019   EAT Total Score 1 5      Last 2 Scores for Patient-Answered CSI Questionnaire  CSI Total Score 2/26/2018 3/1/2019   CSI Total Score 0 0       Procedure:   Flexible fiberoptic laryngoscopy and laryngovideostroboscopy  Indications: This procedure was warranted to evaluate the patient's laryngeal anatomy and function. Risks, benefits, and alternatives were discussed.  Description: After written informed consent was obtained, a time-out was performed to confirm patient identity, procedure, and procedure site. Topical 3% lidocaine with 0.25% phenylephrine was applied to the nasal cavities. I performed the endoscopy and no complications were apparent. Continuous and stroboscopic light were utilized to assess routine phonation and variable frequency phonation.  Performed by: Mel Fournier MD MPH  SLP: Misty Clark, PhD, CCC-SLP   Findings: Normal nasopharynx. Normal base of tongue, valleculae, and epiglottis. Vocal fold  mobility: right: normal; left: normal. Medial edges of the vocal folds: smooth and straight. No focal mucosal lesions were observed on the true vocal folds. Glissade produced appropriate elongation. There was moderate supraglottic recruitment with connected speech. Mucosa of false vocal folds, aryepiglottic folds, piriform sinuses, and posterior glottis unremarkable. Airway was patent.   No focal lesions on NBI, mild stable left lateral leukplakia vs fibrosis.    The addition of stroboscopy allowed evaluation of the mucosal wave.   Amplitude: right: mildly decreased; left: mildly decreased. Symmetry: intermittent symmetry. Closure pattern: complete. Closure plane: at glottic level. Phase distribution: normal.             IMPRESSION AND PLAN:   Gary Iyer returns with no evidence of disease.     I encouraged him to resume voice exercises once his overall health is more stabilized. Today at the conclusion of the visit, he felt his sugars were getting low so he was provided with applesauce and cookies as a precaution. He is working with his primary care provider on these concerns.    He will return in three to four months or sooner as needed. I appreciate the opportunity to participate in the care of this pleasant patient.       Mel Fournier MD

## 2019-07-01 NOTE — PROGRESS NOTES
Dear Colleague:    Gary Iyer recently returned for follow-up at the [facility]. My clinic note from our visit is enclosed below.  Speech recognition software may have been used in the documentation below; input is reviewed before signature to the best of my ability.     I appreciate the ongoing opportunity to participate in this patient's care.    Please feel free to contact me with any questions.      Sincerely yours,  Mel Fournier M.D., M.P.H.  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  Most recently, he returned to the operating room on February 9, 2017 for an area of new irregularity of the left true vocal fold.  Pathology showed severe dysplasia with negative but close margins (for moderate, but not severe dysplasia).  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    No voice changes recently. He has temporarily stopped doing voice exercises due to challenges with blood pressure control, which have made him dizzy.      MEDICATIONS:     Current Outpatient Medications   Medication Sig Dispense Refill     allopurinol (ZYLOPRIM) 300 MG tablet TAKE 1 TABLET(300 MG) BY MOUTH DAILY 90 tablet 1     aspirin 81 MG tablet Take 1 tablet by mouth daily.       atorvastatin (LIPITOR) 20 MG tablet TAKE 1 TABLET(20 MG) BY MOUTH DAILY 90 tablet 1     blood glucose monitoring (ALON MICROLET) lancets 1 each 2 times daily Use to test blood sugar 2 times daily or as directed. 1 Box 3     blood glucose monitoring (NO BRAND SPECIFIED) meter device kit Use to test blood sugar 2 times daily or as directed.  Accucheck meter please and all associated  strips, lancets, and other supplies, 3 month supply with refills for a year. 1 kit 0     cetirizine (ZYRTEC) 10 MG tablet Take 1 tablet (10 mg) by mouth daily 90 tablet 3     COMPRESSION STOCKINGS 1 each daily 20-30 mmHg pressure, waist high 1 each 1     hydrochlorothiazide (HYDRODIURIL) 50 MG tablet TAKE 1 TABLET(50 MG) BY MOUTH EVERY MORNING 90 tablet 1     hydrocortisone 1 % ointment Apply topically 2 times daily as needed 30 g 1     ketoconazole (NIZORAL) 2 % cream Apply topically 2 times daily for up to 2 weeks as needed for rash 60 g 1     losartan (COZAAR) 100 MG tablet Take 1 tablet (100 mg) by mouth daily 90 tablet 3     metFORMIN (GLUCOPHAGE) 500 MG tablet TAKE 1 TABLET BY MOUTH TWICE DAILY WITH MEALS 120 tablet 3     Multiple Vitamin (DAILY MULTIVITAMIN PO) Take  by mouth daily.       omeprazole (PRILOSEC) 20 MG DR capsule TAKE 1 TABLET BY MOUTH DAILY 90 capsule 2     order for DME Place rectally every 12 hours 1.8 oz container of 0.2 % nifedipine suppositories.   Apply on the anus every 12 hours.  Just get to where the anus is tight . 1 Container 5     order for DME Autocpap 10-16 cm 1 Units 0     PARoxetine (PAXIL) 20 MG tablet TAKE 2 TABLETS(40 MG) BY MOUTH DAILY (Patient taking differently: daily TAKE 2 TABLETS(40 MG) BY MOUTH DAILY) 180 tablet 3     psyllium 0.52 G capsule Take 1 capsule (0.52 g) by mouth daily 100 capsule 3     tamsulosin (FLOMAX) 0.4 MG capsule TAKE 1 CAPSULE(0.4 MG) BY MOUTH DAILY 90 capsule 0     triamcinolone (KENALOG) 0.1 % cream Apply topically 2 times daily as needed for irritation 30 g 1     vitamin D2 (ERGOCALCIFEROL) 61385 units (1250 mcg) capsule TAKE 1 CAPSULE BY MOUTH EVERY WEEK 12 capsule 0       ALLERGIES:  No Known Allergies    NEW PMH/PSH: Challenges with managing blood pressure    REVIEW OF SYSTEMS:  The patient completed a comprehensive 11 point review of systems (below), which was reviewed. Positives are as noted below.  Patient Supplied Answers to Review of  [facility] ENT ROS 4/22/2016   Ears, Nose, Throat Nasal congestion or drainage       PHYSICAL EXAM:  General: The patient was alert and conversant, and in no acute distress.    Oral cavity/oropharynx: No masses or lesions. Dentition unchanged since prior. Tongue mobility and palate elevation intact and symmetric.  Neck: No palpable cervical lymphadenopathy, no significant tenderness to palpation of the thyrohyoid space, which was narrow. No obvious thyroid abnormality.  Resp: Breathing comfortably, no stridor or stertor.  Neuro: Symmetric facial function. Other cranial nerve function as documented above.  Psych: Normal affect, pleasant and cooperative.  Voice/speech: No significant dysphonia.      Intake scores  Last 2 Scores for Patient-Answered VHI Questionnaire  VHI Total Score 2/26/2018 3/1/2019   VHI Total Score 9 17      Last 2 Scores for Patient-Answered EAT Questionnaire  EAT Total Score 2/26/2018 3/1/2019   EAT Total Score 1 5      Last 2 Scores for Patient-Answered CSI Questionnaire  CSI Total Score 2/26/2018 3/1/2019   CSI Total Score 0 0       Procedure:   Flexible fiberoptic laryngoscopy and laryngovideostroboscopy  Indications: This procedure was warranted to evaluate the patient's laryngeal anatomy and function. Risks, benefits, and alternatives were discussed.  Description: After written informed consent was obtained, a time-out was performed to confirm patient identity, procedure, and procedure site. Topical 3% lidocaine with 0.25% phenylephrine was applied to the nasal cavities. I performed the endoscopy and no complications were apparent. Continuous and stroboscopic light were utilized to assess routine phonation and variable frequency phonation.  Performed by: Mel Fournier MD MPH  SLP: Misty Clark, PhD, CCC-SLP   Findings: Normal nasopharynx. Normal base of tongue, valleculae, and epiglottis. Vocal fold mobility: right: normal; left: normal. Medial edges of the vocal folds: smooth  and straight. No focal mucosal lesions were observed on the true vocal folds. Glissade produced appropriate elongation. There was moderate supraglottic recruitment with connected speech. Mucosa of false vocal folds, aryepiglottic folds, piriform sinuses, and posterior glottis unremarkable. Airway was patent.   No focal lesions on NBI, mild stable left lateral leukplakia vs fibrosis.    The addition of stroboscopy allowed evaluation of the mucosal wave.   Amplitude: right: mildly decreased; left: mildly decreased. Symmetry: intermittent symmetry. Closure pattern: complete. Closure plane: at glottic level. Phase distribution: normal.             IMPRESSION AND PLAN:   Gary Iyer returns with no evidence of disease.     I encouraged him to resume voice exercises once his overall health is more stabilized. Today at the conclusion of the visit, he felt his sugars were getting low so he was provided with applesauce and cookies as a precaution. He is working with his primary care provider on these concerns.    He will return in three to four months or sooner as needed. I appreciate the opportunity to participate in the care of this pleasant patient.

## 2019-07-02 NOTE — PROGRESS NOTES
[facility]  Elijah Baker Jr., M.D., F.A.C.S.  Mel Viramontes M.D., M.P.H.  Misty Clark, Ph.D., CCC/SLP  Jasmyne Padilla M.M. (voice), MMC., CCC/SLP  Zack Mcdaniels M.M. (voice), M.A., [facility]/SLP    [facility]  VOICE EVALUATION/LARYNGEAL EXAMINATION REPORT    Patient: Gary Iyer  Date of Service: 7/1/2019    HISTORY  PATIENT INFORMATION  Gary Iyer was seen for brief consultation in conjunction with a visit to Dr. Viramontes today.  Please refer to Dr. Viramontes s dictation for a more complete history and impressions.      Mr. Iyer's voice is doing well, and sounds consistent with his recent visits.  I helped Dr. Viramontes with the laryngeal exam today.  No evaluation or therapy warranted.    DIAGNOSIS/REASON FOR REFERRAL  dysphonia/ Evaluate, perform laryngeal exam, treat as appropriate    No charge for today s session  NO CHARGE FACILITY FEE (88827)    PRIMARY ICD-10 code: R49.0 (Dysphonia)    Misty Clark, Ph.D., [facility]-SLP  Speech-Language Pathologist  Director, [facility]  [phone]

## 2019-07-05 ENCOUNTER — NURSE TRIAGE (OUTPATIENT)
Dept: FAMILY MEDICINE | Facility: CLINIC | Age: 59
End: 2019-07-05

## 2019-07-05 NOTE — TELEPHONE ENCOUNTER
I do see progress notes from [facility] and EMG result scanned in Epic.  EMG was ordered by [facility] so that result would be communicated by them to patient.    Patient was last seen 5/10/19 by Dr. Beal.    I called patient, no red flags regarding abdominal pain.   He will monitor and in fact cancelled an appt he had today as he is feeling better.   He wonders if Dr. Beal saw his autonomic testing.   I advised it is in [facility] but to call [facility] as well as they ordered the test.    Routed to Dr. Beal for any comments on the autonomic testing or plan.    Juany Feliciano, RN  [facility]            Additional Information    Negative: Passed out (i.e., fainted, collapsed and was not responding)    Negative: Shock suspected (e.g., cold/pale/clammy skin, too weak to stand, low BP, rapid pulse)    Negative: Sounds like a life-threatening emergency to the triager    Negative: Chest pain    Pain is mainly in upper abdomen (if needed ask: 'is it mainly above the belly button?')    Negative: Passed out (i.e., fainted, collapsed and was not responding)    Negative: Shock suspected (e.g., cold/pale/clammy skin, too weak to stand, low BP, rapid pulse)    Negative: Visible sweat on face or sweat is dripping down    Negative: Chest pain    Negative: SEVERE abdominal pain (e.g., excruciating)    Negative: Pain lasting > 10 minutes and over 50 years old    Negative: Pain lasting > 10 minutes and over 40 years old and associated chest, arm, neck, upper back, or jaw pain    Negative: Pain lasting > 10 minutes and over 35 years old and at least one cardiac risk factor    Negative: Pain lasting > 10 minutes and history of heart disease (i.e., heart attack, bypass surgery, angina, angioplasty, CHF)    Negative: Recent injury to the abdomen    Negative: Vomiting red blood or black (coffee ground) material    Negative: Bloody, black, or tarry bowel movements    Negative: Pregnant > 24 weeks and hand or face swelling    Negative:  "Constant abdominal pain lasting > 2 hours    Negative: Vomiting bile (green color)    Negative: Patient sounds very sick or weak to the triager    Negative: Vomiting and abdomen looks much more swollen than usual    Negative: White of the eyes have turned yellow (i.e.,  jaundice)    Negative: Fever > 103 F (39.4 C)    Negative: Fever > 101 F (38.3 C) and over 60 years of age    Negative: Fever > 100.0 F (37.8 C) and has diabetes mellitus or a weak immune system (e.g., HIV positive, cancer chemotherapy, organ transplant, splenectomy, chronic steroids)    Negative: Fever > 100.0 F (37.8 C) and bedridden (e.g., nursing home patient, stroke, chronic illness, recovering from surgery)    Negative: Age > 60 years    Negative: Patient wants to be seen    Negative: MILD pain that comes and goes (cramps) lasts > 24 hours    Negative: Alcohol abuse known or suspected    Negative: Abdominal pain is a chronic symptom (recurrent or ongoing AND lasting > 4 weeks)    Negative: Intermittent burning pains radiating into chest or sour taste in mouth    Mild abdominal pain    Answer Assessment - Initial Assessment Questions  1. LOCATION: \"Where does it hurt?\"       Right side, up by ribs  2. RADIATION: \"Does the pain shoot anywhere else?\" (e.g., chest, back)      Does not radiate  3. ONSET: \"When did the pain begin?\" (Minutes, hours or days ago)       About a week ago  4. SUDDEN: \"Gradual or sudden onset?\"      Gradual, was present constantly for a few days, now is off and on  5. PATTERN \"Does the pain come and go, or is it constant?\"     - If constant: \"Is it getting better, staying the same, or worsening?\"       (Note: Constant means the pain never goes away completely; most serious pain is constant and it progresses)      - If intermittent: \"How long does it last?\" \"Do you have pain now?\"      (Note: Intermittent means the pain goes away completely between bouts)      Intermittent and lasts about 30 minutes or, no pain today  6. " "SEVERITY: \"How bad is the pain?\"  (e.g., Scale 1-10; mild, moderate, or severe)     - MILD (1-3): doesn't interfere with normal activities, abdomen soft and not tender to touch      - MODERATE (4-7): interferes with normal activities or awakens from sleep, tender to touch      - SEVERE (8-10): excruciating pain, doubled over, unable to do any normal activities        mild  7. RECURRENT SYMPTOM: \"Have you ever had this type of abdominal pain before?\" If so, ask: \"When was the last time?\" and \"What happened that time?\"       Can't remember  8. CAUSE: \"What do you think is causing the abdominal pain?\"      Slept wrong on that side?  9. RELIEVING/AGGRAVATING FACTORS: \"What makes it better or worse?\" (e.g., movement, antacids, bowel movement)      States he has not noticed  10. OTHER SYMPTOMS: \"Has there been any vomiting, diarrhea, constipation, or urine problems?\"        no    Answer Assessment - Initial Assessment Questions  1. LOCATION: \"Where does it hurt?\"       Upper right abdomen  2. RADIATION: \"Does the pain shoot anywhere else?\" (e.g., chest, back)      no  3. ONSET: \"When did the pain begin?\" (e.g., minutes, hours or days ago)       A week ago  4. SUDDEN: \"Gradual or sudden onset?\"       Gradual, getting better and he cancelled his appt to have this evaluated for today  5. PATTERN \"Does the pain come and go, or is it constant?\"     - If constant: \"Is it getting better, staying the same, or worsening?\"       (Note: Constant means the pain never goes away completely; most serious pain is constant and it progresses)      - If intermittent: \"How long does it last?\" \"Do you have pain now?\"      (Note: Intermittent means the pain goes away completely between bouts)      Intermittent, lasts about 30 minutes or less, none today so cancelled appt he had schedued  6. SEVERITY: \"How bad is the pain?\"  (e.g., Scale 1-10; mild, moderate, or severe)     - MILD (1-3): doesn't interfere with normal activities, abdomen soft " "and not tender to touch      - MODERATE (4-7): interferes with normal activities or awakens from sleep, tender to touch      - SEVERE (8-10): excruciating pain, doubled over, unable to do any normal activities        mild  7. RECURRENT SYMPTOM: \"Have you ever had this type of abdominal pain before?\" If so, ask: \"When was the last time?\" and \"What happened that time?\"       Can't remember having this before  8. AGGRAVATING FACTORS: \"Does anything seem to cause this pain?\" (e.g., foods, stress, alcohol)      Has not noticed  9. CARDIAC SYMPTOMS: \"Do you have any of the following symptoms: chest pain, difficulty breathing, sweating, nausea?\"      none  10. OTHER SYMPTOMS: \"Do you have any other symptoms?\" (e.g., fever, vomiting, diarrhea)        no  11. PREGNANCY: \"Is there any chance you are pregnant?\" \"When was your last menstrual period?\"        n/a    Protocols used: ABDOMINAL PAIN - MALE-A-OH, ABDOMINAL PAIN - UPPER-A-OH      "

## 2019-07-05 NOTE — TELEPHONE ENCOUNTER
Reason for call:  Patient reporting a symptom    Symptom or request: Pain in stomach    Duration (how long have symptoms been present): On and off for a week    Have you been treated for this before? No    Additional comments: Patient would like to talk to a nurse, also wants to know if a test from [facility] has been sent to Dr. Beal    Phone Number patient can be reached at:  Home number on file [phone] (home)    Best Time:  Any time    Can we leave a detailed message on this number:  YES     Thank you!  Robina MADERA  Patient Representative  [facility]        Call taken on 7/5/2019 at 9:00 AM by Robina Lam

## 2019-07-05 NOTE — TELEPHONE ENCOUNTER
I called and discussed in detail with patient.     Went over test results etc.    See me in a few weeks    Be seen promptly if symptoms acutely worsen      Finesse Beal MD

## 2019-07-10 ENCOUNTER — TELEPHONE (OUTPATIENT)
Dept: CARDIOLOGY | Facility: CLINIC | Age: 59
End: 2019-07-10

## 2019-07-10 NOTE — TELEPHONE ENCOUNTER
Call to pt; rec'd voicemail.  Left msg returning pts call.   Request pt tcb or respond to my chart msg just sent.

## 2019-07-10 NOTE — TELEPHONE ENCOUNTER
[facility]    Phone Message    May a detailed message be left on voicemail: yes    Reason for Call: Other: per pt- stated he had questions on a test, and about being dizzy, and taking medication for it ? He wasn't really clear and just would have liked a call back from  thanks     Action Taken: Message routed to:  [facility] cardiology

## 2019-07-11 NOTE — TELEPHONE ENCOUNTER
Spoke to pt, would like autonomic study reviewed by Dr Craig and recommendations made. Pt stated he is feeling fine, symptoms are not better or worse, the same.     Will review scanned documents to attempt to retrieve autonomic study

## 2019-07-11 NOTE — TELEPHONE ENCOUNTER
[facility]    Phone Message    May a detailed message be left on voicemail: yes    Reason for Call: Symptoms or Concerns     If patient has red-flag symptoms, warm transfer to triage line    Current symptom or concern: dizzy  Symptoms have been present for:  Couple of day(s)    Has patient previously been seen for this? Yes    By : I. Can    Date: NA    Are there any new or worsening symptoms? Yes: needing to know what to about the dizziness and needing call back asap       Action Taken: Message routed to:  [facility]

## 2019-07-12 NOTE — TELEPHONE ENCOUNTER
Found scanned autonomic study canned under media:   Date 6/24/19 under EMG.     Will route to Dr Craig for review.

## 2019-07-13 NOTE — TELEPHONE ENCOUNTER
I reviewed the test result. Basically his current condition is due to diabetes induced dizziness when he gets up. I do not recommend any changes now however he should measure his BP at home since we are holding his amlodipine.

## 2019-07-15 ENCOUNTER — TELEPHONE (OUTPATIENT)
Dept: CARDIOLOGY | Facility: CLINIC | Age: 59
End: 2019-07-15

## 2019-07-15 NOTE — TELEPHONE ENCOUNTER
[facility] Call [facility]    Phone Message    May a detailed message be left on voicemail: yes    Reason for Call: Other: pt calling to see if the DrGloCan talked to the neurosurgen, and if so why hasn't any one got back in contact with the pt. Please call th ept back to discuss.     Action Taken: Message routed to:  [facility] cardiology

## 2019-07-15 NOTE — TELEPHONE ENCOUNTER
This was communicated with the patient who will monitor blood pressure and update in 1-2 weeks.  Anahi Segura RN

## 2019-07-16 NOTE — TELEPHONE ENCOUNTER
Spoke to patient who reports that he saw the neurosurgeon and he did not find any reason for the dizziness.  Patient states it has improved.  It is still present but improved.  He is going to check his blood pressure daily and let us know the readings in 1-2 weeks per Dr. Craig's recommendations.  Anahi Segura RN

## 2019-08-07 ENCOUNTER — OFFICE VISIT (OUTPATIENT)
Dept: CARDIOLOGY | Facility: CLINIC | Age: 59
End: 2019-08-07
Payer: COMMERCIAL

## 2019-08-07 VITALS
HEART RATE: 108 BPM | BODY MASS INDEX: 34.29 KG/M2 | OXYGEN SATURATION: 96 % | WEIGHT: 239 LBS | DIASTOLIC BLOOD PRESSURE: 83 MMHG | SYSTOLIC BLOOD PRESSURE: 131 MMHG

## 2019-08-07 DIAGNOSIS — I10 ESSENTIAL HYPERTENSION: ICD-10-CM

## 2019-08-07 DIAGNOSIS — I95.1 ORTHOSTATIC HYPOTENSION: Primary | ICD-10-CM

## 2019-08-07 PROCEDURE — 99214 OFFICE O/P EST MOD 30 MIN: CPT | Performed by: INTERNAL MEDICINE

## 2019-08-07 RX ORDER — AMLODIPINE BESYLATE 2.5 MG/1
2.5 TABLET ORAL DAILY
Qty: 90 TABLET | Refills: 3 | Status: SHIPPED | OUTPATIENT
Start: 2019-08-07 | End: 2020-07-17

## 2019-08-07 NOTE — NURSING NOTE
"Chief Complaint   Patient presents with     RECHECK     OV with Dr. Marilynn Craig for 2 month follow up for orthostatic hypotension. Pt reports feeling fatigued.       Initial BP (!) 142/87 (BP Location: Left arm, Patient Position: Chair, Cuff Size: Adult Large)   Pulse 110   Wt 108.4 kg (239 lb)   SpO2 96%   BMI 34.29 kg/m   Estimated body mass index is 34.29 kg/m  as calculated from the following:    Height as of 7/1/19: 1.778 m (5' 10\").    Weight as of this encounter: 108.4 kg (239 lb)..  BP completed using cuff size: danielito Jonyer MA    "

## 2019-08-07 NOTE — PATIENT INSTRUCTIONS
Thank you for coming to the [facility] @ [facility]; please note the following instructions:    1. *START: Amlodipine 2.5mg in evening daily.    2. In 2-3 weeks check your blood pressures in the am for a week. Call the office or my chart us your readings. Also report if you are having dizziness        If you have any questions regarding your visit please contact your care team:     Cardiology  Telephone Number   Anahi KINGSTON., RN  Gracie RUTH, RN   Camila JACKSON, RMSWAPNIL DAUGHERTY, MICKY NUNES, [facility]   [phone] (option 1)   For scheduling appts:     [phone] (select option 1)       For the Device Clinic (Pacemakers and ICD's)  RN's :  Tasha Jose   During business hours: [phone]    *After business hours:  [phone] (select option 4)      Normal test result notifications will be released via [employer] or mailed within 7 business days.  All other test results, will be communicated via telephone once reviewed by your cardiologist.    If you need a medication refill please contact your pharmacy.  Please allow 3 business days for your refill to be completed.    As always, thank you for trusting us with your health care needs!    Patient Education     Amlodipine tablets  Brand Name: Norvasc  What is this medicine?  AMLODIPINE (am ANDREW hall) is a calcium-channel blocker. It affects the amount of calcium found in your heart and muscle cells. This relaxes your blood vessels, which can reduce the amount of work the heart has to do. This medicine is used to lower high blood pressure. It is also used to prevent chest pain.  How should I use this medicine?  Take this medicine by mouth with a glass of water. Follow the directions on the prescription label. Take your medicine at regular intervals. Do not take more medicine than directed.  Talk to your pediatrician regarding the use of this medicine in children. Special care may be needed. This medicine has been used in children as young as 6.  Persons  over 65 years old may have a stronger reaction to this medicine and need smaller doses.  What side effects may I notice from receiving this medicine?  Side effects that you should report to your doctor or health care professional as soon as possible:    allergic reactions like skin rash, itching or hives, swelling of the face, lips, or tongue    breathing problems    changes in vision or hearing    chest pain    fast, irregular heartbeat    swelling of legs or ankles  Side effects that usually do not require medical attention (report to your doctor or health care professional if they continue or are bothersome):    dry mouth    facial flushing    nausea, vomiting    stomach gas, pain    tired, weak    trouble sleeping  What may interact with this medicine?    herbal or dietary supplements    local or general anesthetics    medicines for high blood pressure    medicines for prostate problems    rifampin    What if I miss a dose?  If you miss a dose, take it as soon as you can. If it is almost time for your next dose, take only that dose. Do not take double or extra doses.  Where should I keep my medicine?  Keep out of the reach of children.  Store at room temperature between 59 and 86 degrees F (15 and 30 degrees C). Protect from light. Keep container tightly closed. Throw away any unused medicine after the expiration date.  What should I tell my health care provider before I take this medicine?  They need to know if you have any of these conditions:    heart problems like heart failure or aortic stenosis    liver disease    an unusual or allergic reaction to amlodipine, other medicines, foods, dyes, or preservatives    pregnant or trying to get pregnant    breast-feeding  What should I watch for while using this medicine?  Visit your doctor or health care professional for regular check ups. Check your blood pressure and pulse rate regularly. Ask your health care professional what your blood pressure and pulse rate  should be, and when you should contact him or her.  This medicine may make you feel confused, dizzy or lightheaded. Do not drive, use machinery, or do anything that needs mental alertness until you know how this medicine affects you. To reduce the risk of dizzy or fainting spells, do not sit or stand up quickly, especially if you are an older patient. Avoid alcoholic drinks; they can make you more dizzy.  Do not suddenly stop taking amlodipine. Ask your doctor or health care professional how you can gradually reduce the dose.  NOTE:This sheet is a summary. It may not cover all possible information. If you have questions about this medicine, talk to your doctor, pharmacist, or health care provider. Copyright  2018 [employer]

## 2019-08-07 NOTE — LETTER
8/7/2019      RE: Gary Iyer  [address] [address]       Dear Colleague,    Thank you for the opportunity to participate in the care of your patient, Gary Iyer, at the [facility] at [facility]. Please see a copy of my visit note below.    Referring provider: Finesse Beal MD    Chief complaint: Lightheadedness and falls    HPI: Mr. Gary Iyer is a 58 year old  male with PMH significant for HTN, DM2 and hyperlipidemia.    The patient is being seen today at request of Dr. Beal regarding recent lightheadedness spells.  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 He feels lightheaded and he has fallen a few times now the worst in April of this year resulting in injury.  He is currently on medical leave due to these events on the school bus. Patient reports more frequent symptoms over the last 8 months 1-2 episodes per week.  Since he is not working and mostly at home now (schools closed) he reports very mild symptoms like dizziness and lightheadedness when he gets up from sitting to standing position or gets up from bending down when he is showering.  He denies loss of consciousness during any of these episodes.  The patient is overall doing well from cardiac standpoint.  He denies chest pain, dyspnea on exertion, palpitations, lower extremity edema or syncope.    Patient was seen at an [facility] neurology clinic in May of this year and underwent MRI of the brain, MRA neck and lab tests including TSH, B12 and folic acid which were all unremarkable.  He recently underwent autonomic testing at [facility] which are not available to see yet.    The patient does not have prior history of cardiac disease. He has hx of diabetes  over the last 2 to 3 years.  The patient has history of hypertension for several years now.  Initially he was on hydrochlorothiazide 25 mg which was increased to 50 mg in 2010.  He was on lisinopril which caused dry cough.  Lisinopril was discontinued in January of this year and he was started on losartan 100 mg and amlodipine 5 mg (losartan and amlodipin both at the same time).      Patient is sedentary.  He has 25-pack-year history of smoking quit date in 2010.  Past medical history significant for hypertension, diabetes type 2 and hyperlipidemia.  Patient's father had sudden cardiac death at the age of 45.    Exercise stress echocardiogram 3/13/2019 showed normal baseline LV size and function with no valvular disease.  Exercise stress test was negative for inducible ischemia.  Medications, personal, family, and social history reviewed with patient and revised.    Interval history 8/7/2019:  The patient returns to recheck on syncope and orthostatic hypotension. I have discontinued amlodipin during my last visit ~2 months ago. Patient denies syncope over the last 2 months. He reports feeling better with regards to dizziness since he stopped amlodipine. However his morning BP appear to be elevated at 140-150 mmHg systolic range (patient brought home BP readings). Evening BP appear within normal range. No other symptoms.    PAST MEDICAL HISTORY:  Past Medical History:   Diagnosis Date     Diabetes (H)     2yr     Hypertension      Multinodular goiter (nontoxic) 6/10/2013     Sleep apnea 8yr       CURRENT MEDICATIONS:  Current Outpatient Medications   Medication Sig Dispense Refill     allopurinol (ZYLOPRIM) 300 MG tablet TAKE 1 TABLET(300 MG) BY MOUTH DAILY 90 tablet 1     aspirin 81 MG tablet Take 1 tablet by mouth daily.       atorvastatin (LIPITOR) 20 MG tablet TAKE 1 TABLET(20 MG) BY MOUTH DAILY 90 tablet 1     cetirizine (ZYRTEC) 10 MG tablet Take 1 tablet (10 mg) by mouth daily 90 tablet 3     COMPRESSION  STOCKINGS 1 each daily 20-30 mmHg pressure, waist high 1 each 1     hydrochlorothiazide (HYDRODIURIL) 50 MG tablet TAKE 1 TABLET(50 MG) BY MOUTH EVERY MORNING 90 tablet 1     hydrocortisone 1 % ointment Apply topically 2 times daily as needed 30 g 1     ketoconazole (NIZORAL) 2 % cream Apply topically 2 times daily for up to 2 weeks as needed for rash 60 g 1     losartan (COZAAR) 100 MG tablet Take 1 tablet (100 mg) by mouth daily 90 tablet 3     metFORMIN (GLUCOPHAGE) 500 MG tablet TAKE 1 TABLET BY MOUTH TWICE DAILY WITH MEALS 120 tablet 3     Multiple Vitamin (DAILY MULTIVITAMIN PO) Take  by mouth daily.       omeprazole (PRILOSEC) 20 MG DR capsule TAKE 1 TABLET BY MOUTH DAILY 90 capsule 2     PARoxetine (PAXIL) 20 MG tablet TAKE 2 TABLETS(40 MG) BY MOUTH DAILY (Patient taking differently: daily TAKE 2 TABLETS(40 MG) BY MOUTH DAILY) 180 tablet 3     psyllium 0.52 G capsule Take 1 capsule (0.52 g) by mouth daily 100 capsule 3     tamsulosin (FLOMAX) 0.4 MG capsule TAKE 1 CAPSULE(0.4 MG) BY MOUTH DAILY 90 capsule 1     triamcinolone (KENALOG) 0.1 % cream Apply topically 2 times daily as needed for irritation 30 g 1     vitamin D2 (ERGOCALCIFEROL) 84813 units (1250 mcg) capsule TAKE 1 CAPSULE BY MOUTH EVERY WEEK 12 capsule 0     blood glucose monitoring (ALON MICROLET) lancets 1 each 2 times daily Use to test blood sugar 2 times daily or as directed. 1 Box 3     blood glucose monitoring (NO BRAND SPECIFIED) meter device kit Use to test blood sugar 2 times daily or as directed.  Accucheck meter please and all associated strips, lancets, and other supplies, 3 month supply with refills for a year. 1 kit 0     order for DME Place rectally every 12 hours 1.8 oz container of 0.2 % nifedipine suppositories.   Apply on the anus every 12 hours.  Just get to where the anus is tight . 1 Container 5     order for DME Autocpap 10-16 cm 1 Units 0       PAST SURGICAL HISTORY:  Past Surgical History:   Procedure Laterality Date      COLONOSCOPY       COLONOSCOPY  12    Repeat Colonoscopy in 10 yrs.      ENT SURGERY      Vocal cord carcinoma     HEAD & NECK SURGERY  11    callous removal from throat     LARYNGOSCOPY WITH BIOPSY(IES)  11     LARYNGOSCOPY WITH BIOPSY(IES) N/A 2017    Procedure: LARYNGOSCOPY WITH BIOPSY(IES);  Surgeon: Mel Fournier MD;  Location: [facility] OR     LARYNGOSCOPY WITH MICROSCOPE N/A 2017    Procedure: LARYNGOSCOPY WITH MICROSCOPE;  Surgeon: Mel Fournier MD;  Location: [facility] OR     LASER CO2 LARYNGOSCOPY, COMPLEX  2013    Procedure: LASER CO2 LARYNGOSCOPY, COMPLEX;  Micro Direct Laryngoscopy With Micro Flap Excision Of Lesion Lumenis Co2 Laser ;  Surgeon: Mel Fournier MD;  Location: [facility] OR     LASER CO2 LESION ORAL N/A 2017    Procedure: LASER CO2 LESION ORAL;  Surgeon: Mel Fournier MD;  Location: [facility] OR     ORTHOPEDIC SURGERY  2016    left tibia orif with abbi      VASECTOMY         ALLERGIES:   No Known Allergies    FAMILY HISTORY:  Family History   Problem Relation Age of Onset     C.A.D. Father          MI age 44     Hypertension Mother      Diabetes No family hx of      Cerebrovascular Disease No family hx of      Cancer No family hx of      Glaucoma No family hx of      Macular Degeneration No family hx of          SOCIAL HISTORY:  Social History     Tobacco Use     Smoking status: Former Smoker     Packs/day: 1.00     Years: 25.00     Pack years: 25.00     Last attempt to quit: 2000     Years since quittin.9     Smokeless tobacco: Never Used   Substance Use Topics     Alcohol use: Yes     Alcohol/week: 0.0 oz     Comment: rare     Drug use: No         Exam:  BP (!) 142/87 (BP Location: Left arm, Patient Position: Chair, Cuff Size: Adult Large)   Pulse 110   Wt 108.4 kg (239 lb)   SpO2 96%   BMI 34.29 kg/m     GENERAL APPEARANCE: alert and no distress  HEENT: no icterus, no central cyanosis  CARDIOVASCULAR:  regular rhythm, normal S1, S2, no S3 or S4 and no murmur, click or rub, precordium quiet with normal PMI.  GI: soft, non tender  EXTREMITIES: no edema    I have reviewed the labs and personally reviewed the imaging below and made my comment in the assessment and plan.    Labs:  CBC RESULTS:   Lab Results   Component Value Date    WBC 9.0 03/22/2019    RBC 4.20 (L) 03/22/2019    HGB 12.7 (L) 03/22/2019    HCT 38.3 (L) 03/22/2019    MCV 91 03/22/2019    MCH 30.2 03/22/2019    MCHC 33.2 03/22/2019    RDW 14.0 03/22/2019     03/22/2019       BMP RESULTS:  Lab Results   Component Value Date     11/06/2018    POTASSIUM 3.7 11/06/2018    CHLORIDE 97 11/06/2018    CO2 28 11/06/2018    ANIONGAP 10 11/06/2018     (H) 11/06/2018    BUN 12 11/06/2018    CR 0.70 11/06/2018    GFRESTIMATED >90 11/06/2018    GFRESTBLACK >90 11/06/2018    TRUMAN 9.3 11/06/2018        INR RESULTS:  Lab Results   Component Value Date    INR 1.00 07/25/2010       Exercise Stress Echocardiogram 3/13/2019  This was a normal stress echocardiogram with no evidence of stress-induced  ischemia. Normal resting LV function with EF of approximately 60-65%; normal  response to exercise with increase to approximately 70-75%. No stress induced  regional wall motion abnormalities. No ECG evidence of ischemia. No  significant valvular disease noted on routine screening color flow Doppler and  pulsed Doppler examination. Reduced functional capacity.  No resting wallmotion abnormalities.  Limiting Symptom (s) : fatigue  The patient did not exhibit any symptoms during exercise.  Normal blood pressure response with stress.    Assessment and Plan:   Mr. Gary Iyer is a 58 year old  male with PMH significant for HTN, DM2 and hyperlipidemia.    Lightheadedness, falls: The patient reports severe lightheadedness when he changes position from prolonged sitting to standing sometimes ending up in falls without loss of consciousness.  He is currently on  medical leave since April of this year.  He works as an aid for the .  I am suspecting his symptoms are due to orthostatic hypotension (likely due to diabetic autonomic neuropathy). Recommended to hold amlodipine during my last visit. He feels better since holding medication however morning BP is high. I recommended to re-start amlodipine at a low dose 2.5 mg daily (he was on 5 mg daily before) and monitor BP at home. I recommended wearing waist high compression stockings mainly on the days that he works on the [employer], drinking ~2 lt water , raising slowly from sitting position and always holding on to sthg until he feels steady to start walking. 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 Recommended to continue all other current medications.    Return to clinic as needed.    A total of 30 minutes spent face-toface with greater than 50% of the time spent in counseling and coordinating cares of the issues above.     Please donot hesitate to contact me if you have any questions or concerns. Again, thank you for allowing me to participate in the care of your patient.    Marilynn MARS MD  [facility] Division of Cardiology  Pager [phone]

## 2019-08-07 NOTE — PROGRESS NOTES
Referring provider: Finesse Beal MD    Chief complaint: Lightheadedness and falls    HPI: Mr. Gary Iyer is a 58 year old  male with PMH significant for HTN, DM2 and hyperlipidemia.    The patient is being seen today at request of Dr. Beal regarding recent lightheadedness spells.  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 He feels lightheaded and he has fallen a few times now the worst in April of this year resulting in injury.  He is currently on medical leave due to these events on the school bus. Patient reports more frequent symptoms over the last 8 months 1-2 episodes per week.  Since he is not working and mostly at home now (schools closed) he reports very mild symptoms like dizziness and lightheadedness when he gets up from sitting to standing position or gets up from bending down when he is showering.  He denies loss of consciousness during any of these episodes.  The patient is overall doing well from cardiac standpoint.  He denies chest pain, dyspnea on exertion, palpitations, lower extremity edema or syncope.    Patient was seen at an [facility] neurology clinic in May of this year and underwent MRI of the brain, MRA neck and lab tests including TSH, B12 and folic acid which were all unremarkable.  He recently underwent autonomic testing at [facility] which are not available to see yet.    The patient does not have prior history of cardiac disease. He has hx of diabetes over the last 2 to 3 years.  The patient has history of hypertension for several years now.  Initially he was on hydrochlorothiazide 25 mg which was increased to 50 mg in 2010.  He was on lisinopril which caused dry cough.  Lisinopril was discontinued in January of this year and he was started on losartan 100 mg and amlodipine 5 mg (losartan and  amlodipin both at the same time).      Patient is sedentary.  He has 25-pack-year history of smoking quit date in 2010.  Past medical history significant for hypertension, diabetes type 2 and hyperlipidemia.  Patient's father had sudden cardiac death at the age of 45.    Exercise stress echocardiogram 3/13/2019 showed normal baseline LV size and function with no valvular disease.  Exercise stress test was negative for inducible ischemia.  Medications, personal, family, and social history reviewed with patient and revised.    Interval history 8/7/2019:  The patient returns to recheck on syncope and orthostatic hypotension. I have discontinued amlodipin during my last visit ~2 months ago. Patient denies syncope over the last 2 months. He reports feeling better with regards to dizziness since he stopped amlodipine. However his morning BP appear to be elevated at 140-150 mmHg systolic range (patient brought home BP readings). Evening BP appear within normal range. No other symptoms.    PAST MEDICAL HISTORY:  Past Medical History:   Diagnosis Date     Diabetes (H)     2yr     Hypertension      Multinodular goiter (nontoxic) 6/10/2013     Sleep apnea 8yr       CURRENT MEDICATIONS:  Current Outpatient Medications   Medication Sig Dispense Refill     allopurinol (ZYLOPRIM) 300 MG tablet TAKE 1 TABLET(300 MG) BY MOUTH DAILY 90 tablet 1     aspirin 81 MG tablet Take 1 tablet by mouth daily.       atorvastatin (LIPITOR) 20 MG tablet TAKE 1 TABLET(20 MG) BY MOUTH DAILY 90 tablet 1     cetirizine (ZYRTEC) 10 MG tablet Take 1 tablet (10 mg) by mouth daily 90 tablet 3     COMPRESSION STOCKINGS 1 each daily 20-30 mmHg pressure, waist high 1 each 1     hydrochlorothiazide (HYDRODIURIL) 50 MG tablet TAKE 1 TABLET(50 MG) BY MOUTH EVERY MORNING 90 tablet 1     hydrocortisone 1 % ointment Apply topically 2 times daily as needed 30 g 1     ketoconazole (NIZORAL) 2 % cream Apply topically 2 times daily for up to 2 weeks as needed for rash  60 g 1     losartan (COZAAR) 100 MG tablet Take 1 tablet (100 mg) by mouth daily 90 tablet 3     metFORMIN (GLUCOPHAGE) 500 MG tablet TAKE 1 TABLET BY MOUTH TWICE DAILY WITH MEALS 120 tablet 3     Multiple Vitamin (DAILY MULTIVITAMIN PO) Take  by mouth daily.       omeprazole (PRILOSEC) 20 MG DR capsule TAKE 1 TABLET BY MOUTH DAILY 90 capsule 2     PARoxetine (PAXIL) 20 MG tablet TAKE 2 TABLETS(40 MG) BY MOUTH DAILY (Patient taking differently: daily TAKE 2 TABLETS(40 MG) BY MOUTH DAILY) 180 tablet 3     psyllium 0.52 G capsule Take 1 capsule (0.52 g) by mouth daily 100 capsule 3     tamsulosin (FLOMAX) 0.4 MG capsule TAKE 1 CAPSULE(0.4 MG) BY MOUTH DAILY 90 capsule 1     triamcinolone (KENALOG) 0.1 % cream Apply topically 2 times daily as needed for irritation 30 g 1     vitamin D2 (ERGOCALCIFEROL) 74650 units (1250 mcg) capsule TAKE 1 CAPSULE BY MOUTH EVERY WEEK 12 capsule 0     blood glucose monitoring (ALON MICROLET) lancets 1 each 2 times daily Use to test blood sugar 2 times daily or as directed. 1 Box 3     blood glucose monitoring (NO BRAND SPECIFIED) meter device kit Use to test blood sugar 2 times daily or as directed.  Accucheck meter please and all associated strips, lancets, and other supplies, 3 month supply with refills for a year. 1 kit 0     order for DME Place rectally every 12 hours 1.8 oz container of 0.2 % nifedipine suppositories.   Apply on the anus every 12 hours.  Just get to where the anus is tight . 1 Container 5     order for DME Autocpap 10-16 cm 1 Units 0       PAST SURGICAL HISTORY:  Past Surgical History:   Procedure Laterality Date     COLONOSCOPY       COLONOSCOPY  6-22-12    Repeat Colonoscopy in 10 yrs.      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     HEAD & NECK SURGERY  1/25/11    callous removal from throat     LARYNGOSCOPY WITH BIOPSY(IES)  1-25-11     LARYNGOSCOPY WITH BIOPSY(IES) N/A 2/9/2017    Procedure: LARYNGOSCOPY WITH BIOPSY(IES);  Surgeon: Mel Fournier,  MD;  Location: [facility] OR     LARYNGOSCOPY WITH MICROSCOPE N/A 2017    Procedure: LARYNGOSCOPY WITH MICROSCOPE;  Surgeon: Mel Fournier MD;  Location: [facility] OR     LASER CO2 LARYNGOSCOPY, COMPLEX  2013    Procedure: LASER CO2 LARYNGOSCOPY, COMPLEX;  Micro Direct Laryngoscopy With Micro Flap Excision Of Lesion Lumenis Co2 Laser ;  Surgeon: Mel Fournier MD;  Location: [facility] OR     LASER CO2 LESION ORAL N/A 2017    Procedure: LASER CO2 LESION ORAL;  Surgeon: Mel Fournier MD;  Location: [facility] OR     ORTHOPEDIC SURGERY  2016    left tibia orif with abbi -     VASECTOMY         ALLERGIES:   No Known Allergies    FAMILY HISTORY:  Family History   Problem Relation Age of Onset     C.A.D. Father          MI age 44     Hypertension Mother      Diabetes No family hx of      Cerebrovascular Disease No family hx of      Cancer No family hx of      Glaucoma No family hx of      Macular Degeneration No family hx of          SOCIAL HISTORY:  Social History     Tobacco Use     Smoking status: Former Smoker     Packs/day: 1.00     Years: 25.00     Pack years: 25.00     Last attempt to quit: 2000     Years since quittin.9     Smokeless tobacco: Never Used   Substance Use Topics     Alcohol use: Yes     Alcohol/week: 0.0 oz     Comment: rare     Drug use: No         Exam:  BP (!) 142/87 (BP Location: Left arm, Patient Position: Chair, Cuff Size: Adult Large)   Pulse 110   Wt 108.4 kg (239 lb)   SpO2 96%   BMI 34.29 kg/m    GENERAL APPEARANCE: alert and no distress  HEENT: no icterus, no central cyanosis  CARDIOVASCULAR: regular rhythm, normal S1, S2, no S3 or S4 and no murmur, click or rub, precordium quiet with normal PMI.  GI: soft, non tender  EXTREMITIES: no edema    I have reviewed the labs and personally reviewed the imaging below and made my comment in the assessment and plan.    Labs:  CBC RESULTS:   Lab Results   Component Value Date    WBC 9.0 2019     RBC 4.20 (L) 03/22/2019    HGB 12.7 (L) 03/22/2019    HCT 38.3 (L) 03/22/2019    MCV 91 03/22/2019    MCH 30.2 03/22/2019    MCHC 33.2 03/22/2019    RDW 14.0 03/22/2019     03/22/2019       BMP RESULTS:  Lab Results   Component Value Date     11/06/2018    POTASSIUM 3.7 11/06/2018    CHLORIDE 97 11/06/2018    CO2 28 11/06/2018    ANIONGAP 10 11/06/2018     (H) 11/06/2018    BUN 12 11/06/2018    CR 0.70 11/06/2018    GFRESTIMATED >90 11/06/2018    GFRESTBLACK >90 11/06/2018    TRUMAN 9.3 11/06/2018        INR RESULTS:  Lab Results   Component Value Date    INR 1.00 07/25/2010       Exercise Stress Echocardiogram 3/13/2019  This was a normal stress echocardiogram with no evidence of stress-induced  ischemia. Normal resting LV function with EF of approximately 60-65%; normal  response to exercise with increase to approximately 70-75%. No stress induced  regional wall motion abnormalities. No ECG evidence of ischemia. No  significant valvular disease noted on routine screening color flow Doppler and  pulsed Doppler examination. Reduced functional capacity.  No resting wallmotion abnormalities.  Limiting Symptom (s) : fatigue  The patient did not exhibit any symptoms during exercise.  Normal blood pressure response with stress.    Assessment and Plan:   Mr. Gary Iyer is a 58 year old  male with PMH significant for HTN, DM2 and hyperlipidemia.    Lightheadedness, falls: The patient reports severe lightheadedness when he changes position from prolonged sitting to standing sometimes ending up in falls without loss of consciousness.  He is currently on medical leave since April of this year.  He works as an aid for the .  I am suspecting his symptoms are due to orthostatic hypotension (likely due to diabetic autonomic neuropathy). Recommended to hold amlodipine during my last visit. He feels better since holding medication however morning BP is high. I recommended to re-start amlodipine  at a low dose 2.5 mg daily (he was on 5 mg daily before) and monitor BP at home. I recommended wearing waist high compression stockings mainly on the days that he works on the schoolbus, drinking ~2 lt water , raising slowly from sitting position and always holding on to sthg until he feels steady to start walking. 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 Recommended to continue all other current medications.    Return to clinic as needed.    A total of 30 minutes spent face-toface with greater than 50% of the time spent in counseling and coordinating cares of the issues above.     Please donot hesitate to contact me if you have any questions or concerns. Again, thank you for allowing me to participate in the care of your patient.    Marilynn MARS MD  [facility] Division of Cardiology  Pager [phone]

## 2019-08-09 ENCOUNTER — OFFICE VISIT (OUTPATIENT)
Dept: FAMILY MEDICINE | Facility: CLINIC | Age: 59
End: 2019-08-09
Payer: COMMERCIAL

## 2019-08-09 VITALS
DIASTOLIC BLOOD PRESSURE: 78 MMHG | SYSTOLIC BLOOD PRESSURE: 137 MMHG | WEIGHT: 238.13 LBS | TEMPERATURE: 97.7 F | BODY MASS INDEX: 34.17 KG/M2 | HEART RATE: 103 BPM

## 2019-08-09 DIAGNOSIS — E66.9 OBESITY, UNSPECIFIED CLASSIFICATION, UNSPECIFIED OBESITY TYPE, UNSPECIFIED WHETHER SERIOUS COMORBIDITY PRESENT: ICD-10-CM

## 2019-08-09 DIAGNOSIS — L71.9 ROSACEA: Primary | ICD-10-CM

## 2019-08-09 DIAGNOSIS — E78.5 HYPERLIPIDEMIA LDL GOAL <70: ICD-10-CM

## 2019-08-09 DIAGNOSIS — E11.9 TYPE 2 DIABETES MELLITUS WITHOUT COMPLICATION, WITHOUT LONG-TERM CURRENT USE OF INSULIN (H): ICD-10-CM

## 2019-08-09 DIAGNOSIS — I10 HYPERTENSION GOAL BP (BLOOD PRESSURE) < 140/90: ICD-10-CM

## 2019-08-09 PROCEDURE — 99214 OFFICE O/P EST MOD 30 MIN: CPT | Performed by: FAMILY MEDICINE

## 2019-08-09 RX ORDER — HYDROCHLOROTHIAZIDE 50 MG/1
50 TABLET ORAL DAILY
Qty: 90 TABLET | Refills: 1 | Status: SHIPPED | OUTPATIENT
Start: 2019-08-09 | End: 2020-02-20

## 2019-08-09 RX ORDER — ATORVASTATIN CALCIUM 20 MG/1
20 TABLET, FILM COATED ORAL DAILY
Qty: 90 TABLET | Refills: 1 | Status: SHIPPED | OUTPATIENT
Start: 2019-08-09 | End: 2020-02-06

## 2019-08-09 ASSESSMENT — PAIN SCALES - GENERAL: PAINLEVEL: NO PAIN (0)

## 2019-08-09 ASSESSMENT — ANXIETY QUESTIONNAIRES

## 2019-08-09 ASSESSMENT — PATIENT HEALTH QUESTIONNAIRE - PHQ9
SUM OF ALL RESPONSES TO PHQ QUESTIONS 1-9: 3
5. POOR APPETITE OR OVEREATING: NOT AT ALL

## 2019-08-09 NOTE — LETTER
[address] [id number] [phone]              2019      To whom it may concern:    Gary Iyer (  [birth year] ) is okay to return to work, normal duties.              Sincerely,                      Finesse Beal MD

## 2019-08-09 NOTE — PATIENT INSTRUCTIONS
Compression stockings during day, not at night    Stay well hydrated    Increase walking    Stay on same medications except stop the hydrocortisone cream on face    Use the new metronidazole cream on face instead    See us in 2-3 months, sooner if needed

## 2019-08-09 NOTE — PROGRESS NOTES
Subjective     Gary Iyer is a 59 year old male who presents to clinic today for the following health issues:    HPI   recheck  none            Reviewed and updated as needed this visit by Provider         Review of Systems   ROS COMP:    One day bad cramp in legs, but went away after bananas    Most days walking 20-45 minutes, feels good when doing this    No gout flares    No chest pain/ breathing problems    Seen by cardiology two days ago.    Now on lower 2.5 mg dose amlodipine    Blood pressure was 150s when not on amlodipine    Staying well hydrated    Patient to just follow up as needed with cardiology              Objective    BP (!) 141/84 (BP Location: Right arm, Patient Position: Chair, Cuff Size: Adult Regular)   Pulse 103   Temp 97.7  F (36.5  C) (Oral)   Wt 108 kg (238 lb 2 oz)   BMI 34.17 kg/m    Body mass index is 34.17 kg/m .  Physical Exam   Constitutional: He is oriented to person, place, and time. He appears well-developed and well-nourished.   HENT:   Head: Normocephalic and atraumatic.   Eyes: Conjunctivae are normal.   Neck: Carotid bruit is not present.   Cardiovascular: Normal rate, regular rhythm, normal heart sounds and intact distal pulses.   Pulmonary/Chest: Effort normal and breath sounds normal. No respiratory distress.   Musculoskeletal: He exhibits no edema.   Neurological: He is alert and oriented to person, place, and time. No cranial nerve deficit.   Psychiatric: He has a normal mood and affect. His speech is normal and behavior is normal.       mild to moderate rosacea on face        No chest wall/ abd tenderness      Of note patient taking one iron pill daily       ASSESSMENT / PLAN:  (L71.9) Rosacea  (primary encounter diagnosis)  Comment: patient to stop the hydrocortisone cream  Plan: metroNIDAZOLE (METROCREAM) 0.75 % external         cream        Trial of this instead. Follow up prn symptoms.     (I10) Hypertension goal BP (blood pressure) < 140/90  Comment: barely at  goal   Plan: hydrochlorothiazide (HYDRODIURIL) 50 MG tablet        No change in meds     (E78.5) Hyperlipidemia LDL goal <70  Comment: needs refill   Plan: atorvastatin (LIPITOR) 20 MG tablet            (E11.9) Type 2 diabetes mellitus without complication, without long-term current use of insulin (H)  Comment: hemoglobin a1c earlier this year okay  Plan: no change    (E66.9) Obesity, unspecified classification, unspecified obesity type, unspecified whether serious comorbidity present  Comment: ongoing  Plan: keep working on healthy diet/exercise and wt loss        I reviewed the patient's medications, allergies, medical history, family history, and social history.    Finesse Beal MD

## 2019-08-10 ASSESSMENT — ANXIETY QUESTIONNAIRES: GAD7 TOTAL SCORE: 0

## 2019-08-14 PROBLEM — M25.561 ACUTE PAIN OF RIGHT KNEE: Status: RESOLVED | Noted: 2019-04-19 | Resolved: 2019-08-14

## 2019-08-14 PROBLEM — M25.512 LEFT SHOULDER PAIN, UNSPECIFIED CHRONICITY: Status: RESOLVED | Noted: 2018-08-24 | Resolved: 2019-08-14

## 2019-08-14 NOTE — PROGRESS NOTES
Patient is discharged from PT.  Please refer to SOAP note dated 5/9/19 for last known functional status as well as final objective/subjective values.

## 2019-08-22 ENCOUNTER — TRANSFERRED RECORDS (OUTPATIENT)
Dept: HEALTH INFORMATION MANAGEMENT | Facility: CLINIC | Age: 59
End: 2019-08-22

## 2019-08-29 ENCOUNTER — TELEPHONE (OUTPATIENT)
Dept: CARDIOLOGY | Facility: CLINIC | Age: 59
End: 2019-08-29

## 2019-08-29 NOTE — TELEPHONE ENCOUNTER
[facility]    Phone Message    May a detailed message be left on voicemail: yes    Reason for Call: Other: Gary calling to request a call back. He states he lost Rx for COMPRESSION STOCKINGS and would like to know if he can go pick it up to the clinic. Please call him back to discuss.      Action Taken: Message routed to:  [facility] cardio

## 2019-08-30 ENCOUNTER — TELEPHONE (OUTPATIENT)
Dept: FAMILY MEDICINE | Facility: CLINIC | Age: 59
End: 2019-08-30

## 2019-08-30 ENCOUNTER — TELEPHONE (OUTPATIENT)
Dept: CARDIOLOGY | Facility: CLINIC | Age: 59
End: 2019-08-30

## 2019-08-30 DIAGNOSIS — D64.9 ANEMIA, UNSPECIFIED TYPE: ICD-10-CM

## 2019-08-30 DIAGNOSIS — E11.9 TYPE 2 DIABETES MELLITUS WITHOUT COMPLICATION, WITHOUT LONG-TERM CURRENT USE OF INSULIN (H): Primary | ICD-10-CM

## 2019-08-30 DIAGNOSIS — E11.9 TYPE 2 DIABETES MELLITUS WITHOUT COMPLICATION, WITHOUT LONG-TERM CURRENT USE OF INSULIN (H): ICD-10-CM

## 2019-08-30 LAB
HBA1C MFR BLD: 7.1 % (ref 0–5.6)
HGB BLD-MCNC: 13.5 G/DL (ref 13.3–17.7)

## 2019-08-30 PROCEDURE — 85018 HEMOGLOBIN: CPT | Performed by: FAMILY MEDICINE

## 2019-08-30 PROCEDURE — 83036 HEMOGLOBIN GLYCOSYLATED A1C: CPT | Performed by: FAMILY MEDICINE

## 2019-08-30 PROCEDURE — 36415 COLL VENOUS BLD VENIPUNCTURE: CPT | Performed by: FAMILY MEDICINE

## 2019-08-30 NOTE — TELEPHONE ENCOUNTER
Attempt # 1  Called patient at home number.[phone]   Was call answered?  A1C - states BS 180s in am, one day 236.   Would like a test to see what they have been averaging.  to 149 / 90s. States cardiologist wants them in this range due orthostatic BPs    Routing to PCP to enter A1C order.      Diana Klein RN  [facility]

## 2019-08-30 NOTE — TELEPHONE ENCOUNTER
[facility] Call Center    Phone Message    May a detailed message be left on voicemail: yes    Reason for Call: Medication Question or concern regarding medication   Prescription Clarification  Name of Medication: amLODIPine (NORVASC) 2.5 MG tablet     Prescribing Provider: Dr. Craig   Pharmacy: [facility]   What on the order needs clarification? Mr. Iyer would like a call back to verify his current dosage of Amlodipine.  He states that Dr. Craig made a recent change, and now he was not sure on what his current dose should be.          Action Taken: Message routed to:  [facility] ([facility] fk heart

## 2019-08-30 NOTE — TELEPHONE ENCOUNTER
Reviewed Dr. Craig's recent office note. Amlodipine was reduced to 2.5 mg daily. Patient was to record BP at home. Unable to reach patient. I left message on VM

## 2019-08-30 NOTE — TELEPHONE ENCOUNTER
Reason for Call: Request for an order or referral:    Order or referral being requested:  Order     Date needed: as soon as possible    Has the patient been seen by the PCP for this problem? YES    Additional comments:  Patient would like ASAP    Phone number Patient can be reached at:  Home number on file [phone] (home)    Best Time:   Anytime     Can we leave a detailed message on this number?  YES    Call taken on 8/30/2019 at 8:57 AM by Krysten Guaman

## 2019-09-01 NOTE — RESULT ENCOUNTER NOTE
The diabetes test ( hemoglobin a1c ) is a bit higher than last time, but still acceptable.  Stay on same medications and keep working on healthy diet/exercise.    The red blood count ( hemoglobin ) is back to normal range.    Finesse Beal MD

## 2019-09-04 ENCOUNTER — TELEPHONE (OUTPATIENT)
Dept: FAMILY MEDICINE | Facility: CLINIC | Age: 59
End: 2019-09-04

## 2019-09-04 DIAGNOSIS — E11.9 TYPE 2 DIABETES MELLITUS WITHOUT COMPLICATION, WITHOUT LONG-TERM CURRENT USE OF INSULIN (H): ICD-10-CM

## 2019-09-04 NOTE — TELEPHONE ENCOUNTER
Reason for Call:  Medication or medication refill:    Do you use a [facility]?  Name of the pharmacy and phone number for the current request:  [facility] [phone]    Name of the medication requested: blood glucose monitoring (NO BRAND SPECIFIED) meter device kit/needs all his supplies for diabetes    Other request:  Any questions please call patient    Can we leave a detailed message on this number? YES    Phone number patient can be reached at: Home number on file [phone] (home)    Best Time:  Anytime     Call taken on 9/4/2019 at 9:57 AM by Krysten Guaman

## 2019-09-04 NOTE — TELEPHONE ENCOUNTER
Glucose Monitoring was faxed to [phone]    [facility]  Team 3 Coordinator

## 2019-09-04 NOTE — TELEPHONE ENCOUNTER
Cecilia called and stated Dr. Beal forgot to send over the prescription for the meter. She stated patient needs the Accucheck Guide meter. Please send to pharmacy as soon as possible because patient is at the pharmacy now.    Thank you  Kassie Antonio  Patient Representative

## 2019-09-04 NOTE — TELEPHONE ENCOUNTER
Routed to provider. Please see message below. No brand specified cued with pharmacy.Thank you.     Haley Gray RN

## 2019-09-05 ENCOUNTER — TELEPHONE (OUTPATIENT)
Dept: FAMILY MEDICINE | Facility: CLINIC | Age: 59
End: 2019-09-05
Payer: COMMERCIAL

## 2019-09-05 NOTE — TELEPHONE ENCOUNTER
Reason for Call:  Form, our goal is to have forms completed with 72 hours, however, some forms may require a visit or additional information.    Type of letter, form or note:  medical    Who is the form from?: Patient    Where did the form come from: Patient or family brought in       What clinic location was the form placed at?: [facility])    Where the form was placed: Dr. Milton Box/Folder    What number is listed as a contact on the form?: [phone]       Additional comments: please call patient to  completed form     Call taken on 9/5/2019 at 10:54 AM by Pushpa Jhaveri

## 2019-09-17 DIAGNOSIS — I95.1 ORTHOSTATIC HYPOTENSION: ICD-10-CM

## 2019-10-02 ENCOUNTER — OFFICE VISIT (OUTPATIENT)
Dept: FAMILY MEDICINE | Facility: CLINIC | Age: 59
End: 2019-10-02
Payer: COMMERCIAL

## 2019-10-02 VITALS
WEIGHT: 244.5 LBS | DIASTOLIC BLOOD PRESSURE: 85 MMHG | SYSTOLIC BLOOD PRESSURE: 139 MMHG | TEMPERATURE: 97.6 F | BODY MASS INDEX: 35.08 KG/M2 | HEART RATE: 102 BPM

## 2019-10-02 DIAGNOSIS — R53.83 FATIGUE, UNSPECIFIED TYPE: ICD-10-CM

## 2019-10-02 DIAGNOSIS — E11.9 TYPE 2 DIABETES MELLITUS WITHOUT COMPLICATION, WITHOUT LONG-TERM CURRENT USE OF INSULIN (H): ICD-10-CM

## 2019-10-02 DIAGNOSIS — E78.5 HYPERLIPIDEMIA LDL GOAL <70: Chronic | ICD-10-CM

## 2019-10-02 DIAGNOSIS — R42 DIZZINESS: Primary | ICD-10-CM

## 2019-10-02 DIAGNOSIS — M25.512 LEFT SHOULDER PAIN, UNSPECIFIED CHRONICITY: ICD-10-CM

## 2019-10-02 DIAGNOSIS — I95.1 ORTHOSTATIC HYPOTENSION: ICD-10-CM

## 2019-10-02 DIAGNOSIS — M54.9 BACK PAIN, UNSPECIFIED BACK LOCATION, UNSPECIFIED BACK PAIN LATERALITY, UNSPECIFIED CHRONICITY: ICD-10-CM

## 2019-10-02 DIAGNOSIS — Z91.89 PNEUMOCOCCAL VACCINATION INDICATED: ICD-10-CM

## 2019-10-02 LAB
BASOPHILS # BLD AUTO: 0 10E9/L (ref 0–0.2)
BASOPHILS NFR BLD AUTO: 0.4 %
DIFFERENTIAL METHOD BLD: ABNORMAL
EOSINOPHIL # BLD AUTO: 0.2 10E9/L (ref 0–0.7)
EOSINOPHIL NFR BLD AUTO: 2.7 %
ERYTHROCYTE [DISTWIDTH] IN BLOOD BY AUTOMATED COUNT: 14.1 % (ref 10–15)
HCT VFR BLD AUTO: 39.6 % (ref 40–53)
HGB BLD-MCNC: 13.2 G/DL (ref 13.3–17.7)
LYMPHOCYTES # BLD AUTO: 2.6 10E9/L (ref 0.8–5.3)
LYMPHOCYTES NFR BLD AUTO: 31.1 %
MCH RBC QN AUTO: 30.1 PG (ref 26.5–33)
MCHC RBC AUTO-ENTMCNC: 33.3 G/DL (ref 31.5–36.5)
MCV RBC AUTO: 90 FL (ref 78–100)
MONOCYTES # BLD AUTO: 1.2 10E9/L (ref 0–1.3)
MONOCYTES NFR BLD AUTO: 14.6 %
NEUTROPHILS # BLD AUTO: 4.2 10E9/L (ref 1.6–8.3)
NEUTROPHILS NFR BLD AUTO: 51.2 %
PLATELET # BLD AUTO: 278 10E9/L (ref 150–450)
RBC # BLD AUTO: 4.39 10E12/L (ref 4.4–5.9)
WBC # BLD AUTO: 8.2 10E9/L (ref 4–11)

## 2019-10-02 PROCEDURE — 99214 OFFICE O/P EST MOD 30 MIN: CPT | Mod: 25 | Performed by: FAMILY MEDICINE

## 2019-10-02 PROCEDURE — 85025 COMPLETE CBC W/AUTO DIFF WBC: CPT | Performed by: FAMILY MEDICINE

## 2019-10-02 PROCEDURE — 84443 ASSAY THYROID STIM HORMONE: CPT | Performed by: FAMILY MEDICINE

## 2019-10-02 PROCEDURE — 80053 COMPREHEN METABOLIC PANEL: CPT | Performed by: FAMILY MEDICINE

## 2019-10-02 PROCEDURE — 90732 PPSV23 VACC 2 YRS+ SUBQ/IM: CPT | Performed by: FAMILY MEDICINE

## 2019-10-02 PROCEDURE — 90471 IMMUNIZATION ADMIN: CPT | Performed by: FAMILY MEDICINE

## 2019-10-02 PROCEDURE — 80061 LIPID PANEL: CPT | Performed by: FAMILY MEDICINE

## 2019-10-02 PROCEDURE — 36415 COLL VENOUS BLD VENIPUNCTURE: CPT | Performed by: FAMILY MEDICINE

## 2019-10-02 ASSESSMENT — PAIN SCALES - GENERAL: PAINLEVEL: NO PAIN (0)

## 2019-10-02 NOTE — PROGRESS NOTES
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  Physical Exam  Constitutional:       Appearance: He is well-developed.   HENT:      Head: Normocephalic and atraumatic.   Eyes:      Conjunctiva/sclera: Conjunctivae normal.   Neck:      Vascular: No carotid bruit.   Cardiovascular:      Rate and Rhythm: Normal rate and regular rhythm.      Heart sounds: Normal heart sounds.   Pulmonary:      Effort: Pulmonary effort is normal. No respiratory distress.      Breath sounds: Normal breath sounds.   Neurological:      Mental Status: He is alert and oriented to person, place, and time.      Cranial Nerves: No cranial nerve deficit.   Psychiatric:         Speech: Speech normal.         Behavior: Behavior normal.         finger nose and romberg testing okay    A bit unsteady on standing on one leg and heel toe walking    Range of motion of back fairly limited    Left shoulder range of motion fair; has some pain with this     Diagnostic Test Results:  Labs reviewed in Epic             ASSESSMENT / PLAN:  (R42) Dizziness   (primary encounter diagnosis)  Comment: patient has had neurology and cardiology evals done.  See in chart.  Apparently he is having a functional evaluation done at [facility] Physical therapy next week.   Plan: [facility], [facility] CHIR[facility]RACTIC REFERRAL        I do think working with physical therapy ( he can schedule with our physical therapy if desired ) and also doing more walking/ exercise/ possible light weight lifting is appropriate.  Goal is to become stronger/ more independent as well as deal effectively with the dizziness/ orthostasis.     (I95.1) Orthostatic hypotension  Comment: as above   Plan: [facility] HAND, AND CHIROPRACTIC REFERRAL        Reviewed the cardiology note with him.     (Z91.89) Pneumococcal vaccination indicated  Comment: needs   Plan: VACCINE ADMINISTRATION, INITIAL, PNEUMOCOCCAL         VACCINE,ADULT,SQ OR IM, SCREENING QUESTIONS FOR        ADULT IMMUNIZATIONS        Given     (E11.9) Type 2 diabetes mellitus without complication, without long-term current use of insulin (H)  Comment: last hemoglobin a1c good   Plan: no change in metformin     (M25.512) Left shoulder pain, unspecified chronicity  Comment: work on range of motion exercises   Plan: [facility] HAND, AND CHIROPRACTIC REFERRAL             (M54.9) Back pain, unspecified back location, unspecified back pain laterality, unspecified chronicity  Comment: work on range of motion etc    Plan: [facility] PT[facility] HAND, AND CHIROPRACTIC REFERRAL             (R53.83) Fatigue, unspecified type  Comment: check labs   Plan: TSH with free T4 reflex, Comprehensive         metabolic panel, CBC with platelets         differential             (E78.5) Hyperlipidemia LDL goal <70  Comment: patient not fully fasting but check anyway; no food in almost 4 hours  Plan: Lipid panel reflex to direct LDL Non-fasting               I reviewed the patient's medications, allergies, medical history, family history, and social history.    Finesse Beal MD

## 2019-10-02 NOTE — PATIENT INSTRUCTIONS
Increase walking/ exercise as able    Light weight lifting    See physical therapy    We will send you lab results

## 2019-10-03 LAB
ALBUMIN SERPL-MCNC: 3.7 G/DL (ref 3.4–5)
ALP SERPL-CCNC: 61 U/L (ref 40–150)
ALT SERPL W P-5'-P-CCNC: 103 U/L (ref 0–70)
ANION GAP SERPL CALCULATED.3IONS-SCNC: 9 MMOL/L (ref 3–14)
AST SERPL W P-5'-P-CCNC: 80 U/L (ref 0–45)
BILIRUB SERPL-MCNC: 0.4 MG/DL (ref 0.2–1.3)
BUN SERPL-MCNC: 8 MG/DL (ref 7–30)
CALCIUM SERPL-MCNC: 9.1 MG/DL (ref 8.5–10.1)
CHLORIDE SERPL-SCNC: 96 MMOL/L (ref 94–109)
CHOLEST SERPL-MCNC: 184 MG/DL
CO2 SERPL-SCNC: 30 MMOL/L (ref 20–32)
CREAT SERPL-MCNC: 0.65 MG/DL (ref 0.66–1.25)
GFR SERPL CREATININE-BSD FRML MDRD: >90 ML/MIN/{1.73_M2}
GLUCOSE SERPL-MCNC: 133 MG/DL (ref 70–99)
HDLC SERPL-MCNC: 53 MG/DL
LDLC SERPL CALC-MCNC: 93 MG/DL
NONHDLC SERPL-MCNC: 131 MG/DL
POTASSIUM SERPL-SCNC: 3.2 MMOL/L (ref 3.4–5.3)
PROT SERPL-MCNC: 8.4 G/DL (ref 6.8–8.8)
SODIUM SERPL-SCNC: 135 MMOL/L (ref 133–144)
TRIGL SERPL-MCNC: 190 MG/DL
TSH SERPL DL<=0.005 MIU/L-ACNC: 3.01 MU/L (ref 0.4–4)

## 2019-10-03 NOTE — RESULT ENCOUNTER NOTE
"The potassium is slightly low.  Increase intake of bananas and citrus fruits/ juices.    A couple liver tests ( ALT and AST ) are still mildly elevated.  I don't think you drink much alcohol, but I advise avoiding it entirely and then we can recheck these labs in 2-3 months.    See us at that time, sooner if needed.    Triglycerides are moderately high, but the total and LDL \"bad\" cholesterol are fine.    Keep working on healthy diet/exercise and weight loss as you are able.    Other labs are stable.    Finesse Beal MD          "

## 2019-10-13 DIAGNOSIS — E11.9 TYPE 2 DIABETES MELLITUS WITHOUT COMPLICATION, WITHOUT LONG-TERM CURRENT USE OF INSULIN (H): ICD-10-CM

## 2019-10-14 NOTE — TELEPHONE ENCOUNTER
"Requested Prescriptions   Pending Prescriptions Disp Refills     metFORMIN (GLUCOPHAGE) 500 MG tablet [Pharmacy Med Name: METFORMIN 500MG TABLETS] 120 tablet 0     Sig: TAKE 1 TABLET BY MOUTH TWICE DAILY WITH MEALS   Last Written Prescription Date:  3/1/19  Last Fill Quantity: 120,  # refills: 3   Last office visit: 10/2/2019 with prescribing provider:     Future Office Visit:        Biguanide Agents Passed - 10/13/2019  9:53 AM        Passed - Blood pressure less than 140/90 in past 6 months     BP Readings from Last 3 Encounters:   10/02/19 139/85   08/09/19 137/78   08/07/19 131/83                 Passed - Patient has documented LDL within the past 12 mos.     Recent Labs   Lab Test 10/02/19  1605   LDL 93             Passed - Patient has had a Microalbumin in the past 15 mos.     Recent Labs   Lab Test 11/06/18  1133   MICROL 38   UMALCR 16.19             Passed - Patient is age 10 or older        Passed - Patient has documented A1c within the specified period of time.     If HgbA1C is 8 or greater, it needs to be on file within the past 3 months.  If less than 8, must be on file within the past 6 months.     Recent Labs   Lab Test 08/30/19  1104   A1C 7.1*             Passed - Patient's CR is NOT>1.4 OR Patient's EGFR is NOT<45 within past 12 mos.     Recent Labs   Lab Test 10/02/19  1605   GFRESTIMATED >90   GFRESTBLACK >90       Recent Labs   Lab Test 10/02/19  1605   CR 0.65*             Passed - Patient does NOT have a diagnosis of CHF.        Passed - Medication is active on med list        Passed - Recent (6 mo) or future (30 days) visit within the authorizing provider's specialty     Patient had office visit in the last 6 months or has a visit in the next 30 days with authorizing provider or within the authorizing provider's specialty.  See \"Patient Info\" tab in inbasket, or \"Choose Columns\" in Meds & Orders section of the refill encounter.              "

## 2019-10-15 NOTE — TELEPHONE ENCOUNTER
[place] [facility] [place] note  Dorita RUTH RN  [facility]  [phone]

## 2019-10-28 ENCOUNTER — OFFICE VISIT (OUTPATIENT)
Dept: OTOLARYNGOLOGY | Facility: CLINIC | Age: 59
End: 2019-10-28
Payer: COMMERCIAL

## 2019-10-28 VITALS
WEIGHT: 240 LBS | DIASTOLIC BLOOD PRESSURE: 66 MMHG | BODY MASS INDEX: 34.44 KG/M2 | SYSTOLIC BLOOD PRESSURE: 121 MMHG | HEART RATE: 115 BPM

## 2019-10-28 DIAGNOSIS — C32.0 VOCAL CORD CANCER (H): ICD-10-CM

## 2019-10-28 DIAGNOSIS — C32.0 VOCAL CORD CANCER (H): Primary | ICD-10-CM

## 2019-10-28 DIAGNOSIS — R49.0 DYSPHONIA: Primary | ICD-10-CM

## 2019-10-28 DIAGNOSIS — R49.0 DYSPHONIA: ICD-10-CM

## 2019-10-28 ASSESSMENT — PAIN SCALES - GENERAL: PAINLEVEL: NO PAIN (0)

## 2019-10-28 NOTE — PROGRESS NOTES
[facility] VOICE [facility] VOICE [facility]  VOICE EVALUATION/LARYNGEAL EXAMINATION REPORT    Patient: Gary Iyer  Date of Service: 10/28/2019    Gary Iyer was seen for briefly in conjunction with a visit to Dr. Fournier today.  Please refer to the physician s dictation for a more complete history and impressions.  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  He has been followed regularly since that time, including a second trip to the OR to remove moderate dysplastic change in 2017.  I was present to assist the physician with the logistical aspects of the examination, but no skilled services were rendered.  There is accordingly no charge.    No charge for today s session  NO CHARGE FACILITY FEE (43336)    Travis Mcdaniels M.M., M.A., CCC-SLP  Speech-Language Pathologist  Certificate of Vocology  [phone]

## 2019-10-28 NOTE — PROGRESS NOTES
Dear Colleague:    Gary Iyer recently returned for follow-up at the [facility]. My clinic note from our visit is enclosed below.  Speech recognition software may have been used in the documentation below; input is reviewed before signature to the best of my ability.     I appreciate the ongoing opportunity to participate in this patient's care.    Please feel free to contact me with any questions.      Sincerely yours,  Mel Fournier M.D., M.P.H.  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  Most recently, he returned to the operating room on February 9, 2017 for an area of new irregularity of the left true vocal fold.  Pathology showed severe dysplasia with negative but close margins (for moderate, but not severe dysplasia).  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 Returns today for routine follow-up.    MEDICATIONS:     Current Outpatient Medications   Medication Sig Dispense Refill     allopurinol (ZYLOPRIM) 300 MG tablet TAKE 1 TABLET(300 MG) BY MOUTH DAILY 90 tablet 1     amLODIPine (NORVASC) 2.5 MG tablet Take 1 tablet (2.5 mg) by mouth daily 90 tablet 3     aspirin 81 MG tablet Take 1 tablet by mouth daily.       atorvastatin (LIPITOR) 20 MG tablet Take 1 tablet (20 mg) by mouth daily 90 tablet 1     blood glucose (NO BRAND SPECIFIED) lancets standard Use to test blood sugar 2 times daily or as directed. 100 each 11     blood glucose (NO BRAND SPECIFIED) test strip Use to test blood sugar 2 times daily or as directed. 100 each 11     blood glucose monitoring (ALON MICROLET) lancets 1 each 2 times daily Use to test blood  sugar 2 times daily or as directed. 1 Box 3     blood glucose monitoring (NO BRAND SPECIFIED) meter device kit Use to test blood sugar 2 times daily or as directed.  Accucheck meter please and all associated strips, lancets, and other supplies, 3 month supply with refills for a year. 1 kit 0     cetirizine (ZYRTEC) 10 MG tablet Take 1 tablet (10 mg) by mouth daily 90 tablet 3     COMPRESSION STOCKINGS 1 each daily 20-30 mmHg pressure, waist high 2 each 1     hydrochlorothiazide (HYDRODIURIL) 50 MG tablet Take 1 tablet (50 mg) by mouth daily 90 tablet 1     hydrocortisone 1 % ointment Apply topically 2 times daily as needed 30 g 1     ketoconazole (NIZORAL) 2 % cream Apply topically 2 times daily for up to 2 weeks as needed for rash 60 g 1     losartan (COZAAR) 100 MG tablet Take 1 tablet (100 mg) by mouth daily 90 tablet 3     metFORMIN (GLUCOPHAGE) 500 MG tablet TAKE 1 TABLET BY MOUTH TWICE DAILY WITH MEALS 120 tablet 2     metroNIDAZOLE (METROCREAM) 0.75 % external cream Apply topically 2 times daily as needed (to rosacea areas) 45 g 1     Multiple Vitamin (DAILY MULTIVITAMIN PO) Take  by mouth daily.       omeprazole (PRILOSEC) 20 MG DR capsule TAKE 1 TABLET BY MOUTH DAILY 90 capsule 2     order for DME Place rectally every 12 hours 1.8 oz container of 0.2 % nifedipine suppositories.   Apply on the anus every 12 hours.  Just get to where the anus is tight . 1 Container 5     order for DME Autocpap 10-16 cm 1 Units 0     PARoxetine (PAXIL) 20 MG tablet TAKE 2 TABLETS(40 MG) BY MOUTH DAILY (Patient taking differently: daily TAKE 2 TABLETS(40 MG) BY MOUTH DAILY) 180 tablet 3     psyllium 0.52 G capsule Take 1 capsule (0.52 g) by mouth daily 100 capsule 3     tamsulosin (FLOMAX) 0.4 MG capsule TAKE 1 CAPSULE(0.4 MG) BY MOUTH DAILY 90 capsule 1     triamcinolone (KENALOG) 0.1 % cream Apply topically 2 times daily as needed for irritation 30 g 1     vitamin D2 (ERGOCALCIFEROL) 88281 units (1250 mcg) capsule TAKE 1  CAPSULE BY MOUTH EVERY WEEK 12 capsule 0       ALLERGIES:  No Known Allergies    NEW PMH/PSH: None    REVIEW OF SYSTEMS:  The patient completed a comprehensive 11 point review of systems (below), which was reviewed. Positives are as noted below.  Patient Supplied Answers to Review of Systems   ENT ROS 4/22/2016   Ears, Nose, Throat Nasal congestion or drainage       PHYSICAL EXAM:  General: The patient was alert and conversant, and in no acute distress.    Oral cavity/oropharynx: No masses or lesions. Dentition unchanged since prior. Tongue mobility and palate elevation intact and symmetric.  Neck: No palpable cervical lymphadenopathy, no significant tenderness to palpation of the thyrohyoid space, which was narrow. No obvious thyroid abnormality.  Resp: Breathing comfortably, no stridor or stertor.  Neuro: Symmetric facial function. Other cranial nerve function as documented above.  Psych: Normal affect, pleasant and cooperative.  Voice/speech: No significant dysphonia, very mild breathy quality.    Intake scores  Last 2 Scores for Patient-Answered VHI Questionnaire  VHI Total Score 2/26/2018 3/1/2019   VHI Total Score 9 17      Last 2 Scores for Patient-Answered EAT Questionnaire  EAT Total Score 2/26/2018 3/1/2019   EAT Total Score 1 5        Last 2 Scores for Patient-Answered CSI Questionnaire  CSI Total Score 2/26/2018 3/1/2019   CSI Total Score 0 0           Procedure:   Flexible fiberoptic laryngoscopy and laryngovideostroboscopy  Indications: This procedure was warranted to evaluate the patient's laryngeal anatomy and function. Risks, benefits, and alternatives were discussed.  Description: After written informed consent was obtained, a time-out was performed to confirm patient identity, procedure, and procedure site. Topical 3% lidocaine with 0.25% phenylephrine was applied to the nasal cavities. I performed the endoscopy and no complications were apparent. Continuous and stroboscopic light were utilized  to assess routine phonation and variable frequency phonation.  Performed by: Mel Fournier MD MPH  Findings: Normal nasopharynx. Normal base of tongue, valleculae, and epiglottis. The right true vocal fold demonstrated normal mobility. The left true vocal fold demonstrated normal mobility. The medial edges of the vocal folds appeared smooth and straight. Stable mild left vocal fold erythema, no focal mucosal lesions. Glissade produced appropriate elongation. There was mild to moderate supraglottic recruitment with connected speech. Mucosa of the false vocal folds, aryepiglottic folds, piriform sinuses, and posterior glottis unremarkable. Airway was patent. No focal lesions on NBI.    The addition of stroboscopy allowed evaluation of the mucosal wave.   Amplitude: right: normal; left: mildly decreased. Symmetry: intermittent symmetry. Closure pattern: complete. Closure plane: at glottic level. Phase distribution: slightly open-phase predominant.  Overall impression: stable compared to prior.              IMPRESSION AND PLAN:   Gary Iyer returns today for routine follow up.   Exam is stable and he is doing well vocally.  He will return to clinic in four months, sooner as needed.  I appreciate the opportunity to participate in the care of this pleasant patient.

## 2019-10-28 NOTE — NURSING NOTE
Chief Complaint   Patient presents with     RECHECK     Follow-up, hx of laryngeal cancer     Blood pressure 121/66, pulse 115, weight 108.9 kg (240 lb).    Cheryle Alejandro EMT

## 2019-10-28 NOTE — PATIENT INSTRUCTIONS
Thank you for choosing  [facility].  Please follow up with Dr. Fournier in approximately 4 months or sooner as needed.    [phone] appointment scheduling option 1 and nurse advice option 3.

## 2019-10-28 NOTE — LETTER
10/28/2019      RE: Gary Iyer  [address] [facility]  [place] [address]       Dear Colleague:    Gary Iyer recently returned for follow-up at the [facility]. My clinic note from our visit is enclosed below.  Speech recognition software may have been used in the documentation below; input is reviewed before signature to the best of my ability.     I appreciate the ongoing opportunity to participate in this patient's care.    Please feel free to contact me with any questions.      Sincerely yours,  Mel Fournier M.D., M.P.H.  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  Most recently, he returned to the operating room on February 9, 2017 for an area of new irregularity of the left true vocal fold.  Pathology showed severe dysplasia with negative but close margins (for moderate, but not severe dysplasia).  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 Returns today for routine follow-up.    MEDICATIONS:     Current Outpatient Medications   Medication Sig Dispense Refill     allopurinol (ZYLOPRIM) 300 MG tablet TAKE 1 TABLET(300 MG) BY MOUTH DAILY 90 tablet 1     amLODIPine (NORVASC) 2.5 MG tablet Take 1 tablet (2.5 mg) by mouth daily 90 tablet 3     aspirin 81 MG tablet Take 1 tablet by mouth daily.       atorvastatin (LIPITOR) 20 MG tablet Take 1 tablet (20 mg) by mouth daily 90 tablet 1     blood glucose (NO BRAND SPECIFIED) lancets standard Use to test blood sugar 2 times daily or as directed. 100 each 11     blood glucose (NO BRAND SPECIFIED) test strip Use to test blood sugar 2 times daily or as directed. 100 each 11     blood  glucose monitoring (ALON MICROLET) lancets 1 each 2 times daily Use to test blood sugar 2 times daily or as directed. 1 Box 3     blood glucose monitoring (NO BRAND SPECIFIED) meter device kit Use to test blood sugar 2 times daily or as directed.  Accucheck meter please and all associated strips, lancets, and other supplies, 3 month supply with refills for a year. 1 kit 0     cetirizine (ZYRTEC) 10 MG tablet Take 1 tablet (10 mg) by mouth daily 90 tablet 3     COMPRESSION STOCKINGS 1 each daily 20-30 mmHg pressure, waist high 2 each 1     hydrochlorothiazide (HYDRODIURIL) 50 MG tablet Take 1 tablet (50 mg) by mouth daily 90 tablet 1     hydrocortisone 1 % ointment Apply topically 2 times daily as needed 30 g 1     ketoconazole (NIZORAL) 2 % cream Apply topically 2 times daily for up to 2 weeks as needed for rash 60 g 1     losartan (COZAAR) 100 MG tablet Take 1 tablet (100 mg) by mouth daily 90 tablet 3     metFORMIN (GLUCOPHAGE) 500 MG tablet TAKE 1 TABLET BY MOUTH TWICE DAILY WITH MEALS 120 tablet 2     metroNIDAZOLE (METROCREAM) 0.75 % external cream Apply topically 2 times daily as needed (to rosacea areas) 45 g 1     Multiple Vitamin (DAILY MULTIVITAMIN PO) Take  by mouth daily.       omeprazole (PRILOSEC) 20 MG DR capsule TAKE 1 TABLET BY MOUTH DAILY 90 capsule 2     order for DME Place rectally every 12 hours 1.8 oz container of 0.2 % nifedipine suppositories.   Apply on the anus every 12 hours.  Just get to where the anus is tight . 1 Container 5     order for DME Autocpap 10-16 cm 1 Units 0     PARoxetine (PAXIL) 20 MG tablet TAKE 2 TABLETS(40 MG) BY MOUTH DAILY (Patient taking differently: daily TAKE 2 TABLETS(40 MG) BY MOUTH DAILY) 180 tablet 3     psyllium 0.52 G capsule Take 1 capsule (0.52 g) by mouth daily 100 capsule 3     tamsulosin (FLOMAX) 0.4 MG capsule TAKE 1 CAPSULE(0.4 MG) BY MOUTH DAILY 90 capsule 1     triamcinolone (KENALOG) 0.1 % cream Apply topically 2 times daily as needed for  irritation 30 g 1     vitamin D2 (ERGOCALCIFEROL) 47014 units (1250 mcg) capsule TAKE 1 CAPSULE BY MOUTH EVERY WEEK 12 capsule 0       ALLERGIES:  No Known Allergies    NEW PMH/PSH: None    REVIEW OF SYSTEMS:  The patient completed a comprehensive 11 point review of systems (below), which was reviewed. Positives are as noted below.  Patient Supplied Answers to Review of Systems   ENT ROS 4/22/2016   Ears, Nose, Throat Nasal congestion or drainage       PHYSICAL EXAM:  General: The patient was alert and conversant, and in no acute distress.    Oral cavity/oropharynx: No masses or lesions. Dentition unchanged since prior. Tongue mobility and palate elevation intact and symmetric.  Neck: No palpable cervical lymphadenopathy, no significant tenderness to palpation of the thyrohyoid space, which was narrow. No obvious thyroid abnormality.  Resp: Breathing comfortably, no stridor or stertor.  Neuro: Symmetric facial function. Other cranial nerve function as documented above.  Psych: Normal affect, pleasant and cooperative.  Voice/speech: No significant dysphonia, very mild breathy quality.    Intake scores  Last 2 Scores for Patient-Answered VHI Questionnaire  VHI Total Score 2/26/2018 3/1/2019   VHI Total Score 9 17      Last 2 Scores for Patient-Answered EAT Questionnaire  EAT Total Score 2/26/2018 3/1/2019   EAT Total Score 1 5        Last 2 Scores for Patient-Answered CSI Questionnaire  CSI Total Score 2/26/2018 3/1/2019   CSI Total Score 0 0           Procedure:   Flexible fiberoptic laryngoscopy and laryngovideostroboscopy  Indications: This procedure was warranted to evaluate the patient's laryngeal anatomy and function. Risks, benefits, and alternatives were discussed.  Description: After written informed consent was obtained, a time-out was performed to confirm patient identity, procedure, and procedure site. Topical 3% lidocaine with 0.25% phenylephrine was applied to the nasal cavities. I performed the  endoscopy and no complications were apparent. Continuous and stroboscopic light were utilized to assess routine phonation and variable frequency phonation.  Performed by: Mel Fournier MD MPH  Findings: Normal nasopharynx. Normal base of tongue, valleculae, and epiglottis. The right true vocal fold demonstrated normal mobility. The left true vocal fold demonstrated normal mobility. The medial edges of the vocal folds appeared smooth and straight. Stable mild left vocal fold erythema, no focal mucosal lesions. Glissade produced appropriate elongation. There was mild to moderate supraglottic recruitment with connected speech. Mucosa of the false vocal folds, aryepiglottic folds, piriform sinuses, and posterior glottis unremarkable. Airway was patent. No focal lesions on NBI.    The addition of stroboscopy allowed evaluation of the mucosal wave.   Amplitude: right: normal; left: mildly decreased. Symmetry: intermittent symmetry. Closure pattern: complete. Closure plane: at glottic level. Phase distribution: slightly open-phase predominant.  Overall impression: stable compared to prior.              IMPRESSION AND PLAN:   Gary Iyer returns today for routine follow up.   Exam is stable and he is doing well vocally.  He will return to clinic in four months, sooner as needed.  I appreciate the opportunity to participate in the care of this pleasant patient.     Mel Fournier MD

## 2019-11-03 ENCOUNTER — HEALTH MAINTENANCE LETTER (OUTPATIENT)
Age: 59
End: 2019-11-03

## 2019-11-11 ENCOUNTER — OFFICE VISIT (OUTPATIENT)
Dept: FAMILY MEDICINE | Facility: CLINIC | Age: 59
End: 2019-11-11
Payer: MEDICAID

## 2019-11-11 VITALS
SYSTOLIC BLOOD PRESSURE: 146 MMHG | HEART RATE: 116 BPM | BODY MASS INDEX: 34.13 KG/M2 | HEIGHT: 70 IN | DIASTOLIC BLOOD PRESSURE: 85 MMHG | TEMPERATURE: 97.3 F | OXYGEN SATURATION: 96 % | RESPIRATION RATE: 24 BRPM | WEIGHT: 238.4 LBS

## 2019-11-11 DIAGNOSIS — Z85.21 HX OF LARYNGEAL CANCER: Chronic | ICD-10-CM

## 2019-11-11 DIAGNOSIS — Z71.1 WORRIED WELL: Primary | ICD-10-CM

## 2019-11-11 PROBLEM — E11.51 TYPE II DIABETES MELLITUS WITH PERIPHERAL ARTERY DISEASE (H): Chronic | Status: ACTIVE | Noted: 2017-03-07

## 2019-11-11 PROBLEM — E11.51 TYPE II DIABETES MELLITUS WITH PERIPHERAL ARTERY DISEASE (H): Chronic | Status: RESOLVED | Noted: 2017-03-07 | Resolved: 2019-11-11

## 2019-11-11 PROCEDURE — 99212 OFFICE O/P EST SF 10 MIN: CPT | Performed by: PHYSICIAN ASSISTANT

## 2019-11-11 ASSESSMENT — PAIN SCALES - GENERAL: PAINLEVEL: NO PAIN (0)

## 2019-11-11 ASSESSMENT — MIFFLIN-ST. JEOR: SCORE: 1892.63

## 2019-11-11 NOTE — PROGRESS NOTES
Subjective     Gary Iyer is a 59 year old male who presents to clinic today for the following health issues:    HPI   Concern - Bump   Onset: few days    Description:     Patient states that he has a bony prominence behind his left ear that was sore 2 days ago when he woke up. Its much better now. .     Intensity: mild    Progression of Symptoms:  constant  Patient wants to make sure there is no cyst or lymph node. Patient has a h/o laryngeal cancer in remission   Therapies Tried and outcome: NONE-resolved on its own       Patient Active Problem List   Diagnosis     Hyperlipidemia LDL goal <70     Hypertension goal BP (blood pressure) < 140/90     Gout, chronic     Anxiety     Advanced directives, counseling/discussion     Carotid stenosis     Hx of laryngeal cancer     Type 2 diabetes mellitus without complication (H)     Non morbid obesity due to excess calories     Dysphonia     Vocal process granuloma     Type 2 diabetes mellitus without complication, without long-term current use of insulin (H)     Obstructive sleep apnea- moderate-severe (AHI 25)     Benign prostatic hypertrophy     Psychophysiological insomnia     Periodic limb movements of sleep     Low ferritin     Moderate major depression (H)     Gout, unspecified cause, unspecified chronicity, unspecified site     Chronic cough     Lesion of larynx     Obesity, unspecified classification, unspecified obesity type, unspecified whether serious comorbidity present     Past Surgical History:   Procedure Laterality Date     COLONOSCOPY       COLONOSCOPY  6-22-12    Repeat Colonoscopy in 10 yrs.      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     HEAD & NECK SURGERY  1/25/11    callous removal from throat     LARYNGOSCOPY WITH BIOPSY(IES)  1-25-11     LARYNGOSCOPY WITH BIOPSY(IES) N/A 2/9/2017    Procedure: LARYNGOSCOPY WITH BIOPSY(IES);  Surgeon: Mel Fournier MD;  Location:  OR     LARYNGOSCOPY WITH MICROSCOPE N/A 2/9/2017    Procedure:  LARYNGOSCOPY WITH MICROSCOPE;  Surgeon: Mel Fournier MD;  Location: [facility] OR     LASER CO2 LARYNGOSCOPY, COMPLEX  2013    Procedure: LASER CO2 LARYNGOSCOPY, COMPLEX;  Micro Direct Laryngoscopy With Micro Flap Excision Of Lesion Lumenis Co2 Laser ;  Surgeon: Mel Fournier MD;  Location: [facility] OR     LASER CO2 LESION ORAL N/A 2017    Procedure: LASER CO2 LESION ORAL;  Surgeon: Mel Fournier MD;  Location: [facility] OR     ORTHOPEDIC SURGERY  2016    left tibia orif with abbi -     VASECTOMY         Social History     Tobacco Use     Smoking status: Former Smoker     Packs/day: 1.00     Years: 25.00     Pack years: 25.00     Last attempt to quit: 2000     Years since quittin.2     Smokeless tobacco: Never Used   Substance Use Topics     Alcohol use: Yes     Alcohol/week: 0.0 standard drinks     Comment: rare     Family History   Problem Relation Age of Onset     C.A.D. Father          MI age 44     Hypertension Mother      Diabetes No family hx of      Cerebrovascular Disease No family hx of      Cancer No family hx of      Glaucoma No family hx of      Macular Degeneration No family hx of          Current Outpatient Medications   Medication Sig Dispense Refill     allopurinol (ZYLOPRIM) 300 MG tablet TAKE 1 TABLET(300 MG) BY MOUTH DAILY 90 tablet 1     amLODIPine (NORVASC) 2.5 MG tablet Take 1 tablet (2.5 mg) by mouth daily 90 tablet 3     aspirin 81 MG tablet Take 1 tablet by mouth daily.       atorvastatin (LIPITOR) 20 MG tablet Take 1 tablet (20 mg) by mouth daily 90 tablet 1     blood glucose (NO BRAND SPECIFIED) lancets standard Use to test blood sugar 2 times daily or as directed. 100 each 11     blood glucose (NO BRAND SPECIFIED) test strip Use to test blood sugar 2 times daily or as directed. 100 each 11     blood glucose monitoring (ALON MICROLET) lancets 1 each 2 times daily Use to test blood sugar 2 times daily or as directed. 1 Box 3     blood  glucose monitoring (NO BRAND SPECIFIED) meter device kit Use to test blood sugar 2 times daily or as directed.  Accucheck meter please and all associated strips, lancets, and other supplies, 3 month supply with refills for a year. 1 kit 0     cetirizine (ZYRTEC) 10 MG tablet Take 1 tablet (10 mg) by mouth daily 90 tablet 3     COMPRESSION STOCKINGS 1 each daily 20-30 mmHg pressure, waist high 2 each 1     hydrochlorothiazide (HYDRODIURIL) 50 MG tablet Take 1 tablet (50 mg) by mouth daily 90 tablet 1     hydrocortisone 1 % ointment Apply topically 2 times daily as needed 30 g 1     ketoconazole (NIZORAL) 2 % cream Apply topically 2 times daily for up to 2 weeks as needed for rash 60 g 1     losartan (COZAAR) 100 MG tablet Take 1 tablet (100 mg) by mouth daily 90 tablet 3     metFORMIN (GLUCOPHAGE) 500 MG tablet TAKE 1 TABLET BY MOUTH TWICE DAILY WITH MEALS 120 tablet 2     metroNIDAZOLE (METROCREAM) 0.75 % external cream Apply topically 2 times daily as needed (to rosacea areas) 45 g 1     Multiple Vitamin (DAILY MULTIVITAMIN PO) Take  by mouth daily.       omeprazole (PRILOSEC) 20 MG DR capsule TAKE 1 TABLET BY MOUTH DAILY 90 capsule 2     order for DME Place rectally every 12 hours 1.8 oz container of 0.2 % nifedipine suppositories.   Apply on the anus every 12 hours.  Just get to where the anus is tight . 1 Container 5     order for DME Autocpap 10-16 cm 1 Units 0     PARoxetine (PAXIL) 20 MG tablet TAKE 2 TABLETS(40 MG) BY MOUTH DAILY (Patient taking differently: daily TAKE 2 TABLETS(40 MG) BY MOUTH DAILY) 180 tablet 3     psyllium 0.52 G capsule Take 1 capsule (0.52 g) by mouth daily 100 capsule 3     tamsulosin (FLOMAX) 0.4 MG capsule TAKE 1 CAPSULE(0.4 MG) BY MOUTH DAILY 90 capsule 1     triamcinolone (KENALOG) 0.1 % cream Apply topically 2 times daily as needed for irritation 30 g 1     vitamin D2 (ERGOCALCIFEROL) 69284 units (1250 mcg) capsule TAKE 1 CAPSULE BY MOUTH EVERY WEEK 12 capsule 0     No Known  "Allergies      Reviewed and updated as needed this visit by Provider  Tobacco  Allergies  Meds  Problems  Med Hx  Surg Hx  Fam Hx       Review of Systems   ROS COMP: Constitutional, HEENT, cardiovascular, pulmonary, gi and gu systems are negative, except as otherwise noted.      Objective    BP (!) 146/85 (BP Location: Left arm, Patient Position: Sitting, Cuff Size: Adult Large)   Pulse 116   Temp 97.3  F (36.3  C) (Oral)   Resp 24   Ht 1.778 m (5' 10\")   Wt 108.1 kg (238 lb 6.4 oz)   SpO2 96%   BMI 34.21 kg/m    Body mass index is 34.21 kg/m .     Physical Exam   GENERAL: healthy, alert and no distress  EYES: Eyes grossly normal to inspection, PERRL and conjunctivae and sclerae normal  HENT: ear canals and TM's normal, nose and mouth without ulcers or lesions. Left postauricular bony prominence -no erythema, no swelling, no bruising, no lumps or nodule, mild tenderness over the are   NECK: no adenopathy, no asymmetry, masses, or scars and thyroid normal to palpation  RESP: lungs clear to auscultation - no rales, rhonchi or wheezes  CV: regular rate and rhythm, normal S1 S2, no S3 or S4, no murmur, click or rub, no peripheral edema and peripheral pulses strong  ABDOMEN: soft, nontender, no hepatosplenomegaly, no masses and bowel sounds normal  MS: no gross musculoskeletal defects noted, no edema    Diagnostic Test Results:  Labs reviewed in Epic        Assessment & Plan       ICD-10-CM    1. Worried well Z71.1    2. Hx of laryngeal cancer Z85.21    no lump, sore or lymph node were palpated.  The bony prominence is a normal skull shape and mild pain was present after patient slept on it for a while. Resolved now. Patient was reassured.      BMI:   Estimated body mass index is 34.21 kg/m  as calculated from the following:    Height as of this encounter: 1.778 m (5' 10\").    Weight as of this encounter: 108.1 kg (238 lb 6.4 oz).   Weight management plan: Discussed healthy diet and exercise " guidelines            Return if symptoms worsen or fail to improve.    Eunice Schilling PA-C  [facility]

## 2019-11-21 DIAGNOSIS — K21.9 GASTROESOPHAGEAL REFLUX DISEASE, ESOPHAGITIS PRESENCE NOT SPECIFIED: ICD-10-CM

## 2019-11-21 DIAGNOSIS — J38.7 LEUKOPLAKIA OF LARYNX: ICD-10-CM

## 2019-11-21 DIAGNOSIS — R49.0 DYSPHONIA: ICD-10-CM

## 2019-11-21 NOTE — TELEPHONE ENCOUNTER
"Requested Prescriptions   Pending Prescriptions Disp Refills     omeprazole (PRILOSEC) 20 MG DR capsule [Pharmacy Med Name: OMEPRAZOLE 20MG CAPSULES] 90 capsule 0     Sig: TAKE 1 TABLET BY MOUTH DAILY   Last Written Prescription Date:  2/26/19  Last Fill Quantity: 90,  # refills: 2   Last office visit: 11/11/2019 with prescribing provider:     Future Office Visit:        PPI Protocol Passed - 11/21/2019  1:29 PM        Passed - Not on Clopidogrel (unless Pantoprazole ordered)        Passed - No diagnosis of osteoporosis on record        Passed - Recent (12 mo) or future (30 days) visit within the authorizing provider's specialty     Patient has had an office visit with the authorizing provider or a provider within the authorizing providers department within the previous 12 mos or has a future within next 30 days. See \"Patient Info\" tab in inbasket, or \"Choose Columns\" in Meds & Orders section of the refill encounter.              Passed - Medication is active on med list        Passed - Patient is age 18 or older          "

## 2019-11-22 NOTE — TELEPHONE ENCOUNTER
Prescription approved per [facility] Refill Protocol.    Radha Mckenzie, RN, BSN, PHN  [facility]: [place]

## 2019-11-29 ENCOUNTER — TELEPHONE (OUTPATIENT)
Dept: FAMILY MEDICINE | Facility: CLINIC | Age: 59
End: 2019-11-29

## 2019-11-29 DIAGNOSIS — I10 HYPERTENSION GOAL BP (BLOOD PRESSURE) < 140/90: Primary | Chronic | ICD-10-CM

## 2019-11-29 NOTE — TELEPHONE ENCOUNTER
Reason for Call: Request for an order or referral:    Order or referral being requested: Comprehensive metabolic panel    Date needed: as soon as possible    Has the patient been seen by the PCP for this problem? YES    Additional comments: Patient would like to get this blood test done again as soon as possible.  Please put in an order so he can schedule a lab appointment.    Phone number Patient can be reached at:  Home number on file [phone] (home)    Best Time:  anytime    Can we leave a detailed message on this number?  YES    Call taken on 11/29/2019 at 3:14 PM by Yudith Crystal

## 2019-11-29 NOTE — TELEPHONE ENCOUNTER
RN called patient and relayed provider's message. Patient verbalized understanding. Scheduled lab appointment.    Radha Mckenzie RN, BSN, PHN  [facility]: [place]

## 2019-11-29 NOTE — TELEPHONE ENCOUNTER
I put in order.    Please inform patient and he can do lab only appointment, need not be fasting    Finesse Beal MD

## 2019-12-02 DIAGNOSIS — I10 HYPERTENSION GOAL BP (BLOOD PRESSURE) < 140/90: Chronic | ICD-10-CM

## 2019-12-02 PROCEDURE — 80053 COMPREHEN METABOLIC PANEL: CPT | Performed by: FAMILY MEDICINE

## 2019-12-02 PROCEDURE — 36415 COLL VENOUS BLD VENIPUNCTURE: CPT | Performed by: FAMILY MEDICINE

## 2019-12-03 LAB
ALBUMIN SERPL-MCNC: 3.7 G/DL (ref 3.4–5)
ALP SERPL-CCNC: 63 U/L (ref 40–150)
ALT SERPL W P-5'-P-CCNC: 56 U/L (ref 0–70)
ANION GAP SERPL CALCULATED.3IONS-SCNC: 7 MMOL/L (ref 3–14)
AST SERPL W P-5'-P-CCNC: 41 U/L (ref 0–45)
BILIRUB SERPL-MCNC: 0.5 MG/DL (ref 0.2–1.3)
BUN SERPL-MCNC: 12 MG/DL (ref 7–30)
CALCIUM SERPL-MCNC: 9.4 MG/DL (ref 8.5–10.1)
CHLORIDE SERPL-SCNC: 96 MMOL/L (ref 94–109)
CO2 SERPL-SCNC: 30 MMOL/L (ref 20–32)
CREAT SERPL-MCNC: 0.8 MG/DL (ref 0.66–1.25)
GFR SERPL CREATININE-BSD FRML MDRD: >90 ML/MIN/{1.73_M2}
GLUCOSE SERPL-MCNC: 96 MG/DL (ref 70–99)
POTASSIUM SERPL-SCNC: 3.3 MMOL/L (ref 3.4–5.3)
PROT SERPL-MCNC: 8.5 G/DL (ref 6.8–8.8)
SODIUM SERPL-SCNC: 133 MMOL/L (ref 133–144)

## 2019-12-12 NOTE — LETTER
Patient:   LAMIN FAIRCHILD            MRN: [id number]            FIN: [id number]              Age:   61 years     Sex:  FEMALE     :  54   Associated Diagnoses:   None   Author:   JONAH SOUTH     Health Status   Allergies:    Allergies (1) Active Reaction  NKA None Documented    Current medications: Medications (24) Active  Scheduled: (10)  *Magnesium Protocol Communication  1 each, N/A, Daily  *Potassium Protocol Communication  1 each, N/A, Daily  Acetaminophen 500 mg tab  1,000 mg 2 tab, Oral, Q8H  Budesonide-formoterol 160-4.5 mcg oral MDI 6 gm  2 puff, MDI/DPI, Q12H  cefTRIAXone  1,000 mg 10 mL, Slow IV Push, Daily  Docusate sodium 100 mg cap  100 mg 1 cap, Oral, BID  Enoxaparin 40 mg/0.4 mL syringe  40 mg 0.4 mL, Subcutaneous, Daily  metroNIDAZOLE-NaCl 0.9%  500 mg 100 mL, IVPB, Q8H  Pantoprazole 40 mg DR tab  40 mg 1 tab, Oral, Daily  Sodium Chloride 0.9%  100 mL, IV, Once (scheduled)  Continuous: (1)  Sodium Chloride 0.9% 1,000 mL  1,000 mL, IV, 100 mL/hr  PRN: (13)  Albuterol HFA 90 mcg oral MDI 8 gm  180 mcg 2 puff, MDI/DPI, Q6H  Bupivacaine LIPOSOME 1.3% inj 10 mL SDV  266 mg 20 mL, Infiltration, As Directed PRN  Dextrose (glucose) 40% 15 gm/37.5 gm oral gel UD  15 gm, Oral, As Directed PRN  Dextrose (glucose) 50% 25 gm/50 mL syringe  12.5 gm 25 mL, IV Push, As Directed PRN  Glucagon 1 mg/1 mL emergency kit SDV  1 mg 1 mL, IM, As Directed PRN  HydrALAZINE 20 mg/1 mL inj SDV  5 mg 0.25 mL, Slow IV Push, Q10 Minutes  Ketorolac 15 mg/1 mL inj SDV  15 mg 1 mL, IV Push, Q6H  Labetalol 100 mg/20 mL inj MDV  5 mg 1 mL, IV Push, Q5 Minutes  MetoCLOPramide 10 mg tab  10 mg 1 tab, Oral, Q6H  Morphine PF 2 mg/1 mL inj SDV  2 mg 1 mL, Slow IV Push, Q2H  Ondansetron 4 mg/2 mL inj SDV  4 mg 2 mL, Slow IV Push, Q6H  OxyCODONE 5 mg IR tab  5 mg 1 tab, Oral, Q4H  OxyCODONE 5 mg IR tab  10 mg 2 tab, Oral, Q4H        Objective   Intake and Output   I & O between:  11-DEC-2019 09:29 TO 12-DEC-2019 09:29  Med  DEPARTMENT OF HEALTH AND HUMAN SERVICES  [facility]ARE & MEDICAID SERVICES    PLAN/UPDATED PLAN OF PROGRESS FOR OUTPATIENT REHABILITATION    PATIENTS NAME:  Gary Iyer     : 1960    PROVID[facility] NUMBER:    [id number]    [id number]   PROVIDER NAME: [facility]    MEDICAL RECORD NUMBER: [id number]     START OF CARE DATE:  SOC Date: 17   TYPE:  PT    PRIMARY/TREATMENT DIAGNOSIS: (Pertinent Medical Diagnosis)  Acute pain of left shoulder    VISITS FROM START OF CARE:  Rxs Used: 1     [facility] Initial Evaluation    Subjective:  Patient is a 56 year old male presenting with rehab left shoulder hpi.   Gary Iyer is a 56 year old male with a left shoulder condition.  Condition occurred with:  Unknown cause.  Condition occurred: for unknown reasons.  This is a new condition  17 patient received MD orders for PT for left shoulder pain that started around 17 without any injury or trauma.  He notes he had similar pain about 2 years ago that got better with PT.  He had an injection on  and is improving since.    Patient reports pain:  Lateral and upper arm.    Pain is described as aching and is intermittent and reported as 3/10.  Associated symptoms:  Loss of motion/stiffness and loss of strength. Pain is the same all the time.  Symptoms are exacerbated by using arm overhead, using arm at shoulder level, using arm behind back, lifting and lying on extremity   Since onset symptoms are gradually improving.    Previous treatment includes physical therapy and other (injection).  There was significant improvement following previous treatment.  General health as reported by patient is good.  Pertinent medical history includes:  Cancer, diabetes and overweight.  Medical allergies: no.  Other surgeries include:  Orthopedic surgery and cancer surgery.  Current medications:  Anti-inflammatory and other (cholesterol, diabetes, gout).  Current  Dosing Weight:  73  kg   04-DEC-2019  24 Hour Intake:   36.00  ( 0.49 mL/kg )  24 Hour Output:   75.00           24 Hour Urine/Stool Output:   0.0  24 Hour Balance:   -39.00           24 Hour Urine Output:   0.00  ( 0.00 mL/kg/hr )                   Urine Count:  1    Stool Count:  2         VS/Measurements   Vitals between:   11-DEC-2019 09:29:28   TO   12-DEC-2019 09:29:28                   LAST RESULT MINIMUM MAXIMUM  Temperature 36.5 36.5 37.2  Heart Rate 86 86 113  Respiratory Rate 16 16 18  NISBP           132 121 152  NIDBP           71 70 80  NIMBP           91 88 100  SpO2                    94 93 97      Lines and Tubes:    LINES  Peripheral Intravenous Wrist Left   Gauge: 20   Charted: 12/11/19 22:00  Inserted: 12/07/19   Days Since Insertion: 4 days  Indication of Use: Hydration  DRAINS AND TUBES  Drains Peritoneal Abscess Drain RUQ   Drainage Action:    Charted: 12/11/19 22:00  Inserted: 12/11/19   Days Since Insertion: 0 days  Gastric Tubes Nasogastric Left Nare   Tube Size:    Gastric Tube Level:    Charted: 12/11/19 22:00  Inserted: 12/09/19   Days Since Insertion: 2 days  Usage: Low Intermittent Suction   .    General:  Alert and oriented, No acute distress.    Eye:  Pupils are equal, round and reactive to light.    Neck:  No carotid bruit, No jugular venous distention.    Respiratory:  Lungs are clear to auscultation, Respirations are non-labored, Breath sounds are equal.   Cardiovascular:  Normal rate, Regular rhythm, Good pulses equal in all extremities.   Gastrointestinal:  Soft, Non-tender, Normal bowel sounds.    Integumentary:  Warm, Dry.    Neurologic:  Alert, Oriented, Normal sensory, Normal motor function.      Results Review   General results   Interpretation:   Labs between:  11-DEC-2019 09:29 to 12-DEC-2019 09:29  CBC:                 WBC  HgB  Hct  Plt  MCV  RDW   12-DEC-2019 (H) 17.9  (L) 9.5  (L) 28.1  367  85.4  (H) 15.5   DIFF:                 Seg  Neutroph//ABS  Lymph//ABS   occupation is School .  Patient is working in normal job without restrictions.  Primary job tasks include:  Prolonged sitting and driving.  Barriers include:  None as reported by the patient.  Red flags:  None as reported by the patient.    Objective:  SHOULDER:    Cervical Screen: forward head and rounded shoulder posture    Shoulder:   PROM L PROM R AROM L AROM R MMT L MMT R   Flex 160 170 150 with pain 170 4/5 5/5   Abd 165 165 165 165 4/5 5/5   Full Can     4/5 5/5   IR 20 60   4/5 5/5   ER 40 80 30 80 4/5 5/5   Ext/IR   Left buttock T10       Scapulothoraic Rhythm: rounded shoulders, anterior scapular tilt    Palpation: mild tenderness about left rotator cuff humeral insertion    Special tests:   L R   Impingement     Neer's - -   Hawkin's-Abdulkadir + -   Biceps      Speed's - -   Rotator Cuff Tear     Drop Arm - -   Belly Press - -   Lift off  nt nt     Assessment/Plan:    Patient is a 56 year old male with left side shoulder complaints.    Patient has the following significant findings with corresponding treatment plan.                Diagnosis 1:  Left shoulder pain    Pain -  hot/cold therapy, US, manual therapy, self management, education and home program  Decreased ROM/flexibility - manual therapy, therapeutic exercise and home program  Decreased strength - therapeutic exercise, therapeutic activities and home program  Decreased proprioception - neuro re-education, therapeutic activities and home program  Decreased function - therapeutic activities and home program  Impaired posture - neuro re-education and home program    Therapy Evaluation Codes:   1) History comprised of:   Personal factors that impact the plan of care:      None.    Comorbidity factors that impact the plan of care are:      Cancer, Diabetes and Overweight.     Medications impacting care: Anti-inflammatory and cholesterol, diabetes, gout.  2) Examination of Body Systems comprised of:   Body structures and functions that impact  "the plan of care:      Shoulder.   Activity limitations that impact the plan of care are:      Bathing, Dressing and Lifting.  3) Clinical presentation characteristics are:   Stable/Uncomplicated.  4) Decision-Making    Low complexity using standardized patient assessment instrument and/or measureable assessment of functional outcome.    Cumulative Therapy Evaluation is: Low complexity.  Previous and current functional limitations:  (See Goal Flow Sheet for this information)    Short term and Long term goals: (See Goal Flow Sheet for this information)   Communication ability:  Patient appears to be able to clearly communicate and understand verbal and written communication and follow directions correctly.  Treatment Explanation - The following has been discussed with the patient:   RX ordered/plan of care  Anticipated outcomes  Possible risks and side effects  This patient would benefit from PT intervention to resume normal activities.   Rehab potential is good.    Frequency:  1 X week, once daily  Duration:  for 8 weeks  Discharge Plan:  Achieve all LTG.  Independent in home treatment program.  Reach maximal therapeutic benefit.    Caregiver Signature/Credentials _____________________________ Date ________       Treating Provider: Ascencion Sarkar DPT   I have reviewed and certified the need for these services and plan of treatment while under my care.        PHYSICIAN'S SIGNATURE:   _________________________________________  Date___________   Jake Turpin    Certification period:  Beginning of Cert date period: 06/27/17 to  End of Cert period date: 09/24/17     Functional Level Progress Report: Please see attached \"Goal Flow sheet for Functional level.\"    ____X____ Continue Services or       ________ DC Services                Service dates: From  SOC Date: 06/27/17 date to present                         " [place]//ABS  EOS/ABS  12-DEC-2019 86  // (H) 15.6  // (L) 0.9  // (H) 1.4  // (L) 0.0   BMP:                 Na  Cl  BUN  Glu   12-DEC-2019 139  107  18  90                              K  CO2  Cr  Ca                              4.2  21  0.54  (L) 8.0   Other Chem:             Mg  Phos  Triglycerides  GGTP  DirectBili                           (H) 2.5  3.1         POC GLU:                 Latest Result  Latest Date  Minimum  Min Date  Maximum  Max Date                             (H) 113  12-DEC-2019 (H) 113  12-DEC-2019 (L) 69  12-DEC-2019                       Result title:  CT PERITONEAL ABSCESS DRAIN W IMAGE  Result status:  Final  Verified by:  ASHLEY, BILLY HERRERA on 12/11/2019 6:31  IMPRESSION:Technically successful CT-guided placement of a 12-[place] drain into a subhepatic/lianna-anastomotic fluid collection.               Impression and Plan   61 year old female with history of asthma, gastritis, osteoporosis, hypertension, liver cyst, osteoarthritis, and cecal mass found on colonoscopy who was admitted for elective laparscopic right hemicolectomy.  1. Cecal submucosal mass  - s/p laparoscopic right hemicolectomy on 12/4 by Dr. Shelley   - received cefazolin preoperatively  - continue tylenol - discontinue celebrex and lyrica   - oxycodone and morphine PRN - not requiring pain medicaiton   - colace BID as bowel regimen  - encourage ambulation  s/p IR drainage drain placement   abx  started   lekocytosis multifactorial , on abx repeat labs in am   2. asthma  - presently well controlled  - continue symbicort and albuterol PRN   3. Hypertension - now with episodes of hypotension , plan below   - will hold amlodipine  - will continue to monitor  4. Gastritis  - completed treatment for H. pylori  - continue with pantoprazole daily   5. Liver cyst  -stable  -continue with outpatient followup  6. Lactic Acidosis - resolved   - possibly volume depletion vs BP shifts during surgery  - lactic acid 5.4 --->0.9  7.  Hypomagnesemia  - replete per protocol   8. Hypophosphatemia/Hypokalemia  - repleted   9. Hypotensive - improved, no further episodes   - with symptoms  - bolus as needed   - 2D echo completed without abnormality  - cardiology on consult   - lyrica and celebrex discontinued  - will continue monitor BP   10. Anemia - stable   - Hgb 9.2  - likely 2/2 dilution and postoperative blood loss anemia  - will continue to monitor   11. Hyponatremia  - will restart the patient on NS  - repeat labs this evening   DVT ppx: lovenox  PCP: Dr. Aguirre

## 2020-01-25 ENCOUNTER — OFFICE VISIT (OUTPATIENT)
Dept: URGENT CARE | Facility: URGENT CARE | Age: 60
End: 2020-01-25
Payer: MEDICARE

## 2020-01-25 VITALS
SYSTOLIC BLOOD PRESSURE: 134 MMHG | TEMPERATURE: 98.2 F | HEART RATE: 120 BPM | DIASTOLIC BLOOD PRESSURE: 84 MMHG | OXYGEN SATURATION: 96 %

## 2020-01-25 DIAGNOSIS — J01.10 ACUTE NON-RECURRENT FRONTAL SINUSITIS: Primary | ICD-10-CM

## 2020-01-25 DIAGNOSIS — R05.9 COUGH: ICD-10-CM

## 2020-01-25 PROCEDURE — 99213 OFFICE O/P EST LOW 20 MIN: CPT | Performed by: FAMILY MEDICINE

## 2020-01-25 RX ORDER — AZITHROMYCIN 250 MG/1
TABLET, FILM COATED ORAL
Qty: 6 TABLET | Refills: 0 | Status: SHIPPED | OUTPATIENT
Start: 2020-01-25 | End: 2020-07-17

## 2020-01-25 RX ORDER — GUAIFENESIN 600 MG/1
1200 TABLET, EXTENDED RELEASE ORAL 2 TIMES DAILY
Qty: 40 TABLET | Refills: 0 | Status: SHIPPED | OUTPATIENT
Start: 2020-01-25 | End: 2020-07-17

## 2020-01-25 NOTE — PROGRESS NOTES
SUBJECTIVE: Gary Iyer is a 59 year old male patient complaining of sinus congestion, fullness, pressure in the ears, postnasal drainage cough.  For the past 7 to 10 days.  He denies any fever, denies chills, he is not wheezing, denies recent history of travel.      OBJECTIVE: The patient appears healthy, alert and no distress.   EARS: External ears normal. Canals clear. TM's normal.  NOSE/SINUS: Nares normal. Septum midline. Mucosa normal. No drainage or sinus tenderness.  Sinus palpation: Frontal sinus and Maxillary sinus nontender to palpation   THROAT: normal   NECK:Neck supple. No adenopathy. Thyroid symmetric, normal size,   CHEST: Clear to auscultation    ASSESSMENT: Acute Sinusitis                  (J01.10) Acute non-recurrent frontal sinusitis  (primary encounter diagnosis)    Plan: azithromycin (ZITHROMAX) 250 MG tablet,         guaiFENesin (MUCINEX) 600 MG 12 hr tablet         R05) Cough  Plan: azithromycin (ZITHROMAX) 250 MG tablet,         guaiFENesin (MUCINEX) 600 MG 12 hr tablet          PLAN: See orders.   In addition, I have suggested that the patient     Fabien Conde MD.

## 2020-03-15 ENCOUNTER — VIRTUAL VISIT (OUTPATIENT)
Dept: FAMILY MEDICINE | Facility: OTHER | Age: 60
End: 2020-03-15

## 2020-03-15 NOTE — PROGRESS NOTES
"Date: 03/15/2020 12:14:55  Clinician: Yuridia Bryan  Clinician NPI: [id number]  Patient: bunny prado  Patient : 1960  Patient Address: [address] [place] [id number]  Patient Phone: [phone]  Visit Protocol: URI  Patient Summary:  bunny is a 59 year old ( : 1960 ) male who initiated a Visit for COVID-19 (Coronavirus) evaluation and screening. When asked the question \"Please sign me up to receive news, health information and promotions. \", bunny responded \"Yes\".    bunny states his symptoms started today.   His symptoms consist of nasal congestion.   Symptom details   Nasal secretions: The color of his mucus is clear.   bunny denies having ear pain, malaise, fever, headache, rhinitis, facial pain or pressure, myalgias, chills, wheezing, sore throat, cough, and teeth pain. He also denies having recent facial or sinus surgery in the past 60 days. He is not experiencing dyspnea.    Pertinent COVID-19 (Coronavirus) information  bunny has traveled internationally or to the areas where COVID-19 (Coronavirus) is widespread in the last 14 days before the start of his symptoms. Countries or locations traveled as reported by the patient (free text): [facility]   bunny has not had close contact with a suspected or laboratory-confirmed COVID-19 patient within 14 days of symptom onset.   bunny is not a healthcare worker and does not work in a healthcare facility.   Pertinent medical history  bunny has taken an antibiotic medication in the past month. Antibiotic details as reported by the patient (free text): z pack, zefu 200 mg   bunny does not need a return to work/school note.   Weight: 228 lbs   bunny does not smoke or use smokeless tobacco.   Weight: 228 lbs    MEDICATIONS: losartan-hydrochlorothiazide oral, Lipitor oral, allopurinol oral, paroxetine oral, metformin oral, amlodipine besylate (bulk), ALLERGIES: NKDA  Clinician Response:  Deaisi hollis,  Based on the information provided, you " have a viral upper respiratory infection, otherwise known as a cold. Symptoms vary from person to person, but can include sneezing, coughing, a runny nose, sore throat, and headache and range from mild to severe.  Unfortunately, there are no medications that can cure a cold, so treatment is focused on controlling symptoms as much as possible. Most people gradually feel better until symptoms are gone in 1-2 weeks.  Medication information  Because you have a viral infection, antibiotics will not help you get better. Treating a viral infection with antibiotics could actually make you feel worse.  Unless you are allergic to the over-the-counter medication(s) below, I recommend using:     An antihistamine such as Benadryl, Claritin, or store brand.    A decongestant such as Sudafed PE or store brand.     Over-the-counter medications do not require a prescription. Ask the pharmacist if you have any questions.  Self care  The following tips will keep you as comfortable as possible while you recover:     Rest    Drink plenty of water and other liquids    Take a hot shower to loosen congestion     When to seek care  Please be seen in a clinic or urgent care if new symptoms develop, or symptoms become worse.  Additional treatment plan   Dear bunny,  Based on the information you have provided, it does not appear you need Coronavirus (COVID-19) testing at this time because you do not have fever and cough which are the primary symptoms of Coronavirus. But your possible exposure to Coronavirus means that we do recommend self-isolation for 14 days from the last day you may have been exposed.   What does this mean?  Isolate Yourself:   Isolate yourself at home.   Do Not allow any visitors  Do Not go to work or school  Do Not go to [employer],  centers, shopping, or other public places.  Do Not shake hands.  Avoid close contact with others (hugging, kissing).   Protect Others:   Cover Your Mouth and Nose with a mask,  disposable tissue or wash cloth to avoid spreading germs to others.  Wash your hands and face frequently with soap and water.   If you have not developed a cough with fever by day 15 of isolation you are considered uninfected.  If you develop cough and fever, submit a new visit to us so [facility] can [facility].   Thank you for limiting contact with others, wearing a simple mask to cover your cough, practice good hand hygiene habits and accessing our virtual services where possible to limit the spread of this virus.  For more information about COVID19 and options for caring for yourself at home, please visit the CDC website at https://www.cdc.gov/coronavirus/2019-ncov/about/steps-when-sick.html  For more options for care at [facility], please visit our website at [email or web address]    Diagnosis: Cough  Diagnosis ICD: R05

## 2020-04-01 NOTE — TELEPHONE ENCOUNTER
Anticoagulation Summary  As of 2020    INR goal:   2.0-3.0   TTR:   85.5 % (2.3 y)   INR used for dosin.90 (2020)   Warfarin maintenance plan:   5 mg (5 mg x 1) every Mon, Fri; 2.5 mg (5 mg x 0.5) all other days   Weekly warfarin total:   22.5 mg   Plan last modified:   Bernice Nielson (2019)   Next INR check:   2020   Target end date:   Indefinite    Indications    Stroke (CMS-HCC) [I63.9] (Resolved) [I63.9]             Anticoagulation Episode Summary     INR check location:   Anticoagulation Clinic    Preferred lab:       Send INR reminders to:       Comments:         Anticoagulation Care Providers     Provider Role Specialty Phone number    [facility] Responsible  [phone]    Marshal Sethi M.D. Responsible Neurology [phone]        Anticoagulation Patient Findings          History of Present Illness: follow up appointment for chronic anticoagulation with the high risk medication, warfarin for stroke    Pt remains therapeutic today. Continue current dosing regimen.  Follow up in 8 weeks, to reduce the risk of adverse events related to this high risk medication, warfarin.  Pt declines vitals today      Bernice Nielson, Clinical Pharmacist         Pt calling ENT triage stating that he has come down with a cold after the biopsy on 12/8 and he has been coughing.  RN suggested pt continue symptom management with cough drops, tea, or OTC cough suppressants.  Pt understands recommendations.    Bijal LINARESN, RN  [phone]  [facility] ENT   Head & Neck Surgery   12/12/2017 1:43 PM

## 2020-05-04 ENCOUNTER — TELEPHONE (OUTPATIENT)
Dept: FAMILY MEDICINE | Facility: CLINIC | Age: 60
End: 2020-05-04

## 2020-05-04 DIAGNOSIS — E11.9 TYPE 2 DIABETES MELLITUS WITHOUT COMPLICATION, WITHOUT LONG-TERM CURRENT USE OF INSULIN (H): ICD-10-CM

## 2020-05-04 DIAGNOSIS — R53.83 FATIGUE, UNSPECIFIED TYPE: ICD-10-CM

## 2020-05-04 DIAGNOSIS — E78.5 HYPERLIPIDEMIA LDL GOAL <70: Primary | Chronic | ICD-10-CM

## 2020-05-04 DIAGNOSIS — Z12.5 SCREENING FOR PROSTATE CANCER: ICD-10-CM

## 2020-05-08 ENCOUNTER — TELEPHONE (OUTPATIENT)
Dept: OTOLARYNGOLOGY | Facility: CLINIC | Age: 60
End: 2020-05-08

## 2020-05-08 NOTE — TELEPHONE ENCOUNTER
Health Call Center    Phone Message    May a detailed message be left on voicemail: yes     Reason for Call: Symptoms or Concerns     If patient has red-flag symptoms, warm transfer to triage line    Current symptom or concern: increased hoarseness in voice    Symptoms have been present for:  5 day(s)    Has patient previously been seen for this? Yes    By : Dr. Fournier      Are there any new or worsening symptoms? Yes: Patient called stating he has a follow up scheduled on 6/1/20 but is having increased hoarseness in his voice that he would like to discuss, please call to discuss thank you.       Action Taken: Message routed to:  [facility] ([facility] ENT    Travel Screening: Not Applicable

## 2020-05-11 NOTE — TELEPHONE ENCOUNTER
LVM letting pt know he is welcome to reschedule appt to 5/15 as a return video visit as provider is not seeing patients in clinic at this time due to COVID-19. Left call center number for rescheduling.    Please let pt know clinic staff will reach out two days prior to help set up video visit. Pt can do telephone visit ONLY if unable to facilitate video visit.

## 2020-05-21 DIAGNOSIS — I10 HYPERTENSION GOAL BP (BLOOD PRESSURE) < 140/90: ICD-10-CM

## 2020-05-21 DIAGNOSIS — R49.0 DYSPHONIA: ICD-10-CM

## 2020-05-21 DIAGNOSIS — J38.7 LEUKOPLAKIA OF LARYNX: ICD-10-CM

## 2020-05-21 DIAGNOSIS — K21.9 GASTROESOPHAGEAL REFLUX DISEASE, ESOPHAGITIS PRESENCE NOT SPECIFIED: ICD-10-CM

## 2020-05-22 RX ORDER — HYDROCHLOROTHIAZIDE 50 MG/1
TABLET ORAL
Qty: 90 TABLET | Refills: 0 | Status: SHIPPED | OUTPATIENT
Start: 2020-05-22 | End: 2020-07-17

## 2020-05-22 RX ORDER — LOSARTAN POTASSIUM 100 MG/1
TABLET ORAL
Qty: 90 TABLET | Refills: 0 | Status: SHIPPED | OUTPATIENT
Start: 2020-05-22 | End: 2020-07-17

## 2020-06-08 ENCOUNTER — OFFICE VISIT (OUTPATIENT)
Dept: OTOLARYNGOLOGY | Facility: CLINIC | Age: 60
End: 2020-06-08
Payer: COMMERCIAL

## 2020-06-08 VITALS
SYSTOLIC BLOOD PRESSURE: 137 MMHG | HEIGHT: 70 IN | WEIGHT: 239 LBS | HEART RATE: 125 BPM | DIASTOLIC BLOOD PRESSURE: 85 MMHG | TEMPERATURE: 97.5 F | OXYGEN SATURATION: 94 % | BODY MASS INDEX: 34.22 KG/M2

## 2020-06-08 DIAGNOSIS — J38.3 VOCAL PROCESS GRANULOMA: ICD-10-CM

## 2020-06-08 DIAGNOSIS — Z85.21 HX OF LARYNGEAL CANCER: Primary | ICD-10-CM

## 2020-06-08 DIAGNOSIS — R49.0 DYSPHONIA: ICD-10-CM

## 2020-06-08 ASSESSMENT — MIFFLIN-ST. JEOR: SCORE: 1895.35

## 2020-06-08 ASSESSMENT — PAIN SCALES - GENERAL: PAINLEVEL: NO PAIN (0)

## 2020-06-08 NOTE — NURSING NOTE
"Chief Complaint   Patient presents with     RECHECK     RH follow up     Blood pressure 137/85, pulse 125, temperature 97.5  F (36.4  C), temperature source Temporal, height 1.778 m (5' 10\"), weight 108.4 kg (239 lb), SpO2 94 %.    Jeni Humphreys, EMT  "

## 2020-06-08 NOTE — PROGRESS NOTES
Dear Colleague:    Gary Iyer recently returned for follow-up at the [facility]. My clinic note from our visit is enclosed below.  Speech recognition software may have been used in the documentation below; input is reviewed before signature to the best of my ability.     I appreciate the ongoing opportunity to participate in this patient's care.    Please feel free to contact me with any questions.      Sincerely yours,  Mel Fournier M.D., M.P.H.  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  Most recently, he returned to the operating room on February 9, 2017 for an area of new irregularity of the left true vocal fold.  Pathology showed severe dysplasia with negative but close margins (for moderate, but not severe dysplasia).  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 He returns today for routine follow-up. He reports he has been doing well with voice, swallow, and breathing.      MEDICATIONS:     Current Outpatient Medications   Medication Sig Dispense Refill     allopurinol (ZYLOPRIM) 300 MG tablet TAKE 1 TABLET(300 MG) BY MOUTH DAILY 90 tablet 0     amLODIPine (NORVASC) 2.5 MG tablet Take 1 tablet (2.5 mg) by mouth daily 90 tablet 3     aspirin 81 MG tablet Take 1 tablet by mouth daily.       atorvastatin (LIPITOR) 20 MG tablet TAKE 1 TABLET(20 MG) BY MOUTH DAILY 90 tablet 1     blood glucose (NO BRAND SPECIFIED) lancets standard Use to test blood sugar 2 times daily or as directed. 100 each 11     blood glucose (NO BRAND SPECIFIED) test strip Use to test blood sugar 2 times daily or as directed. 100 each 11     blood glucose  monitoring (ALON MICROLET) lancets 1 each 2 times daily Use to test blood sugar 2 times daily or as directed. 1 Box 3     blood glucose monitoring (NO BRAND SPECIFIED) meter device kit Use to test blood sugar 2 times daily or as directed.  Accucheck meter please and all associated strips, lancets, and other supplies, 3 month supply with refills for a year. 1 kit 0     CETIRIZINE 10 MG PO tablet TAKE ONE TABLET BY MOUTH DAILY 90 tablet 3     COMPRESSION STOCKINGS 1 each daily 20-30 mmHg pressure, waist high 2 each 1     hydrochlorothiazide (HYDRODIURIL) 50 MG tablet TAKE 1 TABLET(50 MG) BY MOUTH DAILY 90 tablet 0     hydrocortisone (CORTAID) 1 % external ointment Apply topically 2 times daily as needed 30 g 1     ketoconazole (NIZORAL) 2 % cream Apply topically 2 times daily for up to 2 weeks as needed for rash 60 g 1     losartan (COZAAR) 100 MG tablet TAKE 1 TABLET(100 MG) BY MOUTH DAILY 90 tablet 0     metFORMIN (GLUCOPHAGE) 500 MG tablet TAKE 1 TABLET BY MOUTH TWICE DAILY WITH MEALS 120 tablet 1     metroNIDAZOLE (METROCREAM) 0.75 % external cream Apply topically 2 times daily as needed (to rosacea areas) 45 g 1     Multiple Vitamin (DAILY MULTIVITAMIN PO) Take  by mouth daily.       omeprazole (PRILOSEC) 20 MG DR capsule TAKE 1 CAPSULE BY MOUTH DAILY 90 capsule 0     order for DME Place rectally every 12 hours 1.8 oz container of 0.2 % nifedipine suppositories.   Apply on the anus every 12 hours.  Just get to where the anus is tight . 1 Container 5     order for DME Autocpap 10-16 cm 1 Units 0     PARoxetine (PAXIL) 20 MG tablet TAKE 2 TABLETS(40 MG) BY MOUTH DAILY 180 tablet 1     psyllium 0.52 G capsule Take 1 capsule (0.52 g) by mouth daily 100 capsule 3     tamsulosin (FLOMAX) 0.4 MG capsule TAKE 1 CAPSULE(0.4 MG) BY MOUTH DAILY 90 capsule 0     triamcinolone (KENALOG) 0.1 % cream Apply topically 2 times daily as needed for irritation 30 g 1     vitamin D2 (ERGOCALCIFEROL) 15907 units (1250 mcg) capsule  TAKE 1 CAPSULE BY MOUTH EVERY WEEK 12 capsule 0       ALLERGIES:  No Known Allergies    NEW PMH/PSH: None    REVIEW OF SYSTEMS:  The patient completed a comprehensive 11 point review of systems (below), which was reviewed. Positives are as noted below.  Patient Supplied Answers to Review of Systems   ENT ROS 4/22/2016   Ears, Nose, Throat Nasal congestion or drainage       PHYSICAL EXAM:  General: The patient was alert and conversant, and in no acute distress.    Oral cavity/oropharynx: No masses or lesions. Dentition unchanged since prior. Tongue mobility and palate elevation intact and symmetric.  Neck: No palpable cervical lymphadenopathy, no significant tenderness to palpation of the thyrohyoid space, which was narrow. No obvious thyroid abnormality. Neck landmarks somewhat indistinct to palpation due to habitus.  Resp: Breathing comfortably, no stridor or stertor.  Neuro: Symmetric facial function. Other cranial nerve function as documented above.  Psych: Normal affect, pleasant and cooperative.  Voice/speech: Mild dysphonia characterized by intermittent breathiness and strain.      Intake scores  Last 2 Scores for Patient-Answered VHI Questionnaire  VHI Total Score 2/26/2018 3/1/2019   VHI Total Score 9 17      Last 2 Scores for Patient-Answered EAT Questionnaire  EAT Total Score 2/26/2018 3/1/2019   EAT Total Score 1 5      Last 2 Scores for Patient-Answered CSI Questionnaire  CSI Total Score 2/26/2018 3/1/2019   CSI Total Score 0 0       Procedure:   Flexible fiberoptic laryngoscopy and laryngovideostroboscopy  Indications: This procedure was warranted to evaluate the patient's laryngeal anatomy and function. Risks, benefits, and alternatives were discussed.  Description: After written informed consent was obtained, a time-out was performed to confirm patient identity, procedure, and procedure site. Topical 3% lidocaine with 0.25% phenylephrine was applied to the nasal cavities. I performed the endoscopy  and no complications were apparent. Continuous and stroboscopic light were utilized to assess routine phonation and variable frequency phonation.  Performed by: Mel Fournier MD MPH  SLP: NA  Findings: Normal nasopharynx. Normal base of tongue, valleculae, and epiglottis. Vocal fold mobility: right: normal; left: normal. Medial edges of the vocal folds: smooth and straight. No focal mucosal lesions were observed on the true vocal folds. Glissade produced appropriate elongation. There was mild to moderate supraglottic recruitment with connected speech. Mucosa of false vocal folds, aryepiglottic folds, piriform sinuses, and posterior glottis unremarkable with the exception of a small recurrent granuloma left medial arytenoid, at the same location where he previously had similar findings in 2018. Airway was patent.   Highlighted on NBI.    The addition of stroboscopy allowed evaluation of the mucosal wave.   Amplitude: right: mildly decreased; left: mildly decreased. Symmetry: intermittent symmetry. Closure pattern: complete. Closure plane: at glottic level. Phase distribution: normal.           IMPRESSION AND PLAN:   Gary Iyer returns with a small recurrent left granuloma in the same location as his prior, and otherwise has no evidence of disease. We reviewed granuloma precautions, which he knows well.     I will see him back in four to six months, or sooner as needed. I appreciate the opportunity to participate in the care of this pleasant patient.

## 2020-06-08 NOTE — LETTER
6/8/2020      RE: Gary Iyer  [address] [facility] [id number]       Dear Colleague:    Gary Iyer recently returned for follow-up at the [facility]. My clinic note from our visit is enclosed below.  Speech recognition software may have been used in the documentation below; input is reviewed before signature to the best of my ability.     I appreciate the ongoing opportunity to participate in this patient's care.    Please feel free to contact me with any questions.      Sincerely yours,  Mel Fournier M.D., M.P.H.  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  Most recently, he returned to the operating room on February 9, 2017 for an area of new irregularity of the left true vocal fold.  Pathology showed severe dysplasia with negative but close margins (for moderate, but not severe dysplasia).  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 He returns today for routine follow-up. He reports he has been doing well with voice, swallow, and breathing.      MEDICATIONS:     Current Outpatient Medications   Medication Sig Dispense Refill     allopurinol (ZYLOPRIM) 300 MG tablet TAKE 1 TABLET(300 MG) BY MOUTH DAILY 90 tablet 0     amLODIPine (NORVASC) 2.5 MG tablet Take 1 tablet (2.5 mg) by mouth daily 90 tablet 3     aspirin 81 MG tablet Take 1 tablet by mouth daily.       atorvastatin (LIPITOR) 20 MG tablet TAKE 1 TABLET(20 MG) BY MOUTH DAILY 90 tablet 1     blood glucose (NO BRAND SPECIFIED) lancets standard Use to test blood sugar 2 times daily or as directed. 100 each 11     blood glucose (NO BRAND SPECIFIED) test strip Use  to test blood sugar 2 times daily or as directed. 100 each 11     blood glucose monitoring (ALON MICROLET) lancets 1 each 2 times daily Use to test blood sugar 2 times daily or as directed. 1 Box 3     blood glucose monitoring (NO BRAND SPECIFIED) meter device kit Use to test blood sugar 2 times daily or as directed.  Accucheck meter please and all associated strips, lancets, and other supplies, 3 month supply with refills for a year. 1 kit 0     CETIRIZINE 10 MG PO tablet TAKE ONE TABLET BY MOUTH DAILY 90 tablet 3     COMPRESSION STOCKINGS 1 each daily 20-30 mmHg pressure, waist high 2 each 1     hydrochlorothiazide (HYDRODIURIL) 50 MG tablet TAKE 1 TABLET(50 MG) BY MOUTH DAILY 90 tablet 0     hydrocortisone (CORTAID) 1 % external ointment Apply topically 2 times daily as needed 30 g 1     ketoconazole (NIZORAL) 2 % cream Apply topically 2 times daily for up to 2 weeks as needed for rash 60 g 1     losartan (COZAAR) 100 MG tablet TAKE 1 TABLET(100 MG) BY MOUTH DAILY 90 tablet 0     metFORMIN (GLUCOPHAGE) 500 MG tablet TAKE 1 TABLET BY MOUTH TWICE DAILY WITH MEALS 120 tablet 1     metroNIDAZOLE (METROCREAM) 0.75 % external cream Apply topically 2 times daily as needed (to rosacea areas) 45 g 1     Multiple Vitamin (DAILY MULTIVITAMIN PO) Take  by mouth daily.       omeprazole (PRILOSEC) 20 MG DR capsule TAKE 1 CAPSULE BY MOUTH DAILY 90 capsule 0     order for DME Place rectally every 12 hours 1.8 oz container of 0.2 % nifedipine suppositories.   Apply on the anus every 12 hours.  Just get to where the anus is tight . 1 Container 5     order for DME Autocpap 10-16 cm 1 Units 0     PARoxetine (PAXIL) 20 MG tablet TAKE 2 TABLETS(40 MG) BY MOUTH DAILY 180 tablet 1     psyllium 0.52 G capsule Take 1 capsule (0.52 g) by mouth daily 100 capsule 3     tamsulosin (FLOMAX) 0.4 MG capsule TAKE 1 CAPSULE(0.4 MG) BY MOUTH DAILY 90 capsule 0     triamcinolone (KENALOG) 0.1 % cream Apply topically 2 times daily as needed for  irritation 30 g 1     vitamin D2 (ERGOCALCIFEROL) 64910 units (1250 mcg) capsule TAKE 1 CAPSULE BY MOUTH EVERY WEEK 12 capsule 0       ALLERGIES:  No Known Allergies    NEW PMH/PSH: None    REVIEW OF SYSTEMS:  The patient completed a comprehensive 11 point review of systems (below), which was reviewed. Positives are as noted below.  Patient Supplied Answers to Review of Systems   ENT ROS 4/22/2016   Ears, Nose, Throat Nasal congestion or drainage       PHYSICAL EXAM:  General: The patient was alert and conversant, and in no acute distress.    Oral cavity/oropharynx: No masses or lesions. Dentition unchanged since prior. Tongue mobility and palate elevation intact and symmetric.  Neck: No palpable cervical lymphadenopathy, no significant tenderness to palpation of the thyrohyoid space, which was narrow. No obvious thyroid abnormality. Neck landmarks somewhat indistinct to palpation due to habitus.  Resp: Breathing comfortably, no stridor or stertor.  Neuro: Symmetric facial function. Other cranial nerve function as documented above.  Psych: Normal affect, pleasant and cooperative.  Voice/speech: Mild dysphonia characterized by intermittent breathiness and strain.      Intake scores  Last 2 Scores for Patient-Answered VHI Questionnaire  VHI Total Score 2/26/2018 3/1/2019   VHI Total Score 9 17      Last 2 Scores for Patient-Answered EAT Questionnaire  EAT Total Score 2/26/2018 3/1/2019   EAT Total Score 1 5      Last 2 Scores for Patient-Answered CSI Questionnaire  CSI Total Score 2/26/2018 3/1/2019   CSI Total Score 0 0       Procedure:   Flexible fiberoptic laryngoscopy and laryngovideostroboscopy  Indications: This procedure was warranted to evaluate the patient's laryngeal anatomy and function. Risks, benefits, and alternatives were discussed.  Description: After written informed consent was obtained, a time-out was performed to confirm patient identity, procedure, and procedure site. Topical 3% lidocaine with  0.25% phenylephrine was applied to the nasal cavities. I performed the endoscopy and no complications were apparent. Continuous and stroboscopic light were utilized to assess routine phonation and variable frequency phonation.  Performed by: Mel Fournier MD MPH  SLP: NA  Findings: Normal nasopharynx. Normal base of tongue, valleculae, and epiglottis. Vocal fold mobility: right: normal; left: normal. Medial edges of the vocal folds: smooth and straight. No focal mucosal lesions were observed on the true vocal folds. Glissade produced appropriate elongation. There was mild to moderate supraglottic recruitment with connected speech. Mucosa of false vocal folds, aryepiglottic folds, piriform sinuses, and posterior glottis unremarkable with the exception of a small recurrent granuloma left medial arytenoid, at the same location where he previously had similar findings in 2018. Airway was patent.   Highlighted on NBI.    The addition of stroboscopy allowed evaluation of the mucosal wave.   Amplitude: right: mildly decreased; left: mildly decreased. Symmetry: intermittent symmetry. Closure pattern: complete. Closure plane: at glottic level. Phase distribution: normal.           IMPRESSION AND PLAN:   Gary Iyer returns with a small recurrent left granuloma in the same location as his prior, and otherwise has no evidence of disease. We reviewed granuloma precautions, which he knows well.     I will see him back in four to six months, or sooner as needed. I appreciate the opportunity to participate in the care of this pleasant patient.         Mel Fournier MD

## 2020-07-02 ENCOUNTER — VIRTUAL VISIT (OUTPATIENT)
Dept: OTOLARYNGOLOGY | Facility: CLINIC | Age: 60
End: 2020-07-02
Payer: COMMERCIAL

## 2020-07-02 DIAGNOSIS — R49.0 DYSPHONIA: Primary | ICD-10-CM

## 2020-07-02 DIAGNOSIS — J38.3 VOCAL PROCESS GRANULOMA: ICD-10-CM

## 2020-07-02 NOTE — LETTER
"7/2/2020       RE: Gary Iyer  [address] [facility] [address]     Dear Colleague,    Thank you for referring your patient, Gary Iyer, to the [facility] VOICE at [facility]. Please see a copy of my visit note below.      Gary Iyer is a 59 year old male who is being cared for via a billable virtual visit.      The Gary Iyer has been notified and verbally consented to the following:     \"This billable virtual visit will be conducted between you and your provider.\"     \"Patient has opted to conduct today's billable virtual visit vs an in-person appointment, and is not able to attend due to possible exposure to COVID-19\"    Video visits are billed at different rates depending on your insurance coverage.  Please reach out to your insurance provider with any questions.    If during the course of the call the provider feels the appointment is not appropriate, you will not be charged for this service.\"     Provider has received verbal consent for billable virtual visit from the patient? Yes    Patient would like the video invitation sent by: Send to e-mail at: [email or web address]    Will anyone else be joining your video visit? The patient's wife was also present in the session.      Preferred method for receiving information email    Video Visit Details:  Call initiated at: 1:45 PM  Call ended at: 2:24 PM  Platform used to conduct today's virtual appointment: [facility]  Location of provider: Work at home  Location of patient: [facility]  Elijah Baker Jr., M.D., F.A.C.S.  Mel Fournier M.D., M.P.H.  Alma Bang M.D.  Misty Clark, Ph.D., CCC/SLP  Jasmyne Padilla M.M. (voice), M.A., CCC/SLP  Zack Mcdaniels M.M. (voice), M.A., CCC/SLP    [facility]  VOICE/SPEECH/BREATHING THERAPY RE-EVALUATION AND PROGRESS REPORT    Patient: Gary Iyer  Date of Service: 7/2/2020    Date of Last Service: 10/28/19 by our service (seen by " my associate Zack Mcdaniels); seen by Dr. Fournier in office visit by Dr. Fournier on 6/8/20  Referring physician: Dr. Fournier  Initial evaluation: 2/27/17    I had the pleasure of seeing Mr. 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  He was last seen officially (for a charged session) on 1/21/19, and has been seen very briefly twice for a no-charge consultation.  Dr. Fournier has requested that he begin doing his voice exercises again, and Mr. Iyer requested a session of therapy to remind him of his therapeutic techniques and activities.  A brief re-evaluation has been done with Dr. Fournier, with Mr. Iyer over the phone, and today, in order to support therapy.    Current status    Mr. Iyer was seen on 6/08/20 for in-clinic laryngeal examination by Dr. Fournier.  (please see her report of that day) The examination showed a very small granuloma on the left medical arytenoid wall, in the same location as a prior granuloma that resolved with therapy.  There is also the residual mucosal stiffness and irregularity of vibration from his previous excisions and biopsies.    Dr. Fournier recommended he return to his voice exercises as well as all the precautions for resolving the granuloma.  Mr. Iyer called for a refresher of his exercises, and realized he would be well-served by a single therapy session.     VOICE AND SPEECH ASSESSMENT  Mr. Iyer presents today with the following:    No complaints of any breathing or swallowing problems.    He states he does not have any loud or extensive voice use; voice needs are unremarkable.    There is no discomfort, effort, or fatigue in vocal production, so maintaining awareness of his voice technique is more problematic.    He has been doing a little of his straw exercises and humming, but it is not systematic or  "consequential    He has some cursory memory of his straw phonation exercises, but does not execute these with adequate technique  o In a demonstration of his exercises, he lets the quality become strained with roughness/leblanc after about 5 seconds; he has the impression he should hold it for 10 seconds, so he continues phonation with considerable strain and poor airflow    Voice quality:    Moderate consistent roughness, with additional glottal leblanc at the ends of utterances especially    Apparent poor airflow; breathing is often not coordinated with speech    Pitch is appropriate, though often difficult to discern due to the roughness    He has poor awareness of his voice quality or technique, but with guidance, is able to discern his improved productions  Cough/ Throat clear:    Minimal observed    THERAPEUTIC ACTIVITIES  Today Mr. Ieyr participated in the following therapeutic activities:    Demonstrated his previous exercises as he could remember; see above; I provided appropriate redirection    I provided instruction for tissue mobilization exercises, using the straw  o Using resistance with water (\"cup and bubbles\") was most facilitating  o Able to improve his technique with substantial cueing; able to reduce cueing by end of session; his wife could hear and see the difference and offer guidance as well.    [place] exercises for improved airflow during phonation.  o speech material with voiceless fricative onsets was facilitating; he did well with a counting task, progressing to simple recited material such as address and phone number  o Good improvement in respiratory coordination and airflow during phonation    [place] exercises to experience a more forward sensation during phonation.  o speech material with nasal continuants was facilitating  o able to recognize improvement in quality  o able to progress to level of sentences or simple phrases  o good learning, but will need practice    [place] concepts of " an optimal regimen for practice.  o he should use an interval schedule of practice, with brief periods of practice frequently throughout each day  o [place] concepts of volitional practice to facilitate motor learning.    I emailed instructions to help facilitate practice.    IMPRESSIONS/GOALS/PLAN  Based on today's re-evaluation, it appears that Mr. Iyer has significant dysphonia and poor vocal technique that may hinder his ability to resolve his vocal granuloma.  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     He will work on his exercises on a daily basis, and work on incorporating the techniques into his daily activities.    Goals for this practice period:     practice all exercises according to instructions    incorporate techniques into daily vocal activities    maintain vigilance for vocal technique    Plan: I will see Mr. Iyer as Dr. Fournier directs or he requests to work on education, modification, and carryover of therapeutic activities to more complex activities.  PLAN: Speech Therapy  DURATION: a single session now, other sessions as directed by Dr. Fournier    GOALS  Patient goal:    To resolve the vocal fold granuloma, and to learn techniques to allow him to do that.    Long-term goal(s): In 6 months, Mr. Iyer will:  1.  In a 20-minute conversation task, demonstrate roughness and strain that do not exceed a level of 3 out of 10, 80% of the time, by therapist judgment  2.  Demonstrate a 100% reduction of the vocal fold lesions by laryngeal exam   3.  Report improvement in dysphonia, and use of adequate voice quality to meet personal, social, and professional needs, 90% of the time during a typical week of vocal activities    Certification  period: 7/2/20 - 9/30/20    This treatment plan was developed with the patient who agreed with the recommendations.    TOTAL SERVICE TIME: 39 minutes  EVALUATION OF VOICE AND RESONANCE (17572)  TREATMENT (18238)  NO CHARGE FACILITY FEE (70923)    Misty Clark, Ph.D., [facility]-SLP  Speech-Language Pathologist  Director, [facility]  [phone]                                                                                                       Outpatient Speech Language Therapy Evaluation  PLAN OF TREATMENT FOR OUTPATIENT REHABILITATION  (COMPLETE FOR INITIAL CLAIMS ONLY)  Patient's Last Name, First Name, M.I.  YOB: 1960  Gary Iyer                           Provider's Name  Misty Clark, SLP Medical Record No.  [id number]                               Onset Date:  7/02/20   Start of Care Date: 7/02/20     Type: Speech Language Therapy Medical Diagnosis: Dysphonia                        Therapy Diagnosis:  Dysphonia.   Visits from SOC:  1   _________________________________________________________________________________  Plan of Treatment:   Speech therapy    DURATION/FREQUENCY OF TREATMENT  A single session today, with other sessions as directed by Dr. Fournier    PROGNOSIS  Good prognosis for voice improvement with speech therapy and regular practice of therapeutic activities.    BARRIERS TO LEARNING/TEACHING AND LEARNING NEEDS  None/Unremarkable    GOALS  Patient goal:    To resolve the vocal fold granuloma, and to learn techniques to allow him to do that.    Long-term goal(s): In 6 months, Mr. Iyer will:  1.  In a 20-minute conversation task, demonstrate roughness and strain that do not exceed a level of 3 out of 10, 80% of the time, by therapist judgment  2.  Demonstrate a 100% reduction of the vocal fold lesions by laryngeal exam   3.  Report improvement in dysphonia, and use of adequate voice quality to meet personal, social, and professional needs, 90% of the time during a  typical week of vocal activities      _________________________________________________________________________________    I CERTIFY THE NEED FOR THESE SERVICES FURNISHED UNDER        THIS PLAN OF TREATMENT AND WHILE UNDER MY CARE     (Physician attestation of this document indicates review and certification of the therapy plan).     Certification date from: 7/02/20  Certification date to: 9/30/20    Referring Provider: Mel Fournier MD       Again, thank you for allowing me to participate in the care of your patient.      Sincerely,    Misty Clark, SLP

## 2020-07-02 NOTE — PROGRESS NOTES
"  Gary Iyer is a 59 year old male who is being cared for via a billable virtual visit.      The Gary Iyer has been notified and verbally consented to the following:     \"This billable virtual visit will be conducted between you and your provider.\"     \"Patient has opted to conduct today's billable virtual visit vs an in-person appointment, and is not able to attend due to possible exposure to COVID-19\"    Video visits are billed at different rates depending on your insurance coverage.  Please reach out to your insurance provider with any questions.    If during the course of the call the provider feels the appointment is not appropriate, you will not be charged for this service.\"     Provider has received verbal consent for billable virtual visit from the patient? Yes    Patient would like the video invitation sent by: Send to e-mail at: [email or web address]    Will anyone else be joining your video visit? The patient's wife was also present in the session.      Preferred method for receiving information email    Video Visit Details:  Call initiated at: 1:45 PM  Call ended at: 2:24 PM  Platform used to conduct today's virtual appointment: [employer]  Location of provider: Work at home  Location of patient: [facility]  Elijah Baker Jr., M.D., F.A.C.S.  Mel Fournier M.D., M.P.H.  Alma Bang M.D.  Misty Clark, Ph.D., CCC/SLP  Jasmyne Padilla M.M. (voice), M.A., CCC/SLP  Zack Mcdaniels M.M. (voice), M.A., [facility]/SLP    [facility]  VOICE/SPEECH/BREATHING THERAPY RE-EVALUATION AND PROGRESS REPORT    Patient: Gary Iyer  Date of Service: 7/2/2020    Date of Last Service: 10/28/19 by our service (seen by my associate Zack Mcdaniels); seen by Dr. Fournier in office visit by Dr. Fuornier on 6/8/20  Referring physician: Dr. Fournier  Initial evaluation: 2/27/17    I had the pleasure of seeing Mr. 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  He was last seen officially (for a charged session) on 1/21/19, and has been seen very briefly twice for a no-charge consultation.  Dr. Fournier has requested that he begin doing his voice exercises again, and Mr. Iyer requested a session of therapy to remind him of his therapeutic techniques and activities.  A brief re-evaluation has been done with Dr. Fournier, with Mr. Iyer over the phone, and today, in order to support therapy.    Current status    Mr. Iyer was seen on 6/08/20 for in-clinic laryngeal examination by Dr. Fournier.  (please see her report of that day) The examination showed a very small granuloma on the left medical arytenoid wall, in the same location as a prior granuloma that resolved with therapy.  There is also the residual mucosal stiffness and irregularity of vibration from his previous excisions and biopsies.    Dr. Fournier recommended he return to his voice exercises as well as all the precautions for resolving the granuloma.  Mr. Iyer called for a refresher of his exercises, and realized he would be well-served by a single therapy session.     VOICE AND SPEECH ASSESSMENT  Mr. Iyer presents today with the following:    No complaints of any breathing or swallowing problems.    He states he does not have any loud or extensive voice use; voice needs are unremarkable.    There is no discomfort, effort, or fatigue in vocal production, so maintaining awareness of his voice technique is more problematic.    He has been doing a little of his straw exercises and humming, but it is not systematic or consequential    He has some cursory memory of his straw phonation exercises, but does not execute these with adequate technique  o In a demonstration of his exercises, he lets the quality become strained with roughness/leblanc after about 5 seconds; he has the impression  "he should hold it for 10 seconds, so he continues phonation with considerable strain and poor airflow    Voice quality:    Moderate consistent roughness, with additional glottal leblanc at the ends of utterances especially    Apparent poor airflow; breathing is often not coordinated with speech    Pitch is appropriate, though often difficult to discern due to the roughness    He has poor awareness of his voice quality or technique, but with guidance, is able to discern his improved productions  Cough/ Throat clear:    Minimal observed    THERAPEUTIC ACTIVITIES  Today Mr. Iyer participated in the following therapeutic activities:    Demonstrated his previous exercises as he could remember; see above; I provided appropriate redirection    I provided instruction for tissue mobilization exercises, using the straw  o Using resistance with water (\"cup and bubbles\") was most facilitating  o Able to improve his technique with substantial cueing; able to reduce cueing by end of session; his wife could hear and see the difference and offer guidance as well.    [place] exercises for improved airflow during phonation.  o speech material with voiceless fricative onsets was facilitating; he did well with a counting task, progressing to simple recited material such as address and phone number  o Good improvement in respiratory coordination and airflow during phonation    [place] exercises to experience a more forward sensation during phonation.  o speech material with nasal continuants was facilitating  o able to recognize improvement in quality  o able to progress to level of sentences or simple phrases  o good learning, but will need practice    [place] concepts of an optimal regimen for practice.  o he should use an interval schedule of practice, with brief periods of practice frequently throughout each day  o [place] concepts of volitional practice to facilitate motor learning.    I emailed instructions to help facilitate " practice.    IMPRESSIONS/GOALS/PLAN  Based on today's re-evaluation, it appears that Mr. Iyer has significant dysphonia and poor vocal technique that may hinder his ability to resolve his vocal granuloma.  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     He will work on his exercises on a daily basis, and work on incorporating the techniques into his daily activities.    Goals for this practice period:     practice all exercises according to instructions    incorporate techniques into daily vocal activities    maintain vigilance for vocal technique    Plan: I will see Mr. Iyer as Dr. Fournier directs or he requests to work on education, modification, and carryover of therapeutic activities to more complex activities.  PLAN: Speech Therapy  DURATION: a single session now, other sessions as directed by Dr. Fournier    GOALS  Patient goal:    To resolve the vocal fold granuloma, and to learn techniques to allow him to do that.    Long-term goal(s): In 6 months, Mr. Iyer will:  1.  In a 20-minute conversation task, demonstrate roughness and strain that do not exceed a level of 3 out of 10, 80% of the time, by therapist judgment  2.  Demonstrate a 100% reduction of the vocal fold lesions by laryngeal exam   3.  Report improvement in dysphonia, and use of adequate voice quality to meet personal, social, and professional needs, 90% of the time during a typical week of vocal activities    Certification period: 7/2/20 - 9/30/20    This treatment plan was developed with the patient who agreed with the recommendations.    TOTAL SERVICE TIME: 39 minutes  EVALUATION OF VOICE AND RESONANCE (15906)  TREATMENT (63960)  NO CHARGE FACILITY FEE (81426)    Misty Clark,  Ph.D., [facility]-SLP  Speech-Language Pathologist  Director, [facility]  [phone]

## 2020-07-07 NOTE — PROGRESS NOTES
Outpatient Speech Language Therapy Evaluation  PLAN OF TREATMENT FOR OUTPATIENT REHABILITATION  (COMPLETE FOR INITIAL CLAIMS ONLY)  Patient's Last Name, First Name, M.I.  YOB: 1960  Gary Iyer                           Provider's Name  Misty Clark, SLP Medical Record No.  [id number]                               Onset Date:  7/02/20   Start of Care Date: 7/02/20     Type: Speech Language Therapy Medical Diagnosis: Dysphonia                        Therapy Diagnosis:  Dysphonia.   Visits from SOC:  1   _________________________________________________________________________________  Plan of Treatment:   Speech therapy    DURATION/FREQUENCY OF TREATMENT  A single session today, with other sessions as directed by Dr. Fournier    PROGNOSIS  Good prognosis for voice improvement with speech therapy and regular practice of therapeutic activities.    BARRIERS TO LEARNING/TEACHING AND LEARNING NEEDS  None/Unremarkable    GOALS  Patient goal:    To resolve the vocal fold granuloma, and to learn techniques to allow him to do that.    Long-term goal(s): In 6 months, Mr. Iyer will:  1.  In a 20-minute conversation task, demonstrate roughness and strain that do not exceed a level of 3 out of 10, 80% of the time, by therapist judgment  2.  Demonstrate a 100% reduction of the vocal fold lesions by laryngeal exam   3.  Report improvement in dysphonia, and use of adequate voice quality to meet personal, social, and professional needs, 90% of the time during a typical week of vocal activities      _________________________________________________________________________________    I CERTIFY THE NEED FOR THESE SERVICES FURNISHED UNDER        THIS PLAN OF TREATMENT AND WHILE UNDER MY CARE     (Physician attestation of this document indicates review and certification of the therapy plan).     Certification date from:  7/02/20  Certification date to: 9/30/20    Referring Provider: Mel Fournier MD

## 2020-07-08 ENCOUNTER — TELEPHONE (OUTPATIENT)
Dept: OTOLARYNGOLOGY | Facility: CLINIC | Age: 60
End: 2020-07-08

## 2020-07-08 NOTE — TELEPHONE ENCOUNTER
LVM for patient informing that clinic never received this paperwork and requested patient to fax this paperwork to us again. Left call back number for patient to return call.    Jeni Humphreys, EMT

## 2020-07-08 NOTE — TELEPHONE ENCOUNTER
[facility] Call Center    Phone Message    May a detailed message be left on voicemail: yes     Reason for Call: Other: Pt said [employer]ax[employer] Papers to fill out 6/26 , he wanted to make sure they were faxed back, If questions please call Pt back to discuss  Thank you,    Action Taken: Message routed to:  [facility] ENT    Travel Screening: Not Applicable

## 2020-07-13 DIAGNOSIS — E11.9 TYPE 2 DIABETES MELLITUS WITHOUT COMPLICATION, WITHOUT LONG-TERM CURRENT USE OF INSULIN (H): ICD-10-CM

## 2020-07-13 NOTE — TELEPHONE ENCOUNTER
"Requested Prescriptions   Pending Prescriptions Disp Refills    metFORMIN (GLUCOPHAGE) 500 MG tablet 120 tablet 1       Biguanide Agents Failed - 7/13/2020 10:17 AM        Failed - Patient has documented A1c within the specified period of time.     If HgbA1C is 8 or greater, it needs to be on file within the past 3 months.  If less than 8, must be on file within the past 6 months.     Recent Labs   Lab Test 08/30/19  1104   A1C 7.1*             Passed - Patient is age 10 or older        Passed - Patient's CR is NOT>1.4 OR Patient's EGFR is NOT<45 within past 12 mos.     Recent Labs   Lab Test 12/02/19  1706   GFRESTIMATED >90   GFRESTBLACK >90       Recent Labs   Lab Test 12/02/19  1706   CR 0.80             Passed - Patient does NOT have a diagnosis of CHF.        Passed - Medication is active on med list        Passed - Recent (6 mo) or future (30 days) visit within the authorizing provider's specialty     Patient had office visit in the last 6 months or has a visit in the next 30 days with authorizing provider or within the authorizing provider's specialty.  See \"Patient Info\" tab in inbasket, or \"Choose Columns\" in Meds & Orders section of the refill encounter.               Routing refill request to provider for review/approval because:  Failed protocol.  Lori Mercado RN,BSN  [facility]    "

## 2020-07-15 NOTE — PROGRESS NOTES
Medicare Certification  Physician Attestation   I agree with the information in this note.    Mel Fournier MD

## 2020-07-17 ENCOUNTER — OFFICE VISIT (OUTPATIENT)
Dept: FAMILY MEDICINE | Facility: CLINIC | Age: 60
End: 2020-07-17
Payer: MEDICARE

## 2020-07-17 VITALS
WEIGHT: 239.25 LBS | OXYGEN SATURATION: 98 % | SYSTOLIC BLOOD PRESSURE: 133 MMHG | DIASTOLIC BLOOD PRESSURE: 85 MMHG | HEART RATE: 96 BPM | BODY MASS INDEX: 34.25 KG/M2 | TEMPERATURE: 98.3 F | HEIGHT: 70 IN

## 2020-07-17 DIAGNOSIS — Z00.00 ENCOUNTER FOR MEDICARE ANNUAL WELLNESS EXAM: Primary | ICD-10-CM

## 2020-07-17 DIAGNOSIS — J38.7 LEUKOPLAKIA OF LARYNX: ICD-10-CM

## 2020-07-17 DIAGNOSIS — R49.0 DYSPHONIA: ICD-10-CM

## 2020-07-17 DIAGNOSIS — I10 HYPERTENSION GOAL BP (BLOOD PRESSURE) < 140/90: ICD-10-CM

## 2020-07-17 DIAGNOSIS — E55.9 VITAMIN D DEFICIENCY DISEASE: ICD-10-CM

## 2020-07-17 DIAGNOSIS — Z23 NEED FOR HEPATITIS B VACCINATION: ICD-10-CM

## 2020-07-17 DIAGNOSIS — E78.5 HYPERLIPIDEMIA LDL GOAL <70: ICD-10-CM

## 2020-07-17 DIAGNOSIS — Z91.09 ENVIRONMENTAL ALLERGIES: ICD-10-CM

## 2020-07-17 DIAGNOSIS — Z12.5 SCREENING FOR PROSTATE CANCER: ICD-10-CM

## 2020-07-17 DIAGNOSIS — E11.9 TYPE 2 DIABETES MELLITUS WITHOUT COMPLICATION, WITHOUT LONG-TERM CURRENT USE OF INSULIN (H): ICD-10-CM

## 2020-07-17 DIAGNOSIS — R53.83 FATIGUE, UNSPECIFIED TYPE: ICD-10-CM

## 2020-07-17 DIAGNOSIS — K21.9 GASTROESOPHAGEAL REFLUX DISEASE, ESOPHAGITIS PRESENCE NOT SPECIFIED: ICD-10-CM

## 2020-07-17 DIAGNOSIS — M10.9 GOUT, UNSPECIFIED CAUSE, UNSPECIFIED CHRONICITY, UNSPECIFIED SITE: ICD-10-CM

## 2020-07-17 DIAGNOSIS — L71.9 ROSACEA: ICD-10-CM

## 2020-07-17 DIAGNOSIS — F41.9 ANXIETY: ICD-10-CM

## 2020-07-17 DIAGNOSIS — L98.9 SKIN LESIONS: ICD-10-CM

## 2020-07-17 LAB
ALBUMIN SERPL-MCNC: 3.7 G/DL (ref 3.4–5)
ALP SERPL-CCNC: 63 U/L (ref 40–150)
ALT SERPL W P-5'-P-CCNC: 75 U/L (ref 0–70)
ANION GAP SERPL CALCULATED.3IONS-SCNC: 10 MMOL/L (ref 3–14)
AST SERPL W P-5'-P-CCNC: 46 U/L (ref 0–45)
BASOPHILS # BLD AUTO: 0.1 10E9/L (ref 0–0.2)
BASOPHILS NFR BLD AUTO: 0.5 %
BILIRUB SERPL-MCNC: 0.4 MG/DL (ref 0.2–1.3)
BUN SERPL-MCNC: 10 MG/DL (ref 7–30)
CALCIUM SERPL-MCNC: 9.3 MG/DL (ref 8.5–10.1)
CHLORIDE SERPL-SCNC: 97 MMOL/L (ref 94–109)
CHOLEST SERPL-MCNC: 158 MG/DL
CO2 SERPL-SCNC: 29 MMOL/L (ref 20–32)
CREAT SERPL-MCNC: 0.75 MG/DL (ref 0.66–1.25)
CREAT UR-MCNC: 344 MG/DL
DIFFERENTIAL METHOD BLD: ABNORMAL
EOSINOPHIL # BLD AUTO: 0.3 10E9/L (ref 0–0.7)
EOSINOPHIL NFR BLD AUTO: 3.4 %
ERYTHROCYTE [DISTWIDTH] IN BLOOD BY AUTOMATED COUNT: 14.6 % (ref 10–15)
GFR SERPL CREATININE-BSD FRML MDRD: >90 ML/MIN/{1.73_M2}
GLUCOSE SERPL-MCNC: 120 MG/DL (ref 70–99)
HBA1C MFR BLD: 7 % (ref 0–5.6)
HCT VFR BLD AUTO: 39.4 % (ref 40–53)
HDLC SERPL-MCNC: 53 MG/DL
HGB BLD-MCNC: 13.2 G/DL (ref 13.3–17.7)
LDLC SERPL CALC-MCNC: 68 MG/DL
LYMPHOCYTES # BLD AUTO: 3.1 10E9/L (ref 0.8–5.3)
LYMPHOCYTES NFR BLD AUTO: 30.8 %
MCH RBC QN AUTO: 29.6 PG (ref 26.5–33)
MCHC RBC AUTO-ENTMCNC: 33.5 G/DL (ref 31.5–36.5)
MCV RBC AUTO: 88 FL (ref 78–100)
MICROALBUMIN UR-MCNC: 52 MG/L
MICROALBUMIN/CREAT UR: 14.97 MG/G CR (ref 0–17)
MONOCYTES # BLD AUTO: 1.1 10E9/L (ref 0–1.3)
MONOCYTES NFR BLD AUTO: 10.7 %
NEUTROPHILS # BLD AUTO: 5.4 10E9/L (ref 1.6–8.3)
NEUTROPHILS NFR BLD AUTO: 54.6 %
NONHDLC SERPL-MCNC: 105 MG/DL
PLATELET # BLD AUTO: 298 10E9/L (ref 150–450)
POTASSIUM SERPL-SCNC: 3.2 MMOL/L (ref 3.4–5.3)
PROT SERPL-MCNC: 8.6 G/DL (ref 6.8–8.8)
PSA SERPL-ACNC: 0.46 UG/L (ref 0–4)
RBC # BLD AUTO: 4.46 10E12/L (ref 4.4–5.9)
SODIUM SERPL-SCNC: 136 MMOL/L (ref 133–144)
T4 FREE SERPL-MCNC: 1.08 NG/DL (ref 0.76–1.46)
TRIGL SERPL-MCNC: 186 MG/DL
TSH SERPL DL<=0.005 MIU/L-ACNC: 5.97 MU/L (ref 0.4–4)
WBC # BLD AUTO: 9.9 10E9/L (ref 4–11)

## 2020-07-17 PROCEDURE — 99213 OFFICE O/P EST LOW 20 MIN: CPT | Mod: 25 | Performed by: FAMILY MEDICINE

## 2020-07-17 PROCEDURE — 36415 COLL VENOUS BLD VENIPUNCTURE: CPT | Performed by: FAMILY MEDICINE

## 2020-07-17 PROCEDURE — G0402 INITIAL PREVENTIVE EXAM: HCPCS | Performed by: FAMILY MEDICINE

## 2020-07-17 PROCEDURE — 82306 VITAMIN D 25 HYDROXY: CPT | Performed by: FAMILY MEDICINE

## 2020-07-17 PROCEDURE — 80061 LIPID PANEL: CPT | Performed by: FAMILY MEDICINE

## 2020-07-17 PROCEDURE — 85025 COMPLETE CBC W/AUTO DIFF WBC: CPT | Performed by: FAMILY MEDICINE

## 2020-07-17 PROCEDURE — G0010 ADMIN HEPATITIS B VACCINE: HCPCS | Performed by: FAMILY MEDICINE

## 2020-07-17 PROCEDURE — 84439 ASSAY OF FREE THYROXINE: CPT | Performed by: FAMILY MEDICINE

## 2020-07-17 PROCEDURE — 82043 UR ALBUMIN QUANTITATIVE: CPT | Performed by: FAMILY MEDICINE

## 2020-07-17 PROCEDURE — 99207 C FOOT EXAM  NO CHARGE: CPT | Mod: 25 | Performed by: FAMILY MEDICINE

## 2020-07-17 PROCEDURE — 83036 HEMOGLOBIN GLYCOSYLATED A1C: CPT | Performed by: FAMILY MEDICINE

## 2020-07-17 PROCEDURE — 90746 HEPB VACCINE 3 DOSE ADULT IM: CPT | Performed by: FAMILY MEDICINE

## 2020-07-17 PROCEDURE — G0103 PSA SCREENING: HCPCS | Performed by: FAMILY MEDICINE

## 2020-07-17 RX ORDER — HYDROCHLOROTHIAZIDE 50 MG/1
50 TABLET ORAL DAILY
Qty: 90 TABLET | Refills: 3 | Status: SHIPPED | OUTPATIENT
Start: 2020-07-17 | End: 2021-03-10

## 2020-07-17 RX ORDER — AMLODIPINE BESYLATE 2.5 MG/1
2.5 TABLET ORAL DAILY
Qty: 90 TABLET | Refills: 3 | Status: SHIPPED | OUTPATIENT
Start: 2020-07-17 | End: 2021-03-10

## 2020-07-17 RX ORDER — CETIRIZINE HYDROCHLORIDE 10 MG/1
10 TABLET ORAL DAILY
Qty: 90 TABLET | Refills: 3 | Status: SHIPPED | OUTPATIENT
Start: 2020-07-17 | End: 2021-03-10

## 2020-07-17 RX ORDER — ATORVASTATIN CALCIUM 20 MG/1
20 TABLET, FILM COATED ORAL DAILY
Qty: 90 TABLET | Refills: 3 | Status: SHIPPED | OUTPATIENT
Start: 2020-07-17 | End: 2021-03-10

## 2020-07-17 RX ORDER — ERGOCALCIFEROL 1.25 MG/1
CAPSULE, LIQUID FILLED ORAL
Qty: 12 CAPSULE | Refills: 0 | Status: SHIPPED | OUTPATIENT
Start: 2020-07-17 | End: 2020-12-08

## 2020-07-17 RX ORDER — ALLOPURINOL 300 MG/1
300 TABLET ORAL DAILY
Qty: 90 TABLET | Refills: 3 | Status: SHIPPED | OUTPATIENT
Start: 2020-07-17 | End: 2021-03-10

## 2020-07-17 RX ORDER — PAROXETINE 20 MG/1
TABLET, FILM COATED ORAL
Qty: 180 TABLET | Refills: 3 | Status: SHIPPED | OUTPATIENT
Start: 2020-07-17 | End: 2021-02-17

## 2020-07-17 RX ORDER — LOSARTAN POTASSIUM 100 MG/1
100 TABLET ORAL DAILY
Qty: 90 TABLET | Refills: 3 | Status: SHIPPED | OUTPATIENT
Start: 2020-07-17 | End: 2021-03-10

## 2020-07-17 ASSESSMENT — PATIENT HEALTH QUESTIONNAIRE - PHQ9
5. POOR APPETITE OR OVEREATING: NOT AT ALL
SUM OF ALL RESPONSES TO PHQ QUESTIONS 1-9: 0

## 2020-07-17 ASSESSMENT — ANXIETY QUESTIONNAIRES
5. BEING SO RESTLESS THAT IT IS HARD TO SIT STILL: NOT AT ALL
2. NOT BEING ABLE TO STOP OR CONTROL WORRYING: SEVERAL DAYS
7. FEELING AFRAID AS IF SOMETHING AWFUL MIGHT HAPPEN: SEVERAL DAYS
1. FEELING NERVOUS, ANXIOUS, OR ON EDGE: NOT AT ALL
3. WORRYING TOO MUCH ABOUT DIFFERENT THINGS: SEVERAL DAYS
IF YOU CHECKED OFF ANY PROBLEMS ON THIS QUESTIONNAIRE, HOW DIFFICULT HAVE THESE PROBLEMS MADE IT FOR YOU TO DO YOUR WORK, TAKE CARE OF THINGS AT HOME, OR GET ALONG WITH OTHER PEOPLE: NOT DIFFICULT AT ALL
GAD7 TOTAL SCORE: 3
6. BECOMING EASILY ANNOYED OR IRRITABLE: NOT AT ALL

## 2020-07-17 ASSESSMENT — MIFFLIN-ST. JEOR: SCORE: 1896.48

## 2020-07-17 ASSESSMENT — PAIN SCALES - GENERAL: PAINLEVEL: NO PAIN (0)

## 2020-07-17 NOTE — LETTER
[facility]   [address] [place]  [address]  [phone]                                   July 20, 2020    Gary Iyer  [address] [facility] [address]        Dear Gary,    Diabetes test ( hemoglobin a1c ) still looks fine.     One thyroid test ( TSH ) is a bit off but the follow up test ( free T4 ) is normal, so no thyroid medication needed.     Other labs are okay/ stable.     Results for orders placed or performed in visit on 07/17/20   Albumin Random Urine Quantitative with Creat Ratio     Status: None   Result Value Ref Range    Creatinine Urine 344 mg/dL    Albumin Urine mg/L 52 mg/L    Albumin Urine mg/g Cr 14.97 0 - 17 mg/g Cr   TSH with free T4 reflex     Status: Abnormal   Result Value Ref Range    TSH 5.97 (H) 0.40 - 4.00 mU/L   Comprehensive metabolic panel     Status: Abnormal   Result Value Ref Range    Sodium 136 133 - 144 mmol/L    Potassium 3.2 (L) 3.4 - 5.3 mmol/L    Chloride 97 94 - 109 mmol/L    Carbon Dioxide 29 20 - 32 mmol/L    Anion Gap 10 3 - 14 mmol/L    Glucose 120 (H) 70 - 99 mg/dL    Urea Nitrogen 10 7 - 30 mg/dL    Creatinine 0.75 0.66 - 1.25 mg/dL    GFR Estimate >90 >60 mL/min/[1.73_m2]    GFR Estimate If Black >90 >60 mL/min/[1.73_m2]    Calcium 9.3 8.5 - 10.1 mg/dL    Bilirubin Total 0.4 0.2 - 1.3 mg/dL    Albumin 3.7 3.4 - 5.0 g/dL    Protein Total 8.6 6.8 - 8.8 g/dL    Alkaline Phosphatase 63 40 - 150 U/L    ALT 75 (H) 0 - 70 U/L    AST 46 (H) 0 - 45 U/L   Hemoglobin A1c     Status: Abnormal   Result Value Ref Range    Hemoglobin A1C 7.0 (H) 0 - 5.6 %   Prostate spec antigen screen     Status: None   Result Value Ref Range    PSA 0.46 0 - 4 ug/L   Lipid panel reflex to direct LDL Fasting     Status: Abnormal   Result Value Ref Range    Cholesterol 158 <200 mg/dL    Triglycerides 186 (H) <150 mg/dL    HDL Cholesterol 53 >39 mg/dL    LDL Cholesterol Calculated 68 <100 mg/dL    Non HDL Cholesterol 105 <130 mg/dL   CBC with  platelets differential     Status: Abnormal   Result Value Ref Range    WBC 9.9 4.0 - 11.0 10e9/L    RBC Count 4.46 4.4 - 5.9 10e12/L    Hemoglobin 13.2 (L) 13.3 - 17.7 g/dL    Hematocrit 39.4 (L) 40.0 - 53.0 %    MCV 88 78 - 100 fl    MCH 29.6 26.5 - 33.0 pg    MCHC 33.5 31.5 - 36.5 g/dL    RDW 14.6 10.0 - 15.0 %    Platelet Count 298 150 - 450 10e9/L    % Neutrophils 54.6 %    % Lymphocytes 30.8 %    % Monocytes 10.7 %    % Eosinophils 3.4 %    % Basophils 0.5 %    Absolute Neutrophil 5.4 1.6 - 8.3 10e9/L    Absolute Lymphocytes 3.1 0.8 - 5.3 10e9/L    Absolute Monocytes 1.1 0.0 - 1.3 10e9/L    Absolute Eosinophils 0.3 0.0 - 0.7 10e9/L    Absolute Basophils 0.1 0.0 - 0.2 10e9/L    Diff Method Automated Method    T4 free     Status: None   Result Value Ref Range    T4 Free 1.08 0.76 - 1.46 ng/dL       If you have any questions please call the clinic at [phone]    Sincerely,    Finesse Beal MD  bmd

## 2020-07-17 NOTE — PATIENT INSTRUCTIONS
Patient Education   Personalized Prevention Plan  You are due for the preventive services outlined below.  Your care team is available to assist you in scheduling these services.  If you have already completed any of these items, please share that information with your care team to update in your medical record.  Health Maintenance Due   Topic Date Due     HIV Screening  07/21/1973     Hepatitis B Vaccine (1 of 3 - Risk 3-dose series) 07/21/1977     Zoster (Shingles) Vaccine (1 of 2) 07/21/2008     Annual Wellness Visit  02/16/2019     Kidney Microalbumin Urine Test  11/06/2019     Diabetic Foot Exam  11/06/2019     Depression Assessment  02/09/2020     A1C Lab  02/29/2020     Eye Exam  04/18/2020      schedule eye exam    Increase exercise     Healthy diet, watch portion size    We will send you lab results    Schedule with dermatology

## 2020-07-18 ASSESSMENT — ANXIETY QUESTIONNAIRES: GAD7 TOTAL SCORE: 3

## 2020-07-19 NOTE — RESULT ENCOUNTER NOTE
Diabetes test ( hemoglobin a1c ) still looks fine.    One thyroid test ( TSH ) is a bit off but the follow up test ( free T4 ) is normal, so no thyroid medication needed.    Other labs are okay/ stable.    Finesse Beal MD

## 2020-07-20 ENCOUNTER — TELEPHONE (OUTPATIENT)
Dept: OTOLARYNGOLOGY | Facility: CLINIC | Age: 60
End: 2020-07-20

## 2020-07-20 LAB — DEPRECATED CALCIDIOL+CALCIFEROL SERPL-MC: 34 UG/L (ref 20–75)

## 2020-07-20 NOTE — TELEPHONE ENCOUNTER
[facility] Call Center    Phone Message    May a detailed message be left on voicemail: yes     Reason for Call: Form or Letter   Type or form/letter needing completion: Life Insurance Papers from [employer]  Provider: Dr Fournier  Date form needed: as soon as you can please  Once completed: Fax form to: fax back to number on form - said these papers were faxed over to us on Friday - Please call Pt if you did not receive these papers - Thanks      Action Taken: Message routed to:  [facility] E[facility]    Travel Screening: Not Applicable

## 2020-07-21 NOTE — TELEPHONE ENCOUNTER
Spoke with patient to inform that the clinic has not received the paperwork he is requesting. Gave patient the fax number for clinic for them to fax the paperwork to us. Patient states that it will be coming from [employer]. Informed patient that writer will continue to watch for the paper work to come in. Patient expressed understanding.    Jeni Humpherys, EMT

## 2020-07-23 NOTE — TELEPHONE ENCOUNTER
[facility] Call Center    Phone Message    May a detailed message be left on voicemail: yes     Reason for Call: Other: Pt calling again to see if clinic has received paperwork from [employer]. Please advise.     Action Taken: Message routed to:  [facility] ENT    Travel Screening: Not Applicable

## 2020-07-27 ENCOUNTER — TELEPHONE (OUTPATIENT)
Dept: OTOLARYNGOLOGY | Facility: CLINIC | Age: 60
End: 2020-07-27

## 2020-07-27 NOTE — TELEPHONE ENCOUNTER
[facility] Call Center    Phone Message    May a detailed message be left on voicemail: yes     Reason for Call: Form or Letter   Type or form/letter needing completion: Release & Request Form  Provider: Dr Fournier  Date form needed: ASAP  Once completed: Fax form to: Fax back to # on form      Action Taken: Message routed to:  [facility] ENT    Travel Screening: Not Applicable

## 2020-07-28 ENCOUNTER — TELEPHONE (OUTPATIENT)
Dept: FAMILY MEDICINE | Facility: CLINIC | Age: 60
End: 2020-07-28

## 2020-07-28 NOTE — TELEPHONE ENCOUNTER
Nurse sees result note: Diabetes test ( hemoglobin a1c ) still looks fine.       One thyroid test ( TSH ) is a bit off but the follow up test ( free T4 ) is normal, so no thyroid medication needed.       Other labs are okay/ stable.       Finesse Beal MD       Attempt # 1  Called patient at home number.[phone]   Was call answered?  Yes, patient wondering if there is anything he can do to fix the thyroid lab that is off a little, nurse explained stay well hydrated - drink 6 to 8 eight ounce glasses of water per day and healthy. Mild dehydration, virus, injury can influence lab results. Patient verbalized understanding and agreement with plan and had no questions.                   Diana Klein RN  [facility]

## 2020-07-28 NOTE — TELEPHONE ENCOUNTER
Reason for Call:  Other call back    Detailed comments: Pt would like a call back to discuss results of thyroid testing he had done.    Phone Number Patient can be reached at: Home number on file [phone] (home)    Best Time: Anytime    Can we leave a detailed message on this number? NO    Call taken on 7/28/2020 at 11:36 AM by Roula Neri

## 2020-07-29 NOTE — TELEPHONE ENCOUNTER
Left message for agent at [facility] advising paper work has not been received and asked that it be faxed to the [facility] fax number in order to have it completed. Fax number of [phone] provided on voicemail as well as call back number.

## 2020-08-10 DIAGNOSIS — R39.12 BENIGN PROSTATIC HYPERPLASIA WITH WEAK URINARY STREAM: ICD-10-CM

## 2020-08-10 DIAGNOSIS — N40.1 BENIGN PROSTATIC HYPERPLASIA WITH WEAK URINARY STREAM: ICD-10-CM

## 2020-08-11 RX ORDER — TAMSULOSIN HYDROCHLORIDE 0.4 MG/1
CAPSULE ORAL
Qty: 90 CAPSULE | Refills: 0 | Status: SHIPPED | OUTPATIENT
Start: 2020-08-11 | End: 2020-11-03

## 2020-08-11 NOTE — TELEPHONE ENCOUNTER
"Requested Prescriptions   Signed Prescriptions Disp Refills    tamsulosin (FLOMAX) 0.4 MG capsule 90 capsule 0     Sig: TAKE 1 CAPSULE(0.4 MG) BY MOUTH DAILY       Alpha Blockers Passed - 8/10/2020  7:46 AM        Passed - Blood pressure under 140/90 in past 12 months     BP Readings from Last 3 Encounters:   07/17/20 133/85   06/08/20 137/85   01/25/20 134/84                 Passed - Recent (12 mo) or future (30 days) visit within the authorizing provider's specialty     Patient has had an office visit with the authorizing provider or a provider within the authorizing providers department within the previous 12 mos or has a future within next 30 days. See \"Patient Info\" tab in inbasket, or \"Choose Columns\" in Meds & Orders section of the refill encounter.              Passed - Patient does not have Tadalafil, Vardenafil, or Sildenafil on their medication list        Passed - Medication is active on med list        Passed - Patient is 18 years of age or older             Prescription approved per [facility] Refill Protocol.    Wendy Diamond RN    "

## 2020-08-22 NOTE — TELEPHONE ENCOUNTER
I put in future lab orders    Patient can schedule fasting lab appointment.    Also advise patient do a phone visit with me, maybe a few days after the lab appointment    Please inform patient    Finesse Beal MD    
Message sent to pt regarding lab and telephone visit  Anum Middleton  Team 3 Coordinator       
Reason for Call: Request for an order or referral:    Order or referral being requested: fasting lab and a1c     Date needed: as soon as possible    Has the patient been seen by the PCP for this problem? n/a    Additional comments: Patient requesting for labs, please advise when ready    Phone number Patient can be reached at:  Home number on file [phone] (home)    Best Time:  Anytime    Can we leave a detailed message on this number?  YES    Call taken on 5/4/2020 at 2:12 PM by Debbie Valadez  
Routed to PCP.  Lori LINARESN,RN  [facility]    
Awake/Alert

## 2020-09-16 ENCOUNTER — TELEPHONE (OUTPATIENT)
Dept: FAMILY MEDICINE | Facility: CLINIC | Age: 60
End: 2020-09-16

## 2020-09-16 NOTE — TELEPHONE ENCOUNTER
"Reason for call:  Patient reporting a symptom    Symptom or request: No Appetite, only takes a few bites, he states \"He feels like his head is full\"  His family has tested positive for Covid-19    Duration (how long have symptoms been present): 2-3 days    Have you been treated for this before? No    Additional comments: Patient is calling to speak with the care team of Dr Beal,  to discuss what he should do.  He was told that he did not have to be tested because he,\" probably has it too\". (This was according to his wife's doctor).  Please call.    Phone Number patient can be reached at:  Home number on file [phone] (home)    Best Time:  Any    Can we leave a detailed message on this number:  YES    Call taken on 9/16/2020 at 11:33 AM by Praveena Milton    "

## 2020-09-16 NOTE — TELEPHONE ENCOUNTER
Attempt # 1  Called patient at home number.[phone] (home)  Was call answered?  Yes, patient states just is not hungry. Patient speaking in full sentences in strong voice without apparent dyspnea. Nurse does not hear wheezing/rattles over the phone. Patient deniesy SOB/dyspnea. Nurse advised patient make sure to be drinking at least 6 to 8 eight ounce glasses of water per day, try to eat at least 3 balanced nutritious meals per day. If starts having dyspnea/SOB, blue fingers/lips/face go to ER. Patient verbalized understanding and agreement with plan and had no questions.                   Diana Klein RN  [facility]

## 2020-10-02 DIAGNOSIS — E11.9 TYPE 2 DIABETES MELLITUS WITHOUT COMPLICATION, WITHOUT LONG-TERM CURRENT USE OF INSULIN (H): ICD-10-CM

## 2020-10-21 ENCOUNTER — OFFICE VISIT (OUTPATIENT)
Dept: URGENT CARE | Facility: URGENT CARE | Age: 60
End: 2020-10-21
Payer: MEDICARE

## 2020-10-21 VITALS
HEART RATE: 107 BPM | DIASTOLIC BLOOD PRESSURE: 77 MMHG | WEIGHT: 234 LBS | BODY MASS INDEX: 33.58 KG/M2 | RESPIRATION RATE: 16 BRPM | SYSTOLIC BLOOD PRESSURE: 146 MMHG | TEMPERATURE: 97.7 F | OXYGEN SATURATION: 96 %

## 2020-10-21 DIAGNOSIS — L57.0 ACTINIC KERATOSIS: Primary | ICD-10-CM

## 2020-10-21 PROCEDURE — 17003 DESTRUCT PREMALG LES 2-14: CPT | Performed by: FAMILY MEDICINE

## 2020-10-21 PROCEDURE — 17000 DESTRUCT PREMALG LESION: CPT | Performed by: FAMILY MEDICINE

## 2020-10-21 NOTE — PROGRESS NOTES
SUBJECTIVE:   Gary Iyer is a 60 year old male presenting with a chief complaint of   Chief Complaint   Patient presents with     Derm Problem     Wart fell of right arm, red and painful, There is a wart on left hand.       60-year-old male presenting with a dry flaky lesion on his right dorsal forearm.  Also has 1 on the dorsum of his left hand.  Apparently he is concerned as he thinks he may have irritated the area while sleeping last evening apparently rubbed on the adjacent nightstand or mattress and presents with inflammation at that area redness surrounding the scaly lesion that appears to be consistent with actinic keratosis    He does see a  dermatologist although has been a couple years.    Review of Systems    Past Medical History:   Diagnosis Date     Diabetes (H)     2yr     Hypertension      Multinodular goiter (nontoxic) 6/10/2013     Sleep apnea 8yr     Family History   Problem Relation Age of Onset     C.A.D. Father          MI age 44     Hypertension Mother          93 old age     Diabetes No family hx of      Cerebrovascular Disease No family hx of      Cancer No family hx of      Glaucoma No family hx of      Macular Degeneration No family hx of      Current Outpatient Medications   Medication Sig Dispense Refill     allopurinol (ZYLOPRIM) 300 MG tablet Take 1 tablet (300 mg) by mouth daily 90 tablet 3     amLODIPine (NORVASC) 2.5 MG tablet Take 1 tablet (2.5 mg) by mouth daily 90 tablet 3     aspirin 81 MG tablet Take 1 tablet by mouth daily.       atorvastatin (LIPITOR) 20 MG tablet Take 1 tablet (20 mg) by mouth daily 90 tablet 3     blood glucose (NO BRAND SPECIFIED) lancets standard Use to test blood sugar 2 times daily or as directed. 100 each 11     blood glucose (NO BRAND SPECIFIED) test strip Use to test blood sugar 2 times daily or as directed. 100 each 11     blood glucose monitoring (ALON MICROLET) lancets 1 each 2 times daily Use to test blood sugar 2 times daily or as  directed. 1 Box 3     blood glucose monitoring (NO BRAND SPECIFIED) meter device kit Use to test blood sugar 2 times daily or as directed.  Accucheck meter please and all associated strips, lancets, and other supplies, 3 month supply with refills for a year. 1 kit 0     cetirizine (ZYRTEC) 10 MG tablet Take 1 tablet (10 mg) by mouth daily 90 tablet 3     COMPRESSION STOCKINGS 1 each daily 20-30 mmHg pressure, waist high 2 each 1     hydrochlorothiazide (HYDRODIURIL) 50 MG tablet Take 1 tablet (50 mg) by mouth daily 90 tablet 3     hydrocortisone (CORTAID) 1 % external ointment Apply topically 2 times daily as needed 30 g 1     ketoconazole (NIZORAL) 2 % cream Apply topically 2 times daily for up to 2 weeks as needed for rash 60 g 1     losartan (COZAAR) 100 MG tablet Take 1 tablet (100 mg) by mouth daily 90 tablet 3     metFORMIN (GLUCOPHAGE) 500 MG tablet TAKE 1 TABLET BY MOUTH TWICE DAILY WITH MEALS 190 tablet 1     metroNIDAZOLE (METROCREAM) 0.75 % external cream Apply topically 2 times daily as needed (to rosacea areas) 45 g 1     Multiple Vitamin (DAILY MULTIVITAMIN PO) Take  by mouth daily.       omeprazole (PRILOSEC) 20 MG DR capsule TAKE 1 CAPSULE BY MOUTH DAILY 90 capsule 3     order for DME Place rectally every 12 hours 1.8 oz container of 0.2 % nifedipine suppositories.   Apply on the anus every 12 hours.  Just get to where the anus is tight . 1 Container 5     order for DME Autocpap 10-16 cm 1 Units 0     PARoxetine (PAXIL) 20 MG tablet TAKE 2 TABLETS(40 MG) BY MOUTH DAILY 180 tablet 3     psyllium 0.52 G capsule Take 1 capsule (0.52 g) by mouth daily 100 capsule 3     tamsulosin (FLOMAX) 0.4 MG capsule TAKE 1 CAPSULE(0.4 MG) BY MOUTH DAILY 90 capsule 0     triamcinolone (KENALOG) 0.1 % cream Apply topically 2 times daily as needed for irritation 30 g 1     vitamin D2 (ERGOCALCIFEROL) 50859 units (1250 mcg) capsule TAKE 1 CAPSULE BY MOUTH EVERY WEEK 12 capsule 0     Social History     Tobacco Use      Smoking status: Former Smoker     Packs/day: 1.00     Years: 25.00     Pack years: 25.00     Quit date: 2000     Years since quittin.1     Smokeless tobacco: Never Used   Substance Use Topics     Alcohol use: Yes     Alcohol/week: 0.0 standard drinks     Comment: rare       OBJECTIVE  BP (!) 146/77 (BP Location: Left arm, Patient Position: Sitting, Cuff Size: Adult Regular)   Pulse 107   Temp 97.7  F (36.5  C) (Tympanic)   Resp 16   Wt 106.1 kg (234 lb)   SpO2 96%   BMI 33.58 kg/m      Physical Exam  Vitals signs reviewed.   Cardiovascular:      Rate and Rhythm: Normal rate.   Skin:     General: Skin is warm.      Comments: Isolated actinic keratosis dorsal right mid forearm.  Isolated with mild surrounding redness only without sign of infection minimal bleeding at the area likely related to recent trauma.    Scaly cornified actinic keratosis mid dorsum of his left hand consistent with actinic keratosis.  Without any redness inflammation or surrounding lesions.   Neurological:      Mental Status: He is alert.           ASSESSMENT:    ICD-10-CM    1. Actinic keratosis  L57.0       PLAN:  Liquid nitrogen was applied to both actinic keratosis x3 freeze thaw cycles  As directed and follow with his primary care physician for any ongoing skin exams or consideration for ongoing dermatologic exams on an annual basis. Emphasized annual primary care follow-up visits.   importance of preventive sunscreens clothing to minimize prolonged sun exposure was also described  The patient indicates understanding of these issues and agrees with the plan.   Nelson Chapin MD

## 2020-10-22 ENCOUNTER — TELEPHONE (OUTPATIENT)
Dept: FAMILY MEDICINE | Facility: CLINIC | Age: 60
End: 2020-10-22

## 2020-10-22 NOTE — TELEPHONE ENCOUNTER
Reason for Call:  Other prescription    Detailed comments: patient would like to know if he can use over the counter meds on the wart that was frozen or if he needs to just leave it alone     Phone Number Patient can be reached at: Cell number on file:    Telephone Information:   Mobile [phone]       Best Time: Any    Can we leave a detailed message on this number? YES    Call taken on 10/22/2020 at 12:52 PM by Danielito Kilpatrick

## 2020-10-22 NOTE — TELEPHONE ENCOUNTER
He should just leave it alone and follow up with his Primary if no resolution.  Please notify.  Edna Rhodes PA-C

## 2020-11-16 ENCOUNTER — OFFICE VISIT (OUTPATIENT)
Dept: OTOLARYNGOLOGY | Facility: CLINIC | Age: 60
End: 2020-11-16
Payer: MEDICARE

## 2020-11-16 VITALS
HEART RATE: 113 BPM | TEMPERATURE: 98.6 F | OXYGEN SATURATION: 96 % | HEIGHT: 70 IN | WEIGHT: 237 LBS | BODY MASS INDEX: 33.93 KG/M2

## 2020-11-16 DIAGNOSIS — R49.0 DYSPHONIA: Primary | ICD-10-CM

## 2020-11-16 DIAGNOSIS — Z85.21 HX OF LARYNGEAL CANCER: ICD-10-CM

## 2020-11-16 PROCEDURE — 99213 OFFICE O/P EST LOW 20 MIN: CPT | Mod: 25 | Performed by: OTOLARYNGOLOGY

## 2020-11-16 PROCEDURE — 31579 LARYNGOSCOPY TELESCOPIC: CPT | Performed by: OTOLARYNGOLOGY

## 2020-11-16 ASSESSMENT — PAIN SCALES - GENERAL: PAINLEVEL: NO PAIN (0)

## 2020-11-16 ASSESSMENT — MIFFLIN-ST. JEOR: SCORE: 1881.27

## 2020-11-16 NOTE — LETTER
11/16/2020      RE: Gary Iyer  [address] [facility]  [place] [address]       Dear Colleague:    Gary Iyer recently returned for follow-up at the [facility]. My clinic note from our visit is enclosed below.  Speech recognition software may have been used in the documentation below; input is reviewed before signature to the best of my ability.     I appreciate the ongoing opportunity to participate in this patient's care.    Please feel free to contact me with any questions.      Sincerely yours,  Mel Fournier M.D., M.P.H.  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  Most recently, he returned to the operating room on February 9, 2017 for an area of new irregularity of the left true vocal fold.  Pathology showed severe dysplasia with negative but close margins (for moderate, but not severe dysplasia).  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 He returns today for routine follow-up. He reports he has been doing well with voice, swallow, and breathing. No new concerns today. He is being careful with COVID precautions and has not been ill during the pandemic. He reports he has continued to do voice exercises and thinks they are helping him keep his voice in good shape.      MEDICATIONS:     Current Outpatient Medications   Medication Sig Dispense Refill     allopurinol (ZYLOPRIM) 300 MG tablet Take 1 tablet (300 mg) by mouth daily 90 tablet 3     amLODIPine (NORVASC) 2.5 MG tablet Take 1 tablet (2.5 mg) by mouth daily 90 tablet 3     aspirin 81 MG tablet Take 1 tablet by mouth daily.       atorvastatin (LIPITOR) 20 MG tablet  Take 1 tablet (20 mg) by mouth daily 90 tablet 3     blood glucose (NO BRAND SPECIFIED) lancets standard Use to test blood sugar 2 times daily or as directed. 100 each 11     blood glucose (NO BRAND SPECIFIED) test strip Use to test blood sugar 2 times daily or as directed. 100 each 11     blood glucose monitoring (ALON MICROLET) lancets 1 each 2 times daily Use to test blood sugar 2 times daily or as directed. 1 Box 3     blood glucose monitoring (NO BRAND SPECIFIED) meter device kit Use to test blood sugar 2 times daily or as directed.  Accucheck meter please and all associated strips, lancets, and other supplies, 3 month supply with refills for a year. 1 kit 0     cetirizine (ZYRTEC) 10 MG tablet Take 1 tablet (10 mg) by mouth daily 90 tablet 3     COMPRESSION STOCKINGS 1 each daily 20-30 mmHg pressure, waist high 2 each 1     hydrochlorothiazide (HYDRODIURIL) 50 MG tablet Take 1 tablet (50 mg) by mouth daily 90 tablet 3     hydrocortisone (CORTAID) 1 % external ointment Apply topically 2 times daily as needed 30 g 1     ketoconazole (NIZORAL) 2 % cream Apply topically 2 times daily for up to 2 weeks as needed for rash 60 g 1     losartan (COZAAR) 100 MG tablet Take 1 tablet (100 mg) by mouth daily 90 tablet 3     metFORMIN (GLUCOPHAGE) 500 MG tablet TAKE 1 TABLET BY MOUTH TWICE DAILY WITH MEALS 190 tablet 1     metroNIDAZOLE (METROCREAM) 0.75 % external cream Apply topically 2 times daily as needed (to rosacea areas) 45 g 1     Multiple Vitamin (DAILY MULTIVITAMIN PO) Take  by mouth daily.       omeprazole (PRILOSEC) 20 MG DR capsule TAKE 1 CAPSULE BY MOUTH DAILY 90 capsule 3     order for DME Place rectally every 12 hours 1.8 oz container of 0.2 % nifedipine suppositories.   Apply on the anus every 12 hours.  Just get to where the anus is tight . 1 Container 5     order for DME Autocpap 10-16 cm 1 Units 0     PARoxetine (PAXIL) 20 MG tablet TAKE 2 TABLETS(40 MG) BY MOUTH DAILY 180 tablet 3     psyllium 0.52 G  capsule Take 1 capsule (0.52 g) by mouth daily 100 capsule 3     tamsulosin (FLOMAX) 0.4 MG capsule TAKE 1 CAPSULE(0.4 MG) BY MOUTH DAILY 90 capsule 2     triamcinolone (KENALOG) 0.1 % cream Apply topically 2 times daily as needed for irritation 30 g 1     vitamin D2 (ERGOCALCIFEROL) 54398 units (1250 mcg) capsule TAKE 1 CAPSULE BY MOUTH EVERY WEEK 12 capsule 0       ALLERGIES:  No Known Allergies    NEW PMH/PSH: None    REVIEW OF SYSTEMS:  The patient completed a comprehensive 11 point review of systems (below), which was reviewed. Positives are as noted below.  Patient Supplied Answers to Review of Systems  [facility] ROS 4/22/2016   Ears, Nose, Throat Nasal congestion or drainage       PHYSICAL EXAM:  General: The patient was alert and conversant, and in no acute distress.    Neck: No palpable cervical lymphadenopathy, no obvious thyroid abnormality. Landmarks somewhat indistinct due to habitus.  Resp: Breathing comfortably, no stridor or stertor.  Neuro: Symmetric facial function.  Psych: Normal affect, pleasant and cooperative.  Voice/speech: No significant dysphonia.      Intake scores  Last 2 Scores for Patient-Answered VHI Questionnaire  VHI Total Score 2/26/2018 3/1/2019   VHI Total Score 9 17      Last 2 Scores for Patient-Answered EAT Questionnaire  EAT Total Score 2/26/2018 3/1/2019   EAT Total Score 1 5      Last 2 Scores for Patient-Answered CSI Questionnaire  CSI Total Score 2/26/2018 3/1/2019   CSI Total Score 0 0         Procedure:   Flexible fiberoptic laryngoscopy and laryngovideostroboscopy  Indications: This procedure was warranted to evaluate the patient's laryngeal anatomy and function. Risks, benefits, and alternatives were discussed.  Description: After written informed consent was obtained, a time-out was performed to confirm patient identity, procedure, and procedure site. Topical 3% lidocaine with 0.25% phenylephrine was applied to the nasal cavities. I performed the endoscopy and no  complications were apparent. Continuous and stroboscopic light were utilized to assess routine phonation and variable frequency phonation.  Performed by: Mel Fournier MD MPH  SLP: NA  Findings: Normal nasopharynx. Normal base of tongue, valleculae, and epiglottis. Vocal fold mobility: right: normal; left: normal. Medial edges of the vocal folds: smooth and straight. Left with mild diffuse erythema, stable lateral leukoplakia vs fibrosis. Glissade produced appropriate elongation. Mucosa of false vocal folds, aryepiglottic folds, piriform sinuses, and posterior glottis unremarkable. Airway was patent.   No focal lesions on NBI. No recurrence of prior granuloma.    The addition of stroboscopy allowed evaluation of the mucosal wave.   Amplitude: right: mildly decreased; left: mildly decreased. Symmetry: intermittent symmetry. Closure pattern: complete. Closure plane: at glottic level. Phase distribution: normal.               IMPRESSION AND PLAN:   Gary Iyer returns with a stable exam. We were both glad to see that his granuloma has not recurred and his vocal folds look stable. He is doing well vocally and I encouraged him to continue speech exercises as they do seem to be helping him stay in good condition.    He will return in six months or sooner as needed. I appreciate the opportunity to participate in the care of this pleasant patient.     Today's visit required additional screening time, PPE, and cleaning measures to allow for a safe in-person visit, due to the public health emergency.        Mel Fournier MD

## 2020-11-16 NOTE — PATIENT INSTRUCTIONS
1.  You were seen in the ENT Clinic today by . If you have any questions or concerns after your appointment, please call [phone]. Press option #1 for scheduling related needs. Press option #3 for Nurse advice.    2.  Plan is to return to clinic in 6 months      Lisa Hazel LPN  [facility] - Otolaryngology

## 2020-11-16 NOTE — NURSING NOTE
"Chief Complaint   Patient presents with     RECHECK     4-6 month follow up       Pulse 113, temperature 98.6  F (37  C), temperature source Temporal, height 1.778 m (5' 10\"), weight 107.5 kg (237 lb), SpO2 96 %.    Jeni Humphreys, EMT  "

## 2020-11-16 NOTE — PROGRESS NOTES
Dear Colleague:    Gary Iyer recently returned for follow-up at the [facility]. My clinic note from our visit is enclosed below.  Speech recognition software may have been used in the documentation below; input is reviewed before signature to the best of my ability.     I appreciate the ongoing opportunity to participate in this patient's care.    Please feel free to contact me with any questions.      Sincerely yours,  Mel Fournier M.D., M.P.H.  , Laryngology  Director, 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  Most recently, he returned to the operating room on February 9, 2017 for an area of new irregularity of the left true vocal fold.  Pathology showed severe dysplasia with negative but close margins (for moderate, but not severe dysplasia).  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 He returns today for routine follow-up. He reports he has been doing well with voice, swallow, and breathing. No new concerns today. He is being careful with COVID precautions and has not been ill during the pandemic. He reports he has continued to do voice exercises and thinks they are helping him keep his voice in good shape.      MEDICATIONS:     Current Outpatient Medications   Medication Sig Dispense Refill     allopurinol (ZYLOPRIM) 300 MG tablet Take 1 tablet (300 mg) by mouth daily 90 tablet 3     amLODIPine (NORVASC) 2.5 MG tablet Take 1 tablet (2.5 mg) by mouth daily 90 tablet 3     aspirin 81 MG tablet Take 1 tablet by mouth daily.       atorvastatin (LIPITOR) 20 MG tablet Take 1 tablet (20 mg) by mouth daily 90 tablet 3     blood glucose (NO BRAND  SPECIFIED) lancets standard Use to test blood sugar 2 times daily or as directed. 100 each 11     blood glucose (NO BRAND SPECIFIED) test strip Use to test blood sugar 2 times daily or as directed. 100 each 11     blood glucose monitoring (ALON MICROLET) lancets 1 each 2 times daily Use to test blood sugar 2 times daily or as directed. 1 Box 3     blood glucose monitoring (NO BRAND SPECIFIED) meter device kit Use to test blood sugar 2 times daily or as directed.  Accucheck meter please and all associated strips, lancets, and other supplies, 3 month supply with refills for a year. 1 kit 0     cetirizine (ZYRTEC) 10 MG tablet Take 1 tablet (10 mg) by mouth daily 90 tablet 3     COMPRESSION STOCKINGS 1 each daily 20-30 mmHg pressure, waist high 2 each 1     hydrochlorothiazide (HYDRODIURIL) 50 MG tablet Take 1 tablet (50 mg) by mouth daily 90 tablet 3     hydrocortisone (CORTAID) 1 % external ointment Apply topically 2 times daily as needed 30 g 1     ketoconazole (NIZORAL) 2 % cream Apply topically 2 times daily for up to 2 weeks as needed for rash 60 g 1     losartan (COZAAR) 100 MG tablet Take 1 tablet (100 mg) by mouth daily 90 tablet 3     metFORMIN (GLUCOPHAGE) 500 MG tablet TAKE 1 TABLET BY MOUTH TWICE DAILY WITH MEALS 190 tablet 1     metroNIDAZOLE (METROCREAM) 0.75 % external cream Apply topically 2 times daily as needed (to rosacea areas) 45 g 1     Multiple Vitamin (DAILY MULTIVITAMIN PO) Take  by mouth daily.       omeprazole (PRILOSEC) 20 MG DR capsule TAKE 1 CAPSULE BY MOUTH DAILY 90 capsule 3     order for DME Place rectally every 12 hours 1.8 oz container of 0.2 % nifedipine suppositories.   Apply on the anus every 12 hours.  Just get to where the anus is tight . 1 Container 5     order for DME Autocpap 10-16 cm 1 Units 0     PARoxetine (PAXIL) 20 MG tablet TAKE 2 TABLETS(40 MG) BY MOUTH DAILY 180 tablet 3     psyllium 0.52 G capsule Take 1 capsule (0.52 g) by mouth daily 100 capsule 3     tamsulosin  (FLOMAX) 0.4 MG capsule TAKE 1 CAPSULE(0.4 MG) BY MOUTH DAILY 90 capsule 2     triamcinolone (KENALOG) 0.1 % cream Apply topically 2 times daily as needed for irritation 30 g 1     vitamin D2 (ERGOCALCIFEROL) 54471 units (1250 mcg) capsule TAKE 1 CAPSULE BY MOUTH EVERY WEEK 12 capsule 0       ALLERGIES:  No Known Allergies    NEW PMH/PSH: None    REVIEW OF SYSTEMS:  The patient completed a comprehensive 11 point review of systems (below), which was reviewed. Positives are as noted below.  Patient Supplied Answers to Review of Systems   ENT ROS 4/22/2016   Ears, Nose, Throat Nasal congestion or drainage       PHYSICAL EXAM:  General: The patient was alert and conversant, and in no acute distress.    Neck: No palpable cervical lymphadenopathy, no obvious thyroid abnormality. Landmarks somewhat indistinct due to habitus.  Resp: Breathing comfortably, no stridor or stertor.  Neuro: Symmetric facial function.  Psych: Normal affect, pleasant and cooperative.  Voice/speech: No significant dysphonia.      Intake scores  Last 2 Scores for Patient-Answered VHI Questionnaire  VHI Total Score 2/26/2018 3/1/2019   VHI Total Score 9 17      Last 2 Scores for Patient-Answered EAT Questionnaire  EAT Total Score 2/26/2018 3/1/2019   EAT Total Score 1 5      Last 2 Scores for Patient-Answered CSI Questionnaire  CSI Total Score 2/26/2018 3/1/2019   CSI Total Score 0 0         Procedure:   Flexible fiberoptic laryngoscopy and laryngovideostroboscopy  Indications: This procedure was warranted to evaluate the patient's laryngeal anatomy and function. Risks, benefits, and alternatives were discussed.  Description: After written informed consent was obtained, a time-out was performed to confirm patient identity, procedure, and procedure site. Topical 3% lidocaine with 0.25% phenylephrine was applied to the nasal cavities. I performed the endoscopy and no complications were apparent. Continuous and stroboscopic light were utilized to  assess routine phonation and variable frequency phonation.  Performed by: Mel Fournier MD MPH  SLP: NA  Findings: Normal nasopharynx. Normal base of tongue, valleculae, and epiglottis. Vocal fold mobility: right: normal; left: normal. Medial edges of the vocal folds: smooth and straight. Left with mild diffuse erythema, stable lateral leukoplakia vs fibrosis. Glissade produced appropriate elongation. Mucosa of false vocal folds, aryepiglottic folds, piriform sinuses, and posterior glottis unremarkable. Airway was patent.   No focal lesions on NBI. No recurrence of prior granuloma.    The addition of stroboscopy allowed evaluation of the mucosal wave.   Amplitude: right: mildly decreased; left: mildly decreased. Symmetry: intermittent symmetry. Closure pattern: complete. Closure plane: at glottic level. Phase distribution: normal.               IMPRESSION AND PLAN:   Gary Iyer returns with a stable exam. We were both glad to see that his granuloma has not recurred and his vocal folds look stable. He is doing well vocally and I encouraged him to continue speech exercises as they do seem to be helping him stay in good condition.    He will return in six months or sooner as needed. I appreciate the opportunity to participate in the care of this pleasant patient.     Today's visit required additional screening time, PPE, and cleaning measures to allow for a safe in-person visit, due to the public health emergency.

## 2020-11-25 DIAGNOSIS — E78.5 HYPERLIPIDEMIA LDL GOAL <70: Chronic | ICD-10-CM

## 2020-11-25 DIAGNOSIS — E11.9 TYPE 2 DIABETES MELLITUS WITHOUT COMPLICATION, WITHOUT LONG-TERM CURRENT USE OF INSULIN (H): ICD-10-CM

## 2020-11-25 DIAGNOSIS — Z12.5 SCREENING FOR PROSTATE CANCER: ICD-10-CM

## 2020-11-25 DIAGNOSIS — R53.83 FATIGUE, UNSPECIFIED TYPE: ICD-10-CM

## 2020-11-25 LAB
ALBUMIN SERPL-MCNC: 3.6 G/DL (ref 3.4–5)
ALP SERPL-CCNC: 63 U/L (ref 40–150)
ALT SERPL W P-5'-P-CCNC: 70 U/L (ref 0–70)
ANION GAP SERPL CALCULATED.3IONS-SCNC: 4 MMOL/L (ref 3–14)
AST SERPL W P-5'-P-CCNC: 47 U/L (ref 0–45)
BASOPHILS # BLD AUTO: 0.1 10E9/L (ref 0–0.2)
BASOPHILS NFR BLD AUTO: 0.6 %
BILIRUB SERPL-MCNC: 0.6 MG/DL (ref 0.2–1.3)
BUN SERPL-MCNC: 9 MG/DL (ref 7–30)
CALCIUM SERPL-MCNC: 8.8 MG/DL (ref 8.5–10.1)
CHLORIDE SERPL-SCNC: 97 MMOL/L (ref 94–109)
CHOLEST SERPL-MCNC: 165 MG/DL
CO2 SERPL-SCNC: 33 MMOL/L (ref 20–32)
CREAT SERPL-MCNC: 0.66 MG/DL (ref 0.66–1.25)
DIFFERENTIAL METHOD BLD: ABNORMAL
EOSINOPHIL # BLD AUTO: 0.3 10E9/L (ref 0–0.7)
EOSINOPHIL NFR BLD AUTO: 3.7 %
ERYTHROCYTE [DISTWIDTH] IN BLOOD BY AUTOMATED COUNT: 15.2 % (ref 10–15)
GFR SERPL CREATININE-BSD FRML MDRD: >90 ML/MIN/{1.73_M2}
GLUCOSE SERPL-MCNC: 147 MG/DL (ref 70–99)
HBA1C MFR BLD: 6.9 % (ref 0–5.6)
HCT VFR BLD AUTO: 37.8 % (ref 40–53)
HDLC SERPL-MCNC: 59 MG/DL
HGB BLD-MCNC: 12.5 G/DL (ref 13.3–17.7)
LDLC SERPL CALC-MCNC: 79 MG/DL
LYMPHOCYTES # BLD AUTO: 2.6 10E9/L (ref 0.8–5.3)
LYMPHOCYTES NFR BLD AUTO: 30.1 %
MCH RBC QN AUTO: 29.6 PG (ref 26.5–33)
MCHC RBC AUTO-ENTMCNC: 33.1 G/DL (ref 31.5–36.5)
MCV RBC AUTO: 90 FL (ref 78–100)
MONOCYTES # BLD AUTO: 0.9 10E9/L (ref 0–1.3)
MONOCYTES NFR BLD AUTO: 9.7 %
NEUTROPHILS # BLD AUTO: 4.9 10E9/L (ref 1.6–8.3)
NEUTROPHILS NFR BLD AUTO: 55.9 %
NONHDLC SERPL-MCNC: 106 MG/DL
PLATELET # BLD AUTO: 296 10E9/L (ref 150–450)
POTASSIUM SERPL-SCNC: 3.4 MMOL/L (ref 3.4–5.3)
PROT SERPL-MCNC: 8.3 G/DL (ref 6.8–8.8)
PSA SERPL-ACNC: 0.57 UG/L (ref 0–4)
RBC # BLD AUTO: 4.22 10E12/L (ref 4.4–5.9)
SODIUM SERPL-SCNC: 134 MMOL/L (ref 133–144)
TRIGL SERPL-MCNC: 135 MG/DL
WBC # BLD AUTO: 8.7 10E9/L (ref 4–11)

## 2020-11-25 PROCEDURE — 80053 COMPREHEN METABOLIC PANEL: CPT | Performed by: FAMILY MEDICINE

## 2020-11-25 PROCEDURE — 36415 COLL VENOUS BLD VENIPUNCTURE: CPT | Performed by: FAMILY MEDICINE

## 2020-11-25 PROCEDURE — 80061 LIPID PANEL: CPT | Performed by: FAMILY MEDICINE

## 2020-11-25 PROCEDURE — 85025 COMPLETE CBC W/AUTO DIFF WBC: CPT | Performed by: FAMILY MEDICINE

## 2020-11-25 PROCEDURE — G0103 PSA SCREENING: HCPCS | Performed by: FAMILY MEDICINE

## 2020-11-25 PROCEDURE — 83036 HEMOGLOBIN GLYCOSYLATED A1C: CPT | Performed by: FAMILY MEDICINE

## 2020-11-26 NOTE — RESULT ENCOUNTER NOTE
The red blood count ( hemoglobin ) is low.  See us in the next 2-3 months in clinic so we can recheck this.  After Dec4, we will be located at the [facility] ( they are closing [place]).    Other labs are okay.    Finesse Beal MD

## 2021-02-09 ENCOUNTER — TELEPHONE (OUTPATIENT)
Dept: FAMILY MEDICINE | Facility: CLINIC | Age: 61
End: 2021-02-09

## 2021-02-09 DIAGNOSIS — F41.9 ANXIETY: Primary | Chronic | ICD-10-CM

## 2021-02-11 NOTE — TELEPHONE ENCOUNTER
Is there a medical necessity for the patient taking 2 tablets of the 20mg tablets once daily instead of using 1 of the 40mg tablets once daily.  If provider is okay with using the 40mg tablets please send new script to pharmacy.  If provider wants to use the 20mg tablets, please provide a statement of medical necessity.

## 2021-02-17 RX ORDER — PAROXETINE 40 MG/1
40 TABLET, FILM COATED ORAL EVERY MORNING
Qty: 90 TABLET | Refills: 1 | Status: SHIPPED | OUTPATIENT
Start: 2021-02-17 | End: 2021-03-10

## 2021-02-17 NOTE — TELEPHONE ENCOUNTER
Left message on voicemail for patient to return call to the clinic at [phone]    Carlos Morris RN  [facility]

## 2021-02-23 ENCOUNTER — TELEPHONE (OUTPATIENT)
Dept: FAMILY MEDICINE | Facility: CLINIC | Age: 61
End: 2021-02-23

## 2021-02-23 NOTE — TELEPHONE ENCOUNTER
History  Chief Complaint   Patient presents with    Abdominal Pain     Having RUQ abdominal pain with nausea for about 4 days  Having softer stools and a headache  Has been taking Zofran without relief  17-year-old female presents with 4 day history of abdominal pain  Most prominent the right upper quadrant  Has a remote h/o cholecystectomy  Patient has been very nauseated she has been taking Zofran it makes her mouth  Watering but the nausea persists she has not vomited she has been able to tolerate fluids but has no appetite her abdomen is slightly bloated the pain is up under the ribs  She also is complaining of some left shoulder pain that is not made worse with movement  She does have a headache  No fever or chills has been hot and cold slightly lightheaded; she denies any vaginal discharge last menstrual periods have been normal and monthly she has had some dysuria but no increased urinary frequency and has decreased urine output  She has had some loose stools intermixed with solid stools no melena or hematochezia he did have a bout of diverticulitis in August of last year this feels somewhat similar  She denies any trauma or fall she has had no back pain no pleuritic pain she is not short of breath she denies any chest pain  Prior to Admission Medications   Prescriptions Last Dose Informant Patient Reported? Taking?    Insulin Glargine (BASAGLAR KWIKPEN SC) Not Taking at Unknown time  Yes No   Sig: Inject under the skin   Prenatal Vit-Fe Fumarate-FA (PRENATAL 1+1 PO) Not Taking at Unknown time  Yes No   Sig: Take by mouth   albuterol (PROVENTIL HFA,VENTOLIN HFA) 90 mcg/act inhaler   Yes Yes   Sig: Inhale 2 puffs every 6 (six) hours as needed for wheezing   aspirin 81 mg chewable tablet Not Taking at Unknown time  Yes No   Sig: Chew 81 mg daily   cholecalciferol (VITAMIN D3) 1,000 units tablet Not Taking at Unknown time  Yes No   Sig: Take 1,000 Units by mouth daily   dicyclomine (BENTYL) Reason for Call: Request for an order or referral:    Order or referral being requested: DME order for diabetic shoes    Date needed: as soon as possible    Has the patient been seen by the PCP for this problem? Not Applicable    Additional comments: Please fax DME order to [facility] at [phone].    Phone number Patient can be reached at:  Home number on file [phone] (home)    Best Time:  any    Can we leave a detailed message on this number?  YES    Call taken on 2/8/2017 at 12:32 PM by Cheryl Saucedo     20 mg tablet Not Taking at Unknown time  No No   Sig: Take 1 tablet (20 mg total) by mouth 2 (two) times a day   Patient not taking: Reported on 2021   mometasone (ELOCON) 0 1 % lotion Not Taking at Unknown time  No No   Sig: Apply topically daily Use for short duration only   Patient not taking: Reported on 2/10/2020   ondansetron (ZOFRAN-ODT) 4 mg disintegrating tablet 2021 at Unknown time  No Yes   Sig: Take 1 tablet (4 mg total) by mouth every 6 (six) hours as needed for nausea   predniSONE 10 mg tablet Not Taking at Unknown time  No No   Sig: Take 3 tabs BID X 2 days, 2 tabs BID X 2 days, 1 tab BID X 2 days, 1 tab daily X 2 days   Patient not taking: Reported on 2020      Facility-Administered Medications: None       Past Medical History:   Diagnosis Date    Asthma     last assessed 10/15/15    Diabetes mellitus ([facility] [address] )     gestational      GERD (gastroesophageal reflux disease)     last assessed 10/15/15    Gestational diabetes        Past Surgical History:   Procedure Laterality Date     SECTION      last assessed 14    CHOLECYSTECTOMY      last assessed 14    DILATION AND CURETTAGE OF UTERUS      DILATION AND CURETTAGE OF UTERUS      OTHER SURGICAL HISTORY Right     right fallopian tube removed    SC LAP,FULGURATE/EXCISE LESIONS Right 2017    Procedure: LAPAROSCOPIC OVARIAN CYSTECTOMY VERSUS OOPHERECTOMY;  Surgeon: Caren Whatley MD;  [facility];  Service: Gynecology    SC REVISE MEDIAN N/CARPAL TUNNEL SURG Right 3/14/2018    Procedure: RELEASE CARPAL TUNNEL;  Surgeon: Kayce Seay MD;  [facility];  Service: Orthopedics    SALPINGECTOMY      right side    TONSILLECTOMY      last assessed 14       Family History   Problem Relation Age of Onset    Arthritis Mother     Atrial fibrillation Mother     COPD Mother     Hypertension Mother     Other Mother         urinary incontinence    Depression Father     Anxiety disorder Father     Arthritis Father     Diabetes Father     Hypertension Father     Heart disease Father     Hyperlipidemia Father     Hydrocephalus Father     Urinary tract infection Father     Hodgkin's lymphoma Brother         and non hodgkin's-per allscripts    Stomach cancer Maternal Aunt    Julieann Frankel Migraines Daughter     Other Daughter         neuroblastoma    Jaundice Daughter     Lung disease Maternal Uncle         mesothelioma     I have reviewed and agree with the history as documented  E-Cigarette/Vaping    E-Cigarette Use Never User      E-Cigarette/Vaping Substances     Social History     Tobacco Use    Smoking status: Former Smoker     Packs/day: 0 50     Years: 20 00     Pack years: 10 00     Types: Cigarettes     Quit date:      Years since quittin 1    Smokeless tobacco: Never Used    Tobacco comment:    Substance Use Topics    Alcohol use: Yes     Comment: rarely; social    Drug use: No       Review of Systems   Constitutional: Positive for activity change, appetite change and fatigue  Negative for chills, diaphoresis and fever  HENT: Negative for congestion, ear pain, rhinorrhea, sneezing and sore throat  Eyes: Negative for discharge  Respiratory: Negative for cough and shortness of breath  Cardiovascular: Negative for chest pain and leg swelling  Gastrointestinal: Positive for abdominal distention, abdominal pain, diarrhea and nausea  Negative for blood in stool and vomiting  Endocrine: Negative for polyuria  Genitourinary: Positive for decreased urine volume and dysuria  Negative for frequency, menstrual problem, urgency, vaginal bleeding and vaginal discharge  Musculoskeletal: Negative for back pain, myalgias and neck pain  Skin: Negative for rash  Neurological: Positive for light-headedness and headaches  Negative for dizziness and numbness  Hematological: Negative for adenopathy  Psychiatric/Behavioral: Negative for confusion     All other systems reviewed and are negative  Physical Exam  Physical Exam  Vitals signs reviewed  Constitutional:       General: She is not in acute distress  Appearance: She is well-developed  She is not ill-appearing, toxic-appearing or diaphoretic  HENT:      Head: Normocephalic and atraumatic  Right Ear: Tympanic membrane and external ear normal       Left Ear: Tympanic membrane and external ear normal       Nose: Nose normal       Mouth/Throat:      Mouth: Mucous membranes are moist       Pharynx: No oropharyngeal exudate  Eyes:      General:         Right eye: No discharge  Left eye: No discharge  Extraocular Movements: Extraocular movements intact  Conjunctiva/sclera: Conjunctivae normal       Pupils: Pupils are equal, round, and reactive to light  Neck:      Musculoskeletal: Normal range of motion and neck supple  No neck rigidity  Comments: No midline or paraspinous tenderness  Cardiovascular:      Rate and Rhythm: Normal rate and regular rhythm  Pulses: Normal pulses  Heart sounds: Normal heart sounds  Pulmonary:      Effort: Pulmonary effort is normal  No respiratory distress  Breath sounds: Normal breath sounds  No stridor  No wheezing, rhonchi or rales  Abdominal:      General: Bowel sounds are normal  There is no distension  Palpations: Abdomen is soft  Tenderness: There is abdominal tenderness (BUQ)  There is no right CVA tenderness, left CVA tenderness, guarding or rebound  Comments: Back no midline T or L spine tendnerness   Musculoskeletal: Normal range of motion  General: No swelling, tenderness or deformity  Right lower leg: No edema  Lymphadenopathy:      Cervical: No cervical adenopathy  Skin:     General: Skin is warm and dry  Capillary Refill: Capillary refill takes less than 2 seconds  Neurological:      Mental Status: She is alert and oriented to person, place, and time        Cranial Nerves: No cranial nerve deficit  Motor: No weakness or abnormal muscle tone  Coordination: Coordination normal       Gait: Gait normal       Comments: Gait steady   Psychiatric:         Mood and Affect: Mood normal          Vital Signs  ED Triage Vitals [02/23/21 1349]   Temperature Pulse Respirations Blood Pressure SpO2   (!) 97 4 °F (36 3 °C) (!) 53 18 134/87 97 %      Temp Source Heart Rate Source Patient Position - Orthostatic VS BP Location FiO2 (%)   Temporal Monitor Sitting Right arm --      Pain Score       7           Vitals:    02/23/21 1700 02/23/21 1715 02/23/21 1730 02/23/21 1745   BP: 107/62 107/66 106/60 112/72   Pulse: (!) 50 (!) 54 (!) 50 61   Patient Position - Orthostatic VS: Sitting Sitting Sitting Sitting         Visual Acuity      ED Medications  Medications   promethazine (PHENERGAN) injection 12 5 mg (12 5 mg Intravenous Given 2/23/21 1733)   sodium chloride 0 9 % bolus 1,000 mL (0 mL Intravenous Stopped 2/23/21 1627)   ondansetron (ZOFRAN) injection 4 mg (4 mg Intravenous Given 2/23/21 1442)   fentanyl citrate (PF) 100 MCG/2ML 50 mcg (50 mcg Intravenous Given 2/23/21 1444)   iohexol (OMNIPAQUE) 350 MG/ML injection (MULTI-DOSE) 100 mL (100 mL Intravenous Given 2/23/21 1647)       Diagnostic Studies  Results Reviewed     Procedure Component Value Units Date/Time    COVID19, Influenza A/B, RSV PCR, [facility] [[id number]]  (Normal) Collected: 02/23/21 1457    Lab Status: Final result Specimen: Nares from Nasopharyngeal Swab Updated: 02/23/21 1548     SARS-CoV-2 Negative     INFLUENZA A PCR Negative     INFLUENZA B PCR Negative     RSV PCR Negative    Narrative: This test has been authorized by FDA under an EUA (Emergency Use Assay) for use by authorized laboratories  Clinical caution and judgement should be used with the interpretation of these results with consideration of the clinical impression and other laboratory testing    Testing reported as "Positive" or "Negative" has been proven to be accurate according to standard laboratory validation requirements  All testing is performed with control materials showing appropriate reactivity at standard intervals  Lactic acid [[id number]]  (Normal) Collected: 02/23/21 1447    Lab Status: Final result Specimen: Blood from Arm, Left Updated: 02/23/21 1513     LACTIC ACID 0 8 mmol/L     Narrative:      Result may be elevated if tourniquet was used during collection      Troponin I [[id number]]  (Normal) Collected: 02/23/21 1447    Lab Status: Final result Specimen: Blood from Arm, Left Updated: 02/23/21 1512     Troponin I <0 02 ng/mL     Comprehensive metabolic panel [[id number]] Collected: 02/23/21 1447    Lab Status: Final result Specimen: Blood from Arm, Left Updated: 02/23/21 1511     Sodium 141 mmol/L      Potassium 4 1 mmol/L      Chloride 104 mmol/L      CO2 30 mmol/L      ANION GAP 7 mmol/L      BUN 12 mg/dL      Creatinine 0 78 mg/dL      Glucose 81 mg/dL      Calcium 8 5 mg/dL      AST 15 U/L      ALT 29 U/L      Alkaline Phosphatase 59 U/L      Total Protein 7 2 g/dL      Albumin 3 6 g/dL      Total Bilirubin 0 20 mg/dL      eGFR 96 ml/min/1 73sq m     Narrative:      [place] guidelines for Chronic Kidney Disease (CKD):     Stage 1 with normal or high GFR (GFR > 90 mL/min/1 73 square meters)    Stage 2 Mild CKD (GFR = 60-89 mL/min/1 73 square meters)    Stage 3A Moderate CKD (GFR = 45-59 mL/min/1 73 square meters)    Stage 3B Moderate CKD (GFR = 30-44 mL/min/1 73 square meters)    Stage 4 Severe CKD (GFR = 15-29 mL/min/1 73 square meters)    Stage 5 End Stage CKD (GFR <15 mL/min/1 73 square meters)  Note: GFR calculation is accurate only with a steady state creatinine    D-Dimer [[id number]]  (Abnormal) Collected: 02/23/21 1447    Lab Status: Final result Specimen: Blood from Arm, Left Updated: 02/23/21 1506     D-Dimer, Quant 0 67 ug/ml FEU     Lipase [[id number]]  (Normal) Collected: 02/23/21 1447    Lab Status: Final result Specimen: Blood from Arm, Left Updated: 02/23/21 1506     Lipase 116 u/L     UA w Reflex to Microscopic w Reflex to Culture [[id number]] Collected: 02/23/21 1457    Lab Status: Final result Specimen: Urine, Clean Catch Updated: 02/23/21 1503     Color, UA Yellow     Clarity, UA Clear     Specific Gravity, UA 1 015     pH, UA 7 0     Leukocytes, UA Negative     Nitrite, UA Negative     Protein, UA Negative mg/dl      Glucose, UA Negative mg/dl      Ketones, UA Negative mg/dl      Urobilinogen, UA 0 2 E U /dl      Bilirubin, UA Negative     Blood, UA Negative    CBC and differential [[id number]] Collected: 02/23/21 1447    Lab Status: Final result Specimen: Blood from Arm, Left Updated: 02/23/21 1457     WBC 6 52 Thousand/uL      RBC 4 27 Million/uL      Hemoglobin 12 4 g/dL      Hematocrit 38 0 %      MCV 89 fL      MCH 29 0 pg      MCHC 32 6 g/dL      RDW 12 7 %      MPV 9 9 fL      Platelets 073 Thousands/uL      nRBC 0 /100 WBCs      Neutrophils Relative 53 %      Immat GRANS % 0 %      Lymphocytes Relative 37 %      Monocytes Relative 6 %      Eosinophils Relative 3 %      Basophils Relative 1 %      Neutrophils Absolute 3 48 Thousands/µL      Immature Grans Absolute 0 02 Thousand/uL      Lymphocytes Absolute 2 38 Thousands/µL      Monocytes Absolute 0 40 Thousand/µL      Eosinophils Absolute 0 19 Thousand/µL      Basophils Absolute 0 05 Thousands/µL     POCT pregnancy, urine [[id number]]  (Normal) Resulted: 02/23/21 1456    Lab Status: Final result Updated: 02/23/21 1456     EXT PREG TEST UR (Ref: Negative) negative     Control valid                 PE Study with CT Abdomen and Pelvis with contrast   Final Result by Cindy Nuñez MD (02/23 1724)      1  No evidence of pulmonary embolism  2   No focal consolidation the lungs  3   Diverticulosis without evidence of diverticulitis     4   Subcutaneous soft tissue nodule overlying the left pelvic rectus abdominis muscle has slightly increased in size now measuring 1 5 cm, previously 1 3 cm  Workstation performed: [id number]                    Procedures  ECG 12 Lead Documentation Only    Date/Time: 2/23/2021 4:59 PM  Performed by: Daksha Nelson MD  Authorized by: Daksha Nelson MD     Indications / Diagnosis:  Abdm pain  ECG reviewed by me, the ED Provider: yes    Patient location:  ED  Previous ECG:     Previous ECG:  Compared to current    Comparison ECG info:  4/16/18 20:32    Similarity:  No change  Rate:     ECG rate:  47    ECG rate assessment: bradycardic    Rhythm:     Rhythm: sinus bradycardia    QRS:     QRS axis:  Normal  Comments:      No acute ischemic changes no signif change from prior  ED Course  ED Course as of Feb 23 1830   Tue Feb 23, 2021   1711 On reviewed labs prior to CT patient still has persistant nausea pain adequately controlled will order phenergan  [address] CT findings with patient and significant other  Will TC with GI regarding sigmoid thickening (Dr Gabby Matta)  Recommend output followup with general surgery for incidental rectus abdm finding       1803 Extensively reviewed CT findings and GI recommendations of colonoscopy and to rule out neoplasm  Patient for patient and significant other verbalized understanding  Dr Gabby Matta does not recommend emperic abx; reviewed plenty of fluids patient did get relief from her nausea from the Phenergan recommended she not drink drive or operate heavy machinery with that reviewed low residue diet he may also use famotidine and she got relief from Bentyl will proceed will re-precribe;                                 SBIRT 20yo+      Most Recent Value   SBIRT (24 yo +)   In order to provide better care to our patients, we are screening all of our patients for alcohol and drug use  Would it be okay to ask you these screening questions? Yes Filed at: 02/23/2021 4504   Initial Alcohol Screen: US AUDIT-C    1   How often do you have a drink containing alcohol?  0 Filed at: 02/23/2021 1353   2  How many drinks containing alcohol do you have on a typical day you are drinking? 0 Filed at: 02/23/2021 1353   3a  Male UNDER 65: How often do you have five or more drinks on one occasion? 0 Filed at: 02/23/2021 1353   3b  FEMALE Any Age, or MALE 65+: How often do you have 4 or more drinks on one occassion? 0 Filed at: 02/23/2021 1353   Audit-C Score  0 Filed at: 02/23/2021 1353   ERON: How many times in the past year have you    Used an illegal drug or used a prescription medication for non-medical reasons? Never Filed at: 02/23/2021 1353          Wells' Criteria for PE      Most Recent Value   Wells' Criteria for PE   Clinical signs and symptoms of DVT  0 Filed at: 02/23/2021 1617   PE is primary diagnosis or equally likely  3 Filed at: 02/23/2021 1617   HR >100  0 Filed at: 02/23/2021 1617   Immobilization at least 3 days or Surgery in the previous 4 weeks  0 Filed at: 02/23/2021 1617   Previous, objectively diagnosed PE or DVT  0 Filed at: 02/23/2021 1617   Hemoptysis  0 Filed at: 02/23/2021 1617   Malignancy with treatment within 6 months or palliative  0 Filed at: 02/23/2021 1617   Wells' Criteria Total  3 Filed at: 02/23/2021 1617                [facility]  Number of Diagnoses or Management Options  Diagnosis management comments: Mdm:  Bilateral upper quadrant tenderness not without peritoneal findings but some referred pain to the left shoulder will evaluate for acute coronary syndrome, pulmonary embolus, COVID, colitis, diverticulitis and re-evaluate        Disposition  Final diagnoses:   Abdominal pain   Nausea   Abnormal CT of the abdomen - thickening of sigmoid colon   Nodule of soft tissue - left pelvuc rectus abdominus muscle, incidental     Time reflects when diagnosis was documented in both MDM as applicable and the Disposition within this note     Time User Action Codes Description Comment    2/23/2021  6:05 PM [address] [place] [R10 9] Abdominal pain 2/23/2021  6:05 PM Pansy Bath Add [R11 0] Nausea     2/23/2021  6:05 PM Vita Sullivanler Add [R93 5] Abnormal CT of the abdomen     2/23/2021  6:06 PM Pansy Bath Modify [R93 5] Abnormal CT of the abdomen thickening of sigmoid colon    2/23/2021  6:06 PM Pansy Bath Add [M79 89] Nodule of soft tissue     2/23/2021  6:07 PM Pansy Bath Modify [E12 46] Nodule of soft tissue left rectus abdmonis    2/23/2021  6:08 PM Pansy Bath Modify [X82 71] Nodule of soft tissue left pelvuc rectus abdominus muscle, incidental      ED Disposition     ED Disposition Condition Date/Time Comment    Discharge Stable Tu Feb 23, 2021  6:17 PM Emerita Castellon discharge to home/self care  Follow-up Information     Follow up With Specialties Details Why Contact Info Additional [facility] Call in 1 day establish with GI for consideration of colonscopy becuase of thickened colon [address] [address]  [facility]my Bush [address] [place] [address], [address] [place] [address]    [address] [facility] Call in 1 day follow up of soft tissue nodule seen on CT in left abdominal wall [address] [address]  [address] [facility], Tammy [address] [place] [address], [address] [place] [address]    Gisell Alexis PA-C Physician Assistant  As needed if no improved by end of week [address]   [address] [address]  [phone]             Patient's Medications   Discharge Prescriptions    DICYCLOMINE (BENTYL) 20 MG TABLET    Take 1 tablet (20 mg total) by mouth every 6 (six) hours as needed (cramps) for up to 20 doses       Start Date: 2/23/2021 End Date: --       Order Dose: 20 mg       Quantity: 20 tablet    Refills: 0    ONDANSETRON (ZOFRAN-ODT) 4 MG DISINTEGRATING TABLET    Take 1 tablet (4 mg total) by mouth every 8 (eight) hours as needed for nausea or vomiting for up to 20 doses       Start Date: 2/23/2021 End Date: --       Order Dose: 4 mg       Quantity: 20 tablet    Refills: 0    PROMETHAZINE (PHENERGAN) 25 MG TABLET    Take 1 tablet (25 mg total) by mouth every 6 (six) hours as needed for nausea or vomiting       Start Date: 2/23/2021 End Date: --       Order Dose: 25 mg       Quantity: 20 tablet    Refills: 0         PDMP Review     None          ED Provider  Electronically Signed by           Zulema Slater MD  02/23/21 1079

## 2021-02-23 NOTE — TELEPHONE ENCOUNTER
Reason for Call:  Other call back    Detailed comments: Patient would like to request a reminder call for his appointment on 3/10/21 with Dr Beal so he doesn't forget again.     Phone Number Patient can be reached at: Home number on file [phone] (home)    Best Time: any    Can we leave a detailed message on this number? YES    Call taken on 2/23/2021 at 11:08 AM by Amanda Shell

## 2021-02-24 NOTE — TELEPHONE ENCOUNTER
I printed label and wrote a sticky note. I will call patient a day before appointment to remind him  Elisa Chandra CMA

## 2021-02-25 NOTE — TELEPHONE ENCOUNTER
Called pharmacy and patient has not picked up his prescription yet.  Attempted to call patient at home and mobile, left messages to return our call to the clinic.    Haley Gray RN

## 2021-02-26 ASSESSMENT — PATIENT HEALTH QUESTIONNAIRE - PHQ9: SUM OF ALL RESPONSES TO PHQ QUESTIONS 1-9: 2

## 2021-02-26 NOTE — TELEPHONE ENCOUNTER
I called patient on his mobile device and spoke to him. He currently would like to just take 1 20 mg of paxil. I did talk to Dr. Beal and he is ok with this plan. Patient will take 1 20 mg for now and if it does not seem to be helping he will let us know. I did complete phq-9 with patient.   Elisa Chandra CMA

## 2021-02-26 NOTE — TELEPHONE ENCOUNTER
"[employer]-- Please mail letter to patient with the information below.    \"Emiliano Vega,    Your insurance did not cover the prescription for PARoxetine (PAXIL) 20 MG tablet, 2 tablets daily. Dr Beal sent a new prescription to your pharmacy for PARoxetine (PAXIL) 40 MG tablet, one tablet daily. Please note the instructions are changed to take one tablet daily.\"  "

## 2021-03-10 ENCOUNTER — OFFICE VISIT (OUTPATIENT)
Dept: FAMILY MEDICINE | Facility: CLINIC | Age: 61
End: 2021-03-10
Payer: MEDICARE

## 2021-03-10 VITALS
HEART RATE: 103 BPM | WEIGHT: 239 LBS | DIASTOLIC BLOOD PRESSURE: 78 MMHG | HEIGHT: 69 IN | TEMPERATURE: 98.1 F | SYSTOLIC BLOOD PRESSURE: 134 MMHG | BODY MASS INDEX: 35.4 KG/M2 | OXYGEN SATURATION: 96 %

## 2021-03-10 DIAGNOSIS — E78.5 HYPERLIPIDEMIA LDL GOAL <70: ICD-10-CM

## 2021-03-10 DIAGNOSIS — M10.9 GOUT, UNSPECIFIED CAUSE, UNSPECIFIED CHRONICITY, UNSPECIFIED SITE: ICD-10-CM

## 2021-03-10 DIAGNOSIS — Z91.09 ENVIRONMENTAL ALLERGIES: ICD-10-CM

## 2021-03-10 DIAGNOSIS — E55.9 VITAMIN D DEFICIENCY DISEASE: ICD-10-CM

## 2021-03-10 DIAGNOSIS — R49.0 DYSPHONIA: ICD-10-CM

## 2021-03-10 DIAGNOSIS — Z00.00 ENCOUNTER FOR MEDICARE ANNUAL WELLNESS EXAM: Primary | ICD-10-CM

## 2021-03-10 DIAGNOSIS — R39.12 BENIGN PROSTATIC HYPERPLASIA WITH WEAK URINARY STREAM: ICD-10-CM

## 2021-03-10 DIAGNOSIS — F41.9 ANXIETY: Chronic | ICD-10-CM

## 2021-03-10 DIAGNOSIS — R53.83 FATIGUE, UNSPECIFIED TYPE: ICD-10-CM

## 2021-03-10 DIAGNOSIS — N40.1 BENIGN PROSTATIC HYPERPLASIA WITH WEAK URINARY STREAM: ICD-10-CM

## 2021-03-10 DIAGNOSIS — I10 HYPERTENSION GOAL BP (BLOOD PRESSURE) < 140/90: ICD-10-CM

## 2021-03-10 DIAGNOSIS — J38.7 LEUKOPLAKIA OF LARYNX: ICD-10-CM

## 2021-03-10 DIAGNOSIS — E11.9 TYPE 2 DIABETES MELLITUS WITHOUT COMPLICATION, WITHOUT LONG-TERM CURRENT USE OF INSULIN (H): ICD-10-CM

## 2021-03-10 LAB
ALBUMIN SERPL-MCNC: 4 G/DL (ref 3.4–5)
ALP SERPL-CCNC: 67 U/L (ref 40–150)
ALT SERPL W P-5'-P-CCNC: 99 U/L (ref 0–70)
ANION GAP SERPL CALCULATED.3IONS-SCNC: 5 MMOL/L (ref 3–14)
AST SERPL W P-5'-P-CCNC: 73 U/L (ref 0–45)
BASOPHILS # BLD AUTO: 0 10E9/L (ref 0–0.2)
BASOPHILS NFR BLD AUTO: 0.5 %
BILIRUB SERPL-MCNC: 0.5 MG/DL (ref 0.2–1.3)
BUN SERPL-MCNC: 10 MG/DL (ref 7–30)
CALCIUM SERPL-MCNC: 9.7 MG/DL (ref 8.5–10.1)
CHLORIDE SERPL-SCNC: 97 MMOL/L (ref 94–109)
CO2 SERPL-SCNC: 34 MMOL/L (ref 20–32)
CREAT SERPL-MCNC: 0.67 MG/DL (ref 0.66–1.25)
DIFFERENTIAL METHOD BLD: ABNORMAL
EOSINOPHIL # BLD AUTO: 0.2 10E9/L (ref 0–0.7)
EOSINOPHIL NFR BLD AUTO: 2.5 %
ERYTHROCYTE [DISTWIDTH] IN BLOOD BY AUTOMATED COUNT: 14.8 % (ref 10–15)
GFR SERPL CREATININE-BSD FRML MDRD: >90 ML/MIN/{1.73_M2}
GLUCOSE SERPL-MCNC: 126 MG/DL (ref 70–99)
HBA1C MFR BLD: 7.4 % (ref 0–5.6)
HCT VFR BLD AUTO: 39.7 % (ref 40–53)
HGB BLD-MCNC: 13.1 G/DL (ref 13.3–17.7)
LYMPHOCYTES # BLD AUTO: 2.5 10E9/L (ref 0.8–5.3)
LYMPHOCYTES NFR BLD AUTO: 28.6 %
MCH RBC QN AUTO: 29 PG (ref 26.5–33)
MCHC RBC AUTO-ENTMCNC: 33 G/DL (ref 31.5–36.5)
MCV RBC AUTO: 88 FL (ref 78–100)
MONOCYTES # BLD AUTO: 1 10E9/L (ref 0–1.3)
MONOCYTES NFR BLD AUTO: 11.8 %
NEUTROPHILS # BLD AUTO: 4.9 10E9/L (ref 1.6–8.3)
NEUTROPHILS NFR BLD AUTO: 56.6 %
PLATELET # BLD AUTO: 286 10E9/L (ref 150–450)
POTASSIUM SERPL-SCNC: 3.9 MMOL/L (ref 3.4–5.3)
PROT SERPL-MCNC: 9.2 G/DL (ref 6.8–8.8)
RBC # BLD AUTO: 4.52 10E12/L (ref 4.4–5.9)
SODIUM SERPL-SCNC: 136 MMOL/L (ref 133–144)
TSH SERPL DL<=0.005 MIU/L-ACNC: 2.42 MU/L (ref 0.4–4)
WBC # BLD AUTO: 8.7 10E9/L (ref 4–11)

## 2021-03-10 PROCEDURE — 85025 COMPLETE CBC W/AUTO DIFF WBC: CPT | Performed by: FAMILY MEDICINE

## 2021-03-10 PROCEDURE — G0439 PPPS, SUBSEQ VISIT: HCPCS | Performed by: FAMILY MEDICINE

## 2021-03-10 PROCEDURE — 99213 OFFICE O/P EST LOW 20 MIN: CPT | Mod: 25 | Performed by: FAMILY MEDICINE

## 2021-03-10 PROCEDURE — 99207 PR FOOT EXAM NO CHARGE: CPT | Mod: 25 | Performed by: FAMILY MEDICINE

## 2021-03-10 PROCEDURE — 80053 COMPREHEN METABOLIC PANEL: CPT | Performed by: FAMILY MEDICINE

## 2021-03-10 PROCEDURE — 83036 HEMOGLOBIN GLYCOSYLATED A1C: CPT | Performed by: FAMILY MEDICINE

## 2021-03-10 PROCEDURE — 84443 ASSAY THYROID STIM HORMONE: CPT | Performed by: FAMILY MEDICINE

## 2021-03-10 PROCEDURE — 36415 COLL VENOUS BLD VENIPUNCTURE: CPT | Performed by: FAMILY MEDICINE

## 2021-03-10 PROCEDURE — 82306 VITAMIN D 25 HYDROXY: CPT | Performed by: FAMILY MEDICINE

## 2021-03-10 RX ORDER — PAROXETINE 40 MG/1
40 TABLET, FILM COATED ORAL EVERY MORNING
Qty: 90 TABLET | Refills: 3 | Status: SHIPPED | OUTPATIENT
Start: 2021-03-10 | End: 2022-04-27

## 2021-03-10 RX ORDER — ALLOPURINOL 300 MG/1
300 TABLET ORAL DAILY
Qty: 90 TABLET | Refills: 3 | Status: SHIPPED | OUTPATIENT
Start: 2021-03-10 | End: 2022-03-28

## 2021-03-10 RX ORDER — CETIRIZINE HYDROCHLORIDE 10 MG/1
10 TABLET ORAL DAILY
Qty: 90 TABLET | Refills: 3 | Status: SHIPPED | OUTPATIENT
Start: 2021-03-10 | End: 2022-03-28

## 2021-03-10 RX ORDER — HYDROCHLOROTHIAZIDE 50 MG/1
50 TABLET ORAL DAILY
Qty: 90 TABLET | Refills: 3 | Status: SHIPPED | OUTPATIENT
Start: 2021-03-10 | End: 2022-04-29

## 2021-03-10 RX ORDER — TAMSULOSIN HYDROCHLORIDE 0.4 MG/1
CAPSULE ORAL
Qty: 90 CAPSULE | Refills: 2 | Status: SHIPPED | OUTPATIENT
Start: 2021-03-10 | End: 2021-08-11

## 2021-03-10 RX ORDER — AMLODIPINE BESYLATE 2.5 MG/1
2.5 TABLET ORAL DAILY
Qty: 90 TABLET | Refills: 3 | Status: SHIPPED | OUTPATIENT
Start: 2021-03-10 | End: 2022-03-28

## 2021-03-10 RX ORDER — LOSARTAN POTASSIUM 100 MG/1
100 TABLET ORAL DAILY
Qty: 90 TABLET | Refills: 3 | Status: SHIPPED | OUTPATIENT
Start: 2021-03-10 | End: 2021-09-08

## 2021-03-10 RX ORDER — ATORVASTATIN CALCIUM 20 MG/1
20 TABLET, FILM COATED ORAL DAILY
Qty: 90 TABLET | Refills: 3 | Status: SHIPPED | OUTPATIENT
Start: 2021-03-10 | End: 2022-03-28

## 2021-03-10 RX ORDER — LANCETS
EACH MISCELLANEOUS
Qty: 100 EACH | Refills: 11 | Status: SHIPPED | OUTPATIENT
Start: 2021-03-10 | End: 2022-06-17

## 2021-03-10 RX ORDER — GLUCOSAMINE HCL/CHONDROITIN SU 500-400 MG
CAPSULE ORAL
Qty: 100 EACH | Refills: 3 | Status: SHIPPED | OUTPATIENT
Start: 2021-03-10 | End: 2022-06-20

## 2021-03-10 ASSESSMENT — MIFFLIN-ST. JEOR: SCORE: 1875.97

## 2021-03-10 ASSESSMENT — ACTIVITIES OF DAILY LIVING (ADL): CURRENT_FUNCTION: NO ASSISTANCE NEEDED

## 2021-03-10 NOTE — PROGRESS NOTES
"SUBJECTIVE:   Gary Iyer is a 60 year old male who presents for Preventive Visit.      Patient has been advised of split billing requirements and indicates understanding: Yes   Are you in the first 12 months of your Medicare coverage?  No    Healthy Habits:     In general, how would you rate your overall health?  Good    Frequency of exercise:  2-3 days/week    Duration of exercise:  15-30 minutes    Do you usually eat at least 4 servings of fruit and vegetables a day, include whole grains    & fiber and avoid regularly eating high fat or \"junk\" foods?  Yes    Taking medications regularly:  Yes    Barriers to taking medications:  None    Medication side effects:  None    Ability to successfully perform activities of daily living:  No assistance needed    Home Safety:  No safety concerns identified    Hearing Impairment:  No hearing concerns    In the past 6 months, have you been bothered by leaking of urine?  No    In general, how would you rate your overall mental or emotional health?  Good      PHQ-2 Total Score: 0    Additional concerns today:  Yes    Do you feel safe in your environment? Yes    Have you ever done Advance Care Planning? (For example, a Health Directive, POLST, or a discussion with a medical provider or your loved ones about your wishes): No, advance care planning information given to patient to review.  Patient declined advance care planning discussion at this time.       Fall risk  Fallen 2 or more times in the past year?: No  Any fall with injury in the past year?: No    Cognitive Screening   1) Repeat 3 items (Leader, Season, Table)    2) Clock draw: ABNORMAL Hands at 10 and 11  3) 3 item recall: Recalls 2 objects   Results: ABNORMAL clock, 1-2 items recalled: PROBABLE COGNITIVE IMPAIRMENT, **INFORM PROVIDER**    Mini-CogTM Copyright BLAINE Melgar. Licensed by the author for use in [facility]; reprinted with permission (lazaro@.[facility]). All rights reserved.      Do you have sleep " apnea, excessive snoring or daytime drowsiness?: no    Reviewed and updated as needed this visit by clinical staff  Tobacco  Allergies  Meds   Med Hx  Surg Hx  Fam Hx  Soc Hx        Reviewed and updated as needed this visit by Provider                Social History     Tobacco Use     Smoking status: Former Smoker     Packs/day: 1.00     Years: 25.00     Pack years: 25.00     Quit date: 2000     Years since quittin.5     Smokeless tobacco: Never Used   Substance Use Topics     Alcohol use: Yes     Alcohol/week: 0.0 standard drinks     Comment: rare     If you drink alcohol do you typically have >3 drinks per day or >7 drinks per week? No           Wondering about the COVID vaccination.    Current providers sharing in care for this patient include:   Patient Care Team:  Finesse Beal MD as PCP - General  Finesse Beal MD as Assigned PCP  Enma Herrera RN as Continuity Care Coordinator (ENT-Otolaryngology)  Mel Fournier MD as MD (Otolaryngology)  Amanuel Barger MD as MD (Otolaryngology)  Tomy Granger MD as MD (INTERNAL MEDICINE - ENDOCRINOLOGY, DIABETES & METABOLISM)  Bijal Sorto, EDGAR as Registered Nurse (Otolaryngology)  Mel Fournier MD as Assigned Surgical Provider    The following health maintenance items are reviewed in Epic and correct as of today:  Health Maintenance   Topic Date Due     HIV SCREENING  Never done     ZOSTER IMMUNIZATION (1 of 2) Never done     EYE EXAM  2020     A1C  2021     MEDICARE ANNUAL WELLNESS VISIT  2021     MICROALBUMIN  2021     DIABETIC FOOT EXAM  2021     PHQ-9  2021     BMP  2021     LIPID  2021     COLORECTAL CANCER SCREENING  2022     Pneumococcal Vaccine: Pediatrics (0 to 5 Years) and At-Risk Patients (6 to 64 Years) (2 of 2) 10/02/2024     ADVANCE CARE PLANNING  2025     DTAP/TDAP/TD IMMUNIZATION (4 - Td) 2028     HEPATITIS C SCREENING   "Completed     DEPRESSION ACTION PLAN  Completed     INFLUENZA VACCINE  Completed     IPV IMMUNIZATION  Aged Out     MENINGITIS IMMUNIZATION  Aged Out            Review of Systems  No new problems    No illnesses recently    On leave from driving job    Reassess after getting covid shots    Kids in good health    No gout flares    Some exercise    Needs new meter    Staying home with pandemic        OBJECTIVE:   Ht 1.755 m (5' 9.09\")   Wt 108.4 kg (239 lb)   BMI 35.20 kg/m   Estimated body mass index is 35.2 kg/m  as calculated from the following:    Height as of this encounter: 1.755 m (5' 9.09\").    Weight as of this encounter: 108.4 kg (239 lb).  Physical Exam  GENERAL: healthy, alert and no distress  EYES: Eyes grossly normal to inspection, PERRL and conjunctivae and sclerae normal  HENT: ear canals and TM's normal, nose and mouth without ulcers or lesions  NECK: no adenopathy, no asymmetry, masses, or scars and thyroid normal to palpation  RESP: lungs clear to auscultation - no rales, rhonchi or wheezes  CV: regular rate and rhythm, normal S1 S2, no S3 or S4, no murmur, click or rub, no peripheral edema and peripheral pulses strong  ABDOMEN: soft, nontender, no hepatosplenomegaly, no masses and bowel sounds normal  MS: no gross musculoskeletal defects noted, no edema  SKIN: no suspicious lesions or rashes  NEURO: Normal strength and tone, mentation intact and speech normal  PSYCH: mentation appears normal, affect normal/bright    Diagnostic Test Results:  Labs reviewed in Epic    ASSESSMENT / PLAN:   Gary was seen today for physical.    Diagnoses and all orders for this visit:    Encounter for Medicare annual wellness exam    Type 2 diabetes mellitus without complication, without long-term current use of insulin (H)  -     FOOT EXAM  -     blood glucose monitoring (NO BRAND SPECIFIED) meter device kit; Use to test blood sugar 1 times daily or as directed. Preferred blood glucose meter OR supplies to " accompany: Blood Glucose Monitor Brands: per insurance.  -     blood glucose (NO BRAND SPECIFIED) test strip; Use to test blood sugar 1 times daily or as directed. To accompany: Blood Glucose Monitor Brands: per insurance.  -     blood glucose calibration (NO BRAND SPECIFIED) solution; To accompany: Blood Glucose Monitor Brands: per insurance.  -     thin (NO BRAND SPECIFIED) lancets; Use with lanceting device. To accompany: Blood Glucose Monitor Brands: per insurance.  -     alcohol swab prep pads; Use to swab area of injection/[place]ia [place] as directed.  -     metFORMIN (GLUCOPHAGE) 500 MG tablet; TAKE 1 TABLET BY MOUTH TWICE DAILY WITH MEALS  -     Hemoglobin A1c    Gout, unspecified cause, unspecified chronicity, unspecified site  -     allopurinol (ZYLOPRIM) 300 MG tablet; Take 1 tablet (300 mg) by mouth daily    Hypertension goal BP (blood pressure) < 140/90  -     amLODIPine (NORVASC) 2.5 MG tablet; Take 1 tablet (2.5 mg) by mouth daily  -     hydrochlorothiazide (HYDRODIURIL) 50 MG tablet; Take 1 tablet (50 mg) by mouth daily  -     losartan (COZAAR) 100 MG tablet; Take 1 tablet (100 mg) by mouth daily    Hyperlipidemia LDL goal <70  -     atorvastatin (LIPITOR) 20 MG tablet; Take 1 tablet (20 mg) by mouth daily    Environmental allergies  -     cetirizine (ZYRTEC) 10 MG tablet; Take 1 tablet (10 mg) by mouth daily    Dysphonia  -     omeprazole (PRILOSEC) 20 MG DR capsule; TAKE 1 CAPSULE BY MOUTH DAILY    Leukoplakia of larynx  -     omeprazole (PRILOSEC) 20 MG DR capsule; TAKE 1 CAPSULE BY MOUTH DAILY    Anxiety  -     PARoxetine (PAXIL) 40 MG tablet; Take 1 tablet (40 mg) by mouth every morning    Benign prostatic hyperplasia with weak urinary stream  -     tamsulosin (FLOMAX) 0.4 MG capsule; TAKE 1 CAPSULE(0.4 MG) BY MOUTH DAILY    Fatigue, unspecified type  -     TSH with free T4 reflex  -     CBC with platelets differential  -     Comprehensive metabolic panel    Vitamin D deficiency disease  -     Vitamin D  "Deficiency    discussed multiple issues with patient  Refill needed meds  On recheck blood pressure okay so stay on same meds  Last hemoglobin a1c fine, recheck today  metoformin only diab med  Not on vit d for a few months, recheck today  Wt ongoing issue  Keep working on healthy diet/exercise and wt loss  Refill needed meds  Gave patient new meter and supplies prescriptions     Patient has been advised of split billing requirements and indicates understanding: Yes  COUNSELING:  Reviewed preventive health counseling, as reflected in patient instructions       Regular exercise       Healthy diet/nutrition       Vision screening       Dental care       Colon cancer screening       Prostate cancer screening    Estimated body mass index is 35.2 kg/m  as calculated from the following:    Height as of this encounter: 1.755 m (5' 9.09\").    Weight as of this encounter: 108.4 kg (239 lb).    Weight management plan: Discussed healthy diet and exercise guidelines    He reports that he quit smoking about 20 years ago. He has a 25.00 pack-year smoking history. He has never used smokeless tobacco.      Appropriate preventive services were discussed with this patient, including applicable screening as appropriate for cardiovascular disease, diabetes, osteopenia/osteoporosis, and glaucoma.  As appropriate for age/gender, discussed screening for colorectal cancer, prostate cancer, breast cancer, and cervical cancer. Checklist reviewing preventive services available has been given to the patient.    Reviewed patients plan of care and provided an AVS. The Basic Care Plan (routine screening as documented in Health Maintenance) for Gary meets the Care Plan requirement. This Care Plan has been established and reviewed with the Patient.    Counseling Resources:  ATP IV Guidelines  Pooled Cohorts Equation Calculator  Breast Cancer Risk Calculator  Breast Cancer: Medication to Reduce Risk  FRAX Risk Assessment  ICSI Preventive " Guidelines  Dietary Guidelines for Americans, 2010  USDA's MyPlate  ASA Prophylaxis  Lung CA Screening    Finesse Beal MD  [facility]    Identified Health Risks:

## 2021-03-10 NOTE — PATIENT INSTRUCTIONS
Patient Education   Personalized Prevention Plan  You are due for the preventive services outlined below.  Your care team is available to assist you in scheduling these services.  If you have already completed any of these items, please share that information with your care team to update in your medical record.  Health Maintenance Due   Topic Date Due     HIV Screening  Never done     Zoster (Shingles) Vaccine (1 of 2) Never done     Eye Exam  04/18/2020           Keep working on healthy diet/exercise and weight loss    Stay on same medications; we will notify you of results and adjust any doses if needed

## 2021-03-11 LAB — DEPRECATED CALCIDIOL+CALCIFEROL SERPL-MC: 24 UG/L (ref 20–75)

## 2021-03-12 DIAGNOSIS — E11.9 TYPE 2 DIABETES MELLITUS WITHOUT COMPLICATION, WITHOUT LONG-TERM CURRENT USE OF INSULIN (H): ICD-10-CM

## 2021-03-12 DIAGNOSIS — E78.5 HYPERLIPIDEMIA LDL GOAL <70: ICD-10-CM

## 2021-03-12 NOTE — RESULT ENCOUNTER NOTE
Diabetes test ( hemoglobin a1c ) is a little higher but still acceptable.  Stay on same diabetes med and keep working on healthy diet/exercise and weight loss.     A couple liver tests ( ALT and AST ) are mildly elevated ; not usually worrisome.    Other labs are okay/ stable.    Finesse Beal MD

## 2021-03-17 ENCOUNTER — MYC MEDICAL ADVICE (OUTPATIENT)
Dept: FAMILY MEDICINE | Facility: CLINIC | Age: 61
End: 2021-03-17

## 2021-03-20 ENCOUNTER — IMMUNIZATION (OUTPATIENT)
Dept: FAMILY MEDICINE | Facility: CLINIC | Age: 61
End: 2021-03-20
Payer: MEDICARE

## 2021-03-20 PROCEDURE — 0011A PR COVID VAC MODERNA 100 MCG/0.5 ML IM: CPT

## 2021-03-20 PROCEDURE — 91301 PR COVID VAC MODERNA 100 MCG/0.5 ML IM: CPT

## 2021-03-30 ENCOUNTER — TELEPHONE (OUTPATIENT)
Dept: FAMILY MEDICINE | Facility: CLINIC | Age: 61
End: 2021-03-30

## 2021-03-30 NOTE — TELEPHONE ENCOUNTER
Prior Authorization Retail Medication Request    Medication/Dose: hydrocortisone (CORTAID) 1 % external ointment  ICD code (if different than what is on RX):  L30.9      Insurance Name:  Medicare  Insurance ID:  [id number]      Pharmacy Information (if different than what is on RX)  Name:  [facility]  Phone:  [phone]

## 2021-03-31 NOTE — TELEPHONE ENCOUNTER
Prior Authorization Approval    Authorization Effective Date: 1/1/2021  Authorization Expiration Date: 3/31/2022  Medication: hydrocortisone (CORTAID) 1 % external ointment  Approved Dose/Quantity:   Reference #:     Insurance Company: [employer] - Phone [phone] Fax [phone]  Expected CoPay:       CoPay Card Available:      Foundation Assistance Needed:    Which Pharmacy is filling the prescription (Not needed for infusion/clinic administered): [id number] - [address] AT [address] [place] [address]  Pharmacy Notified: Yes  Patient Notified: Yes, **Instructed pharmacy to notify patient when script is ready to /ship.**

## 2021-03-31 NOTE — TELEPHONE ENCOUNTER
PA Initiation    Medication: hydrocortisone (CORTAID) 1 % external ointment  Insurance Company: [employer] - Phone [phone] Fax [phone]  Pharmacy Filling the Rx: [id number] - [address] [facility]   Filling Pharmacy Phone: [phone]  Filling Pharmacy Fax: [phone]  Start Date: 3/31/2021    Manually faxed PA request to insurance.

## 2021-04-05 ENCOUNTER — NURSE TRIAGE (OUTPATIENT)
Dept: FAMILY MEDICINE | Facility: CLINIC | Age: 61
End: 2021-04-05

## 2021-04-05 NOTE — TELEPHONE ENCOUNTER
Patient called with concerns of altered mental status. Yesterday he was laughing with his family and then family told him he was not responding to them for 2 seconds. He is having no symptoms now. Reports he had no other additional symptoms prior to episode or after. This happened in the past years ago as a side effect of one of his medications but dose was adjusted and symptoms resolved. These episodes have happened a few times recently.  Appointment scheduled with Dr Beal to discuss    Additional Information    Negative: Difficult to awaken or acting confused (e.g., disoriented, slurred speech)    Negative: New neurologic deficit that is present NOW, sudden onset of ANY of the following: * Weakness of the face, arm, or leg on one side of the body * Numbness of the face, arm, or leg on one side of the body * Loss of speech or garbled speech    Negative: Sounds like a life-threatening emergency to the triager    Negative: Confusion, disorientation, or hallucinations is the main symptom    Negative: Dizziness is the main symptom    Negative: Followed a head injury within last 3 days    Negative: Headache (with neurologic deficit)    Negative: Unable to urinate (or only a few drops) and bladder feels very full    Negative: Loss of control of bowel or bladder (i.e., incontinence) of new onset    Negative: Back pain with numbness (loss of sensation) in groin or rectal area    Negative: Patient sounds very sick or weak to the triager    Negative: Neurologic deficit that was brief (now gone), ANY of the following: * Weakness of the face, arm, or leg on one side of the body * Numbness of the face, arm, or leg on one side of the body * Loss of speech or garbled speech    Negative: Neurologic deficit of gradual onset, ANY of the following: * Weakness of the face, arm, or leg on one side of the body * Numbness of the face, arm, or leg on one side of the body * Loss of speech or garbled speech    Negative: Vinton palsy  "suspected (i.e., weakness only one side of the face, developing over hours to days, no other symptoms)    Negative: Tingling (e.g., pins and needles) of the face, arm or leg on one side of the body, that is  present now    Patient wants to be seen    Answer Assessment - Initial Assessment Questions  1. SYMPTOM: \"What is the main symptom you are concerned about?\" (e.g., weakness, numbness)      Sitting with family, something happened after laughing. Couple seconds family said he didn't respond  2. ONSET: \"When did this start?\" (minutes, hours, days; while sleeping)      Yesterday for 2 seconds  3. LAST NORMAL: \"When was the last time you were normal (no symptoms)?\"      Yesterday evening  4. PATTERN \"Does this come and go, or has it been constant since it started?\"  \"Is it present now?\"      Has happened a few times before  5. CARDIAC SYMPTOMS: \"Have you had any of the following symptoms: chest pain, difficulty breathing, palpitations?\"      Non  6. NEUROLOGIC SYMPTOMS: \"Have you had any of the following symptoms: headache, dizziness, vision loss, double vision, changes in speech, unsteady on your feet?\"      Happened while sitting, no other symptoms  7. OTHER SYMPTOMS: \"Do you have any other symptoms?\"      no  8. PREGNANCY: \"Is there any chance you are pregnant?\" \"When was your last menstrual period?\"      na    Protocols used: NEUROLOGIC DEFICIT-A-OH    Anum Schneider RN      "

## 2021-04-06 ENCOUNTER — VIRTUAL VISIT (OUTPATIENT)
Dept: FAMILY MEDICINE | Facility: CLINIC | Age: 61
End: 2021-04-06
Payer: MEDICARE

## 2021-04-06 DIAGNOSIS — B35.6 TINEA CRURIS: ICD-10-CM

## 2021-04-06 DIAGNOSIS — R55 COUGH SYNCOPE: Primary | ICD-10-CM

## 2021-04-06 DIAGNOSIS — I10 HYPERTENSION GOAL BP (BLOOD PRESSURE) < 140/90: ICD-10-CM

## 2021-04-06 DIAGNOSIS — E11.9 TYPE 2 DIABETES MELLITUS WITHOUT COMPLICATION, WITHOUT LONG-TERM CURRENT USE OF INSULIN (H): ICD-10-CM

## 2021-04-06 DIAGNOSIS — R05.4 COUGH SYNCOPE: Primary | ICD-10-CM

## 2021-04-06 PROCEDURE — 99213 OFFICE O/P EST LOW 20 MIN: CPT | Mod: 95 | Performed by: FAMILY MEDICINE

## 2021-04-06 NOTE — PROGRESS NOTES
Patient feeling okay but not great    Patient was sitting with family and somebody cracked a joke    Was laughing and then blacked out    Stopped breathing    Happened once before     Did not fall to floor     Was sitting    Had no awareness    When it happened last  Year, was in [place] and very stufffy inside    Not lightheaded    Staying hydrated?      Goes for some walking  [    Patient did get the covid vaccines    Had some urinary incontinence after 1st dose;  Couldn't get to bathroom in time    No alcohol - cut that out    Rash in groin area  Sweaty      Full physical not done     Mentation and affect are fine    No tremor of speech or extremity    On video patient looked okay    No distress    ASSESSMENT / PLAN:  (R05) Cough syncope  (primary encounter diagnosis)  Comment: two episodes  Of this in last two years.  Discussed in detail.    Plan: monitor.  If keeps happening then see neurology     (I10) Hypertension goal BP (blood pressure) < 140/90  Comment: patient is on 3 blood pressure meds.  Given the syncope, reasonable to decrease dose of losartan. Patient to monitor blood pressure and let us know in a month how it is doin   Plan: Miscellaneous Order for DME - ONLY FOR DME             (B35.6) Tinea cruris  Comment: use over the counter antifungal powder and antifungal cream  Plan: follow up prn     (E11.9) Type 2 diabetes mellitus without complication, without long-term current use of insulin (H)  Comment: last hemoglobin a1c was fine   Plan: stay on same meds; discussed    Of note, this was a 20 minute video visit.   Started on [place] but finished on face time per patient request.  Completed successfully.    Patient inquired about prescription for blood pressure cuff.      I reviewed the patient's medications, allergies, medical history, family history, and social history.    Finesse Beal MD

## 2021-04-07 ENCOUNTER — OFFICE VISIT (OUTPATIENT)
Dept: OPTOMETRY | Facility: CLINIC | Age: 61
End: 2021-04-07
Payer: MEDICARE

## 2021-04-07 DIAGNOSIS — H52.4 PRESBYOPIA: ICD-10-CM

## 2021-04-07 DIAGNOSIS — H52.03 HYPERMETROPIA, BILATERAL: ICD-10-CM

## 2021-04-07 DIAGNOSIS — H25.13 NUCLEAR AGE-RELATED CATARACT, BOTH EYES: ICD-10-CM

## 2021-04-07 DIAGNOSIS — Z01.01 ENCOUNTER FOR EXAMINATION OF EYES AND VISION WITH ABNORMAL FINDINGS: Primary | ICD-10-CM

## 2021-04-07 DIAGNOSIS — E11.9 TYPE 2 DIABETES MELLITUS WITHOUT RETINOPATHY (H): ICD-10-CM

## 2021-04-07 PROCEDURE — 92014 COMPRE OPH EXAM EST PT 1/>: CPT | Performed by: OPTOMETRIST

## 2021-04-07 ASSESSMENT — EXTERNAL EXAM - LEFT EYE: OS_EXAM: NORMAL

## 2021-04-07 ASSESSMENT — CUP TO DISC RATIO
OS_RATIO: 0.3
OD_RATIO: 0.3

## 2021-04-07 ASSESSMENT — REFRACTION_MANIFEST
OD_SPHERE: +1.00
OD_ADD: +2.50
OD_AXIS: 009
OD_CYLINDER: +0.50
OS_ADD: +2.50
OS_CYLINDER: SPHERE
OS_SPHERE: +0.50

## 2021-04-07 ASSESSMENT — CONF VISUAL FIELD
OD_NORMAL: 1
OS_NORMAL: 1

## 2021-04-07 ASSESSMENT — VISUAL ACUITY
METHOD: SNELLEN - LINEAR
OD_SC: 20/25
OD_SC+: +3
OS_SC: 20/20

## 2021-04-07 ASSESSMENT — TONOMETRY
IOP_METHOD: APPLANATION
OS_IOP_MMHG: 20
OD_IOP_MMHG: 21

## 2021-04-07 ASSESSMENT — SLIT LAMP EXAM - LIDS
COMMENTS: 1+ MEIBOMIAN GLAND DYSFUNCTION
COMMENTS: 1+ MEIBOMIAN GLAND DYSFUNCTION

## 2021-04-07 ASSESSMENT — EXTERNAL EXAM - RIGHT EYE: OD_EXAM: NORMAL

## 2021-04-07 NOTE — PROGRESS NOTES
Chief Complaint   Patient presents with     Diabetic Eye Exam       Hemoglobin A1C   Date Value Ref Range Status   03/10/2021 7.4 (H) 0 - 5.6 % Final     Comment:     Normal <5.7% Prediabetes 5.7-6.4%  Diabetes 6.5% or higher - adopted from ADA   consensus guidelines.     11/25/2020 6.9 (H) 0 - 5.6 % Final     Comment:     Normal <5.7% Prediabetes 5.7-6.4%  Diabetes 6.5% or higher - adopted from ADA   consensus guidelines.     07/17/2020 7.0 (H) 0 - 5.6 % Final     Comment:     Normal <5.7% Prediabetes 5.7-6.4%  Diabetes 6.5% or higher - adopted from ADA   consensus guidelines.         Last Eye Exam: 04/18/19 w/Phi  Dilated Previously: Yes    What are you currently using to see?  does not use glasses or contacts    Distance Vision Acuity: Noticed gradual change in both eyes    Near Vision Acuity: Satisfied with vision while reading  with readers    Eye Comfort: good  Do you use eye drops? : No  Occupation or Hobbies: Retired/Computer/TV    [employer]     Medical, surgical and family histories reviewed and updated 4/7/2021.       OBJECTIVE: See Ophthalmology exam    ASSESSMENT:    ICD-10-CM    1. Encounter for examination of eyes and vision with abnormal findings  Z01.01    2. Type 2 diabetes mellitus without retinopathy (H)  E11.9    3. Nuclear age-related cataract, both eyes  H25.13    4. Hypermetropia, bilateral  H52.03    5. Presbyopia  H52.4       PLAN:    Gary Iyer aware  eye exam results will be sent to Finesse Beal  Patient Instructions   Patient educated on importance of good blood sugar control.  Letter sent to primary care provider with diabetic eye exam report.     You have the start of mild cataracts.  You may notice some blurred vision or glare with night driving.  It is important that you wear good sunglasses to protect your eyes from the ultraviolet light from the sun.     Okay to continue with over the counter reading glasses.   Prescription for reading glasses provided today. Optional  to fill.     Return in 1 year for comprehensive eye exam, or sooner if needed.     The effects of the dilating drops last for 4- 6 hours.  You will be more sensitive to light and vision will be blurry up close.  Mydriatic sunglasses were given if needed.    Adrien Yip, OD  [address]  [place]  [id number]    [phone]

## 2021-04-07 NOTE — PATIENT INSTRUCTIONS
Patient educated on importance of good blood sugar control.  Letter sent to primary care provider with diabetic eye exam report.     You have the start of mild cataracts.  You may notice some blurred vision or glare with night driving.  It is important that you wear good sunglasses to protect your eyes from the ultraviolet light from the sun.     Okay to continue with over the counter reading glasses.   Prescription for reading glasses provided today. Optional to fill.     Return in 1 year for comprehensive eye exam, or sooner if needed.     The effects of the dilating drops last for 4- 6 hours.  You will be more sensitive to light and vision will be blurry up close.  Mydriatic sunglasses were given if needed.    Adrien Yip, OD  [facility]  [address] [place] [address]. [place]  [id number] [phone]

## 2021-04-17 ENCOUNTER — IMMUNIZATION (OUTPATIENT)
Dept: FAMILY MEDICINE | Facility: CLINIC | Age: 61
End: 2021-04-17
Attending: FAMILY MEDICINE
Payer: MEDICARE

## 2021-04-17 PROCEDURE — 0012A PR COVID VAC MODERNA 100 MCG/0.5 ML IM: CPT

## 2021-04-17 PROCEDURE — 91301 PR COVID VAC MODERNA 100 MCG/0.5 ML IM: CPT

## 2021-04-22 ENCOUNTER — TELEPHONE (OUTPATIENT)
Dept: SURGERY | Facility: CLINIC | Age: 61
End: 2021-04-22

## 2021-04-22 NOTE — TELEPHONE ENCOUNTER
Dr Saucedo do you approve this refill (0.2 % nifedipine suppositories) or have him ask his PMD. He has not seen you in some time it appears (2019).    Tiffanie KRAFT RN Specialty Triage 4/22/2021 12:32 PM

## 2021-04-22 NOTE — TELEPHONE ENCOUNTER
Reason for Call:  Other prescription    Detailed comments: pt out of suppository.  nla nopine.  Pt was not sure of the name of the med.  Specialty pharm [phone]     Phone Number Patient can be reached at: Home number on file [phone] (home)    Best Time: any    Can we leave a detailed message on this number? YES    Call taken on 4/22/2021 at 11:33 AM by Carmen Saucedo

## 2021-04-23 ENCOUNTER — TELEPHONE (OUTPATIENT)
Dept: FAMILY MEDICINE | Facility: CLINIC | Age: 61
End: 2021-04-23

## 2021-04-23 DIAGNOSIS — K60.2 ANAL FISSURE: Primary | ICD-10-CM

## 2021-04-23 NOTE — TELEPHONE ENCOUNTER
returned call to pt and let him know since he has not seen MD in sometime he would either need to be seen or reach out to PMD. He will reach out to PMD for refill.    Tiffanie KRAFT RN Specialty Triage 4/23/2021 8:37 AM

## 2021-04-23 NOTE — TELEPHONE ENCOUNTER
Reason for Call:  Other prescription    Detailed comments: Patient is requesting refill of Nifedipine Suppository last received from [facility] pharmacy [phone] by Dr. FLORY Saucedo and patient was advised to call primary MD for refills. Call if any questions. Thank you.      Phone Number Patient can be reached at: Home number on file [phone] (home)    Best Time: any    Can we leave a detailed message on this number? YES    Call taken on 4/23/2021 at 11:27 AM by Fang Pizano

## 2021-04-26 NOTE — TELEPHONE ENCOUNTER
COMPOUND (CMPD RX) - PHARMACY TO MIX COMPOUNDED MEDICATION (Discontinued) 30 g 1 3/8/2016 9/19/2016 --   Sig - Route: Place 1 applicator rectally 2 times daily as needed - Rectal   Class: E-Prescribe   Notes to Pharmacy: Pharmacy please compound 0.3% nifedipine in 1.5% lidocaine ointment   Reason for Discontinue: Therapy completed   Order: [id number]   E-Prescribing Status: Receipt confirmed by pharmacy (3/8/2016 11:15 AM CST)   Printout Tracking            Nifedipine suppository       Last Written Prescription Date:    Last Fill Quantity: ,   # refills:   Last Office Visit: 4/6 - virtual visit   Future Office visit:    Next 5 appointments (look out 90 days)    Apr 27, 2021  3:45 PM  Return Visit with Zachariah Saucedo MD  [facility] ([facility] ) [address] [place] [id number]  [phone]           Routing refill request to provider for review/approval because:  Drug not active on patient's medication list  Medication is reported/historical

## 2021-04-27 ENCOUNTER — TELEPHONE (OUTPATIENT)
Dept: FAMILY MEDICINE | Facility: CLINIC | Age: 61
End: 2021-04-27

## 2021-04-27 ENCOUNTER — OFFICE VISIT (OUTPATIENT)
Dept: SURGERY | Facility: CLINIC | Age: 61
End: 2021-04-27
Payer: MEDICARE

## 2021-04-27 VITALS
SYSTOLIC BLOOD PRESSURE: 117 MMHG | WEIGHT: 239 LBS | DIASTOLIC BLOOD PRESSURE: 74 MMHG | HEART RATE: 79 BPM | BODY MASS INDEX: 35.2 KG/M2

## 2021-04-27 DIAGNOSIS — K60.2 ANAL FISSURE: ICD-10-CM

## 2021-04-27 DIAGNOSIS — K60.2 ANAL FISSURE: Primary | ICD-10-CM

## 2021-04-27 PROCEDURE — 99214 OFFICE O/P EST MOD 30 MIN: CPT | Performed by: SURGERY

## 2021-04-27 RX ORDER — POLYETHYLENE GLYCOL 3350 17 G/17G
1 POWDER, FOR SOLUTION ORAL DAILY
Qty: 850 G | Refills: 3 | Status: SHIPPED | OUTPATIENT
Start: 2021-04-27 | End: 2024-05-22

## 2021-04-27 NOTE — LETTER
4/27/2021         RE: Gary Iyer  [address]  [place] [address]        Dear Colleague,    Thank you for referring your patient, Gary Iyer, to the [facility]. Please see a copy of my visit note below.    Patient has been having bowel movement. Every day .  Has to push hard with bowel movement.    Has been using a warm bath.    Has been using suppository nifedipine.      Anal exam very tender in the posterior anus and can feel a fissure.  Not able to do anoscope.  No other masses palpated.     Last colonoscopy 6/2012 anal fissure no polyps.     For your hemorrhoids try using Metamucil or it's generic equivalent 1-2 tablespoons with a large glass of water or juice every day.  Do not spend much time on the toilet.  Do not bring a magazine or book in the bathroom with you when having a bowel movement.  Sitting on the toilet for extended periods will make your hemorrhoids worse.     You can also use flax seed that is easily obtained at your grocery store. Make sure it is ground up and sprinkle 2 tablespoons on your cereal each morning and drink a large glass of water with it.  You can also mix in yogurt, and even bake in bread or muffins.   Flax seed seems to give less gas and does not have any taste.   If these are not working for you I would be glad to see you back in my clinic to discuss other options.  Call [phone] to set up an appointment.      Need to do the nifidipine suppositories every 12 hours   For a month.    Add the ground up flax seed that you can buy at the grocery store and use a heaping table spoon at least once a day.  important to drink water at same time.   If this is not working for you we can do an internal sphincterotomy.  There is about a 5 % chance of being incontinent.    Sincerely     Zachariah Saucedo M.D.     Time spent with the patient with greater that 50% of the time in discussion was 30 minutes.  In discussing the plan.      Zachariah Saucedo  "MD      Office Visit    3/8/2019  [facility] Zachariah Cade MD  Surgery  Anal fissure  Dx  RECHECK  Reason for Visit   Progress Notes    Expand AllCollapse All  []Expand All by Default  Dear Finesse Paiz  I have seen Gary Iyer and as you know his chief complaint is when he is constipated, then has pain.  And then bleeds a little bit. Is more the bleeding he is worried. Used the nifedipine but was in suppositories and that has helped. Gets better with in a few days of using this.   HPI:  Patient is a 58 year old male  with complaints see above        Review Of Systems  Respiratory: No shortness of breath, dyspnea on exertion, cough, or hemoptysis  Cardiovascular: negative  Gastrointestinal: constipation  Endocrine: negative and diabetes  Patient is taking fiber supplements   /81   Pulse 101   Ht 1.778 m (5' 10\")   Wt 109.8 kg (242 lb)   BMI 34.72 kg/m          Past Medical History        Past Medical History:   Diagnosis Date     Diabetes (H)       2yr     Multinodular goiter (nontoxic) 6/10/2013     Sleep apnea 8yr            Past Surgical History         Past Surgical History:   Procedure Laterality Date     COLONOSCOPY         COLONOSCOPY   6-22-12     Repeat Colonoscopy in 10 yrs.      ENT SURGERY   2008     Vocal cord carcinoma     HEAD & NECK SURGERY   1/25/11     callous removal from throat     LARYNGOSCOPY WITH BIOPSY(IES)   1-25-11     LARYNGOSCOPY WITH BIOPSY(IES) N/A 2/9/2017     Procedure: LARYNGOSCOPY WITH BIOPSY(IES);  Surgeon: Mel Fournier MD;  Location: [facility] OR     LARYNGOSCOPY WITH MICROSCOPE N/A 2/9/2017     Procedure: LARYNGOSCOPY WITH MICROSCOPE;  Surgeon: Mel Fournier MD;  Location:  OR     LASER CO2 LARYNGOSCOPY, COMPLEX   6/27/2013     Procedure: LASER CO2 LARYNGOSCOPY, COMPLEX;  Micro Direct Laryngoscopy With Micro Flap Excision Of Lesion Lumenis Co2 Laser ;  Surgeon: Mel Fournier MD;  Location:  OR "     LASER CO2 LESION ORAL N/A 2/9/2017     Procedure: LASER CO2 LESION ORAL;  Surgeon: Mel Fournier MD;  Location: [facility] OR     ORTHOPEDIC SURGERY   March 2016     left tibia orif with abbi 2-2016     VASECTOMY   02/03            Current Outpatient Prescriptions          Current Outpatient Medications   Medication Sig Dispense Refill     allopurinol (ZYLOPRIM) 300 MG tablet TAKE 1 TABLET(300 MG) BY MOUTH DAILY 90 tablet 1     amLODIPine (NORVASC) 5 MG tablet TAKE 1 TABLET(5 MG) BY MOUTH DAILY 30 tablet 3     aspirin 81 MG tablet Take 1 tablet by mouth daily.         atorvastatin (LIPITOR) 20 MG tablet TAKE 1 TABLET(20 MG) BY MOUTH DAILY 90 tablet 3     blood glucose monitoring (ALON MICROLET) lancets 1 each 2 times daily Use to test blood sugar 2 times daily or as directed. 1 Box 3     blood glucose monitoring (NO BRAND SPECIFIED) meter device kit Use to test blood sugar 2 times daily or as directed.  Accucheck meter please and all associated strips, lancets, and other supplies, 3 month supply with refills for a year. 1 kit 0     cetirizine (ZYRTEC) 10 MG tablet Take 1 tablet (10 mg) by mouth daily 90 tablet 3     fexofenadine (ALLEGRA) 180 MG tablet Take 1 tablet (180 mg) by mouth daily 90 tablet 3     hydrochlorothiazide (HYDRODIURIL) 50 MG tablet TAKE 1 TABLET(50 MG) BY MOUTH EVERY MORNING 90 tablet 3     hydrocortisone 1 % ointment Apply topically 2 times daily as needed 30 g 1     ketoconazole (NIZORAL) 2 % cream Apply topically 2 times daily for up to 2 weeks as needed for rash 60 g 1     losartan (COZAAR) 100 MG tablet Take 1 tablet (100 mg) by mouth daily 90 tablet 0     metFORMIN (GLUCOPHAGE) 500 MG tablet TAKE 1 TABLET BY MOUTH TWICE DAILY WITH MEALS 120 tablet 3     Multiple Vitamin (DAILY MULTIVITAMIN PO) Take  by mouth daily.         omeprazole (PRILOSEC) 20 MG DR capsule TAKE 1 TABLET BY MOUTH DAILY 90 capsule 2     order for DME Autocpap 10-16 cm 1 Units 0     PARoxetine (PAXIL) 20 MG tablet  TAKE 2 TABLETS(40 MG) BY MOUTH DAILY 180 tablet 3     psyllium 0.52 G capsule Take 1 capsule (0.52 g) by mouth daily 100 capsule 3     tamsulosin (FLOMAX) 0.4 MG capsule TAKE 1 CAPSULE(0.4 MG) BY MOUTH DAILY 90 capsule 0     triamcinolone (KENALOG) 0.1 % cream Apply topically 2 times daily as needed for irritation 30 g 1     vitamin D (ERGOCALCIFEROL) 90219 UNIT capsule TAKE 1 CAPSULE BY MOUTH EVERY WEEK 12 capsule 1            Social History   Social History            Socioeconomic History     Marital status:        Spouse name: Not on file     Number of children: 3     Years of education: Not on file     Highest education level: Not on file   Occupational History     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   Social Needs     Financial resource strain: Not on file     Food insecurity:       Worry: Not on file       Inability: Not on file     Transportation needs:       Medical: Not on file       Non-medical: Not on file   Tobacco Use     Smoking status: Former Smoker       Packs/day: 1.00       Years: 8.00       Pack years: 8.00       Last attempt to quit: 2000       Years since quittin.5     Smokeless tobacco: Never Used   Substance and Sexual Activity     Alcohol use: Yes       Alcohol/week: 0.0 oz       Comment: rare     Drug use: No     Sexual activity: Yes       Partners: Female   Lifestyle     Physical activity:       Days per week: Not on file       Minutes per session: Not on file     Stress: Not on file   Relationships     Social connections:       Talks on phone: Not on file       Gets together: Not on file       Attends Roman Catholic service: Not on file       Active member of club or organization: Not on file       Attends meetings of clubs or organizations: Not on file       Relationship status: Not on file     Intimate partner violence:       Fear of current or ex partner: Not on file       Emotionally abused: Not on file       Physically abused: Not on file        Forced sexual activity: Not on file   Other Topics Concern     Parent/sibling w/ CABG, MI or angioplasty before 65F 55M? No   Social History Narrative     Not on file            Physical exam:  Patient able to get up on table with mild difficulty.  Head eyes, nose and mouth within normal limits.  No supraclavicular or cervical adenopathy palpated.  Thyroid within normal limits.  No carotid bruits auscultated.  Lungs are clear to auscultation  Heart is regular rate and rhythm with no murmur or thrills noted.  No costal vertebral angle tenderness noted.  Abdomen is abdomen is soft without significant tenderness, masses, organomegaly or guarding  bowel sounds are positive and no caput medusa noted.  No obvious hernias ventral hernia noted.     Lower extremity edema is not present.     Rectal exam:tender in left side consitant with anal fissure  Can feel a small lump presumed to be healing fissure.            For your hemorrhoids try using Metamucil or it's generic equivalent 1-2 tablespoons with a large glass of water or juice every day.  Do not spend much time on the toilet.  Do not bring a magazine or book in the bathroom with you when having a bowel movement.  Sitting on the toilet for extended periods will make your hemorrhoids worse.      You can also use flax seed that is easily obtained at your grocery store. Make sure it is ground up and sprinkle 2 tablespoons on your cereal each morning and drink a large glass of water with it.  You can also mix in yogurt, and even bake in bread or muffins.   Flax seed seems to give less gas and does not have any taste.   If these are not working for you I would be glad to see you back in my clinic to discuss other options.  Call [phone] to set up an appointment.           Discussed treatment for anal discomfort.  The use of fiber and what the options are.  Keeping the area clean.  The use of steroid creams.  Also discussed use of tight fitting work out underwear  that will help keep skin from rubbing together.             Discussed hemorrhoid care including fiber supplements (metamucil and flax seed ground), not spending much time on the toilet.  Also discussed anusol HC  and how to use it for swelling and discomfort.  Discussed ways to keep are clean.  I also briefly discussed hemorrhoid surgery and the discomforts with it.  Encouraged patient to do conservative treatment first.     Discussed anal fissure treatment including fiber to soften the stool, nifedipine cream 0.2 % with prescription and how to use it every 12 hours, but patient only uses it for a few days and then does well with that.         If continues to have problems especially with some bleeding then recommend a colonoscopy.    Last one is 7 years ago.      Follow up with me to look with anoscope when doing better to look at that area to make sure it is just an anal fissure.      Time spent with the patient with greater that 50% of the time in discussion was 30 minutes.        Zachariah Saucedo MD      Other Notes          Again, thank you for allowing me to participate in the care of your patient.        Sincerely,        Zachariah Saucedo MD

## 2021-04-27 NOTE — PROGRESS NOTES
Patient has been having bowel movement. Every day .  Has to push hard with bowel movement.    Has been using a warm bath.    Has been using suppository nifedipine.      Anal exam very tender in the posterior anus and can feel a fissure.  Not able to do anoscope.  No other masses palpated.     Last colonoscopy 6/2012 anal fissure no polyps.     For your hemorrhoids try using Metamucil or it's generic equivalent 1-2 tablespoons with a large glass of water or juice every day.  Do not spend much time on the toilet.  Do not bring a magazine or book in the bathroom with you when having a bowel movement.  Sitting on the toilet for extended periods will make your hemorrhoids worse.     You can also use flax seed that is easily obtained at your grocery store. Make sure it is ground up and sprinkle 2 tablespoons on your cereal each morning and drink a large glass of water with it.  You can also mix in yogurt, and even bake in bread or muffins.   Flax seed seems to give less gas and does not have any taste.   If these are not working for you I would be glad to see you back in my clinic to discuss other options.  Call [phone] to set up an appointment.      Need to do the nifidipine suppositories every 12 hours   For a month.    Add the ground up flax seed that you can buy at the grocery store and use a heaping table spoon at least once a day.  important to drink water at same time.   If this is not working for you we can do an internal sphincterotomy.  There is about a 5 % chance of being incontinent.    Sincerely     Zachariah Saucedo M.D.     Time spent with the patient with greater that 50% of the time in discussion was 30 minutes.  In discussing the plan.      Zachariah Saucedo MD      Office Visit    3/8/2019  [facility] Zachariah Cade MD  Surgery  Anal fissure  Dx  RECHECK  Reason for Visit   Progress Notes    Expand AllCollapse All  []Expand All by Default  Dear Finesse Paiz  "D  I have seen Gary Iyer and as you know his chief complaint is when he is constipated, then has pain.  And then bleeds a little bit. Is more the bleeding he is worried. Used the nifedipine but was in suppositories and that has helped. Gets better with in a few days of using this.   HPI:  Patient is a 58 year old male  with complaints see above        Review Of Systems  Respiratory: No shortness of breath, dyspnea on exertion, cough, or hemoptysis  Cardiovascular: negative  Gastrointestinal: constipation  Endocrine: negative and diabetes  Patient is taking fiber supplements   /81   Pulse 101   Ht 1.778 m (5' 10\")   Wt 109.8 kg (242 lb)   BMI 34.72 kg/m          Past Medical History        Past Medical History:   Diagnosis Date     Diabetes (H)       2yr     Multinodular goiter (nontoxic) 6/10/2013     Sleep apnea 8yr            Past Surgical History         Past Surgical History:   Procedure Laterality Date     COLONOSCOPY         COLONOSCOPY   6-22-12     Repeat Colonoscopy in 10 yrs.      ENT SURGERY   2008     Vocal cord carcinoma     HEAD & NECK SURGERY   1/25/11     callous removal from throat     LARYNGOSCOPY WITH BIOPSY(IES)   1-25-11     LARYNGOSCOPY WITH BIOPSY(IES) N/A 2/9/2017     Procedure: LARYNGOSCOPY WITH BIOPSY(IES);  Surgeon: Mel Fournier MD;  Location: [facility] OR     LARYNGOSCOPY WITH MICROSCOPE N/A 2/9/2017     Procedure: LARYNGOSCOPY WITH MICROSCOPE;  Surgeon: Mel Fournier MD;  Location:  OR     LASER CO2 LARYNGOSCOPY, COMPLEX   6/27/2013     Procedure: LASER CO2 LARYNGOSCOPY, COMPLEX;  Micro Direct Laryngoscopy With Micro Flap Excision Of Lesion Lumenis Co2 Laser ;  Surgeon: Mel Fournier MD;  Location: [facility] OR     LASER CO2 LESION ORAL N/A 2/9/2017     Procedure: LASER CO2 LESION ORAL;  Surgeon: Mel Fournier MD;  Location:  OR     ORTHOPEDIC SURGERY   March 2016     left tibia orif with abbi 2-2016     VASECTOMY   02/03    "         Current Outpatient Prescriptions          Current Outpatient Medications   Medication Sig Dispense Refill     allopurinol (ZYLOPRIM) 300 MG tablet TAKE 1 TABLET(300 MG) BY MOUTH DAILY 90 tablet 1     amLODIPine (NORVASC) 5 MG tablet TAKE 1 TABLET(5 MG) BY MOUTH DAILY 30 tablet 3     aspirin 81 MG tablet Take 1 tablet by mouth daily.         atorvastatin (LIPITOR) 20 MG tablet TAKE 1 TABLET(20 MG) BY MOUTH DAILY 90 tablet 3     blood glucose monitoring (ALON MICROLET) lancets 1 each 2 times daily Use to test blood sugar 2 times daily or as directed. 1 Box 3     blood glucose monitoring (NO BRAND SPECIFIED) meter device kit Use to test blood sugar 2 times daily or as directed.  Accucheck meter please and all associated strips, lancets, and other supplies, 3 month supply with refills for a year. 1 kit 0     cetirizine (ZYRTEC) 10 MG tablet Take 1 tablet (10 mg) by mouth daily 90 tablet 3     fexofenadine (ALLEGRA) 180 MG tablet Take 1 tablet (180 mg) by mouth daily 90 tablet 3     hydrochlorothiazide (HYDRODIURIL) 50 MG tablet TAKE 1 TABLET(50 MG) BY MOUTH EVERY MORNING 90 tablet 3     hydrocortisone 1 % ointment Apply topically 2 times daily as needed 30 g 1     ketoconazole (NIZORAL) 2 % cream Apply topically 2 times daily for up to 2 weeks as needed for rash 60 g 1     losartan (COZAAR) 100 MG tablet Take 1 tablet (100 mg) by mouth daily 90 tablet 0     metFORMIN (GLUCOPHAGE) 500 MG tablet TAKE 1 TABLET BY MOUTH TWICE DAILY WITH MEALS 120 tablet 3     Multiple Vitamin (DAILY MULTIVITAMIN PO) Take  by mouth daily.         omeprazole (PRILOSEC) 20 MG DR capsule TAKE 1 TABLET BY MOUTH DAILY 90 capsule 2     order for DME Autocpap 10-16 cm 1 Units 0     PARoxetine (PAXIL) 20 MG tablet TAKE 2 TABLETS(40 MG) BY MOUTH DAILY 180 tablet 3     psyllium 0.52 G capsule Take 1 capsule (0.52 g) by mouth daily 100 capsule 3     tamsulosin (FLOMAX) 0.4 MG capsule TAKE 1 CAPSULE(0.4 MG) BY MOUTH DAILY 90 capsule 0      triamcinolone (KENALOG) 0.1 % cream Apply topically 2 times daily as needed for irritation 30 g 1     vitamin D (ERGOCALCIFEROL) 26043 UNIT capsule TAKE 1 CAPSULE BY MOUTH EVERY WEEK 12 capsule 1            Social History   Social History            Socioeconomic History     Marital status:        Spouse name: Not on file     Number of children: 3     Years of education: Not on file     Highest education level: Not on file   Occupational History     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   Social Needs     Financial resource strain: Not on file     Food insecurity:       Worry: Not on file       Inability: Not on file     Transportation needs:       Medical: Not on file       Non-medical: Not on file   Tobacco Use     Smoking status: Former Smoker       Packs/day: 1.00       Years: 8.00       Pack years: 8.00       Last attempt to quit: 2000       Years since quittin.5     Smokeless tobacco: Never Used   Substance and Sexual Activity     Alcohol use: Yes       Alcohol/week: 0.0 oz       Comment: rare     Drug use: No     Sexual activity: Yes       Partners: Female   Lifestyle     Physical activity:       Days per week: Not on file       Minutes per session: Not on file     Stress: Not on file   Relationships     Social connections:       Talks on phone: Not on file       Gets together: Not on file       Attends Confucianist service: Not on file       Active member of club or organization: Not on file       Attends meetings of clubs or organizations: Not on file       Relationship status: Not on file     Intimate partner violence:       Fear of current or ex partner: Not on file       Emotionally abused: Not on file       Physically abused: Not on file       Forced sexual activity: Not on file   Other Topics Concern     Parent/sibling w/ CABG, MI or angioplasty before 65F 55M? No   Social History Narrative     Not on file            Physical exam:  Patient able to get up on  table with mild difficulty.  Head eyes, nose and mouth within normal limits.  No supraclavicular or cervical adenopathy palpated.  Thyroid within normal limits.  No carotid bruits auscultated.  Lungs are clear to auscultation  Heart is regular rate and rhythm with no murmur or thrills noted.  No costal vertebral angle tenderness noted.  Abdomen is abdomen is soft without significant tenderness, masses, organomegaly or guarding  bowel sounds are positive and no caput medusa noted.  No obvious hernias ventral hernia noted.     Lower extremity edema is not present.     Rectal exam:tender in left side consitant with anal fissure  Can feel a small lump presumed to be healing fissure.            For your hemorrhoids try using Metamucil or it's generic equivalent 1-2 tablespoons with a large glass of water or juice every day.  Do not spend much time on the toilet.  Do not bring a magazine or book in the bathroom with you when having a bowel movement.  Sitting on the toilet for extended periods will make your hemorrhoids worse.      You can also use flax seed that is easily obtained at your grocery store. Make sure it is ground up and sprinkle 2 tablespoons on your cereal each morning and drink a large glass of water with it.  You can also mix in yogurt, and even bake in bread or muffins.   Flax seed seems to give less gas and does not have any taste.   If these are not working for you I would be glad to see you back in my clinic to discuss other options.  Call [phone] to set up an appointment.           Discussed treatment for anal discomfort.  The use of fiber and what the options are.  Keeping the area clean.  The use of steroid creams.  Also discussed use of tight fitting work out underwear that will help keep skin from rubbing together.             Discussed hemorrhoid care including fiber supplements (metamucil and flax seed ground), not spending much time on the toilet.  Also discussed anusol HC  and how to  use it for swelling and discomfort.  Discussed ways to keep are clean.  I also briefly discussed hemorrhoid surgery and the discomforts with it.  Encouraged patient to do conservative treatment first.     Discussed anal fissure treatment including fiber to soften the stool, nifedipine cream 0.2 % with prescription and how to use it every 12 hours, but patient only uses it for a few days and then does well with that.         If continues to have problems especially with some bleeding then recommend a colonoscopy.    Last one is 7 years ago.      Follow up with me to look with anoscope when doing better to look at that area to make sure it is just an anal fissure.      Time spent with the patient with greater that 50% of the time in discussion was 30 minutes.        Zachariah Saucedo MD      Other Notes

## 2021-04-27 NOTE — TELEPHONE ENCOUNTER
I called and clarified with pharmacy, following their advice.  They will prepare and send out compounded med.  Finesse Beal MD

## 2021-04-27 NOTE — PATIENT INSTRUCTIONS
Patient has been having bowel movement. Every day .  Has to push hard with bowel movement.    Has been using a warm bath.    Has been using suppository nifedipine.      Anal exam very tender in the posterior anus and can feel a fissure.  Not able to do anoscope.  No other masses palpated.     Last colonoscopy 6/2012 anal fissure no polyps.     For your hemorrhoids try using Metamucil or it's generic equivalent 1-2 tablespoons with a large glass of water or juice every day.  Do not spend much time on the toilet.  Do not bring a magazine or book in the bathroom with you when having a bowel movement.  Sitting on the toilet for extended periods will make your hemorrhoids worse.     You can also use flax seed that is easily obtained at your grocery store. Make sure it is ground up and sprinkle 2 tablespoons on your cereal each morning and drink a large glass of water with it.  You can also mix in yogurt, and even bake in bread or muffins.   Flax seed seems to give less gas and does not have any taste.   If these are not working for you I would be glad to see you back in my clinic to discuss other options.  Call [phone] to set up an appointment.      Need to do the nifidipine suppositories every 12 hours   For a month.    Add the ground up flax seed that you can buy at the grocery store and use a heaping table spoon at least once a day.  important to drink water at same time.   If this is not working for you we can do an internal sphincterotomy.  There is about a 5 % chance of being incontinent.    Sincerely     Zachariah Saucedo M.D.

## 2021-04-27 NOTE — TELEPHONE ENCOUNTER
Daniel from  [facility] needs Clarification on COMPOUNDED NON-CONTROLLED SUBSTANCE (CMPD RX) - PHARMACY TO MIX COMPOUNDED MEDICATION There are no directions? Please call Pal at [phone].  Georgette Bland,

## 2021-05-03 PROBLEM — E66.9 OBESITY, UNSPECIFIED CLASSIFICATION, UNSPECIFIED OBESITY TYPE, UNSPECIFIED WHETHER SERIOUS COMORBIDITY PRESENT: Chronic | Status: ACTIVE | Noted: 2018-11-06

## 2021-05-03 PROBLEM — J38.7 LESION OF LARYNX: Status: RESOLVED | Noted: 2017-12-12 | Resolved: 2021-05-03

## 2021-05-03 PROBLEM — M10.9 GOUT, UNSPECIFIED CAUSE, UNSPECIFIED CHRONICITY, UNSPECIFIED SITE: Chronic | Status: ACTIVE | Noted: 2017-10-06

## 2021-05-03 PROBLEM — R05.3 CHRONIC COUGH: Status: ACTIVE | Noted: 2017-12-12

## 2021-05-04 ENCOUNTER — TELEPHONE (OUTPATIENT)
Dept: SLEEP MEDICINE | Facility: CLINIC | Age: 61
End: 2021-05-04

## 2021-05-04 ENCOUNTER — VIRTUAL VISIT (OUTPATIENT)
Dept: SLEEP MEDICINE | Facility: CLINIC | Age: 61
End: 2021-05-04
Payer: MEDICARE

## 2021-05-04 VITALS — WEIGHT: 230 LBS | HEIGHT: 70 IN | BODY MASS INDEX: 32.93 KG/M2

## 2021-05-04 DIAGNOSIS — E66.9 OBESITY, UNSPECIFIED CLASSIFICATION, UNSPECIFIED OBESITY TYPE, UNSPECIFIED WHETHER SERIOUS COMORBIDITY PRESENT: ICD-10-CM

## 2021-05-04 DIAGNOSIS — G47.61 PERIODIC LIMB MOVEMENTS OF SLEEP: ICD-10-CM

## 2021-05-04 DIAGNOSIS — G47.33 OBSTRUCTIVE SLEEP APNEA: ICD-10-CM

## 2021-05-04 DIAGNOSIS — G47.33 OBSTRUCTIVE SLEEP APNEA: Primary | ICD-10-CM

## 2021-05-04 DIAGNOSIS — R79.0 LOW FERRITIN: ICD-10-CM

## 2021-05-04 DIAGNOSIS — F51.04 PSYCHOPHYSIOLOGICAL INSOMNIA: ICD-10-CM

## 2021-05-04 PROCEDURE — 99204 OFFICE O/P NEW MOD 45 MIN: CPT | Mod: 95 | Performed by: INTERNAL MEDICINE

## 2021-05-04 ASSESSMENT — MIFFLIN-ST. JEOR: SCORE: 1849.52

## 2021-05-04 NOTE — PATIENT INSTRUCTIONS
Your BMI is Body mass index is 33 kg/m .  Weight management is a personal decision.  If you are interested in exploring weight loss strategies, the following discussion covers the approaches that may be successful. Body mass index (BMI) is one way to tell whether you are at a healthy weight, overweight, or obese. It measures your weight in relation to your height.  A BMI of 18.5 to 24.9 is in the healthy range. A person with a BMI of 25 to 29.9 is considered overweight, and someone with a BMI of 30 or greater is considered obese. More than two-thirds of American adults are considered overweight or obese.  Being overweight or obese increases the risk for further weight gain. Excess weight may lead to heart disease and diabetes.  Creating and following plans for healthy eating and physical activity may help you improve your health.  Weight control is part of healthy lifestyle and includes exercise, emotional health, and healthy eating habits. Careful eating habits lifelong are the mainstay of weight control. Though there are significant health benefits from weight loss, long-term weight loss with diet alone may be very difficult to achieve- studies show long-term success with dietary management in less than 10% of people. Attaining a healthy weight may be especially difficult to achieve in those with severe obesity. In some cases, medications, devices and surgical management might be considered.  What can you do?  If you are overweight or obese and are interested in methods for weight loss, you should discuss this with your provider.     Consider reducing daily calorie intake by 500 calories.     Keep a food journal.     Avoiding skipping meals, consider cutting portions instead.    Diet combined with exercise helps maintain muscle while optimizing fat loss. Strength training is particularly important for building and maintaining muscle mass. Exercise helps reduce stress, increase energy, and improves fitness.  Increasing exercise without diet control, however, may not burn enough calories to loose weight.       Start walking three days a week 10-20 minutes at a time    Work towards walking thirty minutes five days a week     Eventually, increase the speed of your walking for 1-2 minutes at time    In addition, we recommend that you review healthy lifestyles and methods for weight loss available through the National Institutes of Health patient information sites:  http://win.niddk.nih.gov/publications/index.htm    And look into health and wellness programs that may be available through your health insurance provider, employer, local community center, or jenifer club.    Weight management plan: Patient was referred to their PCP to discuss a diet and exercise plan.

## 2021-05-04 NOTE — PROGRESS NOTES
Gary is a 60 year old who is being evaluated via a billable video visit.      How would you like to obtain your AVS? MyChart  If the video visit is dropped, the invitation should be resent by: Text to cell phone: [phone]  Will anyone else be joining your video visit? No      Rene Ochoa MD on 5/4/2021 at 9:54 AM    Video Start Time: 11:02 AM     Video-Visit Details    Type of service:  Video Visit    Video End Time:11:40 AM    Originating Location (pt. Location): Home    Distant Location (provider location):  [facility]     Platform used for Video Visit: [facility]           Sleep Consultation:    Date on this visit: 5/4/2021    Primary Physician: Finesse Beal     Chief Complaint   Patient presents with     Consult     reestablish care,       DME     Initial sleep study 2000s, not available for review. . He cannot recall why first study was done, probably was done for snoring. He tried CPAP for a day or so and then quit.     [facility] (216#)  AHI 25, .9, low O2 32% (probably artifact by hypnogram). True low O2 probably upper 70s on CPAP during REM. CPAP 8cm with fragmented sleep during supine REM.     He tried CPAP for 2-3 days but 'could not sleep' and again stopped using it.     He initially presented to [facility]  in 2017 with symptoms of fatigue (ESS 0), nocturnal awakenings, leg shaking. Comorbid diabetes mellitus, hypertension.    - Sleep onset difficulties. Suspect multifactorial: delayed sleep phase, psychophysiologic insomnia, poor sleep hygiene.   - Nocturnal leg kicking/stiffness. This may be related to his obstructive sleep apnea. Nocturnal seizure disorder could also be considerered.       Study Date: 3/14/2017- (227.0 lbs)   - Titration complicated by high leaks on several different mask types.   - Titrated from 5 cmH2O up to 15 cmH2O.  Most of titrations done for RERAs and flow limitations. The final  pressure  was 15 cmH2O with a residual AHI of 0 events per hour.  REM supine seen at 12 cm with good control of events.   - Lowest oxygen saturation was 84.1%.  Time spent less than or equal to 88% was 0.9 minutes.   - PLM index was 72.6 movements per hour.  PLM Arousal Index was 24.4 per hour.   - Cyclic alternating pattern of arousal with and without whole body movements and leg movements was seen.     Recommend autoCPAP 10-16 cm       Overall, the patient rates their experience with PAP as poor    He 'struggled with the machine' and has not been using it much.     He did not 'push it hard' He admits he gave up early. He has claustrophobia. That is controlled with Paxil  He does not recall exactly why he gave up.     Patient used nasal pillows.    Gary goes to bed at 10:00 PM during the week. He gets up at 7-8 AM without an alarm.  Gary denies difficulty falling asleep.  He wakes up 2-3 times a night without difficulty falling back to sleep.      On weekends, schedule is similar.      Patient does watch TV in bed.     Patient does have a regular bed partner. Patient sleeps on his side. He denies no morning headaches.    He gets restless leg syndrome symptoms when the weather changes. He has symptoms > a few times a month.  It does not  interfere with sleep currently      Gary denies any sleep walking.    Occasionally he screams but does not act out his dream.     Patient's [place] Sleepiness score 1/24 inconsistent with excessive daytime sleepiness.  His wife says he is sleepy during the day.     Gary naps 1 times per day for 30-60 minutes in the afternoon. He takes no inadvertant naps.  He denies dozing while driving. He uses 2-3 cups/day of tea. Last caffeine intake is usually before 5 PM.    Allergies:    No Known Allergies    Medications:    Current Outpatient Medications   Medication Sig Dispense Refill     alcohol swab prep pads Use to swab area of injection/maria [place] rosario as directed. 100 each 3     allopurinol  (ZYLOPRIM) 300 MG tablet Take 1 tablet (300 mg) by mouth daily 90 tablet 3     amLODIPine (NORVASC) 2.5 MG tablet Take 1 tablet (2.5 mg) by mouth daily 90 tablet 3     aspirin 81 MG tablet Take 1 tablet by mouth daily.       atorvastatin (LIPITOR) 20 MG tablet Take 1 tablet (20 mg) by mouth daily 90 tablet 3     blood glucose (NO BRAND SPECIFIED) test strip Use to test blood sugar 1 times daily or as directed. To accompany: Blood Glucose Monitor Brands: per insurance. 100 strip 6     blood glucose calibration (NO BRAND SPECIFIED) solution To accompany: Blood Glucose Monitor Brands: per insurance. 1 Bottle 3     blood glucose monitoring (NO BRAND SPECIFIED) meter device kit Use to test blood sugar 1 times daily or as directed. Preferred blood glucose meter OR supplies to accompany: Blood Glucose Monitor Brands: per insurance. 1 kit 0     cetirizine (ZYRTEC) 10 MG tablet Take 1 tablet (10 mg) by mouth daily 90 tablet 3     COMPOUNDED NON-CONTROLLED SUBSTANCE (CMPD RX) - PHARMACY TO MIX COMPOUNDED MEDICATION Pharmacy please compound 0.3% nifedipine in 1.5% lidocaine ointment 30 g 1     COMPRESSION STOCKINGS 1 each daily 20-30 mmHg pressure, waist high 2 each 1     hydrochlorothiazide (HYDRODIURIL) 50 MG tablet Take 1 tablet (50 mg) by mouth daily 90 tablet 3     hydrocortisone (CORTAID) 1 % external ointment Apply topically 2 times daily as needed 30 g 1     ketoconazole (NIZORAL) 2 % cream Apply topically 2 times daily for up to 2 weeks as needed for rash 60 g 1     losartan (COZAAR) 100 MG tablet Take 1 tablet (100 mg) by mouth daily 90 tablet 3     metFORMIN (GLUCOPHAGE) 500 MG tablet TAKE 1 TABLET BY MOUTH TWICE DAILY WITH MEALS 190 tablet 1     metroNIDAZOLE (METROCREAM) 0.75 % external cream Apply topically 2 times daily as needed (to rosacea areas) 45 g 1     Multiple Vitamin (DAILY MULTIVITAMIN PO) Take  by mouth daily.       nifedipine 0.2% in white petrolatum 0.2 % OINT ointment Apply topically 2 times daily  Please make it into suppositories   0.2 % nifedipine cream  Place in the anus every 12 hours.  Just get to where the anus is tight . 100 g 3     omeprazole (PRILOSEC) 20 MG DR capsule TAKE 1 CAPSULE BY MOUTH DAILY 90 capsule 3     order for DME Place rectally every 12 hours 1.8 oz container of 0.2 % nifedipine suppositories.   Apply on the anus every 12 hours.  Just get to where the anus is tight . 1 Container 5     order for DME Autocpap 10-16 cm 1 Units 0     PARoxetine (PAXIL) 40 MG tablet Take 1 tablet (40 mg) by mouth every morning 90 tablet 3     polyethylene glycol (MIRALAX) 17 GM/Dose powder Take 17 g (1 capful) by mouth daily 850 g 3     psyllium 0.52 G capsule Take 1 capsule (0.52 g) by mouth daily 100 capsule 3     tamsulosin (FLOMAX) 0.4 MG capsule TAKE 1 CAPSULE(0.4 MG) BY MOUTH DAILY 90 capsule 2     thin (NO BRAND SPECIFIED) lancets Use with lanceting device. To accompany: Blood Glucose Monitor Brands: per insurance. 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  Sleep study [facility] 5/2012 (216#)  AHI 25, .9, low O2 32% (probably artifact by hypnogram). True low O2 probably upper 70s on CPAP during REM. CPAP 8cm with fragmented sleep during supine REM.   Study Date: 3/14/2017- (227.0 lbs)- Titration was complicated by high leaks on several different mask types. The patient was titrated at pressures ranging from 5 cmH2O up to 15 cmH2O.  Most of titrations were done for RERAs and flow limitations. The final   pressure achieved was 15 cmH2O with a residual AHI of 0 events per hour.  REM supine was seen at 12 cm with good control of events. Lowest oxygen saturation was 84.1%.  Time spent less than or equal to 88% was 0.9 minutes. PLM index was 72.6 movements per hour.  The PLM Arousal Index was 24.4 per hour. Cyclic alternating pattern of arousal with and without whole body movements and leg movements was seen.        SAMARIA (generalized anxiety disorder) 06/13/2013     Priority: Medium     Hypertension goal BP (blood pressure) < 140/90 11/30/2012     Priority: Medium     Hyperlipidemia LDL goal <70 05/04/2012     Priority: Medium     Obesity, unspecified classification, unspecified obesity type, unspecified whether serious comorbidity present 11/06/2018     Priority: Low     Chronic cough 12/12/2017     Priority: Low     Gout, unspecified cause, unspecified chronicity, unspecified site 10/06/2017     Priority: Low     Low ferritin 03/30/2017     Priority: Low     Periodic limb movements of sleep 03/29/2017     Priority: Low     Benign prostatic hypertrophy 03/07/2017     Priority: Low     Type 2 diabetes mellitus without complication, without long-term current use of insulin (H) 02/08/2017     Priority: Low     Dysphonia 04/24/2016     Priority: Low     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 Less than 50% diameter stenosis of the left ICA relative to the distal ICA diameter.       Advanced directives, counseling/discussion 07/23/2013     Priority: Low     Advance Care Planning:  Information given           Past Medical/Surgical History:  Past Medical History:   Diagnosis Date     Diabetes (H)     2yr     Hypertension      Lesion of larynx 12/12/2017    Leokoplakia. s/p  Flexible fiberoptic transnasal laryngoscopy with endoscopic injection, laryngeal biopsy     Multinodular goiter (nontoxic) 06/10/2013     Sleep apnea 8yr     Past Surgical History:   Procedure Laterality Date     COLONOSCOPY  2012    Repeat Colonoscopy in 10 yrs.      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     HEAD & NECK SURGERY  2011    callous removal from throat     LARYNGOSCOPY WITH BIOPSY(IES)  2011     LARYNGOSCOPY WITH BIOPSY(IES) N/A 2017    Procedure: LARYNGOSCOPY WITH BIOPSY(IES);  Surgeon: Mel Fournier MD;  Location: [facility] OR     LARYNGOSCOPY WITH MICROSCOPE N/A 2017    Procedure: LARYNGOSCOPY WITH MICROSCOPE;  Surgeon: Mel Fournier MD;  Location: [facility] OR     LASER CO2 LARYNGOSCOPY, COMPLEX  2013    Procedure: LASER CO2 LARYNGOSCOPY, COMPLEX;  Micro Direct Laryngoscopy With Micro Flap Excision Of Lesion Lumenis Co2 Laser ;  Surgeon: Mel Fournier MD;  Location: [facility] OR     LASER CO2 LESION ORAL N/A 2017    Procedure: LASER CO2 LESION ORAL;  Surgeon: Mel Fournier MD;  Location: [facility] OR     ORTHOPEDIC SURGERY  2016    left tibia orif with abbi -     VASECTOMY  2003       Social History:  Social History     Socioeconomic History     Marital status:      Spouse name: Not on file     Number of children: 3     Years of education: Not on file     Highest education level: Not on file   Occupational History     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   Social Needs     Financial resource strain: Not on file     Food insecurity     Worry: Not on file     Inability: Not on file     Transportation needs     Medical: Not on file     Non-medical: Not on file   Tobacco Use     Smoking status: Former Smoker     Packs/day: 1.00     Years: 25.00     Pack years: 25.00     Quit date: 2000     Years since quittin.6     Smokeless tobacco: Never Used   Substance and Sexual Activity      "Alcohol use: Yes     Alcohol/week: 0.0 standard drinks     Comment: rare     Drug use: No     Sexual activity: Yes     Partners: Female   Lifestyle     Physical activity     Days per week: Not on file     Minutes per session: Not on file     Stress: Not on file   Relationships     Social connections     Talks on phone: Not on file     Gets together: Not on file     Attends Denominational service: Not on file     Active member of club or organization: Not on file     Attends meetings of clubs or organizations: Not on file     Relationship status: Not on file     Intimate partner violence     Fear of current or ex partner: Not on file     Emotionally abused: Not on file     Physically abused: Not on file     Forced sexual activity: Not on file   Other Topics Concern     Parent/sibling w/ CABG, MI or angioplasty before 65F 55M? No   Social History Narrative     Not on file       Family History:  Family History   Problem Relation Age of Onset     C.A.D. Father          MI age 44     Hypertension Mother          93 old age     Diabetes No family hx of      Cerebrovascular Disease No family hx of      Cancer No family hx of      Glaucoma No family hx of      Macular Degeneration No family hx of          Physical Examination:  Vitals: Ht 1.778 m (5' 10\")   Wt 104.3 kg (230 lb)   BMI 33.00 kg/m    BMI= Body mass index is 33 kg/m .      [place] Total Score 2021   Total score - [place] 1       GRIFF[place] Total Score: 6 (21 0900)    SpO2 Readings from Last 4 Encounters:   03/10/21 96%   20 96%   10/21/20 96%   20 98%     GENERAL APPEARANCE: alert and no distress  EYES: Eyes grossly normal to inspection  NECK: generous size  LUNGS: no shortness of breath , cough  NEURO: mentation intact, speech normal and cranial nerves 2-12 appear intact  PSYCH: affect flat      Impression/Plan:    Moderate obstructive sleep apnea by sleep study 2012  Not using CPAP, stopped for unclear reasons.  Pathophysiology of sleep " apnea, treatment options reviewed.   Current symptoms of long sleep times. Comorbid hypertension, diabetes mellitus.   - Restart CPAP 10-16 cmH20  - See DME for re-set-up  - [facility] referral    Psychophysiologic insomnia  Quiescent     Restless leg syndrome   Quiescent     I spent >30 minutes with patient including counseling, and >15 minutes with chart review, and documentation

## 2021-05-05 ENCOUNTER — TELEPHONE (OUTPATIENT)
Dept: SURGERY | Facility: CLINIC | Age: 61
End: 2021-05-05

## 2021-05-05 DIAGNOSIS — K60.2 ANAL FISSURE: ICD-10-CM

## 2021-05-05 NOTE — TELEPHONE ENCOUNTER
Spoke with patient. He received this prescription from Dr Beal 4/26/21:    COMPOUNDED NON-CONTROLLED SUBSTANCE (CMPD RX) - PHARMACY TO MIX COMPOUNDED MEDICATION:Pharmacy please compound 0.3% nifedipine in 1.5% lidocaine ointment     This should be in suppository form and patient states prescription should be from Dr Saucedo, not Dr Beal.    There was another similar prescription sent 4/27/21 by Dr Saucedo to [facility].nifedipine 0.2% in white petrolatum 0.2 % OINT ointment   Route: Apply topically 2 times daily Please make it into suppositories    0.2 % nifedipine cream   Place in the anus every 12 hours.  Just get to where the anus is tight . - Topical    Routing to Dr Saucedo's team to review      Anum Schneider RN

## 2021-05-05 NOTE — TELEPHONE ENCOUNTER
Called pt and let him know that medication was sent for the medication to be made into suppository.    Tiffanie KRAFT RN Specialty Triage 5/5/2021 10:48 AM

## 2021-05-05 NOTE — TELEPHONE ENCOUNTER
Reason for Call:  Other prescription    Detailed comments: Patient calling states the wrong medication was sent to the compounding pharmacy. Please call. Thank you     Phone Number Patient can be reached at: Home number on file [phone] (home)    Best Time: ASAP    Can we leave a detailed message on this number? YES    Call taken on 5/5/2021 at 10:00 AM by Isabela Corona

## 2021-05-06 DIAGNOSIS — K60.2 ANAL FISSURE: Primary | ICD-10-CM

## 2021-05-06 NOTE — TELEPHONE ENCOUNTER
"Patient cold calling; says the compounding pharmacy sold him nifedipine/white petrolatum ointment but he is supposed to use suppositories.        Says the pharmacy told him Dr. Beal ordered this.   It looks to me like Dr. Saucedo, surgery, ordered this.    Patient is not happy about this and does not want to be charged twice.    I called pharmacy,  [facility].   They said Dr. Beal did order the ointment previously and then Dr. Saucedo sent the same Rx but just put a note to make it into suppositories.    Pharmacy is checking with manager to see if patient can get credited for the ointment which he did not want.    They need to know the mg dose, not a percentage.    Per Arlene at the [facility] pharmacy, she suggests it be worded as nifedipine 4 mg suppository, she says 0.2% is equivalent to 2 mg/gram so very close to a 4 mg suppository when they compound it.   To be honest, she lost me on the math so am taking her word for it.    They also need it ordered as number of suppositories, not grams.    I created a Rx that might work using \"suppository mold\" template I found in Epic.    Routed to Dr. Saucedo to address.    Juany Feliciano RN  [facility]          "

## 2021-05-08 RX ORDER — SUPPOSITORY MOLD
1 EACH MISCELLANEOUS EVERY 12 HOURS
Qty: 100 EACH | Refills: 4 | Status: SHIPPED | OUTPATIENT
Start: 2021-05-08

## 2021-05-11 ENCOUNTER — DOCUMENTATION ONLY (OUTPATIENT)
Dept: SLEEP MEDICINE | Facility: CLINIC | Age: 61
End: 2021-05-11

## 2021-05-11 DIAGNOSIS — G47.61 PERIODIC LIMB MOVEMENTS OF SLEEP: ICD-10-CM

## 2021-05-11 DIAGNOSIS — R79.0 LOW FERRITIN: ICD-10-CM

## 2021-05-11 DIAGNOSIS — G47.33 OBSTRUCTIVE SLEEP APNEA: ICD-10-CM

## 2021-05-11 DIAGNOSIS — E66.9 OBESITY, UNSPECIFIED CLASSIFICATION, UNSPECIFIED OBESITY TYPE, UNSPECIFIED WHETHER SERIOUS COMORBIDITY PRESENT: ICD-10-CM

## 2021-05-11 NOTE — PROGRESS NOTES
PT CAME IN TODAY (05/11/2021) FOR A RE-EDUCATION ON HIS CPAP MACHINE AND TO ALSO  NEW SUPPLIES. PT HAS SWITCHED FROM PILLOW MASKS TO NASAL PILLOW MASK. CLARIFICATION SENT TO DR. ZHANG.

## 2021-05-24 ENCOUNTER — OFFICE VISIT (OUTPATIENT)
Dept: OTOLARYNGOLOGY | Facility: CLINIC | Age: 61
End: 2021-05-24
Payer: MEDICARE

## 2021-05-24 VITALS
HEART RATE: 100 BPM | WEIGHT: 238 LBS | HEIGHT: 70 IN | BODY MASS INDEX: 34.07 KG/M2 | TEMPERATURE: 97.7 F | OXYGEN SATURATION: 95 %

## 2021-05-24 DIAGNOSIS — R49.0 DYSPHONIA: Primary | ICD-10-CM

## 2021-05-24 DIAGNOSIS — J38.3 VOCAL PROCESS GRANULOMA: ICD-10-CM

## 2021-05-24 DIAGNOSIS — Z85.21 HX OF LARYNGEAL CANCER: ICD-10-CM

## 2021-05-24 PROCEDURE — 31579 LARYNGOSCOPY TELESCOPIC: CPT | Performed by: OTOLARYNGOLOGY

## 2021-05-24 PROCEDURE — 99214 OFFICE O/P EST MOD 30 MIN: CPT | Mod: 25 | Performed by: OTOLARYNGOLOGY

## 2021-05-24 ASSESSMENT — PAIN SCALES - GENERAL: PAINLEVEL: NO PAIN (0)

## 2021-05-24 ASSESSMENT — MIFFLIN-ST. JEOR: SCORE: 1885.81

## 2021-05-24 NOTE — PROGRESS NOTES
Dear Colleague:    Gary Iyer recently returned for follow-up at the [facility]. My clinic note from our visit is enclosed below.  Speech recognition software may have been used in the documentation below; input is reviewed before signature to the best of my ability.     I appreciate the ongoing opportunity to participate in this patient's care.    Please feel free to contact me with any questions.      Sincerely yours,      Mel Fournier M.D., M.P.H.  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  Most recently, he returned to the operating room on February 9, 2017 for an area of new irregularity of the left true vocal fold.  Pathology showed severe dysplasia with negative but close margins (for moderate, but not severe dysplasia).  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    He repots he has been doing well since last seen. He has had some increased throat-clearing/coughing with allergies. No worsening of reflux. He has completed the COVID vaccine.          MEDICATIONS:     Current Outpatient Medications   Medication Sig Dispense Refill     alcohol swab prep pads Use to swab area of injection/maria del rosario as directed. 100 each 3     allopurinol (ZYLOPRIM) 300 MG tablet Take 1 tablet (300 mg) by mouth daily 90 tablet 3     amLODIPine (NORVASC) 2.5 MG tablet Take 1 tablet (2.5 mg) by mouth daily 90 tablet 3     aspirin 81 MG tablet Take 1 tablet by mouth daily.       atorvastatin (LIPITOR) 20 MG tablet Take 1 tablet (20 mg) by mouth daily 90 tablet 3     blood glucose (NO BRAND SPECIFIED) test strip Use to test blood sugar 1 times daily or as  directed. To accompany: Blood Glucose Monitor Brands: per insurance. 100 strip 6     blood glucose calibration (NO BRAND SPECIFIED) solution To accompany: Blood Glucose Monitor Brands: per insurance. 1 Bottle 3     blood glucose monitoring (NO BRAND SPECIFIED) meter device kit Use to test blood sugar 1 times daily or as directed. Preferred blood glucose meter OR supplies to accompany: Blood Glucose Monitor Brands: per insurance. 1 kit 0     cetirizine (ZYRTEC) 10 MG tablet Take 1 tablet (10 mg) by mouth daily 90 tablet 3     COMPRESSION STOCKINGS 1 each daily 20-30 mmHg pressure, waist high 2 each 1     hydrochlorothiazide (HYDRODIURIL) 50 MG tablet Take 1 tablet (50 mg) by mouth daily 90 tablet 3     hydrocortisone (CORTAID) 1 % external ointment Apply topically 2 times daily as needed 30 g 1     ketoconazole (NIZORAL) 2 % cream Apply topically 2 times daily for up to 2 weeks as needed for rash 60 g 1     losartan (COZAAR) 100 MG tablet Take 1 tablet (100 mg) by mouth daily 90 tablet 3     metFORMIN (GLUCOPHAGE) 500 MG tablet TAKE 1 TABLET BY MOUTH TWICE DAILY WITH MEALS 190 tablet 1     metroNIDAZOLE (METROCREAM) 0.75 % external cream Apply topically 2 times daily as needed (to rosacea areas) 45 g 1     Misc. Devices (SUPPOSITORY MOLD 2GM) MISC 1 suppository every 12 hours Nifedipine 4 mg suppository, one rectally every 12 hours 100 each 4     Multiple Vitamin (DAILY MULTIVITAMIN PO) Take  by mouth daily.       nifedipine 0.2% in white petrolatum 0.2 % OINT ointment Apply topically 2 times daily Please make it into suppositories 0.2 % nifedipine cream Place in the anus every 12 hours.  Just get to where the anus is tight . 100 g 3     omeprazole (PRILOSEC) 20 MG DR capsule TAKE 1 CAPSULE BY MOUTH DAILY 90 capsule 3     order for DME Place rectally every 12 hours 1.8 oz container of 0.2 % nifedipine suppositories.   Apply on the anus every 12 hours.  Just get to where the anus is tight . 1 Container 5     order for  DME Autocpap 10-16 cm 1 Units 0     PARoxetine (PAXIL) 40 MG tablet Take 1 tablet (40 mg) by mouth every morning 90 tablet 3     polyethylene glycol (MIRALAX) 17 GM/Dose powder Take 17 g (1 capful) by mouth daily 850 g 3     psyllium 0.52 G capsule Take 1 capsule (0.52 g) by mouth daily 100 capsule 3     tamsulosin (FLOMAX) 0.4 MG capsule TAKE 1 CAPSULE(0.4 MG) BY MOUTH DAILY 90 capsule 2     thin (NO BRAND SPECIFIED) lancets Use with lanceting device. To accompany: Blood Glucose Monitor Brands: per insurance. 100 each 11     triamcinolone (KENALOG) 0.1 % cream Apply topically 2 times daily as needed for irritation 30 g 1       ALLERGIES:  No Known Allergies    NEW PMH/PSH: Completed COVID vaccine    REVIEW OF SYSTEMS:  The patient completed a comprehensive 11 point review of systems (below), which was reviewed. Positives are as noted below.  Patient Supplied Answers to Review of Systems   ENT ROS 4/22/2016   Ears, Nose, Throat Nasal congestion or drainage         PHYSICAL EXAM:  General: The patient was alert and conversant, and in no acute distress.    Oral cavity/oropharynx: No masses or lesions. Dentition unchanged since prior. Tongue mobility and palate elevation intact and symmetric.  Neck: No palpable cervical lymphadenopathy, no significant tenderness to palpation of the thyrohyoid space, which was narrow. No obvious thyroid abnormality.  Resp: Breathing comfortably, no stridor or stertor.  Neuro: Symmetric facial function. Other cranial nerve function as documented above.  Psych: Normal affect, pleasant and cooperative.  Voice/speech: Mild dysphonia characterized by roughness.      Intake scores  Last 2 Scores for Patient-Answered VHI Questionnaire  VHI Total Score 2/26/2018 3/1/2019   VHI Total Score 9 17      Last 2 Scores for Patient-Answered EAT Questionnaire  EAT Total Score 2/26/2018 3/1/2019   EAT Total Score 1 5        Last 2 Scores for Patient-Answered CSI Questionnaire  CSI Total Score 2/26/2018  3/1/2019   CSI Total Score 0 0           Procedure:   Flexible fiberoptic laryngoscopy and laryngovideostroboscopy  Indications: This procedure was warranted to evaluate the patient's laryngeal anatomy and function. Risks, benefits, and alternatives were discussed.  Description: After written informed consent was obtained, a time-out was performed to confirm patient identity, procedure, and procedure site. Topical 3% lidocaine with 0.25% phenylephrine was applied to the nasal cavities. I performed the endoscopy and no complications were apparent. Continuous and stroboscopic light were utilized to assess routine phonation and variable frequency phonation.  Performed by: Mel Fournier MD MPH  Findings: Normal nasopharynx. Normal base of tongue, valleculae, and epiglottis. Vocal fold mobility: right: normal; left: normal. Medial edges of the vocal folds: smooth and straight. No focal mucosal lesions were observed on the true vocal folds. Stable mild hyperemia of left true vocal fold. Glissade produced appropriate elongation. There was mild to moderate supraglottic recruitment with connected speech. Mucosa of false vocal folds, aryepiglottic folds, piriform sinuses, and posterior glottis unremarkable. Airway was patent.    Similar findings on NBI.    The addition of stroboscopy allowed evaluation of the mucosal wave.   Amplitude: right: normal; left: mildly decreased. Symmetry: intermittent symmetry. Closure pattern: complete. Closure plane: at glottic level. Phase distribution: normal.                  IMPRESSION AND PLAN:   Gary Iyer returns with slight recurrence of leukoplakia/granuloma in the same location where had this in 2018. At that time it resolved just as we were considering a repeat biopsy. Currently, he is having allergies which are provoking coughing/throat-clearing. He is already taking medication for allergies and I recommended consideration of saline rinses/gargles as well.    The plan is a  refresher session of speech therapy, hopefully with Misty Clark, PhD, CCC-SLP as they have worked together in the past and successfully resolved the prior granuloma. I have sent her an in-basket message to try to facilitate.    Return to clinic with me in three months or sooner as needed. I have a low suspicion for recurrence in this location given his history of a prior finding in this area, but will follow this to make sure it resolves. I appreciate the opportunity to participate in the care of this pleasant patient.     Today's visit required additional screening time, PPE, and cleaning measures to allow for a safe in-person visit, due to the public health emergency. I spent a total of 38 minutes on 5/24/2021 in chart review, review of tests, patient visit, documentation, care coordination, and/or discussion with other providers about the issues documented above, separate from any documented procedure(s).

## 2021-05-24 NOTE — LETTER
5/24/2021      RE: Gary Iyer  [address] [facility] [id number]       Dear Colleague:    Gary Iyer recently returned for follow-up at the [facility]. My clinic note from our visit is enclosed below.  Speech recognition software may have been used in the documentation below; input is reviewed before signature to the best of my ability.     I appreciate the ongoing opportunity to participate in this patient's care.    Please feel free to contact me with any questions.      Sincerely yours,      Mel Fournier M.D., M.P.H.  , Laryngology  Director, 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  Most recently, he returned to the operating room on February 9, 2017 for an area of new irregularity of the left true vocal fold.  Pathology showed severe dysplasia with negative but close margins (for moderate, but not severe dysplasia).  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    He repots he has been doing well since last seen. He has had some increased throat-clearing/coughing with allergies. No worsening of reflux. He has completed the COVID vaccine.          MEDICATIONS:     Current Outpatient Medications   Medication Sig Dispense Refill     alcohol swab prep pads Use to swab area of injection/maria del rosario as directed. 100 each 3     allopurinol (ZYLOPRIM) 300 MG tablet Take 1 tablet (300 mg) by mouth daily 90 tablet 3     amLODIPine (NORVASC) 2.5 MG tablet Take 1 tablet (2.5 mg) by mouth daily 90 tablet 3     aspirin 81 MG tablet Take 1 tablet by mouth daily.       atorvastatin (LIPITOR) 20 MG tablet Take 1 tablet (20 mg) by mouth daily 90 tablet 3     blood  glucose (NO BRAND SPECIFIED) test strip Use to test blood sugar 1 times daily or as directed. To accompany: Blood Glucose Monitor Brands: per insurance. 100 strip 6     blood glucose calibration (NO BRAND SPECIFIED) solution To accompany: Blood Glucose Monitor Brands: per insurance. 1 Bottle 3     blood glucose monitoring (NO BRAND SPECIFIED) meter device kit Use to test blood sugar 1 times daily or as directed. Preferred blood glucose meter OR supplies to accompany: Blood Glucose Monitor Brands: per insurance. 1 kit 0     cetirizine (ZYRTEC) 10 MG tablet Take 1 tablet (10 mg) by mouth daily 90 tablet 3     COMPRESSION STOCKINGS 1 each daily 20-30 mmHg pressure, waist high 2 each 1     hydrochlorothiazide (HYDRODIURIL) 50 MG tablet Take 1 tablet (50 mg) by mouth daily 90 tablet 3     hydrocortisone (CORTAID) 1 % external ointment Apply topically 2 times daily as needed 30 g 1     ketoconazole (NIZORAL) 2 % cream Apply topically 2 times daily for up to 2 weeks as needed for rash 60 g 1     losartan (COZAAR) 100 MG tablet Take 1 tablet (100 mg) by mouth daily 90 tablet 3     metFORMIN (GLUCOPHAGE) 500 MG tablet TAKE 1 TABLET BY MOUTH TWICE DAILY WITH MEALS 190 tablet 1     metroNIDAZOLE (METROCREAM) 0.75 % external cream Apply topically 2 times daily as needed (to rosacea areas) 45 g 1     Misc. Devices (SUPPOSITORY MOLD 2GM) MISC 1 suppository every 12 hours Nifedipine 4 mg suppository, one rectally every 12 hours 100 each 4     Multiple Vitamin (DAILY MULTIVITAMIN PO) Take  by mouth daily.       nifedipine 0.2% in white petrolatum 0.2 % OINT ointment Apply topically 2 times daily Please make it into suppositories 0.2 % nifedipine cream Place in the anus every 12 hours.  Just get to where the anus is tight . 100 g 3     omeprazole (PRILOSEC) 20 MG DR capsule TAKE 1 CAPSULE BY MOUTH DAILY 90 capsule 3     order for DME Place rectally every 12 hours 1.8 oz container of 0.2 % nifedipine suppositories.   Apply on the  anus every 12 hours.  Just get to where the anus is tight . 1 Container 5     order for DME Autocpap 10-16 cm 1 Units 0     PARoxetine (PAXIL) 40 MG tablet Take 1 tablet (40 mg) by mouth every morning 90 tablet 3     polyethylene glycol (MIRALAX) 17 GM/Dose powder Take 17 g (1 capful) by mouth daily 850 g 3     psyllium 0.52 G capsule Take 1 capsule (0.52 g) by mouth daily 100 capsule 3     tamsulosin (FLOMAX) 0.4 MG capsule TAKE 1 CAPSULE(0.4 MG) BY MOUTH DAILY 90 capsule 2     thin (NO BRAND SPECIFIED) lancets Use with lanceting device. To accompany: Blood Glucose Monitor Brands: per insurance. 100 each 11     triamcinolone (KENALOG) 0.1 % cream Apply topically 2 times daily as needed for irritation 30 g 1       ALLERGIES:  No Known Allergies    NEW PMH/PSH: Completed COVID vaccine    REVIEW OF SYSTEMS:  The patient completed a comprehensive 11 point review of systems (below), which was reviewed. Positives are as noted below.  Patient Supplied Answers to Review of Systems   ENT ROS 4/22/2016   Ears, Nose, Throat Nasal congestion or drainage         PHYSICAL EXAM:  General: The patient was alert and conversant, and in no acute distress.    Oral cavity/oropharynx: No masses or lesions. Dentition unchanged since prior. Tongue mobility and palate elevation intact and symmetric.  Neck: No palpable cervical lymphadenopathy, no significant tenderness to palpation of the thyrohyoid space, which was narrow. No obvious thyroid abnormality.  Resp: Breathing comfortably, no stridor or stertor.  Neuro: Symmetric facial function. Other cranial nerve function as documented above.  Psych: Normal affect, pleasant and cooperative.  Voice/speech: Mild dysphonia characterized by roughness.      Intake scores  Last 2 Scores for Patient-Answered VHI Questionnaire  VHI Total Score 2/26/2018 3/1/2019   VHI Total Score 9 17      Last 2 Scores for Patient-Answered EAT Questionnaire  EAT Total Score 2/26/2018 3/1/2019   EAT Total Score 1  5        Last 2 Scores for Patient-Answered CSI Questionnaire  CSI Total Score 2/26/2018 3/1/2019   CSI Total Score 0 0           Procedure:   Flexible fiberoptic laryngoscopy and laryngovideostroboscopy  Indications: This procedure was warranted to evaluate the patient's laryngeal anatomy and function. Risks, benefits, and alternatives were discussed.  Description: After written informed consent was obtained, a time-out was performed to confirm patient identity, procedure, and procedure site. Topical 3% lidocaine with 0.25% phenylephrine was applied to the nasal cavities. I performed the endoscopy and no complications were apparent. Continuous and stroboscopic light were utilized to assess routine phonation and variable frequency phonation.  Performed by: Mel Fournier MD MPH  Findings: Normal nasopharynx. Normal base of tongue, valleculae, and epiglottis. Vocal fold mobility: right: normal; left: normal. Medial edges of the vocal folds: smooth and straight. No focal mucosal lesions were observed on the true vocal folds. Stable mild hyperemia of left true vocal fold. Glissade produced appropriate elongation. There was mild to moderate supraglottic recruitment with connected speech. Mucosa of false vocal folds, aryepiglottic folds, piriform sinuses, and posterior glottis unremarkable. Airway was patent.    Similar findings on NBI.    The addition of stroboscopy allowed evaluation of the mucosal wave.   Amplitude: right: normal; left: mildly decreased. Symmetry: intermittent symmetry. Closure pattern: complete. Closure plane: at glottic level. Phase distribution: normal.                  IMPRESSION AND PLAN:   Gary Iyer returns with slight recurrence of leukoplakia/granuloma in the same location where had this in 2018. At that time it resolved just as we were considering a repeat biopsy. Currently, he is having allergies which are provoking coughing/throat-clearing. He is already taking medication for  allergies and I recommended consideration of saline rinses/gargles as well.    The plan is a refresher session of speech therapy, hopefully with Misty Clark, PhD, CCC-SLP as they have worked together in the past and successfully resolved the prior granuloma. I have sent her an in-basket message to try to facilitate.    Return to clinic with me in three months or sooner as needed. I have a low suspicion for recurrence in this location given his history of a prior finding in this area, but will follow this to make sure it resolves. I appreciate the opportunity to participate in the care of this pleasant patient.     Today's visit required additional screening time, PPE, and cleaning measures to allow for a safe in-person visit, due to the public health emergency. I spent a total of 38 minutes on 5/24/2021 in chart review, review of tests, patient visit, documentation, care coordination, and/or discussion with other providers about the issues documented above, separate from any documented procedure(s).        Mel Fournier MD

## 2021-05-24 NOTE — PATIENT INSTRUCTIONS
1.  You were seen in the ENT Clinic today by . If you have any questions or concerns after your appointment, please call [phone]. Press option #1 for scheduling related needs. Press option #3 for Nurse advice.    2.   has recommended the following:   - Speech Therapy    3.  Plan is to return to clinic in 3 months      Lisa Hazel LPN  [phone]  [facility] - Otolaryngology

## 2021-05-24 NOTE — NURSING NOTE
"Chief Complaint   Patient presents with     RECHECK     6 month follow up       Pulse 100, temperature 97.7  F (36.5  C), temperature source Temporal, height 1.778 m (5' 10\"), weight 108 kg (238 lb), SpO2 95 %.    Antwon Del Cid, EMT  "

## 2021-05-25 ENCOUNTER — TELEPHONE (OUTPATIENT)
Dept: OTOLARYNGOLOGY | Facility: CLINIC | Age: 61
End: 2021-05-25

## 2021-05-25 NOTE — TELEPHONE ENCOUNTER
Tried calling patient to see if he was interested in coming in for an appt with Eduardo Jay, on either 05/27 or 06/03 at 1:30pm. You might need to manually enter time because the 1:30 slot isn't showing up on template for some reason..

## 2021-05-26 ENCOUNTER — TELEPHONE (OUTPATIENT)
Dept: OTOLARYNGOLOGY | Facility: CLINIC | Age: 61
End: 2021-05-26

## 2021-05-26 NOTE — TELEPHONE ENCOUNTER
[facility] Call Center    Phone Message    May a detailed message be left on voicemail: yes     Reason for Call: Other: Pt is scheduled to see IVAN Clark tomorrow, but is wondering if the 6/3 Appt at 1:30 is still available. I tried to manually switch, but I'm not seeing a slot for 1:30. Please call Pt to discuss and possibly reschedule. Thank you.     Action Taken: Message routed to:  [facility] ENT    Travel Screening: Not Applicable

## 2021-06-03 ENCOUNTER — VIRTUAL VISIT (OUTPATIENT)
Dept: OTOLARYNGOLOGY | Facility: CLINIC | Age: 61
End: 2021-06-03
Attending: OTOLARYNGOLOGY
Payer: MEDICARE

## 2021-06-03 ENCOUNTER — TELEPHONE (OUTPATIENT)
Dept: SLEEP MEDICINE | Facility: CLINIC | Age: 61
End: 2021-06-03

## 2021-06-03 DIAGNOSIS — E66.9 OBESITY, UNSPECIFIED CLASSIFICATION, UNSPECIFIED OBESITY TYPE, UNSPECIFIED WHETHER SERIOUS COMORBIDITY PRESENT: ICD-10-CM

## 2021-06-03 DIAGNOSIS — G47.33 OBSTRUCTIVE SLEEP APNEA: ICD-10-CM

## 2021-06-03 DIAGNOSIS — R49.0 DYSPHONIA: Primary | ICD-10-CM

## 2021-06-03 DIAGNOSIS — G47.61 PERIODIC LIMB MOVEMENTS OF SLEEP: ICD-10-CM

## 2021-06-03 DIAGNOSIS — J38.3 VOCAL PROCESS GRANULOMA: ICD-10-CM

## 2021-06-03 DIAGNOSIS — R79.0 LOW FERRITIN: ICD-10-CM

## 2021-06-03 PROCEDURE — 99207 PR NO CHARGE LOS: CPT | Performed by: SPEECH-LANGUAGE PATHOLOGIST

## 2021-06-03 PROCEDURE — 92507 TX SP LANG VOICE COMM INDIV: CPT | Mod: GN | Performed by: SPEECH-LANGUAGE PATHOLOGIST

## 2021-06-03 NOTE — PROGRESS NOTES
Gary Iyer is a 60 year old male who is being evaluated via a billable video visit.      Gary has been notified and verbally consented to the following:     This video visit will be conducted between you and your provider.    Patient has opted to conduct today's video visit vs an in-person appointment.     Video visits are billed at different rates depending on your insurance coverage. Please reach out to your insurance provider with any questions.     If during the course of the call the provider feels the appointment is not appropriate, you will not be charged for this service.  Provider has received verbal consent for billable virtual visit from the patient? Yes  Will anyone else be joining your video visit? No    Call initiated at: 1:33 PM   Type of Visit Platform Used: [employer] Video  Location of provider: Home  Location of patient: [facility]  Elijah Baker Jr., M.D., F.A.C.S.  Mel Fournier M.D., M.P.H.  Alma Bang M.D.  Misty Clark, Ph.D., CCC-SLP  Jasmyne Padilla M.M. (voice), M.A., CCC-SLP  Zack Mcdaniels M.M. (voice), M.A., CCC-SLP  EZEQUIEL Lopez (voice), M.S., CCC-SLP    [facility]  VOICE/SPEECH/BREATHING THERAPY PROGRESS REPORT    Patient: Gary Iyer  Date of Service: 6/3/2021    Date of Last Service: 7/2/20  Referring physician: Dr. Fournier    I had the pleasure of seeing Mr. 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 Iyer worked on therapeutic activities to address the above diagnosis.  His vocal fold granuloma resolved well, and he did not need further therapy.  However, the granuloma has now returned, in the same spot, and Dr. Fournier has requested that he return to doing his exercises and using his techniques and strategies, in order to try to resolve the granuloma functionally again.  Although it has been a year since his  "last surgery, the problem is the same, and we will consider this the same episode of care.    Regarding practice, Mr. Iyer reports the following:     Very little practice in the past few months, because his voice has been doing well, and the granuloma have resolved.    Mr. Iyer also states that:    He wonders what he might be doing to provide irritation to the posterior larynx that results in this recurring granuloma.    He does a slight Valsalva every morning when he gets out of bed, but it is only for a few seconds    He does make a rough, glottal leblanc sound quite frequently; he is aware of it.     Mr. Iyer presents today with the following:  Voice quality:    His typical mildly rough and strained quality, with generally low volume.    Very frequent glottal leblanc, and frequent increased rough quality, that lasts for a few syllables  o It sounds like a rough strain that is happening because he is producing phonation with a lack of air stream  o It is also consistent with a vocal gesture of frustration, or he is not sure what you going to say next    Pitch was not formally assessed, but is within normal limits, and appears close enough to optimal to be adequate.  Cough/ Throat clear:    No actual throat clearing, but the short grunting sound is quite frequent    THERAPEUTIC ACTIVITIES  Today Mr. Iyer participated in the following therapeutic activities:    [place] exercises to add phonation to an optimal flowing airstream.  o Semi-occluded vocal tract exercises with a straw and water resistance were most facilitating  - As is typical for him, this exercise helped him learn to use a more balanced airflow    Progressed from the SOVT exercises to speech or speech like exercises  o Humming was good, and he progressed to simple phrases and sentences using the phoneme /m/  o He also liked a counting task, using voiceless fricatives (30's through 70\"s); although he was only 50% successful with this, he was able to " "feel more \"smooth\" phonation  o Finally able to learn a very simple vocal gesture, \"uhmm\" in which he was able to hear better airflow, and greatly reduced roughness and strain  - He took a video of himself doing this exercise, so he can practice it very frequently  - He felt that he can learn from this exercise how he needs to be using his voice all the time  - He is aware of the grunting noise that he makes, and he and his wife will try to be very vigilant about getting him to stop this    [place] concepts of an optimal regimen for practice.  o he should use an interval schedule of practice, with brief periods of practice frequently throughout each day    A video recording of today's therapeutic activities was provided, to facilitate practice.    IMPRESSIONS/GOALS/PLAN  Mr. 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     He will continue to work on his exercises on a daily basis, and work on incorporating the techniques into his daily activities.    Goals for this practice period:     practice all exercises according to instructions    incorporate techniques into daily vocal activities    maintain vigilance for vocal technique    Plan: I will see Mr. Iyer in 2 months along with Dr. Ms. montilla, to assess his progress..    Reporting period 6/3/21 - 9/1/21      TOTAL SERVICE TIME: 56 minutes  TREATMENT (84970)  NO CHARGE FACILITY FEE    Misty Clark, Ph.D., [facility]SLP  Speech-Language Pathologist  Director, [facility]  [phone]              "

## 2021-06-03 NOTE — LETTER
6/3/2021       RE: Gary Iyer  [address] [facility] [address]     Dear Colleague,    Thank you for referring your patient, Gary Iyer, to the [facility] [place] at [facility]. Please see a copy of my visit note below.    Gary Iyer is a 60 year old male who is being evaluated via a billable video visit.      Gary has been notified and verbally consented to the following:     This video visit will be conducted between you and your provider.    Patient has opted to conduct today's video visit vs an in-person appointment.     Video visits are billed at different rates depending on your insurance coverage. Please reach out to your insurance provider with any questions.     If during the course of the call the provider feels the appointment is not appropriate, you will not be charged for this service.  Provider has received verbal consent for billable virtual visit from the patient? Yes  Will anyone else be joining your video visit? No    Call initiated at: 1:33 PM   Type of Visit Platform Used: [employer]  Location of provider: Home  Location of patient: [facility] VOICE [facility]  Elijah Baker Jr., M.D., F.A.C.S.  Mel Fournier M.D., M.P.H.  Alma Bang M.D.  Misty Clark, Ph.D., CCC-SLP  Jasmyne Padilla M.M. (voice), M.A., CCC-SLP  Zack Mcdaniels M.M. (voice), M.A., CCC-SLP  EZEQUIEL Lopez (voice), M.S., CCC-SLP    [facility]  VOICE/SPEECH/BREATHING THERAPY PROGRESS REPORT    Patient: Gary Iyer  Date of Service: 6/3/2021    Date of Last Service: 7/2/20  Referring physician: Dr. Fournier    I had the pleasure of seeing Mr. 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 Iyer worked on therapeutic activities to address the above diagnosis.  His vocal fold granuloma resolved well,  and he did not need further therapy.  However, the granuloma has now returned, in the same spot, and Dr. Fournier has requested that he return to doing his exercises and using his techniques and strategies, in order to try to resolve the granuloma functionally again.  Although it has been a year since his last surgery, the problem is the same, and we will consider this the same episode of care.    Regarding practice, Mr. Iyer reports the following:     Very little practice in the past few months, because his voice has been doing well, and the granuloma have resolved.    Mr. Iyer also states that:    He wonders what he might be doing to provide irritation to the posterior larynx that results in this recurring granuloma.    He does a slight Valsalva every morning when he gets out of bed, but it is only for a few seconds    He does make a rough, glottal leblanc sound quite frequently; he is aware of it.     Mr. Iyer presents today with the following:  Voice quality:    His typical mildly rough and strained quality, with generally low volume.    Very frequent glottal leblanc, and frequent increased rough quality, that lasts for a few syllables  o It sounds like a rough strain that is happening because he is producing phonation with a lack of air stream  o It is also consistent with a vocal gesture of frustration, or he is not sure what you going to say next    Pitch was not formally assessed, but is within normal limits, and appears close enough to optimal to be adequate.  Cough/ Throat clear:    No actual throat clearing, but the short grunting sound is quite frequent    THERAPEUTIC ACTIVITIES  Today Mr. Iyer participated in the following therapeutic activities:    [place] exercises to add phonation to an optimal flowing airstream.  o Semi-occluded vocal tract exercises with a straw and water resistance were most facilitating  - As is typical for him, this exercise helped him learn to use a more balanced  "airflow    Progressed from the SOVT exercises to speech or speech like exercises  o Humming was good, and he progressed to simple phrases and sentences using the phoneme /m/  o He also liked a counting task, using voiceless fricatives (30's through 70\"s); although he was only 50% successful with this, he was able to feel more \"smooth\" phonation  o Finally able to learn a very simple vocal gesture, \"uhmm\" in which he was able to hear better airflow, and greatly reduced roughness and strain  - He took a video of himself doing this exercise, so he can practice it very frequently  - He felt that he can learn from this exercise how he needs to be using his voice all the time  - He is aware of the grunting noise that he makes, and he and his wife will try to be very vigilant about getting him to stop this    [place] concepts of an optimal regimen for practice.  o he should use an interval schedule of practice, with brief periods of practice frequently throughout each day    A video recording of today's therapeutic activities was provided, to facilitate practice.    IMPRESSIONS/GOALS/PLAN  Mr. 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     He will continue to work on his exercises on a daily basis, and work on incorporating the techniques into his daily activities.    Goals for this practice period:     practice all exercises according to instructions    incorporate techniques into daily vocal activities    maintain vigilance for vocal technique    Plan: I will see Mr. Iyer in 2 months along with Dr. Ms. montilla, to assess his progress..    Reporting period 6/3/21 - 9/1/21      TOTAL SERVICE TIME: 56 minutes  TREATMENT (87874)  NO CHARGE FACILITY FEE    Misty Clark, Ph.D., " [facility]-SLP  Speech-Language Pathologist  Director, [facility]  [phone]                                                                                                 Outpatient Speech Language Therapy Evaluation  PLAN OF TREATMENT FOR OUTPATIENT REHABILITATION  (COMPLETE FOR INITIAL CLAIMS ONLY)  Patient's Last Name, First Name, M.I.  YOB: 1960  Gary Iyer                           Provider's Name  Misty Clark SLP Medical Record No.  [id number]                               Onset Date:  6/03/21   Start of Care Date: 6/03/21     Type: Speech Language Therapy Medical Diagnosis: Dysphonia                        Therapy Diagnosis:  Dysphonia   Visits from [facility]:  2   _________________________________________________________________________________  Plan of Treatment:   Speech therapy    DURATION/FREQUENCY OF TREATMENT  Two sessions, the second of which with Dr. Fournier.    PROGNOSIS  Good prognosis for voice improvement with speech therapy and regular practice of therapeutic activities.    BARRIERS TO LEARNING/TEACHING AND LEARNING NEEDS  None/Unremarkable    Goals: Goals same the same since last session; he would like to resolve the granuloma on his left posterior laryngeal area.    _________________________________________________________________________________    I CERTIFY THE NEED FOR THESE SERVICES FURNISHED UNDER        THIS PLAN OF TREATMENT AND WHILE UNDER MY CARE     (Physician attestation of this document indicates review and certification of the therapy plan).     Certification date from: 6/03/21  Certification date to: 9/1/21    Referring Provider: Mel Fournier MD      Again, thank you for allowing me to participate in the care of your patient.      Sincerely,    Misty Clark, SLP

## 2021-06-08 NOTE — TELEPHONE ENCOUNTER
I called  compounding pharmacy, they have a 5/8/21 Rx for nifedipine  suppositories from Dr. Saucedo.    Appears issue resolved.    Closing encounter.    Juany Feliciano RN  [facility]

## 2021-06-10 NOTE — PROGRESS NOTES
Outpatient Speech Language Therapy Evaluation  PLAN OF TREATMENT FOR OUTPATIENT REHABILITATION  (COMPLETE FOR INITIAL CLAIMS ONLY)  Patient's Last Name, First Name, M.I.  YOB: 1960  Gary Iyer                           Provider's Name  Misty MATT Clark, SLP Medical Record No.  [id number]                               Onset Date:  6/03/21   Start of Care Date: 6/03/21     Type: Speech Language Therapy Medical Diagnosis: Dysphonia                        Therapy Diagnosis:  Dysphonia   Visits from [facility]:  2   _________________________________________________________________________________  Plan of Treatment:   Speech therapy    DURATION/FREQUENCY OF TREATMENT  Two sessions, the second of which with Dr. Fournier.    PROGNOSIS  Good prognosis for voice improvement with speech therapy and regular practice of therapeutic activities.    BARRIERS TO LEARNING/TEACHING AND LEARNING NEEDS  None/Unremarkable    Goals: Goals same the same since last session; he would like to resolve the granuloma on his left posterior laryngeal area.    _________________________________________________________________________________    I CERTIFY THE NEED FOR THESE SERVICES FURNISHED UNDER        THIS PLAN OF TREATMENT AND WHILE UNDER MY CARE     (Physician attestation of this document indicates review and certification of the therapy plan).     Certification date from: 6/03/21  Certification date to: 9/1/21    Referring Provider: Mel Fournier MD

## 2021-06-14 DIAGNOSIS — L30.9 DERMATITIS: ICD-10-CM

## 2021-06-14 DIAGNOSIS — L21.9 SEBORRHEIC DERMATITIS: ICD-10-CM

## 2021-06-14 RX ORDER — TRIAMCINOLONE ACETONIDE 1 MG/G
CREAM TOPICAL 2 TIMES DAILY PRN
Qty: 30 G | Refills: 0 | Status: SHIPPED | OUTPATIENT
Start: 2021-06-14

## 2021-06-14 NOTE — TELEPHONE ENCOUNTER
Reason for call:  Medication   If this is a refill request, has the caller requested the refill from the pharmacy already? No  Will the patient be using a [facility]? No  Name of the pharmacy and phone number for the current request: [facility] [phone]    Name of the medication requested: ketoconazole and triamcinolone - cream 0.1%      Phone number to reach patient:  Home number on file [phone] (home)    Best Time:  Any    Can we leave a detailed message on this number?  YES    Travel screening: Not Applicable

## 2021-06-14 NOTE — TELEPHONE ENCOUNTER
"Filled per RN refill protocol     Requested Prescriptions   Pending Prescriptions Disp Refills     triamcinolone (KENALOG) 0.1 % external cream 30 g 0     Sig: Apply topically 2 times daily as needed for irritation       Topical Steroids and Nonsteroidals Protocol Passed - 6/14/2021  4:02 PM        Passed - Patient is age 6 or older        Passed - Authorizing prescriber's most recent note related to this medication read.     If refill request is for ophthalmic use, please forward request to provider for approval.          Passed - High potency steroid not ordered        Passed - Recent (12 mo) or future (30 days) visit within the authorizing provider's specialty     Patient has had an office visit with the authorizing provider or a provider within the authorizing providers department within the previous 12 mos or has a future within next 30 days. See \"Patient Info\" tab in inbasket, or \"Choose Columns\" in Meds & Orders section of the refill encounter.              Passed - Medication is active on med list         Anum Schneider RN    "

## 2021-06-30 ENCOUNTER — NURSE TRIAGE (OUTPATIENT)
Dept: FAMILY MEDICINE | Facility: CLINIC | Age: 61
End: 2021-06-30

## 2021-06-30 NOTE — TELEPHONE ENCOUNTER
Reason for Call:  Other     Detailed comments: Patient said that he is getting up in the middle of the night a lot and was wanting to discuss if this can be related to prostate. Please advise.    Phone Number Patient can be reached at: Home number on file [phone] (home)    Best Time:     Can we leave a detailed message on this number? YES    Call taken on 6/30/2021 at 11:22 AM by Tiffanie Koenig

## 2021-06-30 NOTE — TELEPHONE ENCOUNTER
"Patient calling abut nocturia. Symptoms have been ongoing for a few weeks. Sometimes he has to get up 4-5 times a night, but not every night. No fever, burning or urgency. Stream is not as strong. He feels like he is emptying his bladder completely. Patient takes tamsulosin daily. Appointment scheduled.     Anum Schneider RN    Reason for Disposition    Has to get out of bed to urinate > 2 times a night (i.e., nocturia)    Answer Assessment - Initial Assessment Questions  1. SYMPTOM: \"What's the main symptom you're concerned about?\" (e.g., frequency, incontinence)      nocturia  2. ONSET: \"When did the  nocturia start?\"      A couple weeks ago  3. PAIN: \"Is there any pain?\" If so, ask: \"How bad is it?\" (Scale: 1-10; mild, moderate, severe)      no  4. CAUSE: \"What do you think is causing the symptoms?\"      no  5. OTHER SYMPTOMS: \"Do you have any other symptoms?\" (e.g., fever, flank pain, blood in urine, pain with urination)      no  6. PREGNANCY: \"Is there any chance you are pregnant?\" \"When was your last menstrual period?\"      no    Protocols used: URINARY SYMPTOMS-A-[place]    "

## 2021-07-02 ENCOUNTER — VIRTUAL VISIT (OUTPATIENT)
Dept: FAMILY MEDICINE | Facility: CLINIC | Age: 61
End: 2021-07-02
Payer: MEDICARE

## 2021-07-02 DIAGNOSIS — E11.9 TYPE 2 DIABETES MELLITUS WITHOUT COMPLICATION, WITHOUT LONG-TERM CURRENT USE OF INSULIN (H): ICD-10-CM

## 2021-07-02 DIAGNOSIS — M25.561 RIGHT KNEE PAIN, UNSPECIFIED CHRONICITY: ICD-10-CM

## 2021-07-02 DIAGNOSIS — N40.1 BENIGN PROSTATIC HYPERPLASIA WITH WEAK URINARY STREAM: ICD-10-CM

## 2021-07-02 DIAGNOSIS — R39.12 BENIGN PROSTATIC HYPERPLASIA WITH WEAK URINARY STREAM: ICD-10-CM

## 2021-07-02 DIAGNOSIS — N32.81 OVERACTIVE BLADDER: Primary | ICD-10-CM

## 2021-07-02 PROCEDURE — 99213 OFFICE O/P EST LOW 20 MIN: CPT | Mod: 95 | Performed by: FAMILY MEDICINE

## 2021-07-02 RX ORDER — OXYBUTYNIN CHLORIDE 5 MG/1
TABLET ORAL
Qty: 30 TABLET | Refills: 0 | Status: SHIPPED | OUTPATIENT
Start: 2021-07-02 | End: 2021-07-04

## 2021-07-02 NOTE — PROGRESS NOTES
Gary is a 60 year old who is being evaluated via a billable video visit.   Text link [phone]     How would you like to obtain your AVS? MyChart  If the video visit is dropped, the invitation should be resent by:   Will anyone else be joining your video visit? No     Video Start Time: 11:56 AM         Subjective   Gary is a 60 year old who presents for the following health issues   HPI     Discuss urinating at night  Discuss pain in right knee       Review of Systems      Some  Days up 4-5 times at night to urinate    Not every night   3-4 days a  Week    Always at least 2-3 x     Used to  Be once at night      Stream not forceful but more  Than a drip  Not dripping    Drinking  Fluids okay    During day, just 2-3 x during day    Gets urge to go at night    Urine looks normal    Maybe a little    No pain or burning    No bowel problems    No change in urinary stream    Knee  Pain with walking    Pain off and on    No trauma    No catching or locking      Not feeling unstable    Knee pain at least 3 weeks    Not swollen or maybe a little bit    This is right knee            Objective           Vitals:  No vitals were obtained today due to virtual visit.    Physical Exam   GENERAL: Healthy, alert and no distress  EYES: Eyes grossly normal to inspection.  No discharge or erythema, or obvious scleral/conjunctival abnormalities.  RESP: No audible wheeze, cough, or visible cyanosis.  No visible retractions or increased work of breathing.    SKIN: Visible skin clear. No significant rash, abnormal pigmentation or lesions.  NEURO: Cranial nerves grossly intact.  Mentation and speech appropriate for age.  PSYCH: Mentation appears normal, affect normal/bright, judgement and insight intact, normal speech and appearance well-groomed.         Reviewed past right knee xray from 9 years ago        ASSESSMENT / PLAN:  (N32.81) Overactive bladder  (primary encounter diagnosis)  Comment: discussed in detail. Will do trial of   This just one pill at bedtime.  Warned of side effects.  Let us know in 3 weeks how symptoms are doing, sooner if needed   Plan: oxybutynin (DITROPAN) 5 MG tablet             (M25.561) Right knee pain, unspecified chronicity  Comment: patient with remote prox tibia fracture ; reviewed and showed him via video the xray from 9 years ago.  Likely has some arthritis present.  Should see orthopedics.  Did referral.   Plan: Orthopedic  Referral             (N40.1,  R39.12) Benign prostatic hyperplasia with weak urinary stream  Comment: on tamsulosin  Plan: continue     (E11.9) Type 2 diabetes mellitus without complication, without long-term current use of insulin (H)  Comment: last hemoglobin a1c fine  Plan: see us this fall in clinic       I reviewed the patient's medications, allergies, medical history, family history, and social history.    Finesse Beal MD          Video-Visit Details    Type of service:  Video Visit    Video End Time:12:17 PM    Originating Location (pt. Location): Home    Distant Location (provider location):  [facility]     Platform used for Video Visit: "[employer]

## 2021-07-14 ENCOUNTER — ANCILLARY PROCEDURE (OUTPATIENT)
Dept: GENERAL RADIOLOGY | Facility: CLINIC | Age: 61
End: 2021-07-14
Attending: ORTHOPAEDIC SURGERY
Payer: MEDICARE

## 2021-07-14 ENCOUNTER — OFFICE VISIT (OUTPATIENT)
Dept: ORTHOPEDICS | Facility: CLINIC | Age: 61
End: 2021-07-14
Attending: FAMILY MEDICINE
Payer: MEDICARE

## 2021-07-14 VITALS
HEART RATE: 93 BPM | DIASTOLIC BLOOD PRESSURE: 84 MMHG | BODY MASS INDEX: 34.07 KG/M2 | SYSTOLIC BLOOD PRESSURE: 134 MMHG | HEIGHT: 70 IN | WEIGHT: 238 LBS

## 2021-07-14 DIAGNOSIS — M25.561 RIGHT KNEE PAIN, UNSPECIFIED CHRONICITY: ICD-10-CM

## 2021-07-14 DIAGNOSIS — M17.11 PRIMARY OSTEOARTHRITIS OF RIGHT KNEE: ICD-10-CM

## 2021-07-14 DIAGNOSIS — M25.561 ACUTE PAIN OF RIGHT KNEE: Primary | ICD-10-CM

## 2021-07-14 PROCEDURE — 73562 X-RAY EXAM OF KNEE 3: CPT | Mod: RT | Performed by: RADIOLOGY

## 2021-07-14 PROCEDURE — 99213 OFFICE O/P EST LOW 20 MIN: CPT | Performed by: ORTHOPAEDIC SURGERY

## 2021-07-14 ASSESSMENT — MIFFLIN-ST. JEOR: SCORE: 1885.81

## 2021-07-14 ASSESSMENT — PAIN SCALES - GENERAL: PAINLEVEL: MODERATE PAIN (4)

## 2021-07-14 NOTE — LETTER
7/14/2021         RE: Gary Iyer  [address] [facility] [address]        Dear Colleague,    Thank you for referring your patient, Gary Iyer, to the [facility] [place]. Please see a copy of my visit note below.    CHIEF COMPLAINT:   Chief Complaint   Patient presents with     Right Knee - Pain     Occasional right knee pain for the past 2 months. No recent injury or trauma to the knee. Knee feel better rest. Knee pain is worsened by walking, stairs, and occasionally with sit to stand.   .    Gary Iyer is seen today in the [facility] for evaluation of right knee pain at the request of Dr. Finesse Beal         HISTORY OF PRESENT ILLNESS    Gary Iyer is a 60 year old male seen for evaluation of ongoing right knee pain with no known injury.   Pain has been present for 2 months. No known recent trauma. Pain is occasional. Locates pain along the inner aspect, front. Pain is aggravated with walking, stairs, sit to stand. Better with rest, sitting. No pain at night. No swelling.    Treatment has been over the counter knee sleeve, ice.    Prior right proximal tibia fracture 45-50 years ago, treated with cast in [place].    Present symptoms: pain medially  and anteriorly, pain dull/achy , mild pain.    Pain severity: 4/10  Frequency of symptoms: occasionally  Exacerbating Factors: weight bearing, stairs, [id number], prolonged standing  Relieving Factors: rest, sitting  Night Pain: No  Pain while at rest: occasional   Numbness or tingling: No   Patient has tried:     NSAIDS: No      Physical Therapy: No      Activity modification: No      Bracing: Yes      Injections: No      Ice: No      Assistive device:  No      Other PMH:  has a past medical history of Diabetes (H), Hypertension, Lesion of larynx (12/12/2017), Multinodular goiter (nontoxic) (06/10/2013), and Sleep apnea (8yr). He also has no past medical history of Arthritis, Asthma,  Blood transfusion, Congestive heart failure, unspecified, COPD (chronic obstructive pulmonary disease) (H), Coronary artery disease, or Unspecified cerebral artery occlusion with cerebral infarction.  Patient Active Problem List   Diagnosis     Hyperlipidemia LDL goal <70     Hypertension goal BP (blood pressure) < 140/90     SAMARIA (generalized anxiety disorder)     Advanced directives, counseling/discussion     Carotid stenosis     Hx of laryngeal cancer     Type 2 diabetes mellitus without complication (H)     Dysphonia     Vocal process granuloma     Type 2 diabetes mellitus without complication, without long-term current use of insulin (H)     Obstructive sleep apnea- moderate-severe (AHI 25)     Benign prostatic hypertrophy     Periodic limb movements of sleep     Low ferritin     Gout, unspecified cause, unspecified chronicity, unspecified site     Chronic cough     Obesity, unspecified classification, unspecified obesity type, unspecified whether serious comorbidity present       Surgical Hx:  has a past surgical history that includes vasectomy (02/2003); Head and neck surgery (01/25/2011); ENT surgery (2008); Laryngoscopy with biopsy(ies) (01/25/2011); colonoscopy (06/22/2012); Laser CO2 laryngoscopy, complex (06/27/2013); orthopedic surgery (03/2016); Laryngoscopy with microscope (N/A, 02/09/2017); Laryngoscopy with biopsy(ies) (N/A, 02/09/2017); and Laser CO2 lesion oral (N/A, 02/09/2017).    Medications:   Current Outpatient Medications:      alcohol swab prep pads, Use to swab area of injection/maria del rosario as directed., Disp: 100 each, Rfl: 3     allopurinol (ZYLOPRIM) 300 MG tablet, Take 1 tablet (300 mg) by mouth daily, Disp: 90 tablet, Rfl: 3     amLODIPine (NORVASC) 2.5 MG tablet, Take 1 tablet (2.5 mg) by mouth daily, Disp: 90 tablet, Rfl: 3     aspirin 81 MG tablet, Take 1 tablet by mouth daily., Disp: , Rfl:      atorvastatin (LIPITOR) 20 MG tablet, Take 1 tablet (20 mg) by mouth daily, Disp: 90 tablet,  Rfl: 3     blood glucose (NO BRAND SPECIFIED) test strip, Use to test blood sugar 1 times daily or as directed. To accompany: Blood Glucose Monitor Brands: per insurance., Disp: 100 strip, Rfl: 6     blood glucose calibration (NO BRAND SPECIFIED) solution, To accompany: Blood Glucose Monitor Brands: per insurance., Disp: 1 Bottle, Rfl: 3     blood glucose monitoring (NO BRAND SPECIFIED) meter device kit, Use to test blood sugar 1 times daily or as directed. Preferred blood glucose meter OR supplies to accompany: Blood Glucose Monitor Brands: per insurance., Disp: 1 kit, Rfl: 0     cetirizine (ZYRTEC) 10 MG tablet, Take 1 tablet (10 mg) by mouth daily, Disp: 90 tablet, Rfl: 3     COMPRESSION STOCKINGS, 1 each daily 20-30 mmHg pressure, waist high, Disp: 2 each, Rfl: 1     hydrochlorothiazide (HYDRODIURIL) 50 MG tablet, Take 1 tablet (50 mg) by mouth daily, Disp: 90 tablet, Rfl: 3     hydrocortisone (CORTAID) 1 % external ointment, Apply topically 2 times daily as needed, Disp: 30 g, Rfl: 1     ketoconazole (NIZORAL) 2 % cream, Apply topically 2 times daily for up to 2 weeks as needed for rash, Disp: 60 g, Rfl: 1     losartan (COZAAR) 100 MG tablet, Take 1 tablet (100 mg) by mouth daily, Disp: 90 tablet, Rfl: 3     metFORMIN (GLUCOPHAGE) 500 MG tablet, TAKE 1 TABLET BY MOUTH TWICE DAILY WITH MEALS, Disp: 190 tablet, Rfl: 1     metroNIDAZOLE (METROCREAM) 0.75 % external cream, Apply topically 2 times daily as needed (to rosacea areas), Disp: 45 g, Rfl: 1     Misc. Devices (SUPPOSITORY MOLD 2GM) MISC, 1 suppository every 12 hours Nifedipine 4 mg suppository, one rectally every 12 hours, Disp: 100 each, Rfl: 4     Multiple Vitamin (DAILY MULTIVITAMIN PO), Take  by mouth daily., Disp: , Rfl:      nifedipine 0.2% in white petrolatum 0.2 % OINT ointment, Apply topically 2 times daily Please make it into suppositories 0.2 % nifedipine cream Place in the anus every 12 hours.  Just get to where the anus is tight ., Disp: 100  g, Rfl: 3     omeprazole (PRILOSEC) 20 MG DR capsule, TAKE 1 CAPSULE BY MOUTH DAILY, Disp: 90 capsule, Rfl: 3     order for DME, Place rectally every 12 hours 1.8 oz container of 0.2 % nifedipine suppositories.  Apply on the anus every 12 hours.  Just get to where the anus is tight ., Disp: 1 Container, Rfl: 5     order for DME, Autocpap 10-16 cm, Disp: 1 Units, Rfl: 0     oxybutynin (DITROPAN) 5 MG tablet, TAKE 1 TABLET BY MOUTH AT BEDTIME, Disp: 90 tablet, Rfl: 1     PARoxetine (PAXIL) 40 MG tablet, Take 1 tablet (40 mg) by mouth every morning, Disp: 90 tablet, Rfl: 3     polyethylene glycol (MIRALAX) 17 GM/Dose powder, Take 17 g (1 capful) by mouth daily, Disp: 850 g, Rfl: 3     psyllium 0.52 G capsule, Take 1 capsule (0.52 g) by mouth daily, Disp: 100 capsule, Rfl: 3     tamsulosin (FLOMAX) 0.4 MG capsule, TAKE 1 CAPSULE(0.4 MG) BY MOUTH DAILY, Disp: 90 capsule, Rfl: 2     thin (NO BRAND SPECIFIED) lancets, Use with lanceting device. To accompany: Blood Glucose Monitor Brands: per insurance., Disp: 100 each, Rfl: 11     triamcinolone (KENALOG) 0.1 % external cream, Apply topically 2 times daily as needed for irritation, Disp: 30 g, Rfl: 0    Current Facility-Administered Medications:      bupivacaine (MARCAINE) 0.25 % injection 3 mL, 3 mL, , , Feliciano Aguilar MD, 3 mL at 11/05/18 1025     lidocaine 1 % injection 4 mL, 4 mL, , , Feliciano Aguilar MD, 4 mL at 11/05/18 1025     triamcinolone acetonide (KENALOG-40) injection 80 mg, 80 mg, , , Feliciano Aguilar MD, 80 mg at 11/05/18 1025    Allergies: No Known Allergies    Social Hx: currently not working (prior , , ).   reports that he quit smoking about 20 years ago. He has a 25.00 pack-year smoking history. He has never used smokeless tobacco. He reports current alcohol use. He reports that he does not use drugs.    Family Hx: family history includes C.A.D. in his father; Hypertension in his mother.    REVIEW OF SYSTEMS: 10 point  "ROS neg other than the symptoms noted above in the HPI and PMH. Notables include  CONSTITUTIONAL:NEGATIVE for fever, chills, change in weight  INTEGUMENTARY/SKIN: NEGATIVE for worrisome rashes, moles or lesions  MUSCULOSKELETAL:See HPI above  NEURO: NEGATIVE for weakness, dizziness or paresthesias    PHYSICAL EXAM:  /84   Pulse 93   Ht 1.778 m (5' 10\")   Wt 108 kg (238 lb)   BMI 34.15 kg/m     GENERAL APPEARANCE: healthy, alert, no distress  SKIN: no suspicious lesions or rashes  NEURO: Normal strength and tone, mentation intact and speech normal  PSYCH:  mentation appears normal and affect normal, not anxious  RESPIRATORY: No increased work of breathing.  HANDS: no clubbing, nail pitting  LYMPH: no palpable popliteal lymphadenopathy.    BILATERAL LOWER EXTREMITIES:  Gait: antalgic favoring right   Alignment: slight valgus right.  No gross deformities or masses.  Bilateral Quad atrophy, strength weak  Intact sensation deep peroneal nerve, superficial peroneal nerve, med/lat tibial nerve, sural nerve, saphenous nerve  Intact EHL, EDL, TA, FHL, GS, quadriceps hamstrings and hip flexors  Toes warm and well perfused, brisk capillary refill. Palpable 2+ dp pulses.  Bilateral calf soft and nttp or squeeze.  DTRs: achilles 2+, patella 2+.  Edema: trace    LEFT KNEE EXAM:    Skin: intact, no ecchymosis or erythema; there is distal anterior leg/ankle scarring and skin atrophy.  ROM: slight hyper extension to 120 flexion  Tight hamstrings on straight leg raise.  Effusion: none  Tender: NTTP med/lat joint line, anterior or posterior knee  McMurrays: negative    MCL: stable, and non-painful at both 0 and 30 degrees knee flexion  Varus stress: stable, and non-painful at both 0 and 30 degrees knee flexion  Lachmans: neg, firm endpoint  Posterior Drawer stable  Patellofemoral joint:                Apprehension: negative              Crepitations: mild    RIGHT KNEE EXAM:    Skin: intact, no ecchymosis or erythema  ROM: " "slight hyper extension to 120 flexion, medial discomfort.  Tight hamstrings on straight leg raise.  Effusion: none  Tender: medial joint line, pes  nontender to palpation lat joint line, anterior or posterior knee  McMurrays: negative    There is some varus laxity.  Lachmans: neg, firm endpoint  Posterior Drawer stable  Patellofemoral joint:                Apprehension: negative              Crepitations: mild   Grind: positive.    Noted deformity, scarring of right foot/big toe region.        X-RAY:  3 views right knee from 7/14/2021 were reviewed in clinic today. On my review,  Proximal tibial metaphyseal deformity compatible with old healed fracture, unchanged in appearance from 7/3/2012. Mild medial compartment narrowing.         ASSESSMENT/PLAN: Gary Iyer is a 60 year old male with acute right knee pain, primary osteoarthritis.     * reviewed imaging studies with patient, showing arthritic changes, or wearing of the cartilage in the knee. This can be caused by normal \"wear and tear\" over the years or following prior injury to the knee.  * likely some affect from previous injuries, fractures to the right lower extremity as well.    Treatment typically starts nonsurgically. Surgical indication for total knee arthroplasty  when nonsurgical management is no longer effective.    Non-surgical treatment for knee arthritis includes:    * rest, sitting  * Activity modification - avoid impact activities or activities that aggravate symptoms.  * NSAIDS (non-steroidal anti-inflammatory medications; e.g. Aleve, advil, motrin, ibuprofen) - regular use for inflammation ( twice daily or three times daily), with food, as long as no contra-indications Please discuss with primary care doctor if needed  * ice, 15-20 minutes at a time several times a day or as needed.  * Strengthening of quadriceps muscles  * Physical Therapy for strengthening, stretching and range of motion exercises of legs  * Tylenol as needed for pain, " "consider Tylenol arthritis or similar  * Weight loss: Weight loss:  Body mass index is 34.15 kg/m .. weight loss benefits, not only for the current pain symptoms, but also overall health. Recommend a good diet plan that works for the patient, with the assistance of a dietician or primary care doctor as needed. Also, a good, low-impact exercise program for at least 20 minutes per day, 3 times per week, such as exercise bike, elliptical , or pool.  * Exercise: low impact such as stationary bike, elliptical, pool.  * Injections: cortisone versus viscosupplementation (hyaluronic acid, \"rooster comb\", \"gel shots\"); risks and perceived benefits discussed today. Patient elects NOT to proceed.  * Bracing: bracing the knee may offer some relief of symptoms when worn and provide some stability.  * over the counter supplements such as glucosamine and chondroitin sulfate may help with joint pain.  * topical ointments may help as well    * return to clinic as needed.          Feliciano Aguilar M.D., M.S.  Dept. of Orthopaedic Surgery  [facility]          Again, thank you for allowing me to participate in the care of your patient.        Sincerely,        Feliciano Aguilar MD    "

## 2021-07-19 ENCOUNTER — TELEPHONE (OUTPATIENT)
Dept: FAMILY MEDICINE | Facility: CLINIC | Age: 61
End: 2021-07-19

## 2021-07-19 NOTE — TELEPHONE ENCOUNTER
Reason for Call:  Other call back    Detailed comments: Patient just started oxybutynin (DITROPAN) 5 MG tablet, Patient is concerned because he stated its not working. Would like a call back to discuss other options.    Phone Number Patient can be reached at: Home number on file [phone] (home)    Best Time: Anytime    Can we leave a detailed message on this number? YES    Call taken on 7/19/2021 at 12:40 PM by William Verdugo

## 2021-07-20 NOTE — TELEPHONE ENCOUNTER
Received call from patient. He has been taking the oxybutynin 5 mg tablet since 07/02/21. He feels like it is not really doing anything or helping at all. He gets up a lot still during the night needing to use the bathroom. He feels like sometimes there is not a very strong urine flow. He declines any other symptoms urinary symptoms. Please advise.

## 2021-07-20 NOTE — TELEPHONE ENCOUNTER
Stop oxybutyinin    Double up the tamsulosin medication ( take 2 pills instead of one  Daily )    Then do another virtual visit with us in a few weeks    Please inform patient    Finesse Beal MD

## 2021-07-20 NOTE — TELEPHONE ENCOUNTER
Patient notified of provider message as written. Patient verbalized good understanding.     Vanda LINARESN, RN  [facility]

## 2021-07-20 NOTE — TELEPHONE ENCOUNTER
Left message on answering machine for patient to call back to the nurse at [phone].    Aminah Gross RN  [facility]

## 2021-08-11 ENCOUNTER — VIRTUAL VISIT (OUTPATIENT)
Dept: FAMILY MEDICINE | Facility: CLINIC | Age: 61
End: 2021-08-11
Payer: MEDICARE

## 2021-08-11 DIAGNOSIS — E11.9 TYPE 2 DIABETES MELLITUS WITHOUT COMPLICATION, WITHOUT LONG-TERM CURRENT USE OF INSULIN (H): ICD-10-CM

## 2021-08-11 DIAGNOSIS — N40.1 BENIGN PROSTATIC HYPERPLASIA WITH WEAK URINARY STREAM: Primary | ICD-10-CM

## 2021-08-11 DIAGNOSIS — I10 HYPERTENSION GOAL BP (BLOOD PRESSURE) < 140/90: ICD-10-CM

## 2021-08-11 DIAGNOSIS — R39.12 BENIGN PROSTATIC HYPERPLASIA WITH WEAK URINARY STREAM: Primary | ICD-10-CM

## 2021-08-11 PROCEDURE — 99213 OFFICE O/P EST LOW 20 MIN: CPT | Mod: 95 | Performed by: FAMILY MEDICINE

## 2021-08-11 RX ORDER — TAMSULOSIN HYDROCHLORIDE 0.4 MG/1
CAPSULE ORAL
Qty: 180 CAPSULE | Refills: 1 | Status: SHIPPED | OUTPATIENT
Start: 2021-08-11 | End: 2022-02-23

## 2021-08-11 NOTE — PROGRESS NOTES
Gary is a 61 year old who is being evaluated via a billable video visit.      How would you like to obtain your AVS? Arch[employer]onic text link to [phone]  If the video visit is dropped, the invitation should be resent by:   Will anyone else be joining your video visit? No     Video Start Time: 12:00 PM         Subjective   Gary is a 61 year old who presents for the following health issues    HPI     Follow up       Review of Systems    urine problem not going away    Overall same    Get up 3-4 x at night      Tried the oxybutynin, did not help    See message from 7-    The double dose of tamsulosin  Did not help    Feels  Like not emptying    If waits too long, some urine dribbles out     No pain or burning on urinatiion    Staying hydrated    Bowels okay    Stool softener and miralax available          Objective           Vitals:  No vitals were obtained today due to virtual visit.    Physical Exam   GENERAL: Healthy, alert and no distress  EYES: Eyes grossly normal to inspection.  No discharge or erythema, or obvious scleral/conjunctival abnormalities.  RESP: No audible wheeze, cough, or visible cyanosis.  No visible retractions or increased work of breathing.    SKIN: Visible skin clear. No significant rash, abnormal pigmentation or lesions.  NEURO: Cranial nerves grossly intact.  Mentation and speech appropriate for age.  PSYCH: Mentation appears normal, affect normal/bright, judgement and insight intact, normal speech and appearance well-groomed.       ASSESSMENT / PLAN:  (N40.1,  R39.12) Benign prostatic hyperplasia with weak urinary stream  (primary encounter diagnosis)  Comment: patient did not improve at all with oxybutynin trial. Off that now.  On tamsulosin higher dose, not much help.  Thus should see urology.  Did referral.  If patient does not hear from schedulers let us know.  Stay on higher tamsulosin for now.  Refill done.   Plan: Adult Urology Referral, tamsulosin (FLOMAX) 0.4        MG  capsule            (E11.9) Type 2 diabetes mellitus without complication, without long-term current use of insulin (H)  Comment: see us in November, recheck hemoglobin a1c then  Plan: as above     (I10) Hypertension goal BP (blood pressure) < 140/90  Comment: see us in Nov   Plan: as above    Other meds as is       I reviewed the patient's medications, allergies, medical history, family history, and social history.    Finesse Beal MD              Video-Visit Details    Type of service:  Video Visit    Video End Time:12:19 PM    Originating Location (pt. Location): Home    Distant Location (provider location):  [facility]     Platform used for Video Visit: [employer]

## 2021-08-11 NOTE — PATIENT INSTRUCTIONS
Stay off oxybutynin    Stay on the higher dose tamsulosin ( 2 pills daily )    See urology for your symptoms    Stay on other meds

## 2021-08-23 ENCOUNTER — OFFICE VISIT (OUTPATIENT)
Dept: OTOLARYNGOLOGY | Facility: CLINIC | Age: 61
End: 2021-08-23
Payer: MEDICARE

## 2021-08-23 VITALS
TEMPERATURE: 98.9 F | WEIGHT: 238 LBS | BODY MASS INDEX: 34.07 KG/M2 | HEIGHT: 70 IN | OXYGEN SATURATION: 95 % | HEART RATE: 119 BPM

## 2021-08-23 DIAGNOSIS — Z85.21 HX OF LARYNGEAL CANCER: ICD-10-CM

## 2021-08-23 DIAGNOSIS — J38.3 VOCAL CORD GRANULOMA: Primary | ICD-10-CM

## 2021-08-23 PROCEDURE — 31579 LARYNGOSCOPY TELESCOPIC: CPT | Performed by: OTOLARYNGOLOGY

## 2021-08-23 PROCEDURE — 99212 OFFICE O/P EST SF 10 MIN: CPT | Mod: 25 | Performed by: OTOLARYNGOLOGY

## 2021-08-23 ASSESSMENT — MIFFLIN-ST. JEOR: SCORE: 1880.81

## 2021-08-23 ASSESSMENT — PAIN SCALES - GENERAL: PAINLEVEL: NO PAIN (0)

## 2021-08-23 NOTE — NURSING NOTE
"Chief Complaint   Patient presents with     RECHECK     3 month follow up       Pulse 119, temperature 98.9  F (37.2  C), temperature source Temporal, height 1.778 m (5' 10\"), weight 108 kg (238 lb), SpO2 95 %.    Antwon Del Cid, EMT  "

## 2021-08-23 NOTE — PROGRESS NOTES
Dear Colleague:    Gary Iyer recently returned for follow-up at the [facility]. My clinic note from our visit is enclosed below.  Speech recognition software may have been used in the documentation below; input is reviewed before signature to the best of my ability.     I appreciate the ongoing opportunity to participate in this patient's care.    Please feel free to contact me with any questions.    Sincerely yours,      Mel Fournier M.D., M.P.H.  , Laryngology  Director, 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  Most recently, he returned to the operating room on February 9, 2017 for an area of new irregularity of the left true vocal fold.  Pathology showed severe dysplasia with negative but close margins (for moderate, but not severe dysplasia).  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    At the last visit, we observed a slight recurrence of leukoplakia/granuloma in the same location where he had this in 2018. At that time it resolved just as we were considering a repeat biopsy. When last seen he was having allergies which are provoking coughing/throat-clearing. He is already taking medication for allergies and I recommended consideration of saline rinses/gargles as well. He was also set up for refresher session(s) of speech therapy with Misty Clark, PhD, CCC-SLP.    He returns today reporting he is feeling good. He has been using therapy techniques.        MEDICATIONS:     Current Outpatient Medications   Medication Sig Dispense Refill     alcohol swab prep pads Use to swab area of injection/maria del rosario as directed. 100 each 3     allopurinol  (ZYLOPRIM) 300 MG tablet Take 1 tablet (300 mg) by mouth daily 90 tablet 3     amLODIPine (NORVASC) 2.5 MG tablet Take 1 tablet (2.5 mg) by mouth daily 90 tablet 3     aspirin 81 MG tablet Take 1 tablet by mouth daily.       atorvastatin (LIPITOR) 20 MG tablet Take 1 tablet (20 mg) by mouth daily 90 tablet 3     blood glucose (NO BRAND SPECIFIED) test strip Use to test blood sugar 1 times daily or as directed. To accompany: Blood Glucose Monitor Brands: per insurance. 100 strip 6     blood glucose calibration (NO BRAND SPECIFIED) solution To accompany: Blood Glucose Monitor Brands: per insurance. 1 Bottle 3     blood glucose monitoring (NO BRAND SPECIFIED) meter device kit Use to test blood sugar 1 times daily or as directed. Preferred blood glucose meter OR supplies to accompany: Blood Glucose Monitor Brands: per insurance. 1 kit 0     cetirizine (ZYRTEC) 10 MG tablet Take 1 tablet (10 mg) by mouth daily 90 tablet 3     hydrochlorothiazide (HYDRODIURIL) 50 MG tablet Take 1 tablet (50 mg) by mouth daily 90 tablet 3     hydrocortisone (CORTAID) 1 % external ointment Apply topically 2 times daily as needed 30 g 1     ketoconazole (NIZORAL) 2 % cream Apply topically 2 times daily for up to 2 weeks as needed for rash 60 g 1     losartan (COZAAR) 100 MG tablet Take 1 tablet (100 mg) by mouth daily 90 tablet 3     metFORMIN (GLUCOPHAGE) 500 MG tablet TAKE 1 TABLET BY MOUTH TWICE DAILY WITH MEALS 190 tablet 1     metroNIDAZOLE (METROCREAM) 0.75 % external cream Apply topically 2 times daily as needed (to rosacea areas) 45 g 1     Misc. Devices (SUPPOSITORY MOLD 2GM) MISC 1 suppository every 12 hours Nifedipine 4 mg suppository, one rectally every 12 hours 100 each 4     Multiple Vitamin (DAILY MULTIVITAMIN PO) Take  by mouth daily.       nifedipine 0.2% in white petrolatum 0.2 % OINT ointment Apply topically 2 times daily Please make it into suppositories 0.2 % nifedipine cream Place in the anus every 12 hours.  Just get  to where the anus is tight . 100 g 3     omeprazole (PRILOSEC) 20 MG DR capsule TAKE 1 CAPSULE BY MOUTH DAILY 90 capsule 3     order for DME Autocpap 10-16 cm 1 Units 0     PARoxetine (PAXIL) 40 MG tablet Take 1 tablet (40 mg) by mouth every morning 90 tablet 3     polyethylene glycol (MIRALAX) 17 GM/Dose powder Take 17 g (1 capful) by mouth daily 850 g 3     psyllium 0.52 G capsule Take 1 capsule (0.52 g) by mouth daily 100 capsule 3     tamsulosin (FLOMAX) 0.4 MG capsule 2 po daily 180 capsule 1     thin (NO BRAND SPECIFIED) lancets Use with lanceting device. To accompany: Blood Glucose Monitor Brands: per insurance. 100 each 11     triamcinolone (KENALOG) 0.1 % external cream Apply topically 2 times daily as needed for irritation 30 g 0       ALLERGIES:  No Known Allergies    NEW PMH/PSH: None    REVIEW OF SYSTEMS:  The patient completed a comprehensive 11 point review of systems (below), which was reviewed. Positives are as noted below.  Patient Supplied Answers to Review of Systems   ENT ROS 4/22/2016   Ears, Nose, Throat Nasal congestion or drainage       PHYSICAL EXAM:  General: The patient was alert and conversant, and in no acute distress.    Oral cavity/oropharynx: No masses or lesions. Dentition unchanged since prior. Tongue mobility and palate elevation intact and symmetric.  Neck: No palpable cervical lymphadenopathy, no significant tenderness to palpation of the thyrohyoid space, which was narrow. No obvious thyroid abnormality. Landmarks mildly indistinct.  Resp: Breathing comfortably, no stridor or stertor.  Neuro: Symmetric facial function. Other cranial nerve function as documented above.  Psych: Normal affect, pleasant and cooperative.  Voice/speech: Mild dysphonia characterized by breathiness and roughness.      Intake scores  Last 2 Scores for Patient-Answered VHI Questionnaire  VHI Total Score 2/26/2018 3/1/2019   VHI Total Score 9 17      Last 2 Scores for Patient-Answered EAT  Questionnaire  EAT Total Score 2/26/2018 3/1/2019   EAT Total Score 1 5      Last 2 Scores for Patient-Answered CSI Questionnaire  CSI Total Score 2/26/2018 3/1/2019   CSI Total Score 0 0       Procedure:   Flexible fiberoptic laryngoscopy and laryngovideostroboscopy  Indications: This procedure was warranted to evaluate the patient's laryngeal anatomy and function. Risks, benefits, and alternatives were discussed.  Description: After written informed consent was obtained, a time-out was performed to confirm patient identity, procedure, and procedure site. Topical 3% lidocaine with 0.25% phenylephrine was applied to the nasal cavities. I performed the endoscopy and no complications were apparent. Continuous and stroboscopic light were utilized to assess routine phonation and variable frequency phonation.  Performed by: Mel Fournier MD MPH  Findings: Normal nasopharynx. Normal base of tongue, valleculae, and epiglottis. Vocal fold mobility: right: normal; left: normal. Medial edges of the vocal folds: smooth and straight. Sticky secretions, scattered.   Glissade produced appropriate elongation. Mucosa of false vocal folds, aryepiglottic folds, piriform sinuses, and posterior glottis unremarkable with the exception of persistent but reduced small focus of leukoplakia along left medial arytenoid. Airway was patent.   SImilar findings on NBI.    The addition of stroboscopy allowed evaluation of the mucosal wave.   Amplitude: right: mildly decreased; left: mildly decreased. Symmetry: intermittent symmetry. Closure pattern: complete. Closure plane: at glottic level. Phase distribution: normal.                IMPRESSION AND PLAN:   Gary Iyer returns with some interval improvement of the leukoplakia/granuloma, but not resolution.    We recommended continuing speech therapy techniques. He would like a follow up session with Misty Clark, PhD, CCC-SLP so we will set this up.    He will return to clinic in  approximately three months with me for a repeat exam. I appreciate the opportunity to participate in the care of this pleasant patient.     Today's visit required additional screening time, PPE, and cleaning measures to allow for a safe in-person visit, due to the public health emergency. I spent a total of 17 minutes on 8/23/2021 in chart review, review of tests, patient visit, documentation, care coordination, and/or discussion with other providers about the issues documented above, separate from any documented procedure(s).

## 2021-08-23 NOTE — PATIENT INSTRUCTIONS
1.  You were seen in the ENT Clinic today by . If you have any questions or concerns after your appointment, please call [phone]. Press option #1 for scheduling related needs. Press option #3 for Nurse advice.    2.   has recommended the following:   - speech therapy with D.D, one session    3.  Plan is to return to clinic in 3 months      Lisa Hazel LPN  [phone]  [facility] - Otolaryngology

## 2021-08-23 NOTE — LETTER
8/23/2021      RE: Gary Iyer  [address] [facility]  [place] [address]       Dear Colleague:    Gary Iyer recently returned for follow-up at the [facility]. My clinic note from our visit is enclosed below.  Speech recognition software may have been used in the documentation below; input is reviewed before signature to the best of my ability.     I appreciate the ongoing opportunity to participate in this patient's care.    Please feel free to contact me with any questions.    Sincerely yours,      Mel Fournier M.D., M.P.H.  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  Most recently, he returned to the operating room on February 9, 2017 for an area of new irregularity of the left true vocal fold.  Pathology showed severe dysplasia with negative but close margins (for moderate, but not severe dysplasia).  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    At the last visit, we observed a slight recurrence of leukoplakia/granuloma in the same location where he had this in 2018. At that time it resolved just as we were considering a repeat biopsy. When last seen he was having allergies which are provoking coughing/throat-clearing. He is already taking medication for allergies and I recommended consideration of saline rinses/gargles as well. He was also set up for refresher session(s) of speech therapy with Misty Clark, PhD, CCC-SLP.    He returns today reporting he is feeling good. He has been using therapy techniques.        MEDICATIONS:     Current Outpatient Medications   Medication Sig Dispense Refill     alcohol swab prep  pads Use to swab area of injection/maria del rosario as directed. 100 each 3     allopurinol (ZYLOPRIM) 300 MG tablet Take 1 tablet (300 mg) by mouth daily 90 tablet 3     amLODIPine (NORVASC) 2.5 MG tablet Take 1 tablet (2.5 mg) by mouth daily 90 tablet 3     aspirin 81 MG tablet Take 1 tablet by mouth daily.       atorvastatin (LIPITOR) 20 MG tablet Take 1 tablet (20 mg) by mouth daily 90 tablet 3     blood glucose (NO BRAND SPECIFIED) test strip Use to test blood sugar 1 times daily or as directed. To accompany: Blood Glucose Monitor Brands: per insurance. 100 strip 6     blood glucose calibration (NO BRAND SPECIFIED) solution To accompany: Blood Glucose Monitor Brands: per insurance. 1 Bottle 3     blood glucose monitoring (NO BRAND SPECIFIED) meter device kit Use to test blood sugar 1 times daily or as directed. Preferred blood glucose meter OR supplies to accompany: Blood Glucose Monitor Brands: per insurance. 1 kit 0     cetirizine (ZYRTEC) 10 MG tablet Take 1 tablet (10 mg) by mouth daily 90 tablet 3     hydrochlorothiazide (HYDRODIURIL) 50 MG tablet Take 1 tablet (50 mg) by mouth daily 90 tablet 3     hydrocortisone (CORTAID) 1 % external ointment Apply topically 2 times daily as needed 30 g 1     ketoconazole (NIZORAL) 2 % cream Apply topically 2 times daily for up to 2 weeks as needed for rash 60 g 1     losartan (COZAAR) 100 MG tablet Take 1 tablet (100 mg) by mouth daily 90 tablet 3     metFORMIN (GLUCOPHAGE) 500 MG tablet TAKE 1 TABLET BY MOUTH TWICE DAILY WITH MEALS 190 tablet 1     metroNIDAZOLE (METROCREAM) 0.75 % external cream Apply topically 2 times daily as needed (to rosacea areas) 45 g 1     Misc. Devices (SUPPOSITORY MOLD 2GM) MISC 1 suppository every 12 hours Nifedipine 4 mg suppository, one rectally every 12 hours 100 each 4     Multiple Vitamin (DAILY MULTIVITAMIN PO) Take  by mouth daily.       nifedipine 0.2% in white petrolatum 0.2 % OINT ointment Apply topically 2 times daily Please make it into  suppositories 0.2 % nifedipine cream Place in the anus every 12 hours.  Just get to where the anus is tight . 100 g 3     omeprazole (PRILOSEC) 20 MG DR capsule TAKE 1 CAPSULE BY MOUTH DAILY 90 capsule 3     order for DME Autocpap 10-16 cm 1 Units 0     PARoxetine (PAXIL) 40 MG tablet Take 1 tablet (40 mg) by mouth every morning 90 tablet 3     polyethylene glycol (MIRALAX) 17 GM/Dose powder Take 17 g (1 capful) by mouth daily 850 g 3     psyllium 0.52 G capsule Take 1 capsule (0.52 g) by mouth daily 100 capsule 3     tamsulosin (FLOMAX) 0.4 MG capsule 2 po daily 180 capsule 1     thin (NO BRAND SPECIFIED) lancets Use with lanceting device. To accompany: Blood Glucose Monitor Brands: per insurance. 100 each 11     triamcinolone (KENALOG) 0.1 % external cream Apply topically 2 times daily as needed for irritation 30 g 0       ALLERGIES:  No Known Allergies    NEW PMH/PSH: None    REVIEW OF SYSTEMS:  The patient completed a comprehensive 11 point review of systems (below), which was reviewed. Positives are as noted below.  Patient Supplied Answers to Review of Systems   ENT ROS 4/22/2016   Ears, Nose, Throat Nasal congestion or drainage       PHYSICAL EXAM:  General: The patient was alert and conversant, and in no acute distress.    Oral cavity/oropharynx: No masses or lesions. Dentition unchanged since prior. Tongue mobility and palate elevation intact and symmetric.  Neck: No palpable cervical lymphadenopathy, no significant tenderness to palpation of the thyrohyoid space, which was narrow. No obvious thyroid abnormality. Landmarks mildly indistinct.  Resp: Breathing comfortably, no stridor or stertor.  Neuro: Symmetric facial function. Other cranial nerve function as documented above.  Psych: Normal affect, pleasant and cooperative.  Voice/speech: Mild dysphonia characterized by breathiness and roughness.      Intake scores  Last 2 Scores for Patient-Answered VHI Questionnaire  VHI Total Score 2/26/2018 3/1/2019    VHI Total Score 9 17      Last 2 Scores for Patient-Answered EAT Questionnaire  EAT Total Score 2/26/2018 3/1/2019   EAT Total Score 1 5      Last 2 Scores for Patient-Answered CSI Questionnaire  CSI Total Score 2/26/2018 3/1/2019   CSI Total Score 0 0       Procedure:   Flexible fiberoptic laryngoscopy and laryngovideostroboscopy  Indications: This procedure was warranted to evaluate the patient's laryngeal anatomy and function. Risks, benefits, and alternatives were discussed.  Description: After written informed consent was obtained, a time-out was performed to confirm patient identity, procedure, and procedure site. Topical 3% lidocaine with 0.25% phenylephrine was applied to the nasal cavities. I performed the endoscopy and no complications were apparent. Continuous and stroboscopic light were utilized to assess routine phonation and variable frequency phonation.  Performed by: Mel Fournier MD [facility]  Findings: Normal nasopharynx. Normal base of tongue, valleculae, and epiglottis. Vocal fold mobility: right: normal; left: normal. Medial edges of the vocal folds: smooth and straight. Sticky secretions, scattered.   Glissade produced appropriate elongation. Mucosa of false vocal folds, aryepiglottic folds, piriform sinuses, and posterior glottis unremarkable with the exception of persistent but reduced small focus of leukoplakia along left medial arytenoid. Airway was patent.   SImilar findings on NBI.    The addition of stroboscopy allowed evaluation of the mucosal wave.   Amplitude: right: mildly decreased; left: mildly decreased. Symmetry: intermittent symmetry. Closure pattern: complete. Closure plane: at glottic level. Phase distribution: normal.                IMPRESSION AND PLAN:   Gary Iyer returns with some interval improvement of the leukoplakia/granuloma, but not resolution.    We recommended continuing speech therapy techniques. He would like a follow up session with Misty Clark, PhD,  CCC-SLP so we will set this up.    He will return to clinic in approximately three months with me for a repeat exam. I appreciate the opportunity to participate in the care of this pleasant patient.     Today's visit required additional screening time, PPE, and cleaning measures to allow for a safe in-person visit, due to the public health emergency. I spent a total of 17 minutes on 8/23/2021 in chart review, review of tests, patient visit, documentation, care coordination, and/or discussion with other providers about the issues documented above, separate from any documented procedure(s).        Mel Fournier MD

## 2021-08-29 DIAGNOSIS — N32.81 OVERACTIVE BLADDER: ICD-10-CM

## 2021-08-29 DIAGNOSIS — E11.9 TYPE 2 DIABETES MELLITUS WITHOUT COMPLICATION, WITHOUT LONG-TERM CURRENT USE OF INSULIN (H): ICD-10-CM

## 2021-08-30 ENCOUNTER — OFFICE VISIT (OUTPATIENT)
Dept: UROLOGY | Facility: CLINIC | Age: 61
End: 2021-08-30
Payer: MEDICARE

## 2021-08-30 VITALS — HEART RATE: 112 BPM | DIASTOLIC BLOOD PRESSURE: 84 MMHG | OXYGEN SATURATION: 97 % | SYSTOLIC BLOOD PRESSURE: 138 MMHG

## 2021-08-30 DIAGNOSIS — G47.33 OBSTRUCTIVE SLEEP APNEA: Primary | ICD-10-CM

## 2021-08-30 DIAGNOSIS — E11.9 TYPE 2 DIABETES MELLITUS WITHOUT COMPLICATION, WITHOUT LONG-TERM CURRENT USE OF INSULIN (H): ICD-10-CM

## 2021-08-30 DIAGNOSIS — R35.1 BENIGN PROSTATIC HYPERPLASIA WITH NOCTURIA: ICD-10-CM

## 2021-08-30 DIAGNOSIS — N40.1 BENIGN PROSTATIC HYPERPLASIA WITH NOCTURIA: ICD-10-CM

## 2021-08-30 PROCEDURE — 99203 OFFICE O/P NEW LOW 30 MIN: CPT | Mod: 25 | Performed by: UROLOGY

## 2021-08-30 PROCEDURE — 51798 US URINE CAPACITY MEASURE: CPT | Performed by: UROLOGY

## 2021-08-30 RX ORDER — OXYBUTYNIN CHLORIDE 5 MG/1
5 TABLET ORAL AT BEDTIME
Qty: 90 TABLET | Refills: 3 | Status: SHIPPED | OUTPATIENT
Start: 2021-08-30 | End: 2022-04-29

## 2021-08-30 NOTE — PROGRESS NOTES
S: Gary Iyer is a pleasant  61 year old male who was requested to be seen by  Finesse Beal for a consult with regard to patient's urinary complaints.  Patient complains of Nocturia x 4.  He has no history of elevated PSA.  Symptoms have been on going for several years(s).  Seems to be worsened over time.  His recent PSA was found to be   PSA   Date Value Ref Range Status   11/25/2020 0.57 0 - 4 ug/L Final     Comment:     Assay Method:  Chemiluminescence using Siemens Vista analyzer   .  His AUA Symptom Score:  8.  He has tried oxybutynin then flomax without much improvements.  He has no obstructive voiding symptoms.  He has no urinary frequency.  He has DM.  He has sleep apnea but has not been able to use CPAP machine.    Current Outpatient Medications   Medication Sig Dispense Refill     alcohol swab prep pads Use to swab area of injection/maria [place] rosario as directed. 100 each 3     allopurinol (ZYLOPRIM) 300 MG tablet Take 1 tablet (300 mg) by mouth daily 90 tablet 3     amLODIPine (NORVASC) 2.5 MG tablet Take 1 tablet (2.5 mg) by mouth daily 90 tablet 3     aspirin 81 MG tablet Take 1 tablet by mouth daily.       atorvastatin (LIPITOR) 20 MG tablet Take 1 tablet (20 mg) by mouth daily 90 tablet 3     blood glucose (NO BRAND SPECIFIED) test strip Use to test blood sugar 1 times daily or as directed. To accompany: Blood Glucose Monitor Brands: per insurance. 100 strip 6     blood glucose calibration (NO BRAND SPECIFIED) solution To accompany: Blood Glucose Monitor Brands: per insurance. 1 Bottle 3     blood glucose monitoring (NO BRAND SPECIFIED) meter device kit Use to test blood sugar 1 times daily or as directed. Preferred blood glucose meter OR supplies to accompany: Blood Glucose Monitor Brands: per insurance. 1 kit 0     cetirizine (ZYRTEC) 10 MG tablet Take 1 tablet (10 mg) by mouth daily 90 tablet 3     hydrochlorothiazide (HYDRODIURIL) 50 MG tablet Take 1 tablet (50 mg) by mouth daily 90 tablet 3      hydrocortisone (CORTAID) 1 % external ointment Apply topically 2 times daily as needed 30 g 1     ketoconazole (NIZORAL) 2 % cream Apply topically 2 times daily for up to 2 weeks as needed for rash 60 g 1     losartan (COZAAR) 100 MG tablet Take 1 tablet (100 mg) by mouth daily 90 tablet 3     metFORMIN (GLUCOPHAGE) 500 MG tablet TAKE 1 TABLET BY MOUTH TWICE DAILY WITH MEALS 190 tablet 1     metroNIDAZOLE (METROCREAM) 0.75 % external cream Apply topically 2 times daily as needed (to rosacea areas) 45 g 1     Misc. Devices (SUPPOSITORY MOLD 2GM) MISC 1 suppository every 12 hours Nifedipine 4 mg suppository, one rectally every 12 hours 100 each 4     Multiple Vitamin (DAILY MULTIVITAMIN PO) Take  by mouth daily.       nifedipine 0.2% in white petrolatum 0.2 % OINT ointment Apply topically 2 times daily Please make it into suppositories 0.2 % nifedipine cream Place in the anus every 12 hours.  Just get to where the anus is tight . 100 g 3     omeprazole (PRILOSEC) 20 MG DR capsule TAKE 1 CAPSULE BY MOUTH DAILY 90 capsule 3     order for DME Autocpap 10-16 cm 1 Units 0     oxybutynin (DITROPAN) 5 MG tablet Take 1 tablet (5 mg) by mouth At Bedtime 90 tablet 3     PARoxetine (PAXIL) 40 MG tablet Take 1 tablet (40 mg) by mouth every morning 90 tablet 3     polyethylene glycol (MIRALAX) 17 GM/Dose powder Take 17 g (1 capful) by mouth daily 850 g 3     psyllium 0.52 G capsule Take 1 capsule (0.52 g) by mouth daily 100 capsule 3     tamsulosin (FLOMAX) 0.4 MG capsule 2 po daily 180 capsule 1     thin (NO BRAND SPECIFIED) lancets Use with lanceting device. To accompany: Blood Glucose Monitor Brands: per insurance. 100 each 11     triamcinolone (KENALOG) 0.1 % external cream Apply topically 2 times daily as needed for irritation 30 g 0     No Known Allergies  Past Medical History:   Diagnosis Date     Diabetes (H)     2yr     Hypertension      Lesion of larynx 12/12/2017    Leokoplakia. s/p Flexible fiberoptic transnasal  laryngoscopy with endoscopic injection, laryngeal biopsy     Multinodular goiter (nontoxic) 06/10/2013     Sleep apnea 8yr     Past Surgical History:   Procedure Laterality Date     COLONOSCOPY  2012    Repeat Colonoscopy in 10 yrs.      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     HEAD & NECK SURGERY  2011    callous removal from throat     LARYNGOSCOPY WITH BIOPSY(IES)  2011     LARYNGOSCOPY WITH BIOPSY(IES) N/A 2017    Procedure: LARYNGOSCOPY WITH BIOPSY(IES);  Surgeon: Mel Fournier MD;  Location: [facility] OR     LARYNGOSCOPY WITH MICROSCOPE N/A 2017    Procedure: LARYNGOSCOPY WITH MICROSCOPE;  Surgeon: Mel Fournier MD;  Location: [facility] OR     LASER CO2 LARYNGOSCOPY, COMPLEX  2013    Procedure: LASER CO2 LARYNGOSCOPY, COMPLEX;  Micro Direct Laryngoscopy With Micro Flap Excision Of Lesion Lumenis Co2 Laser ;  Surgeon: Mel Fournier MD;  Location: [facility] OR     LASER CO2 LESION ORAL N/A 2017    Procedure: LASER CO2 LESION ORAL;  Surgeon: Mel Fournier MD;  Location: [facility] OR     ORTHOPEDIC SURGERY  2016    left tibia orif with abbi -     VASECTOMY  2003      Family History   Problem Relation Age of Onset     C.A.D. Father          MI age 44     Hypertension Mother          93 old age     Diabetes No family hx of      Cerebrovascular Disease No family hx of      Cancer No family hx of      Glaucoma No family hx of      Macular Degeneration No family hx of      He does not have a family history of prostate cancer.  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 No   Social History Narrative     Not on file     Social Determinants of Health     Financial Resource Strain:      Difficulty of Paying Living Expenses:    Food Insecurity:      Worried About Running Out of Food in the Last Year:      Ran Out of Food in the Last Year:    Transportation Needs:      Lack of Transportation (Medical):      Lack of Transportation (Non-Medical):    Physical Activity:      Days of Exercise per Week:      Minutes of Exercise per Session:    Stress:      Feeling of Stress :    Social Connections:      Frequency of Communication with Friends and Family:      Frequency of Social Gatherings with Friends and Family:      Attends Mu-ism Services:      Active Member of Clubs or Organizations:      Attends Club or Organization Meetings:      Marital Status:    Intimate Partner Violence:      Fear of Current or Ex-Partner:      Emotionally Abused:      Physically Abused:      Sexually Abused:         REVIEW OF SYSTEMS  =================  C: NEGATIVE for fever, chills, change in weight  I: NEGATIVE for worrisome rashes, moles or lesions  E/M: NEGATIVE for ear, mouth and throat problems  R: NEGATIVE for significant cough or SHORTNESS OF BREATH  CV:  NEGATIVE for chest pain, palpitations or peripheral edema  GI: NEGATIVE for nausea, abdominal pain, heartburn, or change in bowel habits  NEURO: NEGATIVE numbness/weakness  : see HPI  PSYCH: NEGATIVE depression/anxiety  LYmph: no new enlarged lymph nodes  Ortho: no new trauma/movements           O: Exam:/84 (BP Location: Right arm, Patient Position: Chair, Cuff Size: Adult Large)   Pulse 112   SpO2 97%     Constitutional: healthy, alert and no distress  Cardiovascular: negative, PMI normal.   Respiratory: negative, no evidence of respiratory distress  Gastrointestinal: Abdomen soft, non-tender. BS normal. No masses, organomegaly  : penis no discharge. Testis no masses. No scrotal skin lesion.  Prostate apex only.  Bladder scan with minimal RU.  Musculoskeletal: extremities normal- no gross deformities noted, gait normal and normal muscle tone  Skin: no suspicious lesions or rashes  Neurologic: Alert and oriented  Psychiatric: mentation appears normal. and affect normal/bright  Hematologic/Lymphatic/Immunologic: normal ant/post cervical, axillary, supraclavicular and inguinal nodes    Assessment/Plan:   (G47.33) Obstructive sleep apnea- moderate-severe (AHI 25)  (primary encounter diagnosis)  Comment:    Plan: most likely reason for nocturia.          Discussed importance of using CPAP machine    (N40.1,  R35.1) Benign prostatic hyperplasia with nocturia  Comment:    Plan: cont with flomax           Add oxybutynin at bedtime    (E11.9) Type 2 diabetes mellitus without complication, without long-term current use of insulin (H)  Comment:    Plan: discussed importance of DM control.

## 2021-08-31 RX ORDER — OXYBUTYNIN CHLORIDE 5 MG/1
TABLET ORAL
Qty: 90 TABLET | OUTPATIENT
Start: 2021-08-31

## 2021-08-31 NOTE — TELEPHONE ENCOUNTER
Duplicate  (N40.1,  R35.1) Benign prostatic hyperplasia with nocturia  Comment:    Plan: cont with flomax           Add oxybutynin at bedtime

## 2021-09-02 ENCOUNTER — TELEPHONE (OUTPATIENT)
Dept: SLEEP MEDICINE | Facility: CLINIC | Age: 61
End: 2021-09-02

## 2021-09-02 DIAGNOSIS — F41.9 ANXIETY: ICD-10-CM

## 2021-09-02 NOTE — TELEPHONE ENCOUNTER
Reason for Call: Request for an order or referral:    Order or referral being requested: supplies     Date needed: as soon as possible    Has the patient been seen by the PCP for this problem? YES    Additional comments: supplies    Phone number Patient can be reached at:  Cell number on file:    Telephone Information:   Mobile [phone]       Best Time:  any    Can we leave a detailed message on this number?  YES    Call taken on 9/2/2021 at 12:07 PM by Tameka Rayo

## 2021-09-03 ENCOUNTER — TELEPHONE (OUTPATIENT)
Dept: SLEEP MEDICINE | Facility: CLINIC | Age: 61
End: 2021-09-03

## 2021-09-03 NOTE — TELEPHONE ENCOUNTER
Returning patients phone call, there are orders in patients chart from 5/2021. Patient was given the phone number to [facility] to order new supplies.   Ivelisse Yi MA

## 2021-09-03 NOTE — TELEPHONE ENCOUNTER
Spoke with patient, JOSSIE informed him he would need another sleep study due to non compliance. Patient declined scheduling a return visit at this time, patient was informed he is past due for a compliance follow up.   Ivelisse Yi MA

## 2021-09-03 NOTE — TELEPHONE ENCOUNTER
Reason for Call:  Other returning call    Detailed comments: patient is returning call on a request from Dr. Ochoa at  [facility].  I contacted [facility] and states patient needs a new sleep study due to not using his cpap machine.  Patient admits to this, but feels he will use each night for a few weeks to give a report.  If he fails, he will get a new sleep study.  Patient wants an order for new supplies as well, and wondering if he can get this?  Please contact patient.  Thank you.    Phone Number Patient can be reached at: Home number on file [phone] (home) OR cell okay    Best Time: any    Can we leave a detailed message on this number? YES    Call taken on 9/3/2021 at 12:32 PM by Ibteh Chacon

## 2021-09-04 RX ORDER — PAROXETINE 20 MG/1
TABLET, FILM COATED ORAL
Qty: 180 TABLET | Refills: 1 | OUTPATIENT
Start: 2021-09-04

## 2021-09-08 ENCOUNTER — VIRTUAL VISIT (OUTPATIENT)
Dept: FAMILY MEDICINE | Facility: CLINIC | Age: 61
End: 2021-09-08
Payer: MEDICARE

## 2021-09-08 ENCOUNTER — OFFICE VISIT (OUTPATIENT)
Dept: DERMATOLOGY | Facility: CLINIC | Age: 61
End: 2021-09-08
Attending: FAMILY MEDICINE
Payer: MEDICARE

## 2021-09-08 VITALS
SYSTOLIC BLOOD PRESSURE: 147 MMHG | DIASTOLIC BLOOD PRESSURE: 85 MMHG | WEIGHT: 238 LBS | OXYGEN SATURATION: 95 % | HEART RATE: 114 BPM | BODY MASS INDEX: 34.15 KG/M2

## 2021-09-08 DIAGNOSIS — D18.01 CHERRY ANGIOMA: ICD-10-CM

## 2021-09-08 DIAGNOSIS — G47.33 OSA (OBSTRUCTIVE SLEEP APNEA): ICD-10-CM

## 2021-09-08 DIAGNOSIS — L21.9 DERMATITIS, SEBORRHEIC: Primary | ICD-10-CM

## 2021-09-08 DIAGNOSIS — L81.4 LENTIGO: ICD-10-CM

## 2021-09-08 DIAGNOSIS — L57.0 ACTINIC KERATOSIS: ICD-10-CM

## 2021-09-08 DIAGNOSIS — D48.5 NEOPLASM OF UNCERTAIN BEHAVIOR OF SKIN: ICD-10-CM

## 2021-09-08 DIAGNOSIS — I10 HYPERTENSION GOAL BP (BLOOD PRESSURE) < 140/90: ICD-10-CM

## 2021-09-08 DIAGNOSIS — R35.0 URINARY FREQUENCY: ICD-10-CM

## 2021-09-08 DIAGNOSIS — E78.5 HYPERLIPIDEMIA LDL GOAL <70: ICD-10-CM

## 2021-09-08 DIAGNOSIS — D22.9 MULTIPLE BENIGN NEVI: ICD-10-CM

## 2021-09-08 DIAGNOSIS — E11.9 TYPE 2 DIABETES MELLITUS WITHOUT COMPLICATION, WITHOUT LONG-TERM CURRENT USE OF INSULIN (H): Primary | ICD-10-CM

## 2021-09-08 PROCEDURE — 88305 TISSUE EXAM BY PATHOLOGIST: CPT | Performed by: DERMATOLOGY

## 2021-09-08 PROCEDURE — 11102 TANGNTL BX SKIN SINGLE LES: CPT | Performed by: PHYSICIAN ASSISTANT

## 2021-09-08 PROCEDURE — 99214 OFFICE O/P EST MOD 30 MIN: CPT | Mod: 25 | Performed by: PHYSICIAN ASSISTANT

## 2021-09-08 PROCEDURE — 17003 DESTRUCT PREMALG LES 2-14: CPT | Mod: 59 | Performed by: PHYSICIAN ASSISTANT

## 2021-09-08 PROCEDURE — 99214 OFFICE O/P EST MOD 30 MIN: CPT | Mod: 95 | Performed by: FAMILY MEDICINE

## 2021-09-08 PROCEDURE — 17000 DESTRUCT PREMALG LESION: CPT | Mod: 59 | Performed by: PHYSICIAN ASSISTANT

## 2021-09-08 RX ORDER — LOSARTAN POTASSIUM 50 MG/1
50 TABLET ORAL DAILY
Qty: 90 TABLET | Refills: 1 | Status: SHIPPED | OUTPATIENT
Start: 2021-09-08 | End: 2022-04-29

## 2021-09-08 RX ORDER — KETOCONAZOLE 20 MG/G
CREAM TOPICAL
Qty: 60 G | Refills: 4 | Status: SHIPPED | OUTPATIENT
Start: 2021-09-08 | End: 2023-06-06

## 2021-09-08 RX ORDER — KETOCONAZOLE 20 MG/ML
SHAMPOO TOPICAL
Qty: 120 ML | Refills: 3 | Status: SHIPPED | OUTPATIENT
Start: 2021-09-08 | End: 2023-09-01

## 2021-09-08 NOTE — PROGRESS NOTES
Gary is a 61 year old who is being evaluated via a billable video visit.      How would you like to obtain your AVS? Mail a copy  If the video visit is dropped, the invitation should be resent by: Text to cell phone: [phone]  Will anyone else be joining your video visit? No     Video Start Time: 12:21 PM         Subjective   Gary is a 61 year old who presents for the following health issues    HPI     Discuss urology appointment      Review of Systems    using cpap nightly    Taking the tamsulosin daily    Added the oxybutynin at night    Patient wondering about diabetes    Am sugars about 130    Higher later in day like 200    Metformin is only diab med    Wt steady    Walking some    Occasional dry mouth but not bad    Stream okay    Now down to 1-2 x at night to urinate    Was up to 4    Some issues when standing up        Objective           Vitals:  No vitals were obtained today due to virtual visit.    Physical Exam   GENERAL: Healthy, alert and no distress  EYES: Eyes grossly normal to inspection.  No discharge or erythema, or obvious scleral/conjunctival abnormalities.  RESP: No audible wheeze, cough, or visible cyanosis.  No visible retractions or increased work of breathing.    SKIN: Visible skin clear. No significant rash, abnormal pigmentation or lesions.  NEURO: Cranial nerves grossly intact.  Mentation and speech appropriate for age.  PSYCH: Mentation appears normal, affect normal/bright, judgement and insight intact, normal speech and appearance well-groomed.         ASSESSMENT / PLAN:  (E11.9) Type 2 diabetes mellitus without complication, without long-term current use of insulin (H)  (primary encounter diagnosis)  Comment: recheck labs. Patient to schedule fasting lab appointment   Plan: Hemoglobin A1c        If needed could increase metfomin    (R35.0) Urinary frequency  Comment: check ua   Plan: UA with Microscopic reflex to Culture - lab         collect        Urinary symptoms are  better    Continue tamsulosin and oxybutynin    (E78.5) Hyperlipidemia LDL goal <70  Comment: check fasting    Plan: Lipid panel reflex to direct LDL Fasting,         Comprehensive metabolic panel             (I10) Hypertension goal BP (blood pressure) < 140/90  Comment: on 50 mg now; send in 50 mg pill so wont' have to cut 100 mg in half   Plan: losartan (COZAAR) 50 MG tablet             (G47.33) RITO (obstructive sleep apnea)  Comment: patient states he has appointment with sleep specialist coming up; may need new machine   Plan: as above       I reviewed the patient's medications, allergies, medical history, family history, and social history.    Finesse Beal MD            Video-Visit Details    Type of service:  Video Visit    Video End Time:12:39 PM    Originating Location (pt. Location): Home    Distant Location (provider location):  [facility]     Platform used for Video Visit: [email or web address]

## 2021-09-08 NOTE — NURSING NOTE
"Chief Complaint   Patient presents with     Derm Problem     itching and lumps for the last 6 or 7 months.        Vitals:    09/08/21 1552   BP: (!) 147/85   BP Location: Right arm   Patient Position: Sitting   Cuff Size: Adult Large   Pulse: 114   SpO2: 95%   Weight: 108 kg (238 lb)     Wt Readings from Last 1 Encounters:   09/08/21 108 kg (238 lb)     Ht Readings from Last 1 Encounters:   08/23/21 1.778 m (5' 10\")       Almaz Espinal [facility], 9/8/2021 3:52 PM    "

## 2021-09-08 NOTE — PATIENT INSTRUCTIONS
Rash on face is seborrheic dermatitis, it is a chronic rash that can wax and wane.   Use ketoconazole shampoo to wash face.  Apply ketoconazole cream daily.   Wound Care Instructions     FOR SUPERFICIAL WOUNDS     AFTER 24 HOURS YOU SHOULD REMOVE THE BANDAGE AND BEGIN DAILY DRESSING CHANGES AS FOLLOWS:     1) Remove Dressing.     2) Clean and dry the area with tap water using a Q-tip or sterile gauze pad.     3) Apply Polysporin ointment or Bacitracin ointment over entire wound.  Do NOT use Neosporin ointment.     4) Cover the wound with a band-aid, or a sterile non-stick gauze pad and micropore paper tape      REPEAT THESE INSTRUCTIONS AT LEAST ONCE A DAY UNTIL THE WOUND HAS COMPLETELY HEALED.    It is an old wives tale that a wound heals better when it is exposed to air and allowed to dry out. The wound will heal faster with a better cosmetic result if it is kept moist with ointment and covered with a bandage.    **Do not let the wound dry out.**      Supplies Needed:      *Cotton tipped applicators (Q-tips)    *Polysporin Ointment or Bacitracin Ointment (NOT NEOSPORIN)    *Band-aids or non-stick gauze pads and micropore paper tape.    PATIENT INFORMATION:    During the healing process you will notice a number of changes. All wounds develop a small halo of redness surrounding the wound.  This means healing is occurring. Severe itching with extensive redness usually indicates sensitivity to the ointment or bandage tape used to dress the wound.  You should call our office if this develops.      Swelling  and/or discoloration around your surgical site is common, particularly when performed around the eye.    All wounds normally drain.  The larger the wound the more drainage there will be.  After 7-10 days, you will notice the wound beginning to shrink and new skin will begin to grow.  The wound is healed when you can see skin has formed over the entire area.  A healed wound has a healthy, shiny look to the surface  and is red to dark pink in color to normalize.  Wounds may take approximately 4-6 weeks to heal.  Larger wounds may take 6-8 weeks.  After the wound is healed you may discontinue dressing changes.    You may experience a sensation of tightness as your wound heals. This is normal and will gradually subside.    Your healed wound may be sensitive to temperature changes. This sensitivity improves with time, but if you re having a lot of discomfort, try to avoid temperature extremes.    Patients frequently experience itching after their wound appears to have healed because of the continue healing under the skin.  Plain Vaseline will help relieve the itching.    BLEEDIN. Leave the bandage in place.  2. Use tightly rolled up gauze or a cloth to apply direct pressure over the bandage for 20 minutes.  3. Reapply pressure for an additional 20 minutes if necessary  4. Call the office or go to the nearest emergency room if pressure fails to stop the bleeding.  5. Use additional gauze and tape to maintain pressure once the bleeding has stopped.  6. If pressure alone does not stop the bleeding, call the Dermatology Clinic at [phone] or go to the nearest Emergency room.  WOUND CARE INSTRUCTIONS   FOR CRYOSURGERY   This area treated with liquid nitrogen should form a blister (areas treated may or may not blister-skin may just turn dark and slough off). You do not need to bandage the area unless a blister forms and breaks (which may be a few days). When the blister breaks, begin daily dressing changes as follows:  1) Clean and dry the area with tap water using clean Q-tip or sterile gauze pad.   2) Apply Polysporin ointment or Bacitracin ointment over entire wound. Do NOT use Neosporin ointment.   3) Cover the wound with a band-aid or sterile non-stick gauze pad and micropore paper tape.   REPEAT THESE INSTRUCTIONS AT LEAST ONCE A DAY UNTIL THE WOUND HAS COMPLETELY HEALED.   It is an old wives tale that a wound heals  better when it is exposed to air and allowed to dry out. The wound will heal faster with a better cosmetic result if it is kept moist with ointment and covered with a bandage.   Do not let the wound dry out.   IMPORTANT INFORMATION ON REVERSE SIDE   Supplies Needed:   *Cotton tipped applicators (Q-tips)   *Polysporin ointment or Bacitracin ointment (NOT NEOSPORIN)   *Band-aids, or non stick gauze pads and micropore paper tape   PATIENT INFORMATION   During the healing process you will notice a number of changes. All wounds develop a small halo of redness surrounding the wound. This means healing is occurring. Severe itching with extensive redness usually indicates sensitivity to the ointment or bandage tape used to dress the wound. You should call our office if this develops.   Swelling and/or discoloration around your surgical site is common, particularly when performed around the eye.   All wounds normally drain. The larger the wound the more drainage there will be. After 7-10 days, you will notice the wound beginning to shrink and new skin will begin to grow. The wound is healed when you can see skin has formed over the entire area. A healed wound has a healthy, shiny look to the surface and is red to dark pink in color to normalize. Wounds may take approximately 4-6 weeks to heal. Larger wounds may take 6-8 weeks. After the wound is healed you may discontinue dressing changes.   You may experience a sensation of tightness as your wound heals. This is normal and will gradually subside.   Your healed wound may be sensitive to temperature changes. This sensitivity improves with time, but if you re having a lot of discomfort, try to avoid temperature extremes.   Patients frequently experience itching after their wound appears to have healed because of the continue healing under the skin. Plain Vaseline will help relieve the itching.

## 2021-09-08 NOTE — LETTER
9/8/2021         RE: Gary Iyer  [address]  [place] [address]        Dear Colleague,    Thank you for referring your patient, Gary Iyer, to the [facility]. Please see a copy of my visit note below.    Gary Iyer is an extremely pleasant 61 year old year old male patient here today for spot on left hand. Present for months. Sensitive to touch. He  Notes dry skin on face. Patient has no other skin complaints today.  Remainder of the HPI, Meds, PMH, Allergies, FH, and SH was reviewed in chart.    Pertinent Hx:   No personal history of skin cancer.   Past Medical History:   Diagnosis Date     Diabetes (H)     2yr     Hypertension      Lesion of larynx 12/12/2017    Leokoplakia. s/p Flexible fiberoptic transnasal laryngoscopy with endoscopic injection, laryngeal biopsy     Multinodular goiter (nontoxic) 06/10/2013     Sleep apnea 8yr       Past Surgical History:   Procedure Laterality Date     COLONOSCOPY  06/22/2012    Repeat Colonoscopy in 10 yrs.      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     HEAD & NECK SURGERY  01/25/2011    callous removal from throat     LARYNGOSCOPY WITH BIOPSY(IES)  01/25/2011     LARYNGOSCOPY WITH BIOPSY(IES) N/A 02/09/2017    Procedure: LARYNGOSCOPY WITH BIOPSY(IES);  Surgeon: Mel Fournier MD;  Location:  OR     LARYNGOSCOPY WITH MICROSCOPE N/A 02/09/2017    Procedure: LARYNGOSCOPY WITH MICROSCOPE;  Surgeon: Mel Fournier MD;  [facility]: [facility]     LASER CO2 LARYNGOSCOPY, COMPLEX  06/27/2013    Procedure: LASER CO2 LARYNGOSCOPY, COMPLEX;  Micro Direct Laryngoscopy With Micro Flap Excision Of Lesion Lumenis Co2 Laser ;  Surgeon: Mel Fournier MD;  Location: [facility] OR     LASER CO2 LESION ORAL N/A 02/09/2017    Procedure: LASER CO2 LESION ORAL;  Surgeon: Mel Fournier MD;  Location:  OR     ORTHOPEDIC SURGERY  03/2016    left tibia orif with abbi 2-2016     VASECTOMY  02/2003        Family History    Problem Relation Age of Onset     C.A.D. 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 No   Social History Narrative     Not on file     Social Determinants of Health     Financial Resource Strain:      Difficulty of Paying Living Expenses:    Food Insecurity:      Worried About Running Out of Food in the Last Year:      Ran Out of Food in the Last Year:    Transportation Needs:      Lack of Transportation (Medical):      Lack of Transportation (Non-Medical):    Physical Activity:      Days of Exercise per Week:      Minutes of Exercise per Session:    Stress:      Feeling of Stress :    Social Connections:      Frequency of Communication with Friends and Family:      Frequency of Social Gatherings with Friends and Family:      Attends [employer] Services:      Active Member of Clubs or Organizations:      Attends Club or Organization Meetings:      Marital Status:    Intimate Partner Violence:      Fear of Current or Ex-Partner:      Emotionally Abused:       Physically Abused:      Sexually Abused:        Outpatient Encounter Medications as of 9/8/2021   Medication Sig Dispense Refill     alcohol swab prep pads Use to swab area of injection/maria del rosario as directed. 100 each 3     allopurinol (ZYLOPRIM) 300 MG tablet Take 1 tablet (300 mg) by mouth daily 90 tablet 3     amLODIPine (NORVASC) 2.5 MG tablet Take 1 tablet (2.5 mg) by mouth daily 90 tablet 3     aspirin 81 MG tablet Take 1 tablet by mouth daily.       atorvastatin (LIPITOR) 20 MG tablet Take 1 tablet (20 mg) by mouth daily 90 tablet 3     blood glucose (NO BRAND SPECIFIED) test strip Use to test blood sugar 1 times daily or as directed. To accompany: Blood Glucose Monitor Brands: per insurance. 100 strip 6     blood glucose calibration (NO BRAND SPECIFIED) solution To accompany: Blood Glucose Monitor Brands: per insurance. 1 Bottle 3     blood glucose monitoring (NO BRAND SPECIFIED) meter device kit Use to test blood sugar 1 times daily or as directed. Preferred blood glucose meter OR supplies to accompany: Blood Glucose Monitor Brands: per insurance. 1 kit 0     cetirizine (ZYRTEC) 10 MG tablet Take 1 tablet (10 mg) by mouth daily 90 tablet 3     hydrochlorothiazide (HYDRODIURIL) 50 MG tablet Take 1 tablet (50 mg) by mouth daily 90 tablet 3     hydrocortisone (CORTAID) 1 % external ointment Apply topically 2 times daily as needed 30 g 1     ketoconazole (NIZORAL) 2 % cream Apply topically 2 times daily for up to 2 weeks as needed for rash 60 g 1     ketoconazole (NIZORAL) 2 % external cream Apply daily to affected area on face, armpits, groin. 60 g 4     ketoconazole (NIZORAL) 2 % external shampoo Leave on face, beard for 3-5 minutes then rinse. Use 2-3 times weekly. 120 mL 3     losartan (COZAAR) 50 MG tablet Take 1 tablet (50 mg) by mouth daily 90 tablet 1     metFORMIN (GLUCOPHAGE) 500 MG tablet +++NEED RECHECK+++TAKE 1 TABLET BY MOUTH TWICE DAILY WITH MEALS 190 tablet 0     metroNIDAZOLE (METROCREAM) 0.75 %  external cream Apply topically 2 times daily as needed (to rosacea areas) 45 g 1     Misc. Devices (SUPPOSITORY MOLD 2GM) MISC 1 suppository every 12 hours Nifedipine 4 mg suppository, one rectally every 12 hours 100 each 4     Multiple Vitamin (DAILY MULTIVITAMIN PO) Take  by mouth daily.       nifedipine 0.2% in white petrolatum 0.2 % OINT ointment Apply topically 2 times daily Please make it into suppositories 0.2 % nifedipine cream Place in the anus every 12 hours.  Just get to where the anus is tight . 100 g 3     omeprazole (PRILOSEC) 20 MG DR capsule TAKE 1 CAPSULE BY MOUTH DAILY 90 capsule 3     order for DME Autocpap 10-16 cm 1 Units 0     oxybutynin (DITROPAN) 5 MG tablet Take 1 tablet (5 mg) by mouth At Bedtime 90 tablet 3     PARoxetine (PAXIL) 40 MG tablet Take 1 tablet (40 mg) by mouth every morning 90 tablet 3     polyethylene glycol (MIRALAX) 17 GM/Dose powder Take 17 g (1 capful) by mouth daily 850 g 3     psyllium 0.52 G capsule Take 1 capsule (0.52 g) by mouth daily 100 capsule 3     tamsulosin (FLOMAX) 0.4 MG capsule 2 po daily 180 capsule 1     thin (NO BRAND SPECIFIED) lancets Use with lanceting device. To accompany: Blood Glucose Monitor Brands: per insurance. 100 each 11     triamcinolone (KENALOG) 0.1 % external cream Apply topically 2 times daily as needed for irritation 30 g 0     Facility-Administered Encounter Medications as of 9/8/2021   Medication Dose Route Frequency Provider Last Rate Last Admin     bupivacaine (MARCAINE) 0.25 % injection 3 mL  3 mL   Feliciano Aguilar MD   3 mL at 11/05/18 1025     lidocaine 1 % injection 4 mL  4 mL   Feliciano Aguilar MD   4 mL at 11/05/18 1025     triamcinolone acetonide (KENALOG-40) injection 80 mg  80 mg   Feliciano Aguilar MD   80 mg at 11/05/18 1025             O:   NAD, WDWN, Alert & Oriented, Mood & Affect wnl, Vitals stable   Here today alone   BP (!) 147/85 (BP Location: Right arm, Patient Position: Sitting, Cuff Size: Adult Large)    Pulse 114   Wt 108 kg (238 lb)   SpO2 95%   BMI 34.15 kg/m     General appearance normal   Vitals stable   Alert, oriented and in no acute distress     2.2 cm pink thick scaly plaque on left dorsal hand  Gritty papules on forearm, hands and scalp    Stuck on papules and brown macules on trunk and ext   Red papules on trunk  Brown papules and macules with regular pigment network and borders on torso and extremities mild scaly around nose, forehead, hairline    The remainder of skin exam is normal       Eyes: Conjunctivae/lids:Normal     ENT: Lips, buccal mucosa, tongue: normal    MSK:Normal    Cardiovascular: peripheral edema none    Pulm: Breathing Normal    Lymph Nodes: No Head and Neck Lymphadenopathy     Neuro/Psych: Orientation:Alert and Orientedx3 ; Mood/Affect:normal   A/P:  1.  R/O SCC on left dorsal hand   TANGENTIAL BIOPSY SENT OUT:  After consent, anesthesia with LEC and prep, tangential excision performed and specimen sent out for permanent section histology.  No complications and routine wound care. Patient told to call our office in 1-2 weeks for result.      2. Actinic keratoses on hands/forearm  After we get results for biopsy and spot is treated will treat hands/forearms with efudex.   3. Seborrheic Dermatitis on face and along hairline.   Rash on face is seborrheic dermatitis, it is a chronic rash that can wax and wane.   Use ketoconazole shampoo to wash face.  Apply ketoconazole cream daily.   4. Actinic keratoses on scalp x 4  LN2:  Treated with LN2 for 5s for 1-2 cycles. Warned risks of blistering, pain, pigment change, scarring, and incomplete resolution.  Advised patient to return if lesions do not completely resolve.  Wound care sheet given.  5. Seborrheic keratosis, lentigo, angioma, benign nevi   BENIGN LESIONS DISCUSSED WITH PATIENT:  I discussed the specifics of tumor, prognosis, and genetics of benign lesions.  I explained that treatment of these lesions would be purely cosmetic and  not medically neccessary.  I discussed with patient different removal options including excision, cautery and /or laser.      Nature and genetics of benign skin lesions dicussed with patient.  Signs and Symptoms of skin cancer discussed with patient.  ABCDEs of melanoma reviewed with patient.  Patient encouraged to perform monthly skin exams.  UV precautions reviewed with patient  Risks of non-melanoma skin cancer discussed with patient   Return to clinic pending biopsy results.   Gary to follow up with Primary Care provider regarding elevated blood pressure.        Again, thank you for allowing me to participate in the care of your patient.        Sincerely,        Machelle Valentine PA-C

## 2021-09-10 ENCOUNTER — TELEPHONE (OUTPATIENT)
Dept: DERMATOLOGY | Facility: CLINIC | Age: 61
End: 2021-09-10

## 2021-09-10 NOTE — TELEPHONE ENCOUNTER
[facility] Call Center    Phone Message    May a detailed message be left on voicemail: yes     Reason for Call: Medication Question or concern regarding medication   Prescription Clarification  Name of Medication: ketoconazole (NIZORAL) 2 % external shampoo  Prescribing Provider: Machelle Kearney   Pharmacy:    What on the order needs clarification? Pt called and would like some clarification on the direction. Please call the pt back to discuss. Thanks           Action Taken: Message routed to:  Other: [facility]    Travel Screening: Not Applicable

## 2021-09-10 NOTE — TELEPHONE ENCOUNTER
Called pt and he had question about the ketoconazole shampoo. Pt wanted to know if he should shampoo again after using the medicated shampoo. Advised pt to only use the medicated shampoo and on days where he does not use the medicated shampoo he could use his regular shampoo. Pt verbalized understanding.  Ni NUNES RN BSN PHN  [facility]

## 2021-09-13 LAB
PATH REPORT.COMMENTS IMP SPEC: ABNORMAL
PATH REPORT.COMMENTS IMP SPEC: ABNORMAL
PATH REPORT.COMMENTS IMP SPEC: YES
PATH REPORT.FINAL DX SPEC: ABNORMAL
PATH REPORT.GROSS SPEC: ABNORMAL
PATH REPORT.MICROSCOPIC SPEC OTHER STN: ABNORMAL
PATH REPORT.RELEVANT HX SPEC: ABNORMAL

## 2021-09-13 NOTE — PROGRESS NOTES
Gary Iyer is an extremely pleasant 61 year old year old male patient here today for spot on left hand. Present for months. Sensitive to touch. He  Notes dry skin on face. Patient has no other skin complaints today.  Remainder of the HPI, Meds, PMH, Allergies, FH, and SH was reviewed in chart.    Pertinent Hx:   No personal history of skin cancer.   Past Medical History:   Diagnosis Date     Diabetes (H)     2yr     Hypertension      Lesion of larynx 2017    Leokoplakia. s/p Flexible fiberoptic transnasal laryngoscopy with endoscopic injection, laryngeal biopsy     Multinodular goiter (nontoxic) 06/10/2013     Sleep apnea 8yr       Past Surgical History:   Procedure Laterality Date     COLONOSCOPY  2012    Repeat Colonoscopy in 10 yrs.      ENT SURGERY      Vocal cord carcinoma     HEAD & NECK SURGERY  2011    callous removal from throat     LARYNGOSCOPY WITH BIOPSY(IES)  2011     LARYNGOSCOPY WITH BIOPSY(IES) N/A 2017    Procedure: LARYNGOSCOPY WITH BIOPSY(IES);  Surgeon: Mel Fournier MD;  Location: [facility] OR     LARYNGOSCOPY WITH MICROSCOPE N/A 2017    Procedure: LARYNGOSCOPY WITH MICROSCOPE;  Surgeon: Mel Fournier MD;  Location: [facility] OR     LASER CO2 LARYNGOSCOPY, COMPLEX  2013    Procedure: LASER CO2 LARYNGOSCOPY, COMPLEX;  Micro Direct Laryngoscopy With Micro Flap Excision Of Lesion Lumenis Co2 Laser ;  Surgeon: Mel Fournier MD;  Location: [facility] OR     LASER CO2 LESION ORAL N/A 2017    Procedure: LASER CO2 LESION ORAL;  Surgeon: Mel Fournier MD;  Location: [facility] OR     ORTHOPEDIC SURGERY  2016    left tibia orif with abbi -     VASECTOMY  2003        Family History   Problem Relation Age of Onset     C.A.D. 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 No   Social History Narrative     Not on file     Social Determinants of Health     Financial Resource Strain:      Difficulty of Paying Living Expenses:    Food Insecurity:      Worried About Running Out of Food in the Last Year:      Ran Out of Food in the Last Year:    Transportation Needs:      Lack of Transportation (Medical):      Lack of Transportation (Non-Medical):    Physical Activity:      Days of Exercise per Week:      Minutes of Exercise per Session:    Stress:      Feeling of Stress :    Social Connections:      Frequency of Communication with Friends and Family:      Frequency of Social Gatherings with Friends and Family:      Attends Taoism Services:      Active Member of Clubs or Organizations:      Attends Club or Organization Meetings:      Marital Status:    Intimate Partner Violence:      Fear of Current or Ex-Partner:      Emotionally Abused:      Physically Abused:      Sexually Abused:        Outpatient Encounter Medications as of 2021   Medication Sig Dispense Refill     alcohol swab prep pads Use to swab area of injection/maria del rosario as directed. 100 each 3     allopurinol (ZYLOPRIM) 300 MG  tablet Take 1 tablet (300 mg) by mouth daily 90 tablet 3     amLODIPine (NORVASC) 2.5 MG tablet Take 1 tablet (2.5 mg) by mouth daily 90 tablet 3     aspirin 81 MG tablet Take 1 tablet by mouth daily.       atorvastatin (LIPITOR) 20 MG tablet Take 1 tablet (20 mg) by mouth daily 90 tablet 3     blood glucose (NO BRAND SPECIFIED) test strip Use to test blood sugar 1 times daily or as directed. To accompany: Blood Glucose Monitor Brands: per insurance. 100 strip 6     blood glucose calibration (NO BRAND SPECIFIED) solution To accompany: Blood Glucose Monitor Brands: per insurance. 1 Bottle 3     blood glucose monitoring (NO BRAND SPECIFIED) meter device kit Use to test blood sugar 1 times daily or as directed. Preferred blood glucose meter OR supplies to accompany: Blood Glucose Monitor Brands: per insurance. 1 kit 0     cetirizine (ZYRTEC) 10 MG tablet Take 1 tablet (10 mg) by mouth daily 90 tablet 3     hydrochlorothiazide (HYDRODIURIL) 50 MG tablet Take 1 tablet (50 mg) by mouth daily 90 tablet 3     hydrocortisone (CORTAID) 1 % external ointment Apply topically 2 times daily as needed 30 g 1     ketoconazole (NIZORAL) 2 % cream Apply topically 2 times daily for up to 2 weeks as needed for rash 60 g 1     ketoconazole (NIZORAL) 2 % external cream Apply daily to affected area on face, armpits, groin. 60 g 4     ketoconazole (NIZORAL) 2 % external shampoo Leave on face, beard for 3-5 minutes then rinse. Use 2-3 times weekly. 120 mL 3     losartan (COZAAR) 50 MG tablet Take 1 tablet (50 mg) by mouth daily 90 tablet 1     metFORMIN (GLUCOPHAGE) 500 MG tablet +++NEED RECHECK+++TAKE 1 TABLET BY MOUTH TWICE DAILY WITH MEALS 190 tablet 0     metroNIDAZOLE (METROCREAM) 0.75 % external cream Apply topically 2 times daily as needed (to rosacea areas) 45 g 1     Misc. Devices (SUPPOSITORY MOLD 2GM) MISC 1 suppository every 12 hours Nifedipine 4 mg suppository, one rectally every 12 hours 100 each 4     Multiple Vitamin (DAILY  MULTIVITAMIN PO) Take  by mouth daily.       nifedipine 0.2% in white petrolatum 0.2 % OINT ointment Apply topically 2 times daily Please make it into suppositories 0.2 % nifedipine cream Place in the anus every 12 hours.  Just get to where the anus is tight . 100 g 3     omeprazole (PRILOSEC) 20 MG DR capsule TAKE 1 CAPSULE BY MOUTH DAILY 90 capsule 3     order for DME Autocpap 10-16 cm 1 Units 0     oxybutynin (DITROPAN) 5 MG tablet Take 1 tablet (5 mg) by mouth At Bedtime 90 tablet 3     PARoxetine (PAXIL) 40 MG tablet Take 1 tablet (40 mg) by mouth every morning 90 tablet 3     polyethylene glycol (MIRALAX) 17 GM/Dose powder Take 17 g (1 capful) by mouth daily 850 g 3     psyllium 0.52 G capsule Take 1 capsule (0.52 g) by mouth daily 100 capsule 3     tamsulosin (FLOMAX) 0.4 MG capsule 2 po daily 180 capsule 1     thin (NO BRAND SPECIFIED) lancets Use with lanceting device. To accompany: Blood Glucose Monitor Brands: per insurance. 100 each 11     triamcinolone (KENALOG) 0.1 % external cream Apply topically 2 times daily as needed for irritation 30 g 0     Facility-Administered Encounter Medications as of 9/8/2021   Medication Dose Route Frequency Provider Last Rate Last Admin     bupivacaine (MARCAINE) 0.25 % injection 3 mL  3 mL   Feliciano Aguilar MD   3 mL at 11/05/18 1025     lidocaine 1 % injection 4 mL  4 mL   Feliciano Aguilar MD   4 mL at 11/05/18 1025     triamcinolone acetonide (KENALOG-40) injection 80 mg  80 mg   Feliciano Aguilar MD   80 mg at 11/05/18 1025             O:   NAD, WDWN, Alert & Oriented, Mood & Affect wnl, Vitals stable   Here today alone   BP (!) 147/85 (BP Location: Right arm, Patient Position: Sitting, Cuff Size: Adult Large)   Pulse 114   Wt 108 kg (238 lb)   SpO2 95%   BMI 34.15 kg/m     General appearance normal   Vitals stable   Alert, oriented and in no acute distress     2.2 cm pink thick scaly plaque on left dorsal hand  Gritty papules on forearm, hands and scalp     Stuck on papules and brown macules on trunk and ext   Red papules on trunk  Brown papules and macules with regular pigment network and borders on torso and extremities mild scaly around nose, forehead, hairline    The remainder of skin exam is normal       Eyes: Conjunctivae/lids:Normal     ENT: Lips, buccal mucosa, tongue: normal    MSK:Normal    Cardiovascular: peripheral edema none    Pulm: Breathing Normal    Lymph Nodes: No Head and Neck Lymphadenopathy     Neuro/Psych: Orientation:Alert and Orientedx3 ; Mood/Affect:normal   A/P:  1.  R/O SCC on left dorsal hand   TANGENTIAL BIOPSY SENT OUT:  After consent, anesthesia with LEC and prep, tangential excision performed and specimen sent out for permanent section histology.  No complications and routine wound care. Patient told to call our office in 1-2 weeks for result.      2. Actinic keratoses on hands/forearm  After we get results for biopsy and spot is treated will treat hands/forearms with efudex.   3. Seborrheic Dermatitis on face and along hairline.   Rash on face is seborrheic dermatitis, it is a chronic rash that can wax and wane.   Use ketoconazole shampoo to wash face.  Apply ketoconazole cream daily.   4. Actinic keratoses on scalp x 4  LN2:  Treated with LN2 for 5s for 1-2 cycles. Warned risks of blistering, pain, pigment change, scarring, and incomplete resolution.  Advised patient to return if lesions do not completely resolve.  Wound care sheet given.  5. Seborrheic keratosis, lentigo, angioma, benign nevi   BENIGN LESIONS DISCUSSED WITH PATIENT:  I discussed the specifics of tumor, prognosis, and genetics of benign lesions.  I explained that treatment of these lesions would be purely cosmetic and not medically neccessary.  I discussed with patient different removal options including excision, cautery and /or laser.      Nature and genetics of benign skin lesions dicussed with patient.  Signs and Symptoms of skin cancer discussed with  patient.  ABCDEs of melanoma reviewed with patient.  Patient encouraged to perform monthly skin exams.  UV precautions reviewed with patient  Risks of non-melanoma skin cancer discussed with patient   Return to clinic pending biopsy results.   Gary to follow up with Primary Care provider regarding elevated blood pressure.

## 2021-09-15 ENCOUNTER — DOCUMENTATION ONLY (OUTPATIENT)
Dept: SLEEP MEDICINE | Facility: CLINIC | Age: 61
End: 2021-09-15
Payer: MEDICARE

## 2021-09-15 ENCOUNTER — TELEPHONE (OUTPATIENT)
Dept: FAMILY MEDICINE | Facility: CLINIC | Age: 61
End: 2021-09-15

## 2021-09-15 ENCOUNTER — LAB (OUTPATIENT)
Dept: LAB | Facility: CLINIC | Age: 61
End: 2021-09-15
Payer: MEDICARE

## 2021-09-15 DIAGNOSIS — E78.5 HYPERLIPIDEMIA LDL GOAL <70: ICD-10-CM

## 2021-09-15 DIAGNOSIS — R35.0 URINARY FREQUENCY: ICD-10-CM

## 2021-09-15 DIAGNOSIS — E11.9 TYPE 2 DIABETES MELLITUS WITHOUT COMPLICATION, WITHOUT LONG-TERM CURRENT USE OF INSULIN (H): ICD-10-CM

## 2021-09-15 DIAGNOSIS — G47.33 OBSTRUCTIVE SLEEP APNEA: ICD-10-CM

## 2021-09-15 DIAGNOSIS — R79.0 LOW FERRITIN: ICD-10-CM

## 2021-09-15 DIAGNOSIS — E66.9 OBESITY, UNSPECIFIED CLASSIFICATION, UNSPECIFIED OBESITY TYPE, UNSPECIFIED WHETHER SERIOUS COMORBIDITY PRESENT: ICD-10-CM

## 2021-09-15 DIAGNOSIS — G47.61 PERIODIC LIMB MOVEMENTS OF SLEEP: ICD-10-CM

## 2021-09-15 LAB
ALBUMIN SERPL-MCNC: 3.6 G/DL (ref 3.4–5)
ALBUMIN UR-MCNC: NEGATIVE MG/DL
ALP SERPL-CCNC: 60 U/L (ref 40–150)
ALT SERPL W P-5'-P-CCNC: 36 U/L (ref 0–70)
ANION GAP SERPL CALCULATED.3IONS-SCNC: 4 MMOL/L (ref 3–14)
APPEARANCE UR: CLEAR
AST SERPL W P-5'-P-CCNC: 18 U/L (ref 0–45)
BILIRUB SERPL-MCNC: 0.4 MG/DL (ref 0.2–1.3)
BILIRUB UR QL STRIP: NEGATIVE
BUN SERPL-MCNC: 10 MG/DL (ref 7–30)
CALCIUM SERPL-MCNC: 9.1 MG/DL (ref 8.5–10.1)
CHLORIDE BLD-SCNC: 101 MMOL/L (ref 94–109)
CHOLEST SERPL-MCNC: 145 MG/DL
CO2 SERPL-SCNC: 32 MMOL/L (ref 20–32)
COLOR UR AUTO: YELLOW
CREAT SERPL-MCNC: 0.79 MG/DL (ref 0.66–1.25)
FASTING STATUS PATIENT QL REPORTED: YES
GFR SERPL CREATININE-BSD FRML MDRD: >90 ML/MIN/1.73M2
GLUCOSE BLD-MCNC: 162 MG/DL (ref 70–99)
GLUCOSE UR STRIP-MCNC: NEGATIVE MG/DL
HBA1C MFR BLD: 7.8 % (ref 0–5.6)
HDLC SERPL-MCNC: 45 MG/DL
HGB UR QL STRIP: NEGATIVE
KETONES UR STRIP-MCNC: NEGATIVE MG/DL
LDLC SERPL CALC-MCNC: 71 MG/DL
LEUKOCYTE ESTERASE UR QL STRIP: NEGATIVE
NITRATE UR QL: NEGATIVE
NONHDLC SERPL-MCNC: 100 MG/DL
PH UR STRIP: 7 [PH] (ref 5–7)
POTASSIUM BLD-SCNC: 3.6 MMOL/L (ref 3.4–5.3)
PROT SERPL-MCNC: 8.4 G/DL (ref 6.8–8.8)
RBC #/AREA URNS AUTO: ABNORMAL /HPF
SODIUM SERPL-SCNC: 137 MMOL/L (ref 133–144)
SP GR UR STRIP: 1.01 (ref 1–1.03)
SQUAMOUS #/AREA URNS AUTO: ABNORMAL /LPF
TRIGL SERPL-MCNC: 143 MG/DL
UROBILINOGEN UR STRIP-ACNC: 0.2 E.U./DL
WBC #/AREA URNS AUTO: ABNORMAL /HPF

## 2021-09-15 PROCEDURE — 83036 HEMOGLOBIN GLYCOSYLATED A1C: CPT

## 2021-09-15 PROCEDURE — 80061 LIPID PANEL: CPT

## 2021-09-15 PROCEDURE — 81001 URINALYSIS AUTO W/SCOPE: CPT

## 2021-09-15 PROCEDURE — 36415 COLL VENOUS BLD VENIPUNCTURE: CPT

## 2021-09-15 PROCEDURE — 80053 COMPREHEN METABOLIC PANEL: CPT

## 2021-09-15 NOTE — TELEPHONE ENCOUNTER
Routing to Dermatology to follow up with pt regarding biopsy results. Appears pt was contacted earlier today by RN staff from dermatology and pt wanted to discuss with a MD. Call was also sent to primary care. Unsure if dermatology MD is able to follow up or if they would like PCP to follow up. Please see other TE encounter from 9/15/21.    Lanny RUTH RN, BSN  [facility]

## 2021-09-15 NOTE — TELEPHONE ENCOUNTER
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 Morrell.

## 2021-09-15 NOTE — LETTER
[facility]  [address] [place] [address]  [phone]      Gary Jaylon  [address] [place] [address]    9/15/2021     Dear Gary:    You are scheduled for Mohs Surgery on: Wednesday, October 6, 2021 at 8:00 am    Please check in at 2nd floor Dermatology Clinic.     You don't need to arrive more than 5-10 minutes prior to your appointment time.     Be sure to eat a good breakfast and bathe and wash your hair prior to Surgery. Please bring  with you.     If you are taking any anti-coagulants that are prescribed by your Doctor (such as Coumadin/warfarin, Plavix, Aspirin, Ibuprofen), please continue taking them.     However, If you are taking anti-coagulants over the counter without  a Doctor's order for a Medical condition, please discontinue them 10 days prior to Surgery.      Please wear loose comfortable clothing as it could possibly be 4-6 hours until your surgery is completed depending upon how many layers of tissue need to be removed.       LUIS ENRIQUE Morrell MD

## 2021-09-15 NOTE — TELEPHONE ENCOUNTER
Patient is calling regarding the biopsy done to his hand    Can you discuss the results with him    [phone]  Ok to leave message

## 2021-09-15 NOTE — LETTER
[facility]  [address]  [place] [address]  [phone]      9/16/2021       Gary Iyer  [address] [place] [address]      Dear Gary:    You are scheduled for Mohs Surgery on: Wednesday, October 6, 2021 at 8:00 am    Please check in at 2nd floor Dermatology Clinic.     You don't need to arrive more than 5-10 minutes prior to your appointment time.     Be sure to eat a good breakfast and bathe and wash your hair prior to Surgery. Please bring  with you.     If you are taking any anti-coagulants that are prescribed by your Doctor (such as Coumadin/warfarin, Plavix, Aspirin, Ibuprofen), please continue taking them.     However, If you are taking anti-coagulants over the counter without  a Doctor's order for a Medical condition, please discontinue them 10 days prior to Surgery.      Please wear loose comfortable clothing as it could possibly be 4-6 hours until your surgery is completed depending upon how many layers of tissue need to be removed.       LUIS ENRIQUE Morrell MD

## 2021-09-15 NOTE — PROGRESS NOTES
STM Recall:  Patient has started using his CPAP again.  He has an appointment with his sleep Provider on 9/21/2021.  Patient needs another in lab sleep study to keep his CPAP machine per Medicare.

## 2021-09-15 NOTE — TELEPHONE ENCOUNTER
patient notified of test results and mohs procedure explained- appointment scheduled-   Patient wants to discuss AK's with Dr. Morrell- decided he only wants to speak with an MD    Will mail packet 09/16/21    Thank you,    Dorita RUTHRN BSN  [facility] Dermat[facility]  [phone]

## 2021-09-16 NOTE — TELEPHONE ENCOUNTER
Mohs Letter sent to home address    Dorita RUTHRN BSN  [facility]  [phone]

## 2021-09-16 NOTE — TELEPHONE ENCOUNTER
Patient was notified yesterday regarding test results below- I called to notify patient and schedule the MOHS and he does not want a PA anymore-he asked if an MD reviewed these results- I advised him that the pathologist is an MD-   He wants a second opinion on Christin's assessment with a MD    I advised he could discuss this with Dr. Morrell at his MOHS visit on 10/6/21- he wants the MD to discuss his results with him.     Patient is now calling his primary care MD who has routed this message back to us to address this patient.    Routing to Dr. Morrell - ( Dr. 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 Morrell.     Chart note  2. Actinic keratoses on hands/forearm  After we get results for biopsy and spot is treated will treat hands/forearms with efudex.

## 2021-09-18 ENCOUNTER — HEALTH MAINTENANCE LETTER (OUTPATIENT)
Age: 61
End: 2021-09-18

## 2021-09-20 ENCOUNTER — VIRTUAL VISIT (OUTPATIENT)
Dept: OTOLARYNGOLOGY | Facility: CLINIC | Age: 61
End: 2021-09-20
Payer: MEDICARE

## 2021-09-20 DIAGNOSIS — J38.3 VOCAL CORD GRANULOMA: ICD-10-CM

## 2021-09-20 DIAGNOSIS — J38.3 VOCAL FOLD LEUKOPLAKIA: ICD-10-CM

## 2021-09-20 DIAGNOSIS — R49.0 DYSPHONIA: Primary | ICD-10-CM

## 2021-09-20 PROCEDURE — 92507 TX SP LANG VOICE COMM INDIV: CPT | Mod: GN | Performed by: SPEECH-LANGUAGE PATHOLOGIST

## 2021-09-20 NOTE — PROGRESS NOTES
"Gary Iyer is a 61 year old male who is being evaluated via a billable video visit.      Gary has been notified and verbally consented to the following:     This video visit will be conducted between you and your provider.    Patient has opted to conduct today's video visit vs an in-person appointment.     Video visits are billed at different rates depending on your insurance coverage. Please reach out to your insurance provider with any questions.     If during the course of the call the provider feels the appointment is not appropriate, you will not be charged for this service.  Provider has received verbal consent for billable virtual visit from the patient? Yes  Will anyone else be joining your video visit? No    Call initiated at: 11:37 AM   Type of Visit Platform Used: Comply365 Video  Location of provider: Home  Location of patient: [facility] VOICE [facility]  Elijah Baker Jr., M.D., F.A.C.S.  Mel Fournier M.D., M.P.H.  Alma Bang M.D.  Misty Clark, Ph.D., CCC-SLP  Candido Winter, Ph.D., [facility]-SLP  Jasmyne Padilla M.M. (voice), M.A., CCC-SLP  Zack Mcdaniels M.M. (voice), M.A., CCC-SLP  EZEQUIEL Lopez (voice), M.S., CCC-[facility]  VOICE/SPEECH/BREATHING THERAPY PROGRESS REPORT    Patient: Gary Iyer  Date of Service: 9/20/2021    Date of Last Service: 6/03/21  Referring physician: Dr. Fournier    I had the pleasure of seeing Mr. 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    PROGRESS SINCE LAST SESSION  At the last session, Mr. Iyer worked on therapeutic activities to address the above diagnosis.    Regarding practice, Mr. Iyer reports the following:     He was practicing the humming and \"my-oh\" exercises a couple times per day for about a minute    voice is improved during the exercises, but carryover to spontaneous conversation is inconsistent  o He " "doesn't always think about his technique    Mr. Iyer also states that:    He saw Dr. Fournier on 21; his area of leukoplakia is not growing; she advised keeping up with his therapeutic regimen  o He would like a refresher    Voice is rough in the morning and it takes time to clear, but then pretty much ok all day    He has had a recent Dx of skin CA    His mother-in-law in [place]  last night; he was up much of the night, and therefore is tired and groggy this morning     Mr. Iyer presents today with the following:  Voice quality:    Marked roughness with very low pitch and apparent glottal leblanc (he states his morning voice is always worse, but today is especially bad because of his horrible night last night)  o Good improvement after drinking something    Lack of airflow; no apparent use of therapeutic technique  Cough/ Throat clear:    None observed today    THERAPEUTIC ACTIVITIES  Today Mr. Iyer participated in the following therapeutic activities:    Demonstrated previous exercises.  o Demonstrated a gentle hum at Bb2, but it was very rough, with inadequate airflow  o appropriate redirection provided; see below    [place] exercises for improved airflow during phonation.  o speech material with nasal onsets was facilitating  o With heavy cueing was able to coordinate airflow with phonation, and the roughness was reduced (though not normal; voice was never clear)  o Able to progress from simple nasal continuants to phrases, and finally a simple Q&A  Task using the carry phrase \"my favorite\"  o Ample cueing provided for inhaling often enough, and then using the airstream  o He stated he is aware that he mumbles, and when he is not attentive to his speech, he is also not attentive to adequate airflow for voice  o Increasing pitch slightly was helpful, though his awareness of this is inconsistent    [place] a very specific regimen for practice, doing three repetitions of each level of exercise, on an hourly " basis.    I provided an after visit summary by email to help facilitate practice.    IMPRESSIONS/GOALS/PLAN  Mr. 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     He will continue to work on his exercises on a daily basis, and work on incorporating the techniques into his daily activities.    Goals for this practice period:     practice all exercises according to instructions    incorporate techniques into daily vocal activities    maintain vigilance for vocal technique      Plan: I will see Mr. Iyer as recommended by Dr. Fournier to work on education, modification, and carryover of therapeutic activities to more complex activities.    Reporting period 9/2/21 - 12/1/21      TOTAL SERVICE TIME: 47 minutes  TREATMENT (81559)  NO CHARGE FACILITY FEE    Misty Clark, Ph.D., [facility]-SLP  Speech-Language Pathologist  Director, [facility]  [phone]

## 2021-09-20 NOTE — LETTER
9/20/2021       RE: Gary Iyer  [address] [facility] [address]     Dear Colleague,    Thank you for referring your patient, Gary Iyer, to the [facility] VOICE CLINIC [place] at [facility]. Please see a copy of my visit note below.    Gary Iyer is a 61 year old male who is being evaluated via a billable video visit.      Gary has been notified and verbally consented to the following:     This video visit will be conducted between you and your provider.    Patient has opted to conduct today's video visit vs an in-person appointment.     Video visits are billed at different rates depending on your insurance coverage. Please reach out to your insurance provider with any questions.     If during the course of the call the provider feels the appointment is not appropriate, you will not be charged for this service.  Provider has received verbal consent for billable virtual visit from the patient? Yes  Will anyone else be joining your video visit? No    Call initiated at: 11:37 AM   Type of Visit Platform Used: CapsoVision Video  Location of provider: Home  Location of patient: [facility] VOICE [facility]  Elijah Baker Jr., M.D., F.A.C.S.  Mel Fournier M.D., M.P.H.  Alma Bang M.D.  Misty Clark, Ph.D., CCC-SLP  Candido Winter, Ph.D., CCC-SLP  Jasmyne Padilla M.M. (voice), M.A., CCC-SLP  Zack Mcdaniels M.M. (voice), M.A., CCC-SLP  EZEQUIEL Lopez (voice), M.S., CCC-SLP    [facility]  VOICE/SPEECH/BREATHING THERAPY PROGRESS REPORT    Patient: Gary Iyer  Date of Service: 9/20/2021    Date of Last Service: 6/03/21  Referring physician: Dr. Fournier    I had the pleasure of seeing Mr. 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    PROGRESS SINCE LAST SESSION  At the last session, Mr. Iyer worked on therapeutic  "activities to address the above diagnosis.    Regarding practice, Mr. Iyer reports the following:     He was practicing the humming and \"my-oh\" exercises a couple times per day for about a minute    voice is improved during the exercises, but carryover to spontaneous conversation is inconsistent  o He doesn't always think about his technique    Mr. Iyer also states that:    He saw Dr. Fournier on 21; his area of leukoplakia is not growing; she advised keeping up with his therapeutic regimen  o He would like a refresher    Voice is rough in the morning and it takes time to clear, but then pretty much ok all day    He has had a recent Dx of skin CA    His mother-in-law in [place]  last night; he was up much of the night, and therefore is tired and groggy this morning     Mr. Iyer presents today with the following:  Voice quality:    Marked roughness with very low pitch and apparent glottal leblanc (he states his morning voice is always worse, but today is especially bad because of his horrible night last night)  o Good improvement after drinking something    Lack of airflow; no apparent use of therapeutic technique  Cough/ Throat clear:    None observed today    THERAPEUTIC ACTIVITIES  Today Mr. Iyer participated in the following therapeutic activities:    Demonstrated previous exercises.  o Demonstrated a gentle hum at Bb2, but it was very rough, with inadequate airflow  o appropriate redirection provided; see below    [place] exercises for improved airflow during phonation.  o speech material with nasal onsets was facilitating  o With heavy cueing was able to coordinate airflow with phonation, and the roughness was reduced (though not normal; voice was never clear)  o Able to progress from simple nasal continuants to phrases, and finally a simple Q&A  Task using the carry phrase \"my favorite\"  o Ample cueing provided for inhaling often enough, and then using the airstream  o He stated he is aware that he " mumbles, and when he is not attentive to his speech, he is also not attentive to adequate airflow for voice  o Increasing pitch slightly was helpful, though his awareness of this is inconsistent    [place] a very specific regimen for practice, doing three repetitions of each level of exercise, on an hourly basis.    I provided an after visit summary by email to help facilitate practice.    IMPRESSIONS/GOALS/PLAN  Mr. 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     He will continue to work on his exercises on a daily basis, and work on incorporating the techniques into his daily activities.    Goals for this practice period:     practice all exercises according to instructions    incorporate techniques into daily vocal activities    maintain vigilance for vocal technique      Plan: I will see Mr. Iyer as recommended by Dr. Fournier to work on education, modification, and carryover of therapeutic activities to more complex activities.    Reporting period 9/2/21 - 12/1/21      TOTAL SERVICE TIME: 47 minutes  TREATMENT (88088)  NO CHARGE FACILITY FEE    Misty Clark, Ph.D., [facility]3]-SLP  Speech-Language Pathologist  Director, [facility]  [phone]                                                                                                 Outpatient Speech Language Therapy Evaluation  PLAN OF TREATMENT FOR OUTPATIENT REHABILITATION  (RE-CERTIFICATION FOR SPEECH THERAPY)  Patient's Last Name, First Name, M.I.  YOB: 1960  PhilippeGary davila                           Provider's Name  Misty Clark SLP Medical Record No.  [id number]                               Onset Date:  6/3/21   Start of Care Date: 6/3/21      Type: Speech Language Therapy Medical Diagnosis: Dysphonia.                        Therapy Diagnosis:  Dysphonia.   Visits from SOC:  3   _________________________________________________________________________________  Plan of Treatment:   Speech therapy    Monthly, one-hour sessions for three months.    Please see goals from the Plan of Care in the 6/3/21 progress note.    _________________________________________________________________________________    I CERTIFY THE NEED FOR THESE SERVICES FURNISHED UNDER        THIS PLAN OF TREATMENT AND WHILE UNDER MY CARE     (Physician attestation of this document indicates review and certification of the therapy plan).     Certification date from: 9/2/21  Certification date to: 12/1/21    Referring Provider: Mel Fournier         Again, thank you for allowing me to participate in the care of your patient.      Sincerely,    Misty Clark, SLP

## 2021-09-21 ENCOUNTER — TELEPHONE (OUTPATIENT)
Dept: FAMILY MEDICINE | Facility: CLINIC | Age: 61
End: 2021-09-21

## 2021-09-21 ENCOUNTER — VIRTUAL VISIT (OUTPATIENT)
Dept: SLEEP MEDICINE | Facility: CLINIC | Age: 61
End: 2021-09-21
Payer: MEDICARE

## 2021-09-21 VITALS — BODY MASS INDEX: 32.21 KG/M2 | HEIGHT: 70 IN | WEIGHT: 225 LBS

## 2021-09-21 DIAGNOSIS — G47.33 OBSTRUCTIVE SLEEP APNEA: Primary | ICD-10-CM

## 2021-09-21 DIAGNOSIS — R79.0 LOW FERRITIN: ICD-10-CM

## 2021-09-21 DIAGNOSIS — E66.9 OBESITY, UNSPECIFIED CLASSIFICATION, UNSPECIFIED OBESITY TYPE, UNSPECIFIED WHETHER SERIOUS COMORBIDITY PRESENT: ICD-10-CM

## 2021-09-21 DIAGNOSIS — G47.61 PERIODIC LIMB MOVEMENTS OF SLEEP: ICD-10-CM

## 2021-09-21 DIAGNOSIS — E11.9 TYPE 2 DIABETES MELLITUS WITHOUT COMPLICATION, WITHOUT LONG-TERM CURRENT USE OF INSULIN (H): Primary | ICD-10-CM

## 2021-09-21 PROCEDURE — 99214 OFFICE O/P EST MOD 30 MIN: CPT | Mod: 95 | Performed by: INTERNAL MEDICINE

## 2021-09-21 ASSESSMENT — MIFFLIN-ST. JEOR: SCORE: 1821.84

## 2021-09-21 NOTE — TELEPHONE ENCOUNTER
Reason for Call:  Other prescription    Detailed comments: Pt is wondering if medication needs to be added due to his last A1C result     Phone Number Patient can be reached at: Home number on file [phone] (home)    Best Time: any    Can we leave a detailed message on this number? YES    Call taken on 9/21/2021 at 1:20 PM by Lanny Aguirre

## 2021-09-21 NOTE — PROGRESS NOTES
Gary is a 61 year old who is being evaluated via a billable video visit.      How would you like to obtain your AVS? MyChart  If the video visit is dropped, the invitation should be resent by: Text to cell phone: [phone]  Will anyone else be joining your video visit? No      Video Start Time: 10:26 AM     Video-Visit Details    Type of service:  Video Visit    Video End Time:10:40 AM    Originating Location (pt. Location): Home    Distant Location (provider location):  [facility] SLEEP CLINIC [facility]     Platform used for Video Visit: [facility]         Obstructive Sleep Apnea - PAP Follow-Up Visit:    Chief Complaint   Patient presents with     CPAP Follow Up     NON COMPLIANT, [facility]         DME: [facility] ([place])     Initial sleep study 2000s, not available for review. He cannot recall why first study was done, probably was done for snoring. He tried CPAP for a day or so and then quit.     Sleep study [facility] 5/2012 (216#)  AHI 25, .9, low O2 32% (probably artifact by hypnogram). True low O2 probably upper 70s on CPAP during REM. CPAP 8cm with fragmented sleep during supine REM.     He tried CPAP for 2-3 days but 'could not sleep' and again stopped using it.     He initially presented to [facility]  in 2017 with symptoms of fatigue (ESS 0), nocturnal awakenings, leg shaking. Comorbid diabetes mellitus, hypertension.    - Sleep onset difficulties. Suspect multifactorial: delayed sleep phase, psychophysiologic insomnia, poor sleep hygiene.   - Nocturnal leg kicking/stiffness. This may be related to his obstructive sleep apnea. Nocturnal seizure disorder could also be considerered.       Study Date: 3/14/2017- (227.0 lbs)   - Titration complicated by high leaks on several different mask types.   - Titrated from 5 cmH2O up to 15 cmH2O.  Most of titrations done for RERAs and flow limitations. The final  pressure was 15 cmH2O with a residual AHI of 0 events  "per hour.  REM supine seen at 12 cm with good control of events.   - Lowest oxygen saturation was 84.1%.  Time spent less than or equal to 88% was 0.9 minutes.   - PLM index was 72.6 movements per hour.  PLM Arousal Index was 24.4 per hour.   - Cyclic alternating pattern of arousal with and without whole body movements and leg movements was seen.     Recommend autoCPAP 10-16 cm     He returned 5/2021 not using CPAP, stopped for unclear reasons with symptoms of long sleep times (ESS 1). Comorbid hypertension, diabetes mellitus. Recommend restart CPAP 10-16 cmH20, but patient has been told by DME thathe needs to complete another study       He used machine on and off and did not meet compliance  He has been using his machine 3 weeks ago  He started getting up >3-4 times a night to urinate. His urologist said it could be obstructive sleep apnea or diabetes mellitus, so he started using it regularly  He used machine regularly and issue is improved    Overall, he rates the experience with PAP as good now that he is motivated  His mask is 6 months old and leaks later in the night    He takes it off after 6 hours because he is 'satisfied'     His PAP interface is Nasal Mask.    He does feel more rested in the morning.    Total score - [place]: 3 (9/21/2021  8:00 AM)  GRIFFIN Total Score: 7      ResMed   Auto-PAP 10.0 - 16.0 cmH2O 30 day usage data:    66% of days with > 4 hours of use. 9/30 days with no use.   Average use 315 minutes per day.   95%ile Leak 22.69 L/min.   CPAP 95% pressure 12.8 cm. Median 10.9  AHI 0.96 events per hour.     He has used PAP 21 straight days for average of 7' 44\"        Past medical/surgical history, family history, social history, medications and allergies were reviewed.      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  Sleep study [facility] 5/2012 (216#)  AHI 25, .9, low O2 32% (probably artifact by hypnogram). True low O2 probably upper 70s on CPAP during REM. CPAP 8cm with fragmented sleep during supine REM.   Study Date: 3/14/2017- (227.0 lbs)- Titration was complicated by high leaks on several different mask types. The patient was titrated at pressures ranging from 5 cmH2O up to 15 cmH2O.  Most of titrations were done for RERAs and flow limitations. The final  pressure achieved was 15 cmH2O with a residual AHI of 0 events per hour.  REM supine was seen at 12 cm with good control of events. Lowest oxygen saturation was 84.1%.  Time spent less than or equal to 88% was 0.9 minutes. PLM index was 72.6 movements per hour.  The PLM Arousal Index was 24.4 per hour. Cyclic alternating pattern of arousal with and without whole body movements and leg movements was seen.        SAMARIA (generalized anxiety disorder) 06/13/2013     Priority: Medium     Hypertension goal BP (blood pressure) < 140/90 11/30/2012     Priority: Medium     Hyperlipidemia LDL goal <70 05/04/2012     Priority: Medium     Obesity, unspecified classification, unspecified obesity type, unspecified whether serious comorbidity present 11/06/2018     Priority: Low     Chronic cough 12/12/2017     Priority: Low     Gout, unspecified cause, unspecified chronicity, unspecified site 10/06/2017     Priority: Low     Low ferritin 03/30/2017     Priority: Low     Periodic limb movements of sleep 03/29/2017     Priority: Low     Benign prostatic hypertrophy 03/07/2017     Priority: Low     Type 2 diabetes mellitus without complication, without long-term current use of insulin (H) 02/08/2017     Priority: Low     Dysphonia 04/24/2016     Priority: Low     Vocal process granuloma  "04/24/2016     Priority: Low     Type 2 diabetes mellitus without complication (H) 12/23/2015     Priority: Low     Carotid stenosis 08/07/2014     Priority: Low     Ultrasound 2016- Less than 50% diameter stenosis of the right ICA relative to the distal ICA diameter. Less than 50% diameter stenosis of the left ICA relative to the distal ICA diameter.       Advanced directives, counseling/discussion 07/23/2013     Priority: Low     Advance Care Planning:  Information given           Ht 1.778 m (5' 10\")   Wt 102.1 kg (225 lb)   BMI 32.28 kg/m      Impression/Plan:     Moderate obstructive sleep apnea by sleep study 2012  Non-adherant after restart of CPAP 5/2021, but now excellent adherance the last 21 days  - Encouraged use all night long   - Continue CPAP 10-16 cmH20, will ask DME to refill supplies and let patient keep machine until we can get a repeat study done  - Order placed for repeat diagnostic Polysomnogram and not titration study due to [employer] Recall. Ideally done off CPAP 3 days    I spent 15 minutes with patient including counseling, and 15 minutes with chart review, and documentation         "

## 2021-09-21 NOTE — TELEPHONE ENCOUNTER
Increase dose of metformin to 1000 mg 2x daily.  I sent in new prescription with 1000 mg pills but he could also take two of his 500 mg pills per dose    Please inform patient    We can recheck in 4-6 months    Finesse Beal MD

## 2021-09-21 NOTE — PATIENT INSTRUCTIONS
Your BMI is Body mass index is 32.28 kg/m .  Weight management is a personal decision.  If you are interested in exploring weight loss strategies, the following discussion covers the approaches that may be successful. Body mass index (BMI) is one way to tell whether you are at a healthy weight, overweight, or obese. It measures your weight in relation to your height.  A BMI of 18.5 to 24.9 is in the healthy range. A person with a BMI of 25 to 29.9 is considered overweight, and someone with a BMI of 30 or greater is considered obese. More than two-thirds of American adults are considered overweight or obese.  Being overweight or obese increases the risk for further weight gain. Excess weight may lead to heart disease and diabetes.  Creating and following plans for healthy eating and physical activity may help you improve your health.  Weight control is part of healthy lifestyle and includes exercise, emotional health, and healthy eating habits. Careful eating habits lifelong are the mainstay of weight control. Though there are significant health benefits from weight loss, long-term weight loss with diet alone may be very difficult to achieve- studies show long-term success with dietary management in less than 10% of people. Attaining a healthy weight may be especially difficult to achieve in those with severe obesity. In some cases, medications, devices and surgical management might be considered.  What can you do?  If you are overweight or obese and are interested in methods for weight loss, you should discuss this with your provider.     Consider reducing daily calorie intake by 500 calories.     Keep a food journal.     Avoiding skipping meals, consider cutting portions instead.    Diet combined with exercise helps maintain muscle while optimizing fat loss. Strength training is particularly important for building and maintaining muscle mass. Exercise helps reduce stress, increase energy, and improves fitness.  Increasing exercise without diet control, however, may not burn enough calories to loose weight.       Start walking three days a week 10-20 minutes at a time    Work towards walking thirty minutes five days a week     Eventually, increase the speed of your walking for 1-2 minutes at time    In addition, we recommend that you review healthy lifestyles and methods for weight loss available through the National Institutes of Health patient information sites:  http://win.niddk.nih.gov/publications/index.htm    And look into health and wellness programs that may be available through your health insurance provider, employer, local community center, or jenifer club.    Weight management plan: Patient was referred to their PCP to discuss a diet and exercise plan.

## 2021-09-22 NOTE — TELEPHONE ENCOUNTER
Patient notified of Provider's message as written.  Patient verbalized understanding.    Reviewed a diabetic diet with patient.    Patient asking if Ensure would be appropriate for him for overall health and if so, he would like a prescription sent to his [facility] pharmacy    Carlos Morris RN  [facility]

## 2021-09-23 NOTE — TELEPHONE ENCOUNTER
Ensure type supplement not needed.  Better to just eat a well balanced diet.  Insurance does not cover these products anyway.    Please inform patient    Finesse Beal MD     aFdia Duttao  You 1 hour ago (9:07 AM)      To be scheduled now    Routing comment

## 2021-09-23 NOTE — TELEPHONE ENCOUNTER
Pt was given provider's message as written. Verbalized understanding.    Aminah Gross RN  [facility]

## 2021-09-28 ENCOUNTER — TELEPHONE (OUTPATIENT)
Dept: SLEEP MEDICINE | Facility: CLINIC | Age: 61
End: 2021-09-28

## 2021-09-28 DIAGNOSIS — G47.61 PERIODIC LIMB MOVEMENTS OF SLEEP: ICD-10-CM

## 2021-09-28 DIAGNOSIS — R79.0 LOW FERRITIN: ICD-10-CM

## 2021-09-28 DIAGNOSIS — G47.33 OBSTRUCTIVE SLEEP APNEA: ICD-10-CM

## 2021-09-28 DIAGNOSIS — E66.9 OBESITY, UNSPECIFIED CLASSIFICATION, UNSPECIFIED OBESITY TYPE, UNSPECIFIED WHETHER SERIOUS COMORBIDITY PRESENT: ICD-10-CM

## 2021-09-28 NOTE — TELEPHONE ENCOUNTER
Reason for call:  Other   Patient called regarding (reason for call): call back  Additional comments: Patient is requesting a call back to set up sleep study     Phone number to reach patient:  Home number on file [phone] (home) or [phone]    Best Time:  Anytime    Can we leave a detailed message on this number?  YES    Travel screening: Not Applicable

## 2021-09-28 NOTE — TELEPHONE ENCOUNTER
Patient has scheduled sleep study for Wednesday, 9/29/21 and patient is requesting sleep aid Ambien be sent to pharmacy on file.

## 2021-09-29 ENCOUNTER — THERAPY VISIT (OUTPATIENT)
Dept: SLEEP MEDICINE | Facility: CLINIC | Age: 61
End: 2021-09-29
Payer: MEDICARE

## 2021-09-29 DIAGNOSIS — G47.33 OBSTRUCTIVE SLEEP APNEA: ICD-10-CM

## 2021-09-29 DIAGNOSIS — G47.61 PERIODIC LIMB MOVEMENTS OF SLEEP: ICD-10-CM

## 2021-09-29 DIAGNOSIS — R79.0 LOW FERRITIN: ICD-10-CM

## 2021-09-29 DIAGNOSIS — E66.9 OBESITY, UNSPECIFIED CLASSIFICATION, UNSPECIFIED OBESITY TYPE, UNSPECIFIED WHETHER SERIOUS COMORBIDITY PRESENT: ICD-10-CM

## 2021-09-29 PROCEDURE — 95810 POLYSOM 6/> YRS 4/> PARAM: CPT | Performed by: INTERNAL MEDICINE

## 2021-09-29 RX ORDER — ZOLPIDEM TARTRATE 5 MG/1
TABLET ORAL
Qty: 1 TABLET | Refills: 0 | Status: SHIPPED | OUTPATIENT
Start: 2021-09-29 | End: 2022-04-29

## 2021-09-29 NOTE — TELEPHONE ENCOUNTER
Dr. Ochoa sent zolpidem tab to [facility] pharmacy.  Spoke with Gary to inform him the medication was at pharmacy.  Explained for him to take the one tablet with him to the study, and not to take it until the sleep Tech had him ready to go to sleep.  He understood.

## 2021-09-29 NOTE — TELEPHONE ENCOUNTER
Patient requesting zolpidem for sleep study tonight 9/29/21, at [place] location.  Routed to Dr. Ochoa for review.  Patient uses [facility] [address], [place].

## 2021-10-05 ENCOUNTER — TELEPHONE (OUTPATIENT)
Dept: SLEEP MEDICINE | Facility: CLINIC | Age: 61
End: 2021-10-05

## 2021-10-05 DIAGNOSIS — G47.33 OBSTRUCTIVE SLEEP APNEA: Primary | Chronic | ICD-10-CM

## 2021-10-05 NOTE — TELEPHONE ENCOUNTER
Reason for call:  Other   Patient called regarding (reason for call): call back  Additional comments: Patient is calling because he needs to get supplies for his CPAP, but needs the sleep study and prescription sent. Please send.     Phone number to reach patient:  Home number on file [phone] (home)    Best Time:  Any time    Can we leave a detailed message on this number?  YES    Travel screening: Not Applicable

## 2021-10-06 ENCOUNTER — OFFICE VISIT (OUTPATIENT)
Dept: DERMATOLOGY | Facility: CLINIC | Age: 61
End: 2021-10-06
Payer: MEDICARE

## 2021-10-06 ENCOUNTER — TELEPHONE (OUTPATIENT)
Dept: DERMATOLOGY | Facility: CLINIC | Age: 61
End: 2021-10-06

## 2021-10-06 VITALS — OXYGEN SATURATION: 98 % | DIASTOLIC BLOOD PRESSURE: 76 MMHG | HEART RATE: 124 BPM | SYSTOLIC BLOOD PRESSURE: 153 MMHG

## 2021-10-06 DIAGNOSIS — D04.62 SQUAMOUS CELL CARCINOMA IN SITU (SCCIS) OF DORSUM OF LEFT HAND: Primary | ICD-10-CM

## 2021-10-06 DIAGNOSIS — L82.1 SEBORRHEIC KERATOSIS: ICD-10-CM

## 2021-10-06 DIAGNOSIS — L57.0 AK (ACTINIC KERATOSIS): ICD-10-CM

## 2021-10-06 DIAGNOSIS — L81.4 LENTIGO: ICD-10-CM

## 2021-10-06 PROCEDURE — 99213 OFFICE O/P EST LOW 20 MIN: CPT | Mod: 25 | Performed by: DERMATOLOGY

## 2021-10-06 PROCEDURE — 17311 MOHS 1 STAGE H/N/HF/G: CPT | Performed by: DERMATOLOGY

## 2021-10-06 RX ORDER — FLUOROURACIL 50 MG/G
CREAM TOPICAL
Qty: 40 G | Refills: 1 | Status: SHIPPED | OUTPATIENT
Start: 2021-10-06

## 2021-10-06 RX ORDER — CALCIPOTRIENE 50 UG/G
CREAM TOPICAL
Qty: 60 G | Refills: 3 | Status: SHIPPED | OUTPATIENT
Start: 2021-10-06

## 2021-10-06 NOTE — LETTER
10/6/2021         RE: Gary Iyer  [address]  [place] [address]        Dear Colleague,    Thank you for referring your patient, Gary Iyer, to the [facility]. Please see a copy of my visit note below.    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  Today he notes rough spots on arms and hands.   .   Patient states this has been present for a while.  Patient reports the following symptoms:  rough.  Patient reports the following previous treatments none.  These treatments did not work.  Patient reports the following modifying factors none.  Associated symptoms: none.  Patient has no other skin complaints today.  Remainder of the HPI, Meds, PMH, Allergies, FH, and SH was reviewed in chart.      Past Medical History:   Diagnosis Date     Diabetes (H)     2yr     Hypertension      Lesion of larynx 12/12/2017    Leokoplakia. s/p Flexible fiberoptic transnasal laryngoscopy with endoscopic injection, laryngeal biopsy     Multinodular goiter (nontoxic) 06/10/2013     Sleep apnea 8yr     Squamous cell carcinoma of skin, unspecified        Past Surgical History:   Procedure Laterality Date     COLONOSCOPY  06/22/2012    Repeat Colonoscopy in 10 yrs.      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     HEAD & NECK SURGERY  01/25/2011    callous removal from throat     LARYNGOSCOPY WITH BIOPSY(IES)  01/25/2011     LARYNGOSCOPY WITH BIOPSY(IES) N/A 02/09/2017    Procedure: LARYNGOSCOPY WITH BIOPSY(IES);  Surgeon: Mel Fournier MD;  Location: [facility] OR     LARYNGOSCOPY WITH MICROSCOPE N/A 02/09/2017    Procedure: LARYNGOSCOPY WITH MICROSCOPE;  Surgeon: Mel Fournier MD;  Location: [facility] OR     LASER CO2 LARYNGOSCOPY, COMPLEX  06/27/2013    Procedure: LASER CO2 LARYNGOSCOPY, COMPLEX;  Micro Direct Laryngoscopy With Micro Flap Excision Of Lesion Lumenis  Co2 Laser ;  Surgeon: Mel Fournier MD;  Location: [facility] OR     LASER CO2 LESION ORAL N/A 2017    Procedure: LASER CO2 LESION ORAL;  Surgeon: Mel Fournier MD;  Location:  OR     ORTHOPEDIC SURGERY  2016    left tibia orif with abbi      VASECTOMY  2003        Family History   Problem Relation Age of Onset     C.A.D. 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 No   Social History Narrative     Not on file     Social Determinants of Health     Financial Resource Strain:      Difficulty of Paying Living Expenses:    Food Insecurity:      Worried About Running Out of Food in the Last Year:      Ran Out of Food in the Last Year:    Transportation Needs:      Lack of Transportation (Medical):      Lack of Transportation (Non-Medical):    Physical Activity:      Days of Exercise per Week:      Minutes of Exercise per Session:    Stress:      Feeling of Stress :    Social Connections:      Frequency of Communication with Friends  and Family:      Frequency of Social Gatherings with Friends and Family:      Attends Taoism Services:      Active Member of Clubs or Organizations:      Attends Club or Organization Meetings:      Marital Status:    Intimate Partner Violence:      Fear of Current or Ex-Partner:      Emotionally Abused:      Physically Abused:      Sexually Abused:        Outpatient Encounter Medications as of 10/6/2021   Medication Sig Dispense Refill     alcohol swab prep pads Use to swab area of injection/maria del rosario as directed. 100 each 3     allopurinol (ZYLOPRIM) 300 MG tablet Take 1 tablet (300 mg) by mouth daily 90 tablet 3     amLODIPine (NORVASC) 2.5 MG tablet Take 1 tablet (2.5 mg) by mouth daily 90 tablet 3     aspirin 81 MG tablet Take 1 tablet by mouth daily.       atorvastatin (LIPITOR) 20 MG tablet Take 1 tablet (20 mg) by mouth daily 90 tablet 3     blood glucose (NO BRAND SPECIFIED) test strip Use to test blood sugar 1 times daily or as directed. To accompany: Blood Glucose Monitor Brands: per insurance. 100 strip 6     blood glucose calibration (NO BRAND SPECIFIED) solution To accompany: Blood Glucose Monitor Brands: per insurance. 1 Bottle 3     blood glucose monitoring (NO BRAND SPECIFIED) meter device kit Use to test blood sugar 1 times daily or as directed. Preferred blood glucose meter OR supplies to accompany: Blood Glucose Monitor Brands: per insurance. 1 kit 0     cetirizine (ZYRTEC) 10 MG tablet Take 1 tablet (10 mg) by mouth daily 90 tablet 3     hydrochlorothiazide (HYDRODIURIL) 50 MG tablet Take 1 tablet (50 mg) by mouth daily 90 tablet 3     hydrocortisone (CORTAID) 1 % external ointment Apply topically 2 times daily as needed 30 g 1     ketoconazole (NIZORAL) 2 % cream Apply topically 2 times daily for up to 2 weeks as needed for rash 60 g 1     ketoconazole (NIZORAL) 2 % external cream Apply daily to affected area on face, armpits, groin. 60 g 4     ketoconazole (NIZORAL) 2 % external shampoo Leave on  face, beard for 3-5 minutes then rinse. Use 2-3 times weekly. 120 mL 3     losartan (COZAAR) 50 MG tablet Take 1 tablet (50 mg) by mouth daily 90 tablet 1     metFORMIN (GLUCOPHAGE) 1000 MG tablet Take 1 tablet (1,000 mg) by mouth 2 times daily (with meals) 180 tablet 1     metroNIDAZOLE (METROCREAM) 0.75 % external cream Apply topically 2 times daily as needed (to rosacea areas) 45 g 1     Misc. Devices (SUPPOSITORY MOLD 2GM) MISC 1 suppository every 12 hours Nifedipine 4 mg suppository, one rectally every 12 hours 100 each 4     Multiple Vitamin (DAILY MULTIVITAMIN PO) Take  by mouth daily.       nifedipine 0.2% in white petrolatum 0.2 % OINT ointment Apply topically 2 times daily Please make it into suppositories 0.2 % nifedipine cream Place in the anus every 12 hours.  Just get to where the anus is tight . 100 g 3     omeprazole (PRILOSEC) 20 MG DR capsule TAKE 1 CAPSULE BY MOUTH DAILY 90 capsule 3     order for DME Autocpap 10-16 cm 1 Units 0     oxybutynin (DITROPAN) 5 MG tablet Take 1 tablet (5 mg) by mouth At Bedtime 90 tablet 3     PARoxetine (PAXIL) 40 MG tablet Take 1 tablet (40 mg) by mouth every morning 90 tablet 3     polyethylene glycol (MIRALAX) 17 GM/Dose powder Take 17 g (1 capful) by mouth daily 850 g 3     psyllium 0.52 G capsule Take 1 capsule (0.52 g) by mouth daily 100 capsule 3     tamsulosin (FLOMAX) 0.4 MG capsule 2 po daily 180 capsule 1     thin (NO BRAND SPECIFIED) lancets Use with lanceting device. To accompany: Blood Glucose Monitor Brands: per insurance. 100 each 11     triamcinolone (KENALOG) 0.1 % external cream Apply topically 2 times daily as needed for irritation 30 g 0     zolpidem (AMBIEN) 5 MG tablet Take tablet by mouth 15 minutes prior to sleep, for Sleep Study 1 tablet 0     Facility-Administered Encounter Medications as of 10/6/2021   Medication Dose Route Frequency Provider Last Rate Last Admin     bupivacaine (MARCAINE) 0.25 % injection 3 mL  3 mL   Feliciano Aguilar MD    3 mL at 11/05/18 1025     lidocaine 1 % injection 4 mL  4 mL   Feliciano Aguilar MD   4 mL at 11/05/18 1025     triamcinolone acetonide (KENALOG-40) injection 80 mg  80 mg   Feliciano Aguilar MD   80 mg at 11/05/18 1025             O:   NAD, WDWN, Alert & Oriented, Mood & Affect wnl, Vitals stable   Here today alone   BP (!) 153/76 (BP Location: Left arm, Patient Position: Sitting, Cuff Size: Adult Large)   Pulse (!) 124   SpO2 98%    General appearance normal   Vitals stable   Alert, oriented and in no acute distress      Following lymph nodes palpated: antecubital no lad   Gritty papules on arms and hands  L dorsal hand 2.2cm red plaque        Stuck on papules and brown macules on trunk and ext      The remainder of expanded problem focused exam was normal; the following areas were examined:  conjunctiva/lids, face, neck, , chest, digits/nails, RUE, LUE.      Eyes: Conjunctivae/lids:Normal     ENT: Lips, buccal mucosa, tongue: normal    MSK:Normal    Cardiovascular: peripheral edema none    Pulm: Breathing Normal    Neuro/Psych: Orientation:Alert and Orientedx3 ; Mood/Affect:normal       A/P:  1. Seborrheic keratosis, lentigo,   2. L hand squamous cell carcinoma in situ   3. Actinic keratosis   Pathophysiology discussed with pateint   Using 5-Flurouracil Cream    5-Fluorouracil (5FU) topical cream (brand names Efudex, Carac) is a prescription topical medicine to treat actinic keratoses (pre-squamous cell skin cancer lesions), sun-damaged skin as well as superficial skin cancers.    When applied the areas of sun-damaged skin, the 5FU will  find  damaged skin cells and destroy them.   During treatment, the skin will become red and look very irritated. This is the expected  normal response,  Some patients using 5FU show minimal redness and scaling while others have a very  vigorous  response where the skin scabs and peels. The important thing to realize is that 5FU is treating sun-damaged skin that carries skin  cancer risk.      While the skin is irritated, open, sore or scabbed you can apply aquaphor, vaseline or 1% hydrocortisone cream in the morning.     You should apply a thin layer of the cream to the affected area twice a day for 2  weeks every night. A strip of cream the length of your finger tip should be enough to cover your entire face.  For tougher skin like arms, legs, or back, we may suggest longer treatment plans.  However if you react really strong and fast, you might stop earlier or use less frequently. - please call if it is very strong and you are concern you might need to stop early.     If you prescription coverage allows we may add calcipotriene (Dovonex ), a vitamin-D derivative, to the treatment plan.  In these cases we will have you mix the calcipotriene with the efudex to help shorten the treatment course and improve outcomes.      Typically very strong reactions are related to lots of underlying sun damage, and this means you are getting a good response to the medication. However, there is no need to be miserable while using this. Please let us know if you are having trouble or concerns!    It was a pleasure speaking to Gary Iyer today.  Previous clinic notes and pertinent laboratory tests were reviewed prior to Gary Iyer's visit.  Nature and genetics of benign skin lesions dicussed with patient.  Signs and Symptoms of skin cancer discussed with patient.  Patient encouraged to perform monthly skin exams.  UV precautions reviewed with patient.  Gary to follow up with Primary Care provider regarding elevated blood pressure.   Risks of non-melanoma skin cancer discussed with patient   Return to clinic 3 months  PROCEDURE NOTE  L dorsal hands squamous cell carcinoma in situ   .MOHS:   Size and Location    The rationale for Mohs surgery was discussed with the patient and consent was obtained.  The risks and benefits as well as alternatives to therapy were discussed, in detail.  Specifically,  the risks of infection, scarring, bleeding, prolonged wound healing, incomplete removal, allergy to anesthesia, nerve injury and recurrence were addressed.  Indication for Mohs was Size and Location. Prior to the procedure, the treatment site was clearly identified and, if available, confirmed with previous photos and confirmed by the patient   All components of the Universal Protocol/PAUSE rule were completed.  The Mohs surgeon operated in two distinct and integrated capacities as the surgeon and pathologist.      The area was prepped with Betasept.  A rim of normal appearing skin was marked circumferentially around the lesion.  The area was infiltrated with local anesthesia.  The tumor was first debulked to remove all clinically apparent tumor.  An incision following the standard Mohs approach was done and the specimen was oriented,mapped and placed in 2 block(s).  Each specimen was then chromacoded and processed in the Mohs laboratory using standard Mohs technique and submitted for frozen section histology.  Frozen section analysis showed no residual tumor but CLEAR MARGINS.      The tumor was excised using standard Mohs technique in 1 stages(s).  CLEAR MARGINS OBTAINED and Final defect size was 3 x 2.7 cm.     We discussed the options for wound management in full with the patient including risks/benefits/ possible outcomes.        REPAIR SECOND INTENT: We discussed the options for wound management in full with the patient including risks/benefits/possible outcomes. Decision made to allow the wound to heal by second intention. Cautery was used for for hemostasis. EBL minimal; complications none; wound care routine.  The patient was discharged in good condition and will return in one month or prn for wound evaluation.        Again, thank you for allowing me to participate in the care of your patient.        Sincerely,        Eduardo Morrell MD

## 2021-10-06 NOTE — PROGRESS NOTES
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  Today he notes rough spots on arms and hands.   .   Patient states this has been present for a while.  Patient reports the following symptoms:  rough.  Patient reports the following previous treatments none.  These treatments did not work.  Patient reports the following modifying factors none.  Associated symptoms: none.  Patient has no other skin complaints today.  Remainder of the HPI, Meds, PMH, Allergies, FH, and SH was reviewed in chart.      Past Medical History:   Diagnosis Date     Diabetes (H)     2yr     Hypertension      Lesion of larynx 12/12/2017    Leokoplakia. s/p Flexible fiberoptic transnasal laryngoscopy with endoscopic injection, laryngeal biopsy     Multinodular goiter (nontoxic) 06/10/2013     Sleep apnea 8yr     Squamous cell carcinoma of skin, unspecified        Past Surgical History:   Procedure Laterality Date     COLONOSCOPY  06/22/2012    Repeat Colonoscopy in 10 yrs.      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     HEAD & NECK SURGERY  01/25/2011    callous removal from throat     LARYNGOSCOPY WITH BIOPSY(IES)  01/25/2011     LARYNGOSCOPY WITH BIOPSY(IES) N/A 02/09/2017    Procedure: LARYNGOSCOPY WITH BIOPSY(IES);  Surgeon: Mel Fournier MD;  Location:  OR     LARYNGOSCOPY WITH MICROSCOPE N/A 02/09/2017    Procedure: LARYNGOSCOPY WITH MICROSCOPE;  Surgeon: Mel Fournier MD;  Location:  OR     LASER CO2 LARYNGOSCOPY, COMPLEX  06/27/2013    Procedure: LASER CO2 LARYNGOSCOPY, COMPLEX;  Micro Direct Laryngoscopy With Micro Flap Excision Of Lesion Lumenis Co2 Laser ;  Surgeon: Mel Fournier MD;  Location: [facility] OR     LASER CO2 LESION ORAL N/A 02/09/2017    Procedure: LASER CO2 LESION ORAL;  Surgeon: Mel Fournier MD;  Location:  OR     ORTHOPEDIC SURGERY  03/2016    left  tibia orif with abbi      VASECTOMY  2003        Family History   Problem Relation Age of Onset     C.A.D. 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 No   Social History Narrative     Not on file     Social Determinants of Health     Financial Resource Strain:      Difficulty of Paying Living Expenses:    Food Insecurity:      Worried About Running Out of Food in the Last Year:      Ran Out of Food in the Last Year:    Transportation Needs:      Lack of Transportation (Medical):      Lack of Transportation (Non-Medical):    Physical Activity:      Days of Exercise per Week:      Minutes of Exercise per Session:    Stress:      Feeling of Stress :    Social Connections:      Frequency of Communication with Friends and Family:      Frequency of Social Gatherings with Friends and Family:      Attends [employer] Services:      Active Member of Clubs or Organizations:      Attends Club or Organization Meetings:      Marital Status:    Intimate Partner  Violence:      Fear of Current or Ex-Partner:      Emotionally Abused:      Physically Abused:      Sexually Abused:        Outpatient Encounter Medications as of 10/6/2021   Medication Sig Dispense Refill     alcohol swab prep pads Use to swab area of injection/maria del rosario as directed. 100 each 3     allopurinol (ZYLOPRIM) 300 MG tablet Take 1 tablet (300 mg) by mouth daily 90 tablet 3     amLODIPine (NORVASC) 2.5 MG tablet Take 1 tablet (2.5 mg) by mouth daily 90 tablet 3     aspirin 81 MG tablet Take 1 tablet by mouth daily.       atorvastatin (LIPITOR) 20 MG tablet Take 1 tablet (20 mg) by mouth daily 90 tablet 3     blood glucose (NO BRAND SPECIFIED) test strip Use to test blood sugar 1 times daily or as directed. To accompany: Blood Glucose Monitor Brands: per insurance. 100 strip 6     blood glucose calibration (NO BRAND SPECIFIED) solution To accompany: Blood Glucose Monitor Brands: per insurance. 1 Bottle 3     blood glucose monitoring (NO BRAND SPECIFIED) meter device kit Use to test blood sugar 1 times daily or as directed. Preferred blood glucose meter OR supplies to accompany: Blood Glucose Monitor Brands: per insurance. 1 kit 0     cetirizine (ZYRTEC) 10 MG tablet Take 1 tablet (10 mg) by mouth daily 90 tablet 3     hydrochlorothiazide (HYDRODIURIL) 50 MG tablet Take 1 tablet (50 mg) by mouth daily 90 tablet 3     hydrocortisone (CORTAID) 1 % external ointment Apply topically 2 times daily as needed 30 g 1     ketoconazole (NIZORAL) 2 % cream Apply topically 2 times daily for up to 2 weeks as needed for rash 60 g 1     ketoconazole (NIZORAL) 2 % external cream Apply daily to affected area on face, armpits, groin. 60 g 4     ketoconazole (NIZORAL) 2 % external shampoo Leave on face, beard for 3-5 minutes then rinse. Use 2-3 times weekly. 120 mL 3     losartan (COZAAR) 50 MG tablet Take 1 tablet (50 mg) by mouth daily 90 tablet 1     metFORMIN (GLUCOPHAGE) 1000 MG tablet Take 1 tablet (1,000 mg) by mouth 2  times daily (with meals) 180 tablet 1     metroNIDAZOLE (METROCREAM) 0.75 % external cream Apply topically 2 times daily as needed (to rosacea areas) 45 g 1     Misc. Devices (SUPPOSITORY MOLD 2GM) MISC 1 suppository every 12 hours Nifedipine 4 mg suppository, one rectally every 12 hours 100 each 4     Multiple Vitamin (DAILY MULTIVITAMIN PO) Take  by mouth daily.       nifedipine 0.2% in white petrolatum 0.2 % OINT ointment Apply topically 2 times daily Please make it into suppositories 0.2 % nifedipine cream Place in the anus every 12 hours.  Just get to where the anus is tight . 100 g 3     omeprazole (PRILOSEC) 20 MG DR capsule TAKE 1 CAPSULE BY MOUTH DAILY 90 capsule 3     order for DME Autocpap 10-16 cm 1 Units 0     oxybutynin (DITROPAN) 5 MG tablet Take 1 tablet (5 mg) by mouth At Bedtime 90 tablet 3     PARoxetine (PAXIL) 40 MG tablet Take 1 tablet (40 mg) by mouth every morning 90 tablet 3     polyethylene glycol (MIRALAX) 17 GM/Dose powder Take 17 g (1 capful) by mouth daily 850 g 3     psyllium 0.52 G capsule Take 1 capsule (0.52 g) by mouth daily 100 capsule 3     tamsulosin (FLOMAX) 0.4 MG capsule 2 po daily 180 capsule 1     thin (NO BRAND SPECIFIED) lancets Use with lanceting device. To accompany: Blood Glucose Monitor Brands: per insurance. 100 each 11     triamcinolone (KENALOG) 0.1 % external cream Apply topically 2 times daily as needed for irritation 30 g 0     zolpidem (AMBIEN) 5 MG tablet Take tablet by mouth 15 minutes prior to sleep, for Sleep Study 1 tablet 0     Facility-Administered Encounter Medications as of 10/6/2021   Medication Dose Route Frequency Provider Last Rate Last Admin     bupivacaine (MARCAINE) 0.25 % injection 3 mL  3 mL   Feliciano Aguilar MD   3 mL at 11/05/18 1025     lidocaine 1 % injection 4 mL  4 mL   Feliciano Aguilar MD   4 mL at 11/05/18 1025     triamcinolone acetonide (KENALOG-40) injection 80 mg  80 mg   Feliciano Aguilar MD   80 mg at 11/05/18 1025              O:   NAD, WDWN, Alert & Oriented, Mood & Affect wnl, Vitals stable   Here today alone   BP (!) 153/76 (BP Location: Left arm, Patient Position: Sitting, Cuff Size: Adult Large)   Pulse (!) 124   SpO2 98%    General appearance normal   Vitals stable   Alert, oriented and in no acute distress      Following lymph nodes palpated: antecubital no lad   Gritty papules on arms and hands  L dorsal hand 2.2cm red plaque        Stuck on papules and brown macules on trunk and ext      The remainder of expanded problem focused exam was normal; the following areas were examined:  conjunctiva/lids, face, neck, , chest, digits/nails, RUE, LUE.      Eyes: Conjunctivae/lids:Normal     ENT: Lips, buccal mucosa, tongue: normal    MSK:Normal    Cardiovascular: peripheral edema none    Pulm: Breathing Normal    Neuro/Psych: Orientation:Alert and Orientedx3 ; Mood/Affect:normal       A/P:  1. Seborrheic keratosis, lentigo,   2. L hand squamous cell carcinoma in situ   3. Actinic keratosis   Pathophysiology discussed with pateint   Using 5-Flurouracil Cream    5-Fluorouracil (5FU) topical cream (brand names Efudex, Carac) is a prescription topical medicine to treat actinic keratoses (pre-squamous cell skin cancer lesions), sun-damaged skin as well as superficial skin cancers.    When applied the areas of sun-damaged skin, the 5FU will  find  damaged skin cells and destroy them.   During treatment, the skin will become red and look very irritated. This is the expected  normal response,  Some patients using 5FU show minimal redness and scaling while others have a very  vigorous  response where the skin scabs and peels. The important thing to realize is that 5FU is treating sun-damaged skin that carries skin cancer risk.      While the skin is irritated, open, sore or scabbed you can apply aquaphor, vaseline or 1% hydrocortisone cream in the morning.     You should apply a thin layer of the cream to the affected area twice a  day for 2  weeks every night. A strip of cream the length of your finger tip should be enough to cover your entire face.  For tougher skin like arms, legs, or back, we may suggest longer treatment plans.  However if you react really strong and fast, you might stop earlier or use less frequently. - please call if it is very strong and you are concern you might need to stop early.     If you prescription coverage allows we may add calcipotriene (Dovonex ), a vitamin-D derivative, to the treatment plan.  In these cases we will have you mix the calcipotriene with the efudex to help shorten the treatment course and improve outcomes.      Typically very strong reactions are related to lots of underlying sun damage, and this means you are getting a good response to the medication. However, there is no need to be miserable while using this. Please let us know if you are having trouble or concerns!    It was a pleasure speaking to Gary Iyer today.  Previous clinic notes and pertinent laboratory tests were reviewed prior to Gary Iyer's visit.  Nature and genetics of benign skin lesions dicussed with patient.  Signs and Symptoms of skin cancer discussed with patient.  Patient encouraged to perform monthly skin exams.  UV precautions reviewed with patient.  Gary to follow up with Primary Care provider regarding elevated blood pressure.   Risks of non-melanoma skin cancer discussed with patient   Return to clinic 3 months  PROCEDURE NOTE  L dorsal hands squamous cell carcinoma in situ   .MOHS:   Size and Location    The rationale for Mohs surgery was discussed with the patient and consent was obtained.  The risks and benefits as well as alternatives to therapy were discussed, in detail.  Specifically, the risks of infection, scarring, bleeding, prolonged wound healing, incomplete removal, allergy to anesthesia, nerve injury and recurrence were addressed.  Indication for Mohs was Size and Location. Prior to the  procedure, the treatment site was clearly identified and, if available, confirmed with previous photos and confirmed by the patient   All components of the Universal Protocol/PAUSE rule were completed.  The Mohs surgeon operated in two distinct and integrated capacities as the surgeon and pathologist.      The area was prepped with Betasept.  A rim of normal appearing skin was marked circumferentially around the lesion.  The area was infiltrated with local anesthesia.  The tumor was first debulked to remove all clinically apparent tumor.  An incision following the standard Mohs approach was done and the specimen was oriented,mapped and placed in 2 block(s).  Each specimen was then chromacoded and processed in the Mohs laboratory using standard Mohs technique and submitted for frozen section histology.  Frozen section analysis showed no residual tumor but CLEAR MARGINS.      The tumor was excised using standard Mohs technique in 1 stages(s).  CLEAR MARGINS OBTAINED and Final defect size was 3 x 2.7 cm.     We discussed the options for wound management in full with the patient including risks/benefits/ possible outcomes.        REPAIR SECOND INTENT: We discussed the options for wound management in full with the patient including risks/benefits/possible outcomes. Decision made to allow the wound to heal by second intention. Cautery was used for for hemostasis. EBL minimal; complications none; wound care routine.  The patient was discharged in good condition and will return in one month or prn for wound evaluation.

## 2021-10-06 NOTE — TELEPHONE ENCOUNTER
Reach out to patient and he states if he can be see sooner. He is in need of supplies. Previous appointment scheduled with  in 12/20/21 cancel and rescheduled to see Jenny Gore PA-C in October.       MICKY Grayson  [facility]

## 2021-10-06 NOTE — TELEPHONE ENCOUNTER
Incoming fax.     Prior authorization request for Fluorouracil 5% cream. Please advise    Thank you  Claire Le

## 2021-10-06 NOTE — PATIENT INSTRUCTIONS
Open Wound Care     for ____left hand__________        ? No strenuous activity for 48 hours    ? Take Tylenol as needed for discomfort.                                                .         ? Do not drink alcoholic beverages for 48 hours.    ? Keep the pressure bandage in place for 24 hours. If the bandage becomes blood tinged or loose, reinforce it with gauze and tape.        (Refer to the reverse side of this page for management of bleeding).    ? Remove bandage in 24 hours and begin wound care as follows:     1. Clean area with tap water using a Q tip or gauze pad, (shower / bathe normally)  2. Dry wound with Q tip or gauze pad  3. Apply Aquaphor, Vaseline, Polysporin or Bacitracin Ointment with a Q tip    Do NOT use Neosporin Ointment *  4. Cover the wound with a band-aid or nonstick gauze pad and paper tape.  5. Repeat wound care once a day until wound is completely healed.    It is an old wives tale that a wound heals better when it is exposed to air and allowed to dry out. The wound will heal faster with a better cosmetic result if it is kept moist with ointment and covered with a bandage.  Do not let the wound dry out.      Supplies Needed:                Qtips or gauze pads                Polysporin or Bacitracin Ointment                Bandaids or nonstick gauze pads and paper tape    Wound care kits and brown paper tape are available for purchase at   the pharmacy.    BLEEDIN. Use tightly rolled up gauze or cloth to apply direct pressure over the bandage for 20   minutes.  2. Reapply pressure for an additional 20 minutes if necessary  3. Call the office or go to the nearest emergency room if pressure fails to stop the bleeding.  4. Use additional gauze and tape to maintain pressure once the bleeding has stopped.  5. Begin wound care 24 hours after surgery as directed.                  WOUND HEALING    1. One week after surgery a pink / red halo will form around the outside of the wound.   This  is new skin.  2. The center of the wound will appear yellowish white and produce some drainage.  3. The pink halo will slowly migrate in toward the center of the wound until the wound is covered with new shiny pink skin.  4. There will be no more drainage when the wound is completely healed.  5. It will take six months to one year for the redness to fade.  6. The scar may be itchy, tight and sensitive to extreme temperatures for a year after the surgery.  7. Massaging the area several times a day for several minutes after the wound is completely healed will help the scar soften and normalize faster. Begin massage only after healing is complete.      In case of emergency call: Dr Morrell: [phone]     [facility]: [phone]    [facility]: [phone]

## 2021-10-06 NOTE — NURSING NOTE
Chief Complaint   Patient presents with     Derm Problem     Mohs Franci M. Mcphee on 10/6/2021 at 8:00 AM

## 2021-10-08 ENCOUNTER — TELEPHONE (OUTPATIENT)
Dept: DERMATOLOGY | Facility: CLINIC | Age: 61
End: 2021-10-08

## 2021-10-08 NOTE — TELEPHONE ENCOUNTER
Central Prior Authorization Team   Phone: [phone]      PA Initiation    Medication: Fluorouracil 5% cream-Initiated  Insurance Company: Medicare Blue - Phone [phone] Fax [phone]  Pharmacy Filling the Rx: [facility] #[id number] [place], [place] - [address] [place] [address] [address]  Filling Pharmacy Phone: [phone]  Filling Pharmacy Fax:    Start Date: 10/8/2021

## 2021-10-08 NOTE — TELEPHONE ENCOUNTER
Prior Authorization Approval    Authorization Effective Date: 7/10/2021  Authorization Expiration Date: 1/6/2022  Medication: Fluorouracil 5% cream-APPROVED  Approved Dose/Quantity:   Reference #:     Insurance Company: Medicare Blue - Phone [phone] Fax [phone]  Expected CoPay:       CoPay Card Available:      Foundation Assistance Needed:    Which Pharmacy is filling the prescription (Not needed for infusion/clinic administered): [id number] [address] [facility]   Pharmacy Notified: Yes  Patient Notified: No    Pharmacy will notify patient when medication is ready.

## 2021-10-11 NOTE — TELEPHONE ENCOUNTER
Incoming fax.    [facility] pharmacy requesting follow up PA request for Calcipotriene 0.005% cream .    Thank you  Claire Nair

## 2021-10-11 NOTE — PROCEDURES
SLEEP STUDY INTERPRETATION  DIAGNOSTIC POLYSOMNOGRAPHY REPORT      Patient: CHRIST HENNING  YOB: 1960  Study Date: 9/29/2021  MRN: [id number]  Referring Provider: -  Ordering Provider: Rene Ochoa MD    Indications for Polysomnography: The patient is a 61 year old Male who is 61 and weighs 225lb. His BMI is 32. A diagnostic polysomnogram was performed to evaluate for history of moderate obstructive sleep apnea by sleep study 2012. Non-adherant after restart of CPAP 5/2021, but now excellent adherence. Needs study to requalify for a trial of treatment for insurance reasons      Polysomnogram Data: A full night polysomnogram recorded the standard physiologic parameters including EEG, EOG, EMG, ECG, nasal and oral airflow. Respiratory parameters of chest and abdominal movements were recorded with respiratory inductance plethysmography. Oxygen saturation was recorded by pulse oximetry. Hypopnea scoring rule used: 1B 4%.      Sleep Architecture:   The total recording time of the polysomnogram was 439.4 minutes. The total sleep time was 82.0 minutes. Sleep latency was increased at 162.0 minutes with the use of a sleep aid (zolpidem). REM latency was n/a. Arousal index was increased at 153.7 arousals per hour. Sleep efficiency was decreased at 18.7%. Wake after sleep onset was 184.5 minutes. The patient spent 83.5% of total sleep time in Stage N1, 16.5% in Stage N2, 0.0% in Stage N3, and 0.0% in REM.         Respiration:     Events ? The polysomnogram revealed a presence of 22 obstructive, 0 central, and 0 mixed apneas resulting in an apnea index of 16.1 events per hour. There were 90 obstructive hypopneas and 0 central hypopneas resulting in an obstructive hypopnea index of 65.9 and central hypopnea index of 0 events per hour. The combined apnea/hypopnea index was 82.0 events per hour (central apnea/hypopnea index was 0 events per hour). The REM AHI was n/a. The supine AHI was n/a events per hour. The  RERA index was 22.7 events per hour.  The RDI was 104.6 events per hour.    Snoring - was reported as not audible    Respiratory rate and pattern - was notable for normal respiratory rate and pattern.    Sustained Sleep Associated Hypoventilation - Transcutaneous carbon dioxide monitoring was not used, however significant hypoventilation was not suggested by oximetry    Sleep Associated Hypoxemia - (Greater than 5 minutes O2 sat at or below 88%) Baseline oxygen saturation was 94.5%. Lowest oxygen saturation was 79.8%. Time spent less than or equal to 88% was 4.8 minutes. Time spent less than or equal to 89% was 6.8 minutes.    Movement Activity:     Periodic Limb Activity - There were 0 PLMs during the entire study.     REM EMG Activity - Excessive transient/sustained muscle activity was not present.    Nocturnal Behavior - Abnormal sleep related behaviors were not noted     Bruxism - None apparent.        Cardiac Summary:   The average pulse rate was 97.7 bpm. The minimum pulse rate was 28.0 bpm while the maximum pulse rate was 119.3 bpm.  Arrhythmias were not noted.        Assessment:     Evidence of severe obstructive sleep apnea    Study limited by poorly consolidated sleep with 82 minutes of total sleep time    It was not documented that patient withheld PAP usage for 3 nights prior to study but was documented that he no longer had equipment    Recommendations:    Recommend resume treatment with Auto?titrating PAP therapy with a range of 10 to 16 cmH2O. Recommend clinical follow up with sleep management team.    Suggest optimizing sleep schedule and avoiding sleep deprivation.    Weight management (if BMI > 30).    Pharmacologic therapy should be used for management of restless legs syndrome only if present and clinically indicated and not based on the presence of periodic limb movements alone.    Diagnostic Codes:   Obstructive Sleep Apnea G47.33      _____________________________________   Electronically  Signed By: Rene Ochoa MD 10/11/21           Range(%) Time in range (min)   0.0 - 89.0 6.8   0.0 - 88.0 4.8         Stage Min(mm Hg) Max(mm Hg)   Wake - -   NREM(1+2+3) - -   REM - -       Range(mmHg) Time in range (min)   55.0 - 100.0 -   Excluded data <20.0 & >65.0 439.5

## 2021-10-12 LAB — SLPCOMP: NORMAL

## 2021-10-12 NOTE — TELEPHONE ENCOUNTER
Patient has been notified and [facility]

## 2021-10-13 NOTE — TELEPHONE ENCOUNTER
PA Initiation    Medication: calcipotriene  Insurance Company: [employer] - Phone [phone] Fax [phone]  Pharmacy Filling the Rx: [facility] #[id number]  Filling Pharmacy Phone: [phone]  Filling Pharmacy Fax: [phone]  Start Date: 10/13/2021

## 2021-10-13 NOTE — TELEPHONE ENCOUNTER
Prior Authorization Approval    Authorization Effective Date: 7/15/2021  Authorization Expiration Date: 10/13/2022  Medication: calcipotriene  Approved Dose/Quantity:   Reference #: [id number]   Insurance Company: Rosita Lundy - Phone [phone] Fax [phone]  Expected CoPay:       CoPay Card Available:      Foundation Assistance Needed:    Which Pharmacy is filling the prescription (Not needed for infusion/clinic administered): [id number] [address] [place] [address]  Pharmacy Notified: Yes  Patient Notified: Yes  **Instructed pharmacy to notify patient when script is ready to /ship.**

## 2021-10-14 ENCOUNTER — TELEPHONE (OUTPATIENT)
Dept: DERMATOLOGY | Facility: CLINIC | Age: 61
End: 2021-10-14

## 2021-10-14 NOTE — TELEPHONE ENCOUNTER
Spoke to patient and reviewed open wound care with him. I also reviewed signs and symptoms of infection with him.     Denies foul odor, spreading redness, etc.   Patient verbalized understanding.     Radha Castro RN

## 2021-10-14 NOTE — TELEPHONE ENCOUNTER
[facility] Call Center    Phone Message    May a detailed message be left on voicemail: yes     Reason for Call: Other: Pt has questions regarding wound care.      Action Taken: Message routed to:  [facility]    Travel Screening: Not Applicable

## 2021-10-21 ENCOUNTER — DOCUMENTATION ONLY (OUTPATIENT)
Dept: SLEEP MEDICINE | Facility: CLINIC | Age: 61
End: 2021-10-21

## 2021-10-21 DIAGNOSIS — G47.33 OSA (OBSTRUCTIVE SLEEP APNEA): Primary | ICD-10-CM

## 2021-10-21 NOTE — PROGRESS NOTES
Patient came to [facility] for mask fitting appointment on October 21, 2021. Patient requested to switch masks because poor seal/leak and general discomfort. Discussed the following masks: Nasal Pillows, FFM, Nasal Mask. Patient selected a Resmed P10 Mask name: P10  Pillow mask size Medium.    Fang Corral

## 2021-11-19 NOTE — PROGRESS NOTES
"  SUBJECTIVE:   Gary Iyer is a 59 year old male who presents for Preventive Visit.      Are you in the first 12 months of your Medicare Part B coverage?  No    Physical Health:    In general, how would you rate your overall physical health? fair    Outside of work, how many days during the week do you exercise? none    Outside of work, approximately how many minutes a day do you exercise?not applicable    If you drink alcohol do you typically have >3 drinks per day or >7 drinks per week? Not Applicable    Do you usually eat at least 4 servings of fruit and vegetables a day, include whole grains & fiber and avoid regularly eating high fat or \"junk\" foods? NO    Do you have any problems taking medications regularly?  No    Do you have any side effects from medications? none    Needs assistance for the following daily activities: no assistance needed    Which of the following safety concerns are present in your home?  none identified     Hearing impairment: No    In the past 6 months, have you been bothered by leaking of urine? no    Mental Health:    In general, how would you rate your overall mental or emotional health? fair  PHQ-2 Score:      Do you feel safe in your environment? Yes    Have you ever done Advance Care Planning? (For example, a Health Directive, POLST, or a discussion with a medical provider or your loved ones about your wishes): Yes, advance care planning is on file.    Additional concerns to address?  No    Fall risk:  Fallen 2 or more times in the past year?: No  Any fall with injury in the past year?: No    Cognitive Screenin) Repeat 3 items (Leader, Season, Table)     2) Clock draw:  NORMAL  3) 3 item recall:  Recalls 3 objects  Results: NORMAL clock, 1-2 items recalled: COGNITIVE IMPAIRMENT LESS LIKELY    Mini-CogTM Copyright BLAINE Melgar. Licensed by the author for use in [facility]; reprinted with permission (lazaro@.[facility]). All rights reserved.      Do you have sleep apnea, " excessive snoring or daytime drowsiness?: yes but does not use the machine           Reviewed and updated as needed this visit by clinical staff  Tobacco  Allergies  Meds  Med Hx  Surg Hx  Fam Hx  Soc Hx        Reviewed and updated as needed this visit by Provider        Social History     Tobacco Use     Smoking status: Former Smoker     Packs/day: 1.00     Years: 25.00     Pack years: 25.00     Last attempt to quit: 2000     Years since quittin.8     Smokeless tobacco: Never Used   Substance Use Topics     Alcohol use: Yes     Alcohol/week: 0.0 standard drinks     Comment: rare                           Current providers sharing in care for this patient include:    Patient Care Team:  Finesse Beal MD as PCP - General  Finesse Beal MD as Assigned PCP  Enma Herrera RN as Continuity Care Coordinator (ENT-Otolaryngology)  Mel Fournier MD as MD (Otolaryngology)  Amanuel Barger MD as MD (Otolaryngology)  Tomy Granger MD as MD (INTERNAL MEDICINE - ENDOCRINOLOGY, DIABETES & METABOLISM)  Bijal Sorto, RN as Registered Nurse (Otolaryngology)    The following health maintenance items are reviewed in Epic and correct as of today:  Health Maintenance   Topic Date Due     HIV SCREENING  1973     HEPATITIS B IMMUNIZATION (1 of 3 - Risk 3-dose series) 1977     ZOSTER IMMUNIZATION (1 of 2) 2008     MEDICARE ANNUAL WELLNESS VISIT  2019     MICROALBUMIN  2019     DIABETIC FOOT EXAM  2019     PHQ-9  2020     A1C  2020     EYE EXAM  2020     INFLUENZA VACCINE (1) 2020     LIPID  10/02/2020     BMP  2020     ADVANCE CARE PLANNING  2022     COLORECTAL CANCER SCREENING  2022     DTAP/TDAP/TD IMMUNIZATION (4 - Td) 2028     HEPATITIS C SCREENING  Completed     DEPRESSION ACTION PLAN  Completed     PNEUMOCOCCAL IMMUNIZATION 19-64 MEDIUM RISK  Completed     IPV IMMUNIZATION  Aged Out      "MENINGITIS IMMUNIZATION  Aged Out              ROS:  Wondering about blood pressure    Blood pressure was  High when checked  For life insurance but he just woke up and didn't take  meds  Before that    No chest pain / breathing problems    Gained some wt     Sugars 130ish in am    In [place]  Just  Before pandemic    No consistent exercise    Not working currently    Kids in good health        OBJECTIVE:   /85 (BP Location: Right arm, Patient Position: Chair, Cuff Size: Adult Regular)   Pulse 96   Temp 98.3  F (36.8  C) (Oral)   Ht 1.778 m (5' 10\")   Wt 108.5 kg (239 lb 4 oz)   SpO2 98%   BMI 34.33 kg/m   Estimated body mass index is 34.33 kg/m  as calculated from the following:    Height as of this encounter: 1.778 m (5' 10\").    Weight as of this encounter: 108.5 kg (239 lb 4 oz).  EXAM:   GENERAL: healthy, alert and no distress  EYES: Eyes grossly normal to inspection, PERRL and conjunctivae and sclerae normal  HENT: ear canals and TM's normal, nose and mouth without ulcers or lesions  NECK: no adenopathy, no asymmetry, masses, or scars and thyroid normal to palpation  RESP: lungs clear to auscultation - no rales, rhonchi or wheezes  CV: regular rate and rhythm, normal S1 S2, no S3 or S4, no murmur, click or rub, no peripheral edema and peripheral pulses strong  ABDOMEN: soft, nontender, no hepatosplenomegaly, no masses and bowel sounds normal  MS: no gross musculoskeletal defects noted, no edema  SKIN: scattered moles, some keratotic lesions on forearms/ hands  NEURO: Normal strength and tone, mentation intact and speech normal  PSYCH: mentation appears normal, affect normal/bright  Foot exam  Okay bilat; some calluses    Diagnostic Test Results:  Labs reviewed in Epic    ASSESSMENT / PLAN:   Gary was seen today for wellness visit and health maintenance.    Diagnoses and all orders for this visit:    Encounter for Medicare annual wellness exam    Type 2 diabetes mellitus without complication, " without long-term current use of insulin (H)  -     Albumin Random Urine Quantitative with Creat Ratio  -     FOOT EXAM  -     OPTOMETRY REFERRAL  -     metFORMIN (GLUCOPHAGE) 500 MG tablet; TAKE 1 TABLET BY MOUTH TWICE DAILY WITH MEALS  -     Hemoglobin A1c    Hypertension goal BP (blood pressure) < 140/90  -     amLODIPine (NORVASC) 2.5 MG tablet; Take 1 tablet (2.5 mg) by mouth daily  -     hydrochlorothiazide (HYDRODIURIL) 50 MG tablet; Take 1 tablet (50 mg) by mouth daily  -     losartan (COZAAR) 100 MG tablet; Take 1 tablet (100 mg) by mouth daily    Anxiety  -     PARoxetine (PAXIL) 20 MG tablet; TAKE 2 TABLETS(40 MG) BY MOUTH DAILY    Need for hepatitis B vaccination  -     SCREENING QUESTIONS FOR ADULT IMMUNIZATIONS  -     VACCINE ADMINISTRATION, INITIAL  -     HEPATITIS B VACCINE,ADULT,IM    Gout, unspecified cause, unspecified chronicity, unspecified site  -     allopurinol (ZYLOPRIM) 300 MG tablet; Take 1 tablet (300 mg) by mouth daily    Hyperlipidemia LDL goal <70  -     atorvastatin (LIPITOR) 20 MG tablet; Take 1 tablet (20 mg) by mouth daily  -     Comprehensive metabolic panel  -     Lipid panel reflex to direct LDL Fasting    Environmental allergies  -     cetirizine (ZYRTEC) 10 MG tablet; Take 1 tablet (10 mg) by mouth daily    Vitamin D deficiency disease  -     vitamin D2 (ERGOCALCIFEROL) 35604 units (1250 mcg) capsule; TAKE 1 CAPSULE BY MOUTH EVERY WEEK  -     Vitamin D Deficiency    Dysphonia  -     omeprazole (PRILOSEC) 20 MG DR capsule; TAKE 1 CAPSULE BY MOUTH DAILY    Gastroesophageal reflux disease, esophagitis presence not specified  -     omeprazole (PRILOSEC) 20 MG DR capsule; TAKE 1 CAPSULE BY MOUTH DAILY    Leukoplakia of larynx  -     omeprazole (PRILOSEC) 20 MG DR capsule; TAKE 1 CAPSULE BY MOUTH DAILY    Rosacea  -     metroNIDAZOLE (METROCREAM) 0.75 % external cream; Apply topically 2 times daily as needed (to rosacea areas)    Fatigue, unspecified type  -     TSH with free T4  "reflex  -     CBC with platelets differential    Screening for prostate cancer  -     Prostate spec antigen screen    Skin lesions  -     DERMATOLOGY REFERRAL      Discussed multiple issues with patient  He will schedule dermatology consult for the skin lesion  Check hemoglobin a1c, adjust  diab med as needed  Refill meds  Check labs  Wt is ongoing concern  Keep working on healthy diet/exercise and wt loss  Patient to schedule eye exam    COUNSELING:  Reviewed preventive health counseling, as reflected in patient instructions       Regular exercise       Healthy diet/nutrition       Vision screening       Dental care       Colon cancer screening       Prostate cancer screening    Estimated body mass index is 34.33 kg/m  as calculated from the following:    Height as of this encounter: 1.778 m (5' 10\").    Weight as of this encounter: 108.5 kg (239 lb 4 oz).    Weight management plan: Discussed healthy diet and exercise guidelines     reports that he quit smoking about 19 years ago. He has a 25.00 pack-year smoking history. He has never used smokeless tobacco.      Appropriate preventive services were discussed with this patient, including applicable screening as appropriate for cardiovascular disease, diabetes, osteopenia/osteoporosis, and glaucoma.  As appropriate for age/gender, discussed screening for colorectal cancer, prostate cancer, breast cancer, and cervical cancer. Checklist reviewing preventive services available has been given to the patient.    Reviewed patients plan of care and provided an AVS. The Basic Care Plan (routine screening as documented in Health Maintenance) for Gary meets the Care Plan requirement. This Care Plan has been established and reviewed with the Patient.    Counseling Resources:  ATP IV Guidelines  Pooled Cohorts Equation Calculator  Breast Cancer Risk Calculator  FRAX Risk Assessment  ICSI Preventive Guidelines  Dietary Guidelines for Americans, 2010  USDA's MyPlate  ASA " Prophylaxis  Lung CA Screening    Finesse Beal MD  [facility]   denies

## 2021-12-03 ENCOUNTER — OFFICE VISIT (OUTPATIENT)
Dept: OTOLARYNGOLOGY | Facility: CLINIC | Age: 61
End: 2021-12-03
Payer: MEDICARE

## 2021-12-03 VITALS — BODY MASS INDEX: 31.5 KG/M2 | WEIGHT: 220 LBS | HEIGHT: 70 IN

## 2021-12-03 DIAGNOSIS — J38.7 LEUKOPLAKIA OF LARYNX: Primary | ICD-10-CM

## 2021-12-03 DIAGNOSIS — J38.3 VOCAL CORD GRANULOMA: ICD-10-CM

## 2021-12-03 DIAGNOSIS — R49.0 DYSPHONIA: ICD-10-CM

## 2021-12-03 DIAGNOSIS — Z85.21 HX OF LARYNGEAL CANCER: ICD-10-CM

## 2021-12-03 PROCEDURE — 99212 OFFICE O/P EST SF 10 MIN: CPT | Mod: 25 | Performed by: OTOLARYNGOLOGY

## 2021-12-03 PROCEDURE — 31579 LARYNGOSCOPY TELESCOPIC: CPT | Performed by: OTOLARYNGOLOGY

## 2021-12-03 ASSESSMENT — MIFFLIN-ST. JEOR: SCORE: 1799.16

## 2021-12-03 ASSESSMENT — PAIN SCALES - GENERAL: PAINLEVEL: NO PAIN (0)

## 2021-12-03 NOTE — LETTER
12/3/2021      RE: Gary Iyer  [address] [facility] [id number]       Dear Colleague:    Gary Iyer recently returned for follow-up at the [facility]. My clinic note from our visit is enclosed below.  Speech recognition software may have been used in the documentation below; input is reviewed before signature to the best of my ability.     I appreciate the ongoing opportunity to participate in this patient's care.    Please feel free to contact me with any questions.    Sincerely yours,      Mel Fournier M.D., M.P.H.  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  Most recently, he returned to the operating room on February 9, 2017 for an area of new irregularity of the left true vocal fold.  Pathology showed severe dysplasia with negative but close margins (for moderate, but not severe dysplasia).  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    At the last visit, we observed a slight recurrence of leukoplakia/granuloma in the same location where he had this in 2018. At that time it resolved just as we were considering a repeat biopsy. Subsequently he had allergies which were provoking coughing/throat-clearing, and he was again noted to have a granuloma. He was set up for refresher session(s) of speech therapy with Misty Clark, PhD, CCC-SLP and we discussed nasal hygiene as well.     This led to near complete resolution of the granuloma when he was last seen. Today, he returns for a recheck. His throat feels stable. He has had a number of skin cancers treated, especially along left hand and arm.    MEDICATIONS:     Current  Outpatient Medications   Medication Sig Dispense Refill     alcohol swab prep pads Use to swab area of injection/[place] as directed. 100 each 3     allopurinol (ZYLOPRIM) 300 MG tablet Take 1 tablet (300 mg) by mouth daily 90 tablet 3     amLODIPine (NORVASC) 2.5 MG tablet Take 1 tablet (2.5 mg) by mouth daily 90 tablet 3     aspirin 81 MG tablet Take 1 tablet by mouth daily.       atorvastatin (LIPITOR) 20 MG tablet Take 1 tablet (20 mg) by mouth daily 90 tablet 3     blood glucose (NO BRAND SPECIFIED) test strip Use to test blood sugar 1 times daily or as directed. To accompany: Blood Glucose Monitor Brands: per insurance. 100 strip 6     blood glucose calibration (NO BRAND SPECIFIED) solution To accompany: Blood Glucose Monitor Brands: per insurance. 1 Bottle 3     blood glucose monitoring (NO BRAND SPECIFIED) meter device kit Use to test blood sugar 1 times daily or as directed. Preferred blood glucose meter OR supplies to accompany: Blood Glucose Monitor Brands: per insurance. 1 kit 0     calcipotriene (DOVONEX) 0.005 % external cream Mix with efudex and apply twice daily to hands and arms for 12 days 60 g 3     cetirizine (ZYRTEC) 10 MG tablet Take 1 tablet (10 mg) by mouth daily 90 tablet 3     fluorouracil (EFUDEX) 5 % external cream Mix with dovonex and apply twice daily to hands and arms for 12 days 40 g 1     hydrochlorothiazide (HYDRODIURIL) 50 MG tablet Take 1 tablet (50 mg) by mouth daily 90 tablet 3     hydrocortisone (CORTAID) 1 % external ointment Apply topically 2 times daily as needed 30 g 1     ketoconazole (NIZORAL) 2 % cream Apply topically 2 times daily for up to 2 weeks as needed for rash 60 g 1     ketoconazole (NIZORAL) 2 % external cream Apply daily to affected area on face, armpits, groin. 60 g 4     ketoconazole (NIZORAL) 2 % external shampoo Leave on face, beard for 3-5 minutes then rinse. Use 2-3 times weekly. 120 mL 3     losartan (COZAAR) 50 MG tablet Take 1 tablet (50 mg) by mouth  daily 90 tablet 1     metFORMIN (GLUCOPHAGE) 1000 MG tablet Take 1 tablet (1,000 mg) by mouth 2 times daily (with meals) 180 tablet 1     metroNIDAZOLE (METROCREAM) 0.75 % external cream Apply topically 2 times daily as needed (to rosacea areas) 45 g 1     Misc. Devices (SUPPOSITORY MOLD 2GM) MISC 1 suppository every 12 hours Nifedipine 4 mg suppository, one rectally every 12 hours 100 each 4     Multiple Vitamin (DAILY MULTIVITAMIN PO) Take  by mouth daily.       nifedipine 0.2% in white petrolatum 0.2 % OINT ointment Apply topically 2 times daily Please make it into suppositories 0.2 % nifedipine cream Place in the anus every 12 hours.  Just get to where the anus is tight . 100 g 3     omeprazole (PRILOSEC) 20 MG DR capsule TAKE 1 CAPSULE BY MOUTH DAILY 90 capsule 3     order for DME Autocpap 10-16 cm 1 Units 0     oxybutynin (DITROPAN) 5 MG tablet Take 1 tablet (5 mg) by mouth At Bedtime 90 tablet 3     PARoxetine (PAXIL) 40 MG tablet Take 1 tablet (40 mg) by mouth every morning 90 tablet 3     polyethylene glycol (MIRALAX) 17 GM/Dose powder Take 17 g (1 capful) by mouth daily 850 g 3     psyllium 0.52 G capsule Take 1 capsule (0.52 g) by mouth daily 100 capsule 3     tamsulosin (FLOMAX) 0.4 MG capsule 2 po daily 180 capsule 1     thin (NO BRAND SPECIFIED) lancets Use with lanceting device. To accompany: Blood Glucose Monitor Brands: per insurance. 100 each 11     triamcinolone (KENALOG) 0.1 % external cream Apply topically 2 times daily as needed for irritation 30 g 0     zolpidem (AMBIEN) 5 MG tablet Take tablet by mouth 15 minutes prior to sleep, for Sleep Study 1 tablet 0       ALLERGIES:  No Known Allergies    NEW PMH/PSH: None    REVIEW OF SYSTEMS:  The patient completed a comprehensive 11 point review of systems (below), which was reviewed. Positives are as noted below.  Patient Supplied Answers to Review of Systems   ENT ROS 4/22/2016   Ears, Nose, Throat Nasal congestion or drainage       PHYSICAL  EXAM:  General: The patient was alert and conversant, and in no acute distress.    Oral cavity/oropharynx: No masses or lesions. Dentition unchanged since prior. Tongue mobility and palate elevation intact and symmetric.  Neck: No palpable cervical lymphadenopathy, no  tenderness to palpation of the thyrohyoid space, which was narrow. No obvious thyroid abnormality. Indistinct landmarks due to habitus.  Resp: Breathing comfortably, no stridor or stertor.  Neuro: Symmetric facial function. Other cranial nerve function as documented above.  Psych: Normal affect, pleasant and cooperative.  Voice/speech: No significant dysphonia.      Intake scores  Last 2 Scores for Patient-Answered VHI Questionnaire  VHI Total Score 2/26/2018 3/1/2019   VHI Total Score 9 17      Last 2 Scores for Patient-Answered EAT Questionnaire  EAT Total Score 2/26/2018 3/1/2019   EAT Total Score 1 5        Last 2 Scores for Patient-Answered CSI Questionnaire  CSI Total Score 2/26/2018 3/1/2019   CSI Total Score 0 0       Procedure:   Flexible fiberoptic laryngoscopy and laryngovideostroboscopy  Indications: This procedure was warranted to evaluate the patient's laryngeal anatomy and function. Risks, benefits, and alternatives were discussed.  Description: After written informed consent was obtained, a time-out was performed to confirm patient identity, procedure, and procedure site. Topical 3% lidocaine with 0.25% phenylephrine was applied to the nasal cavities. I performed the endoscopy and no complications were apparent. Continuous and stroboscopic light were utilized to assess routine phonation and variable frequency phonation.  Performed by: Mel Fournier MD MPH  Findings: Normal nasopharynx. Normal base of tongue, valleculae, and epiglottis. Vocal fold mobility: right: normal; left: normal. Medial edges of the vocal folds: smooth and straight. No focal mucosal lesions were observed on the true vocal folds. Glissade produced appropriate  elongation. Mucosa of false vocal folds, aryepiglottic folds, piriform sinuses, and posterior glottis unremarkable overall; left medial arytenoid with mild irritation, similar to 2 exams ago. Airway was patent. Similar findings on NBI.    The addition of stroboscopy allowed evaluation of the mucosal wave.   Amplitude: right: mildly decreased; left: mildly decreased. Symmetry: intermittent symmetry. Closure pattern: complete. Closure plane: at glottic level. Phase distribution: normal.                        IMPRESSION AND PLAN:   Gary Iyer returns with slightly increased prominence of the left arytenoid irritation which has waxed and waned over the years.    Plan:   1) Resume voice therapy techniques.   2) Return to clinic in three to four months or sooner as needed.    I appreciate the opportunity to participate in the care of this pleasant patient.     Today's visit required additional screening time, PPE, and cleaning measures to allow for a safe in-person visit, due to the public health emergency. I spent a total of 16 minutes on 12/3/2021 in chart review, review of tests, patient visit, documentation, care coordination, and/or discussion with other providers about the issues documented above, separate from any documented procedure(s).      Mel Fournier MD

## 2021-12-03 NOTE — NURSING NOTE
"Chief Complaint   Patient presents with     RECHECK     follow up       Height 1.778 m (5' 10\"), weight 99.8 kg (220 lb).    Lula Montenegro, EMT    "

## 2021-12-03 NOTE — PROGRESS NOTES
Dear Colleague:    Gary Iyer recently returned for follow-up at the [facility]. My clinic note from our visit is enclosed below.  Speech recognition software may have been used in the documentation below; input is reviewed before signature to the best of my ability.     I appreciate the ongoing opportunity to participate in this patient's care.    Please feel free to contact me with any questions.    Sincerely yours,      Mel Fournier M.D., M.P.H.  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  Most recently, he returned to the operating room on February 9, 2017 for an area of new irregularity of the left true vocal fold.  Pathology showed severe dysplasia with negative but close margins (for moderate, but not severe dysplasia).  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    At the last visit, we observed a slight recurrence of leukoplakia/granuloma in the same location where he had this in 2018. At that time it resolved just as we were considering a repeat biopsy. Subsequently he had allergies which were provoking coughing/throat-clearing, and he was again noted to have a granuloma. He was set up for refresher session(s) of speech therapy with Misty Clark, PhD, CCC-SLP and we discussed nasal hygiene as well.     This led to near complete resolution of the granuloma when he was last seen. Today, he returns for a recheck. His throat feels stable. He has had a number of skin cancers treated, especially along left hand and arm.    MEDICATIONS:     Current Outpatient Medications   Medication Sig Dispense Refill     alcohol swab  prep pads Use to swab area of injection/maria [place] rosario as directed. 100 each 3     allopurinol (ZYLOPRIM) 300 MG tablet Take 1 tablet (300 mg) by mouth daily 90 tablet 3     amLODIPine (NORVASC) 2.5 MG tablet Take 1 tablet (2.5 mg) by mouth daily 90 tablet 3     aspirin 81 MG tablet Take 1 tablet by mouth daily.       atorvastatin (LIPITOR) 20 MG tablet Take 1 tablet (20 mg) by mouth daily 90 tablet 3     blood glucose (NO BRAND SPECIFIED) test strip Use to test blood sugar 1 times daily or as directed. To accompany: Blood Glucose Monitor Brands: per insurance. 100 strip 6     blood glucose calibration (NO BRAND SPECIFIED) solution To accompany: Blood Glucose Monitor Brands: per insurance. 1 Bottle 3     blood glucose monitoring (NO BRAND SPECIFIED) meter device kit Use to test blood sugar 1 times daily or as directed. Preferred blood glucose meter OR supplies to accompany: Blood Glucose Monitor Brands: per insurance. 1 kit 0     calcipotriene (DOVONEX) 0.005 % external cream Mix with efudex and apply twice daily to hands and arms for 12 days 60 g 3     cetirizine (ZYRTEC) 10 MG tablet Take 1 tablet (10 mg) by mouth daily 90 tablet 3     fluorouracil (EFUDEX) 5 % external cream Mix with dovonex and apply twice daily to hands and arms for 12 days 40 g 1     hydrochlorothiazide (HYDRODIURIL) 50 MG tablet Take 1 tablet (50 mg) by mouth daily 90 tablet 3     hydrocortisone (CORTAID) 1 % external ointment Apply topically 2 times daily as needed 30 g 1     ketoconazole (NIZORAL) 2 % cream Apply topically 2 times daily for up to 2 weeks as needed for rash 60 g 1     ketoconazole (NIZORAL) 2 % external cream Apply daily to affected area on face, armpits, groin. 60 g 4     ketoconazole (NIZORAL) 2 % external shampoo Leave on face, beard for 3-5 minutes then rinse. Use 2-3 times weekly. 120 mL 3     losartan (COZAAR) 50 MG tablet Take 1 tablet (50 mg) by mouth daily 90 tablet 1     metFORMIN (GLUCOPHAGE) 1000 MG tablet Take 1 tablet  (1,000 mg) by mouth 2 times daily (with meals) 180 tablet 1     metroNIDAZOLE (METROCREAM) 0.75 % external cream Apply topically 2 times daily as needed (to rosacea areas) 45 g 1     Misc. Devices (SUPPOSITORY MOLD 2GM) MISC 1 suppository every 12 hours Nifedipine 4 mg suppository, one rectally every 12 hours 100 each 4     Multiple Vitamin (DAILY MULTIVITAMIN PO) Take  by mouth daily.       nifedipine 0.2% in white petrolatum 0.2 % OINT ointment Apply topically 2 times daily Please make it into suppositories 0.2 % nifedipine cream Place in the anus every 12 hours.  Just get to where the anus is tight . 100 g 3     omeprazole (PRILOSEC) 20 MG DR capsule TAKE 1 CAPSULE BY MOUTH DAILY 90 capsule 3     order for DME Autocpap 10-16 cm 1 Units 0     oxybutynin (DITROPAN) 5 MG tablet Take 1 tablet (5 mg) by mouth At Bedtime 90 tablet 3     PARoxetine (PAXIL) 40 MG tablet Take 1 tablet (40 mg) by mouth every morning 90 tablet 3     polyethylene glycol (MIRALAX) 17 GM/Dose powder Take 17 g (1 capful) by mouth daily 850 g 3     psyllium 0.52 G capsule Take 1 capsule (0.52 g) by mouth daily 100 capsule 3     tamsulosin (FLOMAX) 0.4 MG capsule 2 po daily 180 capsule 1     thin (NO BRAND SPECIFIED) lancets Use with lanceting device. To accompany: Blood Glucose Monitor Brands: per insurance. 100 each 11     triamcinolone (KENALOG) 0.1 % external cream Apply topically 2 times daily as needed for irritation 30 g 0     zolpidem (AMBIEN) 5 MG tablet Take tablet by mouth 15 minutes prior to sleep, for Sleep Study 1 tablet 0       ALLERGIES:  No Known Allergies    NEW PMH/PSH: None    REVIEW OF SYSTEMS:  The patient completed a comprehensive 11 point review of systems (below), which was reviewed. Positives are as noted below.  Patient Supplied Answers to Review of Systems  [facility] ROS 4/22/2016   Ears, Nose, Throat Nasal congestion or drainage       PHYSICAL EXAM:  General: The patient was alert and conversant, and in no acute  distress.    Oral cavity/oropharynx: No masses or lesions. Dentition unchanged since prior. Tongue mobility and palate elevation intact and symmetric.  Neck: No palpable cervical lymphadenopathy, no  tenderness to palpation of the thyrohyoid space, which was narrow. No obvious thyroid abnormality. Indistinct landmarks due to habitus.  Resp: Breathing comfortably, no stridor or stertor.  Neuro: Symmetric facial function. Other cranial nerve function as documented above.  Psych: Normal affect, pleasant and cooperative.  Voice/speech: No significant dysphonia.      Intake scores  Last 2 Scores for Patient-Answered VHI Questionnaire  VHI Total Score 2/26/2018 3/1/2019   VHI Total Score 9 17      Last 2 Scores for Patient-Answered EAT Questionnaire  EAT Total Score 2/26/2018 3/1/2019   EAT Total Score 1 5        Last 2 Scores for Patient-Answered CSI Questionnaire  CSI Total Score 2/26/2018 3/1/2019   CSI Total Score 0 0       Procedure:   Flexible fiberoptic laryngoscopy and laryngovideostroboscopy  Indications: This procedure was warranted to evaluate the patient's laryngeal anatomy and function. Risks, benefits, and alternatives were discussed.  Description: After written informed consent was obtained, a time-out was performed to confirm patient identity, procedure, and procedure site. Topical 3% lidocaine with 0.25% phenylephrine was applied to the nasal cavities. I performed the endoscopy and no complications were apparent. Continuous and stroboscopic light were utilized to assess routine phonation and variable frequency phonation.  Performed by: Mel Fournier MD MPH  Findings: Normal nasopharynx. Normal base of tongue, valleculae, and epiglottis. Vocal fold mobility: right: normal; left: normal. Medial edges of the vocal folds: smooth and straight. No focal mucosal lesions were observed on the true vocal folds. Glissade produced appropriate elongation. Mucosa of false vocal folds, aryepiglottic folds, piriform  sinuses, and posterior glottis unremarkable overall; left medial arytenoid with mild irritation, similar to 2 exams ago. Airway was patent. Similar findings on NBI.    The addition of stroboscopy allowed evaluation of the mucosal wave.   Amplitude: right: mildly decreased; left: mildly decreased. Symmetry: intermittent symmetry. Closure pattern: complete. Closure plane: at glottic level. Phase distribution: normal.                        IMPRESSION AND PLAN:   Gary Iyer returns with slightly increased prominence of the left arytenoid irritation which has waxed and waned over the years.    Plan:   1) Resume voice therapy techniques.   2) Return to clinic in three to four months or sooner as needed.    I appreciate the opportunity to participate in the care of this pleasant patient.     Today's visit required additional screening time, PPE, and cleaning measures to allow for a safe in-person visit, due to the public health emergency. I spent a total of 16 minutes on 12/3/2021 in chart review, review of tests, patient visit, documentation, care coordination, and/or discussion with other providers about the issues documented above, separate from any documented procedure(s).

## 2021-12-03 NOTE — PATIENT INSTRUCTIONS
1.  You were seen in the ENT Clinic today by . If you have any questions or concerns after your appointment, please call [phone]. Press option #1 for scheduling related needs. Press option #3 for Nurse advice.    2.  Plan is to return to clinic in 3-4 months    Lisa Hazel LPN  [phone]  [facility] - Otolaryngology

## 2022-01-04 ENCOUNTER — VIRTUAL VISIT (OUTPATIENT)
Dept: SLEEP MEDICINE | Facility: CLINIC | Age: 62
End: 2022-01-04
Payer: MEDICARE

## 2022-01-04 VITALS — HEIGHT: 70 IN | BODY MASS INDEX: 30.06 KG/M2 | WEIGHT: 210 LBS

## 2022-01-04 DIAGNOSIS — G47.33 OBSTRUCTIVE SLEEP APNEA: Primary | ICD-10-CM

## 2022-01-04 DIAGNOSIS — G47.61 PERIODIC LIMB MOVEMENTS OF SLEEP: ICD-10-CM

## 2022-01-04 DIAGNOSIS — R79.0 LOW FERRITIN: ICD-10-CM

## 2022-01-04 DIAGNOSIS — E66.9 OBESITY, UNSPECIFIED CLASSIFICATION, UNSPECIFIED OBESITY TYPE, UNSPECIFIED WHETHER SERIOUS COMORBIDITY PRESENT: ICD-10-CM

## 2022-01-04 PROCEDURE — 99213 OFFICE O/P EST LOW 20 MIN: CPT | Mod: 95 | Performed by: INTERNAL MEDICINE

## 2022-01-04 ASSESSMENT — MIFFLIN-ST. JEOR: SCORE: 1753.8

## 2022-01-04 ASSESSMENT — SLEEP AND FATIGUE QUESTIONNAIRES
HOW LIKELY ARE YOU TO NOD OFF OR FALL ASLEEP IN A CAR, WHILE STOPPED FOR A FEW MINUTES IN TRAFFIC: WOULD NEVER DOZE
HOW LIKELY ARE YOU TO NOD OFF OR FALL ASLEEP WHILE WATCHING TV: SLIGHT CHANCE OF DOZING
HOW LIKELY ARE YOU TO NOD OFF OR FALL ASLEEP WHILE SITTING AND TALKING TO SOMEONE: WOULD NEVER DOZE
HOW LIKELY ARE YOU TO NOD OFF OR FALL ASLEEP WHEN YOU ARE A PASSENGER IN A CAR FOR AN HOUR WITHOUT A BREAK: WOULD NEVER DOZE
HOW LIKELY ARE YOU TO NOD OFF OR FALL ASLEEP WHILE SITTING INACTIVE IN A PUBLIC PLACE: WOULD NEVER DOZE
HOW LIKELY ARE YOU TO NOD OFF OR FALL ASLEEP WHILE SITTING QUIETLY AFTER LUNCH WITHOUT ALCOHOL: WOULD NEVER DOZE
HOW LIKELY ARE YOU TO NOD OFF OR FALL ASLEEP WHILE LYING DOWN TO REST IN THE AFTERNOON WHEN CIRCUMSTANCES PERMIT: MODERATE CHANCE OF DOZING
HOW LIKELY ARE YOU TO NOD OFF OR FALL ASLEEP WHILE SITTING AND READING: WOULD NEVER DOZE

## 2022-01-04 NOTE — PATIENT INSTRUCTIONS

## 2022-01-04 NOTE — PROGRESS NOTES
Gary is a 61 year old who is being evaluated via a billable video visit.      How would you like to obtain your AVS? MyChart  If the video visit is dropped, the invitation should be resent by: Text to cell phone: [phone]  Will anyone else be joining your video visit?       Video Start Time: 10:33 AM     Video-Visit Details    Type of service:  Video Visit    Video End Time:10:54 AM    Originating Location (pt. Location): Home    Distant Location (provider location):  [facility]     Platform used for Video Visit: Handpay     He had trouble hearing        Obstructive Sleep Apnea - PAP Follow-Up Visit:    Chief Complaint   Patient presents with     Video Visit     CPAP follow up       Gary Iyer comes for follow-up of their severe sleep apnea, managed with CPAP.     DME: [facility] ([place])     Initial sleep study 2000s, not available for review. He cannot recall why first study was done, probably was done for snoring. He tried CPAP for a day or so and then quit.     Sleep study [facility] 5/2012 (216#)  AHI 25, .9, low O2 32% (probably artifact by hypnogram). True low O2 probably upper 70s on CPAP during REM. CPAP 8cm with fragmented sleep during supine REM.     He tried CPAP for 2-3 days but 'could not sleep' and again stopped using it.     He initially presented to [facility]  in 2017 with symptoms of fatigue (ESS 0), nocturnal awakenings, leg shaking. Comorbid diabetes mellitus, hypertension.    - Sleep onset difficulties. Suspect multifactorial: delayed sleep phase, psychophysiologic insomnia, poor sleep hygiene.   - Nocturnal leg kicking/stiffness. This may be related to his obstructive sleep apnea. Nocturnal seizure disorder could also be considerered.       Study Date: 3/14/2017- (227.0 lbs)   - Titration complicated by high leaks on several different mask types.   - Titrated from 5 cmH2O up to 15 cmH2O.  Most of titrations done for RERAs and flow  limitations. The final  pressure was 15 cmH2O with a residual AHI of 0 events per hour.  REM supine seen at 12 cm with good control of events.   - Lowest oxygen saturation was 84.1%.  Time spent less than or equal to 88% was 0.9 minutes.   - PLM index was 72.6 movements per hour.  PLM Arousal Index was 24.4 per hour.   - Cyclic alternating pattern of arousal with and without whole body movements and leg movements was seen.     Recommend autoCPAP 10-16 cm     He returned 5/2021 not using CPAP, stopped for unclear reasons with symptoms of long sleep times (ESS 1). Comorbid hypertension, diabetes mellitus. Recommend restart CPAP 10-16 cmH20, but patient has been told by DME that he needs to complete another study    9/29/2021 [facility] Study (225 #) - TST 82 minutes. AHI 82.0, .6, Supine AHI n/a, REM AHI n/a, Low O2 79.8%, Time Spent <=88% 4.8 minutes / Time Spent <=89% 6.8 minutes.      Overall, he rates the experience with PAP as improving.    Initially he had some nose soreness  Wife thinks its too much pressure, unclear why    He feels more rested during the day  He stays asleep better, he is urinating less often at night  Leg shaking at night is also improved    His PAP interface is Nasal Pillows.      Total score - [place]: 3 (1/4/2022 10:05 AM)  GRIFFIN Total Score: 0      ResMed   Auto-PAP 10.0 - 16.0 cmH2O 30 day usage data:    76% of days with > 4 hours of use. 0/30 days with no use.   Average use 371 minutes per day.   95%ile Leak 6.43 L/min.   CPAP 95% pressure 13.6 cm.   AHI 0.79 events per hour.     He will take mask off because of nose irritation  He gets tangled in hoses        Past medical/surgical history, family history, social history, medications and allergies were reviewed.      Problem List:  Patient Active Problem List    Diagnosis Date Noted     Hx of laryngeal cancer 09/26/2014     Priority: High     squamous cell cancer of the vocal cord, resected 2008  recurrent T1a  squamous cell carcinoma of the left true vocal fold that was excised June 27, 2013       Obstructive sleep apnea- moderate-severe (AHI 25)      Priority: Medium     Initial sleep study 2000s, not available for review.  Sleep study [facility] 5/2012 (216#)  AHI 25, .9, low O2 32% (probably artifact by hypnogram). True low O2 probably upper 70s on CPAP during REM. CPAP 8 cm with fragmented sleep during supine REM.   Study Date: 3/14/2017- (227.0 lbs)- Titration complicated by high leaks on several masks. Most of titrations done for RERAs and flow limitations. The final  pressure achieved was 15 cmH2O with a residual AHI of 0.  REM supine seen at 12 cm with good control of events. Lowest oxygen saturation 84.1%.  Time spent less than or equal to 88% was 0.9 minutes. PLM index 72.6.  The PLM Arousal Index was 24. Cyclic alternating pattern of arousal with and without whole body movements and leg movements seen.   9/29/2021 [facility] Sleep Study (225 #) - TST 82 minutes AHI 82.0, .6,  Supine AHI n/a, REM AHI n/a, Low O2 79.8%, Time Spent ?88% 4.8 minutes / Time Spent ?89% 6.8 minutes.       SAMARIA (generalized anxiety disorder) 06/13/2013     Priority: Medium     Hypertension goal BP (blood pressure) < 140/90 11/30/2012     Priority: Medium     Hyperlipidemia LDL goal <70 05/04/2012     Priority: Medium     Obesity, unspecified classification, unspecified obesity type, unspecified whether serious comorbidity present 11/06/2018     Priority: Low     Chronic cough 12/12/2017     Priority: Low     Gout, unspecified cause, unspecified chronicity, unspecified site 10/06/2017     Priority: Low     Low ferritin 03/30/2017     Priority: Low     Periodic limb movements of sleep 03/29/2017     Priority: Low     Benign prostatic hypertrophy 03/07/2017     Priority: Low     Type 2 diabetes mellitus without complication, without long-term current use of insulin (H) 02/08/2017     Priority: Low     Dysphonia  "04/24/2016     Priority: Low     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 Less than 50% diameter stenosis of the left ICA relative to the distal ICA diameter.       Advanced directives, counseling/discussion 07/23/2013     Priority: Low     Advance Care Planning:  Information given           Ht 1.778 m (5' 10\")   Wt 95.3 kg (210 lb)   BMI 30.13 kg/m      Impression/Plan:     Severe Sleep apnea.   Tolerating PAP well. Daytime symptoms are improved.   - Continue current treatments.   - Consider back-up nasal mask or use of OTC hydrocorisone cream for when nasal irritation occurs  - Suggeested having hose go out the head of the bed from the mask to reduce tangling at night    Gary Iyer will follow up in about 1 year(s).     I spent 20 minutes with patient including counseling, and 5 minutes with chart review, and documentation   "

## 2022-01-26 ENCOUNTER — TELEPHONE (OUTPATIENT)
Dept: FAMILY MEDICINE | Facility: CLINIC | Age: 62
End: 2022-01-26
Payer: COMMERCIAL

## 2022-01-26 DIAGNOSIS — Z12.5 SCREENING FOR PROSTATE CANCER: Primary | ICD-10-CM

## 2022-01-26 DIAGNOSIS — E11.9 TYPE 2 DIABETES MELLITUS WITHOUT COMPLICATION, WITHOUT LONG-TERM CURRENT USE OF INSULIN (H): ICD-10-CM

## 2022-01-26 DIAGNOSIS — E55.9 VITAMIN D DEFICIENCY DISEASE: ICD-10-CM

## 2022-01-26 DIAGNOSIS — E78.5 HYPERLIPIDEMIA LDL GOAL <70: ICD-10-CM

## 2022-01-26 DIAGNOSIS — R53.83 FATIGUE, UNSPECIFIED TYPE: ICD-10-CM

## 2022-01-26 NOTE — TELEPHONE ENCOUNTER
Reason for Call: Request for an order or referral:    Order or referral being requested: lab orders for physical, has a lab appt on Feb. 14    Date needed: at your convenience    Has the patient been seen by the PCP for this problem? NO    Additional comments: no    Phone number Patient can be reached at:  Home number on file [phone] (home)    Best Time:  anytime    Can we leave a detailed message on this number?  No need for call back    Call taken on 1/26/2022 at 2:20 PM by Tessa Vaughn

## 2022-02-14 ENCOUNTER — LAB (OUTPATIENT)
Dept: LAB | Facility: CLINIC | Age: 62
End: 2022-02-14
Payer: MEDICARE

## 2022-02-14 DIAGNOSIS — R53.83 FATIGUE, UNSPECIFIED TYPE: ICD-10-CM

## 2022-02-14 DIAGNOSIS — E11.9 TYPE 2 DIABETES MELLITUS WITHOUT COMPLICATION, WITHOUT LONG-TERM CURRENT USE OF INSULIN (H): ICD-10-CM

## 2022-02-14 DIAGNOSIS — E78.5 HYPERLIPIDEMIA LDL GOAL <70: ICD-10-CM

## 2022-02-14 DIAGNOSIS — E55.9 VITAMIN D DEFICIENCY DISEASE: ICD-10-CM

## 2022-02-14 DIAGNOSIS — Z12.5 SCREENING FOR PROSTATE CANCER: ICD-10-CM

## 2022-02-14 LAB
ALBUMIN SERPL-MCNC: 3.8 G/DL (ref 3.4–5)
ALP SERPL-CCNC: 61 U/L (ref 40–150)
ALT SERPL W P-5'-P-CCNC: 64 U/L (ref 0–70)
ANION GAP SERPL CALCULATED.3IONS-SCNC: 8 MMOL/L (ref 3–14)
AST SERPL W P-5'-P-CCNC: 39 U/L (ref 0–45)
BASOPHILS # BLD AUTO: 0 10E3/UL (ref 0–0.2)
BASOPHILS NFR BLD AUTO: 0 %
BILIRUB SERPL-MCNC: 0.5 MG/DL (ref 0.2–1.3)
BUN SERPL-MCNC: 12 MG/DL (ref 7–30)
CALCIUM SERPL-MCNC: 9.4 MG/DL (ref 8.5–10.1)
CHLORIDE BLD-SCNC: 97 MMOL/L (ref 94–109)
CHOLEST SERPL-MCNC: 174 MG/DL
CO2 SERPL-SCNC: 30 MMOL/L (ref 20–32)
CREAT SERPL-MCNC: 0.85 MG/DL (ref 0.66–1.25)
CREAT UR-MCNC: 193 MG/DL
EOSINOPHIL # BLD AUTO: 0.3 10E3/UL (ref 0–0.7)
EOSINOPHIL NFR BLD AUTO: 3 %
ERYTHROCYTE [DISTWIDTH] IN BLOOD BY AUTOMATED COUNT: 15.4 % (ref 10–15)
FASTING STATUS PATIENT QL REPORTED: YES
GFR SERPL CREATININE-BSD FRML MDRD: >90 ML/MIN/1.73M2
GLUCOSE BLD-MCNC: 155 MG/DL (ref 70–99)
HBA1C MFR BLD: 7.5 % (ref 0–5.6)
HCT VFR BLD AUTO: 37.5 % (ref 40–53)
HDLC SERPL-MCNC: 51 MG/DL
HGB BLD-MCNC: 12 G/DL (ref 13.3–17.7)
LDLC SERPL CALC-MCNC: 93 MG/DL
LYMPHOCYTES # BLD AUTO: 2.6 10E3/UL (ref 0.8–5.3)
LYMPHOCYTES NFR BLD AUTO: 28 %
MCH RBC QN AUTO: 26.9 PG (ref 26.5–33)
MCHC RBC AUTO-ENTMCNC: 32 G/DL (ref 31.5–36.5)
MCV RBC AUTO: 84 FL (ref 78–100)
MICROALBUMIN UR-MCNC: 187 MG/L
MICROALBUMIN/CREAT UR: 96.89 MG/G CR (ref 0–17)
MONOCYTES # BLD AUTO: 0.9 10E3/UL (ref 0–1.3)
MONOCYTES NFR BLD AUTO: 10 %
NEUTROPHILS # BLD AUTO: 5.4 10E3/UL (ref 1.6–8.3)
NEUTROPHILS NFR BLD AUTO: 58 %
NONHDLC SERPL-MCNC: 123 MG/DL
PLATELET # BLD AUTO: 330 10E3/UL (ref 150–450)
POTASSIUM BLD-SCNC: 3.4 MMOL/L (ref 3.4–5.3)
PROT SERPL-MCNC: 8.7 G/DL (ref 6.8–8.8)
PSA SERPL-MCNC: 0.63 UG/L (ref 0–4)
RBC # BLD AUTO: 4.46 10E6/UL (ref 4.4–5.9)
SODIUM SERPL-SCNC: 135 MMOL/L (ref 133–144)
TRIGL SERPL-MCNC: 152 MG/DL
TSH SERPL DL<=0.005 MIU/L-ACNC: 2.23 MU/L (ref 0.4–4)
WBC # BLD AUTO: 9.3 10E3/UL (ref 4–11)

## 2022-02-14 PROCEDURE — 36415 COLL VENOUS BLD VENIPUNCTURE: CPT

## 2022-02-14 PROCEDURE — 82306 VITAMIN D 25 HYDROXY: CPT

## 2022-02-14 PROCEDURE — 84443 ASSAY THYROID STIM HORMONE: CPT

## 2022-02-14 PROCEDURE — 80053 COMPREHEN METABOLIC PANEL: CPT

## 2022-02-14 PROCEDURE — 82043 UR ALBUMIN QUANTITATIVE: CPT

## 2022-02-14 PROCEDURE — 80061 LIPID PANEL: CPT

## 2022-02-14 PROCEDURE — G0103 PSA SCREENING: HCPCS

## 2022-02-14 PROCEDURE — 85025 COMPLETE CBC W/AUTO DIFF WBC: CPT

## 2022-02-14 PROCEDURE — 83036 HEMOGLOBIN GLYCOSYLATED A1C: CPT

## 2022-02-15 LAB — DEPRECATED CALCIDIOL+CALCIFEROL SERPL-MC: 31 UG/L (ref 20–75)

## 2022-03-30 ENCOUNTER — TELEPHONE (OUTPATIENT)
Dept: OTOLARYNGOLOGY | Facility: CLINIC | Age: 62
End: 2022-03-30
Payer: MEDICARE

## 2022-04-12 ENCOUNTER — NURSE TRIAGE (OUTPATIENT)
Dept: FAMILY MEDICINE | Facility: CLINIC | Age: 62
End: 2022-04-12

## 2022-04-12 ENCOUNTER — OFFICE VISIT (OUTPATIENT)
Dept: FAMILY MEDICINE | Facility: CLINIC | Age: 62
End: 2022-04-12
Payer: MEDICARE

## 2022-04-12 VITALS
SYSTOLIC BLOOD PRESSURE: 156 MMHG | DIASTOLIC BLOOD PRESSURE: 84 MMHG | OXYGEN SATURATION: 100 % | HEART RATE: 112 BPM | TEMPERATURE: 97.9 F | WEIGHT: 250 LBS | BODY MASS INDEX: 35.87 KG/M2

## 2022-04-12 DIAGNOSIS — H57.89 EYE SWELLING, BILATERAL: Primary | ICD-10-CM

## 2022-04-12 DIAGNOSIS — E11.9 TYPE 2 DIABETES MELLITUS WITHOUT COMPLICATION, WITHOUT LONG-TERM CURRENT USE OF INSULIN (H): ICD-10-CM

## 2022-04-12 DIAGNOSIS — Z11.4 SCREENING FOR HIV (HUMAN IMMUNODEFICIENCY VIRUS): ICD-10-CM

## 2022-04-12 DIAGNOSIS — D50.9 IRON DEFICIENCY ANEMIA, UNSPECIFIED IRON DEFICIENCY ANEMIA TYPE: ICD-10-CM

## 2022-04-12 DIAGNOSIS — E66.01 MORBID OBESITY (H): ICD-10-CM

## 2022-04-12 LAB
BASOPHILS # BLD AUTO: 0 10E3/UL (ref 0–0.2)
BASOPHILS NFR BLD AUTO: 1 %
EOSINOPHIL # BLD AUTO: 0.2 10E3/UL (ref 0–0.7)
EOSINOPHIL NFR BLD AUTO: 2 %
ERYTHROCYTE [DISTWIDTH] IN BLOOD BY AUTOMATED COUNT: 15.7 % (ref 10–15)
HCT VFR BLD AUTO: 36.2 % (ref 40–53)
HGB BLD-MCNC: 11.5 G/DL (ref 13.3–17.7)
LYMPHOCYTES # BLD AUTO: 2.4 10E3/UL (ref 0.8–5.3)
LYMPHOCYTES NFR BLD AUTO: 29 %
MCH RBC QN AUTO: 27.2 PG (ref 26.5–33)
MCHC RBC AUTO-ENTMCNC: 31.8 G/DL (ref 31.5–36.5)
MCV RBC AUTO: 86 FL (ref 78–100)
MONOCYTES # BLD AUTO: 1.1 10E3/UL (ref 0–1.3)
MONOCYTES NFR BLD AUTO: 13 %
NEUTROPHILS # BLD AUTO: 4.6 10E3/UL (ref 1.6–8.3)
NEUTROPHILS NFR BLD AUTO: 55 %
PLATELET # BLD AUTO: 351 10E3/UL (ref 150–450)
RBC # BLD AUTO: 4.23 10E6/UL (ref 4.4–5.9)
RETICS # AUTO: 0.07 10E6/UL (ref 0.03–0.1)
RETICS/RBC NFR AUTO: 1.7 % (ref 0.5–2)
WBC # BLD AUTO: 8.3 10E3/UL (ref 4–11)

## 2022-04-12 PROCEDURE — 99214 OFFICE O/P EST MOD 30 MIN: CPT | Performed by: FAMILY MEDICINE

## 2022-04-12 PROCEDURE — 83550 IRON BINDING TEST: CPT | Performed by: FAMILY MEDICINE

## 2022-04-12 PROCEDURE — 36415 COLL VENOUS BLD VENIPUNCTURE: CPT | Performed by: FAMILY MEDICINE

## 2022-04-12 PROCEDURE — 82728 ASSAY OF FERRITIN: CPT | Performed by: FAMILY MEDICINE

## 2022-04-12 PROCEDURE — 85045 AUTOMATED RETICULOCYTE COUNT: CPT | Performed by: FAMILY MEDICINE

## 2022-04-12 PROCEDURE — 82043 UR ALBUMIN QUANTITATIVE: CPT | Performed by: FAMILY MEDICINE

## 2022-04-12 PROCEDURE — 85025 COMPLETE CBC W/AUTO DIFF WBC: CPT | Performed by: FAMILY MEDICINE

## 2022-04-12 RX ORDER — OLOPATADINE HYDROCHLORIDE 1 MG/ML
1 SOLUTION/ DROPS OPHTHALMIC 2 TIMES DAILY
Qty: 1 ML | Refills: 0 | Status: SHIPPED | OUTPATIENT
Start: 2022-04-12 | End: 2022-04-13

## 2022-04-12 RX ORDER — PREDNISONE 20 MG/1
20 TABLET ORAL DAILY
Qty: 5 TABLET | Refills: 0 | Status: SHIPPED | OUTPATIENT
Start: 2022-04-12 | End: 2022-04-17

## 2022-04-12 NOTE — PROGRESS NOTES
Assessment & Plan       ICD-10-CM    1. Eye swelling, bilateral  H57.89 olopatadine (PATANOL) 0.1 % ophthalmic solution     predniSONE (DELTASONE) 20 MG tablet   2. Screening for HIV (human immunodeficiency virus)  Z11.4    3. Type 2 diabetes mellitus without complication, without long-term current use of insulin (H)  E11.9 REVIEW OF HEALTH MAINTENANCE PROTOCOL ORDERS     Albumin Random Urine Quantitative with Creat Ratio     Albumin Random Urine Quantitative with Creat Ratio   4. Iron deficiency anemia, unspecified iron deficiency anemia type  D50.9 CBC with Platelets & Differential     Iron & Iron Binding Capacity     Ferritin     Reticulocyte count     Reticulocyte count     Ferritin     Iron & Iron Binding Capacity     CBC with Platelets & Differential   5. Morbid obesity (H)  E66.01          Review of external notes as documented elsewhere in note             There are no Patient Instructions on file for this visit.    No follow-ups on file.    Eduardo Vidal MD  [facility] SHONA Vega is a 61 year old who presents for the following health issues     History of Present Illness       Reason for visit:  Eyes allergy  Symptom onset:  1-3 days ago    He eats 2-3 servings of fruits and vegetables daily.He consumes 2 sweetened beverage(s) daily.He exercises with enough effort to increase his heart rate 10 to 19 minutes per day.  He exercises with enough effort to increase his heart rate 4 days per week.   He is taking medications regularly.       Concern - Eye swelling  Onset: 3 days  Description: Under both eyes swelling   Intensity: moderate  Progression of Symptoms:  same  Accompanying Signs & Symptoms:   Previous history of similar problem: none  Precipitating factors:        Worsened by:   Alleviating factors:        Improved by:   Therapies tried and outcome:  none     BP Readings from Last 6 Encounters:   04/12/22 (!) 156/84   10/06/21 (!) 153/76   09/08/21 (!) 147/85    08/30/21 138/84   07/14/21 134/84   04/27/21 117/74     Redness/swelling under eyes  X 3 days  Some discharge from eyes  No known new exposures  Does have a history of seasonal allergies for which he takes zyrtec  New onset albuminuria  No new medications  Recent decrease of norvasc dose  Tooth extraction 3-4 days ago        Review of Systems   Constitutional, HEENT, cardiovascular, pulmonary, gi and gu systems are negative, except as otherwise noted.      Objective    BP (!) 156/84   Pulse 112   Temp 97.9  F (36.6  C) (Oral)   Wt 113.4 kg (250 lb)   SpO2 100%   BMI 35.87 kg/m    Body mass index is 35.87 kg/m .  Physical Exam   GENERAL: healthy, alert and no distress  EYES: swelling/puffiness/erythema under both eyes  HENT: ear canals and TM's normal, nose and mouth without ulcers or lesions  NECK: no adenopathy, no asymmetry, masses, or scars and thyroid normal to palpation  SKIN: no suspicious lesions or rashes  PSYCH: mentation appears normal, affect normal/bright

## 2022-04-12 NOTE — TELEPHONE ENCOUNTER
"  Reason for Disposition    [1] MILD eyelid swelling (puffiness) AND [2] persists > 3 days  (Exception: suspect mosquito bites)    Additional Information    Negative: Unresponsive, passed out or very weak    Negative: Difficulty breathing or wheezing    Negative: [1] Difficulty swallowing or slurred speech AND [2] sudden onset    Negative: Sounds like a life-threatening emergency to the triager    Negative: [1] SEVERE eyelid swelling (i.e., shut or almost) AND [2] fever    Negative: [1] Eyelid (outer) is very red AND [2] fever    Negative: Patient sounds very sick or weak to the triager    Negative: [1] Pregnant > 20 weeks AND [2] sudden weight gain (i.e., more than 3 lbs or 1.4 kg in one week)    Negative: [1] SEVERE eyelid swelling on one side AND [2] red and painful (or tender to touch)    Negative: [1] SEVERE eyelid swelling on one side AND [2] sinus pain or pressure    Negative: [1] MILD swelling AND [2] fever    Negative: [1] Painful rash AND [2] multiple small blisters grouped together (i.e., dermatomal distribution or \"band\" or \"stripe\")    Negative: [1] SEVERE eyelid swelling (i.e., shut or almost) AND [2] involves both eyes      (Exception: itchy eyes, which  are probably an allergic reaction)    Negative: [1] SEVERE eyelid swelling (i.e., shut or almost) AND [2] Taking an ACE Inhibitor medication (e.g., benazepril/LOTENSIN, captopril/CAPOTEN, enalapril/VASOTEC, lisinopril/ZESTRIL)    Negative: [1] SEVERE eyelid swelling (i.e., shut or almost) AND [2] involves both eyes AND [3] itchy    Negative: MODERATE-SEVERE eyelid swelling on one side  (Exception: due to a mosquito bite)    Negative: [1] MILD eyelid swelling (puffiness) AND [2] sinus pain or pressure    Negative: Eyelid is red and painful (or tender to touch)    Answer Assessment - Initial Assessment Questions  1. ONSET: \"When did the swelling start?\" (e.g., minutes, hours, days)      4 days ago  2. LOCATION: \"What part of the eyelids is swollen?\"     " " renan eyes   3. SEVERITY: \"How swollen is it?\"      Just the bottom lid, still able to open and close eyes. He did not notice it but family members have   4. ITCHING: \"Is there any itching?\" If so, ask: \"How much?\"   (Scale 1-10; mild, moderate or severe)      Denies   5. PAIN: \"Is the swelling painful to touch?\" If so, ask: \"How painful is it?\"   (Scale 1-10; mild, moderate or severe)      Denies   6. FEVER: \"Do you have a fever?\" If so, ask: \"What is it, how was it measured, and when did it start?\"       Denies   7. CAUSE: \"What do you think is causing the swelling?\"      Not sure   8. RECURRENT SYMPTOM: \"Have you had eyelid swelling before?\" If so, ask: \"When was the last time?\" \"What happened that time?\"      Never had this before   9. OTHER SYMPTOMS: \"Do you have any other symptoms?\" (e.g., blurred vision, eye discharge, rash, runny nose)      Eyelids are red. Denies any other symptoms   10. PREGNANCY: \"Is there any chance you are pregnant?\" \"When was your last menstrual period?\"        N/A    Protocols used: EYE - SWELLING-A-    EDGAR Cehung [facility]    "

## 2022-04-13 ENCOUNTER — TELEPHONE (OUTPATIENT)
Dept: FAMILY MEDICINE | Facility: CLINIC | Age: 62
End: 2022-04-13
Payer: MEDICARE

## 2022-04-13 DIAGNOSIS — D50.9 IRON DEFICIENCY ANEMIA, UNSPECIFIED IRON DEFICIENCY ANEMIA TYPE: Primary | ICD-10-CM

## 2022-04-13 DIAGNOSIS — H57.89 EYE SWELLING, BILATERAL: Primary | ICD-10-CM

## 2022-04-13 LAB
CREAT UR-MCNC: 54 MG/DL
FERRITIN SERPL-MCNC: 14 NG/ML (ref 26–388)
IRON SATN MFR SERPL: 7 % (ref 15–46)
IRON SERPL-MCNC: 34 UG/DL (ref 35–180)
MICROALBUMIN UR-MCNC: 48 MG/L
MICROALBUMIN/CREAT UR: 88.89 MG/G CR (ref 0–17)
TIBC SERPL-MCNC: 467 UG/DL (ref 240–430)

## 2022-04-13 RX ORDER — FERROUS SULFATE 325(65) MG
325 TABLET, DELAYED RELEASE (ENTERIC COATED) ORAL DAILY
Qty: 90 TABLET | Refills: 0 | Status: SHIPPED | OUTPATIENT
Start: 2022-04-13 | End: 2023-03-03

## 2022-04-13 NOTE — TELEPHONE ENCOUNTER
Please call patient: he can use over-the-counter hydrocortisone cream twice daily for 2 weeks - don't get medication in eye itself. I sent an alternate eye drop for him. Call or return to clinic as needed if these symptoms worsen or fail to improve as anticipated.   Lauryn Gurrola MD

## 2022-04-13 NOTE — TELEPHONE ENCOUNTER
Routing to prescribing provider, would you like to send a PA or an alternative?    Thanks!    Lanny RUTH RN, BSN  [facility]

## 2022-04-13 NOTE — TELEPHONE ENCOUNTER
Pt called with questions from appointment on Monday with Dr. Vidal.    1. Pt said Dr. Vidal mentioned applying Hydrocortisone 0.1% cream under eyes but is not mentioned in the AVS or office visit notes. Pt asking if he should be applying it under his eyes, and if so how much, how often, and for how long?    2. Writer reviewed lab result message and will plan to follow up with Dr. Beal.    3. Pt asking about status for eye drops, he was informed a PA is needed. Writer informed pt a message was sent to Dr. Vidal to determine if an alternative medication is available, if not, can process a PA. Writer will start the PA process just in case.    Please call pt and update when possible, [phone] - can leave detailed message.    Lanny RUTH RN, BSN  [facility]

## 2022-04-13 NOTE — TELEPHONE ENCOUNTER
Patient called the olopatadine (PATANOL) 0.1 % ophthalmic solution is not covered by insurance is there an alternative or can do a PA.  Georgette Bland,

## 2022-04-13 NOTE — TELEPHONE ENCOUNTER
Spoke with pt and informed of message from Dr. Gurrloa. No further questions at this time.    Lanny RUTH RN, BSN  [facility]

## 2022-04-14 NOTE — TELEPHONE ENCOUNTER
According to chart notes olopatadine was discontinued and replaced with ketotifen. If a PA is still needed for olopatadine please route back tot he PA team. Thank you.

## 2022-04-15 NOTE — TELEPHONE ENCOUNTER
Central Prior Authorization Team   Phone: [phone]      PA Initiation    Medication: olopatadine (PATANOL) 0.1 % ophthalmic solution--INITIATED  Insurance Company: [facility] - Phone [phone] Fax [phone]  Pharmacy Filling the Rx: [id number] - [place], [place] - [address]   Filling Pharmacy Phone: [phone]  Filling Pharmacy Fax:    Start Date: 4/14/2022

## 2022-04-15 NOTE — TELEPHONE ENCOUNTER
Central Prior Authorization Team   Phone: [phone]      Prior Authorization Approval    Authorization Effective Date: 1/1/2022  Authorization Expiration Date: 4/15/2023  Medication: olopatadine (PATANOL) 0.1 % ophthalmic solution--APPROVED  Approved Dose/Quantity:    Reference #:     Insurance Company: [employer] [facility] - Phone [phone] Fax [phone]  Expected CoPay:       CoPay Card Available:      Foundation Assistance Needed:    Which Pharmacy is filling the prescription (Not needed for infusion/clinic administered): [id number] - [address] AT SEC OF [place] [address]  Pharmacy Notified: Yes  Patient Notified: Yes

## 2022-04-25 ENCOUNTER — TELEPHONE (OUTPATIENT)
Dept: OTOLARYNGOLOGY | Facility: CLINIC | Age: 62
End: 2022-04-25

## 2022-04-25 ENCOUNTER — OFFICE VISIT (OUTPATIENT)
Dept: OTOLARYNGOLOGY | Facility: CLINIC | Age: 62
End: 2022-04-25
Payer: MEDICARE

## 2022-04-25 ENCOUNTER — OFFICE VISIT (OUTPATIENT)
Dept: OTOLARYNGOLOGY | Facility: CLINIC | Age: 62
End: 2022-04-25

## 2022-04-25 VITALS — BODY MASS INDEX: 35.79 KG/M2 | WEIGHT: 250 LBS | HEIGHT: 70 IN | TEMPERATURE: 98 F

## 2022-04-25 DIAGNOSIS — J38.3 VOCAL CORD GRANULOMA: Primary | ICD-10-CM

## 2022-04-25 DIAGNOSIS — F41.9 ANXIETY: Chronic | ICD-10-CM

## 2022-04-25 DIAGNOSIS — J06.9 UPPER RESPIRATORY TRACT INFECTION, UNSPECIFIED TYPE: ICD-10-CM

## 2022-04-25 DIAGNOSIS — Z85.21 HX OF LARYNGEAL CANCER: ICD-10-CM

## 2022-04-25 DIAGNOSIS — J38.7 LARYNGEAL GRANULOMA: Primary | ICD-10-CM

## 2022-04-25 PROCEDURE — 31579 LARYNGOSCOPY TELESCOPIC: CPT | Performed by: OTOLARYNGOLOGY

## 2022-04-25 PROCEDURE — 99213 OFFICE O/P EST LOW 20 MIN: CPT | Mod: 25 | Performed by: OTOLARYNGOLOGY

## 2022-04-25 PROCEDURE — 99207 PR NO CHARGE LOS: CPT | Performed by: SPEECH-LANGUAGE PATHOLOGIST

## 2022-04-25 ASSESSMENT — PAIN SCALES - GENERAL: PAINLEVEL: NO PAIN (0)

## 2022-04-25 NOTE — PROGRESS NOTES
[facility]  VOICE EVALUATION/LARYNGEAL EXAMINATION REPORT    Patient: Gary Iyer  Date of Service: 4/25/2022    HISTORY  PATIENT INFORMATION  Gary Iyer was seen for brief consultation in conjunction with a visit to Dr. Fournier today.  Please refer to the physician s dictation for a more complete history and impressions.  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  In today's exam, this had resolved, but a new area of whitish tissue that seems to be associated with irritation was observed on the right arytenoid.  The patient was observed to be very frequently holding his breath in a semi-Valsalva/auto PEEP laryngeal position, which is likely contributing to posterior laryngeal irritation.    A course of speech therapy is recommended in order to help him optimize laryngeal configuration in order to avoid excess irritation.    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

## 2022-04-25 NOTE — TELEPHONE ENCOUNTER
Patient called patient to move their appointment from 5:00 PM to 4:00 PM. Patient confirmed change.    Antwon Del Cid, EMT

## 2022-04-25 NOTE — PROGRESS NOTES
Dear Colleague:    Gary Iyer recently returned for follow-up at the [facility]. My clinic note from our visit is enclosed below.  Speech recognition software may have been used in the documentation below; input is reviewed before signature to the best of my ability.     I appreciate the ongoing opportunity to participate in this patient's care.    Please feel free to contact me with any questions.    Sincerely yours,      Mel Fournier M.D., M.P.H.  , Laryngology  Director, 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  Most recently, he returned to the operating room on February 9, 2017 for an area of new irregularity of the left true vocal fold.  Pathology showed severe dysplasia with negative but close margins (for moderate, but not severe dysplasia).  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    At the last visit, we observed a slight recurrence of leukoplakia/granuloma in the same location where he had this in 2018. At that time it resolved just as we were considering a repeat biopsy. Subsequently, he had allergies which were provoking coughing/throat-clearing, and he was again noted to have a granuloma. He was set up for refresher session(s) of speech therapy with Misty Clark, PhD, CCC-SLP and we discussed nasal hygiene as well.     This led to near complete resolution of the granuloma, but then at the last visit he was noted to have slightly increased irritation along the left medial arytenoid wall.  About a week ago, he developed an upper respiratory infection and has been coughing, and has had some shortness of breath. He states he is COVID  negative. His family has noted him grunting and throat-clearing frequently.        MEDICATIONS:     Current Outpatient Medications   Medication Sig Dispense Refill     alcohol swab prep pads Use to swab area of injection/[place] as directed. 100 each 3     allopurinol (ZYLOPRIM) 300 MG tablet TAKE 1 TABLET(300 MG) BY MOUTH DAILY 90 tablet 0     amLODIPine (NORVASC) 2.5 MG tablet TAKE 1 TABLET(2.5 MG) BY MOUTH DAILY 90 tablet 0     aspirin 81 MG tablet Take 1 tablet by mouth daily.       atorvastatin (LIPITOR) 20 MG tablet TAKE 1 TABLET(20 MG) BY MOUTH DAILY 90 tablet 0     blood glucose (NO BRAND SPECIFIED) test strip Use to test blood sugar 1 times daily or as directed. To accompany: Blood Glucose Monitor Brands: per insurance. 100 strip 6     blood glucose calibration (NO BRAND SPECIFIED) solution To accompany: Blood Glucose Monitor Brands: per insurance. 1 Bottle 3     blood glucose monitoring (NO BRAND SPECIFIED) meter device kit Use to test blood sugar 1 times daily or as directed. Preferred blood glucose meter OR supplies to accompany: Blood Glucose Monitor Brands: per insurance. 1 kit 0     calcipotriene (DOVONEX) 0.005 % external cream Mix with efudex and apply twice daily to hands and arms for 12 days 60 g 3     cetirizine (ZYRTEC) 10 MG tablet TAKE 1 TABLET(10 MG) BY MOUTH DAILY 90 tablet 3     ferrous sulfate (FE TABS) 325 (65 Fe) MG EC tablet Take 1 tablet (325 mg) by mouth daily . Take with vitamin c (juice or supplement). 90 tablet 0     fluorouracil (EFUDEX) 5 % external cream Mix with dovonex and apply twice daily to hands and arms for 12 days (Patient not taking: No sig reported) 40 g 1     hydrochlorothiazide (HYDRODIURIL) 50 MG tablet Take 1 tablet (50 mg) by mouth daily 90 tablet 3     hydrocortisone (CORTAID) 1 % external ointment Apply topically 2 times daily as needed 30 g 1     ketoconazole (NIZORAL) 2 % external cream Apply daily to affected area on face, armpits, groin. 60 g 4      ketoconazole (NIZORAL) 2 % external shampoo Leave on face, beard for 3-5 minutes then rinse. Use 2-3 times weekly. (Patient not taking: No sig reported) 120 mL 3     ketotifen (ZADITOR) 0.025 % ophthalmic solution Place 1 drop into both eyes 2 times daily as needed for other (eye swelling) 5 mL 0     losartan (COZAAR) 50 MG tablet Take 1 tablet (50 mg) by mouth daily 90 tablet 1     metFORMIN (GLUCOPHAGE) 1000 MG tablet TAKE 1 TABLET(1000 MG) BY MOUTH TWICE DAILY WITH MEALS 180 tablet 0     metroNIDAZOLE (METROCREAM) 0.75 % external cream Apply topically 2 times daily as needed (to rosacea areas) 45 g 1     Misc. Devices (SUPPOSITORY MOLD 2GM) MISC 1 suppository every 12 hours Nifedipine 4 mg suppository, one rectally every 12 hours 100 each 4     Multiple Vitamin (DAILY MULTIVITAMIN PO) Take  by mouth daily.       omeprazole (PRILOSEC) 20 MG DR capsule TAKE 1 CAPSULE BY MOUTH DAILY 90 capsule 3     order for DME Autocpap 10-16 cm 1 Units 0     oxybutynin (DITROPAN) 5 MG tablet Take 1 tablet (5 mg) by mouth At Bedtime (Patient not taking: No sig reported) 90 tablet 3     PARoxetine (PAXIL) 40 MG tablet Take 1 tablet (40 mg) by mouth every morning 90 tablet 3     polyethylene glycol (MIRALAX) 17 GM/Dose powder Take 17 g (1 capful) by mouth daily 850 g 3     psyllium 0.52 G capsule Take 1 capsule (0.52 g) by mouth daily 100 capsule 3     tamsulosin (FLOMAX) 0.4 MG capsule Take 2 capsules (0.8 mg) by mouth daily TAKE 2 CAPSULES BY MOUTH DAILY 180 capsule 0     thin (NO BRAND SPECIFIED) lancets Use with lanceting device. To accompany: Blood Glucose Monitor Brands: per insurance. 100 each 11     triamcinolone (KENALOG) 0.1 % external cream Apply topically 2 times daily as needed for irritation 30 g 0     zolpidem (AMBIEN) 5 MG tablet Take tablet by mouth 15 minutes prior to sleep, for Sleep Study (Patient not taking: No sig reported) 1 tablet 0       ALLERGIES:  No Known Allergies    NEW PMH/PSH: None    REVIEW OF  SYSTEMS:  The patient completed a comprehensive 11 point review of systems (below), which was reviewed. Positives are as noted below.  Patient Supplied Answers to Review of Systems   ENT ROS 4/22/2016   Ears, Nose, Throat Nasal congestion or drainage            PHYSICAL EXAM:  General: The patient was alert and conversant, and in no acute distress. Patient accompanied by his spouse.  Oral cavity/oropharynx: No masses or lesions. Dentition unchanged since prior. Tongue mobility and palate elevation intact and symmetric.  Neck: No palpable cervical lymphadenopathy, no significant tenderness to palpation of the thyrohyoid space, which was narrow. Mildly prominent thyroid, thick neck.  Resp: Breathing comfortably, no stridor or stertor.  Neuro: Symmetric facial function. Other cranial nerve function as documented above.  Psych: Normal affect, pleasant and cooperative.  Voice/speech: Mild dysphonia characterized by intermittent breathiness and roughness.      Intake scores  Last 2 Scores for Patient-Answered VHI Questionnaire  VHI Total Score 2/26/2018 3/1/2019   VHI Total Score 9 17      Last 2 Scores for Patient-Answered EAT Questionnaire  EAT Total Score 2/26/2018 3/1/2019   EAT Total Score 1 5        Last 2 Scores for Patient-Answered CSI Questionnaire  CSI Total Score 2/26/2018 3/1/2019   CSI Total Score 0 0           Procedure:   Flexible fiberoptic laryngoscopy and laryngovideostroboscopy  Indications: This procedure was warranted to evaluate the patient's laryngeal anatomy and function. Risks, benefits, and alternatives were discussed.  Description: After written informed consent was obtained, a time-out was performed to confirm patient identity, procedure, and procedure site. Topical 3% lidocaine with 0.25% phenylephrine was applied to the nasal cavities. I performed the endoscopy and no complications were apparent. Continuous and stroboscopic light were utilized to assess routine phonation and variable  frequency phonation.  Performed by: Mel Fournier MD MPH  SLP: Candido Winter, PhD, CCC-SLP   Findings: Normal nasopharynx. Normal base of tongue, valleculae, and epiglottis. Vocal fold mobility: right: normal; left: normal. Medial edges of the vocal folds: smooth and straight. No focal mucosal lesions were observed on the true vocal folds. Glissade produced appropriate elongation. There was mild supraglottic recruitment with connected speech. Mucosa of false vocal folds, aryepiglottic folds, piriform sinuses, and posterior glottis unremarkable overall; left medial arytenoid irritation has resolved, but now patient has right medial arytenoid irritation consistent with early granuloma. Airway was patent. Response to the therapy probes was good. Similar findings on NBI. Mild thin leukoplakia right inferior aspect of true vocal fold, stable compared to prior.    The addition of stroboscopy allowed evaluation of the mucosal wave.   Amplitude: right: normal; left: mildly decreased. Symmetry: intermittent symmetry. Closure pattern: complete. Closure plane: at glottic level. Phase distribution: normal.                  IMPRESSION AND PLAN:   Gary Iyer returns with no evidence of true vocal fold disease, but does now have a right medial arytenoid early granuloma. The previously seen left medial arytenoid irritation has resolved. He has had a lot of throat clearing as well as some coughing, and his family observes that he breath-holds and grunts a lot (e.g. with Valsalva). We discussed that these behaviors may be contributing to the mucosal irritation.    We will plan refresher sessions of speech therapy. His family will help him remember. He is also planning to see his primary care provider regarding his shortness of breath on Friday, and knows to seek care sooner if it worsens.    He will plan to return to clinic in three to four months, or sooner as needed.    Today's visit required additional screening time,  PPE, and cleaning measures to allow for a safe in-person visit, due to the public health emergency. I spent a total of 20 minutes on 4/25/2022 in chart review, review of tests, patient visit, documentation, care coordination, and/or discussion with other providers about the issues documented above, separate from any documented procedure(s).

## 2022-04-25 NOTE — LETTER
4/25/2022       RE: Gary Iyer  [address] [address]     Dear Colleague,    Thank you for referring your patient, aGry Iyer, to the [facility] VOICE CLINIC [place] at [facility]. Please see a copy of my visit note below.    [facility] VOICE [facility] VOICE [facility]  VOICE EVALUATION/LARYNGEAL EXAMINATION REPORT    Patient: Gary Iyer  Date of Service: 4/25/2022    HISTORY  PATIENT INFORMATION  Gary Iyer was seen for brief consultation in conjunction with a visit to Dr. Fournier today.  Please refer to the physician s dictation for a more complete history and impressions.  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  In today's exam, this had resolved, but a new area of whitish tissue that seems to be associated with irritation was observed on the right arytenoid.  The patient was observed to be very frequently holding his breath in a semi-Valsalva/auto PEEP laryngeal position, which is likely contributing to posterior laryngeal irritation.    A course of speech therapy is recommended in order to help him optimize laryngeal configuration in order to avoid excess irritation.    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      Sincerely,    Candido Winter, SLP

## 2022-04-25 NOTE — LETTER
4/25/2022       RE: Gary Iyer  [address] [facility] [id number]     Dear Colleague,    Thank you for referring your patient, Gary Iyer, to the [facility] [place] at [facility]. Please see a copy of my visit note below.    Dear Colleague:    Gary Iyer recently returned for follow-up at the [facility]. My clinic note from our visit is enclosed below.  Speech recognition software may have been used in the documentation below; input is reviewed before signature to the best of my ability.     I appreciate the ongoing opportunity to participate in this patient's care.    Please feel free to contact me with any questions.    Sincerely yours,      Mel Fournier M.D., M.P.H.  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  Most recently, he returned to the operating room on February 9, 2017 for an area of new irregularity of the left true vocal fold.  Pathology showed severe dysplasia with negative but close margins (for moderate, but not severe dysplasia).  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    At the last visit, we observed a slight recurrence of leukoplakia/granuloma in the same location where he had this in 2018. At that time it resolved just as we were considering a repeat biopsy. Subsequently, he had allergies which were provoking coughing/throat-clearing, and he was again noted to have a granuloma. He was set up for refresher session(s) of speech therapy with Misty Clark, PhD, CCC-SLP and we discussed nasal hygiene as well.      This led to near complete resolution of the granuloma, but then at the last visit he was noted to have slightly increased irritation along the left medial arytenoid wall.  About a week ago, he developed an upper respiratory infection and has been coughing, and has had some shortness of breath. He states he is COVID negative. His family has noted him grunting and throat-clearing frequently.        MEDICATIONS:     Current Outpatient Medications   Medication Sig Dispense Refill     alcohol swab prep pads Use to swab area of injection/maria [place] rosario as directed. 100 each 3     allopurinol (ZYLOPRIM) 300 MG tablet TAKE 1 TABLET(300 MG) BY MOUTH DAILY 90 tablet 0     amLODIPine (NORVASC) 2.5 MG tablet TAKE 1 TABLET(2.5 MG) BY MOUTH DAILY 90 tablet 0     aspirin 81 MG tablet Take 1 tablet by mouth daily.       atorvastatin (LIPITOR) 20 MG tablet TAKE 1 TABLET(20 MG) BY MOUTH DAILY 90 tablet 0     blood glucose (NO BRAND SPECIFIED) test strip Use to test blood sugar 1 times daily or as directed. To accompany: Blood Glucose Monitor Brands: per insurance. 100 strip 6     blood glucose calibration (NO BRAND SPECIFIED) solution To accompany: Blood Glucose Monitor Brands: per insurance. 1 Bottle 3     blood glucose monitoring (NO BRAND SPECIFIED) meter device kit Use to test blood sugar 1 times daily or as directed. Preferred blood glucose meter OR supplies to accompany: Blood Glucose Monitor Brands: per insurance. 1 kit 0     calcipotriene (DOVONEX) 0.005 % external cream Mix with efudex and apply twice daily to hands and arms for 12 days 60 g 3     cetirizine (ZYRTEC) 10 MG tablet TAKE 1 TABLET(10 MG) BY MOUTH DAILY 90 tablet 3     ferrous sulfate (FE TABS) 325 (65 Fe) MG EC tablet Take 1 tablet (325 mg) by mouth daily . Take with vitamin c (juice or supplement). 90 tablet 0     fluorouracil (EFUDEX) 5 % external cream Mix with dovonex and apply twice daily to hands and arms for 12 days (Patient not taking: No sig reported) 40 g 1      hydrochlorothiazide (HYDRODIURIL) 50 MG tablet Take 1 tablet (50 mg) by mouth daily 90 tablet 3     hydrocortisone (CORTAID) 1 % external ointment Apply topically 2 times daily as needed 30 g 1     ketoconazole (NIZORAL) 2 % external cream Apply daily to affected area on face, armpits, groin. 60 g 4     ketoconazole (NIZORAL) 2 % external shampoo Leave on face, beard for 3-5 minutes then rinse. Use 2-3 times weekly. (Patient not taking: No sig reported) 120 mL 3     ketotifen (ZADITOR) 0.025 % ophthalmic solution Place 1 drop into both eyes 2 times daily as needed for other (eye swelling) 5 mL 0     losartan (COZAAR) 50 MG tablet Take 1 tablet (50 mg) by mouth daily 90 tablet 1     metFORMIN (GLUCOPHAGE) 1000 MG tablet TAKE 1 TABLET(1000 MG) BY MOUTH TWICE DAILY WITH MEALS 180 tablet 0     metroNIDAZOLE (METROCREAM) 0.75 % external cream Apply topically 2 times daily as needed (to rosacea areas) 45 g 1     Misc. Devices (SUPPOSITORY MOLD 2GM) MISC 1 suppository every 12 hours Nifedipine 4 mg suppository, one rectally every 12 hours 100 each 4     Multiple Vitamin (DAILY MULTIVITAMIN PO) Take  by mouth daily.       omeprazole (PRILOSEC) 20 MG DR capsule TAKE 1 CAPSULE BY MOUTH DAILY 90 capsule 3     order for DME Autocpap 10-16 cm 1 Units 0     oxybutynin (DITROPAN) 5 MG tablet Take 1 tablet (5 mg) by mouth At Bedtime (Patient not taking: No sig reported) 90 tablet 3     PARoxetine (PAXIL) 40 MG tablet Take 1 tablet (40 mg) by mouth every morning 90 tablet 3     polyethylene glycol (MIRALAX) 17 GM/Dose powder Take 17 g (1 capful) by mouth daily 850 g 3     psyllium 0.52 G capsule Take 1 capsule (0.52 g) by mouth daily 100 capsule 3     tamsulosin (FLOMAX) 0.4 MG capsule Take 2 capsules (0.8 mg) by mouth daily TAKE 2 CAPSULES BY MOUTH DAILY 180 capsule 0     thin (NO BRAND SPECIFIED) lancets Use with lanceting device. To accompany: Blood Glucose Monitor Brands: per insurance. 100 each 11     triamcinolone  (KENALOG) 0.1 % external cream Apply topically 2 times daily as needed for irritation 30 g 0     zolpidem (AMBIEN) 5 MG tablet Take tablet by mouth 15 minutes prior to sleep, for Sleep Study (Patient not taking: No sig reported) 1 tablet 0       ALLERGIES:  No Known Allergies    NEW PMH/PSH: None    REVIEW OF SYSTEMS:  The patient completed a comprehensive 11 point review of systems (below), which was reviewed. Positives are as noted below.  Patient Supplied Answers to Review of Systems   ENT ROS 4/22/2016   Ears, Nose, Throat Nasal congestion or drainage            PHYSICAL EXAM:  General: The patient was alert and conversant, and in no acute distress. Patient accompanied by his spouse.  Oral cavity/oropharynx: No masses or lesions. Dentition unchanged since prior. Tongue mobility and palate elevation intact and symmetric.  Neck: No palpable cervical lymphadenopathy, no significant tenderness to palpation of the thyrohyoid space, which was narrow. Mildly prominent thyroid, thick neck.  Resp: Breathing comfortably, no stridor or stertor.  Neuro: Symmetric facial function. Other cranial nerve function as documented above.  Psych: Normal affect, pleasant and cooperative.  Voice/speech: Mild dysphonia characterized by intermittent breathiness and roughness.      Intake scores  Last 2 Scores for Patient-Answered VHI Questionnaire  VHI Total Score 2/26/2018 3/1/2019   VHI Total Score 9 17      Last 2 Scores for Patient-Answered EAT Questionnaire  EAT Total Score 2/26/2018 3/1/2019   EAT Total Score 1 5        Last 2 Scores for Patient-Answered CSI Questionnaire  CSI Total Score 2/26/2018 3/1/2019   CSI Total Score 0 0           Procedure:   Flexible fiberoptic laryngoscopy and laryngovideostroboscopy  Indications: This procedure was warranted to evaluate the patient's laryngeal anatomy and function. Risks, benefits, and alternatives were discussed.  Description: After written informed consent was obtained, a time-out  was performed to confirm patient identity, procedure, and procedure site. Topical 3% lidocaine with 0.25% phenylephrine was applied to the nasal cavities. I performed the endoscopy and no complications were apparent. Continuous and stroboscopic light were utilized to assess routine phonation and variable frequency phonation.  Performed by: Mel Fournier MD MPH  SLP: Candido Winter, PhD, CCC-SLP   Findings: Normal nasopharynx. Normal base of tongue, valleculae, and epiglottis. Vocal fold mobility: right: normal; left: normal. Medial edges of the vocal folds: smooth and straight. No focal mucosal lesions were observed on the true vocal folds. Glissade produced appropriate elongation. There was mild supraglottic recruitment with connected speech. Mucosa of false vocal folds, aryepiglottic folds, piriform sinuses, and posterior glottis unremarkable overall; left medial arytenoid irritation has resolved, but now patient has right medial arytenoid irritation consistent with early granuloma. Airway was patent. Response to the therapy probes was good. Similar findings on NBI. Mild thin leukoplakia right inferior aspect of true vocal fold, stable compared to prior.    The addition of stroboscopy allowed evaluation of the mucosal wave.   Amplitude: right: normal; left: mildly decreased. Symmetry: intermittent symmetry. Closure pattern: complete. Closure plane: at glottic level. Phase distribution: normal.                  IMPRESSION AND PLAN:   Gary Iyer returns with no evidence of true vocal fold disease, but does now have a right medial arytenoid early granuloma. The previously seen left medial arytenoid irritation has resolved. He has had a lot of throat clearing as well as some coughing, and his family observes that he breath-holds and grunts a lot (e.g. with Valsalva). We discussed that these behaviors may be contributing to the mucosal irritation.    We will plan refresher sessions of speech therapy. His  family will help him remember. He is also planning to see his primary care provider regarding his shortness of breath on Friday, and knows to seek care sooner if it worsens.    He will plan to return to clinic in three to four months, or sooner as needed.    Today's visit required additional screening time, PPE, and cleaning measures to allow for a safe in-person visit, due to the public health emergency. I spent a total of 20 minutes on 4/25/2022 in chart review, review of tests, patient visit, documentation, care coordination, and/or discussion with other providers about the issues documented above, separate from any documented procedure(s).        Again, thank you for allowing me to participate in the care of your patient.      Sincerely,    Mel Fournier MD

## 2022-04-25 NOTE — NURSING NOTE
"Chief Complaint   Patient presents with     RECHECK     Follow up       Temperature 98  F (36.7  C), temperature source Temporal, height 1.778 m (5' 10\"), weight 113.4 kg (250 lb).    Antwon Del Cid, EMT  "

## 2022-04-25 NOTE — PATIENT INSTRUCTIONS
1.  You were seen in the ENT Clinic today by . If you have any questions or concerns after your appointment, please call [phone]. Press option #1 for scheduling related needs. Press option #3 for Nurse advice.    2.   has recommended the following:   - refresher course of speech therapy    3.  Plan is to return to clinic 3-4 months      Lisa Hazel LPN  [phone]  [facility] - Otolaryngology

## 2022-04-27 RX ORDER — PAROXETINE 40 MG/1
TABLET, FILM COATED ORAL
Qty: 90 TABLET | Refills: 1 | Status: SHIPPED | OUTPATIENT
Start: 2022-04-27 | End: 2022-04-29

## 2022-04-27 NOTE — TELEPHONE ENCOUNTER
Prescription approved per [facility] [place] [facility].    Sonia Banks RN

## 2022-04-29 ENCOUNTER — OFFICE VISIT (OUTPATIENT)
Dept: FAMILY MEDICINE | Facility: CLINIC | Age: 62
End: 2022-04-29
Payer: MEDICARE

## 2022-04-29 VITALS
BODY MASS INDEX: 35.65 KG/M2 | OXYGEN SATURATION: 97 % | DIASTOLIC BLOOD PRESSURE: 60 MMHG | HEART RATE: 104 BPM | SYSTOLIC BLOOD PRESSURE: 126 MMHG | HEIGHT: 70 IN | WEIGHT: 249 LBS | TEMPERATURE: 99 F

## 2022-04-29 DIAGNOSIS — F41.9 ANXIETY: Chronic | ICD-10-CM

## 2022-04-29 DIAGNOSIS — Z23 ENCOUNTER FOR IMMUNIZATION: ICD-10-CM

## 2022-04-29 DIAGNOSIS — J38.7 LEUKOPLAKIA OF LARYNX: ICD-10-CM

## 2022-04-29 DIAGNOSIS — Z12.11 SCREEN FOR COLON CANCER: ICD-10-CM

## 2022-04-29 DIAGNOSIS — D64.9 ANEMIA, UNSPECIFIED TYPE: ICD-10-CM

## 2022-04-29 DIAGNOSIS — I10 HYPERTENSION GOAL BP (BLOOD PRESSURE) < 140/90: ICD-10-CM

## 2022-04-29 DIAGNOSIS — E11.9 TYPE 2 DIABETES MELLITUS WITHOUT COMPLICATION, WITHOUT LONG-TERM CURRENT USE OF INSULIN (H): ICD-10-CM

## 2022-04-29 DIAGNOSIS — E78.5 HYPERLIPIDEMIA LDL GOAL <70: ICD-10-CM

## 2022-04-29 DIAGNOSIS — R39.12 BENIGN PROSTATIC HYPERPLASIA WITH WEAK URINARY STREAM: ICD-10-CM

## 2022-04-29 DIAGNOSIS — Z00.00 ROUTINE GENERAL MEDICAL EXAMINATION AT A HEALTH CARE FACILITY: Primary | ICD-10-CM

## 2022-04-29 DIAGNOSIS — E04.1 THYROID NODULE: ICD-10-CM

## 2022-04-29 DIAGNOSIS — K21.9 GASTROESOPHAGEAL REFLUX DISEASE, UNSPECIFIED WHETHER ESOPHAGITIS PRESENT: ICD-10-CM

## 2022-04-29 DIAGNOSIS — Z91.09 ENVIRONMENTAL ALLERGIES: ICD-10-CM

## 2022-04-29 DIAGNOSIS — R49.0 DYSPHONIA: ICD-10-CM

## 2022-04-29 DIAGNOSIS — M10.9 GOUT, UNSPECIFIED CAUSE, UNSPECIFIED CHRONICITY, UNSPECIFIED SITE: ICD-10-CM

## 2022-04-29 DIAGNOSIS — H54.7 VISION PROBLEM: ICD-10-CM

## 2022-04-29 DIAGNOSIS — E66.9 OBESITY, UNSPECIFIED CLASSIFICATION, UNSPECIFIED OBESITY TYPE, UNSPECIFIED WHETHER SERIOUS COMORBIDITY PRESENT: ICD-10-CM

## 2022-04-29 DIAGNOSIS — N40.1 BENIGN PROSTATIC HYPERPLASIA WITH WEAK URINARY STREAM: ICD-10-CM

## 2022-04-29 PROCEDURE — 91306 COVID-19,PF,MODERNA (18+ YRS BOOSTER .25ML): CPT | Performed by: FAMILY MEDICINE

## 2022-04-29 PROCEDURE — 0064A COVID-19,PF,MODERNA (18+ YRS BOOSTER .25ML): CPT | Performed by: FAMILY MEDICINE

## 2022-04-29 PROCEDURE — 99396 PREV VISIT EST AGE 40-64: CPT | Mod: 25 | Performed by: FAMILY MEDICINE

## 2022-04-29 PROCEDURE — 99214 OFFICE O/P EST MOD 30 MIN: CPT | Mod: 25 | Performed by: FAMILY MEDICINE

## 2022-04-29 RX ORDER — LOSARTAN POTASSIUM 50 MG/1
50 TABLET ORAL DAILY
Qty: 90 TABLET | Refills: 3 | Status: SHIPPED | OUTPATIENT
Start: 2022-04-29 | End: 2023-03-03

## 2022-04-29 RX ORDER — ATORVASTATIN CALCIUM 20 MG/1
20 TABLET, FILM COATED ORAL DAILY
Qty: 90 TABLET | Refills: 0 | Status: SHIPPED | OUTPATIENT
Start: 2022-04-29 | End: 2022-09-20

## 2022-04-29 RX ORDER — HYDROCHLOROTHIAZIDE 50 MG/1
50 TABLET ORAL DAILY
Qty: 90 TABLET | Refills: 3 | Status: SHIPPED | OUTPATIENT
Start: 2022-04-29 | End: 2023-03-03

## 2022-04-29 RX ORDER — TAMSULOSIN HYDROCHLORIDE 0.4 MG/1
0.8 CAPSULE ORAL DAILY
Qty: 180 CAPSULE | Refills: 3 | Status: SHIPPED | OUTPATIENT
Start: 2022-04-29 | End: 2023-03-03

## 2022-04-29 RX ORDER — AMLODIPINE BESYLATE 2.5 MG/1
2.5 TABLET ORAL DAILY
Qty: 90 TABLET | Refills: 3 | Status: SHIPPED | OUTPATIENT
Start: 2022-04-29 | End: 2023-03-03

## 2022-04-29 RX ORDER — CETIRIZINE HYDROCHLORIDE 10 MG/1
10 TABLET ORAL DAILY
Qty: 90 TABLET | Refills: 3 | Status: SHIPPED | OUTPATIENT
Start: 2022-04-29 | End: 2023-08-10

## 2022-04-29 RX ORDER — PAROXETINE 40 MG/1
TABLET, FILM COATED ORAL
Qty: 90 TABLET | Refills: 3 | Status: SHIPPED | OUTPATIENT
Start: 2022-04-29 | End: 2023-03-03

## 2022-04-29 RX ORDER — ALLOPURINOL 300 MG/1
TABLET ORAL
Qty: 90 TABLET | Refills: 3 | Status: SHIPPED | OUTPATIENT
Start: 2022-04-29 | End: 2023-03-03

## 2022-04-29 ASSESSMENT — ACTIVITIES OF DAILY LIVING (ADL)
CURRENT_FUNCTION: SHOPPING REQUIRES ASSISTANCE
CURRENT_FUNCTION: TRANSPORTATION REQUIRES ASSISTANCE
CURRENT_FUNCTION: HOUSEWORK REQUIRES ASSISTANCE
CURRENT_FUNCTION: PREPARING MEALS REQUIRES ASSISTANCE
CURRENT_FUNCTION: LAUNDRY REQUIRES ASSISTANCE

## 2022-04-29 NOTE — PROGRESS NOTES
"SUBJECTIVE:   CC: Gary Iyer is an 61 year old male who presents for preventative health visit.       Patient has been advised of split billing requirements and indicates understanding: Yes  Healthy Habits:    In general, how would you rate your overall health?  Fair    Frequency of exercise:  2-3 days/week    Duration of exercise:  15-30 minutes    Do you usually eat at least 4 servings of fruit and vegetables a day, include whole grains    & fiber and avoid regularly eating high fat or \"junk\" foods?  Yes    Taking medications regularly:  Yes    Barriers to taking medications:  None    Medication side effects:  None    Ability to successfully perform activities of daily living:  Transportation requires assistance, shopping requires assistance, preparing meals requires assistance, housework requires assistance and laundry requires assistance    Home Safety:  No safety concerns identified    Hearing Impairment:  No hearing concerns    In the past 6 months, have you been bothered by leaking of urine? Yes (Only if he waits to long to go)    In general, how would you rate your overall mental or emotional health?  Good      PHQ-2 Total Score:    Additional concerns today:  No              Today's PHQ-2 Score:   PHQ-2 (  [employer]) 2022   Q1: Little interest or pleasure in doing things 0   Q2: Feeling down, depressed or hopeless 0   PHQ-2 Score 0   PHQ-2 Total Score (12-17 Years)- Positive if 3 or more points; Administer PHQ-A if positive -   Q1: Little interest or pleasure in doing things -   Q2: Feeling down, depressed or hopeless -   PHQ-2 Score -       Abuse: Current or Past(Physical, Sexual or Emotional)- No  Do you feel safe in your environment? Yes        Social History     Tobacco Use     Smoking status: Former Smoker     Packs/day: 1.00     Years: 25.00     Pack years: 25.00     Quit date: 2000     Years since quittin.6     Smokeless tobacco: Never Used   Substance Use Topics     Alcohol use: " Not Currently     Alcohol/week: 0.0 standard drinks     Comment: rare         Alcohol Use 3/10/2021   Prescreen: >3 drinks/day or >7 drinks/week? No       Last PSA:   PSA   Date Value Ref Range Status   11/25/2020 0.57 0 - 4 ug/L Final     Comment:     Assay Method:  Chemiluminescence using Siemens Vista analyzer     Prostate Specific Antigen Screen   Date Value Ref Range Status   02/14/2022 0.63 0.00 - 4.00 ug/L Final       Reviewed orders with patient. Reviewed health maintenance and updated orders accordingly - Yes       Reviewed and updated as needed this visit by clinical staff                    Reviewed and updated as needed this visit by Provider                       Review of Systems  Has been tired  Was told to start iron but not started yet    No bloody or black stools    Some memory issues    Not working currently    Tries to exercise    Wt going up           OBJECTIVE:   There were no vitals taken for this visit.    Physical Exam  GENERAL: healthy, alert and no distress  EYES: Eyes grossly normal to inspection, PERRL and conjunctivae and sclerae normal  HENT: ear canals and TM's normal, nose and mouth without ulcers or lesions  NECK: no adenopathy, no asymmetry, masses, or scars and thyroid normal to palpation  RESP: lungs clear to auscultation - no rales, rhonchi or wheezes  CV: regular rate and rhythm, normal S1 S2, no S3 or S4, no murmur, click or rub, no peripheral edema and peripheral pulses strong  ABDOMEN: soft, nontender, no hepatosplenomegaly, no masses and bowel sounds normal  MS: no gross musculoskeletal defects noted, no edema  SKIN: no suspicious lesions or rashes  NEURO: Normal strength and tone, mentation intact and speech normal  PSYCH: mentation appears normal, affect normal/bright    Diagnostic Test Results:  Labs reviewed in Epic    ASSESSMENT/PLAN:   Gary was seen today for physical.    Diagnoses and all orders for this visit:    Routine general medical examination at a [facility]  care facility    Encounter for immunization  -     COVID-19,PF,MODERNA (18+ YRS BOOSTER .25ML)    Vision problem  -     OPTOMETRY REFERRAL; Future    Gout, unspecified cause, unspecified chronicity, unspecified site  -     allopurinol (ZYLOPRIM) 300 MG tablet; TAKE 1 TABLET(300 MG) BY MOUTH DAILY    Hypertension goal BP (blood pressure) < 140/90  -     amLODIPine (NORVASC) 2.5 MG tablet; Take 1 tablet (2.5 mg) by mouth daily  -     hydrochlorothiazide (HYDRODIURIL) 50 MG tablet; Take 1 tablet (50 mg) by mouth daily  -     losartan (COZAAR) 50 MG tablet; Take 1 tablet (50 mg) by mouth daily    Hyperlipidemia LDL goal <70  -     atorvastatin (LIPITOR) 20 MG tablet; Take 1 tablet (20 mg) by mouth daily    Environmental allergies  -     cetirizine (ZYRTEC) 10 MG tablet; Take 1 tablet (10 mg) by mouth daily    Type 2 diabetes mellitus without complication, without long-term current use of insulin (H)  -     metFORMIN (GLUCOPHAGE) 1000 MG tablet; TAKE 1 TABLET(1000 MG) BY MOUTH TWICE DAILY WITH MEALS  -     [facility] Adult Diabetes Educator Referral; Future    Dysphonia  -     omeprazole (PRILOSEC) 20 MG DR capsule; TAKE 1 CAPSULE BY MOUTH DAILY    Leukoplakia of larynx  -     omeprazole (PRILOSEC) 20 MG DR capsule; TAKE 1 CAPSULE BY MOUTH DAILY    Anxiety  -     PARoxetine (PAXIL) 40 MG tablet; TAKE 1 TABLET(40 MG) BY MOUTH EVERY MORNING    Benign prostatic hyperplasia with weak urinary stream  -     tamsulosin (FLOMAX) 0.4 MG capsule; Take 2 capsules (0.8 mg) by mouth daily TAKE 2 CAPSULES BY MOUTH DAILY    Thyroid nodule  -     US Thyroid; Future    Gastroesophageal reflux disease, unspecified whether esophagitis present  -     Adult Gastro Ref - Procedure Only; Future    Anemia, unspecified type  -     Adult Gastro Ref - Procedure Only; Future    Screen for colon cancer  -     Adult Gastro Ref - Procedure Only; Future    Obesity, unspecified classification, unspecified obesity type, unspecified whether serious comorbidity  "present  -     [facility]B Adult Diabetes Educator Referral; Future    discussed multiple issues with patient  Went over recent labs in great detail  Good to recheck thyroid nodule with ultrasound; ordered and patient to call and schedule  He will call [facility] to schedule upper and lower scopes  Keep working on healthy diet/exercise and wt loss  See diab ed/dietician mainly to help with wt loss  covid booster given  No gout flares; continue allopurinol  Patient can start iron but hold for two weeks prior to scopes  Blood pressure okay on recheck, stay on same meds  No anginal symptoms   Schedule eye exam    Patient has been advised of split billing requirements and indicates understanding: Yes    COUNSELING:   Reviewed preventive health counseling, as reflected in patient instructions       Regular exercise       Healthy diet/nutrition       Vision screening       Colorectal cancer screening       Prostate cancer screening    Estimated body mass index is 35.87 kg/m  as calculated from the following:    Height as of 4/25/22: 1.778 m (5' 10\").    Weight as of 4/25/22: 113.4 kg (250 lb).     Weight management plan: Discussed healthy diet and exercise guidelines    He reports that he quit smoking about 21 years ago. He has a 25.00 pack-year smoking history. He has never used smokeless tobacco.      Counseling Resources:  ATP IV Guidelines  Pooled Cohorts Equation Calculator  FRAX Risk Assessment  ICSI Preventive Guidelines  Dietary Guidelines for Americans, 2010  USDA's MyPlate  ASA Prophylaxis  Lung CA Screening    Finesse Beal MD  [facility]  "

## 2022-04-29 NOTE — PATIENT INSTRUCTIONS
Call [facility] to schedule upper and lower scope exams    Start one daily iron pill but hold for two weeks prior to scope exams    Keep working on healthy diet/exercise and wt loss    See diabetes educator/ dietician    Continue same meds    Schedule thyroid ultrasound    Eye exam

## 2022-05-03 ENCOUNTER — ANCILLARY PROCEDURE (OUTPATIENT)
Dept: ULTRASOUND IMAGING | Facility: CLINIC | Age: 62
End: 2022-05-03
Attending: FAMILY MEDICINE
Payer: MEDICARE

## 2022-05-03 DIAGNOSIS — E04.1 THYROID NODULE: ICD-10-CM

## 2022-05-03 PROCEDURE — 76536 US EXAM OF HEAD AND NECK: CPT | Mod: TC | Performed by: RADIOLOGY

## 2022-05-04 ENCOUNTER — TELEPHONE (OUTPATIENT)
Dept: FAMILY MEDICINE | Facility: CLINIC | Age: 62
End: 2022-05-04
Payer: MEDICARE

## 2022-05-04 DIAGNOSIS — E04.2 MULTIPLE THYROID NODULES: Primary | ICD-10-CM

## 2022-05-04 NOTE — TELEPHONE ENCOUNTER
I called patient with  Ultrasound result  Thyroid nodules, one getting bigger  Prudent to have patient see Dr Tavares  Patient to call and schedule appointment   Finesse Beal MD

## 2022-05-04 NOTE — RESULT ENCOUNTER NOTE
As we discussed, one of the thyroid nodules is bigger than in 2017    Call to schedule appointment with Dr. Aj Tavares.    Take care    Finesse Beal MD

## 2022-05-05 ENCOUNTER — OFFICE VISIT (OUTPATIENT)
Dept: SURGERY | Facility: CLINIC | Age: 62
End: 2022-05-05
Payer: MEDICARE

## 2022-05-05 VITALS
HEART RATE: 74 BPM | DIASTOLIC BLOOD PRESSURE: 79 MMHG | WEIGHT: 249 LBS | SYSTOLIC BLOOD PRESSURE: 148 MMHG | BODY MASS INDEX: 35.73 KG/M2

## 2022-05-05 DIAGNOSIS — E04.2 MULTIPLE THYROID NODULES: ICD-10-CM

## 2022-05-05 PROCEDURE — 99213 OFFICE O/P EST LOW 20 MIN: CPT | Performed by: SURGERY

## 2022-05-05 NOTE — LETTER
5/5/2022         RE: Gary Iyer  [address] [facility]  [place] [address]        Dear Colleague,    Thank you for referring your patient, Gary Iyer, to the [facility]. Please see a copy of my visit note below.    I was asked to see Gary Iyre regarding thyroid nodule by Finesse Beal     Gary Iyer is a 61 year old male with a thyroid nodule. 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 The patient reports that there has been no noticeable  growth recently. There are no associated symptoms of dysphagia, dysphonia, or dyspnea. The patient denies a family history of thyroid cancer or a history of exposure to ionizing radiation. The TSH was 2.42 on 3/10/21.  An ultrasound on 5/3/22 revealed two thyroid nodules, the left isthmus nodule is larger as compared to 9/8/2017. He is here to discuss FNA.    Past Medical History:   has a past medical history of Diabetes (H), Hypertension, Lesion of larynx (12/12/2017), Multinodular goiter (nontoxic) (06/10/2013), Sleep apnea (8yr), and Squamous cell carcinoma of skin, unspecified.    Past Surgical History:  Past Surgical History:   Procedure Laterality Date     COLONOSCOPY  06/22/2012    Repeat Colonoscopy in 10 yrs.      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     HEAD & NECK SURGERY  01/25/2011    callous removal from throat     LARYNGOSCOPY WITH BIOPSY(IES)  01/25/2011     LARYNGOSCOPY WITH BIOPSY(IES) N/A 02/09/2017    Procedure: LARYNGOSCOPY WITH BIOPSY(IES);  Surgeon: Mel Fournier MD;  [facility]: [facility]     LARYNGOSCOPY WITH MICROSCOPE N/A 02/09/2017    Procedure: LARYNGOSCOPY WITH MICROSCOPE;  Surgeon: Mel Fournier MD;  [facility]     LASER CO2 LARYNGOSCOPY, COMPLEX  06/27/2013    Procedure: LASER CO2 LARYNGOSCOPY, COMPLEX;  Micro Direct Laryngoscopy With Micro Flap Excision Of Lesion Lumenis Co2 Laser ;  Surgeon: Mel Fournier MD;  [facility]: [facility]     LASER  CO2 LESION ORAL N/A 2017    Procedure: LASER CO2 LESION ORAL;  Surgeon: Mel Fournier MD;  [facility]     ORTHOPEDIC SURGERY  2016    left tibia orif with abbi -     VASECTOMY  2003        Social History:  Social History     Socioeconomic History     Marital status:      Spouse name: Not on file     Number of children: 3     Years of education: Not on file     Highest education level: Not on file   Occupational History     Not on file   Tobacco Use     Smoking status: Former Smoker     Packs/day: 1.00     Years: 25.00     Pack years: 25.00     Quit date: 2000     Years since quittin.7     Smokeless tobacco: Never Used   Vaping Use     Vaping Use: Never used   Substance and Sexual Activity     Alcohol use: Not Currently     Alcohol/week: 0.0 standard drinks     Comment: rare     Drug use: No     Sexual activity: Yes     Partners: Female   Other Topics Concern     Parent/sibling w/ CABG, MI or angioplasty before 65F 55M? No   Social History Narrative     Not on file     Social Determinants of Health     Financial Resource Strain: Not on file   Food Insecurity: Not on file   Transportation Needs: Not on file   Physical Activity: Not on file   Stress: Not on file   Social Connections: Not on file   Intimate Partner Violence: Not on file   Housing Stability: Not on file        Family History:  Family History   Problem Relation Age of Onset     C.A.D. Father          MI age 44     Hypertension Mother          93 old age     Diabetes No family hx of      Cerebrovascular Disease No family hx of      Cancer No family hx of      Glaucoma No family hx of      Macular Degeneration No family hx of        ROS:  10 point ROS neg other than the symptoms noted above in the HPI.      PHYSICAL EXAMINATION: A comprehensive head and neck examination was performed. Of note, there is not a palpable enlargement of the thyroid gland. There were no other palpable masses or  adenopathy.    Vitals: BP (!) 148/79   Pulse 74   Wt 112.9 kg (249 lb)   BMI 35.73 kg/m    BMI= Body mass index is 35.73 kg/m .  Constitutional: healthy, alert and no distress  Eyes: Pupils round and equal, no icterus   ENT: Mucous membranes moist  Respiratory:  Non-labored respiration  Gastrointestinal: Abdomen soft, non-tender. No masses, organomegaly  Musculoskeletal: No gross deformity  Neurologic: No gross focal deficits  Psychiatric: mentation appears normal and affect normal/bright  Hematologic/Lymphatic/Immunologic: No bruising noted  Skin: No lesions, rashes, erythema or jaundice noted    IMPRESSION: Thyroid nodule    Plan:    Growing so will repeat FNA. The risks, benefits and alternatives of FNA were discussed, including (but not limited to) pain, bleeding, infection, and the potential need for additional FNAs or surgery. We discussed the possible results and briefly what each would mean. We also briefly discussed surgery. She is agreeable to an FNA and we have taken the liberty of arranging this.               Again, thank you for allowing me to participate in the care of your patient.        Sincerely,        Aj Tavares, DO

## 2022-05-05 NOTE — PROGRESS NOTES
I was asked to see Gary Iyer regarding thyroid nodule by Finesse Beal     Gary Iyer is a 61 year old male with a thyroid nodule. 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 The patient reports that there has been no noticeable  growth recently. There are no associated symptoms of dysphagia, dysphonia, or dyspnea. The patient denies a family history of thyroid cancer or a history of exposure to ionizing radiation. The TSH was 2.42 on 3/10/21.  An ultrasound on 5/3/22 revealed two thyroid nodules, the left isthmus nodule is larger as compared to 9/8/2017. He is here to discuss FNA.    Past Medical History:   has a past medical history of Diabetes (H), Hypertension, Lesion of larynx (12/12/2017), Multinodular goiter (nontoxic) (06/10/2013), Sleep apnea (8yr), and Squamous cell carcinoma of skin, unspecified.    Past Surgical History:  Past Surgical History:   Procedure Laterality Date     COLONOSCOPY  06/22/2012    Repeat Colonoscopy in 10 yrs.      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     HEAD & NECK SURGERY  01/25/2011    callous removal from throat     LARYNGOSCOPY WITH BIOPSY(IES)  01/25/2011     LARYNGOSCOPY WITH BIOPSY(IES) N/A 02/09/2017    Procedure: LARYNGOSCOPY WITH BIOPSY(IES);  Surgeon: Mel Fournier MD;  [facility]     LARYNGOSCOPY WITH MICROSCOPE N/A 02/09/2017    Procedure: LARYNGOSCOPY WITH MICROSCOPE;  Surgeon: Mel Fournier MD;  [facility]     LASER CO2 LARYNGOSCOPY, COMPLEX  06/27/2013    Procedure: LASER CO2 LARYNGOSCOPY, COMPLEX;  Micro Direct Laryngoscopy With Micro Flap Excision Of Lesion Lumenis Co2 Laser ;  Surgeon: Mel Fournier MD;  [facility]     LASER CO2 LESION ORAL N/A 02/09/2017    Procedure: LASER CO2 LESION ORAL;  Surgeon: Mel Fournier MD;  [facility]     ORTHOPEDIC SURGERY  03/2016    left tibia orif with abbi 2-2016     VASECTOMY  02/2003        Social History:  Social  History     Socioeconomic History     Marital status:      Spouse name: Not on file     Number of children: 3     Years of education: Not on file     Highest education level: Not on file   Occupational History     Not on file   Tobacco Use     Smoking status: Former Smoker     Packs/day: 1.00     Years: 25.00     Pack years: 25.00     Quit date: 2000     Years since quittin.7     Smokeless tobacco: Never Used   Vaping Use     Vaping Use: Never used   Substance and Sexual Activity     Alcohol use: Not Currently     Alcohol/week: 0.0 standard drinks     Comment: rare     Drug use: No     Sexual activity: Yes     Partners: Female   Other Topics Concern     Parent/sibling w/ CABG, MI or angioplasty before 65F 55M? No   Social History Narrative     Not on file     Social Determinants of Health     Financial Resource Strain: Not on file   Food Insecurity: Not on file   Transportation Needs: Not on file   Physical Activity: Not on file   Stress: Not on file   Social Connections: Not on file   Intimate Partner Violence: Not on file   Housing Stability: Not on file        Family History:  Family History   Problem Relation Age of Onset     C.A.D. Father          MI age 44     Hypertension Mother          93 old age     Diabetes No family hx of      Cerebrovascular Disease No family hx of      Cancer No family hx of      Glaucoma No family hx of      Macular Degeneration No family hx of        ROS:  10 point ROS neg other than the symptoms noted above in the HPI.      PHYSICAL EXAMINATION: A comprehensive head and neck examination was performed. Of note, there is not a palpable enlargement of the thyroid gland. There were no other palpable masses or adenopathy.    Vitals: BP (!) 148/79   Pulse 74   Wt 112.9 kg (249 lb)   BMI 35.73 kg/m    BMI= Body mass index is 35.73 kg/m .  Constitutional: healthy, alert and no distress  Eyes: Pupils round and equal, no icterus   ENT: Mucous membranes  moist  Respiratory:  Non-labored respiration  Gastrointestinal: Abdomen soft, non-tender. No masses, organomegaly  Musculoskeletal: No gross deformity  Neurologic: No gross focal deficits  Psychiatric: mentation appears normal and affect normal/bright  Hematologic/Lymphatic/Immunologic: No bruising noted  Skin: No lesions, rashes, erythema or jaundice noted    IMPRESSION: Thyroid nodule    Plan:    Growing so will repeat FNA. The risks, benefits and alternatives of FNA were discussed, including (but not limited to) pain, bleeding, infection, and the potential need for additional FNAs or surgery. We discussed the possible results and briefly what each would mean. We also briefly discussed surgery. She is agreeable to an FNA and we have taken the liberty of arranging this.

## 2022-05-09 ENCOUNTER — OFFICE VISIT (OUTPATIENT)
Dept: OPTOMETRY | Facility: CLINIC | Age: 62
End: 2022-05-09
Payer: MEDICARE

## 2022-05-09 DIAGNOSIS — H52.4 PRESBYOPIA: ICD-10-CM

## 2022-05-09 DIAGNOSIS — H52.03 HYPERMETROPIA, BILATERAL: ICD-10-CM

## 2022-05-09 DIAGNOSIS — Z01.01 ENCOUNTER FOR EXAMINATION OF EYES AND VISION WITH ABNORMAL FINDINGS: Primary | ICD-10-CM

## 2022-05-09 DIAGNOSIS — H25.13 NUCLEAR AGE-RELATED CATARACT, BOTH EYES: ICD-10-CM

## 2022-05-09 DIAGNOSIS — E11.9 TYPE 2 DIABETES MELLITUS WITHOUT RETINOPATHY (H): ICD-10-CM

## 2022-05-09 PROCEDURE — 92014 COMPRE OPH EXAM EST PT 1/>: CPT | Performed by: OPTOMETRIST

## 2022-05-09 PROCEDURE — 92015 DETERMINE REFRACTIVE STATE: CPT | Performed by: OPTOMETRIST

## 2022-05-09 ASSESSMENT — CUP TO DISC RATIO
OS_RATIO: 0.3
OD_RATIO: 0.3

## 2022-05-09 ASSESSMENT — TONOMETRY
OD_IOP_MMHG: 19
IOP_METHOD: TONOPEN
OS_IOP_MMHG: 20

## 2022-05-09 ASSESSMENT — REFRACTION_MANIFEST
OS_ADD: +2.50
OD_ADD: +2.50
OS_SPHERE: +0.50
OD_CYLINDER: +0.50
OD_AXIS: 009
OD_SPHERE: +1.00

## 2022-05-09 ASSESSMENT — REFRACTION_WEARINGRX
OS_CYLINDER: SPHERE
OD_AXIS: 009
OD_CYLINDER: +0.50
OS_SPHERE: +3.00
SPECS_TYPE: READING
OD_SPHERE: +3.50

## 2022-05-09 ASSESSMENT — VISUAL ACUITY
METHOD: SNELLEN - LINEAR
OS_SC: 20/50
OD_SC: 20/70-1
OS_SC: 20/20
OD_SC: 20/20

## 2022-05-09 ASSESSMENT — CONF VISUAL FIELD
METHOD: COUNTING FINGERS
OS_NORMAL: 1
OD_NORMAL: 1

## 2022-05-09 ASSESSMENT — KERATOMETRY
OS_K1POWER_DIOPTERS: 44.50
OD_AXISANGLE2_DEGREES: 57
OS_K2POWER_DIOPTERS: 44.50
OD_K2POWER_DIOPTERS: 44.75
OS_AXISANGLE2_DEGREES: 180
OD_K1POWER_DIOPTERS: 44.00

## 2022-05-09 ASSESSMENT — SLIT LAMP EXAM - LIDS
COMMENTS: DERMATOCHALASIS
COMMENTS: DERMATOCHALASIS

## 2022-05-09 ASSESSMENT — EXTERNAL EXAM - RIGHT EYE: OD_EXAM: BROW PTOSIS

## 2022-05-09 ASSESSMENT — EXTERNAL EXAM - LEFT EYE: OS_EXAM: BROW PTOSIS

## 2022-05-09 NOTE — PROGRESS NOTES
Chief Complaint   Patient presents with     Diabetic Eye Exam        Chief Complaint(s) and History of Present Illness(es)     Diabetic Eye Exam                Lab Results   Component Value Date    A1C 7.5 02/14/2022    A1C 7.8 09/15/2021    A1C 7.4 03/10/2021    A1C 6.9 11/25/2020    A1C 7.0 07/17/2020    A1C 7.1 08/30/2019    A1C 6.6 03/22/2019            Last Eye Exam: 4/2021  Dilated Previously: Yes    What are you currently using to see?  Readers, he did not bring them to this appointment. He thinks that they are +3.00    Distance Vision Acuity: Noticed gradual change in both eyes    Near Vision Acuity: Satisfied with vision while reading and using computer unaided and witth the readers as needed  Eye Comfort: good  Do you use eye drops? : No  Occupation or Hobbies: Semi Retired    [employer]      Medical, surgical and family histories reviewed and updated 5/9/2022.       OBJECTIVE: See Ophthalmology exam    ASSESSMENT:    ICD-10-CM    1. Encounter for examination of eyes and vision with abnormal findings  Z01.01 EYE EXAM (SIMPLE-NONBILLABLE)   2. Type 2 diabetes mellitus without retinopathy (H)  E11.9 EYE EXAM (SIMPLE-NONBILLABLE)    Negative diabetic retinopathy   3. Nuclear age-related cataract, both eyes  H25.13 EYE EXAM (SIMPLE-NONBILLABLE)   4. Hypermetropia, bilateral  H52.03 REFRACTION   5. Presbyopia  H52.4 REFRACTION       PLAN:    Gary Iyer aware  eye exam results will be sent to Finesse Beal  Patient Instructions   There are not any signs of the diabetes affecting the eyes today.  It is important that you get your eyes dilated once yearly and keep good control of your diabetes.    You have the start of mild cataracts.  You may notice some blurred vision or glare with night driving.  It is important that you wear good sunglasses to protect your eyes from the ultraviolet light from the sun.  I recommend that you return in 1 year for an eye exam unless there are any sudden  changes in your vision.       Eyeglass prescription given. Optional glasses as needed- OTC readers ok or prescription given for bifocals.    Return in 1 year for a complete eye exam or sooner if needed.    Xavier Peck, JAIR

## 2022-05-09 NOTE — PATIENT INSTRUCTIONS
There are not any signs of the diabetes affecting the eyes today.  It is important that you get your eyes dilated once yearly and keep good control of your diabetes.    You have the start of mild cataracts.  You may notice some blurred vision or glare with night driving.  It is important that you wear good sunglasses to protect your eyes from the ultraviolet light from the sun.  I recommend that you return in 1 year for an eye exam unless there are any sudden changes in your vision.       Eyeglass prescription given. Optional glasses as needed- OTC readers ok or prescription given for bifocals.    Return in 1 year for a complete eye exam or sooner if needed.    Xavier Peck, OD    The affects of the dilating drops last for 4- 6 hours.  You will be more sensitive to light and vision will be blurry up close.  Do not drive if you do not feel comfortable.  Mydriatic sunglasses were given if needed.    Patient Education   Diabetes weakens the blood vessels all over the body, including the eyes. Damage to the blood vessels in the eyes can cause swelling or bleeding into part of the eye (called the retina). This is called diabetic retinopathy (ANNA-tin--pu-thee). If not treated, this disease can cause vision loss or blindness.   Symptoms may include blurred or distorted vision, but many people have no symptoms. It's important to see your eye doctor regularly to check for problems.   Early treatment and good control can help protect your vision. Here are the things you can do to help prevent vision loss:      1. Keep your blood sugar levels under tight control.      2. Bring high blood pressure under control.      3. No smoking.      4. Have yearly dilated eye exams.         Optometry Providers       Clinic Locations                                 Telephone Number   Dr. Dorita Phelps/[place]to[place]  [phone]     [facility] Hours:                [facility] Hours:       [facility] Hours:   [address]   [address]     [address] [place] [address]   [place] [address] [address]    [place] [address]  Phone: [phone]                    Phone: [phone]     Phone: [phone]                      Monday 8:00-6:00                          Monday 8:00-6:00                          Monday 8:00-6:00              Tuesday 8:00-6:00                          Tuesday 8:00-6:00                          Tuesday 8:00-6:00              Wednesday 8:00-6:00                  Wednesday 8:00-6:00                   Wednesday 8:00-6:00      Thursday 8:00-6:00                        Thursday 8:00-6:00                         Thursday 8:00-6:00            Friday 8:00-5:00                              Friday 8:00-5:00                              Friday 8:00-5:00    [facility] Hours:   [address] [place] [address]  [phone]    Monday 9:00-6:00  Tuesday 9:00-6:00  Wednesday 9:00-6:00  Thursday 9:00-6:00  Friday 9:00-5:00  Please log on to [email or web address] to order your contact lenses.  The link is found on the [facility] page.  As always, Thank you for trusting us with your health care needs!

## 2022-05-09 NOTE — LETTER
5/9/2022         RE: Gary Iyer  [address] [place] [address]        Dear Colleague,    Thank you for referring your patient, Gary Iyer, to the [facility]. Please see a copy of my visit note below.    Chief Complaint   Patient presents with     Diabetic Eye Exam        Chief Complaint(s) and History of Present Illness(es)     Diabetic Eye Exam                Lab Results   Component Value Date    A1C 7.5 02/14/2022    A1C 7.8 09/15/2021    A1C 7.4 03/10/2021    A1C 6.9 11/25/2020    A1C 7.0 07/17/2020    A1C 7.1 08/30/2019    A1C 6.6 03/22/2019            Last Eye Exam: 4/2021  Dilated Previously: Yes    What are you currently using to see?  Readers, he did not bring them to this appointment. He thinks that they are +3.00    Distance Vision Acuity: Noticed gradual change in both eyes    Near Vision Acuity: Satisfied with vision while reading and using computer unaided and witth the readers as needed  Eye Comfort: good  Do you use eye drops? : No  Occupation or Hobbies: Semi Retired    [employer]      Medical, surgical and family histories reviewed and updated 5/9/2022.       OBJECTIVE: See Ophthalmology exam    ASSESSMENT:    ICD-10-CM    1. Encounter for examination of eyes and vision with abnormal findings  Z01.01 EYE EXAM (SIMPLE-NONBILLABLE)   2. Type 2 diabetes mellitus without retinopathy (H)  E11.9 EYE EXAM (SIMPLE-NONBILLABLE)    Negative diabetic retinopathy   3. Nuclear age-related cataract, both eyes  H25.13 EYE EXAM (SIMPLE-NONBILLABLE)   4. Hypermetropia, bilateral  H52.03 REFRACTION   5. Presbyopia  H52.4 REFRACTION       PLAN:    Gary Iyer aware  eye exam results will be sent to Finesse Beal  Patient Instructions   There are not any signs of the diabetes affecting the eyes today.  It is important that you get your eyes dilated once yearly and keep good control of your diabetes.    You have the start of mild cataracts.  You  may notice some blurred vision or glare with night driving.  It is important that you wear good sunglasses to protect your eyes from the ultraviolet light from the sun.  I recommend that you return in 1 year for an eye exam unless there are any sudden changes in your vision.       Eyeglass prescription given. Optional glasses as needed- OTC readers ok or prescription given for bifocals.    Return in 1 year for a complete eye exam or sooner if needed.    Xavier Peck, OD               Again, thank you for allowing me to participate in the care of your patient.        Sincerely,        Xavier Peck, OD

## 2022-05-16 ENCOUNTER — ANCILLARY PROCEDURE (OUTPATIENT)
Dept: ULTRASOUND IMAGING | Facility: CLINIC | Age: 62
End: 2022-05-16
Attending: SURGERY
Payer: MEDICARE

## 2022-05-16 ENCOUNTER — VIRTUAL VISIT (OUTPATIENT)
Dept: EDUCATION SERVICES | Facility: CLINIC | Age: 62
End: 2022-05-16
Attending: FAMILY MEDICINE
Payer: MEDICARE

## 2022-05-16 DIAGNOSIS — E66.9 OBESITY, UNSPECIFIED CLASSIFICATION, UNSPECIFIED OBESITY TYPE, UNSPECIFIED WHETHER SERIOUS COMORBIDITY PRESENT: ICD-10-CM

## 2022-05-16 DIAGNOSIS — E04.2 MULTIPLE THYROID NODULES: ICD-10-CM

## 2022-05-16 DIAGNOSIS — E11.9 TYPE 2 DIABETES MELLITUS WITHOUT COMPLICATION, WITHOUT LONG-TERM CURRENT USE OF INSULIN (H): ICD-10-CM

## 2022-05-16 PROCEDURE — G0108 DIAB MANAGE TRN  PER INDIV: HCPCS | Mod: 95 | Performed by: DIETITIAN, REGISTERED

## 2022-05-16 PROCEDURE — 10005 FNA BX W/US GDN 1ST LES: CPT

## 2022-05-16 PROCEDURE — 88173 CYTOPATH EVAL FNA REPORT: CPT | Performed by: PATHOLOGY

## 2022-05-16 NOTE — Clinical Note
Just FYI- we discussed lifestyle changes for weight loss. Also discussed potentially starting a GLP-1, depending on the results from his thyroid biopsy yesterday.  Danelle Alba RD [facility]

## 2022-05-17 LAB
PATH REPORT.COMMENTS IMP SPEC: NORMAL
PATH REPORT.FINAL DX SPEC: NORMAL
PATH REPORT.GROSS SPEC: NORMAL
PATH REPORT.MICROSCOPIC SPEC OTHER STN: NORMAL
PATH REPORT.RELEVANT HX SPEC: NORMAL

## 2022-05-17 NOTE — PROGRESS NOTES
Diabetes Self-Management Education & Support    Presents for: Individual review    Type of Visit: Video Visit    How would patient like to obtain AVS? Rossana    ASSESSMENT:    Gary would like to work on weight loss, he gained 20-30 pounds over the last year. Today we discussed healthy eating for diabetes and weight loss. Recommended that Gary pay attention to portion sizes, discussed appropriate portion sizes for different food groups. Recommneded that he limit rice serving to 1/2 cup cooked when he is also having lentils and chipati at his meal. We discussed limiting meat portions to the size of his palm. Writer will send healthy living with diabetes book to give him more information. We discussed exercise. Recommended that Gary work on adding in more walking and also strength training using the hand weights that he has at home.     Gary is also asking about medication/surgeries for weight loss. Writer explained that [facility] has a surgical weight loss program that he could request a referral for from Dr. Beal. We discussed medications for weight loss, explained that there are a few on the market, but lifestyle change is preferred for weight loss. We discussed GLP-1 medication to help with diabetes management and weight loss. Gary explains that he has never had pancreatitis, but he is getting a biopsy today for thyroid nodules. Writer explained that we should wait to rule out medullary thyroid cancer before starting GLP-1. Writer will check back in 1 week to check on interpretation of biopsy results from ENT and then can recommend to start GLP-1.     Recommended that Gary check BG once/day    Patient's most recent   Lab Results   Component Value Date    A1C 7.5 02/14/2022    A1C 7.4 03/10/2021    is not meeting goal of <7.0    Diabetes knowledge and skills assessment:   Patient is knowledgeable in diabetes management concepts related to: Taking Medication    Continue education with the following  diabetes management concepts: Healthy Eating, Being Active, Monitoring, Taking Medication, Problem Solving, Reducing Risks and Healthy Coping    Based on learning assessment above, most appropriate setting for further diabetes education would be: Individual setting.    INTERVENTIONS:    Education provided today on:  AADE Self-Care Behaviors:  Diabetes Pathophysiology  Healthy Eating: carbohydrate counting, consistency in amount, composition, and timing of food intake, weight reduction, eating out, portion control, plate planning method and label reading  Being Active: relationship to blood glucose and describe appropriate activity program  Monitoring: purpose, log and interpret results, individual blood glucose targets and frequency of monitoring  Taking Medication: action of prescribed medication and when to take medications    Opportunities for ongoing education and support in diabetes-self management were discussed. Pt verbalized understanding of concepts discussed and recommendations provided today.       Education Materials Provided:  [employer] Healthy Living with Diabetes Book, BG Log Sheet, Carbohydrate Counting and My Plate Planner      PLAN    Aim for 60 grams of carbohydrates at meals  Balance plate with lean protein and non-starchy vegetables  Work on walking more and adding in strength training    Could consider GLP-1 pending thyroid biopsy results, will check back in 1 week     Topics to cover at upcoming visits: Healthy Eating, Being Active, Monitoring, Taking Medication, Problem Solving, Reducing Risks and Healthy Coping  Follow-up: 1 week via [employer] and 6/20 for follow-up visit    See Goals Section for co-developed, patient-stated behavior change goals.  AVS provided to patient today.          SUBJECTIVE / OBJECTIVE:  Presents for: Individual review  Accompanied by: Self  Diabetes education in the past 24mo: No  Focus of Visit: Patient Unsure  Diabetes type: Type 2  Date of diagnosis: about  "4-5 years ago  Disease course: Stable  Difficulty affording diabetes medication?: No  Difficulty affording diabetes testing supplies?: No  Cultural Influences/Ethnic Background:  Not  or     Diabetes Symptoms & Complications:  Fatigue: No  Neuropathy: No  Polydipsia: No  Polyphagia: No  Polyuria: Sometimes (in the night- taking meds for this)  Visual change: No  Slow healing wounds: No  Symptom course: Stable  Weight trend: Increasing  Complications assessed today?: Yes    Patient Problem List and Family Medical History reviewed for relevant medical history, current medical status, and diabetes risk factors.    Vitals:  There were no vitals taken for this visit.  Estimated body mass index is 35.73 kg/m  as calculated from the following:    Height as of 4/29/22: 1.778 m (5' 10\").    Weight as of 5/5/22: 112.9 kg (249 lb).   Last 3 BP:   BP Readings from Last 3 Encounters:   05/05/22 (!) 148/79   04/29/22 126/60   04/12/22 (!) 156/84       History   Smoking Status     Former Smoker     Packs/day: 1.00     Years: 25.00     Quit date: 9/1/2000   Smokeless Tobacco     Never Used       Labs:  Lab Results   Component Value Date    A1C 7.5 02/14/2022    A1C 7.4 03/10/2021     Lab Results   Component Value Date     02/14/2022     03/10/2021     Lab Results   Component Value Date    LDL 93 02/14/2022    LDL 79 11/25/2020     HDL Cholesterol   Date Value Ref Range Status   11/25/2020 59 >39 mg/dL Final     Direct Measure HDL   Date Value Ref Range Status   02/14/2022 51 >=40 mg/dL Final   ]  GFR Estimate   Date Value Ref Range Status   02/14/2022 >90 >60 mL/min/1.73m2 Final     Comment:     Effective December 21, 2021 eGFRcr in adults is calculated using the 2021 CKD-EPI creatinine equation which includes age and gender (Glen desai al., NEJM, DOI: 10.1056/EJIKvv8359721)   03/10/2021 >90 >60 mL/min/[1.73_m2] Final     Comment:     Non  GFR Calc  Starting 12/18/2018, serum creatinine " based estimated GFR (eGFR) will be   calculated using the Chronic Kidney Disease Epidemiology Collaboration   (CKD-EPI) equation.       GFR Estimate If Black   Date Value Ref Range Status   03/10/2021 >90 >60 mL/min/[1.73_m2] Final     Comment:      GFR Calc  Starting 12/18/2018, serum creatinine based estimated GFR (eGFR) will be   calculated using the Chronic Kidney Disease Epidemiology Collaboration   (CKD-EPI) equation.       Lab Results   Component Value Date    CR 0.85 02/14/2022    CR 0.67 03/10/2021     No results found for: MICROALBUMIN    Healthy Eating:  Healthy Eating Assessed Today: Yes  Meals include: Breakfast, Lunch, Dinner  Breakfast: 1 cup of tea with stevia with a few cookies , then will have breakfast- eggs, toast with butter and oatmeal milk (2%) and sugar  Lunch: Lentils, Rice, veggies, chipati - water to drink  Dinner: evening tea and then dinner- meat, veggies, chipati  Snacks: popcorn, chips  Beverages: Water, Tea (mikey drink)  Has patient met with a dietitian in the past?: Yes    Being Active:  Being Active Assessed Today: Yes  Exercise:: Yes  Days per week of moderate to strenuous exercise (like a brisk walk): 4  On average, minutes per day of exercise at this level: 20  Exercise Minutes per Week: 80    Monitoring:  Monitoring Assessed Today: Yes  Did patient bring glucose meter to appointment? : No  Times checking blood sugar at home (per): Month    Glucose data:  Has not testing in a while      Taking Medications:  Diabetes Medication(s)     Biguanides       metFORMIN (GLUCOPHAGE) 1000 MG tablet    TAKE 1 TABLET(1000 MG) BY MOUTH TWICE DAILY WITH MEALS          Taking Medication Assessed Today: Yes  Current Treatments: Oral Medication (taken by mouth)  Problems taking diabetes medications regularly?: No  Diabetes medication side effects?: No    Problem Solving:  Problem Solving Assessed Today: Yes  Is the patient at risk for hypoglycemia?: No  Is the patient at risk for  DKA?: No      Reducing Risks:  Reducing Risks Assessed Today: Yes  Diabetes Risks: Age over 45 years, Sedentary Lifestyle  CAD Risks: Diabetes Mellitus, Male sex, Obesity, Sedentary lifestyle  Has dilated eye exam at least once a year?: Yes    Healthy Coping:  Healthy Coping Assessed Today: Yes  Patient Activation Measure Survey Score:  STUART Score (Last Two) 5/7/2012   STUART Raw Score 0   Activation Score 0   STUART Level 1       HERBERT Brown [facility]  Time Spent: 30 minutes  Encounter Type: Individual    Any diabetes medication dose changes were made via the Certified Diabetes Care & Education Protocol in collaboration with the patient's referring provider. A copy of this encounter was shared with the provider.

## 2022-05-20 ENCOUNTER — TELEPHONE (OUTPATIENT)
Dept: SURGERY | Facility: CLINIC | Age: 62
End: 2022-05-20

## 2022-05-23 NOTE — TELEPHONE ENCOUNTER
Pt called again asking for results of his thyroid biopsy. Writer saw the results in his chart but no result note to share with the pt.    Will reach out to Dr. Tavares's team to assist with follow up.      Lanny RUTH RN, BSN  [facility]

## 2022-05-25 DIAGNOSIS — E66.01 MORBID OBESITY (H): ICD-10-CM

## 2022-05-25 DIAGNOSIS — E11.9 TYPE 2 DIABETES MELLITUS WITHOUT COMPLICATION, WITHOUT LONG-TERM CURRENT USE OF INSULIN (H): Primary | ICD-10-CM

## 2022-05-25 NOTE — TELEPHONE ENCOUNTER
Talked to patient, will come back and see us if it is getting bigger otherwise will get an US in 1-2 years.

## 2022-05-25 NOTE — TELEPHONE ENCOUNTER
Gary Mcclendon left a message for you that his thyroid biopsy results are benign, so it is okay to start the new medication.    Mariel Mccabe RN, [facility]

## 2022-05-26 RX ORDER — SEMAGLUTIDE 1.34 MG/ML
0.25 INJECTION, SOLUTION SUBCUTANEOUS
Qty: 1.5 ML | Refills: 0 | Status: SHIPPED | OUTPATIENT
Start: 2022-05-26 | End: 2022-06-30

## 2022-05-26 NOTE — TELEPHONE ENCOUNTER
Dr. Beal,    Please let me know if you agree with plan and sign pended orders.     Gary could benefit from addition of GLP-1 for weight loss benefit. Recommend starting Ozempic at 0.25 mg/week for 7 days, then increase to 0.5 mg/week.     Danelle Alba RD LD [facility]

## 2022-05-30 ENCOUNTER — TELEPHONE (OUTPATIENT)
Dept: FAMILY MEDICINE | Facility: CLINIC | Age: 62
End: 2022-05-30
Payer: MEDICARE

## 2022-05-30 DIAGNOSIS — E11.9 TYPE 2 DIABETES MELLITUS WITHOUT COMPLICATION, WITHOUT LONG-TERM CURRENT USE OF INSULIN (H): Primary | ICD-10-CM

## 2022-05-30 NOTE — TELEPHONE ENCOUNTER
Reason for Call:  Other medication question     Detailed comments: Gary has questions regarding Ozempic. He states this is a new medication.     Phone Number Patient can be reached at: Home number on file [phone] (home)    Best Time: any     Can we leave a detailed message on this number? YES    Call taken on 5/30/2022 at 9:47 AM by Maryan Alejandro

## 2022-05-31 NOTE — TELEPHONE ENCOUNTER
Left message on voicemail advising patient to return call at [phone].    When patient returns call, please relay message below and assist with scheduling lab-only appt.    Vanda MELENDEZ, RN  [facility], [place]

## 2022-05-31 NOTE — TELEPHONE ENCOUNTER
Yes patient could do a baseline hemoglobin a1c.  I put in order.  He  Can do a lab only visit, need not be fasting.      Please inform patient    Finesse Beal MD

## 2022-05-31 NOTE — TELEPHONE ENCOUNTER
Reached out to patient to inquire about call below.   Patient states that he is worried Dr. Beal does not know he will be starting Ozempic at 0.25 mg/week.   Writer notified patient of refill encounter (05/25/2022) where Danelle Alba RD had routed message to Dr. Beal to review plan- Dr. Beal signed pended orders and is aware of plan to start Ozempic at 0.25 mg/week. Patient verbalized understanding.  He is also requesting a 'baseline A1C' prior to starting medication. Writer notified him that per Care Gaps, he should be due August 14, 2022. However, he would still like to come in for a baseline if Dr. Beal will order for him sooner.    AUG 14   2022 A1C (Every 6 Months)  Last completed: Feb 14, 2022     NORA Solares RN  [facility], [facility]

## 2022-06-02 ENCOUNTER — LAB (OUTPATIENT)
Dept: LAB | Facility: CLINIC | Age: 62
End: 2022-06-02
Payer: COMMERCIAL

## 2022-06-02 DIAGNOSIS — Z11.4 SCREENING FOR HIV (HUMAN IMMUNODEFICIENCY VIRUS): ICD-10-CM

## 2022-06-02 DIAGNOSIS — E11.9 TYPE 2 DIABETES MELLITUS WITHOUT COMPLICATION, WITHOUT LONG-TERM CURRENT USE OF INSULIN (H): ICD-10-CM

## 2022-06-02 LAB — HBA1C MFR BLD: 7.4 % (ref 0–5.6)

## 2022-06-02 PROCEDURE — 87389 HIV-1 AG W/HIV-1&-2 AB AG IA: CPT

## 2022-06-02 PROCEDURE — 83036 HEMOGLOBIN GLYCOSYLATED A1C: CPT

## 2022-06-02 PROCEDURE — 36415 COLL VENOUS BLD VENIPUNCTURE: CPT

## 2022-06-02 NOTE — TELEPHONE ENCOUNTER
[facility] pharmacy called and stated patient would like a new glucometer and all supplies to go with it. He is requesting the plain Contour instead of the Contour Next. Please advise and call back to discuss.    Thank you  Kassie Antonio  Patient Representative    
Informed that script was sent to pharmacy. VELMA Dill  
Okayed prescription.    Finesse Beal MD    
PCP please see message below.  Patient requesting new blood glucose monitoring kit.  I pended this per request.    Kenneth Bejarano RN    
Will route to provider to advise.    Josee Loyola RN  [facility]          
blood glucose (ALON CONTOUR NEXT) test strip      Last Written Prescription Date: 6/29/15  Last Fill Quantity: 100,  # refills: 3   Last Office Visit with G, [facility] or [facility] prescribing provider: 2/3/17                                               
Temples.../Clavicles.../Shoulders...

## 2022-06-03 ENCOUNTER — TELEPHONE (OUTPATIENT)
Dept: FAMILY MEDICINE | Facility: CLINIC | Age: 62
End: 2022-06-03

## 2022-06-03 ENCOUNTER — ALLIED HEALTH/NURSE VISIT (OUTPATIENT)
Dept: FAMILY MEDICINE | Facility: CLINIC | Age: 62
End: 2022-06-03

## 2022-06-03 DIAGNOSIS — E11.9 TYPE 2 DIABETES MELLITUS WITHOUT COMPLICATION, WITHOUT LONG-TERM CURRENT USE OF INSULIN (H): Primary | ICD-10-CM

## 2022-06-03 LAB — HIV 1+2 AB+HIV1 P24 AG SERPL QL IA: NONREACTIVE

## 2022-06-03 PROCEDURE — 99207 PR NO CHARGE NURSE ONLY: CPT

## 2022-06-03 NOTE — TELEPHONE ENCOUNTER
Reason for Call:  Other prescription    Detailed comments: patient wondering if he could schedule to have a nurse show him how to use the  ozempic    Phone Number Patient can be reached at: [phone]    Best Time: asap    Can we leave a detailed message on this number? YES    Call taken on 6/3/2022 at 10:10 AM by Wendy Dunlap

## 2022-06-03 NOTE — PROGRESS NOTES
Ozempic pen injection teaching completed.   Patient was able to return demonstration on injection practice kit without any issues.  Written instructions also given.  Advised to follow-up with DM ed as scheduled      Carlos Morris RN  [facility]

## 2022-06-03 NOTE — TELEPHONE ENCOUNTER
Writer called pt and scheduled him on the RN schedule for teaching today. He will plan to be at the clinic within the hour.      Lanny RUTH RN, BSN  [facility]

## 2022-06-03 NOTE — TELEPHONE ENCOUNTER
Patient has not showed for his RN appointment.  Called patient and he is still planning to come  He stated that he will be here within 30 min    Carlos Morris RN  [facility]

## 2022-06-04 NOTE — RESULT ENCOUNTER NOTE
Diabetes test ( hemoglobin a1c ) is okay, similar to last time.    To clarify, there is a new national guideline for every adult to have a one time HIV test.  The lab now automatically runs this if it has not been done for a patient.  You do not have HIV.    Finesse Beal MD

## 2022-06-14 DIAGNOSIS — E11.9 TYPE 2 DIABETES MELLITUS WITHOUT COMPLICATION, WITHOUT LONG-TERM CURRENT USE OF INSULIN (H): ICD-10-CM

## 2022-06-15 ENCOUNTER — TELEPHONE (OUTPATIENT)
Dept: EDUCATION SERVICES | Facility: CLINIC | Age: 62
End: 2022-06-15
Payer: COMMERCIAL

## 2022-06-16 RX ORDER — BLOOD-GLUCOSE METER
EACH MISCELLANEOUS
Qty: 1 KIT | Refills: 0 | Status: SHIPPED | OUTPATIENT
Start: 2022-06-16 | End: 2022-08-24

## 2022-06-17 ENCOUNTER — TELEPHONE (OUTPATIENT)
Dept: FAMILY MEDICINE | Facility: CLINIC | Age: 62
End: 2022-06-17
Payer: COMMERCIAL

## 2022-06-17 DIAGNOSIS — E11.9 TYPE 2 DIABETES MELLITUS WITHOUT COMPLICATION, WITHOUT LONG-TERM CURRENT USE OF INSULIN (H): ICD-10-CM

## 2022-06-17 RX ORDER — LANCETS
EACH MISCELLANEOUS
Qty: 100 EACH | Refills: 11 | Status: SHIPPED | OUTPATIENT
Start: 2022-06-17

## 2022-06-17 NOTE — TELEPHONE ENCOUNTER
Reason for Call:  Other prescription    Detailed comments: Amberly from [facility] calling requesting refill of the pts Lancets, could not find Rx on current med list, and she also could not on her end. Please contact if there are further questions/concerns.  Cecilia [phone]    Phone Number Patient can be reached at: Home number on file [phone] (home)    Best Time: Any    Can we leave a detailed message on this number? Not Applicable    Call taken on 6/17/2022 at 11:28 AM by Amanda Valladares

## 2022-06-20 RX ORDER — GLUCOSAMINE HCL/CHONDROITIN SU 500-400 MG
CAPSULE ORAL
Qty: 100 EACH | Refills: 1 | Status: SHIPPED | OUTPATIENT
Start: 2022-06-20

## 2022-06-20 RX ORDER — BLOOD SUGAR DIAGNOSTIC
STRIP MISCELLANEOUS
Qty: 100 STRIP | Refills: 1 | Status: SHIPPED | OUTPATIENT
Start: 2022-06-20

## 2022-06-30 ENCOUNTER — VIRTUAL VISIT (OUTPATIENT)
Dept: EDUCATION SERVICES | Facility: CLINIC | Age: 62
End: 2022-06-30
Payer: COMMERCIAL

## 2022-06-30 DIAGNOSIS — E66.01 MORBID OBESITY (H): ICD-10-CM

## 2022-06-30 DIAGNOSIS — E11.9 TYPE 2 DIABETES MELLITUS WITHOUT COMPLICATION, WITHOUT LONG-TERM CURRENT USE OF INSULIN (H): Primary | ICD-10-CM

## 2022-06-30 PROCEDURE — G0108 DIAB MANAGE TRN  PER INDIV: HCPCS | Mod: TEL | Performed by: DIETITIAN, REGISTERED

## 2022-06-30 RX ORDER — BLOOD SUGAR DIAGNOSTIC
STRIP MISCELLANEOUS
Qty: 100 STRIP | Refills: 11 | Status: SHIPPED | OUTPATIENT
Start: 2022-06-30

## 2022-06-30 RX ORDER — SEMAGLUTIDE 1.34 MG/ML
0.5 INJECTION, SOLUTION SUBCUTANEOUS
Qty: 3 ML | Refills: 3 | Status: SHIPPED | OUTPATIENT
Start: 2022-06-30 | End: 2023-02-23

## 2022-06-30 NOTE — PROGRESS NOTES
Diabetes Self-Management Education & Support    Presents for: Follow-up    Type of Visit: Telephone Visit    How would patient like to obtain AVS? Rossana    ASSESSMENT:    Gary feels that the Ozempic is going well, no side effects. He states that his BG have come down, all in target range. He has taken the 0.25 mg dose for 4 weeks now, recommended that he increase to 0.5 mg dose now. Sent new prescription to pharmacy.  People are commenting on his weight loss- but he isn't sure if he has lost any because he hasn't weighed himself. He has started walking 6,000 - 8,000 steps/day.     Patient's most recent   Lab Results   Component Value Date    A1C 7.4 06/02/2022    A1C 7.4 03/10/2021    is not meeting goal of <7.0    Diabetes knowledge and skills assessment:   Patient is knowledgeable in diabetes management concepts related to: Healthy Eating, Being Active, Monitoring, Taking Medication, Problem Solving, Reducing Risks and Healthy Coping    Continue education with the following diabetes management concepts: Healthy Eating, Being Active, Monitoring, Taking Medication, Problem Solving, Reducing Risks and Healthy Coping    Based on learning assessment above, most appropriate setting for further diabetes education would be: Individual setting.    INTERVENTIONS:    Education provided today on:  AADE Self-Care Behaviors:  Diabetes Pathophysiology  Healthy Eating: carbohydrate counting, consistency in amount, composition, and timing of food intake, portion control and plate planning method  Being Active: relationship to blood glucose  Monitoring: purpose, log and interpret results, individual blood glucose targets and frequency of monitoring  Taking Medication: action of prescribed medication, drawing up, administering and storing injectable diabetes medications, proper site selection and rotation for injections, side effects of prescribed medications and when to take medications  Problem Solving: high blood glucose -  "causes, signs/symptoms, treatment and prevention    Opportunities for ongoing education and support in diabetes-self management were discussed. Pt verbalized understanding of concepts discussed and recommendations provided today.       Education Materials Provided:  No new materials provided today      PLAN    Increase to 0.5 mg Ozempic dose  Continue with weight loss efforts    Topics to cover at upcoming visits: Healthy Eating, Being Active, Monitoring, Taking Medication, Problem Solving, Reducing Risks and Healthy Coping  Follow-up: 8/5    See Goals Section for co-developed, patient-stated behavior change goals.  AVS provided to patient today.          SUBJECTIVE / OBJECTIVE:  Presents for: Follow-up  Accompanied by: Self  Diabetes education in the past 24mo: No  Focus of Visit: Patient Unsure  Diabetes type: Type 2  Date of diagnosis: about 4-5 years ago  Disease course: Stable  Difficulty affording diabetes medication?: No  Difficulty affording diabetes testing supplies?: No  Other concerns:: None  Cultural Influences/Ethnic Background:  Not  or     Diabetes Symptoms & Complications:  Fatigue: No  Neuropathy: No  Polydipsia: No  Polyphagia: No  Polyuria: Sometimes (in the night- taking meds for this)  Visual change: No  Slow healing wounds: No  Symptom course: Stable  Weight trend: Increasing  Complications assessed today?: No    Patient Problem List and Family Medical History reviewed for relevant medical history, current medical status, and diabetes risk factors.    Vitals:  There were no vitals taken for this visit.  Estimated body mass index is 35.73 kg/m  as calculated from the following:    Height as of 4/29/22: 1.778 m (5' 10\").    Weight as of 5/5/22: 112.9 kg (249 lb).   Last 3 BP:   BP Readings from Last 3 Encounters:   05/05/22 (!) 148/79   04/29/22 126/60   04/12/22 (!) 156/84       History   Smoking Status     Former Smoker     Packs/day: 1.00     Years: 25.00     Quit date: 9/1/2000 "   Smokeless Tobacco     Never Used       Labs:  Lab Results   Component Value Date    A1C 7.4 06/02/2022    A1C 7.4 03/10/2021     Lab Results   Component Value Date     02/14/2022     03/10/2021     Lab Results   Component Value Date    LDL 93 02/14/2022    LDL 79 11/25/2020     HDL Cholesterol   Date Value Ref Range Status   11/25/2020 59 >39 mg/dL Final     Direct Measure HDL   Date Value Ref Range Status   02/14/2022 51 >=40 mg/dL Final   ]  GFR Estimate   Date Value Ref Range Status   02/14/2022 >90 >60 mL/min/1.73m2 Final     Comment:     Effective December 21, 2021 eGFRcr in adults is calculated using the 2021 CKD-EPI creatinine equation which includes age and gender (Glen et al., NEJ, DOI: 10.1056/PBBJuw7179557)   03/10/2021 >90 >60 mL/min/[1.73_m2] Final     Comment:     Non  GFR Calc  Starting 12/18/2018, serum creatinine based estimated GFR (eGFR) will be   calculated using the Chronic Kidney Disease Epidemiology Collaboration   (CKD-EPI) equation.       GFR Estimate If Black   Date Value Ref Range Status   03/10/2021 >90 >60 mL/min/[1.73_m2] Final     Comment:      GFR Calc  Starting 12/18/2018, serum creatinine based estimated GFR (eGFR) will be   calculated using the Chronic Kidney Disease Epidemiology Collaboration   (CKD-EPI) equation.       Lab Results   Component Value Date    CR 0.85 02/14/2022    CR 0.67 03/10/2021     No results found for: MICROALBUMIN    Healthy Eating:  Healthy Eating Assessed Today: Yes  Meals include: Breakfast, Lunch, Dinner  Breakfast: 1 cup of tea with stevia with a few cookies , then will have breakfast- eggs, toast with butter and oatmeal milk (2%) and sugar  Lunch: Lentils, Rice, veggies, chipati - water to drink  Dinner: evening tea and then dinner- meat, veggies, chipati  Snacks: popcorn, chips  Beverages: Water, Tea (mikey drink)  Has patient met with a dietitian in the past?: Yes    Being Active:  Being Active Assessed  Today: Yes  Exercise:: Yes  Days per week of moderate to strenuous exercise (like a brisk walk): 4  On average, minutes per day of exercise at this level: 20  Exercise Minutes per Week: 80    Monitoring:  Monitoring Assessed Today: Yes  Did patient bring glucose meter to appointment? : No  Times checking blood sugar at home (per): Month    Glucose data: Could not find BG log, patient recalling trends  B mg/dl fasting today, always below 130  After meal: always less than 180, 140's averaging       Taking Medications:  Diabetes Medication(s)     Biguanides       metFORMIN (GLUCOPHAGE) 1000 MG tablet    TAKE 1 TABLET(1000 MG) BY MOUTH TWICE DAILY WITH MEALS    Incretin Mimetic Agents (GLP-1 Receptor Agonists)       semaglutide (OZEMPIC, 0.25 OR 0.5 MG/DOSE,) 2 MG/1.5ML SOPN pen    Inject 0.5 mg Subcutaneous every 7 days          Taking Medication Assessed Today: Yes  Current Treatments: Oral Medication (taken by mouth)  Problems taking diabetes medications regularly?: No  Diabetes medication side effects?: No    Problem Solving:  Problem Solving Assessed Today: Yes  Is the patient at risk for hypoglycemia?: No  Is the patient at risk for DKA?: No              Reducing Risks:  Reducing Risks Assessed Today: Yes  Diabetes Risks: Age over 45 years, Sedentary Lifestyle  CAD Risks: Diabetes Mellitus, Male sex, Obesity, Sedentary lifestyle  Has dilated eye exam at least once a year?: Yes    Healthy Coping:  Healthy Coping Assessed Today: Yes  Patient Activation Measure Survey Score:  STUART Score (Last Two) 2012   STUART Raw Score 0   Activation Score 0   STUART Level 1             HERBERT Brown [facility]  Time Spent: 30 minutes  Encounter Type: Individual        Any diabetes medication dose changes were made via the Certified Diabetes Care & Education Protocol in collaboration with the patient's referring provider. A copy of this encounter was shared with the provider.

## 2022-06-30 NOTE — LETTER
6/30/2022         RE: Gary Iyer  [address]  [place] [address]        Dear Colleague,    Thank you for referring your patient, Gary Iyer, to the [facility]. Please see a copy of my visit note below.    Diabetes Self-Management Education & Support    Presents for: Follow-up    Type of Visit: Telephone Visit    How would patient like to obtain AVS? MyChart    ASSESSMENT:    Gary feels that the Ozempic is going well, no side effects. He states that his BG have come down, all in target range. He has taken the 0.25 mg dose for 4 weeks now, recommended that he increase to 0.5 mg dose now. Sent new prescription to pharmacy.  People are commenting on his weight loss- but he isn't sure if he has lost any because he hasn't weighed himself. He has started walking 6,000 - 8,000 steps/day.     Patient's most recent   Lab Results   Component Value Date    A1C 7.4 06/02/2022    A1C 7.4 03/10/2021    is not meeting goal of <7.0    Diabetes knowledge and skills assessment:   Patient is knowledgeable in diabetes management concepts related to: Healthy Eating, Being Active, Monitoring, Taking Medication, Problem Solving, Reducing Risks and Healthy Coping    Continue education with the following diabetes management concepts: Healthy Eating, Being Active, Monitoring, Taking Medication, Problem Solving, Reducing Risks and Healthy Coping    Based on learning assessment above, most appropriate setting for further diabetes education would be: Individual setting.    INTERVENTIONS:    Education provided today on:  AADE Self-Care Behaviors:  Diabetes Pathophysiology  Healthy Eating: carbohydrate counting, consistency in amount, composition, and timing of food intake, portion control and plate planning method  Being Active: relationship to blood glucose  Monitoring: purpose, log and interpret results, individual blood glucose targets and frequency of monitoring  Taking Medication: action of  "prescribed medication, drawing up, administering and storing injectable diabetes medications, proper site selection and rotation for injections, side effects of prescribed medications and when to take medications  Problem Solving: high blood glucose - causes, signs/symptoms, treatment and prevention    Opportunities for ongoing education and support in diabetes-self management were discussed. Pt verbalized understanding of concepts discussed and recommendations provided today.       Education Materials Provided:  No new materials provided today      PLAN    Increase to 0.5 mg Ozempic dose  Continue with weight loss efforts    Topics to cover at upcoming visits: Healthy Eating, Being Active, Monitoring, Taking Medication, Problem Solving, Reducing Risks and Healthy Coping  Follow-up: 8/5    See Goals Section for co-developed, patient-stated behavior change goals.  AVS provided to patient today.          SUBJECTIVE / OBJECTIVE:  Presents for: Follow-up  Accompanied by: Self  Diabetes education in the past 24mo: No  Focus of Visit: Patient Unsure  Diabetes type: Type 2  Date of diagnosis: about 4-5 years ago  Disease course: Stable  Difficulty affording diabetes medication?: No  Difficulty affording diabetes testing supplies?: No  Other concerns:: None  Cultural Influences/Ethnic Background:  Not  or     Diabetes Symptoms & Complications:  Fatigue: No  Neuropathy: No  Polydipsia: No  Polyphagia: No  Polyuria: Sometimes (in the night- taking meds for this)  Visual change: No  Slow healing wounds: No  Symptom course: Stable  Weight trend: Increasing  Complications assessed today?: No    Patient Problem List and Family Medical History reviewed for relevant medical history, current medical status, and diabetes risk factors.    Vitals:  There were no vitals taken for this visit.  Estimated body mass index is 35.73 kg/m  as calculated from the following:    Height as of 4/29/22: 1.778 m (5' 10\").    Weight as " of 5/5/22: 112.9 kg (249 lb).   Last 3 BP:   BP Readings from Last 3 Encounters:   05/05/22 (!) 148/79   04/29/22 126/60   04/12/22 (!) 156/84       History   Smoking Status     Former Smoker     Packs/day: 1.00     Years: 25.00     Quit date: 9/1/2000   Smokeless Tobacco     Never Used       Labs:  Lab Results   Component Value Date    A1C 7.4 06/02/2022    A1C 7.4 03/10/2021     Lab Results   Component Value Date     02/14/2022     03/10/2021     Lab Results   Component Value Date    LDL 93 02/14/2022    LDL 79 11/25/2020     HDL Cholesterol   Date Value Ref Range Status   11/25/2020 59 >39 mg/dL Final     Direct Measure HDL   Date Value Ref Range Status   02/14/2022 51 >=40 mg/dL Final   ]  GFR Estimate   Date Value Ref Range Status   02/14/2022 >90 >60 mL/min/1.73m2 Final     Comment:     Effective December 21, 2021 eGFRcr in adults is calculated using the 2021 CKD-EPI creatinine equation which includes age and gender (Glen et al., NEJM, DOI: 10.1056/XOTXms2407687)   03/10/2021 >90 >60 mL/min/[1.73_m2] Final     Comment:     Non  GFR Calc  Starting 12/18/2018, serum creatinine based estimated GFR (eGFR) will be   calculated using the Chronic Kidney Disease Epidemiology Collaboration   (CKD-EPI) equation.       GFR Estimate If Black   Date Value Ref Range Status   03/10/2021 >90 >60 mL/min/[1.73_m2] Final     Comment:      GFR Calc  Starting 12/18/2018, serum creatinine based estimated GFR (eGFR) will be   calculated using the Chronic Kidney Disease Epidemiology Collaboration   (CKD-EPI) equation.       Lab Results   Component Value Date    CR 0.85 02/14/2022    CR 0.67 03/10/2021     No results found for: MICROALBUMIN    Healthy Eating:  Healthy Eating Assessed Today: Yes  Meals include: Breakfast, Lunch, Dinner  Breakfast: 1 cup of tea with stevia with a few cookies , then will have breakfast- eggs, toast with butter and oatmeal milk (2%) and sugar  Lunch: Lentils,  Rice, veggies, chipati - water to drink  Dinner: evening tea and then dinner- meat, veggies, chipati  Snacks: popcorn, chips  Beverages: Water, Tea (mikey drink)  Has patient met with a dietitian in the past?: Yes    Being Active:  Being Active Assessed Today: Yes  Exercise:: Yes  Days per week of moderate to strenuous exercise (like a brisk walk): 4  On average, minutes per day of exercise at this level: 20  Exercise Minutes per Week: 80    Monitoring:  Monitoring Assessed Today: Yes  Did patient bring glucose meter to appointment? : No  Times checking blood sugar at home (per): Month    Glucose data: Could not find BG log, patient recalling trends  B mg/dl fasting today, always below 130  After meal: always less than 180, 140's averaging       Taking Medications:  Diabetes Medication(s)     Biguanides       metFORMIN (GLUCOPHAGE) 1000 MG tablet    TAKE 1 TABLET(1000 MG) BY MOUTH TWICE DAILY WITH MEALS    Incretin Mimetic Agents (GLP-1 Receptor Agonists)       semaglutide (OZEMPIC, 0.25 OR 0.5 MG/DOSE,) 2 MG/1.5ML SOPN pen    Inject 0.5 mg Subcutaneous every 7 days          Taking Medication Assessed Today: Yes  Current Treatments: Oral Medication (taken by mouth)  Problems taking diabetes medications regularly?: No  Diabetes medication side effects?: No    Problem Solving:  Problem Solving Assessed Today: Yes  Is the patient at risk for hypoglycemia?: No  Is the patient at risk for DKA?: No              Reducing Risks:  Reducing Risks Assessed Today: Yes  Diabetes Risks: Age over 45 years, Sedentary Lifestyle  CAD Risks: Diabetes Mellitus, Male sex, Obesity, Sedentary lifestyle  Has dilated eye exam at least once a year?: Yes    Healthy Coping:  Healthy Coping Assessed Today: Yes  Patient Activation Measure Survey Score:  STUART Score (Last Two) 2012   STUART Raw Score 0   Activation Score 0   STUART Level 1             Danelle Alba RD LD [facility]  Time Spent: 30 minutes  Encounter Type: Individual        Any  diabetes medication dose changes were made via the Certified Diabetes Care & Education Protocol in collaboration with the patient's referring provider. A copy of this encounter was shared with the provider.

## 2022-07-11 ENCOUNTER — TELEPHONE (OUTPATIENT)
Dept: FAMILY MEDICINE | Facility: CLINIC | Age: 62
End: 2022-07-11

## 2022-07-11 NOTE — TELEPHONE ENCOUNTER
Patient Quality Outreach    Patient is due for the following:   Colon Cancer Screening -  Colonoscopy    NEXT STEPS:   patient needs to schedule a colonoscopy    Type of outreach:    Sent letter.    Next Steps:  Reach out within 90 days via Phone.    Max number of attempts reached: No. Will try again in 90 days if patient still on fail list.    Questions for provider review:    None     Elisa Chandra, [facility]  Chart routed to Care Team.

## 2022-07-11 NOTE — LETTER
[facility]  [address]  [place] [address]  [phone]          July 11, 2022      Gary Iyer  [address] [address]      Your healthcare team cares about your health. To provide you with the best care,   we have reviewed your chart and based on our findings, we see that you are due to:     - COLON CANCER SCREENING:  Call or mychart the clinic to schedule your colonoscopy or schedule/ your FIT Test, or Cologuard test    If you have already completed these items, please contact the clinic via phone or   Mychart so your care team can review and update your records. Thank you for   choosing [facility] for your healthcare needs. For any questions,   concerns, or to schedule an appointment please contact the clinic.       Healthy Regards,      Your [facility] Care Team

## 2022-07-15 ENCOUNTER — TELEPHONE (OUTPATIENT)
Dept: OTOLARYNGOLOGY | Facility: CLINIC | Age: 62
End: 2022-07-15

## 2022-07-15 NOTE — TELEPHONE ENCOUNTER
[facility] Call Center    Phone Message    May a detailed message be left on voicemail: yes     Reason for Call: Other: Pt is requesting the appointment on 8/16 to be changed to video visit if possible. Thank you     Action Taken: Other: ENT    Travel Screening: Not Applicable

## 2022-07-18 NOTE — TELEPHONE ENCOUNTER
Candido Rollins'd switching appt to video and called pt to switch. Confirmed pt will be in [place] at the time of the visit. Appt switched. No further questions or concerns.    Katie Smith LPN

## 2022-07-27 ENCOUNTER — TRANSFERRED RECORDS (OUTPATIENT)
Dept: HEALTH INFORMATION MANAGEMENT | Facility: CLINIC | Age: 62
End: 2022-07-27

## 2022-08-05 ENCOUNTER — VIRTUAL VISIT (OUTPATIENT)
Dept: EDUCATION SERVICES | Facility: CLINIC | Age: 62
End: 2022-08-05
Payer: COMMERCIAL

## 2022-08-05 DIAGNOSIS — E11.9 TYPE 2 DIABETES MELLITUS WITHOUT COMPLICATION, WITHOUT LONG-TERM CURRENT USE OF INSULIN (H): Primary | ICD-10-CM

## 2022-08-05 PROCEDURE — 98967 PH1 ASSMT&MGMT NQHP 11-20: CPT | Performed by: DIETITIAN, REGISTERED

## 2022-08-05 NOTE — LETTER
8/5/2022         RE: Gary Iyer  [address]  [place] [address]        Dear Colleague,    Thank you for referring your patient, Gary Iyer, to the [facility]. Please see a copy of my visit note below.    Diabetes Self-Management Education & Support    Presents for: Follow-up    Type of Visit: Telephone Visit    How would patient like to obtain AVS? MyChart    ASSESSMENT:    Gary states that he feels like Ozempic is working really well, he feels like his appetite has decreased and he is eating less. He states that he has lost about 15 pounds and is feeling really great. We reviewed healthy eating for diabetes, limiting portions of carbohydrates. He has still be walking 5 times/week, recommended picking up the pace on his walks if able.     Patient's most recent   Lab Results   Component Value Date    A1C 7.4 06/02/2022    A1C 7.4 03/10/2021    is meeting goal of <8.0    Diabetes knowledge and skills assessment:   Patient is knowledgeable in diabetes management concepts related to: Healthy Eating, Being Active, Monitoring, Taking Medication, Problem Solving, Reducing Risks and Healthy Coping    Continue education with the following diabetes management concepts: Healthy Eating, Being Active, Monitoring, Taking Medication, Problem Solving, Reducing Risks and Healthy Coping    Based on learning assessment above, most appropriate setting for further diabetes education would be: Individual setting.    INTERVENTIONS:    Education provided today on:  AADE Self-Care Behaviors:  Healthy Eating: carbohydrate counting, consistency in amount, composition, and timing of food intake, weight reduction, portion control and plate planning method  Monitoring: purpose, log and interpret results, individual blood glucose targets and frequency of monitoring  Taking Medication: action of prescribed medication, drawing up, administering and storing injectable diabetes medications, side effects of  "prescribed medications and when to take medications    Opportunities for ongoing education and support in diabetes-self management were discussed. Pt verbalized understanding of concepts discussed and recommendations provided today.       Education Materials Provided:  No new materials provided today          PLAN    Continue with current plan    Topics to cover at upcoming visits: Healthy Eating, Being Active, Monitoring, Taking Medication, Problem Solving, Reducing Risks and Healthy Coping  Follow-up: November 8th    See Goals Section for co-developed, patient-stated behavior change goals.  AVS provided to patient today.          SUBJECTIVE / OBJECTIVE:  Presents for: Follow-up  Accompanied by: Self  Diabetes education in the past 24mo: No  Focus of Visit: Patient Unsure  Diabetes type: Type 2  Date of diagnosis: about 4-5 years ago  Disease course: Stable  Difficulty affording diabetes medication?: No  Difficulty affording diabetes testing supplies?: No  Other concerns:: None  Cultural Influences/Ethnic Background:  Not  or     Diabetes Symptoms & Complications:  Fatigue: No  Neuropathy: No  Polydipsia: No  Polyphagia: No  Polyuria: Sometimes (in the night- taking meds for this)  Visual change: No  Slow healing wounds: No  Symptom course: Stable  Weight trend: Increasing  Complications assessed today?: No    Patient Problem List and Family Medical History reviewed for relevant medical history, current medical status, and diabetes risk factors.    Vitals:  There were no vitals taken for this visit.  Estimated body mass index is 35.73 kg/m  as calculated from the following:    Height as of 4/29/22: 1.778 m (5' 10\").    Weight as of 5/5/22: 112.9 kg (249 lb).   Last 3 BP:   BP Readings from Last 3 Encounters:   05/05/22 (!) 148/79   04/29/22 126/60   04/12/22 (!) 156/84       History   Smoking Status     Former Smoker     Packs/day: 1.00     Years: 25.00     Quit date: 9/1/2000   Smokeless Tobacco     " Never Used       Labs:  Lab Results   Component Value Date    A1C 7.4 06/02/2022    A1C 7.4 03/10/2021     Lab Results   Component Value Date     02/14/2022     03/10/2021     Lab Results   Component Value Date    LDL 93 02/14/2022    LDL 79 11/25/2020     HDL Cholesterol   Date Value Ref Range Status   11/25/2020 59 >39 mg/dL Final     Direct Measure HDL   Date Value Ref Range Status   02/14/2022 51 >=40 mg/dL Final   ]  GFR Estimate   Date Value Ref Range Status   02/14/2022 >90 >60 mL/min/1.73m2 Final     Comment:     Effective December 21, 2021 eGFRcr in adults is calculated using the 2021 CKD-EPI creatinine equation which includes age and gender (Glen et al., NEJ, DOI: 10.1056/PWWYtb3100185)   03/10/2021 >90 >60 mL/min/[1.73_m2] Final     Comment:     Non  GFR Calc  Starting 12/18/2018, serum creatinine based estimated GFR (eGFR) will be   calculated using the Chronic Kidney Disease Epidemiology Collaboration   (CKD-EPI) equation.       GFR Estimate If Black   Date Value Ref Range Status   03/10/2021 >90 >60 mL/min/[1.73_m2] Final     Comment:      GFR Calc  Starting 12/18/2018, serum creatinine based estimated GFR (eGFR) will be   calculated using the Chronic Kidney Disease Epidemiology Collaboration   (CKD-EPI) equation.       Lab Results   Component Value Date    CR 0.85 02/14/2022    CR 0.67 03/10/2021     No results found for: MICROALBUMIN    Healthy Eating:  Healthy Eating Assessed Today: Yes  Meal planning/habits: Smaller portions  Meals include: Breakfast, Lunch, Dinner  Breakfast: 1 cup of tea with stevia with a few cookies , then will have breakfast- eggs, toast with butter and oatmeal with milk (2%) and sugar  Lunch: Lentils, veggies, chipati (2), rice (1 cup) - water to drink  Dinner: evening tea and then dinner- meat, veggies, chipati  Snacks: popcorn, chips, has a couple glasses of tea with cookie  Beverages: Water, Tea (mikey drink)  Has patient met  with a dietitian in the past?: Yes    Being Active:  Being Active Assessed Today: Yes  Exercise:: Yes (walking 7,000-8,000 steps)  Days per week of moderate to strenuous exercise (like a brisk walk): 4  On average, minutes per day of exercise at this level: 20  Exercise Minutes per Week: 80    Monitoring:  Monitoring Assessed Today: Yes  Did patient bring glucose meter to appointment? : No  Times checking blood sugar at home (per): Month    Glucose data:  -145 mg/dl fasting + after meal, none below 70      Taking Medications:  Diabetes Medication(s)     Biguanides       metFORMIN (GLUCOPHAGE) 1000 MG tablet    TAKE 1 TABLET(1000 MG) BY MOUTH TWICE DAILY WITH MEALS    Incretin Mimetic Agents (GLP-1 Receptor Agonists)       semaglutide (OZEMPIC, 0.25 OR 0.5 MG/DOSE,) 2 MG/1.5ML SOPN pen    Inject 0.5 mg Subcutaneous every 7 days          Taking Medication Assessed Today: Yes  Current Treatments: Oral Medication (taken by mouth)  Problems taking diabetes medications regularly?: No  Diabetes medication side effects?: No    Problem Solving:  Problem Solving Assessed Today: Yes  Is the patient at risk for hypoglycemia?: No  Is the patient at risk for DKA?: No              Reducing Risks:  Reducing Risks Assessed Today: Yes  Diabetes Risks: Age over 45 years, Sedentary Lifestyle  CAD Risks: Diabetes Mellitus, Male sex, Obesity, Sedentary lifestyle  Has dilated eye exam at least once a year?: Yes    Healthy Coping:  Healthy Coping Assessed Today: Yes  Patient Activation Measure Survey Score:  STUART Score (Last Two) 5/7/2012   STUART Raw Score 0   Activation Score 0   STUART Level 1       Danelle Alba RD LD [facility]  Time Spent: 15 minutes  Encounter Type: Individual        Any diabetes medication dose changes were made via the Certified Diabetes Care & Education Protocol in collaboration with the patient's referring provider. A copy of this encounter was shared with the provider.

## 2022-08-05 NOTE — PROGRESS NOTES
Diabetes Self-Management Education & Support    Presents for: Follow-up    Type of Visit: Telephone Visit    How would patient like to obtain AVS? Rossana    ASSESSMENT:    Gary states that he feels like Ozempic is working really well, he feels like his appetite has decreased and he is eating less. He states that he has lost about 15 pounds and is feeling really great. We reviewed healthy eating for diabetes, limiting portions of carbohydrates. He has still be walking 5 times/week, recommended picking up the pace on his walks if able.     Patient's most recent   Lab Results   Component Value Date    A1C 7.4 06/02/2022    A1C 7.4 03/10/2021    is meeting goal of <8.0    Diabetes knowledge and skills assessment:   Patient is knowledgeable in diabetes management concepts related to: Healthy Eating, Being Active, Monitoring, Taking Medication, Problem Solving, Reducing Risks and Healthy Coping    Continue education with the following diabetes management concepts: Healthy Eating, Being Active, Monitoring, Taking Medication, Problem Solving, Reducing Risks and Healthy Coping    Based on learning assessment above, most appropriate setting for further diabetes education would be: Individual setting.    INTERVENTIONS:    Education provided today on:  AADE Self-Care Behaviors:  Healthy Eating: carbohydrate counting, consistency in amount, composition, and timing of food intake, weight reduction, portion control and plate planning method  Monitoring: purpose, log and interpret results, individual blood glucose targets and frequency of monitoring  Taking Medication: action of prescribed medication, drawing up, administering and storing injectable diabetes medications, side effects of prescribed medications and when to take medications    Opportunities for ongoing education and support in diabetes-self management were discussed. Pt verbalized understanding of concepts discussed and recommendations provided today.    "    Education Materials Provided:  No new materials provided today          PLAN    Continue with current plan    Topics to cover at upcoming visits: Healthy Eating, Being Active, Monitoring, Taking Medication, Problem Solving, Reducing Risks and Healthy Coping  Follow-up: November 8th    See Goals Section for co-developed, patient-stated behavior change goals.  AVS provided to patient today.          SUBJECTIVE / OBJECTIVE:  Presents for: Follow-up  Accompanied by: Self  Diabetes education in the past 24mo: No  Focus of Visit: Patient Unsure  Diabetes type: Type 2  Date of diagnosis: about 4-5 years ago  Disease course: Stable  Difficulty affording diabetes medication?: No  Difficulty affording diabetes testing supplies?: No  Other concerns:: None  Cultural Influences/Ethnic Background:  Not  or     Diabetes Symptoms & Complications:  Fatigue: No  Neuropathy: No  Polydipsia: No  Polyphagia: No  Polyuria: Sometimes (in the night- taking meds for this)  Visual change: No  Slow healing wounds: No  Symptom course: Stable  Weight trend: Increasing  Complications assessed today?: No    Patient Problem List and Family Medical History reviewed for relevant medical history, current medical status, and diabetes risk factors.    Vitals:  There were no vitals taken for this visit.  Estimated body mass index is 35.73 kg/m  as calculated from the following:    Height as of 4/29/22: 1.778 m (5' 10\").    Weight as of 5/5/22: 112.9 kg (249 lb).   Last 3 BP:   BP Readings from Last 3 Encounters:   05/05/22 (!) 148/79   04/29/22 126/60   04/12/22 (!) 156/84       History   Smoking Status     Former Smoker     Packs/day: 1.00     Years: 25.00     Quit date: 9/1/2000   Smokeless Tobacco     Never Used       Labs:  Lab Results   Component Value Date    A1C 7.4 06/02/2022    A1C 7.4 03/10/2021     Lab Results   Component Value Date     02/14/2022     03/10/2021     Lab Results   Component Value Date    LDL 93 " 02/14/2022    LDL 79 11/25/2020     HDL Cholesterol   Date Value Ref Range Status   11/25/2020 59 >39 mg/dL Final     Direct Measure HDL   Date Value Ref Range Status   02/14/2022 51 >=40 mg/dL Final   ]  GFR Estimate   Date Value Ref Range Status   02/14/2022 >90 >60 mL/min/1.73m2 Final     Comment:     Effective December 21, 2021 eGFRcr in adults is calculated using the 2021 CKD-EPI creatinine equation which includes age and gender (Glen et al., [place], DOI: 10.1056/LICLxm3202529)   03/10/2021 >90 >60 mL/min/[1.73_m2] Final     Comment:     Non  GFR Calc  Starting 12/18/2018, serum creatinine based estimated GFR (eGFR) will be   calculated using the Chronic Kidney Disease Epidemiology Collaboration   (CKD-EPI) equation.       GFR Estimate If Black   Date Value Ref Range Status   03/10/2021 >90 >60 mL/min/[1.73_m2] Final     Comment:      GFR Calc  Starting 12/18/2018, serum creatinine based estimated GFR (eGFR) will be   calculated using the Chronic Kidney Disease Epidemiology Collaboration   (CKD-EPI) equation.       Lab Results   Component Value Date    CR 0.85 02/14/2022    CR 0.67 03/10/2021     No results found for: MICROALBUMIN    Healthy Eating:  Healthy Eating Assessed Today: Yes  Meal planning/habits: Smaller portions  Meals include: Breakfast, Lunch, Dinner  Breakfast: 1 cup of tea with stevia with a few cookies , then will have breakfast- eggs, toast with butter and oatmeal with milk (2%) and sugar  Lunch: Lentils, veggies, chipati (2), rice (1 cup) - water to drink  Dinner: evening tea and then dinner- meat, veggies, chipati  Snacks: popcorn, chips, has a couple glasses of tea with cookie  Beverages: Water, Tea (mikey drink)  Has patient met with a dietitian in the past?: Yes    Being Active:  Being Active Assessed Today: Yes  Exercise:: Yes (walking 7,000-8,000 steps)  Days per week of moderate to strenuous exercise (like a brisk walk): 4  On average, minutes per day of  exercise at this level: 20  Exercise Minutes per Week: 80    Monitoring:  Monitoring Assessed Today: Yes  Did patient bring glucose meter to appointment? : No  Times checking blood sugar at home (per): Month    Glucose data:  -145 mg/dl fasting + after meal, none below 70      Taking Medications:  Diabetes Medication(s)     Biguanides       metFORMIN (GLUCOPHAGE) 1000 MG tablet    TAKE 1 TABLET(1000 MG) BY MOUTH TWICE DAILY WITH MEALS    Incretin Mimetic Agents (GLP-1 Receptor Agonists)       semaglutide (OZEMPIC, 0.25 OR 0.5 MG/DOSE,) 2 MG/1.5ML SOPN pen    Inject 0.5 mg Subcutaneous every 7 days          Taking Medication Assessed Today: Yes  Current Treatments: Oral Medication (taken by mouth)  Problems taking diabetes medications regularly?: No  Diabetes medication side effects?: No    Problem Solving:  Problem Solving Assessed Today: Yes  Is the patient at risk for hypoglycemia?: No  Is the patient at risk for DKA?: No              Reducing Risks:  Reducing Risks Assessed Today: Yes  Diabetes Risks: Age over 45 years, Sedentary Lifestyle  CAD Risks: Diabetes Mellitus, Male sex, Obesity, Sedentary lifestyle  Has dilated eye exam at least once a year?: Yes    Healthy Coping:  Healthy Coping Assessed Today: Yes  Patient Activation Measure Survey Score:  STUART Score (Last Two) 5/7/2012   STAURT Raw Score 0   Activation Score 0   STUART Level 1       [facility]  Time Spent: 15 minutes  Encounter Type: Individual        Any diabetes medication dose changes were made via the Certified Diabetes Care & Education Protocol in collaboration with the patient's referring provider. A copy of this encounter was shared with the provider.

## 2022-08-15 ENCOUNTER — OFFICE VISIT (OUTPATIENT)
Dept: OTOLARYNGOLOGY | Facility: CLINIC | Age: 62
End: 2022-08-15
Payer: COMMERCIAL

## 2022-08-15 VITALS
HEIGHT: 70 IN | WEIGHT: 237.7 LBS | DIASTOLIC BLOOD PRESSURE: 73 MMHG | BODY MASS INDEX: 34.03 KG/M2 | HEART RATE: 110 BPM | OXYGEN SATURATION: 98 % | SYSTOLIC BLOOD PRESSURE: 113 MMHG

## 2022-08-15 DIAGNOSIS — Z85.21 HX OF LARYNGEAL CANCER: ICD-10-CM

## 2022-08-15 DIAGNOSIS — J38.7 LARYNGEAL GRANULOMA: Primary | ICD-10-CM

## 2022-08-15 PROCEDURE — 31579 LARYNGOSCOPY TELESCOPIC: CPT | Performed by: OTOLARYNGOLOGY

## 2022-08-15 PROCEDURE — 99212 OFFICE O/P EST SF 10 MIN: CPT | Mod: 25 | Performed by: OTOLARYNGOLOGY

## 2022-08-15 ASSESSMENT — PAIN SCALES - GENERAL: PAINLEVEL: NO PAIN (0)

## 2022-08-15 NOTE — PROGRESS NOTES
Dear Colleague:    Gary Iyer recently returned for follow-up at the [facility]. My clinic note from our visit is enclosed below.  Speech recognition software may have been used in the documentation below; input is reviewed before signature to the best of my ability.     I appreciate the ongoing opportunity to participate in this patient's care.    Please feel free to contact me with any questions.    Sincerely yours,      Mel Fournier M.D., M.P.H.  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  Most recently, he returned to the operating room on February 9, 2017 for an area of new irregularity of the left true vocal fold.  Pathology showed severe dysplasia with negative but close margins (for moderate, but not severe dysplasia).  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    At the last visit, we observed a slight recurrence of leukoplakia/granuloma in the same location where he had this in 2018. At that time it resolved just as we were considering a repeat biopsy. Subsequently, he had allergies which were provoking coughing/throat-clearing, and he was again noted to have a granuloma. He was set up for refresher session(s) of speech therapy with Misty Clark, PhD, CCC-SLP and we discussed nasal hygiene as well.     This led to near complete resolution of the granuloma, but subsequently he was noted to have slightly increased irritation along the left medial arytenoid wall.  Then he developed an upper respiratory infection and has been coughing, and had some shortness of breath. He states he was COVID negative. His family had  noted him grunting and throat-clearing frequently.    Today:  He feels he is doing better overall. He has reduced his grunting and throat-clearing. No problems with acid reflux. He does not feel he needs speech therapy beyond what is already scheduled (with Candido Winter, PhD, CCC-SLP, coming up tomorrow and in a few weeks).    MEDICATIONS:     Current Outpatient Medications   Medication Sig Dispense Refill     ACCU-CHEK GUIDE test strip TEST ONCE DAILY 100 strip 1     alcohol swab prep pads USE TO SWAB AREA OF INJECTION/OLIMPIA 100 each 1     allopurinol (ZYLOPRIM) 300 MG tablet TAKE 1 TABLET(300 MG) BY MOUTH DAILY 90 tablet 3     amLODIPine (NORVASC) 2.5 MG tablet Take 1 tablet (2.5 mg) by mouth daily 90 tablet 3     aspirin 81 MG tablet Take 1 tablet by mouth daily.       atorvastatin (LIPITOR) 20 MG tablet Take 1 tablet (20 mg) by mouth daily 90 tablet 0     blood glucose (ACCU-CHEK GUIDE) test strip Use to test blood sugar 1 times daily or as directed. 100 strip 11     blood glucose calibration (NO BRAND SPECIFIED) solution To accompany: Blood Glucose Monitor Brands: per insurance. 1 Bottle 3     Blood Glucose Monitoring Suppl (ACCU-CHEK GUIDE ME) w/Device KIT USE ONCE DAILY 1 kit 0     calcipotriene (DOVONEX) 0.005 % external cream Mix with efudex and apply twice daily to hands and arms for 12 days 60 g 3     cetirizine (ZYRTEC) 10 MG tablet Take 1 tablet (10 mg) by mouth daily 90 tablet 3     ferrous sulfate (FE TABS) 325 (65 Fe) MG EC tablet Take 1 tablet (325 mg) by mouth daily . Take with vitamin c (juice or supplement). 90 tablet 0     fluorouracil (EFUDEX) 5 % external cream Mix with dovonex and apply twice daily to hands and arms for 12 days 40 g 1     hydrochlorothiazide (HYDRODIURIL) 50 MG tablet Take 1 tablet (50 mg) by mouth daily 90 tablet 3     hydrocortisone (CORTAID) 1 % external ointment Apply topically 2 times daily as needed 30 g 1     ketoconazole (NIZORAL) 2 % external cream Apply daily  to affected area on face, armpits, groin. 60 g 4     ketoconazole (NIZORAL) 2 % external shampoo Leave on face, beard for 3-5 minutes then rinse. Use 2-3 times weekly. 120 mL 3     losartan (COZAAR) 50 MG tablet Take 1 tablet (50 mg) by mouth daily 90 tablet 3     metFORMIN (GLUCOPHAGE) 1000 MG tablet TAKE 1 TABLET(1000 MG) BY MOUTH TWICE DAILY WITH MEALS 180 tablet 1     metroNIDAZOLE (METROCREAM) 0.75 % external cream Apply topically 2 times daily as needed (to rosacea areas) 45 g 1     Misc. Devices (SUPPOSITORY MOLD 2GM) MISC 1 suppository every 12 hours Nifedipine 4 mg suppository, one rectally every 12 hours 100 each 4     Multiple Vitamin (DAILY MULTIVITAMIN PO) Take  by mouth daily.       omeprazole (PRILOSEC) 20 MG DR capsule TAKE 1 CAPSULE BY MOUTH DAILY 90 capsule 3     order for DME Autocpap 10-16 cm 1 Units 0     PARoxetine (PAXIL) 40 MG tablet TAKE 1 TABLET(40 MG) BY MOUTH EVERY MORNING 90 tablet 3     polyethylene glycol (MIRALAX) 17 GM/Dose powder Take 17 g (1 capful) by mouth daily 850 g 3     psyllium 0.52 G capsule Take 1 capsule (0.52 g) by mouth daily 100 capsule 3     semaglutide (OZEMPIC, 0.25 OR 0.5 MG/DOSE,) 2 MG/1.5ML SOPN pen Inject 0.5 mg Subcutaneous every 7 days 3 mL 3     tamsulosin (FLOMAX) 0.4 MG capsule Take 2 capsules (0.8 mg) by mouth daily TAKE 2 CAPSULES BY MOUTH DAILY 180 capsule 3     thin (NO BRAND SPECIFIED) lancets Use with lanceting device. To accompany: Blood Glucose Monitor Brands: per insurance. 100 each 11     triamcinolone (KENALOG) 0.1 % external cream Apply topically 2 times daily as needed for irritation 30 g 0     RESERPINE-HYDROCHLOROTHIAZIDE OR daily         ALLERGIES:  No Known Allergies    NEW PMH/PSH: None    REVIEW OF SYSTEMS:  The patient completed a comprehensive 11 point review of systems (below), which was reviewed. Positives are as noted below.  Patient Supplied Answers to Review of Systems   ENT ROS 4/22/2016   Ears, Nose, Throat: Nasal congestion or  drainage            PHYSICAL EXAM:  General: The patient was alert and conversant, and in no acute distress.    Neck: No palpable cervical lymphadenopathy, no significant tenderness to palpation of the thyrohyoid space, which was narrow. No obvious thyroid abnormality.  Resp: Breathing comfortably, no stridor or stertor.  Neuro: Symmetric facial function. Other cranial nerve function as documented above.  Psych: Normal affect, pleasant and cooperative.  Voice/speech: Mild dysphonia characterized by intermittent roughness.      Intake scores  Last 2 Scores for Patient-Answered VHI Questionnaire  VHI Total Score 2/26/2018 3/1/2019   VHI Total Score 9 17      Last 2 Scores for Patient-Answered EAT Questionnaire  EAT Total Score 2/26/2018 3/1/2019   EAT Total Score 1 5        Last 2 Scores for Patient-Answered CSI Questionnaire  CSI Total Score 2/26/2018 3/1/2019   CSI Total Score 0 0           Procedure:   Flexible fiberoptic laryngoscopy and laryngovideostroboscopy  Indications: This procedure was warranted to evaluate the patient's laryngeal anatomy and function. Risks, benefits, and alternatives were discussed.  Description: After written informed consent was obtained, a time-out was performed to confirm patient identity, procedure, and procedure site. Topical 3% lidocaine with 0.25% phenylephrine was applied to the nasal cavities. I performed the endoscopy and no complications were apparent. Continuous and stroboscopic light were utilized to assess routine phonation and variable frequency phonation.  Performed by: Mel Fournier MD MPH  Findings: Normal nasopharynx. Normal base of tongue, valleculae, and epiglottis. Vocal fold mobility: right: normal; left: normal. Medial edges of the vocal folds: smooth and straight. No focal mucosal lesions were observed on the true vocal folds. Glissade produced appropriate elongation. There was moderate supraglottic recruitment with connected speech. Mucosa of false vocal  folds, aryepiglottic folds, piriform sinuses, and posterior glottis notable for resolution of the right granuloma and new similar changes on the left medial arytenoid face. Airway was patent.   Similar findings on NBI.    The addition of stroboscopy allowed evaluation of the mucosal wave.   Amplitude: right: normal; left: normal. Symmetry: intermittent symmetry. Closure pattern: complete. Closure plane: at glottic level. Phase distribution: normal.                IMPRESSION AND PLAN:   Gary Iyer returns with resolution of the right granuloma and a small granuloma-type lesion on the left posterior larynx. No evidence of recurrence of prior carcinoma/dysplasia lesions.    Recommendations/Plan:  1) Continue being careful with phonotrauma,  2) Continue with speech therapy with Candido Winter, PhD, CCC-SLP.  3) Return to clinic in approximately four months, or sooner as needed.    I appreciate the opportunity to participate in the care of this pleasant patient.     Today's visit required additional screening time, PPE, and cleaning measures to allow for a safe in-person visit, due to the public health emergency. I spent a total of 17 minutes on 8/15/2022 in chart review, review of tests, patient visit, documentation, care coordination, and/or discussion with other providers about the issues documented above, separate from any documented procedure(s).

## 2022-08-15 NOTE — Clinical Note
8/15/2022       RE: Gary Iyer  [address] [facility] [address]     Dear Colleague,    Thank you for referring your patient, Gary Iyer, to the [facility] [place] at [facility]. Please see a copy of my visit note below.    Dear Colleague:    Gary Iyer recently returned for follow-up at the [facility]. My clinic note from our visit is enclosed below.  Speech recognition software may have been used in the documentation below; input is reviewed before signature to the best of my ability.     I appreciate the ongoing opportunity to participate in this patient's care.    Please feel free to contact me with any questions.    Sincerely yours,      Mel Fournier M.D., M.P.H.  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  Most recently, he returned to the operating room on February 9, 2017 for an area of new irregularity of the left true vocal fold.  Pathology showed severe dysplasia with negative but close margins (for moderate, but not severe dysplasia).  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    At the last visit, we observed a slight recurrence of leukoplakia/granuloma in the same location where he had this in 2018. At that time it resolved just as we were considering a repeat biopsy. Subsequently, he had allergies which were provoking coughing/throat-clearing, and he was again noted to have a granuloma. He was set up for refresher session(s) of speech therapy with Misty Clark, PhD, CCC-SLP and we discussed nasal hygiene as well.      This led to near complete resolution of the granuloma, but subsequently he was noted to have slightly increased irritation along the left medial arytenoid wall.  Then he developed an upper respiratory infection and has been coughing, and had some shortness of breath. He states he was COVID negative. His family had noted him grunting and throat-clearing frequently.    Today:  He feels he is doing better overall. He has reduced his grunting and throat-clearing. No problems with acid reflux. He does not feel he needs speech therapy beyond what is already scheduled (with Candido Winter, PhD, CCC-SLP, coming up tomorrow and in a few weeks).    MEDICATIONS:     Current Outpatient Medications   Medication Sig Dispense Refill     ACCU-CHEK GUIDE test strip TEST ONCE DAILY 100 strip 1     alcohol swab prep pads USE TO SWAB AREA OF INJECTION/OLIMPIA 100 each 1     allopurinol (ZYLOPRIM) 300 MG tablet TAKE 1 TABLET(300 MG) BY MOUTH DAILY 90 tablet 3     amLODIPine (NORVASC) 2.5 MG tablet Take 1 tablet (2.5 mg) by mouth daily 90 tablet 3     aspirin 81 MG tablet Take 1 tablet by mouth daily.       atorvastatin (LIPITOR) 20 MG tablet Take 1 tablet (20 mg) by mouth daily 90 tablet 0     blood glucose (ACCU-CHEK GUIDE) test strip Use to test blood sugar 1 times daily or as directed. 100 strip 11     blood glucose calibration (NO BRAND SPECIFIED) solution To accompany: Blood Glucose Monitor Brands: per insurance. 1 Bottle 3     Blood Glucose Monitoring Suppl (ACCU-CHEK GUIDE ME) w/Device KIT USE ONCE DAILY 1 kit 0     calcipotriene (DOVONEX) 0.005 % external cream Mix with efudex and apply twice daily to hands and arms for 12 days 60 g 3     cetirizine (ZYRTEC) 10 MG tablet Take 1 tablet (10 mg) by mouth daily 90 tablet 3     ferrous sulfate (FE TABS) 325 (65 Fe) MG EC tablet Take 1 tablet (325 mg) by mouth daily . Take with vitamin c (juice or supplement). 90 tablet 0     fluorouracil (EFUDEX) 5 % external cream Mix with  dovonex and apply twice daily to hands and arms for 12 days 40 g 1     hydrochlorothiazide (HYDRODIURIL) 50 MG tablet Take 1 tablet (50 mg) by mouth daily 90 tablet 3     hydrocortisone (CORTAID) 1 % external ointment Apply topically 2 times daily as needed 30 g 1     ketoconazole (NIZORAL) 2 % external cream Apply daily to affected area on face, armpits, groin. 60 g 4     ketoconazole (NIZORAL) 2 % external shampoo Leave on face, beard for 3-5 minutes then rinse. Use 2-3 times weekly. 120 mL 3     losartan (COZAAR) 50 MG tablet Take 1 tablet (50 mg) by mouth daily 90 tablet 3     metFORMIN (GLUCOPHAGE) 1000 MG tablet TAKE 1 TABLET(1000 MG) BY MOUTH TWICE DAILY WITH MEALS 180 tablet 1     metroNIDAZOLE (METROCREAM) 0.75 % external cream Apply topically 2 times daily as needed (to rosacea areas) 45 g 1     Misc. Devices (SUPPOSITORY MOLD 2GM) MISC 1 suppository every 12 hours Nifedipine 4 mg suppository, one rectally every 12 hours 100 each 4     Multiple Vitamin (DAILY MULTIVITAMIN PO) Take  by mouth daily.       omeprazole (PRILOSEC) 20 MG DR capsule TAKE 1 CAPSULE BY MOUTH DAILY 90 capsule 3     order for DME Autocpap 10-16 cm 1 Units 0     PARoxetine (PAXIL) 40 MG tablet TAKE 1 TABLET(40 MG) BY MOUTH EVERY MORNING 90 tablet 3     polyethylene glycol (MIRALAX) 17 GM/Dose powder Take 17 g (1 capful) by mouth daily 850 g 3     psyllium 0.52 G capsule Take 1 capsule (0.52 g) by mouth daily 100 capsule 3     semaglutide (OZEMPIC, 0.25 OR 0.5 MG/DOSE,) 2 MG/1.5ML SOPN pen Inject 0.5 mg Subcutaneous every 7 days 3 mL 3     tamsulosin (FLOMAX) 0.4 MG capsule Take 2 capsules (0.8 mg) by mouth daily TAKE 2 CAPSULES BY MOUTH DAILY 180 capsule 3     thin (NO BRAND SPECIFIED) lancets Use with lanceting device. To accompany: Blood Glucose Monitor Brands: per insurance. 100 each 11     triamcinolone (KENALOG) 0.1 % external cream Apply topically 2 times daily as needed for irritation 30 g 0     RESERPINE-HYDROCHLOROTHIAZIDE OR  daily         ALLERGIES:  No Known Allergies    NEW PMH/PSH: None    REVIEW OF SYSTEMS:  The patient completed a comprehensive 11 point review of systems (below), which was reviewed. Positives are as noted below.  Patient Supplied Answers to Review of Systems   ENT ROS 4/22/2016   Ears, Nose, Throat: Nasal congestion or drainage            PHYSICAL EXAM:  General: The patient was alert and conversant, and in no acute distress.    Neck: No palpable cervical lymphadenopathy, no significant tenderness to palpation of the thyrohyoid space, which was narrow. No obvious thyroid abnormality.  Resp: Breathing comfortably, no stridor or stertor.  Neuro: Symmetric facial function. Other cranial nerve function as documented above.  Psych: Normal affect, pleasant and cooperative.  Voice/speech: Mild dysphonia characterized by intermittent roughness.      Intake scores  Last 2 Scores for Patient-Answered VHI Questionnaire  VHI Total Score 2/26/2018 3/1/2019   VHI Total Score 9 17      Last 2 Scores for Patient-Answered EAT Questionnaire  EAT Total Score 2/26/2018 3/1/2019   EAT Total Score 1 5        Last 2 Scores for Patient-Answered CSI Questionnaire  CSI Total Score 2/26/2018 3/1/2019   CSI Total Score 0 0           Procedure:   Flexible fiberoptic laryngoscopy and laryngovideostroboscopy  Indications: This procedure was warranted to evaluate the patient's laryngeal anatomy and function. Risks, benefits, and alternatives were discussed.  Description: After written informed consent was obtained, a time-out was performed to confirm patient identity, procedure, and procedure site. Topical 3% lidocaine with 0.25% phenylephrine was applied to the nasal cavities. I performed the endoscopy and no complications were apparent. Continuous and stroboscopic light were utilized to assess routine phonation and variable frequency phonation.  Performed by: Mel Fournier MD MPH  SLP: ***  Findings: Normal nasopharynx. Normal base of  "tongue, valleculae, and epiglottis. Vocal fold mobility: right: {MOBILITY:892252}; left: {MOBILITY:981678}. Medial edges of the vocal folds: {SHAPE:587661}. { ENT MUCOSAL LESIONS:498052293} Glissade { ENT GLISSADE:337479502::\"produced appropriate elongation\"}. There was {SEVERITY (TF):780031} supraglottic recruitment with connected speech. Mucosa of false vocal folds, aryepiglottic folds, piriform sinuses, and posterior glottis unremarkable. Airway was ***patent. { ENT THERAPY PROBES:116580703} { ENT NBI:648488297}    The addition of stroboscopy allowed evaluation of the mucosal wave.   Amplitude: right: {AMPLITUDE:572754}; left: {AMPLITUDE:797879}. Symmetry: {SYMMETRY:122185211}. Closure pattern: {CLOSURE PATTERN:752176}. Closure plane: { ENT CLOSURE PLANE:357317648}. Phase distribution: {PHASE DISTRIBUTION:610383}.                IMPRESSION AND PLAN:   Gary Iyer returns with a resolution of the right granuloma and a small granuloma-type lesion on the left posterior larynx. No evidence of recurrence of prior carcinoma/dysplasia lesions.    Recommendations/Plan:  1) Continue being careful with phonotrauma,  2) Continue with speech therapy with Candido Winter, PhD, CCC-SLP.  3) Return to clinic in approximately four months, or sooner as needed.    I appreciate the opportunity to participate in the care of this pleasant patient.     ***Today's visit required additional screening time, PPE, and cleaning measures to allow for a safe in-person visit, due to the public health emergency. I spent a total of *** minutes on ***8/15/2022 in chart review, review of tests, patient visit, documentation, care coordination, and/or discussion with other providers about the issues documented above, separate from any documented procedure(s).      Dear Colleague:    Gary Iyer recently returned for follow-up at the [facility]. My clinic note from our visit is enclosed below.  Speech recognition software may " have been used in the documentation below; input is reviewed before signature to the best of my ability.     I appreciate the ongoing opportunity to participate in this patient's care.    Please feel free to contact me with any questions.    Sincerely yours,      Mel Fournier M.D., M.P.H.  , Laryngology  Director, 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  Most recently, he returned to the operating room on February 9, 2017 for an area of new irregularity of the left true vocal fold.  Pathology showed severe dysplasia with negative but close margins (for moderate, but not severe dysplasia).  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    At the last visit, we observed a slight recurrence of leukoplakia/granuloma in the same location where he had this in 2018. At that time it resolved just as we were considering a repeat biopsy. Subsequently, he had allergies which were provoking coughing/throat-clearing, and he was again noted to have a granuloma. He was set up for refresher session(s) of speech therapy with Misty Clark, PhD, CCC-SLP and we discussed nasal hygiene as well.     This led to near complete resolution of the granuloma, but subsequently he was noted to have slightly increased irritation along the left medial arytenoid wall.  Then he developed an upper respiratory infection and has been coughing, and had some shortness of breath. He states he was COVID negative. His family had noted him grunting and throat-clearing frequently.    Today:  He feels he is doing better overall. He has reduced his grunting and throat-clearing. No problems with acid  reflux. He does not feel he needs speech therapy beyond what is already scheduled (with Candido Winter, PhD, CCC-SLP, coming up tomorrow and in a few weeks).    MEDICATIONS:     Current Outpatient Medications   Medication Sig Dispense Refill     ACCU-CHEK GUIDE test strip TEST ONCE DAILY 100 strip 1     alcohol swab prep pads USE TO SWAB AREA OF INJECTION/OLIMPIA 100 each 1     allopurinol (ZYLOPRIM) 300 MG tablet TAKE 1 TABLET(300 MG) BY MOUTH DAILY 90 tablet 3     amLODIPine (NORVASC) 2.5 MG tablet Take 1 tablet (2.5 mg) by mouth daily 90 tablet 3     aspirin 81 MG tablet Take 1 tablet by mouth daily.       atorvastatin (LIPITOR) 20 MG tablet Take 1 tablet (20 mg) by mouth daily 90 tablet 0     blood glucose (ACCU-CHEK GUIDE) test strip Use to test blood sugar 1 times daily or as directed. 100 strip 11     blood glucose calibration (NO BRAND SPECIFIED) solution To accompany: Blood Glucose Monitor Brands: per insurance. 1 Bottle 3     Blood Glucose Monitoring Suppl (ACCU-CHEK GUIDE ME) w/Device KIT USE ONCE DAILY 1 kit 0     calcipotriene (DOVONEX) 0.005 % external cream Mix with efudex and apply twice daily to hands and arms for 12 days 60 g 3     cetirizine (ZYRTEC) 10 MG tablet Take 1 tablet (10 mg) by mouth daily 90 tablet 3     ferrous sulfate (FE TABS) 325 (65 Fe) MG EC tablet Take 1 tablet (325 mg) by mouth daily . Take with vitamin c (juice or supplement). 90 tablet 0     fluorouracil (EFUDEX) 5 % external cream Mix with dovonex and apply twice daily to hands and arms for 12 days 40 g 1     hydrochlorothiazide (HYDRODIURIL) 50 MG tablet Take 1 tablet (50 mg) by mouth daily 90 tablet 3     hydrocortisone (CORTAID) 1 % external ointment Apply topically 2 times daily as needed 30 g 1     ketoconazole (NIZORAL) 2 % external cream Apply daily to affected area on face, armpits, groin. 60 g 4     ketoconazole (NIZORAL) 2 % external shampoo Leave on face, beard for 3-5 minutes then rinse. Use 2-3 times weekly. 120  mL 3     losartan (COZAAR) 50 MG tablet Take 1 tablet (50 mg) by mouth daily 90 tablet 3     metFORMIN (GLUCOPHAGE) 1000 MG tablet TAKE 1 TABLET(1000 MG) BY MOUTH TWICE DAILY WITH MEALS 180 tablet 1     metroNIDAZOLE (METROCREAM) 0.75 % external cream Apply topically 2 times daily as needed (to rosacea areas) 45 g 1     Misc. Devices (SUPPOSITORY MOLD 2GM) MISC 1 suppository every 12 hours Nifedipine 4 mg suppository, one rectally every 12 hours 100 each 4     Multiple Vitamin (DAILY MULTIVITAMIN PO) Take  by mouth daily.       omeprazole (PRILOSEC) 20 MG DR capsule TAKE 1 CAPSULE BY MOUTH DAILY 90 capsule 3     order for DME Autocpap 10-16 cm 1 Units 0     PARoxetine (PAXIL) 40 MG tablet TAKE 1 TABLET(40 MG) BY MOUTH EVERY MORNING 90 tablet 3     polyethylene glycol (MIRALAX) 17 GM/Dose powder Take 17 g (1 capful) by mouth daily 850 g 3     psyllium 0.52 G capsule Take 1 capsule (0.52 g) by mouth daily 100 capsule 3     semaglutide (OZEMPIC, 0.25 OR 0.5 MG/DOSE,) 2 MG/1.5ML SOPN pen Inject 0.5 mg Subcutaneous every 7 days 3 mL 3     tamsulosin (FLOMAX) 0.4 MG capsule Take 2 capsules (0.8 mg) by mouth daily TAKE 2 CAPSULES BY MOUTH DAILY 180 capsule 3     thin (NO BRAND SPECIFIED) lancets Use with lanceting device. To accompany: Blood Glucose Monitor Brands: per insurance. 100 each 11     triamcinolone (KENALOG) 0.1 % external cream Apply topically 2 times daily as needed for irritation 30 g 0     RESERPINE-HYDROCHLOROTHIAZIDE OR daily         ALLERGIES:  No Known Allergies    NEW PMH/PSH: None    REVIEW OF SYSTEMS:  The patient completed a comprehensive 11 point review of systems (below), which was reviewed. Positives are as noted below.  Patient Supplied Answers to Review of Systems   ENT ROS 4/22/2016   Ears, Nose, Throat: Nasal congestion or drainage            PHYSICAL EXAM:  General: The patient was alert and conversant, and in no acute distress.    Neck: No palpable cervical lymphadenopathy, no significant  tenderness to palpation of the thyrohyoid space, which was narrow. No obvious thyroid abnormality.  Resp: Breathing comfortably, no stridor or stertor.  Neuro: Symmetric facial function. Other cranial nerve function as documented above.  Psych: Normal affect, pleasant and cooperative.  Voice/speech: Mild dysphonia characterized by intermittent roughness.      Intake scores  Last 2 Scores for Patient-Answered VHI Questionnaire  VHI Total Score 2/26/2018 3/1/2019   VHI Total Score 9 17      Last 2 Scores for Patient-Answered EAT Questionnaire  EAT Total Score 2/26/2018 3/1/2019   EAT Total Score 1 5        Last 2 Scores for Patient-Answered CSI Questionnaire  CSI Total Score 2/26/2018 3/1/2019   CSI Total Score 0 0           Procedure:   Flexible fiberoptic laryngoscopy and laryngovideostroboscopy  Indications: This procedure was warranted to evaluate the patient's laryngeal anatomy and function. Risks, benefits, and alternatives were discussed.  Description: After written informed consent was obtained, a time-out was performed to confirm patient identity, procedure, and procedure site. Topical 3% lidocaine with 0.25% phenylephrine was applied to the nasal cavities. I performed the endoscopy and no complications were apparent. Continuous and stroboscopic light were utilized to assess routine phonation and variable frequency phonation.  Performed by: Mel Fournier MD MPH  Findings: Normal nasopharynx. Normal base of tongue, valleculae, and epiglottis. Vocal fold mobility: right: normal; left: normal. Medial edges of the vocal folds: smooth and straight. No focal mucosal lesions were observed on the true vocal folds. Glissade produced appropriate elongation. There was moderate supraglottic recruitment with connected speech. Mucosa of false vocal folds, aryepiglottic folds, piriform sinuses, and posterior glottis notable for resolution of the right granuloma and new similar changes on the left medial arytenoid face.  Airway was patent.   Similar findings on NBI.    The addition of stroboscopy allowed evaluation of the mucosal wave.   Amplitude: right: normal; left: normal. Symmetry: intermittent symmetry. Closure pattern: complete. Closure plane: at glottic level. Phase distribution: normal.                IMPRESSION AND PLAN:   Gary Iyer returns with resolution of the right granuloma and a small granuloma-type lesion on the left posterior larynx. No evidence of recurrence of prior carcinoma/dysplasia lesions.    Recommendations/Plan:  1) Continue being careful with phonotrauma,  2) Continue with speech therapy with Candido Winter, PhD, CCC-SLP.  3) Return to clinic in approximately four months, or sooner as needed.    I appreciate the opportunity to participate in the care of this pleasant patient.     Today's visit required additional screening time, PPE, and cleaning measures to allow for a safe in-person visit, due to the public health emergency. I spent a total of 17 minutes on 8/15/2022 in chart review, review of tests, patient visit, documentation, care coordination, and/or discussion with other providers about the issues documented above, separate from any documented procedure(s).        Again, thank you for allowing me to participate in the care of your patient.      Sincerely,    Mel Fournier MD

## 2022-08-15 NOTE — PATIENT INSTRUCTIONS
1.  You were seen in the ENT Clinic today by . If you have any questions or concerns after your appointment, please call [phone]. Press option #1 for scheduling related needs. Press option #3 for Nurse advice.    2.   has recommended the following:   - continue speech therapy with Candido H   - continue being careful with phnotrauma    3.  Plan is to return to clinic in 4 months      Lisa Hazel LPN  [phone]  [facility] - Otolaryngology

## 2022-08-15 NOTE — LETTER
8/15/2022      RE: Gary Iyer  [address] [facility] [address]       Dear Colleague:    Gary Iyer recently returned for follow-up at the [facility]. My clinic note from our visit is enclosed below.  Speech recognition software may have been used in the documentation below; input is reviewed before signature to the best of my ability.     I appreciate the ongoing opportunity to participate in this patient's care.    Please feel free to contact me with any questions.    Sincerely yours,      Mel Fournier M.D., M.P.H.  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  Most recently, he returned to the operating room on February 9, 2017 for an area of new irregularity of the left true vocal fold.  Pathology showed severe dysplasia with negative but close margins (for moderate, but not severe dysplasia).  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    At the last visit, we observed a slight recurrence of leukoplakia/granuloma in the same location where he had this in 2018. At that time it resolved just as we were considering a repeat biopsy. Subsequently, he had allergies which were provoking coughing/throat-clearing, and he was again noted to have a granuloma. He was set up for refresher session(s) of speech therapy with Misty Clark, PhD, CCC-SLP and we discussed nasal hygiene as well.     This led to near complete resolution of the granuloma, but subsequently he was noted to have slightly increased irritation along the left medial arytenoid wall.  Then he developed an upper respiratory infection and has been coughing, and  had some shortness of breath. He states he was COVID negative. His family had noted him grunting and throat-clearing frequently.    Today:  He feels he is doing better overall. He has reduced his grunting and throat-clearing. No problems with acid reflux. He does not feel he needs speech therapy beyond what is already scheduled (with Candido Winter, PhD, CCC-SLP, coming up tomorrow and in a few weeks).    MEDICATIONS:     Current Outpatient Medications   Medication Sig Dispense Refill     ACCU-CHEK GUIDE test strip TEST ONCE DAILY 100 strip 1     alcohol swab prep pads USE TO SWAB AREA OF INJECTION/OLIMPIA 100 each 1     allopurinol (ZYLOPRIM) 300 MG tablet TAKE 1 TABLET(300 MG) BY MOUTH DAILY 90 tablet 3     amLODIPine (NORVASC) 2.5 MG tablet Take 1 tablet (2.5 mg) by mouth daily 90 tablet 3     aspirin 81 MG tablet Take 1 tablet by mouth daily.       atorvastatin (LIPITOR) 20 MG tablet Take 1 tablet (20 mg) by mouth daily 90 tablet 0     blood glucose (ACCU-CHEK GUIDE) test strip Use to test blood sugar 1 times daily or as directed. 100 strip 11     blood glucose calibration (NO BRAND SPECIFIED) solution To accompany: Blood Glucose Monitor Brands: per insurance. 1 Bottle 3     Blood Glucose Monitoring Suppl (ACCU-CHEK GUIDE ME) w/Device KIT USE ONCE DAILY 1 kit 0     calcipotriene (DOVONEX) 0.005 % external cream Mix with efudex and apply twice daily to hands and arms for 12 days 60 g 3     cetirizine (ZYRTEC) 10 MG tablet Take 1 tablet (10 mg) by mouth daily 90 tablet 3     ferrous sulfate (FE TABS) 325 (65 Fe) MG EC tablet Take 1 tablet (325 mg) by mouth daily . Take with vitamin c (juice or supplement). 90 tablet 0     fluorouracil (EFUDEX) 5 % external cream Mix with dovonex and apply twice daily to hands and arms for 12 days 40 g 1     hydrochlorothiazide (HYDRODIURIL) 50 MG tablet Take 1 tablet (50 mg) by mouth daily 90 tablet 3     hydrocortisone (CORTAID) 1 % external ointment Apply topically 2 times  daily as needed 30 g 1     ketoconazole (NIZORAL) 2 % external cream Apply daily to affected area on face, armpits, groin. 60 g 4     ketoconazole (NIZORAL) 2 % external shampoo Leave on face, beard for 3-5 minutes then rinse. Use 2-3 times weekly. 120 mL 3     losartan (COZAAR) 50 MG tablet Take 1 tablet (50 mg) by mouth daily 90 tablet 3     metFORMIN (GLUCOPHAGE) 1000 MG tablet TAKE 1 TABLET(1000 MG) BY MOUTH TWICE DAILY WITH MEALS 180 tablet 1     metroNIDAZOLE (METROCREAM) 0.75 % external cream Apply topically 2 times daily as needed (to rosacea areas) 45 g 1     Misc. Devices (SUPPOSITORY MOLD 2GM) MISC 1 suppository every 12 hours Nifedipine 4 mg suppository, one rectally every 12 hours 100 each 4     Multiple Vitamin (DAILY MULTIVITAMIN PO) Take  by mouth daily.       omeprazole (PRILOSEC) 20 MG DR capsule TAKE 1 CAPSULE BY MOUTH DAILY 90 capsule 3     order for DME Autocpap 10-16 cm 1 Units 0     PARoxetine (PAXIL) 40 MG tablet TAKE 1 TABLET(40 MG) BY MOUTH EVERY MORNING 90 tablet 3     polyethylene glycol (MIRALAX) 17 GM/Dose powder Take 17 g (1 capful) by mouth daily 850 g 3     psyllium 0.52 G capsule Take 1 capsule (0.52 g) by mouth daily 100 capsule 3     semaglutide (OZEMPIC, 0.25 OR 0.5 MG/DOSE,) 2 MG/1.5ML SOPN pen Inject 0.5 mg Subcutaneous every 7 days 3 mL 3     tamsulosin (FLOMAX) 0.4 MG capsule Take 2 capsules (0.8 mg) by mouth daily TAKE 2 CAPSULES BY MOUTH DAILY 180 capsule 3     thin (NO BRAND SPECIFIED) lancets Use with lanceting device. To accompany: Blood Glucose Monitor Brands: per insurance. 100 each 11     triamcinolone (KENALOG) 0.1 % external cream Apply topically 2 times daily as needed for irritation 30 g 0     RESERPINE-HYDROCHLOROTHIAZIDE OR daily         ALLERGIES:  No Known Allergies    NEW PMH/PSH: None    REVIEW OF SYSTEMS:  The patient completed a comprehensive 11 point review of systems (below), which was reviewed. Positives are as noted below.  Patient Supplied Answers to  Review of Systems   ENT ROS 4/22/2016   Ears, Nose, Throat: Nasal congestion or drainage            PHYSICAL EXAM:  General: The patient was alert and conversant, and in no acute distress.    Neck: No palpable cervical lymphadenopathy, no significant tenderness to palpation of the thyrohyoid space, which was narrow. No obvious thyroid abnormality.  Resp: Breathing comfortably, no stridor or stertor.  Neuro: Symmetric facial function. Other cranial nerve function as documented above.  Psych: Normal affect, pleasant and cooperative.  Voice/speech: Mild dysphonia characterized by intermittent roughness.      Intake scores  Last 2 Scores for Patient-Answered VHI Questionnaire  VHI Total Score 2/26/2018 3/1/2019   VHI Total Score 9 17      Last 2 Scores for Patient-Answered EAT Questionnaire  EAT Total Score 2/26/2018 3/1/2019   EAT Total Score 1 5        Last 2 Scores for Patient-Answered CSI Questionnaire  CSI Total Score 2/26/2018 3/1/2019   CSI Total Score 0 0           Procedure:   Flexible fiberoptic laryngoscopy and laryngovideostroboscopy  Indications: This procedure was warranted to evaluate the patient's laryngeal anatomy and function. Risks, benefits, and alternatives were discussed.  Description: After written informed consent was obtained, a time-out was performed to confirm patient identity, procedure, and procedure site. Topical 3% lidocaine with 0.25% phenylephrine was applied to the nasal cavities. I performed the endoscopy and no complications were apparent. Continuous and stroboscopic light were utilized to assess routine phonation and variable frequency phonation.  Performed by: Mel Fournier MD MPH  Findings: Normal nasopharynx. Normal base of tongue, valleculae, and epiglottis. Vocal fold mobility: right: normal; left: normal. Medial edges of the vocal folds: smooth and straight. No focal mucosal lesions were observed on the true vocal folds. Glissade produced appropriate elongation. There was  moderate supraglottic recruitment with connected speech. Mucosa of false vocal folds, aryepiglottic folds, piriform sinuses, and posterior glottis notable for resolution of the right granuloma and new similar changes on the left medial arytenoid face. Airway was patent.   Similar findings on NBI.    The addition of stroboscopy allowed evaluation of the mucosal wave.   Amplitude: right: normal; left: normal. Symmetry: intermittent symmetry. Closure pattern: complete. Closure plane: at glottic level. Phase distribution: normal.                IMPRESSION AND PLAN:   Gary Iyer returns with resolution of the right granuloma and a small granuloma-type lesion on the left posterior larynx. No evidence of recurrence of prior carcinoma/dysplasia lesions.    Recommendations/Plan:  1) Continue being careful with phonotrauma,  2) Continue with speech therapy with Candido Winter, PhD, CCC-SLP.  3) Return to clinic in approximately four months, or sooner as needed.    I appreciate the opportunity to participate in the care of this pleasant patient.     Today's visit required additional screening time, PPE, and cleaning measures to allow for a safe in-person visit, due to the public health emergency. I spent a total of 17 minutes on 8/15/2022 in chart review, review of tests, patient visit, documentation, care coordination, and/or discussion with other providers about the issues documented above, separate from any documented procedure(s).        Mel Fournier MD

## 2022-08-16 ENCOUNTER — VIRTUAL VISIT (OUTPATIENT)
Dept: OTOLARYNGOLOGY | Facility: CLINIC | Age: 62
End: 2022-08-16
Payer: COMMERCIAL

## 2022-08-16 DIAGNOSIS — R49.0 DYSPHONIA: Primary | ICD-10-CM

## 2022-08-16 DIAGNOSIS — J38.7 LARYNGEAL GRANULOMA: ICD-10-CM

## 2022-08-16 PROCEDURE — 92507 TX SP LANG VOICE COMM INDIV: CPT | Mod: GN | Performed by: SPEECH-LANGUAGE PATHOLOGIST

## 2022-08-16 NOTE — LETTER
8/16/2022       RE: Gary Iyer  [address] [facility] [address]     Dear Colleague,    Thank you for referring your patient, Gary Iyer, to the [facility] VOICE CLINIC [place] at [facility]. Please see a copy of my visit note below.    Gary Iyer is a 62 year old male who is being seen via a billable video visit.      The patient  has been notified and verbally consented to the following:     This video visit will be conducted between you and your provider.    Patient has opted to conduct today's video visit vs an in-person appointment, and is not able to attend due to possible exposure to COVID-19.      If during the course of the call the provider feels a video visit is not appropriate, you will not be charged for this service.    Call initiated at: 1300  Type of Video Platform Used: [employer]  Location of provider: [facility]  Location of patient: Residence    THERAPY NOTE (CPT 21036)  Date of Service: 8/16/2022  Mr. 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    SUBJECTIVE / OBJECTIVE:  The patient was seen for repeat repeat evaluation with Dr. Fournier yesterday.  Exam showed resolution of the right-sided vocal process granulation tissue, but presence of new left-sided granulation tissue.    THERAPEUTIC ACTIVITIES  Counseling and Education    Asked many questions about the nature of his symptoms, and I answered all of these thoroughly.  Cough and throat clearing suppression strategies    The patient noted that he is not having difficulties with coughing and throat clearing at this time    We discussed that if he should begin to feel the urge to frequently cough or clear his throat to replace coughing and throat clearing with hard swallows, and sips of very hot or very cold liquid.  He endorsed understanding, and demonstrated no  instances of coughing or throat clearing throughout the rest of today's session.  Exercises to improve respiratory phonatory coordination     Awareness of excessive (inspiratory reserve) and insufficient (expiratory reserve) lung volumes    A counting task was utilized to promote patient recognition of increased respiratory engagement to counter recoil during exhalation, and habituate a low breath subsequent to this threshold, but before expiratory reserve volume    Patient reported improved awareness of these thresholds, and was able to operate within their boundaries with minimal clinician support  Adding phonation to an optimally flowing air stream    Flow mode phonation with 2 fingers directly in contact with lips was most facilitating for increased accuracy    The patient performed this task on sustained and variegated pitch patterns with moderate to max cues and 100% accuracy    The patient then worked to apply flow mode phonation to connected speech at the sentence level with /h/-loaded sentences.  He required maximum cues to achieve approximately 80% accuracy with target of reduced roughness and reduced strain    Cues included    Take a bigger breath    Keep the air moving past the end of the sentence    Reduced loudness but do not whisper  Concepts of an optimal regimen for practice were instructed.  o He should use an interval schedule of practice, with brief periods of practice frequently throughout each day  o [place] concepts of volitional practice to facilitate motor learning.    I provided handouts of today's therapeutic activities to facilitate practice.    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 Mr. Iyer demonstrates good initial learning of phonatory patterns designed to reduce posterior laryngeal impacted during phonation.  He benefited the most  from flow mode phonation.  Ongoing skilled intervention is required in order to advance the patient toward increasing independence with this voice quality, which will ultimately help to resolve vocal process granuloma.    PLAN: I will see Mr. Iyer in 4 weeks, at which time we will continue to work toward the plan of care.     Candido Winter, Ph.D., [facility]-SLP  Speech-Language Pathologist-[facility]  [phone]  he/him/his    Speech recognition software may have been used in the above documentation; input is reviewed before signature to the best of my ability.         Again, thank you for allowing me to participate in the care of your patient.      Sincerely,    Candido Winter, SLP

## 2022-08-16 NOTE — PROGRESS NOTES
Gary Iyer is a 62 year old male who is being seen via a billable video visit.      The patient  has been notified and verbally consented to the following:     This video visit will be conducted between you and your provider.    Patient has opted to conduct today's video visit vs an in-person appointment, and is not able to attend due to possible exposure to COVID-19.      If during the course of the call the provider feels a video visit is not appropriate, you will not be charged for this service.    Call initiated at: 1300  Type of Video Platform Used: [employer]  Location of provider: [facility]  Location of patient: Residence    THERAPY NOTE (CPT 87512)  Date of Service: 8/16/2022  Mr. 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    SUBJECTIVE / OBJECTIVE:  The patient was seen for repeat repeat evaluation with Dr. Fournier yesterday.  Exam showed resolution of the right-sided vocal process granulation tissue, but presence of new left-sided granulation tissue.    THERAPEUTIC ACTIVITIES  Counseling and Education    Asked many questions about the nature of his symptoms, and I answered all of these thoroughly.  Cough and throat clearing suppression strategies    The patient noted that he is not having difficulties with coughing and throat clearing at this time    We discussed that if he should begin to feel the urge to frequently cough or clear his throat to replace coughing and throat clearing with hard swallows, and sips of very hot or very cold liquid.  He endorsed understanding, and demonstrated no instances of coughing or throat clearing throughout the rest of today's session.  Exercises to improve respiratory phonatory coordination     Awareness of excessive (inspiratory reserve) and insufficient (expiratory reserve) lung volumes    A counting task was utilized to promote patient recognition of  increased respiratory engagement to counter recoil during exhalation, and habituate a low breath subsequent to this threshold, but before expiratory reserve volume    Patient reported improved awareness of these thresholds, and was able to operate within their boundaries with minimal clinician support  Adding phonation to an optimally flowing air stream    Flow mode phonation with 2 fingers directly in contact with lips was most facilitating for increased accuracy    The patient performed this task on sustained and variegated pitch patterns with moderate to max cues and 100% accuracy    The patient then worked to apply flow mode phonation to connected speech at the sentence level with /h/-loaded sentences.  He required maximum cues to achieve approximately 80% accuracy with target of reduced roughness and reduced strain    Cues included    Take a bigger breath    Keep the air moving past the end of the sentence    Reduced loudness but do not whisper  Concepts of an optimal regimen for practice were instructed.  o He should use an interval schedule of practice, with brief periods of practice frequently throughout each day  o [place] concepts of volitional practice to facilitate motor learning.    I provided handouts of today's therapeutic activities to facilitate practice.    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 Mr. Iyer demonstrates good initial learning of phonatory patterns designed to reduce posterior laryngeal impacted during phonation.  He benefited the most from flow mode phonation.  Ongoing skilled intervention is required in order to advance the patient toward increasing independence with this voice quality, which will ultimately help to resolve vocal process granuloma.    PLAN: I will see Mr. Iyer in 4 weeks, at which time we will continue to work  toward the plan of care.     Candido Winter, Ph.D., [facility]-SLP  Speech-Language Pathologist-[facility]  [phone]  he/him/his    Speech recognition software may have been used in the above documentation; input is reviewed before signature to the best of my ability.

## 2022-08-16 NOTE — PATIENT INSTRUCTIONS
"Home Practice:  For 5 minutes at a time, 3-5 times per day:    Take a big breath, and hum gently on \"mmmmmmmmm\"  This should be quiet, and not rough  Glide up and down in pitch  Make sure it stays quiet, but not a whisper  Next try to use the easy, sigh-like voice with the \"H\" sentences below:  Remember, take a big breath first, and let all of the air out through your voice    Noelle is hungry.  Hip-hip-hooray!  Hippity hop.  Hamburger helper.  Hooray for Tyrese.  Horseradish is hot.  Heaven help Rozina.  Oh has a hard hat.  Noelle has a halo.  Rozina hates hotdogs.  Eugene high hopes.  Kartik has a harmonica.  Rozina has a happy heart.  Scarlett the Horrible hates Neha's housework.  Kartik has a handy hammer.  Karyna has heavy hallucinations.  Elias has a hockey helmet, in his humid house.  Rozina has a huge ham in her hot oven.  Neha has to have Kat's kisses.  Kartik put a lombardi on his hairy head.  Pranav has a half a hand of honey.  Here's how to have a happy home.  Clarisse has a handsome .  Anum has horrific headaches.  Hail, hail the gang's all here.  Kartik Blanchard had a horrible habit.  Isaak Tadeo held a hula hoop.    "

## 2022-08-17 ENCOUNTER — MEDICAL CORRESPONDENCE (OUTPATIENT)
Dept: HEALTH INFORMATION MANAGEMENT | Facility: CLINIC | Age: 62
End: 2022-08-17

## 2022-08-20 DIAGNOSIS — E11.9 TYPE 2 DIABETES MELLITUS WITHOUT COMPLICATION, WITHOUT LONG-TERM CURRENT USE OF INSULIN (H): ICD-10-CM

## 2022-08-22 NOTE — TELEPHONE ENCOUNTER
Prescription approved per [facility] Refill Protocol.    Sonia Banks, RN, BSN

## 2022-08-30 ENCOUNTER — TELEPHONE (OUTPATIENT)
Dept: EDUCATION SERVICES | Facility: CLINIC | Age: 62
End: 2022-08-30

## 2022-08-30 ENCOUNTER — NURSE TRIAGE (OUTPATIENT)
Dept: NURSING | Facility: CLINIC | Age: 62
End: 2022-08-30

## 2022-08-30 NOTE — TELEPHONE ENCOUNTER
Pt was given provider's message. States he has alternated injection sites on his abdomen. Pt thought his message was sent to Danelle and requested this message be addressed by Danelle.    Aminah Gross RN  [facility]

## 2022-08-30 NOTE — TELEPHONE ENCOUNTER
Triage Call:     Pt is calling to report that he thinks that he has a stomach rash from injecting Ozempic.   The itching has been for 3-4 days  There are flat pink areas on his abdomen where he has inserted this medication in about 20-25 places  Over the past 3-4 days he has noticed them being itchy     Other than the Ozempic; patient can not think of anything else that could be causing the flat pink areas and itching    Pt wants to know if he needs to be seen for this or how to proceed.     Disposition: Call back by PCP today. Pt was given the care advice. Writer is routing this message to patient's PCP to advise patient how to proceed and if he needs to be seen in person for the areas on his abdomen.     Jocelyn Shaw RN  [facility] Nurse Advisor 11:37 AM 8/30/2022    Reason for Disposition    Caller has NON-URGENT medicine question about med that PCP or specialist prescribed and triager unable to answer question    Mild localized rash    Additional Information    Negative: Sounds like a life-threatening emergency to the triager    Negative: Athlete's Foot suspected (i.e., itchy rash between the toes)    Negative: Insect bite(s) suspected    Negative: Jock Itch suspected (i.e., itchy rash on inner thighs near genital area)    Negative: Localized lump (or swelling) without redness or rash    Negative: Poison ivy, oak, or sumac contact suspected    Negative: Rash of female genital area (vulva)    Negative: Rash of male genital area (penis or scrotum)    Negative: Redness of immunization site    Negative: Shingles suspected (i.e., painful rash, multiple small blisters grouped together in one area of body; dermatomal distribution)    Negative: Small spot, skin growth, or mole    Negative: Wound infection suspected (i.e., pain, spreading redness, or pus; in a cut, puncture, scrape or sutured wound)    Negative: Fever and localized purple or blood-colored spots or dots that are not from injury or friction     Negative: Fever and localized rash is very painful    Negative: Patient sounds very sick or weak to the triager    Negative: Looks like a boil, infected sore, deep ulcer, or other infected rash (spreading redness, pus)    Negative: Painful rash with multiple small blisters grouped together (i.e., dermatomal distribution or 'band' or 'stripe')    Negative: Localized rash is very painful (no fever)    Negative: Localized purple or blood-colored spots or dots that are not from injury or friction (no fever)    Negative: Lyme disease suspected (e.g., bull's-eye rash or tick bite / exposure)    Negative: Patient wants to be seen    Negative: Tender bumps in armpits    Negative: Pimples (localized) and no improvement after using CARE ADVICE    Negative: SEVERE local itching persists after 2 days of steroid cream    Negative: Applying cream or ointment and it causes severe itch, burning, or pain    Negative: Medication patch causing local rash or itching    Negative: Localized rash present > 7 days    Negative: Red, moist, irritated area between skin folds (or under larger breasts)    Negative: Localized area of skin darkening or thickening on lower legs or ankles and has NOT been evaluated by a doctor (or NP/PA)    Negative: Intentional drug overdose and suicidal thoughts or ideas    Negative: Drug overdose and triager unable to answer question    Negative: Caller requesting a renewal or refill of a medicine patient is currently taking    Negative: Caller requesting information unrelated to medicine    Negative: Caller requesting information about COVID-19 Vaccine    Negative: Caller requesting information about Emergency Contraception    Negative: Caller requesting information about Combined Birth Control Pills    Negative: Caller requesting information about Progestin Birth Control Pills    Negative: Caller requesting information about Post-Op pain or medicines    Negative: Caller requesting a prescription antibiotic  (such as penicillin) for Strep throat and has a positive culture result    Negative: Caller requesting a prescription anti-viral med (such as Tamiflu) and has influenza (flu) symptoms    Negative: Immunization reaction suspected    Negative: Rash while taking a medicine or within 3 days of stopping it    Negative: Asthma and having symptoms of asthma (cough, wheezing, etc.)    Negative: Symptom of illness (e.g., headache, abdominal pain, earache, vomiting) that are more than mild    Negative: Breastfeeding questions about mother's medicines and diet    Negative: MORE THAN A DOUBLE DOSE of a prescription or over-the-counter (OTC) drug    Negative: DOUBLE DOSE (an extra dose or lesser amount) of prescription drug and any symptoms (e.g., dizziness, nausea, pain, sleepiness)    Negative: DOUBLE DOSE (an extra dose or lesser amount) of over-the-counter (OTC) drug and any symptoms (e.g., dizziness, nausea, pain, sleepiness)    Negative: Took another person's prescription drug    Negative: DOUBLE DOSE (an extra dose or lesser amount) of prescription drug and NO symptoms  (Exception: A double dose of antibiotics.)    Negative: Diabetes drug error or overdose (e.g., took wrong type of insulin or took extra dose)    Negative: Caller has medication question about med NOT prescribed by PCP and triager unable to answer question (e.g., compatibility with other med, storage)    Negative: Prescription not at pharmacy and was prescribed by PCP recently  (Exception: triager has access to EMR and prescription is recorded there. Go to Home Care and confirm for pharmacy.)    Negative: Pharmacy calling with prescription question and triager unable to answer question    Negative: Caller has URGENT medicine question about med that PCP or specialist prescribed and triager unable to answer question    Protocols used: MEDICATION QUESTION CALL-A-OH, RASH OR REDNESS - [id number]

## 2022-08-30 NOTE — TELEPHONE ENCOUNTER
Pt calling to speak to you, says he was prescribed a weekly injection and the injection site is now itchy.  I did refer him over to [facility] but he would like to speak to you.      Please advise.      Shai Cleaning   [place]   [phone]

## 2022-08-30 NOTE — TELEPHONE ENCOUNTER
Try to inject in a different location, or at least not in same place on abdomen    This may help clarify if it is from the injection or not    Please inform patient    Finesse Beal MD

## 2022-09-01 NOTE — TELEPHONE ENCOUNTER
Called patient who states that the itching has been dying down now.  Recommended that he continue to monitor and rotate injection sites. Call back with any further symptoms.     Danelle Alba RD LD [facility]

## 2022-09-19 NOTE — TELEPHONE ENCOUNTER
Called pt to let him know that it looks like a prescription for a new blood glucose meter was sent in this morning for him to the [facility]. He plans to go pick it up.    Mariel Mccabe RN, [facility]      
patient calling to speak to you, says he needs he needs  A new glucometer. Pt goes to [facility].      Please advise.      Shai Cleaning   [facility]  Diabetes & Nutrition Scheduling  [phone]   
2020

## 2022-09-21 NOTE — TELEPHONE ENCOUNTER
Patient Quality Outreach    Patient is due for the following:   Colon Cancer Screening    Next Steps:   patient completed colonoscopy    Type of outreach:    Chart review performed, no outreach needed.    Next Steps:  Reach out within 90 days via NA.    Max number of attempts reached: Yes. Will try again in 90 days if patient still on fail list.    Questions for provider review:    None     Elisa Chandra, KANDY  Chart routed to chart closed.

## 2022-09-25 ENCOUNTER — OFFICE VISIT (OUTPATIENT)
Dept: URGENT CARE | Facility: URGENT CARE | Age: 62
End: 2022-09-25
Payer: MEDICARE

## 2022-09-25 VITALS
TEMPERATURE: 97.5 F | RESPIRATION RATE: 16 BRPM | SYSTOLIC BLOOD PRESSURE: 126 MMHG | BODY MASS INDEX: 33.47 KG/M2 | HEART RATE: 102 BPM | DIASTOLIC BLOOD PRESSURE: 78 MMHG | WEIGHT: 233.25 LBS | OXYGEN SATURATION: 97 %

## 2022-09-25 DIAGNOSIS — B35.6 TINEA CRURIS: Primary | ICD-10-CM

## 2022-09-25 PROCEDURE — 99213 OFFICE O/P EST LOW 20 MIN: CPT | Performed by: PHYSICIAN ASSISTANT

## 2022-09-25 RX ORDER — KETOCONAZOLE 20 MG/G
CREAM TOPICAL 2 TIMES DAILY
Qty: 60 G | Refills: 0 | Status: SHIPPED | OUTPATIENT
Start: 2022-09-25 | End: 2024-05-22

## 2022-09-25 RX ORDER — NYSTATIN 100000 [USP'U]/G
POWDER TOPICAL 2 TIMES DAILY
Qty: 60 G | Refills: 0 | Status: SHIPPED | OUTPATIENT
Start: 2022-09-25

## 2022-09-25 ASSESSMENT — ENCOUNTER SYMPTOMS
FATIGUE: 0
FEVER: 0
CHEST TIGHTNESS: 0
CARDIOVASCULAR NEGATIVE: 1
SORE THROAT: 0
CHILLS: 0
SHORTNESS OF BREATH: 0
COLOR CHANGE: 1
PALPITATIONS: 0
WHEEZING: 0
COUGH: 0
WOUND: 0
RESPIRATORY NEGATIVE: 1
RHINORRHEA: 0

## 2022-09-25 NOTE — PROGRESS NOTES
Kamryn Vega is a 62 year old, presenting for the following health issues:  Urgent Care (Urgent Care visit for rash in groin.) and Rash (Rash in groin that has been there on and off for months. He has had this last bout for about 2-3 weeks ago. He applies athletes foot cream and it goes away, but recurs. This time he applied the cream and it has gotten worse so he came in today. He has a red, raised rash on his arms as well. )    HPI   Rash  Onset/Duration:  Chronic, intermittent  Description  Location: groin region  Character: flakey, burning  Itching: mild  Intensity:  moderate  Progression of Symptoms:  intermittent  Accompanying signs and symptoms:   Fever: No  Body aches or joint pain: No  Sore throat symptoms: No  Recent cold symptoms: No  History:           Previous episodes of similar rash: Yes, and was given antifungal creams with some relief until now  New exposures:  None  Recent travel: No  Exposure to similar rash: No  Precipitating or alleviating factors: none  Therapies tried and outcome: antifungal cream with some relief    Patient Active Problem List   Diagnosis     Hyperlipidemia LDL goal <70     Hypertension goal BP (blood pressure) < 140/90     SAMARIA (generalized anxiety disorder)     Advanced directives, counseling/discussion     Carotid stenosis     Hx of laryngeal cancer     Type 2 diabetes mellitus without complication (H)     Dysphonia     Vocal process granuloma     Type 2 diabetes mellitus without complication, without long-term current use of insulin (H)     Obstructive sleep apnea- moderate- severe (AHI 25, 82)     Benign prostatic hypertrophy     Periodic limb movements of sleep     Low ferritin     Gout, unspecified cause, unspecified chronicity, unspecified site     Chronic cough     Obesity, unspecified classification, unspecified obesity type, unspecified whether serious comorbidity present     Morbid obesity (H)     Current Outpatient Medications   Medication     ACCU-CHEK  GUIDE test strip     alcohol swab prep pads     allopurinol (ZYLOPRIM) 300 MG tablet     amLODIPine (NORVASC) 2.5 MG tablet     aspirin 81 MG tablet     atorvastatin (LIPITOR) 20 MG tablet     blood glucose (ACCU-CHEK GUIDE) test strip     blood glucose calibration (NO BRAND SPECIFIED) solution     Blood Glucose Monitoring Suppl (ACCU-CHEK GUIDE ME) w/Device KIT     cetirizine (ZYRTEC) 10 MG tablet     fluorouracil (EFUDEX) 5 % external cream     hydrochlorothiazide (HYDRODIURIL) 50 MG tablet     hydrocortisone (CORTAID) 1 % external ointment     ketoconazole (NIZORAL) 2 % external cream     losartan (COZAAR) 50 MG tablet     metFORMIN (GLUCOPHAGE) 1000 MG tablet     metroNIDAZOLE (METROCREAM) 0.75 % external cream     Misc. Devices (SUPPOSITORY MOLD 2GM) MISC     omeprazole (PRILOSEC) 20 MG DR capsule     order for DME     PARoxetine (PAXIL) 40 MG tablet     polyethylene glycol (MIRALAX) 17 GM/Dose powder     psyllium 0.52 G capsule     semaglutide (OZEMPIC, 0.25 OR 0.5 MG/DOSE,) 2 MG/1.5ML SOPN pen     tamsulosin (FLOMAX) 0.4 MG capsule     thin (NO BRAND SPECIFIED) lancets     triamcinolone (KENALOG) 0.1 % external cream     calcipotriene (DOVONEX) 0.005 % external cream     ferrous sulfate (FE TABS) 325 (65 Fe) MG EC tablet     ketoconazole (NIZORAL) 2 % external shampoo     Multiple Vitamin (DAILY MULTIVITAMIN PO)     RESERPINE-HYDROCHLOROTHIAZIDE OR     Current Facility-Administered Medications   Medication     bupivacaine (MARCAINE) 0.25 % injection 3 mL     lidocaine 1 % injection 4 mL     triamcinolone acetonide (KENALOG-40) injection 80 mg      No Known Allergies    Review of Systems   Constitutional: Negative for chills, fatigue and fever.   HENT: Negative.  Negative for congestion, ear pain, rhinorrhea and sore throat.    Respiratory: Negative.  Negative for cough, chest tightness, shortness of breath and wheezing.    Cardiovascular: Negative.  Negative for chest pain, palpitations and peripheral edema.    Skin: Positive for color change and rash. Negative for pallor and wound.   All other systems reviewed and are negative.           Objective    /78 (BP Location: Left arm, Patient Position: Sitting, Cuff Size: Adult Large)   Pulse 102   Temp 97.5  F (36.4  C) (Tympanic)   Resp 16   Wt 105.8 kg (233 lb 4 oz)   SpO2 97%   BMI 33.47 kg/m    Body mass index is 33.47 kg/m .  Physical Exam  Vitals and nursing note reviewed.   Constitutional:       General: He is not in acute distress.     Appearance: Normal appearance. He is well-developed. He is obese. He is not ill-appearing.   Skin:     General: Skin is warm and dry.      Capillary Refill: Capillary refill takes less than 2 seconds.      Findings: Rash (hypopigmented rash with discrete borders present in the groin region) present. No abrasion, abscess, acne, bruising, ecchymosis, erythema or petechiae. Rash is macular and scaling. Rash is not nodular, papular, purpuric, pustular, urticarial or vesicular.   Neurological:      Mental Status: He is alert and oriented to person, place, and time.   Psychiatric:         Mood and Affect: Mood normal.         Behavior: Behavior normal.         Thought Content: Thought content normal.         Judgment: Judgment normal.          Assessment/Plan:  Tinea cruris:  Will give ketoconazole cream as directed and give nystatin powder.  Keep clean and dry.  Avoid triggers and irritating agents.  Avoid scratching to present secondary infection.  RTC if worsening rash or if she develops redness, swelling, drainage or fevers.  Will send to DERM if no improvement.  -     ketoconazole (NIZORAL) 2 % external cream; Apply topically 2 times daily  -     nystatin (MYCOSTATIN) 135136 UNIT/GM external powder; Apply topically 2 times daily Prevention of rash  -     Adult Dermatology Referral        Tessa Louis PA-C

## 2022-09-26 ENCOUNTER — TELEPHONE (OUTPATIENT)
Dept: FAMILY MEDICINE | Facility: CLINIC | Age: 62
End: 2022-09-26

## 2022-09-26 DIAGNOSIS — E11.9 TYPE 2 DIABETES MELLITUS WITHOUT COMPLICATION, WITHOUT LONG-TERM CURRENT USE OF INSULIN (H): Primary | ICD-10-CM

## 2022-09-26 NOTE — TELEPHONE ENCOUNTER
Reason for Call: Request for an order or referral:    Order or referral being requested:  Patient is wanting Dr Lian to do an order for diabetic shoes.    Date needed: as soon as possible    Has the patient been seen by the PCP for this problem? YES    Additional comments:     Phone number Patient can be reached at:  Home number on file [phone] (home)    Best Time:  any    Can we leave a detailed message on this number?  YES    Call taken on 9/26/2022 at 2:09 PM by Shayla Byers

## 2022-09-27 NOTE — TELEPHONE ENCOUNTER
I did complete order/ referral but I don't think we have documented in a clinic note recently the need for this  We may have to have patient come in to clinic to do foot exam and document this  He needs to check with insurance on this    Please inform patient    Finesse Beal MD

## 2022-09-27 NOTE — TELEPHONE ENCOUNTER
Received call from patient. Notified him of response from provider. He verbalized understanding. Gave him phone number for [facility] orthotics and prosthetics.    Chrissy Uriostegui RN   [facility]

## 2022-09-27 NOTE — TELEPHONE ENCOUNTER
Called patient. Left voice message to return call at [phone].    Chrissy Uriostegui RN   [facility]

## 2022-09-28 ENCOUNTER — OFFICE VISIT (OUTPATIENT)
Dept: DERMATOLOGY | Facility: CLINIC | Age: 62
End: 2022-09-28
Payer: MEDICARE

## 2022-09-28 DIAGNOSIS — Z53.9 ERRONEOUS ENCOUNTER--DISREGARD: Primary | ICD-10-CM

## 2022-09-28 ASSESSMENT — PAIN SCALES - GENERAL: PAINLEVEL: NO PAIN (0)

## 2022-09-28 NOTE — TELEPHONE ENCOUNTER
SPINE PATIENTS - NEW PROTOCOL PREVISIT    RECORDS RECEIVED FROM: Internal   REASON FOR VISIT: low back pain   Date of Appt: 09/30/2022    NOTES (FOR ALL VISITS) STATUS DETAILS   OFFICE NOTE from referring provider N/A    OFFICE NOTE from other specialist N/A    DISCHARGE SUMMARY from hospital N/A    DISCHARGE REPORT from ER N/A    EMG REPORT N/A    MEDICATION LIST N/A    IMAGING  (FOR ALL VISITS)     MRI (HEAD, NECK, SPINE) N/A    XRAY (SPINE) *NEUROSURGERY* N/A    CT (HEAD, NECK, SPINE) N/A       Spoke to patient wife, she states patient has never been seen for back. Not sure when pain started. No imaging.   Magali Hernandez on 9/28/2022 at 9:23 AM

## 2022-09-28 NOTE — PROGRESS NOTES
Patient decided he no longer needs this appointment today 9/28/22.     Toya JACKSON RN  [facility]at[facility]  [phone]

## 2022-09-28 NOTE — LETTER
9/28/2022         RE: Gary Iyer  [address] [address]        Dear Colleague,    Thank you for referring your patient, Gary Iyer, to the [facility]. Please see a copy of my visit note below.    Patient decided he no longer needs this appointment today 9/28/22.     Toya JACKSON RN  [facility]  [phone]        Again, thank you for allowing me to participate in the care of your patient.        Sincerely,        Machelle Valentine PA-C

## 2022-09-30 ENCOUNTER — PRE VISIT (OUTPATIENT)
Dept: NEUROSURGERY | Facility: CLINIC | Age: 62
End: 2022-09-30

## 2022-10-03 NOTE — TELEPHONE ENCOUNTER
Patient said he came to  and DME order not there would like it faxed to the Diabetic shoe  Store at [phone] and there phone number is [phone].  Georgette Dawson   [facility]

## 2022-10-05 ENCOUNTER — OFFICE VISIT (OUTPATIENT)
Dept: PALLIATIVE MEDICINE | Facility: CLINIC | Age: 62
End: 2022-10-05
Payer: MEDICARE

## 2022-10-05 VITALS — OXYGEN SATURATION: 98 % | HEART RATE: 116 BPM | SYSTOLIC BLOOD PRESSURE: 126 MMHG | DIASTOLIC BLOOD PRESSURE: 81 MMHG

## 2022-10-05 DIAGNOSIS — M79.18 MYOFASCIAL PAIN: ICD-10-CM

## 2022-10-05 DIAGNOSIS — M47.817 LUMBOSACRAL SPONDYLOSIS WITHOUT MYELOPATHY: Primary | ICD-10-CM

## 2022-10-05 PROCEDURE — 99203 OFFICE O/P NEW LOW 30 MIN: CPT | Performed by: PHYSICAL MEDICINE & REHABILITATION

## 2022-10-05 RX ORDER — METHOCARBAMOL 500 MG/1
500 TABLET, FILM COATED ORAL 2 TIMES DAILY PRN
Qty: 60 TABLET | Refills: 0 | Status: SHIPPED | OUTPATIENT
Start: 2022-10-05

## 2022-10-05 ASSESSMENT — PAIN SCALES - GENERAL: PAINLEVEL: SEVERE PAIN (7)

## 2022-10-05 NOTE — NURSING NOTE
PEG Score 10/5/2022   PEG Total Score 7.33         Kellie Nix MA  [facility] [place]

## 2022-10-05 NOTE — PATIENT INSTRUCTIONS
Will get a lumbar x-ray of your low back today. I will my chart with results.  2.   Start methocarbamol 500 mg twice daily as needed for muscle spasms. Can be sedating.  Do not drive until you know how the medication affects you. Use this sparingly.   3. You can try tylenol up to 1,000 mg every 6-8 hours. No more than 3,000 mg per day.   4. Try over the counter topicals over the painful area. This includes things such as aspercreme, lidocaine, tiger balm, icy/hot, etc.   5. Start physical therapy.   6. Follow up with me after 4-6 sessions of PT.     Rianna Choi MD  [facility]       ----------------------------------------------------------------  Clinic Number:  [phone]   Call with any questions about your care and for scheduling assistance.   Calls are returned Monday through Friday between 8 AM and 4:30 PM. We usually get back to you within 2 business days depending on the issue/request.    If we are prescribing your medications:  For opioid medication refills, call the clinic or send a Twenga message 7 days in advance.  Please include:  Name of requested medication  Name of the pharmacy.  For non-opioid medications, call your pharmacy directly to request a refill. Please allow 3-4 days to be processed.   Per [place] State Law:  All controlled substance prescriptions must be filled within 30 days of being written.    For those controlled substances allowing refills, pickup must occur within 30 days of last fill.      We believe regular attendance is key to your success in our program!    Any time you are unable to keep your appointment we ask that you call us at least 24 hours in advance to cancel.This will allow us to offer the appointment time to another patient.   Multiple missed appointments may lead to dismissal from the clinic.

## 2022-10-05 NOTE — PROCEDURES
[facility] Medical Spine Consultation    Date of visit: 10/5/2022    Assessment:  Gary Iyer is a 62 year old male with a past medical history significant for history of laryngeal cancer, obstructive sleep apnea, obesity, hyperlipidemia, gout, diabetes mellitus type 2, hypertension, BPH, anxiety who presents with complaints of:    1. Low back pain: Gary reports a several year history of chronic low back pain which has been intermittent.  Pain has been aggravated over the last 2 to 3 weeks and particularly over the last day.  Located in bilateral low back.  No radicular symptoms.  On exam pain is reproduced with lumbar extension and rotation to both sides.  There is tenderness in the lumbar paraspinals.  No lumbar imaging is available to review.  Based on history and exam pain is likely secondary to lumbosacral spondylosis with overlying myofascial pain.    Plan:  The following recommendations were given to the patient. Diagnosis, treatment options, risks, benefits, and alternatives were discussed, and all questions were answered. The patient expressed understanding of the plan for management.     1. Therapies:  Start physical therapy.   2. Diagnostic Studies: Order placed for a baseline lumbar x-ray. Will my chart with results.   3. Medication Management:   1. Start methocarbamol 500 mg BID prn.   2. Discussed trying Tylenol up to 1,000 mg every 6-8 hrs prn, not exceeding 3,000 mg per day.   3. Discussed trying OTC topicals.   4. Procedures recommended: consider lumbar facet injections vs MBB to RFA process.   5. Follow up: after 4-6 sessions of PT.     Reason for consultation:    Primary Care Provider is Finesse Beal.    Gary Iyer is a 62 year old male who I was asked to see in consultation by No ref. provider found.    Consultation and Evaluation for: Back pain    Review of Electronic Chart: Today I have also reviewed available medical information in the patient's medical record at [facility]  ([facility]), including relevant provider notes, laboratory work, and imaging.     Chief Complaint:    Chief Complaint   Patient presents with     Pain       History:  Gary Iyer is a 62 year old male who presents for initial evaluation of chief pain complaint of back pain.  The pain is located in bilateral low back.  Started 3 to 4 years ago off-and-on.  He reports 2 to 3 weeks ago the back started acting up again.  Does report a fall 8 to 10 days ago and unsure if pain started before or after fall.  States it is hard to stand up straight due to the pain.  Yesterday started having more pain in the post dural lateral hip/buttock.  No particular inciting event.  Pain is better today than it was yesterday.  Denies any radicular symptoms.  Pain is constant.  Sharp in quality.  He has seen the chiropractor for a visit. Severity/Intensity: 10+/10 at worst, 7/10 at best  Aggravating factors include: sitting, standing  Relieving factors include: walking    Red Flags: The patient denies bowel or bladder incontinence, parasthesias, weakness, saddle anesthesia, unintentional weight loss, or fever/chills/sweats. Hx of laryngeal cancer.    Medications:       Current pain medications:  Ibuprofen - NH  Cyclobenzaprine - from wife - ?  Gabapentin - from wife - [facility]  Diclofenac cream - does not know which one - [facility]             Previous pain medications:  None    Past Pain Treatments:  PT: No   TENs Unit:No   Injections: No  Self-care:   Yes - ice - [facility]  Surgeries related to pain: No  Alternative Therapies:    Chiropractic: yes - x 1 visit - temporary benefit    Acupuncture: No  Other: No    Diagnostic tests:  None    EMG/Testing:  None    Labs:   Hemoglobin A1c 7.4    Past Medical History:  Past Medical History:   Diagnosis Date     Diabetes (H)     2yr     Hypertension      Lesion of larynx 12/12/2017    Leokoplakia. s/p Flexible fiberoptic transnasal laryngoscopy with endoscopic injection, laryngeal biopsy     Multinodular goiter  (nontoxic) 06/10/2013     Sleep apnea 8yr     Squamous cell carcinoma of skin, unspecified        Past Surgical History:  Past Surgical History:   Procedure Laterality Date     COLONOSCOPY  06/22/2012    Repeat Colonoscopy in 10 yrs.      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     HEAD & NECK SURGERY  01/25/2011    callous removal from throat     LARYNGOSCOPY WITH BIOPSY(IES)  01/25/2011     LARYNGOSCOPY WITH BIOPSY(IES) N/A 02/09/2017    Procedure: LARYNGOSCOPY WITH BIOPSY(IES);  Surgeon: Mel Fournier MD;  [facility]     LARYNGOSCOPY WITH MICROSCOPE N/A 02/09/2017    Procedure: LARYNGOSCOPY WITH MICROSCOPE;  Surgeon: Mel Fournier MD;  [facility]     LASER CO2 LARYNGOSCOPY, COMPLEX  06/27/2013    Procedure: LASER CO2 LARYNGOSCOPY, COMPLEX;  Micro Direct Laryngoscopy With Micro Flap Excision Of Lesion Lumenis Co2 Laser ;  Surgeon: Mel Fournier MD;  [facility]: [facility]     LASER CO2 LESION ORAL N/A 02/09/2017    Procedure: LASER CO2 LESION ORAL;  Surgeon: Mel Fournier MD;  [facility]     ORTHOPEDIC SURGERY  03/2016    left tibia orif with abbi 2-2016     VASECTOMY  02/2003       Medications:  Current Outpatient Medications   Medication Sig Dispense Refill     ACCU-CHEK GUIDE test strip TEST ONCE DAILY 100 strip 1     alcohol swab prep pads USE TO SWAB AREA OF INJECTION/OLIMPIA 100 each 1     allopurinol (ZYLOPRIM) 300 MG tablet TAKE 1 TABLET(300 MG) BY MOUTH DAILY 90 tablet 3     amLODIPine (NORVASC) 2.5 MG tablet Take 1 tablet (2.5 mg) by mouth daily 90 tablet 3     aspirin 81 MG tablet Take 1 tablet by mouth daily.       atorvastatin (LIPITOR) 20 MG tablet TAKE 1 TABLET(20 MG) BY MOUTH DAILY 90 tablet 0     blood glucose (ACCU-CHEK GUIDE) test strip Use to test blood sugar 1 times daily or as directed. 100 strip 11     blood glucose calibration (NO BRAND SPECIFIED) solution To accompany: Blood Glucose Monitor Brands: per insurance. 1 Bottle 3     Blood  Glucose Monitoring Suppl (ACCU-CHEK GUIDE ME) w/Device KIT USE TO TEST ONCE DAILY 1 kit 0     calcipotriene (DOVONEX) 0.005 % external cream Mix with efudex and apply twice daily to hands and arms for 12 days 60 g 3     cetirizine (ZYRTEC) 10 MG tablet Take 1 tablet (10 mg) by mouth daily 90 tablet 3     ferrous sulfate (FE TABS) 325 (65 Fe) MG EC tablet Take 1 tablet (325 mg) by mouth daily . Take with vitamin c (juice or supplement). 90 tablet 0     fluorouracil (EFUDEX) 5 % external cream Mix with dovonex and apply twice daily to hands and arms for 12 days 40 g 1     hydrochlorothiazide (HYDRODIURIL) 50 MG tablet Take 1 tablet (50 mg) by mouth daily 90 tablet 3     hydrocortisone (CORTAID) 1 % external ointment Apply topically 2 times daily as needed 30 g 1     ketoconazole (NIZORAL) 2 % external cream Apply topically 2 times daily 60 g 0     ketoconazole (NIZORAL) 2 % external cream Apply daily to affected area on face, armpits, groin. 60 g 4     ketoconazole (NIZORAL) 2 % external shampoo Leave on face, beard for 3-5 minutes then rinse. Use 2-3 times weekly. 120 mL 3     losartan (COZAAR) 50 MG tablet Take 1 tablet (50 mg) by mouth daily 90 tablet 3     metFORMIN (GLUCOPHAGE) 1000 MG tablet TAKE 1 TABLET(1000 MG) BY MOUTH TWICE DAILY WITH MEALS 180 tablet 0     metroNIDAZOLE (METROCREAM) 0.75 % external cream Apply topically 2 times daily as needed (to rosacea areas) 45 g 1     Misc. Devices (SUPPOSITORY MOLD 2GM) MISC 1 suppository every 12 hours Nifedipine 4 mg suppository, one rectally every 12 hours 100 each 4     Multiple Vitamin (DAILY MULTIVITAMIN PO) Take by mouth daily       nystatin (MYCOSTATIN) 567788 UNIT/GM external powder Apply topically 2 times daily Prevention of rash 60 g 0     omeprazole (PRILOSEC) 20 MG DR capsule TAKE 1 CAPSULE BY MOUTH DAILY 90 capsule 3     order for DME Autocpap 10-16 cm 1 Units 0     PARoxetine (PAXIL) 40 MG tablet TAKE 1 TABLET(40 MG) BY MOUTH EVERY MORNING 90 tablet 3      polyethylene glycol (MIRALAX) 17 GM/Dose powder Take 17 g (1 capful) by mouth daily 850 g 3     psyllium 0.52 G capsule Take 1 capsule (0.52 g) by mouth daily 100 capsule 3     RESERPINE-HYDROCHLOROTHIAZIDE OR daily       semaglutide (OZEMPIC, 0.25 OR 0.5 MG/DOSE,) 2 MG/1.5ML SOPN pen Inject 0.5 mg Subcutaneous every 7 days 3 mL 3     tamsulosin (FLOMAX) 0.4 MG capsule Take 2 capsules (0.8 mg) by mouth daily TAKE 2 CAPSULES BY MOUTH DAILY 180 capsule 3     thin (NO BRAND SPECIFIED) lancets Use with lanceting device. To accompany: Blood Glucose Monitor Brands: per insurance. 100 each 11     triamcinolone (KENALOG) 0.1 % external cream Apply topically 2 times daily as needed for irritation 30 g 0       Allergies:   No Known Allergies    Family history:  Family History   Problem Relation Age of Onset     C.A.D. Father          MI age 44     Hypertension Mother          93 old age     Diabetes No family hx of      Cerebrovascular Disease No family hx of      Cancer No family hx of      Glaucoma No family hx of      Macular Degeneration No family hx of        Social History:  Home situation: Lives in [place], [place] with wife.   Occupation/Schooling: Retired.  Tobacco use: None  Drug use: None  Alcohol use: ocassional     Physical Exam:  Vitals:    10/05/22 1018   BP: 126/81   Pulse: 116   SpO2: 98%     Exam:  Constitutional: Well developed, well nourished, appears stated age.  HEENT: Head atraumatic, normocephalic. Eyes without conjunctival injection or jaundice. Neck supple. No obvious neck masses.  Respiratory: Breathing unlabored on room air.    Skin: No rash, lesions of exposed skin.   Psychiatric/mental status: Alert, without lethargy or stupor. Speech fluent. Appropriate affect. Mood normal. Able to follow commands without difficulty.     Musculoskeletal exam:  Gait/Station/Posture:   Normal stance, arm swing, and stride; gait slow.   Normal bulk and tone. Unremarkable spinal curvature.    Lumbar  spine:  Range of motion within normal limits    Rotation/ext to right: painful    Rotation/ext to left: painful   Myofascial tenderness:  Bilateral lumbar paraspinals  Focal tenderness: No SI joint, gluteal, piriformis, GT, or IT tenderness    Neurologic exam:  CN:  Cranial nerves 2-12 are grossly intact  Motor Strength:  5/5 symmetric LE strength    Reflexes:     Patella L4:  R:  2/4 L: 2/4   Achilles S1:  R:  1/4 L: 1/4    Sensory:  (lower extremities):   Light touch: normal    Allodynia: absent    Hyperalgesia: absent     Rianna Choi MD  [facility] Pain Management       BILLING TIME DOCUMENTATION:   The total TIME spent on this patient on the date of the encounter/appointment was 37 minutes.      TOTAL TIME includes:   Time spent preparing to see the patient (reviewing records and tests)   Time spent face to face (or over the phone) with the patient   Time spent ordering tests, medications, procedures and referrals  Time spent documenting clinical information in Epic

## 2022-10-10 NOTE — PROGRESS NOTES
[facility] Medical Spine Consultation    Date of visit: 10/5/2022    Assessment:  Gary Iyer is a 62 year old male with a past medical history significant for history of laryngeal cancer, obstructive sleep apnea, obesity, hyperlipidemia, gout, diabetes mellitus type 2, hypertension, BPH, anxiety who presents with complaints of:    1. Low back pain: Gary reports a several year history of chronic low back pain which has been intermittent.  Pain has been aggravated over the last 2 to 3 weeks and particularly over the last day.  Located in bilateral low back.  No radicular symptoms.  On exam pain is reproduced with lumbar extension and rotation to both sides.  There is tenderness in the lumbar paraspinals.  No lumbar imaging is available to review.  Based on history and exam pain is likely secondary to lumbosacral spondylosis with overlying myofascial pain.    Plan:  The following recommendations were given to the patient. Diagnosis, treatment options, risks, benefits, and alternatives were discussed, and all questions were answered. The patient expressed understanding of the plan for management.     1. Therapies:  Start physical therapy.   2. Diagnostic Studies: Order placed for a baseline lumbar x-ray. Will my chart with results.   3. Medication Management:   1. Start methocarbamol 500 mg BID prn.   2. Discussed trying Tylenol up to 1,000 mg every 6-8 hrs prn, not exceeding 3,000 mg per day.   3. Discussed trying OTC topicals.   4. Procedures recommended: consider lumbar facet injections vs MBB to RFA process.   5. Follow up: after 4-6 sessions of PT.     Reason for consultation:    Primary Care Provider is Finesse Beal.    Gary Iyer is a 62 year old male who I was asked to see in consultation by No ref. provider found.    Consultation and Evaluation for: Back pain    Review of Electronic Chart: Today I have also reviewed available medical information in the patient's medical record at [facility]  ([facility]), including relevant provider notes, laboratory work, and imaging.     Chief Complaint:    Chief Complaint   Patient presents with     Pain       History:  Gary Iyer is a 62 year old male who presents for initial evaluation of chief pain complaint of back pain.  The pain is located in bilateral low back.  Started 3 to 4 years ago off-and-on.  He reports 2 to 3 weeks ago the back started acting up again.  Does report a fall 8 to 10 days ago and unsure if pain started before or after fall.  States it is hard to stand up straight due to the pain.  Yesterday started having more pain in the post dural lateral hip/buttock.  No particular inciting event.  Pain is better today than it was yesterday.  Denies any radicular symptoms.  Pain is constant.  Sharp in quality.  He has seen the chiropractor for a visit. Severity/Intensity: 10+/10 at worst, 7/10 at best  Aggravating factors include: sitting, standing  Relieving factors include: walking    Red Flags: The patient denies bowel or bladder incontinence, parasthesias, weakness, saddle anesthesia, unintentional weight loss, or fever/chills/sweats. Hx of laryngeal cancer.    Medications:       Current pain medications:  Ibuprofen - [facility]  Cyclobenzaprine - from wife - ?  Gabapentin - from wife - [facility]  Diclofenac cream - does not know which one - [facility]             Previous pain medications:  None    Past Pain Treatments:  PT: No   TENs Unit:No   Injections: No  Self-care:   Yes - ice - [facility]  Surgeries related to pain: No  Alternative Therapies:    Chiropractic: yes - x 1 visit - temporary benefit    Acupuncture: No  Other: No    Diagnostic tests:  None    EMG/Testing:  None    Labs:   Hemoglobin A1c 7.4    Past Medical History:  Past Medical History:   Diagnosis Date     Diabetes (H)     2yr     Hypertension      Lesion of larynx 12/12/2017    Leokoplakia. s/p Flexible fiberoptic transnasal laryngoscopy with endoscopic injection, laryngeal biopsy     Multinodular goiter  (nontoxic) 06/10/2013     Sleep apnea 8yr     Squamous cell carcinoma of skin, unspecified        Past Surgical History:  Past Surgical History:   Procedure Laterality Date     COLONOSCOPY  06/22/2012    Repeat Colonoscopy in 10 yrs.      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     HEAD & NECK SURGERY  01/25/2011    callous removal from throat     LARYNGOSCOPY WITH BIOPSY(IES)  01/25/2011     LARYNGOSCOPY WITH BIOPSY(IES) N/A 02/09/2017    Procedure: LARYNGOSCOPY WITH BIOPSY(IES);  Surgeon: Mel Fournier MD;  [facility]     LARYNGOSCOPY WITH MICROSCOPE N/A 02/09/2017    Procedure: LARYNGOSCOPY WITH MICROSCOPE;  Surgeon: Mel Fournier MD;  [facility]     LASER CO2 LARYNGOSCOPY, COMPLEX  06/27/2013    Procedure: LASER CO2 LARYNGOSCOPY, COMPLEX;  Micro Direct Laryngoscopy With Micro Flap Excision Of Lesion Lumenis Co2 Laser ;  Surgeon: Mel Fournier MD;  [facility] OR     LASER CO2 LESION ORAL N/A 02/09/2017    Procedure: LASER CO2 LESION ORAL;  Surgeon: Mel Fournier MD;  [facility]     ORTHOPEDIC SURGERY  03/2016    left tibia orif with abbi 2-2016     VASECTOMY  02/2003       Medications:  Current Outpatient Medications   Medication Sig Dispense Refill     ACCU-CHEK GUIDE test strip TEST ONCE DAILY 100 strip 1     alcohol swab prep pads USE TO SWAB AREA OF INJECTION/OLIMPIA 100 each 1     allopurinol (ZYLOPRIM) 300 MG tablet TAKE 1 TABLET(300 MG) BY MOUTH DAILY 90 tablet 3     amLODIPine (NORVASC) 2.5 MG tablet Take 1 tablet (2.5 mg) by mouth daily 90 tablet 3     aspirin 81 MG tablet Take 1 tablet by mouth daily.       atorvastatin (LIPITOR) 20 MG tablet TAKE 1 TABLET(20 MG) BY MOUTH DAILY 90 tablet 0     blood glucose (ACCU-CHEK GUIDE) test strip Use to test blood sugar 1 times daily or as directed. 100 strip 11     blood glucose calibration (NO BRAND SPECIFIED) solution To accompany: Blood Glucose Monitor Brands: per insurance. 1 Bottle 3     Blood  Glucose Monitoring Suppl (ACCU-CHEK GUIDE ME) w/Device KIT USE TO TEST ONCE DAILY 1 kit 0     calcipotriene (DOVONEX) 0.005 % external cream Mix with efudex and apply twice daily to hands and arms for 12 days 60 g 3     cetirizine (ZYRTEC) 10 MG tablet Take 1 tablet (10 mg) by mouth daily 90 tablet 3     ferrous sulfate (FE TABS) 325 (65 Fe) MG EC tablet Take 1 tablet (325 mg) by mouth daily . Take with vitamin c (juice or supplement). 90 tablet 0     fluorouracil (EFUDEX) 5 % external cream Mix with dovonex and apply twice daily to hands and arms for 12 days 40 g 1     hydrochlorothiazide (HYDRODIURIL) 50 MG tablet Take 1 tablet (50 mg) by mouth daily 90 tablet 3     hydrocortisone (CORTAID) 1 % external ointment Apply topically 2 times daily as needed 30 g 1     ketoconazole (NIZORAL) 2 % external cream Apply topically 2 times daily 60 g 0     ketoconazole (NIZORAL) 2 % external cream Apply daily to affected area on face, armpits, groin. 60 g 4     ketoconazole (NIZORAL) 2 % external shampoo Leave on face, beard for 3-5 minutes then rinse. Use 2-3 times weekly. 120 mL 3     losartan (COZAAR) 50 MG tablet Take 1 tablet (50 mg) by mouth daily 90 tablet 3     metFORMIN (GLUCOPHAGE) 1000 MG tablet TAKE 1 TABLET(1000 MG) BY MOUTH TWICE DAILY WITH MEALS 180 tablet 0     metroNIDAZOLE (METROCREAM) 0.75 % external cream Apply topically 2 times daily as needed (to rosacea areas) 45 g 1     Misc. Devices (SUPPOSITORY MOLD 2GM) MISC 1 suppository every 12 hours Nifedipine 4 mg suppository, one rectally every 12 hours 100 each 4     Multiple Vitamin (DAILY MULTIVITAMIN PO) Take by mouth daily       nystatin (MYCOSTATIN) 135459 UNIT/GM external powder Apply topically 2 times daily Prevention of rash 60 g 0     omeprazole (PRILOSEC) 20 MG DR capsule TAKE 1 CAPSULE BY MOUTH DAILY 90 capsule 3     order for DME Autocpap 10-16 cm 1 Units 0     PARoxetine (PAXIL) 40 MG tablet TAKE 1 TABLET(40 MG) BY MOUTH EVERY MORNING 90 tablet 3      polyethylene glycol (MIRALAX) 17 GM/Dose powder Take 17 g (1 capful) by mouth daily 850 g 3     psyllium 0.52 G capsule Take 1 capsule (0.52 g) by mouth daily 100 capsule 3     RESERPINE-HYDROCHLOROTHIAZIDE OR daily       semaglutide (OZEMPIC, 0.25 OR 0.5 MG/DOSE,) 2 MG/1.5ML SOPN pen Inject 0.5 mg Subcutaneous every 7 days 3 mL 3     tamsulosin (FLOMAX) 0.4 MG capsule Take 2 capsules (0.8 mg) by mouth daily TAKE 2 CAPSULES BY MOUTH DAILY 180 capsule 3     thin (NO BRAND SPECIFIED) lancets Use with lanceting device. To accompany: Blood Glucose Monitor Brands: per insurance. 100 each 11     triamcinolone (KENALOG) 0.1 % external cream Apply topically 2 times daily as needed for irritation 30 g 0       Allergies:   No Known Allergies    Family history:  Family History   Problem Relation Age of Onset     C.A.D. Father          MI age 44     Hypertension Mother          93 old age     Diabetes No family hx of      Cerebrovascular Disease No family hx of      Cancer No family hx of      Glaucoma No family hx of      Macular Degeneration No family hx of        Social History:  Home situation: Lives in [place], [place] with wife.   Occupation/Schooling: Retired.  Tobacco use: None  Drug use: None  Alcohol use: ocassional     Physical Exam:  Vitals:    10/05/22 1018   BP: 126/81   Pulse: 116   SpO2: 98%     Exam:  Constitutional: Well developed, well nourished, appears stated age.  HEENT: Head atraumatic, normocephalic. Eyes without conjunctival injection or jaundice. Neck supple. No obvious neck masses.  Respiratory: Breathing unlabored on room air.    Skin: No rash, lesions of exposed skin.   Psychiatric/mental status: Alert, without lethargy or stupor. Speech fluent. Appropriate affect. Mood normal. Able to follow commands without difficulty.     Musculoskeletal exam:  Gait/Station/Posture:   Normal stance, arm swing, and stride; gait slow.   Normal bulk and tone. Unremarkable spinal curvature.    Lumbar  spine:  Range of motion within normal limits    Rotation/ext to right: painful    Rotation/ext to left: painful   Myofascial tenderness:  Bilateral lumbar paraspinals  Focal tenderness: No SI joint, gluteal, piriformis, GT, or IT tenderness    Neurologic exam:  CN:  Cranial nerves 2-12 are grossly intact  Motor Strength:  5/5 symmetric LE strength    Reflexes:     Patella L4:  R:  2/4 L: 2/4   Achilles S1:  R:  1/4 L: 1/4    Sensory:  (lower extremities):   Light touch: normal    Allodynia: absent    Hyperalgesia: absent     Rianna Choi MD  [facility] Pain Management       BILLING TIME DOCUMENTATION:   The total TIME spent on this patient on the date of the encounter/appointment was 37 minutes.      TOTAL TIME includes:   Time spent preparing to see the patient (reviewing records and tests)   Time spent face to face (or over the phone) with the patient   Time spent ordering tests, medications, procedures and referrals  Time spent documenting clinical information in Epic

## 2022-10-12 ENCOUNTER — TELEPHONE (OUTPATIENT)
Dept: FAMILY MEDICINE | Facility: CLINIC | Age: 62
End: 2022-10-12

## 2022-10-12 NOTE — TELEPHONE ENCOUNTER
Fax to be reviewed by the provider for orders [employer]    Who is the it from? [employer]   This was faxed to [facility]  Where was the fax placed? Given to physician  What number is listed as a contact on the fax?    [employer]  [address] [place] [address], [address]  [place] [address]  Phone: [phone]  Fax: [phone]    Please fax to above    Has the patient signed a consent form for release of information? Not Applicable

## 2022-10-17 ENCOUNTER — THERAPY VISIT (OUTPATIENT)
Dept: PHYSICAL THERAPY | Facility: CLINIC | Age: 62
End: 2022-10-17
Attending: PHYSICAL MEDICINE & REHABILITATION
Payer: MEDICARE

## 2022-10-17 DIAGNOSIS — M47.817 LUMBOSACRAL SPONDYLOSIS WITHOUT MYELOPATHY: Primary | ICD-10-CM

## 2022-10-17 PROCEDURE — 97161 PT EVAL LOW COMPLEX 20 MIN: CPT | Mod: GP | Performed by: PHYSICAL THERAPIST

## 2022-10-17 PROCEDURE — 97110 THERAPEUTIC EXERCISES: CPT | Mod: GP | Performed by: PHYSICAL THERAPIST

## 2022-10-17 NOTE — PROGRESS NOTES
[facility]    OUTPATIENT Physical Therapy ORTHOPEDIC EVALUATION  PLAN OF TREATMENT FOR OUTPATIENT REHABILITATION  (COMPLETE FOR INITIAL CLAIMS ONLY)  Patient's Last Name, First Name, M.I.  YOB: 1960  Bhavin Iyerv       Provider s Name:  [facility]   Medical Record No.  [id number]   Start of Care Date:  10/17/22   Onset Date:  10/05/22    Treatment Diagnosis:  Lumbosacral Spondylosis Medical Diagnosis:  Lumbosacral spondylosis without myelopathy       Goals:     10/17/22 0500   Body Part   Goals listed below are for LBP   Goal #1   Goal #1 stairs   Previous Functional Level No restrictions   Current Functional Level Descend stairs   Performance Level an increase in pain and use of handrail   STG Target Performance Descend stairs   Performance Level descends 12 stairs without an increase in pain greater than 2/10   Rationale to enter/leave the house safely;for safe community access to buildings;for safe community ambulaton   Due Date 11/28/22   LTG Target Performance Descend stairs   Performance Level Descends 12 staris without use of handrail   Rationale to enter/leave the house safely;for safe community access to buildings;for safe community ambulaton   Due Date 01/23/23       Therapy Frequency:  1x/wk  Predicted Duration of Therapy Intervention:  16 visits    Michel Casper, NICK                 I CERTIFY THE NEED FOR THESE SERVICES FURNISHED UNDER        THIS PLAN OF TREATMENT AND WHILE UNDER MY CARE     (Physician attestation of this document indicates review and certification of the therapy plan).                     Certification Date From:  10/17/22   Certification Date To:  01/14/23    Referring Provider:  Rianna Choi    Initial Assessment        See Epic Evaluation SOC Date: 10/17/22

## 2022-10-17 NOTE — PROGRESS NOTES
Physical Therapy Initial Evaluation  Subjective: Patient presents to physical therapy with complaints of Low back pain. Patient states that their pain stays primarily in the low back/ Left buttock region. Patient denies any numbness/tingling or pain down the legs. Patient reports that they get dizzy when standing up after sitting for a prolonged period of time and were told by their doctor to stand still for a few seconds before walking. Patient has fallen once in the last few weeks due to their dizziness. Patient reports that their back pain has been occurring for several months and that they cannot recall a TAMMY. Patient cannot recall any activities that make their sx better or worse.    MATT: 38%    Patient Health History  Gary Iyer being seen for back pain.     Problem began: 10/5/2022.   Problem occurred: no TAMMY   Pain is reported as 3/10 on pain scale.  General health as reported by patient is fair.  Pertinent medical history includes: diabetes, high blood pressure, history of fractures and overweight.   Red flags:  None as reported by patient.  Medical allergies: none.   Surgeries include:  Cancer surgery and orthopedic surgery.    Current medications:  High blood pressure medication.    Current occupation is Retired .                                       Objective:    LUMBAR:    Posture: Patient sits with anterior pelvic tilt and forward shoulders.    Neurological:    Motor Deficit: Gross BLE strength 5/5 with exception of BLE Hip abduction 3/5  Myotomes L R   L1-2 (hip flexion)     L3 (knee extension)     L4 (ankle DF)     L5 (g. toe ext)     S1 (ankle PF or knee flex)       Sensory Deficit, Reflexes: not tested    Dural Signs:   L R   Slump neg pos   SLR neg pos       AROM: (Major, Moderate, Minimal or Nil loss)  Movement Loss [place] Mod Min Nil Pain   Flexion  +   +   Extension   +  +   Side Gliding L    +    Side Gliding R    + +     Repeated movement testing: Patient reported pain with extension and  flexion. Patient reported that flexion was more painful than extension but neither motion resulted in peripherlization of sx.    Functional Screen:   Ambulation: patient walks with narrow PILLO, stiff trunk and slow step through gait.     Stairs: patient reports pain going up and down stairs but reports descending stairs is more difficult. Patient ascends stairs slowly with a reciprocal pattern and no handrail. Patient descends stairs cautiously and with use of handrail. Patient descends with reciprocal pattern.    Bed mobility: patient requires min assist x 1 to sit up in bed and go from supine to sidelying. Patient is independent with sit to supine. Patient declined going prone.    System    Physical Exam    General     ROS    Assessment/Plan:    Patient is a 62 year old male with lumbar complaints.    Patient has the following significant findings with corresponding treatment plan.                Diagnosis 1:  Lumbar R side   Pain -  hot/cold therapy, US, electric stimulation, mechanical traction, manual therapy, self management, education, directional preference exercise and home program  Decreased ROM/flexibility - manual therapy, therapeutic exercise, therapeutic activity and home program  Decreased joint mobility - manual therapy, therapeutic exercise, therapeutic activity and home program  Decreased strength - therapeutic exercise, therapeutic activities and home program  Impaired balance - neuro re-education, gait training, therapeutic activities, adaptive equipment/assistive device and home program    Cumulative Therapy Evaluation is: Low complexity.    Previous and current functional limitations:  (See Goal Flow Sheet for this information)    Short term and Long term goals: (See Goal Flow Sheet for this information)     Communication ability:  Patient appears to be able to clearly communicate and understand verbal and written communication and follow directions correctly.  Treatment Explanation - The  following has been discussed with the patient:   RX ordered/plan of care  Anticipated outcomes  Possible risks and side effects  This patient would benefit from PT intervention to resume normal activities.   Rehab potential is good.    Frequency:  1 X week, once daily  Duration:  for 16 visits  Discharge Plan:  Achieve all LTG.  Independent in home treatment program.  Reach maximal therapeutic benefit.    Please refer to the daily flowsheet for treatment today, total treatment time and time spent performing 1:1 timed codes.

## 2022-10-19 ENCOUNTER — OFFICE VISIT (OUTPATIENT)
Dept: FAMILY MEDICINE | Facility: CLINIC | Age: 62
End: 2022-10-19
Payer: MEDICARE

## 2022-10-19 VITALS
WEIGHT: 234.5 LBS | HEART RATE: 99 BPM | BODY MASS INDEX: 33.65 KG/M2 | TEMPERATURE: 97.7 F | SYSTOLIC BLOOD PRESSURE: 128 MMHG | DIASTOLIC BLOOD PRESSURE: 80 MMHG

## 2022-10-19 DIAGNOSIS — Z23 ENCOUNTER FOR IMMUNIZATION: ICD-10-CM

## 2022-10-19 DIAGNOSIS — E66.01 MORBID OBESITY (H): ICD-10-CM

## 2022-10-19 DIAGNOSIS — E11.9 TYPE 2 DIABETES MELLITUS WITHOUT COMPLICATION, WITHOUT LONG-TERM CURRENT USE OF INSULIN (H): Primary | ICD-10-CM

## 2022-10-19 PROCEDURE — 99207 PR FOOT EXAM NO CHARGE: CPT | Performed by: FAMILY MEDICINE

## 2022-10-19 PROCEDURE — 91312 COVID-19,PF,PFIZER BOOSTER BIVALENT: CPT | Performed by: FAMILY MEDICINE

## 2022-10-19 PROCEDURE — 99213 OFFICE O/P EST LOW 20 MIN: CPT | Mod: 25 | Performed by: FAMILY MEDICINE

## 2022-10-19 PROCEDURE — 0124A COVID-19,PF,PFIZER BOOSTER BIVALENT: CPT | Performed by: FAMILY MEDICINE

## 2022-10-19 NOTE — PROGRESS NOTES
Kamryn Vega is a 62 year old  presenting for the following health issues:  Patient Request      History of Present Illness       Reason for visit:  Diabetic shoes  Symptom onset:  More than a month  Symptoms include:  Swelling toe  Symptom intensity:  Moderate  Symptom progression:  Staying the same  Had these symptoms before:  Yes  Has tried/received treatment for these symptoms:  No  What makes it worse:  Safer shoes  What makes it better:  Less excertion    He eats 2-3 servings of fruits and vegetables daily.He consumes 1 sweetened beverage(s) daily.He exercises with enough effort to increase his heart rate 20 to 29 minutes per day.  He exercises with enough effort to increase his heart rate 4 days per week.   He is taking medications regularly.               Review of Systems    needs form completed for [employer]    Needs new shoes    Feet feeling better    Back a little sore       Objective    BP (!) 160/82 (BP Location: Left arm, Patient Position: Chair, Cuff Size: Adult Regular)   Pulse 99   Temp 97.7  F (36.5  C) (Temporal)   Wt 106.4 kg (234 lb 8 oz)   BMI 33.65 kg/m    Body mass index is 33.65 kg/m .  Physical Exam         foot exam done here today    No ulcers but has preulcerative calluses bilaterally    Decreased sensation to both light touch and vibration bilaterally, and especially distally    Small hematoma under right great nail    Very difficult to feel dorsalis pedis bilaterally           ASSESSMENT / PLAN:  (E11.9) Type 2 diabetes mellitus without complication, without long-term current use of insulin (H)  (primary encounter diagnosis)  Comment: completed the form so he can get diabetic shoes.  We donis fax this in.   Plan: FOOT EXAM        See us in Feb for physical/ labs etc.     (Z23) Encounter for immunization  Comment: updated covid bivalent.  Discussed in detail.  We went with pfizer this time  Plan: COVID-19,PF,PFIZER BOOSTER BIVALENT (12+YRS),          SCREENING QUESTIONS FOR ADULT IMMUNIZATIONS             (E66.01) Morbid obesity (H)  Comment: chronic  Plan: keep working on healthy diet/exercise and wt loss      I reviewed the patient's medications, allergies, medical history, family history, and social history.    Finesse Beal MD

## 2022-10-19 NOTE — PATIENT INSTRUCTIONS
We will fax in the info for diabetic shoes    Keep working on healthy diet/exercise and wt loss    See us for physical in February

## 2022-10-26 ENCOUNTER — THERAPY VISIT (OUTPATIENT)
Dept: PHYSICAL THERAPY | Facility: CLINIC | Age: 62
End: 2022-10-26
Payer: MEDICARE

## 2022-10-26 DIAGNOSIS — M47.817 LUMBOSACRAL SPONDYLOSIS WITHOUT MYELOPATHY: Primary | ICD-10-CM

## 2022-10-26 PROCEDURE — 97110 THERAPEUTIC EXERCISES: CPT | Mod: GP | Performed by: PHYSICAL THERAPIST

## 2022-11-07 ENCOUNTER — TELEPHONE (OUTPATIENT)
Dept: OTOLARYNGOLOGY | Facility: CLINIC | Age: 62
End: 2022-11-07

## 2022-11-07 NOTE — TELEPHONE ENCOUNTER
LVM that Candido is no longer available, and we need to reschedule upcoming appointments. We can put patient on the waiting list, but we are not going to have anything sooner other than next available at this time. Patient cannot transfer to another provider either as they are also booked out just as far. Gave call center number

## 2022-11-09 ENCOUNTER — OFFICE VISIT (OUTPATIENT)
Dept: OTOLARYNGOLOGY | Facility: CLINIC | Age: 62
End: 2022-11-09
Payer: MEDICARE

## 2022-11-09 DIAGNOSIS — R49.0 DYSPHONIA: Primary | ICD-10-CM

## 2022-11-09 DIAGNOSIS — J38.7 LARYNGEAL GRANULOMA: ICD-10-CM

## 2022-11-09 PROCEDURE — 92507 TX SP LANG VOICE COMM INDIV: CPT | Mod: GN | Performed by: SPEECH-LANGUAGE PATHOLOGIST

## 2022-11-09 NOTE — LETTER
"11/9/2022       RE: Gary Iyer  [address] [facility]  [place] [address]     Dear Colleague,    Thank you for referring your patient, Gary Iyer, to the [facility]ICE [facility] [place] [facility]. Please see a copy of my visit note below.    THERAPY NOTE (CPT 16678)  Date of Service: 11/9/2022  Mr. 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    SUBJECTIVE / OBJECTIVE:  Since our most-recent session, Mr. Iyer reports that he has had significant difficulties with back pain, which have prevented him from practicing as prescribed. He feels that his voice quality is essentially the same, but was slightly improved when he was able to practice.    THERAPEUTIC ACTIVITIES  Counseling and Education    Asked many questions about the nature of his symptoms, and I answered all of these thoroughly.  Strategies for adding phonation to an optimally flowing airstream    Identified sensation of \"easiness\" in throat when blowing air through straw into cup of water without voice, then adding phonation to this airstream while maintaining sensation of easiness    The patient then worked to apply this to /m/ hum    Chant mode was critical for this    The patient then applied this to /m/-loaded sentences    Chant mode again critical    Cues to \"add sigh\" were facilitating to decreasing roughness and strain    Negative practice (comparison of sensation of \"easiness\" in throat versus valsalva) was helpful for increasing patient awareness    Cues to modify pitch and to modulate if roughness was heard were not helpful (patient unable to identify ascending vs descending pitch glides, unable to discriminate between rough vs breathy voice quality)    Finally, the patient applied the above techniques to semi-scripted conversational turns (\"What's your favorite...\")           Concepts of an " "optimal regimen for practice were instructed.  o He should use an interval schedule of practice, with brief periods of practice frequently throughout each day  o [place] concepts of volitional practice to facilitate motor learning.    A revised regimen for home practice was instructed.    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 Mr. Iyer has not had the opportunity to practice as desired since his last session d/t difficulties with chronic back pain. In today's session, he was able to produce an easier, clearer voice quality with less roughness and less \"press\" during flow-mode phonation when provided with max cues. Patient awareness of rough vs clear voice quality limited, but accuracy was maintained following massed practice.    PLAN: Ideally, I would like to see Mr. Iyer in 2 weeks, but there are no openings until late December. We will meet in December, at which time we will continue working toward decreasing external cues. He will also see Dr. Fournier for repeat evaluation on 12/12/22 in the interim.     Candido Winter, Ph.D., [facility]  Speech-Language Pathologist-[facility]  [phone]  he/him/his    Speech recognition software may have been used in the above documentation; input is reviewed before signature to the best of my ability.       "

## 2022-11-09 NOTE — PROGRESS NOTES
"THERAPY NOTE (CPT 16801)  Date of Service: 11/9/2022  Mr. 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    SUBJECTIVE / OBJECTIVE:  Since our most-recent session, Mr. Iyer reports that he has had significant difficulties with back pain, which have prevented him from practicing as prescribed. He feels that his voice quality is essentially the same, but was slightly improved when he was able to practice.    THERAPEUTIC ACTIVITIES  Counseling and Education    Asked many questions about the nature of his symptoms, and I answered all of these thoroughly.  Strategies for adding phonation to an optimally flowing airstream    Identified sensation of \"easiness\" in throat when blowing air through straw into cup of water without voice, then adding phonation to this airstream while maintaining sensation of easiness    The patient then worked to apply this to /m/ hum    Chant mode was critical for this    The patient then applied this to /m/-loaded sentences    Chant mode again critical    Cues to \"add sigh\" were facilitating to decreasing roughness and strain    Negative practice (comparison of sensation of \"easiness\" in throat versus valsalva) was helpful for increasing patient awareness    Cues to modify pitch and to modulate if roughness was heard were not helpful (patient unable to identify ascending vs descending pitch glides, unable to discriminate between rough vs breathy voice quality)    Finally, the patient applied the above techniques to semi-scripted conversational turns (\"What's your favorite...\")           Concepts of an optimal regimen for practice were instructed.  o He should use an interval schedule of practice, with brief periods of practice frequently throughout each day  o [place] concepts of volitional practice to facilitate motor learning.    A revised regimen for home practice was " "instructed.    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 Mr. Iyer has not had the opportunity to practice as desired since his last session d/t difficulties with chronic back pain. In today's session, he was able to produce an easier, clearer voice quality with less roughness and less \"press\" during flow-mode phonation when provided with max cues. Patient awareness of rough vs clear voice quality limited, but accuracy was maintained following massed practice.    PLAN: Ideally, I would like to see Mr. Iyer in 2 weeks, but there are no openings until late December. We will meet in December, at which time we will continue working toward decreasing external cues. He will also see Dr. Fournier for repeat evaluation on 12/12/22 in the interim.     Candido Winter, Ph.D., [facility]  Speech-Language Pathologist-[facility]  [phone]  he/him/his    Speech recognition software may have been used in the above documentation; input is reviewed before signature to the best of my ability.     "

## 2022-11-23 DIAGNOSIS — G47.33 OBSTRUCTIVE SLEEP APNEA: Primary | Chronic | ICD-10-CM

## 2022-12-07 ENCOUNTER — TELEPHONE (OUTPATIENT)
Dept: EDUCATION SERVICES | Facility: CLINIC | Age: 62
End: 2022-12-07

## 2022-12-07 DIAGNOSIS — E11.9 TYPE 2 DIABETES MELLITUS WITHOUT COMPLICATION, WITHOUT LONG-TERM CURRENT USE OF INSULIN (H): Primary | ICD-10-CM

## 2022-12-07 NOTE — TELEPHONE ENCOUNTER
Unfortunately a lot of the other GLP-1 medications are experiencing the same supply issues.  We could consider switching to Victoza or Bydureon until the supply issues are resolved, not sure if insurance would cover. I would start with the lowest dose, even though he has been on Ozempic.     Victoza: start with 0.6 mg daily for 1 week--> then 1.2 mg daily  Bydureon: 2 mg dose    Danelle Alba RD [facility]  Triage: [phone]  [phone]

## 2022-12-07 NOTE — TELEPHONE ENCOUNTER
Drug Change Request    Contacts       Type Contact Phone/Fax    12/07/2022 12:19 PM CST Phone (Incoming) Gary Iyer (Self) [phone] (H)        What is the current medication?:  Ozempic     What alternative is being requested?: no specific request, just an alternative to Ozempic    Why the request to change?:  Pt states [facility] does not have it in stock at any location, and it has been hard to refill, possibly due to manufacuting    Preferred Pharmacy:   [id number] - [place], [place] - [address] OF [facility] [address]  [address]  [facility] [address]  Phone: [phone] Fax: [phone]    [facility] - [place], [place] - [address] [place] [address]  [address] [facility] [address]  Phone: [phone] Fax: [phone]      Could we send this information to you in [employer] or would you prefer to receive a phone call?:   Patient would prefer a phone call   Okay to leave a detailed message?: Yes at Home number on file [phone] (home)

## 2022-12-09 RX ORDER — LIRAGLUTIDE 6 MG/ML
INJECTION SUBCUTANEOUS
Qty: 6 ML | Refills: 3 | Status: SHIPPED | OUTPATIENT
Start: 2022-12-09 | End: 2023-09-01

## 2022-12-09 NOTE — TELEPHONE ENCOUNTER
Called to notify patient Vm left to call clinic back to advise. Please relay message if patient calls back.

## 2022-12-09 NOTE — TELEPHONE ENCOUNTER
I sent in prescription for victoza, once daily med similar to the once weekly ozempic.   Please inform patient.  He can start this one instead and then see us in 2-3 months    Finesse Beal MD

## 2022-12-12 ENCOUNTER — OFFICE VISIT (OUTPATIENT)
Dept: OTOLARYNGOLOGY | Facility: CLINIC | Age: 62
End: 2022-12-12
Payer: MEDICARE

## 2022-12-12 ENCOUNTER — TELEPHONE (OUTPATIENT)
Dept: FAMILY MEDICINE | Facility: CLINIC | Age: 62
End: 2022-12-12

## 2022-12-12 VITALS
OXYGEN SATURATION: 96 % | WEIGHT: 237.5 LBS | DIASTOLIC BLOOD PRESSURE: 74 MMHG | SYSTOLIC BLOOD PRESSURE: 141 MMHG | HEART RATE: 112 BPM | BODY MASS INDEX: 34.08 KG/M2

## 2022-12-12 DIAGNOSIS — R49.0 DYSPHONIA: Primary | ICD-10-CM

## 2022-12-12 DIAGNOSIS — Z85.21 HX OF LARYNGEAL CANCER: ICD-10-CM

## 2022-12-12 DIAGNOSIS — J38.7 LARYNGEAL GRANULOMA: ICD-10-CM

## 2022-12-12 PROCEDURE — 99213 OFFICE O/P EST LOW 20 MIN: CPT | Mod: 25 | Performed by: OTOLARYNGOLOGY

## 2022-12-12 PROCEDURE — 31579 LARYNGOSCOPY TELESCOPIC: CPT | Performed by: OTOLARYNGOLOGY

## 2022-12-12 RX ORDER — SEMAGLUTIDE 1.34 MG/ML
INJECTION, SOLUTION SUBCUTANEOUS
COMMUNITY
Start: 2022-07-27 | End: 2023-04-06

## 2022-12-12 ASSESSMENT — PAIN SCALES - GENERAL: PAINLEVEL: NO PAIN (0)

## 2022-12-12 NOTE — LETTER
12/12/2022      RE: Gary Iyer  [address] [facility]  [place] [address]       Dear Colleague:    Gary Iyer recently returned for follow-up at the [facility]. My clinic note from our visit is enclosed below.  Speech recognition software may have been used in the documentation below; input is reviewed before signature to the best of my ability.     I appreciate the ongoing opportunity to participate in this patient's care.    Please feel free to contact me with any questions.    Sincerely yours,      Mel Fournier M.D., M.P.H.  , Laryngology  Director, 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  He later returned to the operating room on February 9, 2017 for an area of new irregularity of the left true vocal fold.  Pathology showed severe dysplasia with negative but close margins (for moderate, but not severe dysplasia).  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    We then observed a slight recurrence of leukoplakia/granuloma in the same location where he had this in 2018; this resolved with conservative management. Subsequently, he had allergies which were provoking coughing/throat-clearing, and he was again noted to have a granuloma. He was set up for refresher session(s) of speech therapy with Misty Clark, PhD, CCC-SLP and we discussed nasal hygiene as well. This led to near complete resolution of the granuloma, but subsequently he was noted to have slightly increased irritation along the left medial arytenoid wall.  Then he developed an upper respiratory infection and has been coughing, and had some shortness of breath. He states he was COVID  negative. His family had noted him grunting and throat-clearing frequently.    At the last visit he had resolution of the right granuloma and a minor left granuloma. He worked with Candido Winter, PhD, CCC-SLP for speech therapy. He returns today stating he is doing better and his voice has remained stable. No significant change in vocal demand.        MEDICATIONS:     Current Outpatient Medications   Medication Sig Dispense Refill     ACCU-CHEK GUIDE test strip TEST ONCE DAILY 100 strip 1     alcohol swab prep pads USE TO SWAB AREA OF INJECTION/OLIMPIA 100 each 1     allopurinol (ZYLOPRIM) 300 MG tablet TAKE 1 TABLET(300 MG) BY MOUTH DAILY 90 tablet 3     amLODIPine (NORVASC) 2.5 MG tablet Take 1 tablet (2.5 mg) by mouth daily 90 tablet 3     aspirin 81 MG tablet Take 1 tablet by mouth daily.       atorvastatin (LIPITOR) 20 MG tablet TAKE 1 TABLET(20 MG) BY MOUTH DAILY 90 tablet 0     blood glucose (ACCU-CHEK GUIDE) test strip Use to test blood sugar 1 times daily or as directed. 100 strip 11     blood glucose calibration (NO BRAND SPECIFIED) solution To accompany: Blood Glucose Monitor Brands: per insurance. 1 Bottle 3     Blood Glucose Monitoring Suppl (ACCU-CHEK GUIDE ME) w/Device KIT USE TO TEST ONCE DAILY 1 kit 0     calcipotriene (DOVONEX) 0.005 % external cream Mix with efudex and apply twice daily to hands and arms for 12 days 60 g 3     cetirizine (ZYRTEC) 10 MG tablet Take 1 tablet (10 mg) by mouth daily 90 tablet 3     ferrous sulfate (FE TABS) 325 (65 Fe) MG EC tablet Take 1 tablet (325 mg) by mouth daily . Take with vitamin c (juice or supplement). 90 tablet 0     fluorouracil (EFUDEX) 5 % external cream Mix with dovonex and apply twice daily to hands and arms for 12 days 40 g 1     hydrochlorothiazide (HYDRODIURIL) 50 MG tablet Take 1 tablet (50 mg) by mouth daily 90 tablet 3     hydrocortisone (CORTAID) 1 % external ointment Apply topically 2 times daily as needed 30 g 1     insulin pen needle (31G  X 6 MM) 31G X 6 MM miscellaneous Use one pen needle daily or as directed. ( for Victoza ) 90 each 3     ketoconazole (NIZORAL) 2 % external cream Apply topically 2 times daily 60 g 0     ketoconazole (NIZORAL) 2 % external cream Apply daily to affected area on face, armpits, groin. 60 g 4     ketoconazole (NIZORAL) 2 % external shampoo Leave on face, beard for 3-5 minutes then rinse. Use 2-3 times weekly. 120 mL 3     liraglutide (VICTOZA) 18 MG/3ML solution 0.6 mg daily subcutaneous for one week, then increase to 1.2 mg daily 6 mL 3     losartan (COZAAR) 50 MG tablet Take 1 tablet (50 mg) by mouth daily 90 tablet 3     metFORMIN (GLUCOPHAGE) 1000 MG tablet TAKE 1 TABLET(1000 MG) BY MOUTH TWICE DAILY WITH MEALS 180 tablet 0     methocarbamol (ROBAXIN) 500 MG tablet Take 1 tablet (500 mg) by mouth 2 times daily as needed for muscle spasms 60 tablet 0     metroNIDAZOLE (METROCREAM) 0.75 % external cream Apply topically 2 times daily as needed (to rosacea areas) 45 g 1     Misc. Devices (SUPPOSITORY MOLD 2GM) MISC 1 suppository every 12 hours Nifedipine 4 mg suppository, one rectally every 12 hours 100 each 4     Multiple Vitamin (DAILY MULTIVITAMIN PO) Take by mouth daily       nystatin (MYCOSTATIN) 074007 UNIT/GM external powder Apply topically 2 times daily Prevention of rash 60 g 0     omeprazole (PRILOSEC) 20 MG DR capsule TAKE 1 CAPSULE BY MOUTH DAILY 90 capsule 3     order for DME Autocpap 10-16 cm 1 Units 0     PARoxetine (PAXIL) 40 MG tablet TAKE 1 TABLET(40 MG) BY MOUTH EVERY MORNING 90 tablet 3     polyethylene glycol (MIRALAX) 17 GM/Dose powder Take 17 g (1 capful) by mouth daily 850 g 3     psyllium 0.52 G capsule Take 1 capsule (0.52 g) by mouth daily 100 capsule 3     RESERPINE-HYDROCHLOROTHIAZIDE OR daily       semaglutide (OZEMPIC, 0.25 OR 0.5 MG/DOSE,) 2 MG/1.5ML SOPN pen        semaglutide (OZEMPIC, 0.25 OR 0.5 MG/DOSE,) 2 MG/1.5ML SOPN pen Inject 0.5 mg Subcutaneous every 7 days 3 mL 3      tamsulosin (FLOMAX) 0.4 MG capsule Take 2 capsules (0.8 mg) by mouth daily TAKE 2 CAPSULES BY MOUTH DAILY 180 capsule 3     thin (NO BRAND SPECIFIED) lancets Use with lanceting device. To accompany: Blood Glucose Monitor Brands: per insurance. 100 each 11     triamcinolone (KENALOG) 0.1 % external cream Apply topically 2 times daily as needed for irritation 30 g 0       ALLERGIES:  No Known Allergies    NEW PMH/PSH: None    REVIEW OF SYSTEMS:  The patient completed a comprehensive 11 point review of systems (below), which was reviewed. Positives are as noted below.  Patient Supplied Answers to Review of Systems   ENT ROS 4/22/2016   Ears, Nose, Throat: Nasal congestion or drainage            PHYSICAL EXAM:  General: The patient was alert and conversant, and in no acute distress.    Oral cavity/oropharynx: No masses or lesions. Dentition unchanged since prior. Tongue mobility and palate elevation intact and symmetric.  Neck: No palpable cervical lymphadenopathy, no significant tenderness to palpation of the thyrohyoid space, which was narrow. No obvious thyroid abnormality.  Resp: Breathing comfortably, no stridor or stertor.  Neuro: Symmetric facial function. Other cranial nerve function as documented above.  Psych: Normal affect, pleasant and cooperative.  Voice/speech: Mild dysphonia characterized by roughness, strain and occasional pitch instability.      Procedure:   Flexible fiberoptic laryngoscopy and laryngovideostroboscopy  Indications: This procedure was warranted to evaluate the patient's laryngeal anatomy and function. Risks, benefits, and alternatives were discussed.  Description: After written informed consent was obtained, a time-out was performed to confirm patient identity, procedure, and procedure site. Topical 3% lidocaine with 0.25% phenylephrine was applied to the nasal cavities. I performed the endoscopy and no complications were apparent. Continuous and stroboscopic light were utilized to  assess routine phonation and variable frequency phonation.  Performed by: Mel Fournier MD MPH  Findings: Normal nasopharynx. Normal base of tongue, valleculae, and epiglottis. Vocal fold mobility: right: normal; left: normal. Medial edges of the vocal folds: smooth and straight on the right; left with a small smooth mass along posterior aspect, distinct from granuloma. No focal mucosal lesions were observed on the true vocal folds. Glissade produced appropriate elongation. There was mild to moderate supraglottic recruitment with connected speech. Mucosa of false vocal folds, aryepiglottic folds, piriform sinuses, and posterior glottis unremarkable except for minimal leukoplakic changes along the left medial arytenoid/posterior larynx, improving compared to prior. Airway was patent.   Similar findings on NBI.    The addition of stroboscopy allowed evaluation of the mucosal wave.   Amplitude: right: normal; left: mildly decreased. Symmetry: intermittent symmetry. Closure pattern: normal. Closure plane: at glottic level. Phase distribution: normal.                                  IMPRESSION AND PLAN:   Gary Iyer returns with mild left arytenoid irritation that is improving, and a small new smooth-appearing left posterior vocal fold prominence of unclear etiology. Given his history of swellings that seem to come and go, as well as the overall benign appearance of the area, we agreed upon close surveillance rather than immediately proceeding to biopsy or resection.    Recommendations/Plan:  He will return to clinic in six to eight weeks, or sooner as needed.  He is going to [facility] in the meantime so he will likely follow up in mid February.    I appreciate the opportunity to participate in the care of this pleasant patient.     Today's visit required additional screening time, PPE, and cleaning measures to allow for a safe in-person visit, due to the public health emergency. I spent a total of 24 minutes on  12/12/2022 in chart review, review of tests, patient visit, documentation, care coordination, and/or discussion with other providers about the issues documented above, separate from any documented procedure(s).        Mel Fournier MD

## 2022-12-12 NOTE — PATIENT INSTRUCTIONS
1.  You were seen in the ENT Clinic today by . If you have any questions or concerns after your appointment, please call [phone]. Press option #1 for scheduling related needs. Press option #3 for Nurse advice.    2. Plan is to return to clinic in 2 months      Lisa Hazel LPN  [phone]  [facility] - Otolaryngology

## 2022-12-12 NOTE — Clinical Note
12/12/2022       RE: Gary Iyer  [address] [facility] [place]  [place] [address]     Dear Colleague,    Thank you for referring your patient, Gary Iyer, to the [facility] EAR NOSE [facility]AT [facility]INIC [place] at [facility]. Please see a copy of my visit note below.    Dear Colleague:    Gary Iyer recently returned for follow-up at the [facility]. My clinic note from our visit is enclosed below.  Speech recognition software may have been used in the documentation below; input is reviewed before signature to the best of my ability.     I appreciate the ongoing opportunity to participate in this patient's care.    Please feel free to contact me with any questions.    Sincerely yours,      Mel Fournier M.D., M.P.H.  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  He later returned to the operating room on February 9, 2017 for an area of new irregularity of the left true vocal fold.  Pathology showed severe dysplasia with negative but close margins (for moderate, but not severe dysplasia).  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    We then observed a slight recurrence of leukoplakia/granuloma in the same location where he had this in 2018; this resolved with conservative management. Subsequently, he had allergies which were provoking coughing/throat-clearing, and he was again noted to have a granuloma. He was set up for refresher session(s) of speech therapy with Misty Clark, PhD, CCC-SLP and we discussed nasal hygiene as well. This led to near complete resolution of the  granuloma, but subsequently he was noted to have slightly increased irritation along the left medial arytenoid wall.  Then he developed an upper respiratory infection and has been coughing, and had some shortness of breath. He states he was COVID negative. His family had noted him grunting and throat-clearing frequently.    At the last visit he had resolution of the right granuloma and a minor left granuloma. He worked with Candido Winter, PhD, CCC-SLP for speech therapy. He returns today stating he is doing better and his voice has remained stable. No significant change in vocal demand.        MEDICATIONS:     Current Outpatient Medications   Medication Sig Dispense Refill     ACCU-CHEK GUIDE test strip TEST ONCE DAILY 100 strip 1     alcohol swab prep pads USE TO SWAB AREA OF INJECTION/OLIMPIA 100 each 1     allopurinol (ZYLOPRIM) 300 MG tablet TAKE 1 TABLET(300 MG) BY MOUTH DAILY 90 tablet 3     amLODIPine (NORVASC) 2.5 MG tablet Take 1 tablet (2.5 mg) by mouth daily 90 tablet 3     aspirin 81 MG tablet Take 1 tablet by mouth daily.       atorvastatin (LIPITOR) 20 MG tablet TAKE 1 TABLET(20 MG) BY MOUTH DAILY 90 tablet 0     blood glucose (ACCU-CHEK GUIDE) test strip Use to test blood sugar 1 times daily or as directed. 100 strip 11     blood glucose calibration (NO BRAND SPECIFIED) solution To accompany: Blood Glucose Monitor Brands: per insurance. 1 Bottle 3     Blood Glucose Monitoring Suppl (ACCU-CHEK GUIDE ME) w/Device KIT USE TO TEST ONCE DAILY 1 kit 0     calcipotriene (DOVONEX) 0.005 % external cream Mix with efudex and apply twice daily to hands and arms for 12 days 60 g 3     cetirizine (ZYRTEC) 10 MG tablet Take 1 tablet (10 mg) by mouth daily 90 tablet 3     ferrous sulfate (FE TABS) 325 (65 Fe) MG EC tablet Take 1 tablet (325 mg) by mouth daily . Take with vitamin c (juice or supplement). 90 tablet 0     fluorouracil (EFUDEX) 5 % external cream Mix with dovonex and apply twice daily to hands and  arms for 12 days 40 g 1     hydrochlorothiazide (HYDRODIURIL) 50 MG tablet Take 1 tablet (50 mg) by mouth daily 90 tablet 3     hydrocortisone (CORTAID) 1 % external ointment Apply topically 2 times daily as needed 30 g 1     insulin pen needle (31G X 6 MM) 31G X 6 MM miscellaneous Use one pen needle daily or as directed. ( for Victoza ) 90 each 3     ketoconazole (NIZORAL) 2 % external cream Apply topically 2 times daily 60 g 0     ketoconazole (NIZORAL) 2 % external cream Apply daily to affected area on face, armpits, groin. 60 g 4     ketoconazole (NIZORAL) 2 % external shampoo Leave on face, beard for 3-5 minutes then rinse. Use 2-3 times weekly. 120 mL 3     liraglutide (VICTOZA) 18 MG/3ML solution 0.6 mg daily subcutaneous for one week, then increase to 1.2 mg daily 6 mL 3     losartan (COZAAR) 50 MG tablet Take 1 tablet (50 mg) by mouth daily 90 tablet 3     metFORMIN (GLUCOPHAGE) 1000 MG tablet TAKE 1 TABLET(1000 MG) BY MOUTH TWICE DAILY WITH MEALS 180 tablet 0     methocarbamol (ROBAXIN) 500 MG tablet Take 1 tablet (500 mg) by mouth 2 times daily as needed for muscle spasms 60 tablet 0     metroNIDAZOLE (METROCREAM) 0.75 % external cream Apply topically 2 times daily as needed (to rosacea areas) 45 g 1     Misc. Devices (SUPPOSITORY MOLD 2GM) MISC 1 suppository every 12 hours Nifedipine 4 mg suppository, one rectally every 12 hours 100 each 4     Multiple Vitamin (DAILY MULTIVITAMIN PO) Take by mouth daily       nystatin (MYCOSTATIN) 656559 UNIT/GM external powder Apply topically 2 times daily Prevention of rash 60 g 0     omeprazole (PRILOSEC) 20 MG DR capsule TAKE 1 CAPSULE BY MOUTH DAILY 90 capsule 3     order for DME Autocpap 10-16 cm 1 Units 0     PARoxetine (PAXIL) 40 MG tablet TAKE 1 TABLET(40 MG) BY MOUTH EVERY MORNING 90 tablet 3     polyethylene glycol (MIRALAX) 17 GM/Dose powder Take 17 g (1 capful) by mouth daily 850 g 3     psyllium 0.52 G capsule Take 1 capsule (0.52 g) by mouth daily 100  capsule 3     RESERPINE-HYDROCHLOROTHIAZIDE OR daily       semaglutide (OZEMPIC, 0.25 OR 0.5 MG/DOSE,) 2 MG/1.5ML SOPN pen        semaglutide (OZEMPIC, 0.25 OR 0.5 MG/DOSE,) 2 MG/1.5ML SOPN pen Inject 0.5 mg Subcutaneous every 7 days 3 mL 3     tamsulosin (FLOMAX) 0.4 MG capsule Take 2 capsules (0.8 mg) by mouth daily TAKE 2 CAPSULES BY MOUTH DAILY 180 capsule 3     thin (NO BRAND SPECIFIED) lancets Use with lanceting device. To accompany: Blood Glucose Monitor Brands: per insurance. 100 each 11     triamcinolone (KENALOG) 0.1 % external cream Apply topically 2 times daily as needed for irritation 30 g 0       ALLERGIES:  No Known Allergies    NEW PMH/PSH: None    REVIEW OF SYSTEMS:  The patient completed a comprehensive 11 point review of systems (below), which was reviewed. Positives are as noted below.  Patient Supplied Answers to Review of Systems   ENT ROS 4/22/2016   Ears, Nose, Throat: Nasal congestion or drainage            PHYSICAL EXAM:  General: The patient was alert and conversant, and in no acute distress.    Oral cavity/oropharynx: No masses or lesions. Dentition unchanged since prior. Tongue mobility and palate elevation intact and symmetric.  Neck: No palpable cervical lymphadenopathy, no significant tenderness to palpation of the thyrohyoid space, which was narrow. No obvious thyroid abnormality.  Resp: Breathing comfortably, no stridor or stertor.  Neuro: Symmetric facial function. Other cranial nerve function as documented above.  Psych: Normal affect, pleasant and cooperative.  Voice/speech: Mild dysphonia characterized by roughness, strain and occasional pitch instability.      Procedure:   Flexible fiberoptic laryngoscopy and laryngovideostroboscopy  Indications: This procedure was warranted to evaluate the patient's laryngeal anatomy and function. Risks, benefits, and alternatives were discussed.  Description: After written informed consent was obtained, a time-out was performed to confirm  patient identity, procedure, and procedure site. Topical 3% lidocaine with 0.25% phenylephrine was applied to the nasal cavities. I performed the endoscopy and no complications were apparent. Continuous and stroboscopic light were utilized to assess routine phonation and variable frequency phonation.  Performed by: Mel Fournier MD MPH  Findings: Normal nasopharynx. Normal base of tongue, valleculae, and epiglottis. Vocal fold mobility: right: normal; left: normal. Medial edges of the vocal folds: smooth and straight on the right; left with a small smooth mass along posterior aspect, distinct from granuloma. No focal mucosal lesions were observed on the true vocal folds. Glissade produced appropriate elongation. There was mild to moderate supraglottic recruitment with connected speech. Mucosa of false vocal folds, aryepiglottic folds, piriform sinuses, and posterior glottis unremarkable except for minimal leukoplakic changes along the left medial arytenoid/posterior larynx, improving compared to prior. Airway was patent.   Similar findings on NBI.    The addition of stroboscopy allowed evaluation of the mucosal wave.   Amplitude: right: normal; left: mildly decreased. Symmetry: intermittent symmetry. Closure pattern: normal. Closure plane: at glottic level. Phase distribution: normal.                                    IMPRESSION AND PLAN:   Gary Iyer returns with mild left arytenoid irritation that is improving, and a very small new smooth-appearing left posterior vocal fold mass of unclear etiology. Given his history of swellings that seem to come and go, as well as the overall benign appearance of the area, we agreed upon close surveillance rather than immediately proceeding to biopsy or resection.    Recommendations/Plan:  He will return to clinic in six to eight weeks, or sooner as needed.  He is going to [facility] in the meantime so he will likely follow up in mid February.    I appreciate the opportunity  to participate in the care of this pleasant patient.     Today's visit required additional screening time, PPE, and cleaning measures to allow for a safe in-person visit, due to the public health emergency. I spent a total of 24 minutes on 12/12/2022 in chart review, review of tests, patient visit, documentation, care coordination, and/or discussion with other providers about the issues documented above, separate from any documented procedure(s).      Dear Colleague:    Gary Iyer recently returned for follow-up at the [facility]. My clinic note from our visit is enclosed below.  Speech recognition software may have been used in the documentation below; input is reviewed before signature to the best of my ability.     I appreciate the ongoing opportunity to participate in this patient's care.    Please feel free to contact me with any questions.    Sincerely yours,      Mel Fournier M.D., M.P.H.  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  He later returned to the operating room on February 9, 2017 for an area of new irregularity of the left true vocal fold.  Pathology showed severe dysplasia with negative but close margins (for moderate, but not severe dysplasia).  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    We then observed a slight recurrence of leukoplakia/granuloma in the same location where he had this in 2018; this resolved with conservative management. Subsequently, he had allergies which were provoking coughing/throat-clearing, and he was again noted to have a granuloma. He was set up for refresher session(s) of speech  therapy with Misty Clark, PhD, CCC-SLP and we discussed nasal hygiene as well. This led to near complete resolution of the granuloma, but subsequently he was noted to have slightly increased irritation along the left medial arytenoid wall.  Then he developed an upper respiratory infection and has been coughing, and had some shortness of breath. He states he was COVID negative. His family had noted him grunting and throat-clearing frequently.    At the last visit he had resolution of the right granuloma and a minor left granuloma. He worked with Candido Winter, PhD, CCC-SLP for speech therapy. He returns today stating he is doing better and his voice has remained stable. No significant change in vocal demand.        MEDICATIONS:     Current Outpatient Medications   Medication Sig Dispense Refill     ACCU-CHEK GUIDE test strip TEST ONCE DAILY 100 strip 1     alcohol swab prep pads USE TO SWAB AREA OF INJECTION/OLIMPIA 100 each 1     allopurinol (ZYLOPRIM) 300 MG tablet TAKE 1 TABLET(300 MG) BY MOUTH DAILY 90 tablet 3     amLODIPine (NORVASC) 2.5 MG tablet Take 1 tablet (2.5 mg) by mouth daily 90 tablet 3     aspirin 81 MG tablet Take 1 tablet by mouth daily.       atorvastatin (LIPITOR) 20 MG tablet TAKE 1 TABLET(20 MG) BY MOUTH DAILY 90 tablet 0     blood glucose (ACCU-CHEK GUIDE) test strip Use to test blood sugar 1 times daily or as directed. 100 strip 11     blood glucose calibration (NO BRAND SPECIFIED) solution To accompany: Blood Glucose Monitor Brands: per insurance. 1 Bottle 3     Blood Glucose Monitoring Suppl (ACCU-CHEK GUIDE ME) w/Device KIT USE TO TEST ONCE DAILY 1 kit 0     calcipotriene (DOVONEX) 0.005 % external cream Mix with efudex and apply twice daily to hands and arms for 12 days 60 g 3     cetirizine (ZYRTEC) 10 MG tablet Take 1 tablet (10 mg) by mouth daily 90 tablet 3     ferrous sulfate (FE TABS) 325 (65 Fe) MG EC tablet Take 1 tablet (325 mg) by mouth daily . Take with vitamin c  (juice or supplement). 90 tablet 0     fluorouracil (EFUDEX) 5 % external cream Mix with dovonex and apply twice daily to hands and arms for 12 days 40 g 1     hydrochlorothiazide (HYDRODIURIL) 50 MG tablet Take 1 tablet (50 mg) by mouth daily 90 tablet 3     hydrocortisone (CORTAID) 1 % external ointment Apply topically 2 times daily as needed 30 g 1     insulin pen needle (31G X 6 MM) 31G X 6 MM miscellaneous Use one pen needle daily or as directed. ( for Victoza ) 90 each 3     ketoconazole (NIZORAL) 2 % external cream Apply topically 2 times daily 60 g 0     ketoconazole (NIZORAL) 2 % external cream Apply daily to affected area on face, armpits, groin. 60 g 4     ketoconazole (NIZORAL) 2 % external shampoo Leave on face, beard for 3-5 minutes then rinse. Use 2-3 times weekly. 120 mL 3     liraglutide (VICTOZA) 18 MG/3ML solution 0.6 mg daily subcutaneous for one week, then increase to 1.2 mg daily 6 mL 3     losartan (COZAAR) 50 MG tablet Take 1 tablet (50 mg) by mouth daily 90 tablet 3     metFORMIN (GLUCOPHAGE) 1000 MG tablet TAKE 1 TABLET(1000 MG) BY MOUTH TWICE DAILY WITH MEALS 180 tablet 0     methocarbamol (ROBAXIN) 500 MG tablet Take 1 tablet (500 mg) by mouth 2 times daily as needed for muscle spasms 60 tablet 0     metroNIDAZOLE (METROCREAM) 0.75 % external cream Apply topically 2 times daily as needed (to rosacea areas) 45 g 1     Misc. Devices (SUPPOSITORY MOLD 2GM) MISC 1 suppository every 12 hours Nifedipine 4 mg suppository, one rectally every 12 hours 100 each 4     Multiple Vitamin (DAILY MULTIVITAMIN PO) Take by mouth daily       nystatin (MYCOSTATIN) 137522 UNIT/GM external powder Apply topically 2 times daily Prevention of rash 60 g 0     omeprazole (PRILOSEC) 20 MG DR capsule TAKE 1 CAPSULE BY MOUTH DAILY 90 capsule 3     order for DME Autocpap 10-16 cm 1 Units 0     PARoxetine (PAXIL) 40 MG tablet TAKE 1 TABLET(40 MG) BY MOUTH EVERY MORNING 90 tablet 3     polyethylene glycol (MIRALAX) 17  GM/Dose powder Take 17 g (1 capful) by mouth daily 850 g 3     psyllium 0.52 G capsule Take 1 capsule (0.52 g) by mouth daily 100 capsule 3     RESERPINE-HYDROCHLOROTHIAZIDE OR daily       semaglutide (OZEMPIC, 0.25 OR 0.5 MG/DOSE,) 2 MG/1.5ML SOPN pen        semaglutide (OZEMPIC, 0.25 OR 0.5 MG/DOSE,) 2 MG/1.5ML SOPN pen Inject 0.5 mg Subcutaneous every 7 days 3 mL 3     tamsulosin (FLOMAX) 0.4 MG capsule Take 2 capsules (0.8 mg) by mouth daily TAKE 2 CAPSULES BY MOUTH DAILY 180 capsule 3     thin (NO BRAND SPECIFIED) lancets Use with lanceting device. To accompany: Blood Glucose Monitor Brands: per insurance. 100 each 11     triamcinolone (KENALOG) 0.1 % external cream Apply topically 2 times daily as needed for irritation 30 g 0       ALLERGIES:  No Known Allergies    NEW PMH/PSH: None    REVIEW OF SYSTEMS:  The patient completed a comprehensive 11 point review of systems (below), which was reviewed. Positives are as noted below.  Patient Supplied Answers to Review of Systems   ENT ROS 4/22/2016   Ears, Nose, Throat: Nasal congestion or drainage            PHYSICAL EXAM:  General: The patient was alert and conversant, and in no acute distress.    Oral cavity/oropharynx: No masses or lesions. Dentition unchanged since prior. Tongue mobility and palate elevation intact and symmetric.  Neck: No palpable cervical lymphadenopathy, no significant tenderness to palpation of the thyrohyoid space, which was narrow. No obvious thyroid abnormality.  Resp: Breathing comfortably, no stridor or stertor.  Neuro: Symmetric facial function. Other cranial nerve function as documented above.  Psych: Normal affect, pleasant and cooperative.  Voice/speech: Mild dysphonia characterized by roughness, strain and occasional pitch instability.      Procedure:   Flexible fiberoptic laryngoscopy and laryngovideostroboscopy  Indications: This procedure was warranted to evaluate the patient's laryngeal anatomy and function. Risks, benefits,  and alternatives were discussed.  Description: After written informed consent was obtained, a time-out was performed to confirm patient identity, procedure, and procedure site. Topical 3% lidocaine with 0.25% phenylephrine was applied to the nasal cavities. I performed the endoscopy and no complications were apparent. Continuous and stroboscopic light were utilized to assess routine phonation and variable frequency phonation.  Performed by: Mel Fournier MD MPH  Findings: Normal nasopharynx. Normal base of tongue, valleculae, and epiglottis. Vocal fold mobility: right: normal; left: normal. Medial edges of the vocal folds: smooth and straight on the right; left with a small smooth mass along posterior aspect, distinct from granuloma. No focal mucosal lesions were observed on the true vocal folds. Glissade produced appropriate elongation. There was mild to moderate supraglottic recruitment with connected speech. Mucosa of false vocal folds, aryepiglottic folds, piriform sinuses, and posterior glottis unremarkable except for minimal leukoplakic changes along the left medial arytenoid/posterior larynx, improving compared to prior. Airway was patent.   Similar findings on NBI.    The addition of stroboscopy allowed evaluation of the mucosal wave.   Amplitude: right: normal; left: mildly decreased. Symmetry: intermittent symmetry. Closure pattern: normal. Closure plane: at glottic level. Phase distribution: normal.                                  IMPRESSION AND PLAN:   Gary Iyer returns with mild left arytenoid irritation that is improving, and a small new smooth-appearing left posterior vocal fold prominence of unclear etiology. Given his history of swellings that seem to come and go, as well as the overall benign appearance of the area, we agreed upon close surveillance rather than immediately proceeding to biopsy or resection.    Recommendations/Plan:  He will return to clinic in six to eight weeks, or sooner  as needed.  He is going to [facility] in the meantime so he will likely follow up in mid February.    I appreciate the opportunity to participate in the care of this pleasant patient.     Today's visit required additional screening time, PPE, and cleaning measures to allow for a safe in-person visit, due to the public health emergency. I spent a total of 24 minutes on 12/12/2022 in chart review, review of tests, patient visit, documentation, care coordination, and/or discussion with other providers about the issues documented above, separate from any documented procedure(s).        Again, thank you for allowing me to participate in the care of your patient.      Sincerely,    Mel Fournier MD

## 2022-12-12 NOTE — TELEPHONE ENCOUNTER
Called to notify patient. Patient stated that he needs a prescription for pen needles. The victoza did not come with these.

## 2022-12-12 NOTE — TELEPHONE ENCOUNTER
Pharmacy calling, says victoza Rx was sent to pharmacy to replace ozempic which is currently unavailable; however, victoza does not come with it's own pen needles as ozempic does.    I leo'd Rx as advised by pharmacy, new Rx so routed to PCP to send.      Juany Feliciano RN  [facility]

## 2022-12-12 NOTE — PROGRESS NOTES
Dear Colleague:    Gary Iyer recently returned for follow-up at the [facility]. My clinic note from our visit is enclosed below.  Speech recognition software may have been used in the documentation below; input is reviewed before signature to the best of my ability.     I appreciate the ongoing opportunity to participate in this patient's care.    Please feel free to contact me with any questions.    Sincerely yours,      Mel Fournier M.D., M.P.H.  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  He later returned to the operating room on February 9, 2017 for an area of new irregularity of the left true vocal fold.  Pathology showed severe dysplasia with negative but close margins (for moderate, but not severe dysplasia).  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    We then observed a slight recurrence of leukoplakia/granuloma in the same location where he had this in 2018; this resolved with conservative management. Subsequently, he had allergies which were provoking coughing/throat-clearing, and he was again noted to have a granuloma. He was set up for refresher session(s) of speech therapy with Misty Clark, PhD, CCC-SLP and we discussed nasal hygiene as well. This led to near complete resolution of the granuloma, but subsequently he was noted to have slightly increased irritation along the left medial arytenoid wall.  Then he developed an upper respiratory infection and has been coughing, and had some shortness of breath. He states he was COVID negative. His family had noted him grunting and throat-clearing frequently.    At  the last visit he had resolution of the right granuloma and a minor left granuloma. He worked with Candido Winter, PhD, CCC-SLP for speech therapy. He returns today stating he is doing better and his voice has remained stable. No significant change in vocal demand.        MEDICATIONS:     Current Outpatient Medications   Medication Sig Dispense Refill     ACCU-CHEK GUIDE test strip TEST ONCE DAILY 100 strip 1     alcohol swab prep pads USE TO SWAB AREA OF INJECTION/OLIMPIA 100 each 1     allopurinol (ZYLOPRIM) 300 MG tablet TAKE 1 TABLET(300 MG) BY MOUTH DAILY 90 tablet 3     amLODIPine (NORVASC) 2.5 MG tablet Take 1 tablet (2.5 mg) by mouth daily 90 tablet 3     aspirin 81 MG tablet Take 1 tablet by mouth daily.       atorvastatin (LIPITOR) 20 MG tablet TAKE 1 TABLET(20 MG) BY MOUTH DAILY 90 tablet 0     blood glucose (ACCU-CHEK GUIDE) test strip Use to test blood sugar 1 times daily or as directed. 100 strip 11     blood glucose calibration (NO BRAND SPECIFIED) solution To accompany: Blood Glucose Monitor Brands: per insurance. 1 Bottle 3     Blood Glucose Monitoring Suppl (ACCU-CHEK GUIDE ME) w/Device KIT USE TO TEST ONCE DAILY 1 kit 0     calcipotriene (DOVONEX) 0.005 % external cream Mix with efudex and apply twice daily to hands and arms for 12 days 60 g 3     cetirizine (ZYRTEC) 10 MG tablet Take 1 tablet (10 mg) by mouth daily 90 tablet 3     ferrous sulfate (FE TABS) 325 (65 Fe) MG EC tablet Take 1 tablet (325 mg) by mouth daily . Take with vitamin c (juice or supplement). 90 tablet 0     fluorouracil (EFUDEX) 5 % external cream Mix with dovonex and apply twice daily to hands and arms for 12 days 40 g 1     hydrochlorothiazide (HYDRODIURIL) 50 MG tablet Take 1 tablet (50 mg) by mouth daily 90 tablet 3     hydrocortisone (CORTAID) 1 % external ointment Apply topically 2 times daily as needed 30 g 1     insulin pen needle (31G X 6 MM) 31G X 6 MM miscellaneous Use one pen needle daily or as directed. ( for  Victoza ) 90 each 3     ketoconazole (NIZORAL) 2 % external cream Apply topically 2 times daily 60 g 0     ketoconazole (NIZORAL) 2 % external cream Apply daily to affected area on face, armpits, groin. 60 g 4     ketoconazole (NIZORAL) 2 % external shampoo Leave on face, beard for 3-5 minutes then rinse. Use 2-3 times weekly. 120 mL 3     liraglutide (VICTOZA) 18 MG/3ML solution 0.6 mg daily subcutaneous for one week, then increase to 1.2 mg daily 6 mL 3     losartan (COZAAR) 50 MG tablet Take 1 tablet (50 mg) by mouth daily 90 tablet 3     metFORMIN (GLUCOPHAGE) 1000 MG tablet TAKE 1 TABLET(1000 MG) BY MOUTH TWICE DAILY WITH MEALS 180 tablet 0     methocarbamol (ROBAXIN) 500 MG tablet Take 1 tablet (500 mg) by mouth 2 times daily as needed for muscle spasms 60 tablet 0     metroNIDAZOLE (METROCREAM) 0.75 % external cream Apply topically 2 times daily as needed (to rosacea areas) 45 g 1     Misc. Devices (SUPPOSITORY MOLD 2GM) MISC 1 suppository every 12 hours Nifedipine 4 mg suppository, one rectally every 12 hours 100 each 4     Multiple Vitamin (DAILY MULTIVITAMIN PO) Take by mouth daily       nystatin (MYCOSTATIN) 310070 UNIT/GM external powder Apply topically 2 times daily Prevention of rash 60 g 0     omeprazole (PRILOSEC) 20 MG DR capsule TAKE 1 CAPSULE BY MOUTH DAILY 90 capsule 3     order for DME Autocpap 10-16 cm 1 Units 0     PARoxetine (PAXIL) 40 MG tablet TAKE 1 TABLET(40 MG) BY MOUTH EVERY MORNING 90 tablet 3     polyethylene glycol (MIRALAX) 17 GM/Dose powder Take 17 g (1 capful) by mouth daily 850 g 3     psyllium 0.52 G capsule Take 1 capsule (0.52 g) by mouth daily 100 capsule 3     RESERPINE-HYDROCHLOROTHIAZIDE OR daily       semaglutide (OZEMPIC, 0.25 OR 0.5 MG/DOSE,) 2 MG/1.5ML SOPN pen        semaglutide (OZEMPIC, 0.25 OR 0.5 MG/DOSE,) 2 MG/1.5ML SOPN pen Inject 0.5 mg Subcutaneous every 7 days 3 mL 3     tamsulosin (FLOMAX) 0.4 MG capsule Take 2 capsules (0.8 mg) by mouth daily TAKE 2  CAPSULES BY MOUTH DAILY 180 capsule 3     thin (NO BRAND SPECIFIED) lancets Use with lanceting device. To accompany: Blood Glucose Monitor Brands: per insurance. 100 each 11     triamcinolone (KENALOG) 0.1 % external cream Apply topically 2 times daily as needed for irritation 30 g 0       ALLERGIES:  No Known Allergies    NEW PMH/PSH: None    REVIEW OF SYSTEMS:  The patient completed a comprehensive 11 point review of systems (below), which was reviewed. Positives are as noted below.  Patient Supplied Answers to Review of Systems   ENT ROS 4/22/2016   Ears, Nose, Throat: Nasal congestion or drainage            PHYSICAL EXAM:  General: The patient was alert and conversant, and in no acute distress.    Oral cavity/oropharynx: No masses or lesions. Dentition unchanged since prior. Tongue mobility and palate elevation intact and symmetric.  Neck: No palpable cervical lymphadenopathy, no significant tenderness to palpation of the thyrohyoid space, which was narrow. No obvious thyroid abnormality.  Resp: Breathing comfortably, no stridor or stertor.  Neuro: Symmetric facial function. Other cranial nerve function as documented above.  Psych: Normal affect, pleasant and cooperative.  Voice/speech: Mild dysphonia characterized by roughness, strain and occasional pitch instability.      Procedure:   Flexible fiberoptic laryngoscopy and laryngovideostroboscopy  Indications: This procedure was warranted to evaluate the patient's laryngeal anatomy and function. Risks, benefits, and alternatives were discussed.  Description: After written informed consent was obtained, a time-out was performed to confirm patient identity, procedure, and procedure site. Topical 3% lidocaine with 0.25% phenylephrine was applied to the nasal cavities. I performed the endoscopy and no complications were apparent. Continuous and stroboscopic light were utilized to assess routine phonation and variable frequency phonation.  Performed by: Mel  MD Irlanda MPH  Findings: Normal nasopharynx. Normal base of tongue, valleculae, and epiglottis. Vocal fold mobility: right: normal; left: normal. Medial edges of the vocal folds: smooth and straight on the right; left with a small smooth mass along posterior aspect, distinct from granuloma. No focal mucosal lesions were observed on the true vocal folds. Glissade produced appropriate elongation. There was mild to moderate supraglottic recruitment with connected speech. Mucosa of false vocal folds, aryepiglottic folds, piriform sinuses, and posterior glottis unremarkable except for minimal leukoplakic changes along the left medial arytenoid/posterior larynx, improving compared to prior. Airway was patent.   Similar findings on NBI.    The addition of stroboscopy allowed evaluation of the mucosal wave.   Amplitude: right: normal; left: mildly decreased. Symmetry: intermittent symmetry. Closure pattern: normal. Closure plane: at glottic level. Phase distribution: normal.                                  IMPRESSION AND PLAN:   Gary Iyer returns with mild left arytenoid irritation that is improving, and a small new smooth-appearing left posterior vocal fold prominence of unclear etiology. Given his history of swellings that seem to come and go, as well as the overall benign appearance of the area, we agreed upon close surveillance rather than immediately proceeding to biopsy or resection.    Recommendations/Plan:  He will return to clinic in six to eight weeks, or sooner as needed.  He is going to [facility] in the meantime so he will likely follow up in mid February.    I appreciate the opportunity to participate in the care of this pleasant patient.     Today's visit required additional screening time, PPE, and cleaning measures to allow for a safe in-person visit, due to the public health emergency. I spent a total of 24 minutes on 12/12/2022 in chart review, review of tests, patient visit, documentation, care  coordination, and/or discussion with other providers about the issues documented above, separate from any documented procedure(s).

## 2022-12-18 ENCOUNTER — HEALTH MAINTENANCE LETTER (OUTPATIENT)
Age: 62
End: 2022-12-18

## 2022-12-23 NOTE — TELEPHONE ENCOUNTER
[facility] Call Center    Phone Message    May a detailed message be left on voicemail: yes     Reason for Call: Symptoms or Concerns     If patient has red-flag symptoms, warm transfer to triage line    Current symptom or concern: Pt had an Appt this Friday (4/1) but clinic r/s to 6/3 due to provider not being in. Pt states he has had some throat pain that he is concerned about and would like to be seen sooner.     Symptoms have been present for:  4-5 day(s)    Has patient previously been seen for this? Yes    By : Dr. Fournier    Date: 12/3/21    Are there any new or worsening symptoms? Yes: Worsening throat pain.      Action Taken: Message routed to:  [facility] ENT    Travel Screening: Not Applicable                                                                         Needs appt to be seen

## 2022-12-28 ENCOUNTER — VIRTUAL VISIT (OUTPATIENT)
Dept: OTOLARYNGOLOGY | Facility: CLINIC | Age: 62
End: 2022-12-28
Payer: MEDICARE

## 2022-12-28 DIAGNOSIS — J38.7 LARYNGEAL GRANULOMA: ICD-10-CM

## 2022-12-28 DIAGNOSIS — R49.0 DYSPHONIA: Primary | ICD-10-CM

## 2022-12-28 PROCEDURE — 92507 TX SP LANG VOICE COMM INDIV: CPT | Mod: 95 | Performed by: SPEECH-LANGUAGE PATHOLOGIST

## 2022-12-28 NOTE — PROGRESS NOTES
Gary Iyer is a 62 year old male who is being seen via a billable video visit.      The patient has been notified and verbally consented to the following:     This video visit will be conducted between you and your provider.    Patient has opted to conduct today's video visit vs an in-person appointment, and is not able to attend due to possible exposure to COVID-19.      If during the course of the call the provider feels a video visit is not appropriate, you will not be charged for this service.    Call initiated at: 10:02am  Type of Video Platform Used: Frugoton  Location of provider: [facility]  Location of patient: Residence    THERAPY NOTE (CPT 94674)  Date of Service: 12/28/2022  Mr. Iyer was seent today, 12/28/2022, for speech therapy for treatment of dysphonia related to laryngeal hyperfunction, as well as to reduce posterior glottic compression to reduce vocal process granuloma.      SUBJECTIVE / OBJECTIVE:  Since our most-recent session, Mr. Iyer reports that he has been doing well. He has not been performing voice exercises lately, because he feels that his voice quality has been improved and it is no longer bothering him. He also notes that he has been consistently using strategies to avoid posterior glottic compression when bearing down.    He did have repeat laryngeal exam with Dr. Fournier on 12/12/22, which showed improvingleft arytenoid irritation, as well as new smooth-appearing left posterior vocal fold prominence of unclear etiology. They will repeat exam to monitor this on 2/20/23.    THERAPEUTIC ACTIVITIES  Counseling and Education    Asked many questions about the nature of his symptoms, and I answered all of these thoroughly.  Cough Suppression Strategies    The patient stated that he was concerned he would be coughing more frequently during an upcoming trip to [facility] because of air quality. He learned the following cough suppression strategies:    Hard  "swallow    Sips of hot/cold liquid    Sucking on popcicles/ice chips    Slow nasal inhalation followed by exhale on \"sh\"    Whispered \"eh eh eh\"    Sucking on pectin-based hard candies  Strategies to reduce posterior glottic compression    The patient independently used a lighter/softer voice quality, which reduced roughness and likely also reduced posterior compression during glottal onsets during spontaneous speech.    Ongoing mild-moderate roughness associated with reduced airflow.     Flow-mode phonation on /u/ immediately increased his awareness of this, and allowed him to reduce roughness.    The patient independently recalled and demonstrated \"shhh\" and blowing silently with puffed cheeks  To be performed when bearing down or lifting something heavy.    I provided handouts of today's therapeutic activities to facilitate practice.    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 Mr. Iyer reports that he no longer has concerns about his voice quality, and is consistently implementing strategies for reducing posterior laryngeal compression. On his most recent exam, posterior laryngeal irritation was improving, but a new prominence of unclear etiology was observed, for which he will continue to follow with Dr. Fournier.    At this time, the patient is independent with therapy strategies, and has had visible reduction in laryngeal irritation. He also no longer has voice complaints.    PLAN: I will see Mr. Iyer as needed pending upcoming visits with Dr. Fournier.    Candido Winter, Ph.D., [facility]SLP  Speech-Language Pathologist-[facility]  [phone]  he/him/his    Speech recognition software may have been used in the above documentation; input is reviewed before signature to the best of my ability.   "

## 2022-12-28 NOTE — PATIENT INSTRUCTIONS
"For when you're lifting something heavy or getting up from the bed:  No grunting!  Instead, say \"shhhhhhhhhhhhhh\" loudly or  Blow out through your lips with puffed up cheeks      For throat clearing/cough suppression:  When you feel the need to clear your throat (phlegm/mucus feeling), instead of throat clearing:  Swallow hard (like you're swallowing a golf ball)  Take sips of very hot or very cold liquid  Slow breath in through your nose followed followed by exhale on \"shhhhhhhh\"   Whispered \"eh eh eh\"  Repeat these in any order as many times as you need until the cough goes away. If you've been holding it back for several minutes and still feel the phlegm/mucus, give one gentle throat clear.  If you'd like to use a cough drop, brands like Luden's that have pectin in them are good. Stay away from anything with menthol or that says \"cooling.\"  "

## 2022-12-28 NOTE — LETTER
12/28/2022       RE: Gary Iyer  [address]  [place] [address]     Dear Colleague,    Thank you for referring your patient, Gary Iyer, to the [facility] [place] [facility]. Please see a copy of my visit note below.    Gary Iyer is a 62 year old male who is being seen via a billable video visit.      The patient has been notified and verbally consented to the following:     This video visit will be conducted between you and your provider.    Patient has opted to conduct today's video visit vs an in-person appointment, and is not able to attend due to possible exposure to COVID-19.      If during the course of the call the provider feels a video visit is not appropriate, you will not be charged for this service.    Call initiated at: 10:02am  Type of Video Platform Used: Beepi  Location of provider: [facility]  Location of patient: Residence    THERAPY NOTE (CPT 19762)  Date of Service: 12/28/2022  Mr. Iyer was seent today, 12/28/2022, for speech therapy for treatment of dysphonia related to laryngeal hyperfunction, as well as to reduce posterior glottic compression to reduce vocal process granuloma.      SUBJECTIVE / OBJECTIVE:  Since our most-recent session, Mr. Iyer reports that he has been doing well. He has not been performing voice exercises lately, because he feels that his voice quality has been improved and it is no longer bothering him. He also notes that he has been consistently using strategies to avoid posterior glottic compression when bearing down.    He did have repeat laryngeal exam with Dr. Fournier on 12/12/22, which showed improvingleft arytenoid irritation, as well as new smooth-appearing left posterior vocal fold prominence of unclear etiology. They will repeat exam to monitor this on 2/20/23.    THERAPEUTIC ACTIVITIES  Counseling and Education    Asked many questions about  "the nature of his symptoms, and I answered all of these thoroughly.  Cough Suppression Strategies    The patient stated that he was concerned he would be coughing more frequently during an upcoming trip to [facility] because of air quality. He learned the following cough suppression strategies:    Hard swallow    Sips of hot/cold liquid    Sucking on popcicles/ice chips    Slow nasal inhalation followed by exhale on \"sh\"    Whispered \"eh eh eh\"    Sucking on pectin-based hard candies  Strategies to reduce posterior glottic compression    The patient independently used a lighter/softer voice quality, which reduced roughness and likely also reduced posterior compression during glottal onsets during spontaneous speech.    Ongoing mild-moderate roughness associated with reduced airflow.     Flow-mode phonation on /u/ immediately increased his awareness of this, and allowed him to reduce roughness.    The patient independently recalled and demonstrated \"shhh\" and blowing silently with puffed cheeks  To be performed when bearing down or lifting something heavy.    I provided handouts of today's therapeutic activities to facilitate practice.    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 Mr. Iyer reports that he no longer has concerns about his voice quality, and is consistently implementing strategies for reducing posterior laryngeal compression. On his most recent exam, posterior laryngeal irritation was improving, but a new prominence of unclear etiology was observed, for which he will continue to follow with Dr. Fournier.    At this time, the patient is independent with therapy strategies, and has had visible reduction in laryngeal irritation. He also no longer has voice complaints.    PLAN: I will see Mr. Iyer as needed pending upcoming visits with Dr." Irlanda.    Candido Winter, Ph.D., [facility]-SLP  Speech-Language Pathologist-[facility]  [phone]  he/him/his    Speech recognition software may have been used in the above documentation; input is reviewed before signature to the best of my ability.

## 2023-01-03 ENCOUNTER — TELEPHONE (OUTPATIENT)
Dept: FAMILY MEDICINE | Facility: CLINIC | Age: 63
End: 2023-01-03

## 2023-01-03 NOTE — TELEPHONE ENCOUNTER
Patient called needs a card filled out for his covid vaccinations or print out going overseas and has to have this. Please call and advise.  Georgette Dawosn   [facility]

## 2023-01-05 ENCOUNTER — TELEPHONE (OUTPATIENT)
Dept: FAMILY MEDICINE | Facility: CLINIC | Age: 63
End: 2023-01-05

## 2023-01-05 DIAGNOSIS — J20.9 ACUTE BRONCHITIS, UNSPECIFIED ORGANISM: Primary | ICD-10-CM

## 2023-01-05 RX ORDER — AMOXICILLIN 500 MG/1
500 CAPSULE ORAL 3 TIMES DAILY
Qty: 30 CAPSULE | Refills: 0 | Status: SHIPPED | OUTPATIENT
Start: 2023-01-05 | End: 2023-01-15

## 2023-01-05 NOTE — TELEPHONE ENCOUNTER
Dr. Beal,    Patient requesting an antibiotic that would help with viral and respiratory illness so he can have on had while he is traveling outside of the [place] next week Monday.     [facility]    [phone], ok for detailed vm. Ok to call home phone and leave detailed vm as well.     Thanks,  EDGAR Medina  [facility]

## 2023-01-09 NOTE — TELEPHONE ENCOUNTER
Patient is calling. He has not received this in the mail yet. He would like to  hard copy at  in clinic as soon as possible. He is leaving this afternoon. He would like a call back at [phone] to notify when ready.     Chrissy Uriostegui RN   [facility]

## 2023-01-09 NOTE — TELEPHONE ENCOUNTER
Printed out a copy of immunizations and brought to first floor . Called to notify patient no answer VM left.

## 2023-01-30 NOTE — LETTER
4/25/2022      RE: Gary Iyer  [address] [facility] [address]       Dear Colleague:    Gary Iyer recently returned for follow-up at the [facility]. My clinic note from our visit is enclosed below.  Speech recognition software may have been used in the documentation below; input is reviewed before signature to the best of my ability.     I appreciate the ongoing opportunity to participate in this patient's care.    Please feel free to contact me with any questions.    Sincerely yours,      Mel Fournier M.D., M.P.H.  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  Most recently, he returned to the operating room on February 9, 2017 for an area of new irregularity of the left true vocal fold.  Pathology showed severe dysplasia with negative but close margins (for moderate, but not severe dysplasia).  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    At the last visit, we observed a slight recurrence of leukoplakia/granuloma in the same location where he had this in 2018. At that time it resolved just as we were considering a repeat biopsy. Subsequently, he had allergies which were provoking coughing/throat-clearing, and he was again noted to have a granuloma. He was set up for refresher session(s) of speech therapy with Misty Clark, PhD, CCC-SLP and we discussed nasal hygiene as well.     This led to near complete resolution of the granuloma, but then at the last visit he was noted to have slightly increased irritation along the left medial arytenoid wall.  About a week ago, he developed an upper respiratory infection and has  been coughing, and has had some shortness of breath. He states he is COVID negative. His family has noted him grunting and throat-clearing frequently.        MEDICATIONS:     Current Outpatient Medications   Medication Sig Dispense Refill     alcohol swab prep pads Use to swab area of injection/[place]ia [place] as directed. 100 each 3     allopurinol (ZYLOPRIM) 300 MG tablet TAKE 1 TABLET(300 MG) BY MOUTH DAILY 90 tablet 0     amLODIPine (NORVASC) 2.5 MG tablet TAKE 1 TABLET(2.5 MG) BY MOUTH DAILY 90 tablet 0     aspirin 81 MG tablet Take 1 tablet by mouth daily.       atorvastatin (LIPITOR) 20 MG tablet TAKE 1 TABLET(20 MG) BY MOUTH DAILY 90 tablet 0     blood glucose (NO BRAND SPECIFIED) test strip Use to test blood sugar 1 times daily or as directed. To accompany: Blood Glucose Monitor Brands: per insurance. 100 strip 6     blood glucose calibration (NO BRAND SPECIFIED) solution To accompany: Blood Glucose Monitor Brands: per insurance. 1 Bottle 3     blood glucose monitoring (NO BRAND SPECIFIED) meter device kit Use to test blood sugar 1 times daily or as directed. Preferred blood glucose meter OR supplies to accompany: Blood Glucose Monitor Brands: per insurance. 1 kit 0     calcipotriene (DOVONEX) 0.005 % external cream Mix with efudex and apply twice daily to hands and arms for 12 days 60 g 3     cetirizine (ZYRTEC) 10 MG tablet TAKE 1 TABLET(10 MG) BY MOUTH DAILY 90 tablet 3     ferrous sulfate (FE TABS) 325 (65 Fe) MG EC tablet Take 1 tablet (325 mg) by mouth daily . Take with vitamin c (juice or supplement). 90 tablet 0     fluorouracil (EFUDEX) 5 % external cream Mix with dovonex and apply twice daily to hands and arms for 12 days (Patient not taking: No sig reported) 40 g 1     hydrochlorothiazide (HYDRODIURIL) 50 MG tablet Take 1 tablet (50 mg) by mouth daily 90 tablet 3     hydrocortisone (CORTAID) 1 % external ointment Apply topically 2 times daily as needed 30 g 1     ketoconazole (NIZORAL) 2 % external cream  Apply daily to affected area on face, armpits, groin. 60 g 4     ketoconazole (NIZORAL) 2 % external shampoo Leave on face, beard for 3-5 minutes then rinse. Use 2-3 times weekly. (Patient not taking: No sig reported) 120 mL 3     ketotifen (ZADITOR) 0.025 % ophthalmic solution Place 1 drop into both eyes 2 times daily as needed for other (eye swelling) 5 mL 0     losartan (COZAAR) 50 MG tablet Take 1 tablet (50 mg) by mouth daily 90 tablet 1     metFORMIN (GLUCOPHAGE) 1000 MG tablet TAKE 1 TABLET(1000 MG) BY MOUTH TWICE DAILY WITH MEALS 180 tablet 0     metroNIDAZOLE (METROCREAM) 0.75 % external cream Apply topically 2 times daily as needed (to rosacea areas) 45 g 1     Misc. Devices (SUPPOSITORY MOLD 2GM) MISC 1 suppository every 12 hours Nifedipine 4 mg suppository, one rectally every 12 hours 100 each 4     Multiple Vitamin (DAILY MULTIVITAMIN PO) Take  by mouth daily.       omeprazole (PRILOSEC) 20 MG DR capsule TAKE 1 CAPSULE BY MOUTH DAILY 90 capsule 3     order for DME Autocpap 10-16 cm 1 Units 0     oxybutynin (DITROPAN) 5 MG tablet Take 1 tablet (5 mg) by mouth At Bedtime (Patient not taking: No sig reported) 90 tablet 3     PARoxetine (PAXIL) 40 MG tablet Take 1 tablet (40 mg) by mouth every morning 90 tablet 3     polyethylene glycol (MIRALAX) 17 GM/Dose powder Take 17 g (1 capful) by mouth daily 850 g 3     psyllium 0.52 G capsule Take 1 capsule (0.52 g) by mouth daily 100 capsule 3     tamsulosin (FLOMAX) 0.4 MG capsule Take 2 capsules (0.8 mg) by mouth daily TAKE 2 CAPSULES BY MOUTH DAILY 180 capsule 0     thin (NO BRAND SPECIFIED) lancets Use with lanceting device. To accompany: Blood Glucose Monitor Brands: per insurance. 100 each 11     triamcinolone (KENALOG) 0.1 % external cream Apply topically 2 times daily as needed for irritation 30 g 0     zolpidem (AMBIEN) 5 MG tablet Take tablet by mouth 15 minutes prior to sleep, for Sleep Study (Patient not taking: No sig reported) 1 tablet 0        ALLERGIES:  No Known Allergies    NEW PMH/PSH: None    REVIEW OF SYSTEMS:  The patient completed a comprehensive 11 point review of systems (below), which was reviewed. Positives are as noted below.  Patient Supplied Answers to Review of Systems   ENT ROS 4/22/2016   Ears, Nose, Throat Nasal congestion or drainage            PHYSICAL EXAM:  General: The patient was alert and conversant, and in no acute distress. Patient accompanied by his spouse.  Oral cavity/oropharynx: No masses or lesions. Dentition unchanged since prior. Tongue mobility and palate elevation intact and symmetric.  Neck: No palpable cervical lymphadenopathy, no significant tenderness to palpation of the thyrohyoid space, which was narrow. Mildly prominent thyroid, thick neck.  Resp: Breathing comfortably, no stridor or stertor.  Neuro: Symmetric facial function. Other cranial nerve function as documented above.  Psych: Normal affect, pleasant and cooperative.  Voice/speech: Mild dysphonia characterized by intermittent breathiness and roughness.      Intake scores  Last 2 Scores for Patient-Answered VHI Questionnaire  VHI Total Score 2/26/2018 3/1/2019   VHI Total Score 9 17      Last 2 Scores for Patient-Answered EAT Questionnaire  EAT Total Score 2/26/2018 3/1/2019   EAT Total Score 1 5        Last 2 Scores for Patient-Answered CSI Questionnaire  CSI Total Score 2/26/2018 3/1/2019   CSI Total Score 0 0           Procedure:   Flexible fiberoptic laryngoscopy and laryngovideostroboscopy  Indications: This procedure was warranted to evaluate the patient's laryngeal anatomy and function. Risks, benefits, and alternatives were discussed.  Description: After written informed consent was obtained, a time-out was performed to confirm patient identity, procedure, and procedure site. Topical 3% lidocaine with 0.25% phenylephrine was applied to the nasal cavities. I performed the endoscopy and no complications were apparent. Continuous and  stroboscopic light were utilized to assess routine phonation and variable frequency phonation.  Performed by: Mel Fournier MD MPH  SLP: Candido Winter, PhD, CCC-SLP   Findings: Normal nasopharynx. Normal base of tongue, valleculae, and epiglottis. Vocal fold mobility: right: normal; left: normal. Medial edges of the vocal folds: smooth and straight. No focal mucosal lesions were observed on the true vocal folds. Glissade produced appropriate elongation. There was mild supraglottic recruitment with connected speech. Mucosa of false vocal folds, aryepiglottic folds, piriform sinuses, and posterior glottis unremarkable overall; left medial arytenoid irritation has resolved, but now patient has right medial arytenoid irritation consistent with early granuloma. Airway was patent. Response to the therapy probes was good. Similar findings on NBI. Mild thin leukoplakia right inferior aspect of true vocal fold, stable compared to prior.    The addition of stroboscopy allowed evaluation of the mucosal wave.   Amplitude: right: normal; left: mildly decreased. Symmetry: intermittent symmetry. Closure pattern: complete. Closure plane: at glottic level. Phase distribution: normal.                  IMPRESSION AND PLAN:   Gary Iyer returns with no evidence of true vocal fold disease, but does now have a right medial arytenoid early granuloma. The previously seen left medial arytenoid irritation has resolved. He has had a lot of throat clearing as well as some coughing, and his family observes that he breath-holds and grunts a lot (e.g. with Valsalva). We discussed that these behaviors may be contributing to the mucosal irritation.    We will plan refresher sessions of speech therapy. His family will help him remember. He is also planning to see his primary care provider regarding his shortness of breath on Friday, and knows to seek care sooner if it worsens.    He will plan to return to clinic in three to four months,  or sooner as needed.    Today's visit required additional screening time, PPE, and cleaning measures to allow for a safe in-person visit, due to the public health emergency. I spent a total of 20 minutes on 4/25/2022 in chart review, review of tests, patient visit, documentation, care coordination, and/or discussion with other providers about the issues documented above, separate from any documented procedure(s).       [FreeTextEntry3] : BINH Valencia [phone] [FreeTextEntry1] : January 2023 - Patient returns today for follow-up in his usual state of health. He remains on dual antiplatelet therapy after his PFO was closed on 11/16/2022 by Watson Kauffman MD.\par He continues to have upper GI symptoms for which he is planning to have EGD and colonoscopy in March 2023.

## 2023-02-20 ENCOUNTER — OFFICE VISIT (OUTPATIENT)
Dept: OTOLARYNGOLOGY | Facility: CLINIC | Age: 63
End: 2023-02-20
Payer: MEDICARE

## 2023-02-20 ENCOUNTER — TELEPHONE (OUTPATIENT)
Dept: OTOLARYNGOLOGY | Facility: CLINIC | Age: 63
End: 2023-02-20

## 2023-02-20 VITALS
SYSTOLIC BLOOD PRESSURE: 143 MMHG | BODY MASS INDEX: 33.17 KG/M2 | DIASTOLIC BLOOD PRESSURE: 80 MMHG | HEIGHT: 70 IN | HEART RATE: 116 BPM | WEIGHT: 231.7 LBS | OXYGEN SATURATION: 96 %

## 2023-02-20 DIAGNOSIS — J38.7 LEUKOPLAKIA OF LARYNX: ICD-10-CM

## 2023-02-20 DIAGNOSIS — R49.0 DYSPHONIA: Primary | ICD-10-CM

## 2023-02-20 DIAGNOSIS — J38.7 LARYNGEAL GRANULOMA: ICD-10-CM

## 2023-02-20 DIAGNOSIS — J38.3 OTHER DISEASES OF VOCAL CORDS: ICD-10-CM

## 2023-02-20 PROCEDURE — 99213 OFFICE O/P EST LOW 20 MIN: CPT | Mod: 25 | Performed by: OTOLARYNGOLOGY

## 2023-02-20 PROCEDURE — 31579 LARYNGOSCOPY TELESCOPIC: CPT | Performed by: OTOLARYNGOLOGY

## 2023-02-20 ASSESSMENT — PAIN SCALES - GENERAL: PAINLEVEL: NO PAIN (0)

## 2023-02-20 NOTE — PROGRESS NOTES
Dear Colleague:    Gary Iyer recently returned for follow-up at the [facility]. My clinic note from our visit is enclosed below.  Speech recognition software may have been used in the documentation below; input is reviewed before signature to the best of my ability.     I appreciate the ongoing opportunity to participate in this patient's care.    Please feel free to contact me with any questions.    Sincerely yours,      Mel Fournier M.D., M.P.H.  , Laryngology  Director, 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  He later returned to the operating room on February 9, 2017 for an area of new irregularity of the left true vocal fold.  Pathology showed severe dysplasia with negative but close margins (for moderate, but not severe dysplasia).  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    We then observed a slight recurrence of leukoplakia/granuloma in the same location where he had this in 2018. This resolved with conservative management. Subsequently, he had allergies which were provoking coughing/throat-clearing, and he was again noted to have a granuloma. He was set up for refresher session(s) of speech therapy with Misty Clark, PhD, CCC-SLP and we discussed nasal hygiene as well. This led to near complete resolution of the granuloma, but subsequently he was noted to have slightly increased irritation along the left medial arytenoid wall.  Then he developed an upper respiratory infection and has been coughing, and had some shortness of breath. He states he was COVID negative. His family had noted him grunting and throat-clearing  frequently.    He later had resolution of the right granuloma and a minor left granuloma. Last time we noted mild left arytenoid irritation that is improving, and a small new smooth-appearing left posterior vocal fold prominence of unclear etiology. Given his history of fluctuating findings we agreed on close follow up. He also worked with Candido Winter, PhD, CCC-SLP for therapy most recently.    Today's updates:  1) He is back from [place]. While he was there, he did have some coughing because of allergies, pollution etc. He was able to be there for the scattering of his mother's ashes.  2) He has been back for two weeks. Overall, his throat/voice have been improving. Yesterday, he had one episode of coughing.        MEDICATIONS:     Current Outpatient Medications   Medication Sig Dispense Refill     ACCU-CHEK GUIDE test strip TEST ONCE DAILY 100 strip 1     alcohol swab prep pads USE TO SWAB AREA OF INJECTION/OLIMPIA 100 each 1     allopurinol (ZYLOPRIM) 300 MG tablet TAKE 1 TABLET(300 MG) BY MOUTH DAILY 90 tablet 3     amLODIPine (NORVASC) 2.5 MG tablet Take 1 tablet (2.5 mg) by mouth daily 90 tablet 3     aspirin 81 MG tablet Take 1 tablet by mouth daily.       atorvastatin (LIPITOR) 20 MG tablet TAKE 1 TABLET(20 MG) BY MOUTH DAILY 90 tablet 0     blood glucose (ACCU-CHEK GUIDE) test strip Use to test blood sugar 1 times daily or as directed. 100 strip 11     blood glucose calibration (NO BRAND SPECIFIED) solution To accompany: Blood Glucose Monitor Brands: per insurance. 1 Bottle 3     Blood Glucose Monitoring Suppl (ACCU-CHEK GUIDE ME) w/Device KIT USE TO TEST ONCE DAILY 1 kit 0     calcipotriene (DOVONEX) 0.005 % external cream Mix with efudex and apply twice daily to hands and arms for 12 days 60 g 3     cetirizine (ZYRTEC) 10 MG tablet Take 1 tablet (10 mg) by mouth daily 90 tablet 3     ferrous sulfate (FE TABS) 325 (65 Fe) MG EC tablet Take 1 tablet (325 mg) by mouth daily . Take with vitamin c (juice or  supplement). 90 tablet 0     fluorouracil (EFUDEX) 5 % external cream Mix with dovonex and apply twice daily to hands and arms for 12 days 40 g 1     hydrochlorothiazide (HYDRODIURIL) 50 MG tablet Take 1 tablet (50 mg) by mouth daily 90 tablet 3     hydrocortisone (CORTAID) 1 % external ointment Apply topically 2 times daily as needed 30 g 1     insulin pen needle (31G X 6 MM) 31G X 6 MM miscellaneous Use one pen needle daily or as directed. ( for Victoza ) 90 each 3     ketoconazole (NIZORAL) 2 % external cream Apply topically 2 times daily 60 g 0     ketoconazole (NIZORAL) 2 % external cream Apply daily to affected area on face, armpits, groin. 60 g 4     ketoconazole (NIZORAL) 2 % external shampoo Leave on face, beard for 3-5 minutes then rinse. Use 2-3 times weekly. 120 mL 3     liraglutide (VICTOZA) 18 MG/3ML solution 0.6 mg daily subcutaneous for one week, then increase to 1.2 mg daily 6 mL 3     losartan (COZAAR) 50 MG tablet Take 1 tablet (50 mg) by mouth daily 90 tablet 3     metFORMIN (GLUCOPHAGE) 1000 MG tablet TAKE 1 TABLET(1000 MG) BY MOUTH TWICE DAILY WITH MEALS 180 tablet 0     methocarbamol (ROBAXIN) 500 MG tablet Take 1 tablet (500 mg) by mouth 2 times daily as needed for muscle spasms 60 tablet 0     metroNIDAZOLE (METROCREAM) 0.75 % external cream Apply topically 2 times daily as needed (to rosacea areas) 45 g 1     Misc. Devices (SUPPOSITORY MOLD 2GM) MISC 1 suppository every 12 hours Nifedipine 4 mg suppository, one rectally every 12 hours 100 each 4     Multiple Vitamin (DAILY MULTIVITAMIN PO) Take by mouth daily       nystatin (MYCOSTATIN) 032268 UNIT/GM external powder Apply topically 2 times daily Prevention of rash 60 g 0     omeprazole (PRILOSEC) 20 MG DR capsule TAKE 1 CAPSULE BY MOUTH DAILY 90 capsule 3     order for DME Autocpap 10-16 cm 1 Units 0     PARoxetine (PAXIL) 40 MG tablet TAKE 1 TABLET(40 MG) BY MOUTH EVERY MORNING 90 tablet 3     polyethylene glycol (MIRALAX) 17 GM/Dose  powder Take 17 g (1 capful) by mouth daily 850 g 3     psyllium 0.52 G capsule Take 1 capsule (0.52 g) by mouth daily 100 capsule 3     RESERPINE-HYDROCHLOROTHIAZIDE OR daily       semaglutide (OZEMPIC, 0.25 OR 0.5 MG/DOSE,) 2 MG/1.5ML SOPN pen        semaglutide (OZEMPIC, 0.25 OR 0.5 MG/DOSE,) 2 MG/1.5ML SOPN pen Inject 0.5 mg Subcutaneous every 7 days 3 mL 3     tamsulosin (FLOMAX) 0.4 MG capsule Take 2 capsules (0.8 mg) by mouth daily TAKE 2 CAPSULES BY MOUTH DAILY 180 capsule 3     thin (NO BRAND SPECIFIED) lancets Use with lanceting device. To accompany: Blood Glucose Monitor Brands: per insurance. 100 each 11     triamcinolone (KENALOG) 0.1 % external cream Apply topically 2 times daily as needed for irritation 30 g 0       ALLERGIES:  No Known Allergies    NEW PMH/PSH: None    REVIEW OF SYSTEMS:  The patient completed a comprehensive 11 point review of systems (below), which was reviewed. Positives are as noted below.      PHYSICAL EXAM:  General: The patient was alert and conversant, and in no acute distress.    Neck: No palpable cervical lymphadenopathy, no significant tenderness to palpation of the thyrohyoid space, which was narrow. No obvious thyroid abnormality.  Resp: Breathing comfortably, no stridor or stertor.  Neuro: Symmetric facial function. Other cranial nerve function as documented above.  Psych: Normal affect, pleasant and cooperative.  Voice/speech: No significant dysphonia.      Procedure:   Flexible fiberoptic laryngoscopy and laryngovideostroboscopy  Indications: This procedure was warranted to evaluate the patient's laryngeal anatomy and function. Risks, benefits, and alternatives were discussed.  Description: After written informed consent was obtained, a time-out was performed to confirm patient identity, procedure, and procedure site. Topical 3% lidocaine with 0.25% phenylephrine was applied to the nasal cavities. I performed the endoscopy and no complications were apparent. Continuous  and stroboscopic light were utilized to assess routine phonation and variable frequency phonation.  Performed by: Mel Fournier MD MPH  Findings: Normal nasopharynx. Normal base of tongue, valleculae, and epiglottis. Vocal fold mobility: right: normal; left: normal. Medial edges of the vocal folds: smooth and straight overall; left vocal fold with residual pinpoint leukoplakia at mid-posterior aspect, reducing compared to prior.   Glissade produced appropriate elongation. There was mild to moderate supraglottic recruitment with connected speech. Mucosa of false vocal folds, aryepiglottic folds, piriform sinuses, and posterior glottis unremarkable except for mild right arytenoid mucosal irritation suggestive of early granuloma. Airway was patent.    Similar findings on NBI.    The addition of stroboscopy allowed evaluation of the mucosal wave.   Amplitude: right: mildly decreased; left: mildly decreased. Symmetry: intermittent symmetry. Closure pattern: complete. Closure plane: at glottic level. Phase distribution: normal.                      IMPRESSION AND PLAN:   Gary Iyer returns with a reduction in the size of the leukoplakia of the posterior left true vocal fold, and a tiny possible recurrent right granuloma.    Plan:  1) He will resume speech therapy exercises.  He is going to refer back to his notes and will contact Candido Winter, PhD, CCC-SLP if he needs a refresher. We talked about doing the exercises routinely over the longer term.  2) He will plan to return to clinic in two to three months, or sooner as needed.  He understands to contact us with any concerns.    I appreciate the opportunity to participate in the care of this patient.     Today's visit required additional screening time, PPE, and cleaning measures to allow for a safe in-person visit, due to the public health emergency. I spent a total of 24 minutes on 2/20/2023 in chart review, review of tests, patient visit, documentation,  care coordination, and/or discussion with other providers about the issues documented above, separate from any documented procedure(s).

## 2023-02-20 NOTE — PATIENT INSTRUCTIONS
1.  You were seen in the ENT Clinic today by . If you have any questions or concerns after your appointment, please call [phone]. Press option #1 for scheduling related needs. Press option #3 for Nurse advice.    2. Plan is to return to clinic in 2-3 months      Lisa Hazel LPN  [phone]  [facility] - Otolaryngology

## 2023-02-20 NOTE — Clinical Note
2/20/2023       RE: Gary Iyer  [address] [facility] [address]     Dear Colleague,    Thank you for referring your patient, Gary Iyer, to the [facility] EAR NOSE AND TH[facility]AT [facility] [place] at [facility]. Please see a copy of my visit note below.    Dear Colleague:    Gary Iyer recently returned for follow-up at the [facility]. My clinic note from our visit is enclosed below.  Speech recognition software may have been used in the documentation below; input is reviewed before signature to the best of my ability.     I appreciate the ongoing opportunity to participate in this patient's care.    Please feel free to contact me with any questions.    Sincerely yours,      Mel Fournier M.D., M.P.H.  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  He later returned to the operating room on February 9, 2017 for an area of new irregularity of the left true vocal fold.  Pathology showed severe dysplasia with negative but close margins (for moderate, but not severe dysplasia).  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    We then observed a slight recurrence of leukoplakia/granuloma in the same location where he had this in 2018. This resolved with conservative management. Subsequently, he had allergies which were provoking coughing/throat-clearing, and he was again noted to have a granuloma. He was set up for refresher session(s) of speech therapy with Misty Clark, PhD, CCC-SLP and we discussed nasal hygiene as well. This led to near complete resolution of the  granuloma, but subsequently he was noted to have slightly increased irritation along the left medial arytenoid wall.  Then he developed an upper respiratory infection and has been coughing, and had some shortness of breath. He states he was COVID negative. His family had noted him grunting and throat-clearing frequently.    He later had resolution of the right granuloma and a minor left granuloma. Last time we noted mild left arytenoid irritation that is improving, and a small new smooth-appearing left posterior vocal fold prominence of unclear etiology. Given his history of fluctuating findings we agreed on close follow up. He also worked with Candido Winter, PhD, CCC-SLP for therapy most recently.    Today's updates:  1) He is back from [place]. While he was there, he did have some coughing because of allergies, pollution etc. He was able to be there for the scattering of his mother's ashes.  2) He has been back for two weeks. Overall, his throat/voice have been improving. Yesterday, he had one episode of coughing.        MEDICATIONS:     Current Outpatient Medications   Medication Sig Dispense Refill     ACCU-CHEK GUIDE test strip TEST ONCE DAILY 100 strip 1     alcohol swab prep pads USE TO SWAB AREA OF INJECTION/OLIMPIA 100 each 1     allopurinol (ZYLOPRIM) 300 MG tablet TAKE 1 TABLET(300 MG) BY MOUTH DAILY 90 tablet 3     amLODIPine (NORVASC) 2.5 MG tablet Take 1 tablet (2.5 mg) by mouth daily 90 tablet 3     aspirin 81 MG tablet Take 1 tablet by mouth daily.       atorvastatin (LIPITOR) 20 MG tablet TAKE 1 TABLET(20 MG) BY MOUTH DAILY 90 tablet 0     blood glucose (ACCU-CHEK GUIDE) test strip Use to test blood sugar 1 times daily or as directed. 100 strip 11     blood glucose calibration (NO BRAND SPECIFIED) solution To accompany: Blood Glucose Monitor Brands: per insurance. 1 Bottle 3     Blood Glucose Monitoring Suppl (ACCU-CHEK GUIDE ME) w/Device KIT USE TO TEST ONCE DAILY 1 kit 0     calcipotriene  (DOVONEX) 0.005 % external cream Mix with efudex and apply twice daily to hands and arms for 12 days 60 g 3     cetirizine (ZYRTEC) 10 MG tablet Take 1 tablet (10 mg) by mouth daily 90 tablet 3     ferrous sulfate (FE TABS) 325 (65 Fe) MG EC tablet Take 1 tablet (325 mg) by mouth daily . Take with vitamin c (juice or supplement). 90 tablet 0     fluorouracil (EFUDEX) 5 % external cream Mix with dovonex and apply twice daily to hands and arms for 12 days 40 g 1     hydrochlorothiazide (HYDRODIURIL) 50 MG tablet Take 1 tablet (50 mg) by mouth daily 90 tablet 3     hydrocortisone (CORTAID) 1 % external ointment Apply topically 2 times daily as needed 30 g 1     insulin pen needle (31G X 6 MM) 31G X 6 MM miscellaneous Use one pen needle daily or as directed. ( for Victoza ) 90 each 3     ketoconazole (NIZORAL) 2 % external cream Apply topically 2 times daily 60 g 0     ketoconazole (NIZORAL) 2 % external cream Apply daily to affected area on face, armpits, groin. 60 g 4     ketoconazole (NIZORAL) 2 % external shampoo Leave on face, beard for 3-5 minutes then rinse. Use 2-3 times weekly. 120 mL 3     liraglutide (VICTOZA) 18 MG/3ML solution 0.6 mg daily subcutaneous for one week, then increase to 1.2 mg daily 6 mL 3     losartan (COZAAR) 50 MG tablet Take 1 tablet (50 mg) by mouth daily 90 tablet 3     metFORMIN (GLUCOPHAGE) 1000 MG tablet TAKE 1 TABLET(1000 MG) BY MOUTH TWICE DAILY WITH MEALS 180 tablet 0     methocarbamol (ROBAXIN) 500 MG tablet Take 1 tablet (500 mg) by mouth 2 times daily as needed for muscle spasms 60 tablet 0     metroNIDAZOLE (METROCREAM) 0.75 % external cream Apply topically 2 times daily as needed (to rosacea areas) 45 g 1     Misc. Devices (SUPPOSITORY MOLD 2GM) MISC 1 suppository every 12 hours Nifedipine 4 mg suppository, one rectally every 12 hours 100 each 4     Multiple Vitamin (DAILY MULTIVITAMIN PO) Take by mouth daily       nystatin (MYCOSTATIN) 607016 UNIT/GM external powder Apply  topically 2 times daily Prevention of rash 60 g 0     omeprazole (PRILOSEC) 20 MG DR capsule TAKE 1 CAPSULE BY MOUTH DAILY 90 capsule 3     order for DME Autocpap 10-16 cm 1 Units 0     PARoxetine (PAXIL) 40 MG tablet TAKE 1 TABLET(40 MG) BY MOUTH EVERY MORNING 90 tablet 3     polyethylene glycol (MIRALAX) 17 GM/Dose powder Take 17 g (1 capful) by mouth daily 850 g 3     psyllium 0.52 G capsule Take 1 capsule (0.52 g) by mouth daily 100 capsule 3     RESERPINE-HYDROCHLOROTHIAZIDE OR daily       semaglutide (OZEMPIC, 0.25 OR 0.5 MG/DOSE,) 2 MG/1.5ML SOPN pen        semaglutide (OZEMPIC, 0.25 OR 0.5 MG/DOSE,) 2 MG/1.5ML SOPN pen Inject 0.5 mg Subcutaneous every 7 days 3 mL 3     tamsulosin (FLOMAX) 0.4 MG capsule Take 2 capsules (0.8 mg) by mouth daily TAKE 2 CAPSULES BY MOUTH DAILY 180 capsule 3     thin (NO BRAND SPECIFIED) lancets Use with lanceting device. To accompany: Blood Glucose Monitor Brands: per insurance. 100 each 11     triamcinolone (KENALOG) 0.1 % external cream Apply topically 2 times daily as needed for irritation 30 g 0       ALLERGIES:  No Known Allergies    NEW PMH/PSH: None    REVIEW OF SYSTEMS:  The patient completed a comprehensive 11 point review of systems (below), which was reviewed. Positives are as noted below.  Patient Supplied Answers to Review of Systems   ENT ROS 4/22/2016   Ears, Nose, Throat: Nasal congestion or drainage         PHYSICAL EXAM:  General: The patient was alert and conversant, and in no acute distress.    Neck: No palpable cervical lymphadenopathy, no significant tenderness to palpation of the thyrohyoid space, which was narrow. No obvious thyroid abnormality.  Resp: Breathing comfortably, no stridor or stertor.  Neuro: Symmetric facial function. Other cranial nerve function as documented above.  Psych: Normal affect, pleasant and cooperative.  Voice/speech: No significant dysphonia.      Procedure:   Flexible fiberoptic laryngoscopy and  laryngovideostroboscopy  Indications: This procedure was warranted to evaluate the patient's laryngeal anatomy and function. Risks, benefits, and alternatives were discussed.  Description: After written informed consent was obtained, a time-out was performed to confirm patient identity, procedure, and procedure site. Topical 3% lidocaine with 0.25% phenylephrine was applied to the nasal cavities. I performed the endoscopy and no complications were apparent. Continuous and stroboscopic light were utilized to assess routine phonation and variable frequency phonation.  Performed by: Mel Fournier MD MPH  Findings: Normal nasopharynx. Normal base of tongue, valleculae, and epiglottis. Vocal fold mobility: right: normal; left: normal. Medial edges of the vocal folds: smooth and straight overall; left vocal fold with residual pinpoint leukoplakia at mid-posterior aspect, reducing compared to prior.   Glissade produced appropriate elongation. There was mild to moderate supraglottic recruitment with connected speech. Mucosa of false vocal folds, aryepiglottic folds, piriform sinuses, and posterior glottis unremarkable except for mild right arytenoid mucosal irritation suggestive of early granuloma. Airway was patent.    Similar findings on NBI.    The addition of stroboscopy allowed evaluation of the mucosal wave.   Amplitude: right: mildly decreased; left: mildly decreased. Symmetry: intermittent symmetry. Closure pattern: complete. Closure plane: at glottic level. Phase distribution: normal.                          IMPRESSION AND PLAN:   Gary Iyer returns with a reduction in the size of the leukoplakia of the posterior left true vocal fold, and a tiny possible recurrent right granuloma.    Plan:  1) He will resume speech therapy exercises.  He is going to refer back to his notes and will contact Candido Winter, PhD, CCC-SLP if he needs a refresher. We talked about doing the exercises routinely over the longer  term.  2) He will plan to return to clinic in two to three months, or sooner as needed.  He understands to contact us with any concerns.    I appreciate the opportunity to participate in the care of this patient.     ***Today's visit required additional screening time, PPE, and cleaning measures to allow for a safe in-person visit, due to the public health emergency. I spent a total of *** minutes on ***2/20/2023 in chart review, review of tests, patient visit, documentation, care coordination, and/or discussion with other providers about the issues documented above, separate from any documented procedure(s).      Dear Colleague:    Gary Iyer recently returned for follow-up at the [facility]. My clinic note from our visit is enclosed below.  Speech recognition software may have been used in the documentation below; input is reviewed before signature to the best of my ability.     I appreciate the ongoing opportunity to participate in this patient's care.    Please feel free to contact me with any questions.    Sincerely yours,      Mel Fournier M.D., M.P.H.  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  He later returned to the operating room on February 9, 2017 for an area of new irregularity of the left true vocal fold.  Pathology showed severe dysplasia with negative but close margins (for moderate, but not severe dysplasia).  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    We then observed a slight recurrence of leukoplakia/granuloma in the same location where he had this in 2018. This resolved with conservative management.  Subsequently, he had allergies which were provoking coughing/throat-clearing, and he was again noted to have a granuloma. He was set up for refresher session(s) of speech therapy with Misty Clark, PhD, CCC-SLP and we discussed nasal hygiene as well. This led to near complete resolution of the granuloma, but subsequently he was noted to have slightly increased irritation along the left medial arytenoid wall.  Then he developed an upper respiratory infection and has been coughing, and had some shortness of breath. He states he was COVID negative. His family had noted him grunting and throat-clearing frequently.    He later had resolution of the right granuloma and a minor left granuloma. Last time we noted mild left arytenoid irritation that is improving, and a small new smooth-appearing left posterior vocal fold prominence of unclear etiology. Given his history of fluctuating findings we agreed on close follow up. He also worked with Candido Winter, PhD, CCC-SLP for therapy most recently.    Today's updates:  1) He is back from [place]. While he was there, he did have some coughing because of allergies, pollution etc. He was able to be there for the scattering of his mother's ashes.  2) He has been back for two weeks. Overall, his throat/voice have been improving. Yesterday, he had one episode of coughing.        MEDICATIONS:     Current Outpatient Medications   Medication Sig Dispense Refill     ACCU-CHEK GUIDE test strip TEST ONCE DAILY 100 strip 1     alcohol swab prep pads USE TO SWAB AREA OF INJECTION/OLIMPIA 100 each 1     allopurinol (ZYLOPRIM) 300 MG tablet TAKE 1 TABLET(300 MG) BY MOUTH DAILY 90 tablet 3     amLODIPine (NORVASC) 2.5 MG tablet Take 1 tablet (2.5 mg) by mouth daily 90 tablet 3     aspirin 81 MG tablet Take 1 tablet by mouth daily.       atorvastatin (LIPITOR) 20 MG tablet TAKE 1 TABLET(20 MG) BY MOUTH DAILY 90 tablet 0     blood glucose (ACCU-CHEK GUIDE) test strip Use to test blood  sugar 1 times daily or as directed. 100 strip 11     blood glucose calibration (NO BRAND SPECIFIED) solution To accompany: Blood Glucose Monitor Brands: per insurance. 1 Bottle 3     Blood Glucose Monitoring Suppl (ACCU-CHEK GUIDE ME) w/Device KIT USE TO TEST ONCE DAILY 1 kit 0     calcipotriene (DOVONEX) 0.005 % external cream Mix with efudex and apply twice daily to hands and arms for 12 days 60 g 3     cetirizine (ZYRTEC) 10 MG tablet Take 1 tablet (10 mg) by mouth daily 90 tablet 3     ferrous sulfate (FE TABS) 325 (65 Fe) MG EC tablet Take 1 tablet (325 mg) by mouth daily . Take with vitamin c (juice or supplement). 90 tablet 0     fluorouracil (EFUDEX) 5 % external cream Mix with dovonex and apply twice daily to hands and arms for 12 days 40 g 1     hydrochlorothiazide (HYDRODIURIL) 50 MG tablet Take 1 tablet (50 mg) by mouth daily 90 tablet 3     hydrocortisone (CORTAID) 1 % external ointment Apply topically 2 times daily as needed 30 g 1     insulin pen needle (31G X 6 MM) 31G X 6 MM miscellaneous Use one pen needle daily or as directed. ( for Victoza ) 90 each 3     ketoconazole (NIZORAL) 2 % external cream Apply topically 2 times daily 60 g 0     ketoconazole (NIZORAL) 2 % external cream Apply daily to affected area on face, armpits, groin. 60 g 4     ketoconazole (NIZORAL) 2 % external shampoo Leave on face, beard for 3-5 minutes then rinse. Use 2-3 times weekly. 120 mL 3     liraglutide (VICTOZA) 18 MG/3ML solution 0.6 mg daily subcutaneous for one week, then increase to 1.2 mg daily 6 mL 3     losartan (COZAAR) 50 MG tablet Take 1 tablet (50 mg) by mouth daily 90 tablet 3     metFORMIN (GLUCOPHAGE) 1000 MG tablet TAKE 1 TABLET(1000 MG) BY MOUTH TWICE DAILY WITH MEALS 180 tablet 0     methocarbamol (ROBAXIN) 500 MG tablet Take 1 tablet (500 mg) by mouth 2 times daily as needed for muscle spasms 60 tablet 0     metroNIDAZOLE (METROCREAM) 0.75 % external cream Apply topically 2 times daily as needed (to  rosacea areas) 45 g 1     Misc. Devices (SUPPOSITORY MOLD 2GM) MISC 1 suppository every 12 hours Nifedipine 4 mg suppository, one rectally every 12 hours 100 each 4     Multiple Vitamin (DAILY MULTIVITAMIN PO) Take by mouth daily       nystatin (MYCOSTATIN) 082986 UNIT/GM external powder Apply topically 2 times daily Prevention of rash 60 g 0     omeprazole (PRILOSEC) 20 MG DR capsule TAKE 1 CAPSULE BY MOUTH DAILY 90 capsule 3     order for DME Autocpap 10-16 cm 1 Units 0     PARoxetine (PAXIL) 40 MG tablet TAKE 1 TABLET(40 MG) BY MOUTH EVERY MORNING 90 tablet 3     polyethylene glycol (MIRALAX) 17 GM/Dose powder Take 17 g (1 capful) by mouth daily 850 g 3     psyllium 0.52 G capsule Take 1 capsule (0.52 g) by mouth daily 100 capsule 3     RESERPINE-HYDROCHLOROTHIAZIDE OR daily       semaglutide (OZEMPIC, 0.25 OR 0.5 MG/DOSE,) 2 MG/1.5ML SOPN pen        semaglutide (OZEMPIC, 0.25 OR 0.5 MG/DOSE,) 2 MG/1.5ML SOPN pen Inject 0.5 mg Subcutaneous every 7 days 3 mL 3     tamsulosin (FLOMAX) 0.4 MG capsule Take 2 capsules (0.8 mg) by mouth daily TAKE 2 CAPSULES BY MOUTH DAILY 180 capsule 3     thin (NO BRAND SPECIFIED) lancets Use with lanceting device. To accompany: Blood Glucose Monitor Brands: per insurance. 100 each 11     triamcinolone (KENALOG) 0.1 % external cream Apply topically 2 times daily as needed for irritation 30 g 0       ALLERGIES:  No Known Allergies    NEW PMH/PSH: None    REVIEW OF SYSTEMS:  The patient completed a comprehensive 11 point review of systems (below), which was reviewed. Positives are as noted below.      PHYSICAL EXAM:  General: The patient was alert and conversant, and in no acute distress.    Neck: No palpable cervical lymphadenopathy, no significant tenderness to palpation of the thyrohyoid space, which was narrow. No obvious thyroid abnormality.  Resp: Breathing comfortably, no stridor or stertor.  Neuro: Symmetric facial function. Other cranial nerve function as documented  above.  Psych: Normal affect, pleasant and cooperative.  Voice/speech: No significant dysphonia.      Procedure:   Flexible fiberoptic laryngoscopy and laryngovideostroboscopy  Indications: This procedure was warranted to evaluate the patient's laryngeal anatomy and function. Risks, benefits, and alternatives were discussed.  Description: After written informed consent was obtained, a time-out was performed to confirm patient identity, procedure, and procedure site. Topical 3% lidocaine with 0.25% phenylephrine was applied to the nasal cavities. I performed the endoscopy and no complications were apparent. Continuous and stroboscopic light were utilized to assess routine phonation and variable frequency phonation.  Performed by: Mel Fournier MD MPH  Findings: Normal nasopharynx. Normal base of tongue, valleculae, and epiglottis. Vocal fold mobility: right: normal; left: normal. Medial edges of the vocal folds: smooth and straight overall; left vocal fold with residual pinpoint leukoplakia at mid-posterior aspect, reducing compared to prior.   Glissade produced appropriate elongation. There was mild to moderate supraglottic recruitment with connected speech. Mucosa of false vocal folds, aryepiglottic folds, piriform sinuses, and posterior glottis unremarkable except for mild right arytenoid mucosal irritation suggestive of early granuloma. Airway was patent.    Similar findings on NBI.    The addition of stroboscopy allowed evaluation of the mucosal wave.   Amplitude: right: mildly decreased; left: mildly decreased. Symmetry: intermittent symmetry. Closure pattern: complete. Closure plane: at glottic level. Phase distribution: normal.                      IMPRESSION AND PLAN:   Gary Iyer returns with a reduction in the size of the leukoplakia of the posterior left true vocal fold, and a tiny possible recurrent right granuloma.    Plan:  1) He will resume speech therapy exercises.  He is going to refer back  to his notes and will contact Candido Winter, PhD, CCC-SLP if he needs a refresher. We talked about doing the exercises routinely over the longer term.  2) He will plan to return to clinic in two to three months, or sooner as needed.  He understands to contact us with any concerns.    I appreciate the opportunity to participate in the care of this patient.     Today's visit required additional screening time, PPE, and cleaning measures to allow for a safe in-person visit, due to the public health emergency. I spent a total of 24 minutes on 2/20/2023 in chart review, review of tests, patient visit, documentation, care coordination, and/or discussion with other providers about the issues documented above, separate from any documented procedure(s).        Again, thank you for allowing me to participate in the care of your patient.      Sincerely,    Mel Fournier MD

## 2023-02-20 NOTE — LETTER
2/20/2023      RE: Gary Iyer  [address] [facility]  [place] [address]       Dear Colleague:    Gary Iyer recently returned for follow-up at the [facility]. My clinic note from our visit is enclosed below.  Speech recognition software may have been used in the documentation below; input is reviewed before signature to the best of my ability.     I appreciate the ongoing opportunity to participate in this patient's care.    Please feel free to contact me with any questions.    Sincerely yours,      Mel Fournier M.D., M.P.H.  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  He later returned to the operating room on February 9, 2017 for an area of new irregularity of the left true vocal fold.  Pathology showed severe dysplasia with negative but close margins (for moderate, but not severe dysplasia).  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    We then observed a slight recurrence of leukoplakia/granuloma in the same location where he had this in 2018. This resolved with conservative management. Subsequently, he had allergies which were provoking coughing/throat-clearing, and he was again noted to have a granuloma. He was set up for refresher session(s) of speech therapy with Misty Clark, PhD, CCC-SLP and we discussed nasal hygiene as well. This led to near complete resolution of the granuloma, but subsequently he was noted to have slightly increased irritation along the left medial arytenoid wall.  Then he developed an upper respiratory infection and has been coughing, and had some shortness of breath. He states he was COVID  negative. His family had noted him grunting and throat-clearing frequently.    He later had resolution of the right granuloma and a minor left granuloma. Last time we noted mild left arytenoid irritation that is improving, and a small new smooth-appearing left posterior vocal fold prominence of unclear etiology. Given his history of fluctuating findings we agreed on close follow up. He also worked with Candido Winter, PhD, CCC-SLP for therapy most recently.    Today's updates:  1) He is back from [place]. While he was there, he did have some coughing because of allergies, pollution etc. He was able to be there for the scattering of his mother's ashes.  2) He has been back for two weeks. Overall, his throat/voice have been improving. Yesterday, he had one episode of coughing.        MEDICATIONS:     Current Outpatient Medications   Medication Sig Dispense Refill     ACCU-CHEK GUIDE test strip TEST ONCE DAILY 100 strip 1     alcohol swab prep pads USE TO SWAB AREA OF INJECTION/OLIMPIA 100 each 1     allopurinol (ZYLOPRIM) 300 MG tablet TAKE 1 TABLET(300 MG) BY MOUTH DAILY 90 tablet 3     amLODIPine (NORVASC) 2.5 MG tablet Take 1 tablet (2.5 mg) by mouth daily 90 tablet 3     aspirin 81 MG tablet Take 1 tablet by mouth daily.       atorvastatin (LIPITOR) 20 MG tablet TAKE 1 TABLET(20 MG) BY MOUTH DAILY 90 tablet 0     blood glucose (ACCU-CHEK GUIDE) test strip Use to test blood sugar 1 times daily or as directed. 100 strip 11     blood glucose calibration (NO BRAND SPECIFIED) solution To accompany: Blood Glucose Monitor Brands: per insurance. 1 Bottle 3     Blood Glucose Monitoring Suppl (ACCU-CHEK GUIDE ME) w/Device KIT USE TO TEST ONCE DAILY 1 kit 0     calcipotriene (DOVONEX) 0.005 % external cream Mix with efudex and apply twice daily to hands and arms for 12 days 60 g 3     cetirizine (ZYRTEC) 10 MG tablet Take 1 tablet (10 mg) by mouth daily 90 tablet 3     ferrous sulfate (FE TABS) 325 (65 Fe) MG EC tablet Take 1  tablet (325 mg) by mouth daily . Take with vitamin c (juice or supplement). 90 tablet 0     fluorouracil (EFUDEX) 5 % external cream Mix with dovonex and apply twice daily to hands and arms for 12 days 40 g 1     hydrochlorothiazide (HYDRODIURIL) 50 MG tablet Take 1 tablet (50 mg) by mouth daily 90 tablet 3     hydrocortisone (CORTAID) 1 % external ointment Apply topically 2 times daily as needed 30 g 1     insulin pen needle (31G X 6 MM) 31G X 6 MM miscellaneous Use one pen needle daily or as directed. ( for Victoza ) 90 each 3     ketoconazole (NIZORAL) 2 % external cream Apply topically 2 times daily 60 g 0     ketoconazole (NIZORAL) 2 % external cream Apply daily to affected area on face, armpits, groin. 60 g 4     ketoconazole (NIZORAL) 2 % external shampoo Leave on face, beard for 3-5 minutes then rinse. Use 2-3 times weekly. 120 mL 3     liraglutide (VICTOZA) 18 MG/3ML solution 0.6 mg daily subcutaneous for one week, then increase to 1.2 mg daily 6 mL 3     losartan (COZAAR) 50 MG tablet Take 1 tablet (50 mg) by mouth daily 90 tablet 3     metFORMIN (GLUCOPHAGE) 1000 MG tablet TAKE 1 TABLET(1000 MG) BY MOUTH TWICE DAILY WITH MEALS 180 tablet 0     methocarbamol (ROBAXIN) 500 MG tablet Take 1 tablet (500 mg) by mouth 2 times daily as needed for muscle spasms 60 tablet 0     metroNIDAZOLE (METROCREAM) 0.75 % external cream Apply topically 2 times daily as needed (to rosacea areas) 45 g 1     Misc. Devices (SUPPOSITORY MOLD 2GM) MISC 1 suppository every 12 hours Nifedipine 4 mg suppository, one rectally every 12 hours 100 each 4     Multiple Vitamin (DAILY MULTIVITAMIN PO) Take by mouth daily       nystatin (MYCOSTATIN) 745395 UNIT/GM external powder Apply topically 2 times daily Prevention of rash 60 g 0     omeprazole (PRILOSEC) 20 MG DR capsule TAKE 1 CAPSULE BY MOUTH DAILY 90 capsule 3     order for DME Autocpap 10-16 cm 1 Units 0     PARoxetine (PAXIL) 40 MG tablet TAKE 1 TABLET(40 MG) BY MOUTH EVERY  MORNING 90 tablet 3     polyethylene glycol (MIRALAX) 17 GM/Dose powder Take 17 g (1 capful) by mouth daily 850 g 3     psyllium 0.52 G capsule Take 1 capsule (0.52 g) by mouth daily 100 capsule 3     RESERPINE-HYDROCHLOROTHIAZIDE OR daily       semaglutide (OZEMPIC, 0.25 OR 0.5 MG/DOSE,) 2 MG/1.5ML SOPN pen        semaglutide (OZEMPIC, 0.25 OR 0.5 MG/DOSE,) 2 MG/1.5ML SOPN pen Inject 0.5 mg Subcutaneous every 7 days 3 mL 3     tamsulosin (FLOMAX) 0.4 MG capsule Take 2 capsules (0.8 mg) by mouth daily TAKE 2 CAPSULES BY MOUTH DAILY 180 capsule 3     thin (NO BRAND SPECIFIED) lancets Use with lanceting device. To accompany: Blood Glucose Monitor Brands: per insurance. 100 each 11     triamcinolone (KENALOG) 0.1 % external cream Apply topically 2 times daily as needed for irritation 30 g 0       ALLERGIES:  No Known Allergies    NEW PMH/PSH: None    REVIEW OF SYSTEMS:  The patient completed a comprehensive 11 point review of systems (below), which was reviewed. Positives are as noted below.      PHYSICAL EXAM:  General: The patient was alert and conversant, and in no acute distress.    Neck: No palpable cervical lymphadenopathy, no significant tenderness to palpation of the thyrohyoid space, which was narrow. No obvious thyroid abnormality.  Resp: Breathing comfortably, no stridor or stertor.  Neuro: Symmetric facial function. Other cranial nerve function as documented above.  Psych: Normal affect, pleasant and cooperative.  Voice/speech: No significant dysphonia.      Procedure:   Flexible fiberoptic laryngoscopy and laryngovideostroboscopy  Indications: This procedure was warranted to evaluate the patient's laryngeal anatomy and function. Risks, benefits, and alternatives were discussed.  Description: After written informed consent was obtained, a time-out was performed to confirm patient identity, procedure, and procedure site. Topical 3% lidocaine with 0.25% phenylephrine was applied to the nasal cavities. I  performed the endoscopy and no complications were apparent. Continuous and stroboscopic light were utilized to assess routine phonation and variable frequency phonation.  Performed by: Mel Fournier MD MPH  Findings: Normal nasopharynx. Normal base of tongue, valleculae, and epiglottis. Vocal fold mobility: right: normal; left: normal. Medial edges of the vocal folds: smooth and straight overall; left vocal fold with residual pinpoint leukoplakia at mid-posterior aspect, reducing compared to prior.   Glissade produced appropriate elongation. There was mild to moderate supraglottic recruitment with connected speech. Mucosa of false vocal folds, aryepiglottic folds, piriform sinuses, and posterior glottis unremarkable except for mild right arytenoid mucosal irritation suggestive of early granuloma. Airway was patent.    Similar findings on NBI.    The addition of stroboscopy allowed evaluation of the mucosal wave.   Amplitude: right: mildly decreased; left: mildly decreased. Symmetry: intermittent symmetry. Closure pattern: complete. Closure plane: at glottic level. Phase distribution: normal.                      IMPRESSION AND PLAN:   Gary Iyer returns with a reduction in the size of the leukoplakia of the posterior left true vocal fold, and a tiny possible recurrent right granuloma.    Plan:  1) He will resume speech therapy exercises.  He is going to refer back to his notes and will contact Candido Winter, PhD, CCC-SLP if he needs a refresher. We talked about doing the exercises routinely over the longer term.  2) He will plan to return to clinic in two to three months, or sooner as needed.  He understands to contact us with any concerns.    I appreciate the opportunity to participate in the care of this patient.     Today's visit required additional screening time, PPE, and cleaning measures to allow for a safe in-person visit, due to the public health emergency. I spent a total of 24 minutes on  2/20/2023 in chart review, review of tests, patient visit, documentation, care coordination, and/or discussion with other providers about the issues documented above, separate from any documented procedure(s).        Mel Fournier MD

## 2023-02-22 NOTE — TELEPHONE ENCOUNTER
The pt refused the 4.24.23 appt, he thought that was too soon. So made another appt for him. Thanks.

## 2023-03-03 ENCOUNTER — OFFICE VISIT (OUTPATIENT)
Dept: FAMILY MEDICINE | Facility: CLINIC | Age: 63
End: 2023-03-03
Payer: MEDICARE

## 2023-03-03 ENCOUNTER — NURSE TRIAGE (OUTPATIENT)
Dept: NURSING | Facility: CLINIC | Age: 63
End: 2023-03-03

## 2023-03-03 VITALS
WEIGHT: 229.4 LBS | HEIGHT: 70 IN | SYSTOLIC BLOOD PRESSURE: 121 MMHG | DIASTOLIC BLOOD PRESSURE: 72 MMHG | HEART RATE: 107 BPM | TEMPERATURE: 97.3 F | OXYGEN SATURATION: 96 % | BODY MASS INDEX: 32.84 KG/M2

## 2023-03-03 DIAGNOSIS — N40.1 BENIGN PROSTATIC HYPERPLASIA WITH WEAK URINARY STREAM: ICD-10-CM

## 2023-03-03 DIAGNOSIS — F41.9 ANXIETY: Chronic | ICD-10-CM

## 2023-03-03 DIAGNOSIS — I10 HYPERTENSION GOAL BP (BLOOD PRESSURE) < 140/90: ICD-10-CM

## 2023-03-03 DIAGNOSIS — M10.9 GOUT, UNSPECIFIED CAUSE, UNSPECIFIED CHRONICITY, UNSPECIFIED SITE: ICD-10-CM

## 2023-03-03 DIAGNOSIS — R39.12 BENIGN PROSTATIC HYPERPLASIA WITH WEAK URINARY STREAM: ICD-10-CM

## 2023-03-03 DIAGNOSIS — Z12.5 SCREENING FOR PROSTATE CANCER: ICD-10-CM

## 2023-03-03 DIAGNOSIS — E11.9 TYPE 2 DIABETES MELLITUS WITHOUT COMPLICATION, WITHOUT LONG-TERM CURRENT USE OF INSULIN (H): Primary | ICD-10-CM

## 2023-03-03 DIAGNOSIS — I65.23 CAROTID STENOSIS, BILATERAL: ICD-10-CM

## 2023-03-03 DIAGNOSIS — E78.5 HYPERLIPIDEMIA LDL GOAL <70: Chronic | ICD-10-CM

## 2023-03-03 DIAGNOSIS — R53.83 FATIGUE, UNSPECIFIED TYPE: ICD-10-CM

## 2023-03-03 LAB
ALBUMIN SERPL-MCNC: 3.6 G/DL (ref 3.4–5)
ALP SERPL-CCNC: 58 U/L (ref 40–150)
ALT SERPL W P-5'-P-CCNC: 62 U/L (ref 0–70)
ANION GAP SERPL CALCULATED.3IONS-SCNC: 8 MMOL/L (ref 3–14)
AST SERPL W P-5'-P-CCNC: 41 U/L (ref 0–45)
BASOPHILS # BLD AUTO: 0 10E3/UL (ref 0–0.2)
BASOPHILS NFR BLD AUTO: 0 %
BILIRUB SERPL-MCNC: 0.6 MG/DL (ref 0.2–1.3)
BUN SERPL-MCNC: 8 MG/DL (ref 7–30)
CALCIUM SERPL-MCNC: 9.4 MG/DL (ref 8.5–10.1)
CHLORIDE BLD-SCNC: 97 MMOL/L (ref 94–109)
CHOLEST SERPL-MCNC: 142 MG/DL
CO2 SERPL-SCNC: 30 MMOL/L (ref 20–32)
CREAT SERPL-MCNC: 0.7 MG/DL (ref 0.66–1.25)
EOSINOPHIL # BLD AUTO: 0.3 10E3/UL (ref 0–0.7)
EOSINOPHIL NFR BLD AUTO: 3 %
ERYTHROCYTE [DISTWIDTH] IN BLOOD BY AUTOMATED COUNT: 16.4 % (ref 10–15)
FASTING STATUS PATIENT QL REPORTED: NORMAL
GFR SERPL CREATININE-BSD FRML MDRD: >90 ML/MIN/1.73M2
GLUCOSE BLD-MCNC: 127 MG/DL (ref 70–99)
HBA1C MFR BLD: 6.1 % (ref 0–5.6)
HCT VFR BLD AUTO: 39.8 % (ref 40–53)
HDLC SERPL-MCNC: 57 MG/DL
HGB BLD-MCNC: 12.7 G/DL (ref 13.3–17.7)
LDLC SERPL CALC-MCNC: 59 MG/DL
LYMPHOCYTES # BLD AUTO: 3.7 10E3/UL (ref 0.8–5.3)
LYMPHOCYTES NFR BLD AUTO: 34 %
MCH RBC QN AUTO: 27.4 PG (ref 26.5–33)
MCHC RBC AUTO-ENTMCNC: 31.9 G/DL (ref 31.5–36.5)
MCV RBC AUTO: 86 FL (ref 78–100)
MONOCYTES # BLD AUTO: 1.2 10E3/UL (ref 0–1.3)
MONOCYTES NFR BLD AUTO: 11 %
NEUTROPHILS # BLD AUTO: 5.5 10E3/UL (ref 1.6–8.3)
NEUTROPHILS NFR BLD AUTO: 51 %
NONHDLC SERPL-MCNC: 85 MG/DL
PLATELET # BLD AUTO: 314 10E3/UL (ref 150–450)
POTASSIUM BLD-SCNC: 3.6 MMOL/L (ref 3.4–5.3)
PROT SERPL-MCNC: 8.8 G/DL (ref 6.8–8.8)
PSA SERPL-MCNC: 0.77 UG/L (ref 0–4)
RBC # BLD AUTO: 4.63 10E6/UL (ref 4.4–5.9)
SODIUM SERPL-SCNC: 135 MMOL/L (ref 133–144)
TRIGL SERPL-MCNC: 128 MG/DL
TSH SERPL DL<=0.005 MIU/L-ACNC: 2.72 MU/L (ref 0.4–4)
WBC # BLD AUTO: 10.8 10E3/UL (ref 4–11)

## 2023-03-03 PROCEDURE — 36415 COLL VENOUS BLD VENIPUNCTURE: CPT | Performed by: FAMILY MEDICINE

## 2023-03-03 PROCEDURE — 99214 OFFICE O/P EST MOD 30 MIN: CPT | Performed by: FAMILY MEDICINE

## 2023-03-03 PROCEDURE — G0103 PSA SCREENING: HCPCS | Performed by: FAMILY MEDICINE

## 2023-03-03 PROCEDURE — 83036 HEMOGLOBIN GLYCOSYLATED A1C: CPT | Performed by: FAMILY MEDICINE

## 2023-03-03 PROCEDURE — 99207 PR FOOT EXAM NO CHARGE: CPT | Performed by: FAMILY MEDICINE

## 2023-03-03 PROCEDURE — 80061 LIPID PANEL: CPT | Performed by: FAMILY MEDICINE

## 2023-03-03 PROCEDURE — 80050 GENERAL HEALTH PANEL: CPT | Performed by: FAMILY MEDICINE

## 2023-03-03 RX ORDER — ATORVASTATIN CALCIUM 20 MG/1
20 TABLET, FILM COATED ORAL DAILY
Qty: 90 TABLET | Refills: 3 | Status: SHIPPED | OUTPATIENT
Start: 2023-03-03 | End: 2024-04-05

## 2023-03-03 RX ORDER — TAMSULOSIN HYDROCHLORIDE 0.4 MG/1
0.8 CAPSULE ORAL DAILY
Qty: 180 CAPSULE | Refills: 3 | Status: SHIPPED | OUTPATIENT
Start: 2023-03-03 | End: 2024-01-08

## 2023-03-03 RX ORDER — PAROXETINE 40 MG/1
TABLET, FILM COATED ORAL
Qty: 90 TABLET | Refills: 3 | Status: SHIPPED | OUTPATIENT
Start: 2023-03-03 | End: 2024-01-08

## 2023-03-03 RX ORDER — AMLODIPINE BESYLATE 2.5 MG/1
2.5 TABLET ORAL DAILY
Qty: 90 TABLET | Refills: 3 | Status: SHIPPED | OUTPATIENT
Start: 2023-03-03 | End: 2024-04-05

## 2023-03-03 RX ORDER — LOSARTAN POTASSIUM 50 MG/1
50 TABLET ORAL DAILY
Qty: 90 TABLET | Refills: 3 | Status: SHIPPED | OUTPATIENT
Start: 2023-03-03 | End: 2023-09-01

## 2023-03-03 RX ORDER — HYDROCHLOROTHIAZIDE 50 MG/1
50 TABLET ORAL DAILY
Qty: 90 TABLET | Refills: 3 | Status: SHIPPED | OUTPATIENT
Start: 2023-03-03 | End: 2024-04-05

## 2023-03-03 RX ORDER — ALLOPURINOL 300 MG/1
TABLET ORAL
Qty: 90 TABLET | Refills: 3 | Status: SHIPPED | OUTPATIENT
Start: 2023-03-03 | End: 2024-04-05

## 2023-03-03 ASSESSMENT — ENCOUNTER SYMPTOMS
HEMATOCHEZIA: 0
DYSURIA: 0
HEADACHES: 0
HEARTBURN: 0
FREQUENCY: 0
WEAKNESS: 0
FEVER: 0
COUGH: 0
DIARRHEA: 0
PARESTHESIAS: 0
ARTHRALGIAS: 0
CHILLS: 0
PALPITATIONS: 0
MYALGIAS: 0
ABDOMINAL PAIN: 0
JOINT SWELLING: 0
CONSTIPATION: 0
SORE THROAT: 0
DIZZINESS: 0
NERVOUS/ANXIOUS: 0
HEMATURIA: 0
NAUSEA: 0
SHORTNESS OF BREATH: 0

## 2023-03-03 ASSESSMENT — ACTIVITIES OF DAILY LIVING (ADL)
CURRENT_FUNCTION: HOUSEWORK REQUIRES ASSISTANCE
CURRENT_FUNCTION: LAUNDRY REQUIRES ASSISTANCE
CURRENT_FUNCTION: BATHING REQUIRES ASSISTANCE

## 2023-03-03 NOTE — PROGRESS NOTES
Kamryn Vega is a 62 year old , presenting for the following health issues:  Diabetes and Health Maintenance      HPI     Diabetes Follow-up    How often are you checking your blood sugar? One time daily  What time of day are you checking your blood sugars (select all that apply)?  Before and after meals  Have you had any blood sugars above 200?  No  Have you had any blood sugars below 70?  No    What symptoms do you notice when your blood sugar is low?  None    What concerns do you have today about your diabetes? None     Do you have any of these symptoms? (Select all that apply)  No numbness or tingling in feet.  No redness, sores or blisters on feet.  No complaints of excessive thirst.  No reports of blurry vision.  No significant changes to weight.      BP Readings from Last 2 Encounters:   02/20/23 (!) 143/80   12/12/22 (!) 141/74     Hemoglobin A1C (%)   Date Value   06/02/2022 7.4 (H)   02/14/2022 7.5 (H)   03/10/2021 7.4 (H)   11/25/2020 6.9 (H)     LDL Cholesterol Calculated (mg/dL)   Date Value   02/14/2022 93   09/15/2021 71   11/25/2020 79   07/17/2020 68            How many servings of fruits and vegetables do you eat daily?  2-3    On average, how many sweetened beverages do you drink each day (Examples: soda, juice, sweet tea, etc.  Do NOT count diet or artificially sweetened beverages)?   0    How many days per week do you exercise enough to make your heart beat faster? 3 or less    How many minutes a day do you exercise enough to make your heart beat faster? 10 - 19    How many days per week do you miss taking your medication? 0    Hypertension Follow-up      Do you check your blood pressure regularly outside of the clinic? Yes     Are you following a low salt diet? Yes    Are your blood pressures ever more than 140 on the top number (systolic) OR more   than 90 on the bottom number (diastolic), for example 140/90? Doesn't check           Review of Systems   Feels like has to have bm  more than usual    But still just having  bm once daily    Got home a month ago from [place]    [place] okay there      abd does not hurt; just feels not quite right    No diarrhea    No bloody or black stools    Some pepto was tried    No vomiting    Not eating  As much as usual    Wt down a bit    Walking for exercise          Objective    There were no vitals taken for this visit.  There is no height or weight on file to calculate BMI.  Physical Exam   GENERAL: healthy, alert and no distress  EYES: Eyes grossly normal to inspection, PERRL and conjunctivae and sclerae normal  HENT: ear canals and TM's normal, nose and mouth without ulcers or lesions  NECK: no adenopathy, no asymmetry, masses, or scars and thyroid normal to palpation  RESP: lungs clear to auscultation - no rales, rhonchi or wheezes  CV: regular rate and rhythm, normal S1 S2, no S3 or S4, no murmur, click or rub, no peripheral edema and peripheral pulses strong  ABDOMEN: soft, nontender, no hepatosplenomegaly, no masses and bowel sounds normal  MS: no gross musculoskeletal defects noted, no edema  SKIN: no suspicious lesions or rashes  NEURO: Normal strength and tone, mentation intact and speech normal  PSYCH: mentation appears normal, affect normal/bright  Foot exam  Normal bilat; some dry skin         ASSESSMENT / PLAN:  (E11.9) Type 2 diabetes mellitus without complication, without long-term current use of insulin (H)  (primary encounter diagnosis)  Comment: check labs, refill med   Plan: HEMOGLOBIN A1C, FOOT EXAM, metFORMIN         (GLUCOPHAGE) 1000 MG tablet             (I10) Hypertension goal BP (blood pressure) < 140/90  Comment: blood pressure okay here   Plan: amLODIPine (NORVASC) 2.5 MG tablet,         hydrochlorothiazide (HYDRODIURIL) 50 MG tablet,        losartan (COZAAR) 50 MG tablet         Refill meds     (E78.5) Hyperlipidemia LDL goal <70  Comment:    Plan: Lipid panel reflex to direct LDL Non-fasting,         Comprehensive metabolic  panel, atorvastatin         (LIPITOR) 20 MG tablet        Check labs, refill med     (Z12.5) Screening for prostate cancer  Comment:  psa   Plan: Prostate Specific Antigen Screen             (R53.83) Fatigue, unspecified type  Comment: check   Plan: TSH with free T4 reflex, CBC with Platelets &         Differential             (M10.9) Gout, unspecified cause, unspecified chronicity, unspecified site  Comment: refill ; doing well   Plan: allopurinol (ZYLOPRIM) 300 MG tablet             (F41.9) Anxiety  Comment:stable   Plan: PARoxetine (PAXIL) 40 MG tablet             (N40.1,  R39.12) Benign prostatic hyperplasia with weak urinary stream  Comment: refill , stable   Plan: tamsulosin (FLOMAX) 0.4 MG capsule             (I65.23) Carotid stenosis, bilateral  Comment: patient to call and schedule this to recheck   Plan: US Carotid Bilateral               I reviewed the patient's medications, allergies, medical history, family history, and social history.    Finesse Beal MD

## 2023-03-03 NOTE — PATIENT INSTRUCTIONS
Continue probiotic    Stay well hydrated    Increase exercise     We will send you lab results    Call [phone] to schedule carotid ultrasound

## 2023-03-04 NOTE — RESULT ENCOUNTER NOTE
The diabetes test ( hemoglobin a1c ) is excellent.    Red blood count ( hemoglobin ) is better.    Other labs are fine.    Finesse Beal MD

## 2023-03-04 NOTE — TELEPHONE ENCOUNTER
Pt calling w/ questions about his lab results from today.Informed pt results still awaiting provider review. Advised  discuss results w/ PCP during clinic hours.Pt voiced understanding and agreement.       Reason for Disposition    Caller requesting routine or non-urgent lab result    Additional Information    Negative: Lab calling with strep throat test results and triager can call in prescription    Negative: Lab calling with urinalysis test results and triager can call in prescription    Negative: Medication questions    Negative: Medication renewal and refill questions    Negative: Pre-operative or pre-procedural questions    Negative: ED call to PCP (i.e., primary care provider; doctor, NP, or PA)    Negative: Doctor (or NP/PA) call to PCP    Negative: Call about patient who is currently hospitalized    Negative: Lab or radiology calling with CRITICAL test results    Negative: [1] Follow-up call from patient regarding patient's clinical status AND [2] information urgent    Negative: [1] Caller requests to speak ONLY to PCP AND [2] URGENT question    Negative: [1] Caller requests to speak to PCP now AND [2] won't tell us reason for call  (Exception: If 10 pm to 6 am, caller must first discuss reason for the call.)    Negative: Notification of hospital admission    Negative: Notification of death    Negative: Caller requesting lab results  (Exception: Routine or non-urgent lab result.)    Negative: Lab or radiology calling with test results    Negative: [1] Follow-up call from patient regarding patient's clinical status AND [2] information NON-URGENT    Negative: [1] Caller requests to speak ONLY to PCP AND [2] NON-URGENT question    Negative: Caller requesting an appointment, triage offered and declined    Protocols used: [facility]

## 2023-03-07 ENCOUNTER — TELEPHONE (OUTPATIENT)
Dept: FAMILY MEDICINE | Facility: CLINIC | Age: 63
End: 2023-03-07
Payer: MEDICARE

## 2023-03-07 NOTE — TELEPHONE ENCOUNTER
A Forms came in via Fax to be completed by the provider: [facility] Assessment Request for ICD-10 codes    Who is the form from? county  This was received at [facility]   Where was the form placed? No action needed from the provider.   What number is listed as a contact on the form?     Ivett Diaz phone: [phone] Fax: [phone]    Has the patient signed a consent form for release of information? YES    Additional comments: ICD 10 codes attached to the forms    Xuan Muñiz    Pt called back. States that he was able to get and use his neb solution. Pt states his breathing is the same.

## 2023-03-07 NOTE — TELEPHONE ENCOUNTER
The requested information has been confirmed faxed to the fax number on the request and listed below and a copy has been sent to be added to the chart. Xuan Muñiz,

## 2023-03-13 ENCOUNTER — ANCILLARY PROCEDURE (OUTPATIENT)
Dept: ULTRASOUND IMAGING | Facility: CLINIC | Age: 63
End: 2023-03-13
Attending: FAMILY MEDICINE
Payer: MEDICARE

## 2023-03-13 DIAGNOSIS — I65.23 CAROTID STENOSIS, BILATERAL: ICD-10-CM

## 2023-03-13 PROCEDURE — 93880 EXTRACRANIAL BILAT STUDY: CPT | Mod: TC | Performed by: RADIOLOGY

## 2023-03-14 NOTE — RESULT ENCOUNTER NOTE
Good news    Only mild narrowing of the neck arteries    We can recheck in a few years    Finesse Beal MD

## 2023-03-25 DIAGNOSIS — R49.0 DYSPHONIA: ICD-10-CM

## 2023-03-25 DIAGNOSIS — J38.7 LEUKOPLAKIA OF LARYNX: ICD-10-CM

## 2023-04-06 ENCOUNTER — TELEPHONE (OUTPATIENT)
Dept: FAMILY MEDICINE | Facility: CLINIC | Age: 63
End: 2023-04-06
Payer: MEDICARE

## 2023-04-06 DIAGNOSIS — E66.01 MORBID OBESITY (H): ICD-10-CM

## 2023-04-06 DIAGNOSIS — E11.9 TYPE 2 DIABETES MELLITUS WITHOUT COMPLICATION, WITHOUT LONG-TERM CURRENT USE OF INSULIN (H): ICD-10-CM

## 2023-04-06 NOTE — TELEPHONE ENCOUNTER
Shanta with [facility] pharmacy calling.  Ozempic  changed up the concentration of their product and they will need a new prescription to reflect the change.    Current prescription is for Ozempic 2 mg/1.5 ml, inject 0.5 mg under the skin every 7 days     The pen now comes in 2 mg / 3 ml   Please send prescription for the new concentration    Carlos Morris RN  [facility]

## 2023-04-07 ENCOUNTER — TELEPHONE (OUTPATIENT)
Dept: FAMILY MEDICINE | Facility: CLINIC | Age: 63
End: 2023-04-07
Payer: MEDICARE

## 2023-04-20 ENCOUNTER — PATIENT OUTREACH (OUTPATIENT)
Dept: CARE COORDINATION | Facility: CLINIC | Age: 63
End: 2023-04-20
Payer: MEDICARE

## 2023-05-01 ENCOUNTER — MEDICAL CORRESPONDENCE (OUTPATIENT)
Dept: HEALTH INFORMATION MANAGEMENT | Facility: CLINIC | Age: 63
End: 2023-05-01
Payer: MEDICARE

## 2023-05-01 ENCOUNTER — TELEPHONE (OUTPATIENT)
Dept: FAMILY MEDICINE | Facility: CLINIC | Age: 63
End: 2023-05-01
Payer: MEDICARE

## 2023-05-01 NOTE — TELEPHONE ENCOUNTER
Forms/Letter Request    Type of form/letter: Professional statement of Need - [place] [employer]     Have you been seen for this request: N/A    Do we have the form/letter: Yes: Dropped off by patient     Who is the form from? Patient - Dept of [place] Human Services     Where did/will the form come from? Patient or family brought in       When is form/letter needed by: ? Please see back of form-     How would you like the form/letter returned: N/A      Could we send this information to you in [facility] or would you prefer to receive a phone call?:   Patient would prefer a phone call   Okay to leave a detailed message?: Yes at Cell number on file:    Telephone Information:   Mobile [phone]

## 2023-05-02 NOTE — PATIENT INSTRUCTIONS
1.  You were seen in the ENT Clinic today by Dr. Fournier.  If you have any questions or concerns after your appointment, please call [phone].  Press option #1 for scheduling related needs.  Press option #3 for Nurse advice.  2.  Plan is to return to clinic in 2-3 months for repeat exam.     Bijal LINARESN, RN  [facility] ENT   Head & Neck Surgery       
Warm

## 2023-05-04 ENCOUNTER — TELEPHONE (OUTPATIENT)
Dept: FAMILY MEDICINE | Facility: CLINIC | Age: 63
End: 2023-05-04
Payer: MEDICARE

## 2023-05-04 ENCOUNTER — MEDICAL CORRESPONDENCE (OUTPATIENT)
Dept: HEALTH INFORMATION MANAGEMENT | Facility: CLINIC | Age: 63
End: 2023-05-04
Payer: MEDICARE

## 2023-05-04 NOTE — TELEPHONE ENCOUNTER
I called and discussed in detail with patient    Diabetic shoe form    Also another form for housing    Has not worked since covid    Anxiety and diabetes    Completed both forms    We will fax    Finesse Beal MD

## 2023-05-04 NOTE — TELEPHONE ENCOUNTER
Called contact on back of form to retrieve the fax number to send form to. If the clinic gets a call back please note the fax number and route back to team so we can fax form over for the patient.   Contact info was: Mohsh Mohamed [phone]    Gemma Brian -    [facility]

## 2023-05-04 NOTE — TELEPHONE ENCOUNTER
LVALEX with contact provided on back of form - Mohsh Mohamed [phone]. Asked that the contact call the clinic back and provide a fax number to send for to.   If the contact calls back please ask for Fax number.     Gemma Brian -    [facility]

## 2023-05-05 ENCOUNTER — MEDICAL CORRESPONDENCE (OUTPATIENT)
Dept: HEALTH INFORMATION MANAGEMENT | Facility: CLINIC | Age: 63
End: 2023-05-05
Payer: MEDICARE

## 2023-05-05 NOTE — TELEPHONE ENCOUNTER
Spoke to Mohsen Mohamed on the phone. [facility] requested that form be emailed to him at :   [email or web address]    Gemma Brian -    [facility]

## 2023-05-09 ENCOUNTER — MEDICAL CORRESPONDENCE (OUTPATIENT)
Dept: HEALTH INFORMATION MANAGEMENT | Facility: CLINIC | Age: 63
End: 2023-05-09
Payer: MEDICARE

## 2023-05-10 ENCOUNTER — MEDICAL CORRESPONDENCE (OUTPATIENT)
Dept: HEALTH INFORMATION MANAGEMENT | Facility: CLINIC | Age: 63
End: 2023-05-10
Payer: MEDICARE

## 2023-05-16 ENCOUNTER — VIRTUAL VISIT (OUTPATIENT)
Dept: OTOLARYNGOLOGY | Facility: CLINIC | Age: 63
End: 2023-05-16
Payer: MEDICARE

## 2023-05-16 DIAGNOSIS — J38.7 LARYNGEAL GRANULOMA: ICD-10-CM

## 2023-05-16 DIAGNOSIS — R49.0 DYSPHONIA: Primary | ICD-10-CM

## 2023-05-16 PROCEDURE — 92507 TX SP LANG VOICE COMM INDIV: CPT | Mod: GN | Performed by: SPEECH-LANGUAGE PATHOLOGIST

## 2023-05-16 NOTE — PROGRESS NOTES
Gary Iyer is a 62 year old male who is being seen via a billable video visit.      The patient has been notified and verbally consented to the following:     This video visit will be conducted between you and your provider.    Patient has opted to conduct today's video visit vs an in-person appointment, and is not able to attend due to possible exposure to COVID-19.      If during the course of the call the provider feels a video visit is not appropriate, you will not be charged for this service.    Call initiated at: 0900  Type of Video Platform Used: [employer]  Location of provider: [facility]  Location of patient: Residence    THERAPY NOTE (CPT 39736)  Date of Service: 5/16/2023  Mr. 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    SUBJECTIVE / OBJECTIVE:  Since our most-recent session, Mr. Iyer reports that he has been doing well generally. He feels that his voice is giving him no problems. He is having no throat discomfort.     In his last laryngeal exam with Dr. 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    THERAPEUTIC ACTIVITIES  Counseling and Education    Asked many questions about the nature of his symptoms, and I answered all of these thoroughly.  Strategies for reducing posterior glottic impact    The patient was able to independently recall strategies for cough suppression, and states that he has had no difficulties with coughing or throat clearing recently.  During today's entire therapy session, no instances of coughing or throat clearing were observed.    The patient noted that she frequently holds his breath both during speech, and when performing any other random activities, such as brushing his teeth.  We discussed the importance of allowing air to release to avoid excess glottic contact, which may influence  granuloma formation.    Explicit practice of exhaling steadily at rest and during various activities helped to reduce breath-holding    Adding phonation to the optimal flowing air stream    In contrast to previous therapy sessions, the patient had a fair amount of difficulty with flow mode phonation.  Thus we replaced this with cup and bubbles exercises, which the patient was able to perform on sustained and variegated pitch patterns with greater than 80% accuracy and moderately frequent cueing.  He required max cues in order to turn on and off phonation without vocal leblanc.  He was not able to generalize this to phonation through a straw without the cup, or to phonation without the straw.    ASSESSMENT/PLAN  PROGRESS TOWARD LONG TERM GOALS:   Adequate progress; too early for objective measures    IMPRESSIONS: Dysphonia (R49.0) in the context of Laryngeal Hyperfunction (J38.7). Mr. Iyer presents today with good maintenance of previously learned strategies for cough suppression and avoiding other activities that cause posterior glottic constriction.  In addition, he learned strategies for reduced posterior glottic impact during phonation.  He had a very difficult time reproducing flow mode phonation, which has been successful for him in the past, and thus we replaced this today with cup and bubbles phonation.  He was able to perform these with acceptable accuracy, but had minimal carryover to conversational speech.    The primary consequence of lower flow phonation during today's session appeared to be vocal roughness, but not vocal strain.  The patient is not upset by his voice quality.  He is scheduled for repeat laryngeal exam on 6-23.  If at that time, it appears that his speaking technique is contributing to ongoing granulation tissue, then we will pursue a more intensive course of voice specific therapy.  Please note, these future therapy sessions should be in person, as it appears that there may be some audio  challenges at the patient's home that make voice aspects of virtual therapy challenging.     Candido Winter, Ph.D., [facility]-SLP  Speech-Language Pathologist-[facility]  [phone]  he/him/his    Speech recognition software may have been used in the above documentation; input is reviewed before signature to the best of my ability.

## 2023-05-16 NOTE — LETTER
5/16/2023       RE: Gary Iyer  [address]  [place] [address]     Dear Colleague,    Thank you for referring your patient, Gary Iyer, to the [facility] [place] [facility]. Please see a copy of my visit note below.    Gary Iyer is a 62 year old male who is being seen via a billable video visit.      The patient has been notified and verbally consented to the following:   This video visit will be conducted between you and your provider.  Patient has opted to conduct today's video visit vs an in-person appointment, and is not able to attend due to possible exposure to COVID-19.    If during the course of the call the provider feels a video visit is not appropriate, you will not be charged for this service.    Call initiated at: 0900  Type of Video Platform Used: PLDT  Location of provider: [facility]  Location of patient: Residence    THERAPY NOTE (CPT 22191)  Date of Service: 5/16/2023  Mr. 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    SUBJECTIVE / OBJECTIVE:  Since our most-recent session, Mr. Iyer reports that he has been doing well generally. He feels that his voice is giving him no problems. He is having no throat discomfort.     In his last laryngeal exam with Dr. 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    THERAPEUTIC ACTIVITIES  Counseling and Education  Asked many questions about the nature of his symptoms, and I answered all of these thoroughly.  Strategies for reducing posterior glottic impact  The patient was able to independently recall strategies for cough suppression, and states that he has had no difficulties with coughing or throat clearing recently.  During today's entire therapy session, no instances of coughing  or throat clearing were observed.  The patient noted that she frequently holds his breath both during speech, and when performing any other random activities, such as brushing his teeth.  We discussed the importance of allowing air to release to avoid excess glottic contact, which may influence granuloma formation.  Explicit practice of exhaling steadily at rest and during various activities helped to reduce breath-holding  Adding phonation to the optimal flowing air stream  In contrast to previous therapy sessions, the patient had a fair amount of difficulty with flow mode phonation.  Thus we replaced this with cup and bubbles exercises, which the patient was able to perform on sustained and variegated pitch patterns with greater than 80% accuracy and moderately frequent cueing.  He required max cues in order to turn on and off phonation without vocal leblanc.  He was not able to generalize this to phonation through a straw without the cup, or to phonation without the straw.    ASSESSMENT/PLAN  PROGRESS TOWARD LONG TERM GOALS:   Adequate progress; too early for objective measures    IMPRESSIONS: Dysphonia (R49.0) in the context of Laryngeal Hyperfunction (J38.7). Mr. Iyer presents today with good maintenance of previously learned strategies for cough suppression and avoiding other activities that cause posterior glottic constriction.  In addition, he learned strategies for reduced posterior glottic impact during phonation.  He had a very difficult time reproducing flow mode phonation, which has been successful for him in the past, and thus we replaced this today with cup and bubbles phonation.  He was able to perform these with acceptable accuracy, but had minimal carryover to conversational speech.    The primary consequence of lower flow phonation during today's session appeared to be vocal roughness, but not vocal strain.  The patient is not upset by his voice quality.  He is scheduled for repeat laryngeal exam on  6-23.  If at that time, it appears that his speaking technique is contributing to ongoing granulation tissue, then we will pursue a more intensive course of voice specific therapy.  Please note, these future therapy sessions should be in person, as it appears that there may be some audio challenges at the patient's home that make voice aspects of virtual therapy challenging.     Candido Winter, Ph.D., [facility]-SLP  Speech-Language Pathologist-[facility]  [phone]  he/him/his    Speech recognition software may have been used in the above documentation; input is reviewed before signature to the best of my ability.

## 2023-05-16 NOTE — PATIENT INSTRUCTIONS
"VOICE EXERCISES (3x/day, 5 minutes at a time   -- Inhale SILENTLY and feel your belly fill with air  --Put the straw about 1/2 to 1 inch in the water   --\"Whoooooo\" (smooth bubbles like a motor boat or a simmering pot of water)  3x silently with just air for 5-7 seconds  3x Air > Voice > Air > Voice, very gently and quietly alternating   3x sliding up and down like sirens  Count from 0-10 (into the straw)  NOTE: If you hear the roughness/gravelliness come into your voice, make bigger bubbles    "

## 2023-06-01 ENCOUNTER — HEALTH MAINTENANCE LETTER (OUTPATIENT)
Age: 63
End: 2023-06-01

## 2023-06-02 ENCOUNTER — OFFICE VISIT (OUTPATIENT)
Dept: OTOLARYNGOLOGY | Facility: CLINIC | Age: 63
End: 2023-06-02
Payer: MEDICARE

## 2023-06-02 VITALS
WEIGHT: 234 LBS | SYSTOLIC BLOOD PRESSURE: 130 MMHG | HEIGHT: 70 IN | DIASTOLIC BLOOD PRESSURE: 75 MMHG | BODY MASS INDEX: 33.5 KG/M2 | HEART RATE: 104 BPM

## 2023-06-02 DIAGNOSIS — J38.7 LEUKOPLAKIA OF LARYNX: ICD-10-CM

## 2023-06-02 DIAGNOSIS — J38.3 VOCAL PROCESS GRANULOMA: ICD-10-CM

## 2023-06-02 DIAGNOSIS — R49.0 DYSPHONIA: Primary | ICD-10-CM

## 2023-06-02 DIAGNOSIS — R49.0 DYSPHONIA: ICD-10-CM

## 2023-06-02 DIAGNOSIS — Z85.21 HX OF LARYNGEAL CANCER: ICD-10-CM

## 2023-06-02 DIAGNOSIS — J38.7 LARYNGEAL GRANULOMA: Primary | ICD-10-CM

## 2023-06-02 DIAGNOSIS — J38.7 LARYNGEAL HYPERFUNCTION: ICD-10-CM

## 2023-06-02 PROCEDURE — 99213 OFFICE O/P EST LOW 20 MIN: CPT | Mod: 25 | Performed by: OTOLARYNGOLOGY

## 2023-06-02 PROCEDURE — 92524 BEHAVRAL QUALIT ANALYS VOICE: CPT | Mod: GN | Performed by: SPEECH-LANGUAGE PATHOLOGIST

## 2023-06-02 PROCEDURE — 31579 LARYNGOSCOPY TELESCOPIC: CPT | Performed by: OTOLARYNGOLOGY

## 2023-06-02 ASSESSMENT — PAIN SCALES - GENERAL: PAINLEVEL: NO PAIN (0)

## 2023-06-02 NOTE — PATIENT INSTRUCTIONS
1.  You were seen in the ENT Clinic today by . If you have any questions or concerns after your appointment, please call [phone]. Press option #1 for scheduling related needs. Press option #3 for Nurse advice.    2.   has recommended the following:   - continue speech therapy exercises as per Kassandra VALLADARES    3.  Plan is to return to clinic in 3-4 months      Lisa Hazel LPN  [phone]  [facility] - Otolaryngology

## 2023-06-02 NOTE — LETTER
6/2/2023      RE: Gary Iyer  [address]  [place] [address]       Dear Colleague:    Gary Iyer recently returned for follow-up at the [facility]. My clinic note from our visit is enclosed below.  Speech recognition software may have been used in the documentation below; input is reviewed before signature to the best of my ability.     I appreciate the ongoing opportunity to participate in this patient's care.    Please feel free to contact me with any questions.    Sincerely yours,      Mel Fournier M.D., M.P.H.  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  He later returned to the operating room on February 9, 2017 for an area of new irregularity of the left true vocal fold.  Pathology showed severe dysplasia with negative but close margins (for moderate, but not severe dysplasia).  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    We then observed a slight recurrence of leukoplakia/granuloma in the same location where he had this in 2018. This resolved with conservative management. Subsequently, he had allergies which were provoking coughing/throat-clearing, and he was again noted to have a granuloma. He was set up for refresher session(s) of speech therapy with Misty Clark, PhD, CCC-SLP and we discussed nasal hygiene as well. This led to near complete resolution of the granuloma, but subsequently he was noted to have slightly increased irritation along the left medial arytenoid wall.  Then he developed an upper respiratory infection and has been coughing, and had some shortness of breath. He states he was COVID  negative. His family had noted him grunting and throat-clearing frequently.    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    Today's updates:  - His voice seems stable, and he is talking less. Sometimes he is doing voice exercises. He worked with Candido Winter, PhD, CCC-SLP most recently. He feels like he has a hard time doing voice exercises, they can be overwhelming and he has trouble doing them when there is not immediate accountability.  - His swallow and breathing are stable.  - No new past medical history/past surgical history.  - He recently had an upper respiratory infection, but this resolved.      MEDICATIONS:     Current Outpatient Medications   Medication Sig Dispense Refill    ACCU-CHEK GUIDE test strip TEST ONCE DAILY 100 strip 1    alcohol swab prep pads USE TO SWAB AREA OF INJECTION/OLIMPIA 100 each 1    allopurinol (ZYLOPRIM) 300 MG tablet TAKE 1 TABLET(300 MG) BY MOUTH DAILY 90 tablet 3    amLODIPine (NORVASC) 2.5 MG tablet Take 1 tablet (2.5 mg) by mouth daily 90 tablet 3    aspirin 81 MG tablet Take 1 tablet by mouth daily.      atorvastatin (LIPITOR) 20 MG tablet Take 1 tablet (20 mg) by mouth daily 90 tablet 3    blood glucose (ACCU-CHEK GUIDE) test strip Use to test blood sugar 1 times daily or as directed. 100 strip 11    blood glucose calibration (NO BRAND SPECIFIED) solution To accompany: Blood Glucose Monitor Brands: per insurance. 1 Bottle 3    Blood Glucose Monitoring Suppl (ACCU-CHEK GUIDE ME) w/Device KIT USE TO TEST ONCE DAILY 1 kit 0    calcipotriene (DOVONEX) 0.005 % external cream Mix with efudex and apply twice daily to hands and arms for 12 days 60 g 3    cetirizine (ZYRTEC) 10 MG tablet Take 1 tablet (10 mg) by mouth daily 90 tablet 3    fluorouracil (EFUDEX) 5 % external cream Mix with dovonex and apply twice daily to hands and arms for 12 days 40 g 1     hydrochlorothiazide (HYDRODIURIL) 50 MG tablet Take 1 tablet (50 mg) by mouth daily 90 tablet 3    hydrocortisone (CORTAID) 1 % external ointment Apply topically 2 times daily as needed 30 g 1    insulin pen needle (31G X 6 MM) 31G X 6 MM miscellaneous Use one pen needle daily or as directed. ( for Victoza ) 90 each 3    ketoconazole (NIZORAL) 2 % external cream Apply topically 2 times daily 60 g 0    ketoconazole (NIZORAL) 2 % external cream Apply daily to affected area on face, armpits, groin. 60 g 4    ketoconazole (NIZORAL) 2 % external shampoo Leave on face, beard for 3-5 minutes then rinse. Use 2-3 times weekly. 120 mL 3    liraglutide (VICTOZA) 18 MG/3ML solution 0.6 mg daily subcutaneous for one week, then increase to 1.2 mg daily 6 mL 3    losartan (COZAAR) 50 MG tablet Take 1 tablet (50 mg) by mouth daily 90 tablet 3    metFORMIN (GLUCOPHAGE) 1000 MG tablet Take 1 tablet (1,000 mg) by mouth 2 times daily (with meals) 180 tablet 1    methocarbamol (ROBAXIN) 500 MG tablet Take 1 tablet (500 mg) by mouth 2 times daily as needed for muscle spasms 60 tablet 0    metroNIDAZOLE (METROCREAM) 0.75 % external cream Apply topically 2 times daily as needed (to rosacea areas) 45 g 1    Misc. Devices (SUPPOSITORY MOLD 2GM) MISC 1 suppository every 12 hours Nifedipine 4 mg suppository, one rectally every 12 hours 100 each 4    Multiple Vitamin (DAILY MULTIVITAMIN PO) Take by mouth daily      nystatin (MYCOSTATIN) 075627 UNIT/GM external powder Apply topically 2 times daily Prevention of rash 60 g 0    omeprazole (PRILOSEC) 20 MG DR capsule TAKE 1 CAPSULE BY MOUTH DAILY 90 capsule 3    order for DME Autocpap 10-16 cm 1 Units 0    OZEMPIC, 0.25 OR 0.5 MG/DOSE, 2 MG/1.5ML SOPN pen INJECT 0.5MG UNDER THE SKIN EVERY 7 DAYS 3 mL 3    PARoxetine (PAXIL) 40 MG tablet TAKE 1 TABLET(40 MG) BY MOUTH EVERY MORNING 90 tablet 3    polyethylene glycol (MIRALAX) 17 GM/Dose powder Take 17 g (1 capful) by mouth daily 850 g 3    psyllium  0.52 G capsule Take 1 capsule (0.52 g) by mouth daily 100 capsule 3    RESERPINE-HYDROCHLOROTHIAZIDE OR daily      semaglutide (OZEMPIC) 2 MG/3ML SOPN pen Inject 0.5 mg Subcutaneous every 7 days 3 mL 3    tamsulosin (FLOMAX) 0.4 MG capsule Take 2 capsules (0.8 mg) by mouth daily TAKE 2 CAPSULES BY MOUTH DAILY 180 capsule 3    thin (NO BRAND SPECIFIED) lancets Use with lanceting device. To accompany: Blood Glucose Monitor Brands: per insurance. 100 each 11    triamcinolone (KENALOG) 0.1 % external cream Apply topically 2 times daily as needed for irritation 30 g 0       ALLERGIES:  No Known Allergies    NEW PMH/PSH: None    REVIEW OF SYSTEMS:  The patient completed a comprehensive 11 point review of systems (below), which was reviewed. Positives are as noted below.  Patient Supplied Answers to Review of Systems       PHYSICAL EXAM:  General: The patient was alert and conversant, and in no acute distress.    Oral cavity/oropharynx: No masses or lesions. Dentition unchanged since prior. Tongue mobility and palate elevation intact and symmetric.  Neck: No palpable cervical lymphadenopathy, no  tenderness to palpation of the thyrohyoid space, which was narrow. No obvious thyroid abnormality.  Resp: Breathing comfortably, no stridor or stertor.  Neuro: Symmetric facial function. Other cranial nerve function as documented above.  Psych: Normal affect, pleasant and cooperative.  Voice/speech: Mild dysphonia characterized by roughness and strain.      Procedure:   Flexible fiberoptic laryngoscopy and laryngovideostroboscopy  Indications: This procedure was warranted to evaluate the patient's laryngeal anatomy and function. Risks, benefits, and alternatives were discussed.  Description: After written informed consent was obtained, a time-out was performed to confirm patient identity, procedure, and procedure site. Topical 3% lidocaine with 0.25% phenylephrine was applied to the nasal cavities. I performed the endoscopy and no  complications were apparent. Continuous and stroboscopic light were utilized to assess routine phonation and variable frequency phonation.  Performed by: Mel Fournier MD [facility]  SLP: Kassandra Coleman MS CCC-SLP   Findings: Normal nasopharynx. Normal base of tongue, valleculae, and epiglottis. Vocal fold mobility: right: normal; left: normal. Medial edges of the true vocal folds: smooth and straight. Mild leukoplakia left superior/lateral true vocal fold, stable compared to prior. Glissade produced appropriate elongation. There was moderate supraglottic recruitment with connected speech. Mucosa of false vocal folds, aryepiglottic folds, piriform sinuses, and posterior glottis unremarkable. Airway was patent.   Similar findings on NBI.    The addition of stroboscopy allowed evaluation of the mucosal wave.   Amplitude: right: mildly decreased; left: mildly decreased. Symmetry: intermittent symmetry. Closure pattern: complete. Closure plane: at glottic level. Phase distribution: normal.                                      IMPRESSION AND PLAN:   Gary Iyer returns with a larger right granuloma. He did have a recent upper respiratory infection. 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    Plan:  1) Continue speech therapy exercises. He will identify priority exercises with Kassandra Coleman MS CCC-SLP today as he finds the full set of exercises to be too much.  2) He will return to clinic in three to four months.    I appreciate the opportunity to participate in the care of this pleasant patient.     I spent a total of 21 minutes on 6/2/2023 in chart review, review of tests, patient visit, documentation, care coordination, and/or discussion with other providers about the issues documented above, separate from any documented procedure(s).        Mel Fournier MD

## 2023-06-02 NOTE — PROGRESS NOTES
Dear Colleague:    Gary Iyer recently returned for follow-up at the [facility]. My clinic note from our visit is enclosed below.  Speech recognition software may have been used in the documentation below; input is reviewed before signature to the best of my ability.     I appreciate the ongoing opportunity to participate in this patient's care.    Please feel free to contact me with any questions.    Sincerely yours,      Mel Fournier M.D., M.P.H.  , Laryngology  Director, 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  He later returned to the operating room on February 9, 2017 for an area of new irregularity of the left true vocal fold.  Pathology showed severe dysplasia with negative but close margins (for moderate, but not severe dysplasia).  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    We then observed a slight recurrence of leukoplakia/granuloma in the same location where he had this in 2018. This resolved with conservative management. Subsequently, he had allergies which were provoking coughing/throat-clearing, and he was again noted to have a granuloma. He was set up for refresher session(s) of speech therapy with Misty Clark, PhD, CCC-SLP and we discussed nasal hygiene as well. This led to near complete resolution of the granuloma, but subsequently he was noted to have slightly increased irritation along the left medial arytenoid wall.  Then he developed an upper respiratory infection and has been coughing, and had some shortness of breath. He states he was COVID negative. His family had noted him grunting and throat-clearing  frequently.    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    Today's updates:  - His voice seems stable, and he is talking less. Sometimes he is doing voice exercises. He worked with Candido Winter, PhD, CCC-SLP most recently. He feels like he has a hard time doing voice exercises, they can be overwhelming and he has trouble doing them when there is not immediate accountability.  - His swallow and breathing are stable.  - No new past medical history/past surgical history.  - He recently had an upper respiratory infection, but this resolved.      MEDICATIONS:     Current Outpatient Medications   Medication Sig Dispense Refill     ACCU-CHEK GUIDE test strip TEST ONCE DAILY 100 strip 1     alcohol swab prep pads USE TO SWAB AREA OF INJECTION/OLIMPIA 100 each 1     allopurinol (ZYLOPRIM) 300 MG tablet TAKE 1 TABLET(300 MG) BY MOUTH DAILY 90 tablet 3     amLODIPine (NORVASC) 2.5 MG tablet Take 1 tablet (2.5 mg) by mouth daily 90 tablet 3     aspirin 81 MG tablet Take 1 tablet by mouth daily.       atorvastatin (LIPITOR) 20 MG tablet Take 1 tablet (20 mg) by mouth daily 90 tablet 3     blood glucose (ACCU-CHEK GUIDE) test strip Use to test blood sugar 1 times daily or as directed. 100 strip 11     blood glucose calibration (NO BRAND SPECIFIED) solution To accompany: Blood Glucose Monitor Brands: per insurance. 1 Bottle 3     Blood Glucose Monitoring Suppl (ACCU-CHEK GUIDE ME) w/Device KIT USE TO TEST ONCE DAILY 1 kit 0     calcipotriene (DOVONEX) 0.005 % external cream Mix with efudex and apply twice daily to hands and arms for 12 days 60 g 3     cetirizine (ZYRTEC) 10 MG tablet Take 1 tablet (10 mg) by mouth daily 90 tablet 3     fluorouracil (EFUDEX) 5 % external cream Mix with dovonex and apply twice daily to hands and arms for 12 days 40 g 1     hydrochlorothiazide (HYDRODIURIL) 50 MG tablet Take 1  tablet (50 mg) by mouth daily 90 tablet 3     hydrocortisone (CORTAID) 1 % external ointment Apply topically 2 times daily as needed 30 g 1     insulin pen needle (31G X 6 MM) 31G X 6 MM miscellaneous Use one pen needle daily or as directed. ( for Victoza ) 90 each 3     ketoconazole (NIZORAL) 2 % external cream Apply topically 2 times daily 60 g 0     ketoconazole (NIZORAL) 2 % external cream Apply daily to affected area on face, armpits, groin. 60 g 4     ketoconazole (NIZORAL) 2 % external shampoo Leave on face, beard for 3-5 minutes then rinse. Use 2-3 times weekly. 120 mL 3     liraglutide (VICTOZA) 18 MG/3ML solution 0.6 mg daily subcutaneous for one week, then increase to 1.2 mg daily 6 mL 3     losartan (COZAAR) 50 MG tablet Take 1 tablet (50 mg) by mouth daily 90 tablet 3     metFORMIN (GLUCOPHAGE) 1000 MG tablet Take 1 tablet (1,000 mg) by mouth 2 times daily (with meals) 180 tablet 1     methocarbamol (ROBAXIN) 500 MG tablet Take 1 tablet (500 mg) by mouth 2 times daily as needed for muscle spasms 60 tablet 0     metroNIDAZOLE (METROCREAM) 0.75 % external cream Apply topically 2 times daily as needed (to rosacea areas) 45 g 1     Misc. Devices (SUPPOSITORY MOLD 2GM) MISC 1 suppository every 12 hours Nifedipine 4 mg suppository, one rectally every 12 hours 100 each 4     Multiple Vitamin (DAILY MULTIVITAMIN PO) Take by mouth daily       nystatin (MYCOSTATIN) 671874 UNIT/GM external powder Apply topically 2 times daily Prevention of rash 60 g 0     omeprazole (PRILOSEC) 20 MG DR capsule TAKE 1 CAPSULE BY MOUTH DAILY 90 capsule 3     order for DME Autocpap 10-16 cm 1 Units 0     OZEMPIC, 0.25 OR 0.5 MG/DOSE, 2 MG/1.5ML SOPN pen INJECT 0.5MG UNDER THE SKIN EVERY 7 DAYS 3 mL 3     PARoxetine (PAXIL) 40 MG tablet TAKE 1 TABLET(40 MG) BY MOUTH EVERY MORNING 90 tablet 3     polyethylene glycol (MIRALAX) 17 GM/Dose powder Take 17 g (1 capful) by mouth daily 850 g 3     psyllium 0.52 G capsule Take 1 capsule (0.52  g) by mouth daily 100 capsule 3     RESERPINE-HYDROCHLOROTHIAZIDE OR daily       semaglutide (OZEMPIC) 2 MG/3ML SOPN pen Inject 0.5 mg Subcutaneous every 7 days 3 mL 3     tamsulosin (FLOMAX) 0.4 MG capsule Take 2 capsules (0.8 mg) by mouth daily TAKE 2 CAPSULES BY MOUTH DAILY 180 capsule 3     thin (NO BRAND SPECIFIED) lancets Use with lanceting device. To accompany: Blood Glucose Monitor Brands: per insurance. 100 each 11     triamcinolone (KENALOG) 0.1 % external cream Apply topically 2 times daily as needed for irritation 30 g 0       ALLERGIES:  No Known Allergies    NEW PMH/PSH: None    REVIEW OF SYSTEMS:  The patient completed a comprehensive 11 point review of systems (below), which was reviewed. Positives are as noted below.  Patient Supplied Answers to Review of Systems       PHYSICAL EXAM:  General: The patient was alert and conversant, and in no acute distress.    Oral cavity/oropharynx: No masses or lesions. Dentition unchanged since prior. Tongue mobility and palate elevation intact and symmetric.  Neck: No palpable cervical lymphadenopathy, no  tenderness to palpation of the thyrohyoid space, which was narrow. No obvious thyroid abnormality.  Resp: Breathing comfortably, no stridor or stertor.  Neuro: Symmetric facial function. Other cranial nerve function as documented above.  Psych: Normal affect, pleasant and cooperative.  Voice/speech: Mild dysphonia characterized by roughness and strain.      Procedure:   Flexible fiberoptic laryngoscopy and laryngovideostroboscopy  Indications: This procedure was warranted to evaluate the patient's laryngeal anatomy and function. Risks, benefits, and alternatives were discussed.  Description: After written informed consent was obtained, a time-out was performed to confirm patient identity, procedure, and procedure site. Topical 3% lidocaine with 0.25% phenylephrine was applied to the nasal cavities. I performed the endoscopy and no complications were apparent.  Continuous and stroboscopic light were utilized to assess routine phonation and variable frequency phonation.  Performed by: Mel Fournier MD MPH  SLP: Kassandra Coleman MS CCC-SLP   Findings: Normal nasopharynx. Normal base of tongue, valleculae, and epiglottis. Vocal fold mobility: right: normal; left: normal. Medial edges of the true vocal folds: smooth and straight. Mild leukoplakia left superior/lateral true vocal fold, stable compared to prior. Glissade produced appropriate elongation. There was moderate supraglottic recruitment with connected speech. Mucosa of false vocal folds, aryepiglottic folds, piriform sinuses, and posterior glottis unremarkable. Airway was patent.   Similar findings on NBI.    The addition of stroboscopy allowed evaluation of the mucosal wave.   Amplitude: right: mildly decreased; left: mildly decreased. Symmetry: intermittent symmetry. Closure pattern: complete. Closure plane: at glottic level. Phase distribution: normal.                                      IMPRESSION AND PLAN:   Gary Iyer returns with a larger right granuloma. He did have a recent upper respiratory infection. 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    Plan:  1) Continue speech therapy exercises. He will identify priority exercises with Kassandra Coleman MS CCC-SLP today as he finds the full set of exercises to be too much.  2) He will return to clinic in three to four months.    I appreciate the opportunity to participate in the care of this pleasant patient.     I spent a total of 21 minutes on 6/2/2023 in chart review, review of tests, patient visit, documentation, care coordination, and/or discussion with other providers about the issues documented above, separate from any documented procedure(s).

## 2023-06-02 NOTE — PROGRESS NOTES
"[facility]  Clinical Voice and Upper Airway Evaluation Report    Patient's Name: Gary Iyer  Date of Evaluation: 6/2/2023  Providing SLP: Kassandra Coleman MS CCC-SLP  Seen in Conjunction With: Mel Fournier MD MPH  Insurance Coverage: Medicare   Chief Complaint: Dysphonia  Evaluation Location: [facility]  Time of Evaluation: 11:18 AM - 12:20 PM      Patient History:   Gary is a 62-year-old male who is a longstanding patient of Dr. Fournier's and Dr. Winter's secondary to recurrent laryngeal squamous cell carcinoma, laryngeal hyperfunction, and vocal process granuloma. He has been working with Dr. Winter intermittently. His impressions from their most recent therapy session on 5/16/23 included:     \"Mr. Iyer presents today with good maintenance of previously learned strategies for cough suppression and avoiding other activities that cause posterior glottic constriction.  In addition, he learned strategies for reduced posterior glottic impact during phonation.  He had a very difficult time reproducing flow mode phonation, which has been successful for him in the past, and thus we replaced this today with cup and bubbles phonation.  He was able to perform these with acceptable accuracy, but had minimal carryover to conversational speech.     The primary consequence of lower flow phonation during today's session appeared to be vocal roughness, but not vocal strain.  The patient is not upset by his voice quality.  He is scheduled for repeat laryngeal exam on 6-23.  If at that time, it appears that his speaking technique is contributing to ongoing granulation tissue, then we will pursue a more intensive course of voice specific therapy.  Please note, these future therapy sessions should be in person, as it appears that there may be some audio challenges at the patient's home that make voice aspects of virtual therapy challenging.\"    Gary presents for " "repeat laryngoscopy today with Dr. Fournier. He feels that his voice has been doing okay at this time. During his various courses of voice therapy, he endorses that he is diligent with exercises for a few weeks after ending his session but then will lose interest and stop doing them. For example, he reported today that he resumed his exercises a couple of days before today's appointment. He reports having a URI a couple of weeks ago with some coughing. He denies dysphagia and dyspnea at this time.      Quality of Life Questionnaires:    Patient Supplied Answers To Last 2 VHI Questionnaires      11/16/2020     1:00 PM 8/15/2022     2:00 PM   Voice Handicap Index (VHI-10)   My voice makes it difficult for people to hear me 0 0   People have difficulty understanding me in a noisy room 1 0   My voice difficulties restrict my personal and social life.  1 0   I feel left out of conversations because of my voice 0 0   My voice problem causes me to lose income 1 0   I feel as though I have to strain to produce voice 1 0   The clarity of my voice is unpredictable 0 0   My voice problem upsets me 0 0   My voice makes me feel handicapped 1 0   People ask, \"What's wrong with your voice?\" 0 0   VHI-10 5 0     Patient Supplied Answers To Last 2 CSI Questionnaires      3/1/2019     7:39 AM 8/15/2022     2:00 PM   Cough Severity Index (CSI)   My cough is worse when I lie down 0 0   My coughing problem causes me to restrict my personal and social life 0 0   I tend to avoid places because of my cough problem 0 0   I feel embarrassed because of my coughing problem 0 0   People ask, ''What's wrong?'' because I cough a lot 0 0   I run out of air when I cough 1 0   My coughing problem affects my voice 0 0   My coughing problem limits my physical activity 1 0   My coughing problem upsets me 1 0   People ask me if I am sick because I cough a lot 0 0   CSI Score 3 0     Patient Supplied Answers To Last 2 EAT Questionnaires      3/1/2019     " "7:40 AM 8/15/2022     2:00 PM   Eating Assessment Tool (EAT-10)   My swallowing problem has caused me to lose weight 1 0   My swallowing problem interferes with my ability to go out for meals 0 0   Swallowing liquids takes extra effort 1 0   Swallowing solids takes extra effort 1 0   Swallowing pills takes extra effort 1 0   Swallowing is painful 0 0   The pleasure of eating is affected by my swallowing 0 0   When I swallow food sticks in my throat 0 0   I cough when I eat 1 0   Swallowing is stressful 0 0   EAT-10 5 0       Perceptual Analysis (48438): Evaluation of Voice / Speech / Non-Communicative Laryngeal Behaviors    The GRBAS is a perceptual rating of voice change. 0 indicated no impairment, 3 indicates a severe impairment. \"C\" and \"I\" may be used to signify if these feature was observed consistently or intermittently respectively. This is a rating based on clinical judgement of disordered voice quality.  G ( 1-2 ) General Dysphonia     R ( 1-2 ) Roughness     B ( 0 ) Breathiness     A ( 0 ) Asthenia     S ( 1 ) Strain    *Validity of this measure may be slightly reduced when performed via acoustic signal from virtual visit*    Additional observations:     Coughing / throat clearing: not observed today    Breathing patterns: WNL at rest     Overt tension: \" \"    Habitual pitch: WNL for age- and gender-matched peers    Pitch range: \" \"    Maximum phonation time at normal pitch and loudness on /a/ vowel: not assessed today    Resonance: primarily back-focused    Loudness: appropriate      Laryngoscopy with stroboscopy:    Provider performing exam: Mel Fournier MD MPH    Informed consent: Informed verbal consent was obtained, which includes potential side-effects, risks, and benefits of the procedure.     Anesthetic: Topical anesthesia with 3% lidocaine and 0.25% phenylephrine was applied the nostrils bilaterally.     Scope type: A distal chip flexible laryngoscope was passed through the nare with LED and " "xenon light source(s).     The laryngeal and pharyngeal structures were evaluated for gross appearance, mobility, function, and focal lesions / abnormalities of the associated mucosa.    Velar Function: complete closure without bubbling of secretions and no weakness observed     General Appearance  o R vocal process lesion off the medial border of the arytenoid complex, which appears larger than his previous laryngoscopy  o Small L vocal process lesion off the medial border of the arytenoid complex  o Mild interarytenoid pachydermia    Secretions: WNL     Subglottis Appearance: visible portion is patent     R Arytenoid Abduction / Adduction: symmetrical and timely ab/adduction with appropriate range of motion     L Arytenoid Abduction / Adduction: \" \"     Mediolateral Compression: mild to moderate with connected speech and singing    Anteroposterior Compression: mild with phonation    Vocal Fold Elongation: appropriate     Left (L) Vocal Fold Edge and Mucosa: white with small lesion at the vocal process    Right (R) Vocal Fold Edge: white with smooth and straight vibratory edge    Narrow Band Imaging: similar findings as halogen light source    Glottic Closure: complete     Phase Closure: predominantly open phase     Vertical Level of Approximation: true vocal fold appear to adduct at the same height     L Amplitude: WNL     R Amplitude: WNL    L Mucosal Wave: WNL     R Mucosal Wave: WNL     Phase Symmetry: symmetrical     Periodicity: periodic     Inhalation Phonation: similar findings to exhalation phonation     Other Observations: intermittent hard breath-hold maneuvers observed with the true vocal folds and supraglottic structures      Impressions and Plan:     Gary is a 62-year-old male presenting today with dysphonia R49.0 secondary to 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 Perceptually, his voice is mild to moderately rough and strained. Laryngoscopy today demonstrated a R vocal " process lesion, which is larger than his previous laryngoscopy. Supraglottic hyperfunction was also observed. Gary and I planned to quickly review his voice exercises at the end of his appointment with Dr. Fournier today; however, he had numerous questions about how often, how long, and general technique for the exercises, so it was felt that it would be more beneficial for him to undergo another course of voice therapy with my colleague, Candido Winter, PhD CCC-SLP, who has worked with him extensively in the past. Gary was reminded of the importance of continuing to perform his voice therapy exercises outside of his therapy sessions for maintenance, as he endorses that he can be quick to forget about the techniques between sessions. Gary is in agreement with this plan of care and will follow-up with Dr. Fournier in 3-4 months.      Goals:      Coordinate phonatory and respiratory subsystems, demonstrating adequate independence for activities of daily communication     Decrease extrinsic laryngeal muscle activity during communication events     Improve voice quality in all activities of daily living using, as measured by a minimum of a 50% point reduction on the Voice Handicap Index-10 or Singing VHI    Demonstrate appropriate vocal hygiene including, but not limited to, adequate hydration, avoiding vocal abuse, integrating behavioral and dietary changes for GERD into their daily life?.     Incorporate behaviors to reduce irritation and promote laryngeal healing and prevent recurrence.?     Implement behavioral strategies to reduce laryngopharyngeal reflux. ?     Independently maintain a forward focus voice placement 90% of the time during conversational speech.    Independently perform the Vocal Function Exercises, rebalancing respiration, phonation, and resonance 90% of the time    Perform High Level Phonation and Pitch Range Navigation Exercises independently 90% of the time    Patient will express  satisfaction with their voice quality and function and their ability to participate in social, personal, and work/school functions without limitation      Billed Procedures:      Individual speech therapy session 61312    Perceptual voice assessment 12060    Assessment and treatment time: 62 minutes    Chart review, interpretation of testing, documentation preparation, etc: 26 minutes      Medicare Certification Dates:  23 - 23      Thank you for allowing me to participate in this patient's care,    Kassandra Coleman MS CCC-SLP  Speech-Language Pathologist  [facility]  Department of Otolaryngology - Head and Neck Surgery  [facility]  [email or web address]  Direct: [phone]  [phone]    *This note may have been completed using [id number] dictation software, so errors may exist. Please contact me for clarification if needed*

## 2023-06-06 ENCOUNTER — OFFICE VISIT (OUTPATIENT)
Dept: OTOLARYNGOLOGY | Facility: CLINIC | Age: 63
End: 2023-06-06
Payer: COMMERCIAL

## 2023-06-06 DIAGNOSIS — J38.7 LARYNGEAL GRANULOMA: ICD-10-CM

## 2023-06-06 DIAGNOSIS — R49.0 DYSPHONIA: Primary | ICD-10-CM

## 2023-06-06 PROCEDURE — 92507 TX SP LANG VOICE COMM INDIV: CPT | Mod: GN | Performed by: SPEECH-LANGUAGE PATHOLOGIST

## 2023-06-06 NOTE — LETTER
"6/6/2023       RE: Gary Iyer  [address] [address]     Dear Colleague,    Thank you for referring your patient, Gary Iyer, to the [facility] [place] [facility]. Please see a copy of my visit note below.    THERAPY NOTE (CPT 03273)  Date of Service: 6/6/2023  Mr. 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    SUBJECTIVE / OBJECTIVE:  Since our most-recent session, Mr. Iyer underwent repeat laryngeal exam with Dr. Fournier on 6/2/2023, which demonstrated increased size of right-sided vocal fold granuloma.  Patient was referred for voice therapy for reduction of laryngeal hyperfunction which was likely contributing to increasing size of granuloma    THERAPEUTIC ACTIVITIES  Counseling and Education  Asked many questions about the nature of his symptoms, and I answered all of these thoroughly.  Strategies for reducing posterior glottic impact  Identified periods were patient had semi-- Valsalva breath-holding, particularly when trying to think of the next thing to say.  These were replaced with slow sustained exhalation to avoid excess abduction of the vocal folds  [place] to add phonation to an optimal flowing air stream  Identified sensation of \"easiness\" in throat using cup and bubbles  Performed on sustained and variegated pitch patterns  Gradually applied at this sensation of \"easiness\" to cardinal vowels and to /h/-loaded sentences in chant mode, with gradually normalized prosodic variation  Cues to \"keep the air flowing\", and to reduce loudness level, as well as to take bigger prephonation inhalation helped to increase patient accuracy, as did simple cues to reduce loudness level  Ultimately, the patient was able to apply " "this to conversational speech with approximately 80% accuracy with moderate to max cueing provided by this clinician and by the patient's wife.      Concepts of an optimal regimen for practice were instructed.  He should use an interval schedule of practice, with brief periods of practice frequently throughout each day  Banquete concepts of volitional practice to facilitate motor learning.  A revised regimen for home practice was instructed.  I provided an audio recording and handouts of today's therapeutic activities to facilitate practice.    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 Mr. Iyer demonstrated good learning of strategies for reducing posterior glottic impact.  He benefited the most from modified flow mode phonation with increased awareness of \"easy\" sensation in throat learned during cup and bubbles phonation.  Ongoing skilled intervention is required in order to increase patient independence with the above voice techniques.  The patient is scheduled to see me for 2 additional sessions in October, but we will try to have him scheduled earlier if an opening comes up.    Candido Winter, Ph.D., [facility]-SLP  Speech-Language Pathologist-[facility]  [phone]  he/him/his    Speech recognition software may have been used in the above documentation; input is reviewed before signature to the best of my ability.         Again, thank you for allowing me to participate in the care of your patient.      Sincerely,    Candido Winter, SLP      "

## 2023-06-11 ENCOUNTER — TELEPHONE (OUTPATIENT)
Dept: FAMILY MEDICINE | Facility: CLINIC | Age: 63
End: 2023-06-11
Payer: MEDICARE

## 2023-06-11 NOTE — TELEPHONE ENCOUNTER
Reason for Call:  Appointment Request    Patient requesting this type of appt: Chronic Diease Management/Medication/Follow-Up    Requested provider: Finesse Beal    Reason patient unable to be scheduled: Not within requested timeframe    When does patient want to be seen/preferred time: 3-7 days    Comments:  wants you asap based on lab results    Could we send this information to you in [place] or would you prefer to receive a phone call?:   Patient would prefer a phone call   Okay to leave a detailed message?: Yes at Home number on file [phone] (home)    Call taken on 6/11/2023 at 2:59 PM by Caryl Colin

## 2023-06-12 NOTE — TELEPHONE ENCOUNTER
Spoke to patient on the phone. Patient is calling to schedule son due to labs. Not self.      Gemma Brian -    [facility]

## 2023-06-12 NOTE — PROGRESS NOTES
Outpatient Speech Language Therapy Evaluation  PLAN OF TREATMENT FOR OUTPATIENT REHABILITATION  (COMPLETE FOR INITIAL CLAIMS ONLY)  Patient's Last Name, First Name, M.I.  YOB: 1960  Gary Iyer                           Provider's Name  Kassandra Coleman, SLP Medical Record No.  [id number]                               Onset Date:  6/02/23   Start of Care Date: 6/02/23     Type: Speech Language Therapy Medical Diagnosis: Dysphonia [R49.0]                        Therapy Diagnosis:  Dysphonia [R49.0]   Visits from SOC:  1   _________________________________________________________________________________  Plan of Treatment:   Speech therapy    DURATION/FREQUENCY OF TREATMENT  Six weekly, one-hour sessions, with two monthly one-hour follow-up sessions    PROGNOSIS  Good prognosis for voice improvement with speech therapy and regular practice of therapeutic activities.    BARRIERS TO LEARNING/TEACHING AND LEARNING NEEDS  None/Unremarkable    Goals:    Coordinate phonatory and respiratory subsystems, demonstrating adequate independence for activities of daily communication     Decrease extrinsic laryngeal muscle activity during communication events     Improve voice quality in all activities of daily living using, as measured by a minimum of a 50% point reduction on the Voice Handicap Index-10 or Singing VHI    Demonstrate appropriate vocal hygiene including, but not limited to, adequate hydration, avoiding vocal abuse, integrating behavioral and dietary changes for GERD into their daily life?.     Incorporate behaviors to reduce irritation and promote laryngeal healing and prevent recurrence.?     Implement behavioral strategies to reduce laryngopharyngeal reflux. ?     Independently maintain a forward focus voice placement 90% of the time during conversational speech.    Independently perform the Vocal Function  Exercises, rebalancing respiration, phonation, and resonance 90% of the time    Perform High Level Phonation and Pitch Range Navigation Exercises independently 90% of the time    Patient will express satisfaction with their voice quality and function and their ability to participate in social, personal, and work/school functions without limitation  _________________________________________________________________________________    I CERTIFY THE NEED FOR THESE SERVICES FURNISHED UNDER        THIS PLAN OF TREATMENT AND WHILE UNDER MY CARE     (Physician attestation of this document indicates review and certification of the therapy plan).     Certification date from: 6/02/23  Certification date to: 8/31/23    Referring Provider: Mel Fournier

## 2023-06-14 NOTE — PROGRESS NOTES
Medicare Certification  Physician Attestation for Therapy   I agree with the information in this note.    Mel Fournier MD

## 2023-07-11 ENCOUNTER — PATIENT OUTREACH (OUTPATIENT)
Dept: GERIATRIC MEDICINE | Facility: CLINIC | Age: 63
End: 2023-07-11
Payer: COMMERCIAL

## 2023-07-11 NOTE — LETTER
July 11, 2023      GARY HENNING  [address] [place] [place] [address]      Dear Gary:    Welcome to [facility] health program. My name is Marya Mondragon RN. I m your care coordinator. You re eligible for care coordination benefits through your [facility] plan.    As your care coordinator, we ll:  Meet to go over your care coordination benefits  Talk about your physical and mental health care needs   Review your preventative care needs  Create a plan that meets your needs with the services you choose    I won t replace your CADI waiver . Your waiver  and I work together to coordinate your Medicaid and waiver benefits.    What happens next?  I ll call you soon to introduce myself and tell you more about my role. We ll then plan time to go over your health and safety needs. Our goal is to keep you as healthy and independent as possible.    [facility] combines the benefits you may already have from Medical Assistance, Medicare and the Prescription Drug Coverage Program. Soon you ll receive a new member identification (ID) card from [facility]. Use this card whenever you get health services.    The [facility] care coordination program is voluntary and offered to you at no cost. If you wish to stop being in the care coordination program or have questions, call me at [phone]. If you reach my voicemail, leave a message and your phone number. TTY users, call the [place] Rel[employer] at 913 or [phone] (mrzdlr-dd-gzltwv relay service).    Sincerely,    Marya Mondragon RN    E-mail: [email or web address]  Phone: [phone]    Care Manager  [facility] ([facility] D-SNP) is a health plan that contracts with both Medicare and the [place] Medical Assistance (Medicaid) program to provide benefits of both programs to enrollees. Enrollment in [facility] depends on contract renewal.    [id number] accepted  [id number]       [id number] (07/2022)

## 2023-07-11 NOTE — PROGRESS NOTES
[facility] Care Coordination Contact    Member became effective with  [employer] on 7/1/2023 with [facility]  Previous Health Plan: [facility]  Previous Care System: [facility]  Previous care coordinators name and number: Ivett Diaz ( [facility]) & Brandon Albrecht ( [employer])  Waiver Type: CADI  Last MMIS Entry: Date 4/18/2023 and Type 01 COMM W/SVC  MMIS visit date (and type) if different from above: 2/24/2023 02 COMM N/PGM  Services Listed in MMIS: A3 F [facility]-CSP  UTF received: No: Requested on 7/11/2023  Address/Phone discrepancy: N/A  [facility] mailed  Mailed [facility] leave behind    Saige Vasquez  Case Management Specialist   [facility]  [phone]

## 2023-07-17 ENCOUNTER — PATIENT OUTREACH (OUTPATIENT)
Dept: GERIATRIC MEDICINE | Facility: CLINIC | Age: 63
End: 2023-07-17
Payer: COMMERCIAL

## 2023-07-17 NOTE — PROGRESS NOTES
7/24/23 CC was able to get MMIS to accept the entry for ROV.  Juany Mondragon RN BA PHN  [facility] Case Management  [phone]            [facility] Refusal Telephone Assessment    Member refused home visit HRA on 7/17/23 (reason: He already has a care coordinator with the CADI programand does not want another care coordinator it is too confusing and he does not like to answer a lot of questions. He will accept CC calling every 6 months).    ER visits: No  Hospitalizations: No  Health concerns: no  Falls/Injuries: No  ADL/IADL Dependencies: through CAC program but CC will need to contact the CAC Care Coordinator for more information.        Member currently receiving the following home care services:     Member currently receiving the following community resources:    Informal support(s):     CC did speak with his CADI care coordinator Sanna Rueda and he does have PCA 2.5 hrs /day through [employer] and IHS 15 hrs per week through [employer]    [facility] sponsored benefits: Shared information re: Silver Sneakers/gym memberships, ASA, Calcium +D.    Follow-Up Plan: Member informed of future contact, plan to f/u with member with a 6 month telephone assessment and offer a home visit.  Contact information shared with member and family, encouraged member to call with any questions or concerns at any time.    This CC note routed to PCP, Finesse Beal

## 2023-07-17 NOTE — LETTER
July 19, 2023    GARY HENNING  [address] [place] [address]        Dear Gary:    As a member of [facility] program, we offer a health risk assessment at no cost to you. I know you don't want to have the assessment right now. If you change your mind, please call me at the number below.    Who performs the health risk assessment?  A [facility] Care Coordinator performs the assessment. Our Care Coordinators can also help you understand your benefits. They can tell you about services to aid you at home, such as managing your care with your doctors if your health worsens.    Our Care Coordinators are here for you if you need:  Support for activities you used to do by yourself (including making meals, bathing, and paying bills)  Equipment for bathroom or home safety  Help finding a new place to live  Information on staying healthy, preventing falls and immunizations    Questions?  If you have questions, or you would like to do the assessment, call me at [phone]. TTY users call [phone]. I'm here from 8am to 5pm. I may reach out to you again soon.      Sincerely,        Marya Mondragon RN    E-mail: [email or web address]  Phone: [phone]    Care Manager  [facility]    <[id number] accepted    [id number] (12/2021)  [id number]>

## 2023-07-18 ENCOUNTER — PATIENT OUTREACH (OUTPATIENT)
Dept: GERIATRIC MEDICINE | Facility: CLINIC | Age: 63
End: 2023-07-18
Payer: COMMERCIAL

## 2023-07-18 NOTE — PROGRESS NOTES
7/18/23 CC called CADI CC Sanna asking if she can provide CC with the phone number to the PCA agency [employer]. CC needs to be sure they know to bill [employer] and [employer] will want the last [place] [employer] for authorization. [facility].  Juany Mondragon RN BA N  [facility] Case Management  [phone]

## 2023-07-19 ENCOUNTER — PATIENT OUTREACH (OUTPATIENT)
Dept: GERIATRIC MEDICINE | Facility: CLINIC | Age: 63
End: 2023-07-19
Payer: COMMERCIAL

## 2023-07-19 DIAGNOSIS — E11.9 TYPE 2 DIABETES MELLITUS WITHOUT COMPLICATION, WITHOUT LONG-TERM CURRENT USE OF INSULIN (H): ICD-10-CM

## 2023-07-19 DIAGNOSIS — E66.01 MORBID OBESITY (H): ICD-10-CM

## 2023-07-19 NOTE — PROGRESS NOTES
"Per CC, mailed client a \"Refusal of Home Visit\" letter.    Mailed [employer] leave behind.    Saige Vasquez  Case Management Specialist   [facility]  [phone]      "

## 2023-07-19 NOTE — PROGRESS NOTES
7/19/23 CC received a call back from Gary who had a lot of questions on who CC was and from where and why CC wants to know information. Gary wrote down the information and said he wants to call [employer] first and will call CC back. CC explained that CC is just looking for the name of his PCA agency so CC can notify them that his insurance may have changed and he is not with [employer] and the billing may change.  Juany SHAW N  [facility] Case Management  [phone]              7/19/23 CC has not heard back from [facility] CC as to who is members PCA agency.  CC did find a note in members previous [place] Choices assessment records:        CC called member Gary to ask him who his PCA agency and that CC needs to inform [employer] as well as the agency to bill [employer] now and not the county.[facility]  Juany SHAW N  [facility] Case Management  [phone]

## 2023-07-20 RX ORDER — SEMAGLUTIDE 0.68 MG/ML
INJECTION, SOLUTION SUBCUTANEOUS
Qty: 3 ML | Refills: 3 | Status: SHIPPED | OUTPATIENT
Start: 2023-07-20 | End: 2023-11-06

## 2023-08-07 ENCOUNTER — OFFICE VISIT (OUTPATIENT)
Dept: OPTOMETRY | Facility: CLINIC | Age: 63
End: 2023-08-07
Payer: COMMERCIAL

## 2023-08-07 DIAGNOSIS — E11.9 TYPE 2 DIABETES MELLITUS WITHOUT RETINOPATHY (H): ICD-10-CM

## 2023-08-07 DIAGNOSIS — H52.221 REGULAR ASTIGMATISM OF RIGHT EYE: ICD-10-CM

## 2023-08-07 DIAGNOSIS — H52.4 PRESBYOPIA: ICD-10-CM

## 2023-08-07 DIAGNOSIS — Z01.01 ENCOUNTER FOR EXAMINATION OF EYES AND VISION WITH ABNORMAL FINDINGS: Primary | ICD-10-CM

## 2023-08-07 DIAGNOSIS — H25.13 NUCLEAR AGE-RELATED CATARACT, BOTH EYES: ICD-10-CM

## 2023-08-07 DIAGNOSIS — H52.03 HYPERMETROPIA, BILATERAL: ICD-10-CM

## 2023-08-07 PROCEDURE — 92015 DETERMINE REFRACTIVE STATE: CPT | Performed by: OPTOMETRIST

## 2023-08-07 PROCEDURE — 92014 COMPRE OPH EXAM EST PT 1/>: CPT | Performed by: OPTOMETRIST

## 2023-08-07 ASSESSMENT — VISUAL ACUITY
METHOD: SNELLEN - LINEAR
OS_SC: 20/40-1
OD_SC+: -1
OS_SC: 20/25
OD_SC: 20/25
OD_SC: 20/40-1

## 2023-08-07 ASSESSMENT — CONF VISUAL FIELD
OS_INFERIOR_NASAL_RESTRICTION: 0
OS_INFERIOR_TEMPORAL_RESTRICTION: 0
OS_SUPERIOR_NASAL_RESTRICTION: 0
OD_SUPERIOR_NASAL_RESTRICTION: 0
OS_NORMAL: 1
OD_SUPERIOR_TEMPORAL_RESTRICTION: 0
OD_INFERIOR_TEMPORAL_RESTRICTION: 0
OS_SUPERIOR_TEMPORAL_RESTRICTION: 0
METHOD: COUNTING FINGERS
OD_INFERIOR_NASAL_RESTRICTION: 0
OD_NORMAL: 1

## 2023-08-07 ASSESSMENT — EXTERNAL EXAM - LEFT EYE: OS_EXAM: BROW PTOSIS

## 2023-08-07 ASSESSMENT — TONOMETRY
OD_IOP_MMHG: 18
OS_IOP_MMHG: 18
IOP_METHOD: APPLANATION

## 2023-08-07 ASSESSMENT — REFRACTION_MANIFEST
OS_SPHERE: +0.50
OD_ADD: +2.50
OS_ADD: +2.50
OD_AXIS: 010
OS_CYLINDER: SPHERE
OD_SPHERE: +1.25
OD_CYLINDER: +0.50

## 2023-08-07 ASSESSMENT — EXTERNAL EXAM - RIGHT EYE: OD_EXAM: BROW PTOSIS

## 2023-08-07 ASSESSMENT — SLIT LAMP EXAM - LIDS
COMMENTS: DERMATOCHALASIS
COMMENTS: DERMATOCHALASIS

## 2023-08-07 ASSESSMENT — CUP TO DISC RATIO
OS_RATIO: 0.3
OD_RATIO: 0.3

## 2023-08-07 NOTE — LETTER
8/7/2023         RE: Gary Iyer  [address] [place] [address]        Dear Colleague,    Thank you for referring your patient, Gary Iyer, to the [facility]. Please see a copy of my visit note below.    Chief Complaint   Patient presents with     Diabetic Eye Exam       Chief Complaint(s) and History of Present Illness(es)       Diabetic Eye Exam              Vision: is stable    Diabetes Type: Type 2, on insulin and taking oral medications    Duration: years    Blood Sugars: is controlled (Checks few times per week )                   Lab Results   Component Value Date    A1C 6.1 03/03/2023    A1C 7.4 06/02/2022    A1C 7.5 02/14/2022    A1C 7.8 09/15/2021    A1C 7.4 03/10/2021    A1C 6.9 11/25/2020    A1C 7.0 07/17/2020    A1C 7.1 08/30/2019    A1C 6.6 03/22/2019         Last Eye Exam: 5/9/2022  Dilated Previously: Yes, side effects of dilation explained today    What are you currently using to see?  does not use glasses or contacts - occasionally uses a +3.00 OTC reader but did not bring with today     Distance Vision Acuity: Satisfied with vision    Near Vision Acuity: Satisfied with vision while reading and using computer unaided and readers    Eye Comfort: good - occasional irritation   Do you use eye drops? : No  Occupation or Hobbies: Retired     Ileana Chao  Optometry Assistant     Medical, surgical and family histories reviewed and updated 8/7/2023.       OBJECTIVE: See Ophthalmology exam    ASSESSMENT:    ICD-10-CM    1. Encounter for examination of eyes and vision with abnormal findings  Z01.01       2. Type 2 diabetes mellitus without retinopathy (H)  E11.9       3. Nuclear age-related cataract, both eyes  H25.13       4. Hypermetropia, bilateral  H52.03       5. Regular astigmatism of right eye  H52.221       6. Presbyopia  H52.4           PLAN:    Gary Iyer aware  eye exam results will be sent to Finesse Beal  Patient Instructions   Patient educated  on importance of good blood sugar control.  Letter sent to primary care provider with diabetic eye exam report.     You have the start of mild cataracts.  You may notice some blurred vision or glare with night driving.  It is important that you wear good sunglasses to protect your eyes from the ultraviolet light from the sun.     Glasses prescription provided today. Optional to fill.   Ok to continue use of OTC reading glasses.     Return in 1 year for a comprehensive eye exam, or sooner if needed.      The effects of the dilating drops last for 4- 6 hours.  You will be more sensitive to light and vision will be blurry up close.  Mydriatic sunglasses were given if needed.     Adrien Yip OD  [address] [place]  [address] [phone]           Again, thank you for allowing me to participate in the care of your patient.        Sincerely,        Adrien Yip OD

## 2023-08-07 NOTE — PATIENT INSTRUCTIONS
Patient educated on importance of good blood sugar control.  Letter sent to primary care provider with diabetic eye exam report.     You have the start of mild cataracts.  You may notice some blurred vision or glare with night driving.  It is important that you wear good sunglasses to protect your eyes from the ultraviolet light from the sun.     Glasses prescription provided today. Optional to fill.   Ok to continue use of OTC reading glasses.     Return in 1 year for a comprehensive eye exam, or sooner if needed.      The effects of the dilating drops last for 4- 6 hours.  You will be more sensitive to light and vision will be blurry up close.  Mydriatic sunglasses were given if needed.     Adrien Yip, JAIR  [address] [place]  [address] [phone]

## 2023-08-07 NOTE — PROGRESS NOTES
Chief Complaint   Patient presents with    Diabetic Eye Exam       Chief Complaint(s) and History of Present Illness(es)       Diabetic Eye Exam              Vision: is stable    Diabetes Type: Type 2, on insulin and taking oral medications    Duration: years    Blood Sugars: is controlled (Checks few times per week )                   Lab Results   Component Value Date    A1C 6.1 03/03/2023    A1C 7.4 06/02/2022    A1C 7.5 02/14/2022    A1C 7.8 09/15/2021    A1C 7.4 03/10/2021    A1C 6.9 11/25/2020    A1C 7.0 07/17/2020    A1C 7.1 08/30/2019    A1C 6.6 03/22/2019         Last Eye Exam: 5/9/2022  Dilated Previously: Yes, side effects of dilation explained today    What are you currently using to see?  does not use glasses or contacts - occasionally uses a +3.00 OTC reader but did not bring with today     Distance Vision Acuity: Satisfied with vision    Near Vision Acuity: Satisfied with vision while reading and using computer unaided and readers    Eye Comfort: good - occasional irritation   Do you use eye drops? : No  Occupation or Hobbies: Retired     Ileana Chao  Optometry Assistant     Medical, surgical and family histories reviewed and updated 8/7/2023.       OBJECTIVE: See Ophthalmology exam    ASSESSMENT:    ICD-10-CM    1. Encounter for examination of eyes and vision with abnormal findings  Z01.01       2. Type 2 diabetes mellitus without retinopathy (H)  E11.9       3. Nuclear age-related cataract, both eyes  H25.13       4. Hypermetropia, bilateral  H52.03       5. Regular astigmatism of right eye  H52.221       6. Presbyopia  H52.4           PLAN:    Gary Iyer aware  eye exam results will be sent to Finesse Beal  Patient Instructions   Patient educated on importance of good blood sugar control.  Letter sent to primary care provider with diabetic eye exam report.     You have the start of mild cataracts.  You may notice some blurred vision or glare with night driving.  It is important that you  wear good sunglasses to protect your eyes from the ultraviolet light from the sun.     Glasses prescription provided today. Optional to fill.   Ok to continue use of OTC reading glasses.     Return in 1 year for a comprehensive eye exam, or sooner if needed.      The effects of the dilating drops last for 4- 6 hours.  You will be more sensitive to light and vision will be blurry up close.  Mydriatic sunglasses were given if needed.     Adrien Yip, OD  [facility]  [address] [place] [address]. [place]  [place]  [address]    [phone]

## 2023-08-28 ENCOUNTER — DOCUMENTATION ONLY (OUTPATIENT)
Dept: FAMILY MEDICINE | Facility: CLINIC | Age: 63
End: 2023-08-28
Payer: COMMERCIAL

## 2023-08-28 DIAGNOSIS — E11.9 TYPE 2 DIABETES MELLITUS WITHOUT COMPLICATION, WITHOUT LONG-TERM CURRENT USE OF INSULIN (H): ICD-10-CM

## 2023-08-28 DIAGNOSIS — D64.9 ANEMIA, UNSPECIFIED TYPE: Primary | ICD-10-CM

## 2023-08-29 ENCOUNTER — LAB (OUTPATIENT)
Dept: LAB | Facility: CLINIC | Age: 63
End: 2023-08-29
Payer: COMMERCIAL

## 2023-08-29 ENCOUNTER — TELEPHONE (OUTPATIENT)
Dept: FAMILY MEDICINE | Facility: CLINIC | Age: 63
End: 2023-08-29

## 2023-08-29 ENCOUNTER — DOCUMENTATION ONLY (OUTPATIENT)
Dept: FAMILY MEDICINE | Facility: CLINIC | Age: 63
End: 2023-08-29

## 2023-08-29 DIAGNOSIS — E78.5 HYPERLIPIDEMIA LDL GOAL <70: Primary | Chronic | ICD-10-CM

## 2023-08-29 DIAGNOSIS — E11.9 TYPE 2 DIABETES MELLITUS WITHOUT COMPLICATION, WITHOUT LONG-TERM CURRENT USE OF INSULIN (H): ICD-10-CM

## 2023-08-29 DIAGNOSIS — E78.5 HYPERLIPIDEMIA LDL GOAL <70: Chronic | ICD-10-CM

## 2023-08-29 DIAGNOSIS — D64.9 ANEMIA, UNSPECIFIED TYPE: ICD-10-CM

## 2023-08-29 LAB
ALBUMIN SERPL BCG-MCNC: 4.3 G/DL (ref 3.5–5.2)
ALP SERPL-CCNC: 50 U/L (ref 40–129)
ALT SERPL W P-5'-P-CCNC: 95 U/L (ref 0–70)
ANION GAP SERPL CALCULATED.3IONS-SCNC: 19 MMOL/L (ref 7–15)
AST SERPL W P-5'-P-CCNC: 92 U/L (ref 0–45)
BASOPHILS # BLD AUTO: 0 10E3/UL (ref 0–0.2)
BASOPHILS NFR BLD AUTO: 0 %
BILIRUB SERPL-MCNC: 0.5 MG/DL
BUN SERPL-MCNC: 10.1 MG/DL (ref 8–23)
CALCIUM SERPL-MCNC: 9.5 MG/DL (ref 8.8–10.2)
CHLORIDE SERPL-SCNC: 91 MMOL/L (ref 98–107)
CHOLEST SERPL-MCNC: 152 MG/DL
CREAT SERPL-MCNC: 0.8 MG/DL (ref 0.67–1.17)
CREAT UR-MCNC: 224 MG/DL
DEPRECATED HCO3 PLAS-SCNC: 26 MMOL/L (ref 22–29)
EOSINOPHIL # BLD AUTO: 0.3 10E3/UL (ref 0–0.7)
EOSINOPHIL NFR BLD AUTO: 3 %
ERYTHROCYTE [DISTWIDTH] IN BLOOD BY AUTOMATED COUNT: 15.2 % (ref 10–15)
GFR SERPL CREATININE-BSD FRML MDRD: >90 ML/MIN/1.73M2
GLUCOSE SERPL-MCNC: 125 MG/DL (ref 70–99)
HBA1C MFR BLD: 6 % (ref 0–5.6)
HCT VFR BLD AUTO: 40 % (ref 40–53)
HDLC SERPL-MCNC: 53 MG/DL
HGB BLD-MCNC: 13.3 G/DL (ref 13.3–17.7)
HOLD SPECIMEN: NORMAL
IMM GRANULOCYTES # BLD: 0 10E3/UL
IMM GRANULOCYTES NFR BLD: 0 %
LDLC SERPL CALC-MCNC: 72 MG/DL
LYMPHOCYTES # BLD AUTO: 2.2 10E3/UL (ref 0.8–5.3)
LYMPHOCYTES NFR BLD AUTO: 27 %
MCH RBC QN AUTO: 28.7 PG (ref 26.5–33)
MCHC RBC AUTO-ENTMCNC: 33.3 G/DL (ref 31.5–36.5)
MCV RBC AUTO: 86 FL (ref 78–100)
MICROALBUMIN UR-MCNC: 137 MG/L
MICROALBUMIN/CREAT UR: 61.16 MG/G CR (ref 0–17)
MONOCYTES # BLD AUTO: 0.9 10E3/UL (ref 0–1.3)
MONOCYTES NFR BLD AUTO: 10 %
NEUTROPHILS # BLD AUTO: 5 10E3/UL (ref 1.6–8.3)
NEUTROPHILS NFR BLD AUTO: 60 %
NONHDLC SERPL-MCNC: 99 MG/DL
PLATELET # BLD AUTO: 286 10E3/UL (ref 150–450)
POTASSIUM SERPL-SCNC: 3.5 MMOL/L (ref 3.4–5.3)
PROT SERPL-MCNC: 8.7 G/DL (ref 6.4–8.3)
RBC # BLD AUTO: 4.64 10E6/UL (ref 4.4–5.9)
SODIUM SERPL-SCNC: 136 MMOL/L (ref 136–145)
TRIGL SERPL-MCNC: 134 MG/DL
WBC # BLD AUTO: 8.3 10E3/UL (ref 4–11)

## 2023-08-29 PROCEDURE — 36415 COLL VENOUS BLD VENIPUNCTURE: CPT

## 2023-08-29 PROCEDURE — 82043 UR ALBUMIN QUANTITATIVE: CPT

## 2023-08-29 PROCEDURE — 80061 LIPID PANEL: CPT

## 2023-08-29 PROCEDURE — 82570 ASSAY OF URINE CREATININE: CPT

## 2023-08-29 PROCEDURE — 80053 COMPREHEN METABOLIC PANEL: CPT

## 2023-08-29 PROCEDURE — 85025 COMPLETE CBC W/AUTO DIFF WBC: CPT

## 2023-08-29 PROCEDURE — 83036 HEMOGLOBIN GLYCOSYLATED A1C: CPT

## 2023-08-29 NOTE — TELEPHONE ENCOUNTER
Patient calling.  He just left his lab appointment.  He is asking why his lipids, kidney function, and liver function tests were not ordered.  He stated that they margarette a green tube that can be used for lipids but this does not have an order.  Explained to patient that he is not due for these tests he listed.    Patient would like provider to review.  Is it ok for him to get his lipids, kidney function, and liver function tests rechecked at this time?  If so, please add to the recent specimen drawn.  If unable to add-on, he does not want to be poked again and will just see Dr. Beal at upcoming appointment to discuss further    Carlos Morris RN  [facility]

## 2023-08-29 NOTE — PROGRESS NOTES
Gary Iyer has an upcoming lab appointment:    Future Appointments   Date Time Provider Department Center   8/29/2023 10:30 AM FZ LAB [facility]   9/1/2023 12:00 PM Finesse Beal MD [facility]   10/3/2023  9:00 AM Candido Winter, SLP [facility]   10/24/2023  9:00 AM Candido Winter, SLP [facility]   11/3/2023 11:00 AM Mel Fournier MD [facility]     Patient is scheduled for the following lab(s):    There is no order available. Please review and place either future orders or HMPO (Review of Health Maintenance Protocol Orders), as appropriate.    Health Maintenance Due   Topic    MICROALBUMIN     ANNUAL REVIEW OF HM ORDERS     A1C      Ruben Manuel

## 2023-08-29 NOTE — TELEPHONE ENCOUNTER
Spoke to patient on the phone and explain.    Gemma Brian -    ALEX [facility]

## 2023-08-29 NOTE — TELEPHONE ENCOUNTER
I put in more orders and was able to add them to blood we have    Please inform patient    Finesse Beal MD

## 2023-08-30 ENCOUNTER — TELEPHONE (OUTPATIENT)
Dept: OTOLARYNGOLOGY | Facility: CLINIC | Age: 63
End: 2023-08-30
Payer: COMMERCIAL

## 2023-08-30 NOTE — TELEPHONE ENCOUNTER
LVM to reschedule upcoming visit with Candido as he is no longer available on that date. We can put patient on waitilist but nothing sooner. Gave call center number

## 2023-09-01 ENCOUNTER — OFFICE VISIT (OUTPATIENT)
Dept: FAMILY MEDICINE | Facility: CLINIC | Age: 63
End: 2023-09-01
Payer: COMMERCIAL

## 2023-09-01 ENCOUNTER — TELEPHONE (OUTPATIENT)
Dept: FAMILY MEDICINE | Facility: CLINIC | Age: 63
End: 2023-09-01

## 2023-09-01 VITALS
OXYGEN SATURATION: 94 % | HEIGHT: 70 IN | BODY MASS INDEX: 32.64 KG/M2 | DIASTOLIC BLOOD PRESSURE: 78 MMHG | HEART RATE: 113 BPM | TEMPERATURE: 98 F | WEIGHT: 228 LBS | SYSTOLIC BLOOD PRESSURE: 120 MMHG

## 2023-09-01 DIAGNOSIS — M79.662 PAIN OF LEFT LOWER LEG: Primary | ICD-10-CM

## 2023-09-01 DIAGNOSIS — E11.9 TYPE 2 DIABETES MELLITUS WITHOUT COMPLICATION, WITHOUT LONG-TERM CURRENT USE OF INSULIN (H): ICD-10-CM

## 2023-09-01 DIAGNOSIS — L21.9 DERMATITIS, SEBORRHEIC: ICD-10-CM

## 2023-09-01 DIAGNOSIS — F41.1 GAD (GENERALIZED ANXIETY DISORDER): Chronic | ICD-10-CM

## 2023-09-01 DIAGNOSIS — E66.9 OBESITY, UNSPECIFIED CLASSIFICATION, UNSPECIFIED OBESITY TYPE, UNSPECIFIED WHETHER SERIOUS COMORBIDITY PRESENT: Chronic | ICD-10-CM

## 2023-09-01 DIAGNOSIS — R80.9 MICROALBUMINURIA: ICD-10-CM

## 2023-09-01 DIAGNOSIS — I10 HYPERTENSION GOAL BP (BLOOD PRESSURE) < 140/90: Primary | Chronic | ICD-10-CM

## 2023-09-01 DIAGNOSIS — E78.5 HYPERLIPIDEMIA LDL GOAL <70: Chronic | ICD-10-CM

## 2023-09-01 DIAGNOSIS — Z85.21 HX OF LARYNGEAL CANCER: Chronic | ICD-10-CM

## 2023-09-01 PROCEDURE — 99214 OFFICE O/P EST MOD 30 MIN: CPT | Performed by: FAMILY MEDICINE

## 2023-09-01 RX ORDER — LOSARTAN POTASSIUM 100 MG/1
100 TABLET ORAL DAILY
Qty: 90 TABLET | Refills: 1 | Status: SHIPPED | OUTPATIENT
Start: 2023-09-01 | End: 2024-05-22

## 2023-09-01 RX ORDER — KETOCONAZOLE 20 MG/ML
SHAMPOO TOPICAL
Qty: 120 ML | Refills: 3 | Status: SHIPPED | OUTPATIENT
Start: 2023-09-01

## 2023-09-01 NOTE — PROGRESS NOTES
Kamryn Vega is a 63 year old, presenting for the following health issues:  No chief complaint on file.      9/1/2023    11:43 AM   Additional Questions   Roomed by Ghazal BRWON     Diabetes Follow-up    How often are you checking your blood sugar? One time daily  What time of day are you checking your blood sugars (select all that apply)?  Before meals  Have you had any blood sugars above 200?  No  Have you had any blood sugars below 70?  No  What symptoms do you notice when your blood sugar is low?  Shaky  What concerns do you have today about your diabetes? None   Do you have any of these symptoms? (Select all that apply)  No numbness or tingling in feet.  No redness, sores or blisters on feet.  No complaints of excessive thirst.  No reports of blurry vision.  No significant changes to weight.      BP Readings from Last 2 Encounters:   09/01/23 120/78   06/02/23 130/75     Hemoglobin A1C (%)   Date Value   08/29/2023 6.0 (H)   03/03/2023 6.1 (H)   03/10/2021 7.4 (H)   11/25/2020 6.9 (H)     LDL Cholesterol Calculated (mg/dL)   Date Value   08/29/2023 72   03/03/2023 59   11/25/2020 79   07/17/2020 68         How many servings of fruits and vegetables do you eat daily?  0-1  On average, how many sweetened beverages do you drink each day (Examples: soda, juice, sweet tea, etc.  Do NOT count diet or artificially sweetened beverages)?   1  How many days per week do you exercise enough to make your heart beat faster? 3 or less  How many minutes a day do you exercise enough to make your heart beat faster? 9 or less  How many days per week do you miss taking your medication? 0        Review of Systems   Decreased appetite  Wt  about 250 April 2022  Now 228  Started ozemic may of 2022    1-2 drinks etoh  per day        Objective    There were no vitals taken for this visit.  There is no height or weight on file to calculate BMI.  Physical Exam  Constitutional:       Appearance: He is well-developed.    HENT:      Head: Normocephalic and atraumatic.   Eyes:      Conjunctiva/sclera: Conjunctivae normal.   Neck:      Vascular: No carotid bruit.   Cardiovascular:      Rate and Rhythm: Normal rate and regular rhythm.      Heart sounds: Normal heart sounds.   Pulmonary:      Effort: Pulmonary effort is normal. No respiratory distress.      Breath sounds: Normal breath sounds.   Neurological:      Mental Status: He is alert and oriented to person, place, and time.      Cranial Nerves: No cranial nerve deficit.   Psychiatric:         Speech: Speech normal.         Behavior: Behavior normal.      No edema    Radials symmetric     Went over recent labs in great detail            ASSESSMENT / PLAN:  (I10) Hypertension goal BP (blood pressure) < 140/90  (primary encounter diagnosis)  Comment: he has some microalbumin so increase losartan to 100 mg   Plan: losartan (COZAAR) 100 MG tablet        If get dizzy/ lightheaded, then drop the low dose amlodipine     (R80.9) Microalbuminuria  Comment: as above   Plan: losartan (COZAAR) 100 MG tablet             (L21.9) Dermatitis, seborrheic  Comment: refill   Plan: ketoconazole (NIZORAL) 2 % external shampoo             (E11.9) Type 2 diabetes mellitus without complication, without long-term current use of insulin (H)  Comment: hemoglobin a1c fine    Plan: no change in meds     (E66.9) Obesity, unspecified classification, unspecified obesity type, unspecified whether serious comorbidity present  Comment: keep working on healthy diet/exercise and wt loss; glp med helping   Plan: as above     (Z85.21) Hx of laryngeal cancer  Comment: patient gets surveillance from ent and speech therapy   Plan: continue     (E78.5) Hyperlipidemia LDL goal <70  Comment: on statin   Plan: continue     (F41.1) SAMARIA (generalized anxiety disorder)  Comment: stable   Plan: no change in med     See us in the spring       I reviewed the patient's medications, allergies, medical history, family history, and  social history.    Finesse Beal MD

## 2023-09-01 NOTE — TELEPHONE ENCOUNTER
Dr. Beal,    Pt has no hx of kidney issues, and GFR look good. I see he has HTN and is on losartan but labs ok.  I am assuming he is ok to consume whey protein.     Before we call, are you in agreement?    Thanks,  EDGAR Medina  [facility]

## 2023-09-01 NOTE — PATIENT INSTRUCTIONS
Consider cutting down further on alcohol    Keep working on healthy diet/exercise and wt loss    Increase losartan to 100 mg    If you get dizzy/ lightheaded then stop amlodipine     Other meds as is    Adequate protein intake

## 2023-09-05 NOTE — TELEPHONE ENCOUNTER
"Spoke with patient and gave PCP message:    \"  Yes that is fine     Please inform patient      \"    Patient verbalized understanding.     Patient is also requesting an xray for his left leg; previous surgery to site - states this was discussed with PCP at appointment     Elizabeth Sinclair RN  [facility]    "

## 2023-09-05 NOTE — TELEPHONE ENCOUNTER
I called and discussed in detail  Will do xray  tib/fib    Patient will schedule xray only appointment  Finesse Beal MD

## 2023-09-10 ENCOUNTER — HEALTH MAINTENANCE LETTER (OUTPATIENT)
Age: 63
End: 2023-09-10

## 2023-09-11 ENCOUNTER — TELEPHONE (OUTPATIENT)
Dept: FAMILY MEDICINE | Facility: CLINIC | Age: 63
End: 2023-09-11
Payer: COMMERCIAL

## 2023-09-11 DIAGNOSIS — J38.7 LEUKOPLAKIA OF LARYNX: ICD-10-CM

## 2023-09-11 DIAGNOSIS — R49.0 DYSPHONIA: ICD-10-CM

## 2023-09-11 NOTE — TELEPHONE ENCOUNTER
Pharmacy calling      Pt reports does of omeprazole should be 20 mg BID.    Current dose in MAR is 20 mg daily    omeprazole (PRILOSEC) 20 MG DR capsule TAKE 1 CAPSULE BY MOUTH DAILY 90 capsule 3 ordered 03/27/2023         Ok for placing new order for BID dosing?        Lourdes RN    Triage Nurse  [facility]

## 2023-09-13 ENCOUNTER — ANCILLARY PROCEDURE (OUTPATIENT)
Dept: GENERAL RADIOLOGY | Facility: CLINIC | Age: 63
End: 2023-09-13
Attending: FAMILY MEDICINE
Payer: COMMERCIAL

## 2023-09-13 DIAGNOSIS — M79.662 PAIN OF LEFT LOWER LEG: ICD-10-CM

## 2023-09-13 PROCEDURE — 73590 X-RAY EXAM OF LOWER LEG: CPT | Mod: TC | Performed by: RADIOLOGY

## 2023-09-18 ENCOUNTER — OFFICE VISIT (OUTPATIENT)
Dept: OTOLARYNGOLOGY | Facility: CLINIC | Age: 63
End: 2023-09-18
Attending: SPEECH-LANGUAGE PATHOLOGIST
Payer: COMMERCIAL

## 2023-09-18 DIAGNOSIS — R49.0 DYSPHONIA: Primary | ICD-10-CM

## 2023-09-18 DIAGNOSIS — J38.7 LARYNGEAL GRANULOMA: ICD-10-CM

## 2023-09-18 PROCEDURE — 92507 TX SP LANG VOICE COMM INDIV: CPT | Mod: GN | Performed by: SPEECH-LANGUAGE PATHOLOGIST

## 2023-09-18 NOTE — LETTER
9/18/2023       RE: Gary Iyer  [address]  [place] [address]     Dear Colleague,    Thank you for referring your patient, Gary Iyer, to the [facility]ICE [facility] [place] [facility]. Please see a copy of my visit note below.    THERAPY NOTE (CPT 47333)  Date of Service: 9/18/2023  Mr. 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    SUBJECTIVE / OBJECTIVE:  Since our most-recent session, Mr. Iyer reports that he has not been practicing as much as he would have liked to (approximately 2-3 times per week).  He feels that his voice quality has been stable since her most recent therapy session.    At baseline today, the patient presents with mildly increased loudness, and frequent moderate roughness.    THERAPEUTIC ACTIVITIES  Counseling and Education  Asked many questions about the nature of his symptoms, and I answered all of these thoroughly.  Strategies for reducing posterior glottic impact  The patient appeared to have less keen insight into target versus not target voice quality at the onset of today's therapy session.  We thus began by recording his voice and comparing target versus known target productions both visually with spectrogram with pitch tracing and with auditory feedback  Patient reviewed easy onsets and flow mode phonation strategies, and progressed gradually from /h/ loaded sentences in chant mode to semiscripted conversational turns in chant mode to spontaneous speech in chant mode.  At the final level, he demonstrated approximately 75% accuracy with moderately frequent cueing.  At base level (sentences), he demonstrated approximately 75% accuracy with minimal cueing.  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 Mr. Iyer presents today with perceptually slightly rougher and more strained voice quality than his previous session.  This appears to have been associated with a reduction in practice, as well as with reduced awareness of target versus nontarget voice productions.  The patient received substantial benefit today from both visual and auditory biofeedback comparing his own target versus nontarget productions.  I emphasized the importance of ongoing intentional practice for reducing size of vocal process granuloma and improving voice quality, and both the patient and his wife demonstrated good understanding of practice recommendations.    PLAN: I will see Mr. Iyer in 6 weeks, at which time we will continue to work toward the plan of care.     Candido Winter, Ph.D., [facility]-SLP  Speech-Language Pathologist-[facility]  [phone]  he/him/his    Speech recognition software may have been used in the above documentation; input is reviewed before signature to the best of my ability.       Again, thank you for allowing me to participate in the care of your patient.      Sincerely,    Candido Winter, SLP

## 2023-09-18 NOTE — PROGRESS NOTES
THERAPY NOTE (CPT 42939)  Date of Service: 9/18/2023  Mr. 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    SUBJECTIVE / OBJECTIVE:  Since our most-recent session, Mr. Iyer reports that he has not been practicing as much as he would have liked to (approximately 2-3 times per week).  He feels that his voice quality has been stable since her most recent therapy session.    At baseline today, the patient presents with mildly increased loudness, and frequent moderate roughness.    THERAPEUTIC ACTIVITIES  Counseling and Education  Asked many questions about the nature of his symptoms, and I answered all of these thoroughly.  Strategies for reducing posterior glottic impact  The patient appeared to have less keen insight into target versus not target voice quality at the onset of today's therapy session.  We thus began by recording his voice and comparing target versus known target productions both visually with spectrogram with pitch tracing and with auditory feedback  Patient reviewed easy onsets and flow mode phonation strategies, and progressed gradually from /h/ loaded sentences in chant mode to semiscripted conversational turns in chant mode to spontaneous speech in chant mode.  At the final level, he demonstrated approximately 75% accuracy with moderately frequent cueing.  At base level (sentences), he demonstrated approximately 75% accuracy with minimal cueing.  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 Mr. Iyer presents today with perceptually slightly rougher and more strained voice quality than his previous session.  This appears to have been associated with a reduction in  practice, as well as with reduced awareness of target versus nontarget voice productions.  The patient received substantial benefit today from both visual and auditory biofeedback comparing his own target versus nontarget productions.  I emphasized the importance of ongoing intentional practice for reducing size of vocal process granuloma and improving voice quality, and both the patient and his wife demonstrated good understanding of practice recommendations.    PLAN: I will see Mr. Iyer in 6 weeks, at which time we will continue to work toward the plan of care.     Candido Winter, Ph.D., [facility]-SLP  Speech-Language Pathologist-[facility]  [phone]  he/him/his    Speech recognition software may have been used in the above documentation; input is reviewed before signature to the best of my ability.

## 2023-09-20 ENCOUNTER — TELEPHONE (OUTPATIENT)
Dept: FAMILY MEDICINE | Facility: CLINIC | Age: 63
End: 2023-09-20
Payer: COMMERCIAL

## 2023-09-20 NOTE — TELEPHONE ENCOUNTER
Patient states he is already taking the 100 mg of Losartan(not 50mg). Also patient asking if he should continue taking amLODIPine (NORVASC) 2.5 MG tablet or stop it?

## 2023-09-20 NOTE — TELEPHONE ENCOUNTER
Blood pressure was okay so just stay on the 100 mg losartan and continue other meds also    Please inform patient    Finesse Beal MD

## 2023-09-20 NOTE — TELEPHONE ENCOUNTER
Called patient.  He was seen on 9/1/2023 and per OV notes, losartan was increased to 100 mg daily and patient was advised that if he gets dizzy/lightheaded, he can drop the low dose amlodipine.  Patient stated that he was confused with the instructions and was not sure if he was supposed to continue the amlodipine or not.  Denies any dizziness/lightheadedness but stated that she has only taken the amlodipine about 5 times since his visit on 9/1/2023 due to not being sure what to do.  Last dose of amlodipine was 3-4 days ago.  Explained to patient that he should be on both meds but if he starts feeling dizzy/lightheaded, then he can stop the amlodipine.  Patient verbalized understanding.    Carlos Morris RN  [facility]

## 2023-10-08 DIAGNOSIS — E11.9 TYPE 2 DIABETES MELLITUS WITHOUT COMPLICATION, WITHOUT LONG-TERM CURRENT USE OF INSULIN (H): ICD-10-CM

## 2023-10-19 ENCOUNTER — MYC MEDICAL ADVICE (OUTPATIENT)
Dept: OTOLARYNGOLOGY | Facility: CLINIC | Age: 63
End: 2023-10-19
Payer: COMMERCIAL

## 2023-11-06 DIAGNOSIS — E66.01 MORBID OBESITY (H): ICD-10-CM

## 2023-11-06 DIAGNOSIS — E11.9 TYPE 2 DIABETES MELLITUS WITHOUT COMPLICATION, WITHOUT LONG-TERM CURRENT USE OF INSULIN (H): ICD-10-CM

## 2023-11-06 RX ORDER — SEMAGLUTIDE 0.68 MG/ML
INJECTION, SOLUTION SUBCUTANEOUS
Qty: 3 ML | Refills: 3 | Status: SHIPPED | OUTPATIENT
Start: 2023-11-06 | End: 2024-02-29

## 2023-11-17 ENCOUNTER — OFFICE VISIT (OUTPATIENT)
Dept: OTOLARYNGOLOGY | Facility: CLINIC | Age: 63
End: 2023-11-17
Payer: COMMERCIAL

## 2023-11-17 VITALS
WEIGHT: 231 LBS | HEIGHT: 70 IN | OXYGEN SATURATION: 99 % | HEART RATE: 120 BPM | DIASTOLIC BLOOD PRESSURE: 73 MMHG | SYSTOLIC BLOOD PRESSURE: 123 MMHG | BODY MASS INDEX: 33.07 KG/M2

## 2023-11-17 DIAGNOSIS — R49.0 DYSPHONIA: Primary | ICD-10-CM

## 2023-11-17 DIAGNOSIS — Z85.21 HX OF LARYNGEAL CANCER: ICD-10-CM

## 2023-11-17 DIAGNOSIS — J38.7 LARYNGEAL HYPERFUNCTION: ICD-10-CM

## 2023-11-17 DIAGNOSIS — J38.7 LARYNGEAL GRANULOMA: ICD-10-CM

## 2023-11-17 PROCEDURE — 31579 LARYNGOSCOPY TELESCOPIC: CPT | Performed by: OTOLARYNGOLOGY

## 2023-11-17 PROCEDURE — 99212 OFFICE O/P EST SF 10 MIN: CPT | Mod: 25 | Performed by: OTOLARYNGOLOGY

## 2023-11-17 PROCEDURE — 99207 PR NO BILLABLE SERVICE THIS VISIT: CPT | Performed by: SPEECH-LANGUAGE PATHOLOGIST

## 2023-11-17 ASSESSMENT — PAIN SCALES - GENERAL: PAINLEVEL: NO PAIN (0)

## 2023-11-17 NOTE — LETTER
11/17/2023      RE: Gary Iyer  [address] [place] [address]       Dear Colleague:  Gary Iyer recently returned for follow-up at the [facility]. My clinic note from our visit is enclosed below.  Speech recognition software may have been used in the documentation below; input is reviewed before signature to the best of my ability.     I appreciate the ongoing opportunity to participate in this patient's care.    Please feel free to contact me with any questions.      Sincerely yours,  Mel Fournier M.D., M.P.H.  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  He later returned to the operating room on February 9, 2017 for an area of new irregularity of the left true vocal fold.  Pathology showed severe dysplasia with negative but close margins (for moderate, but not severe dysplasia).  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    We then observed a slight recurrence of leukoplakia/granuloma in the same location where he had this in 2018. This resolved with conservative management. Subsequently, he had allergies which were provoking coughing/throat-clearing, and he was again noted to have a granuloma. He was set up for refresher session(s) of speech therapy with Misty Clark, PhD, CCC-SLP and we discussed nasal hygiene as well. This led to near complete resolution of the granuloma, but subsequently he was noted to have slightly increased irritation along the left medial arytenoid wall.  Then he developed an upper respiratory infection and has been coughing, and had some shortness of breath. He states he was COVID  negative. His family had noted him grunting and throat-clearing frequently.    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    Today's updates:  - voice has been doing fine  - swallowing and breathing are normal  - still doing exercises from Candido Winter, PhD, CCC-SLP   - no new PMH/PSH      MEDICATIONS:     Current Outpatient Medications   Medication Sig Dispense Refill    ACCU-CHEK GUIDE test strip TEST ONCE DAILY 100 strip 1    alcohol swab prep pads USE TO SWAB AREA OF INJECTION/OLIMPIA 100 each 1    allopurinol (ZYLOPRIM) 300 MG tablet TAKE 1 TABLET(300 MG) BY MOUTH DAILY 90 tablet 3    amLODIPine (NORVASC) 2.5 MG tablet Take 1 tablet (2.5 mg) by mouth daily 90 tablet 3    aspirin 81 MG tablet Take 1 tablet by mouth daily.      atorvastatin (LIPITOR) 20 MG tablet Take 1 tablet (20 mg) by mouth daily 90 tablet 3    blood glucose (ACCU-CHEK GUIDE) test strip Use to test blood sugar 1 times daily or as directed. 100 strip 11    blood glucose calibration (NO BRAND SPECIFIED) solution To accompany: Blood Glucose Monitor Brands: per insurance. 1 Bottle 3    Blood Glucose Monitoring Suppl (ACCU-CHEK GUIDE ME) w/Device KIT USE TO TEST ONCE DAILY 1 kit 0    calcipotriene (DOVONEX) 0.005 % external cream Mix with efudex and apply twice daily to hands and arms for 12 days 60 g 3    cetirizine (ZYRTEC) 10 MG tablet TAKE 1 TABLET(10 MG) BY MOUTH DAILY 90 tablet 3    fluorouracil (EFUDEX) 5 % external cream Mix with dovonex and apply twice daily to hands and arms for 12 days 40 g 1    hydrochlorothiazide (HYDRODIURIL) 50 MG tablet Take 1 tablet (50 mg) by mouth daily 90 tablet 3    hydrocortisone (CORTAID) 1 % external ointment Apply topically 2 times daily as needed 30 g 1    insulin pen needle (31G X 6 MM) 31G X 6 MM miscellaneous Use one pen needle daily or as directed. ( for Victoza ) 90 each 3     ketoconazole (NIZORAL) 2 % external cream Apply daily to affected area on face, armpits, groin. 60 g 1    ketoconazole (NIZORAL) 2 % external cream Apply topically 2 times daily 60 g 0    ketoconazole (NIZORAL) 2 % external shampoo Leave on face, beard for 3-5 minutes then rinse. Use 2-3 times weekly. 120 mL 3    losartan (COZAAR) 100 MG tablet Take 1 tablet (100 mg) by mouth daily 90 tablet 1    metFORMIN (GLUCOPHAGE) 1000 MG tablet TAKE 1 TABLET(1000 MG) BY MOUTH TWICE DAILY WITH MEALS 180 tablet 0    methocarbamol (ROBAXIN) 500 MG tablet Take 1 tablet (500 mg) by mouth 2 times daily as needed for muscle spasms 60 tablet 0    metroNIDAZOLE (METROCREAM) 0.75 % external cream Apply topically 2 times daily as needed (to rosacea areas) 45 g 1    Misc. Devices (SUPPOSITORY MOLD 2GM) MISC 1 suppository every 12 hours Nifedipine 4 mg suppository, one rectally every 12 hours 100 each 4    Multiple Vitamin (DAILY MULTIVITAMIN PO) Take by mouth daily      nystatin (MYCOSTATIN) 871407 UNIT/GM external powder Apply topically 2 times daily Prevention of rash 60 g 0    omeprazole (PRILOSEC) 20 MG DR capsule Take 1 capsule (20 mg) by mouth 2 times daily 180 capsule 3    order for DME Autocpap 10-16 cm 1 Units 0    OZEMPIC, 0.25 OR 0.5 MG/DOSE, 2 MG/3ML pen INJECT 0.5 MG UNDER THE SKIN EVERY 7 DAYS. 3 mL 3    PARoxetine (PAXIL) 40 MG tablet TAKE 1 TABLET(40 MG) BY MOUTH EVERY MORNING 90 tablet 3    polyethylene glycol (MIRALAX) 17 GM/Dose powder Take 17 g (1 capful) by mouth daily 850 g 3    psyllium 0.52 G capsule Take 1 capsule (0.52 g) by mouth daily 100 capsule 3    RESERPINE-HYDROCHLOROTHIAZIDE OR daily      tamsulosin (FLOMAX) 0.4 MG capsule Take 2 capsules (0.8 mg) by mouth daily TAKE 2 CAPSULES BY MOUTH DAILY 180 capsule 3    thin (NO BRAND SPECIFIED) lancets Use with lanceting device. To accompany: Blood Glucose Monitor Brands: per insurance. 100 each 11    triamcinolone (KENALOG) 0.1 % external cream Apply topically 2  times daily as needed for irritation 30 g 0       ALLERGIES:  No Known Allergies    NEW PMH/PSH: None    REVIEW OF SYSTEMS:  The patient completed a comprehensive 11 point review of systems (below), which was reviewed. Positives are as noted below.  Patient Supplied Answers to Review of Systems  Noncontributory      PHYSICAL EXAM:  General: The patient was alert and conversant, and in no acute distress.    Oral cavity/oropharynx: No masses or lesions. Dentition unchanged since prior. Tongue mobility and palate elevation intact and symmetric.  Neck: No palpable cervical lymphadenopathy, no significant tenderness to palpation of the thyrohyoid space, which was narrow. No obvious thyroid abnormality.  Resp: Breathing comfortably, no stridor or stertor.  Neuro: Symmetric facial function. Other cranial nerve function as documented above.  Psych: Normal affect, pleasant and cooperative.  Voice/speech: Mild to moderate dysphonia characterized by glottal leblanc.      Procedure:   Flexible fiberoptic laryngoscopy and laryngovideostroboscopy  Indications: This procedure was warranted to evaluate the patient's laryngeal anatomy and function. Risks, benefits, and alternatives were discussed.  Description: After written informed consent was obtained, a time-out was performed to confirm patient identity, procedure, and procedure site. Topical 2% lidocaine and oxymetazoline were applied to the nasal cavities. I performed the endoscopy and no complications were apparent. Continuous and stroboscopic light were utilized to assess routine phonation and variable frequency phonation.  Performed by: Mel Fournier MD MPH  SLP: Kassandra Coleman MS CCC-SLP   Findings: Normal nasopharynx. Normal base of tongue, valleculae, and epiglottis. Vocal fold mobility: right: normal; left: normal. Medial edges of the true vocal folds: smooth and straight.  Left true vocal fold with mild erythema, no focal lesions.  Glissade produced appropriate  elongation. Mucosa of false vocal folds, aryepiglottic folds, piriform sinuses, and posterior glottis unremarkable overall, with a small focal area of leukoplakia along left medial arytenoid where it has previously been noted intermittently. Airway was patent.  Similar findings on NBI.    The addition of stroboscopy allowed evaluation of the mucosal wave.   Amplitude: right: mildly decreased; left: mildly decreased. Symmetry: intermittent symmetry. Closure pattern: complete. Closure plane: at glottic level. Phase distribution: normal.                            IMPRESSION AND PLAN:   Gary Iyer returns with resolution of the previously noted right posterior granuloma and a tiny area of mucosal irritation along the left medial arytenoid.    We encouraged him to continue focusing on airflow when speaking, and to continue to be mindful about high impact coughing/ throat-clearing. He will continue working with Candido Winter, PhD, CCC-SLP on therapy. He also did some learning today with Kassandra Coleman, MS CCC-SLP regarding techniques as well as strategies for integrating therapy techniques into daily life.    He will return to see me in 6 months for a checkup, or sooner as needed.    I spent a total of 17 minutes on 11/17/2023 in chart review, review of tests, patient visit, documentation, care coordination, and/or discussion with other providers about the issues documented above, separate from any documented procedure(s).      Mel Fournier MD

## 2023-11-17 NOTE — PATIENT INSTRUCTIONS
1.  You were seen in the ENT Clinic today by . If you have any questions or concerns after your appointment, please call [phone]. Press option #1 for scheduling related needs. Press option #3 for Nurse advice.    2.   has recommended the following:   - continue speech therapy    3.  Plan is to return to clinic in 6 months      Lisa Hazel LPN  [phone]  [facility] - Otolaryngology

## 2023-11-17 NOTE — PROGRESS NOTES
"[facility]  Clinical Voice and Upper Airway Evaluation Report    Patient's Name: Gary Iyer  Date of Evaluation: 11/17/2023  Providing SLP: Kassandra Coleman MS CCC-SLP  Seen in Conjunction With: Mel Fournier MD MPH  Insurance Coverage: [facility]   Chief Complaint: Dysphonia  Evaluation Location: [facility] [place]  Time of Evaluation: 8:40 - 9:18 AM      Patient History:     Gary is a 62-year-old male who is a longstanding patient of Dr. Fournire's and Dr. Winter's secondary to recurrent laryngeal squamous cell carcinoma, laryngeal hyperfunction, and vocal process granuloma. He has been working with Dr. Winter intermittently. His impressions from their most recent therapy session on 5/16/23 included:     \"Mr. Iyer presents today with good maintenance of previously learned strategies for cough suppression and avoiding other activities that cause posterior glottic constriction.  In addition, he learned strategies for reduced posterior glottic impact during phonation.  He had a very difficult time reproducing flow mode phonation, which has been successful for him in the past, and thus we replaced this today with cup and bubbles phonation.  He was able to perform these with acceptable accuracy, but had minimal carryover to conversational speech.     The primary consequence of lower flow phonation during today's session appeared to be vocal roughness, but not vocal strain.  The patient is not upset by his voice quality.  He is scheduled for repeat laryngeal exam on 6-23.  If at that time, it appears that his speaking technique is contributing to ongoing granulation tissue, then we will pursue a more intensive course of voice specific therapy.  Please note, these future therapy sessions should be in person, as it appears that there may be some audio challenges at the patient's home that make voice aspects of virtual therapy challenging.\"    Gary presents for " "repeat laryngoscopy today with Dr. Fournier. He feels that his voice has been doing okay at this time. During his various courses of voice therapy, he endorses that he is diligent with exercises for a few weeks after ending his session but then will lose interest and stop doing them. For example, he reported today that he resumed his exercises a couple of days before today's appointment. He reports having a URI a couple of weeks ago with some coughing. He denies dysphagia and dyspnea at this time.\"    During his last visit with Dr. Fournier on 6/2/23, his R vocal process lesion was larger in size compared to previously, and he underwent 2 additional sessions of voice therapy with my colleague, Candido Winter.    Today, he presents for regular laryngeal surveillance. He reports stable voice and denies throat pain, dysphagia, or excessive coughing/throat clearing. He has continued his home programming.      Quality of Life Questionnaires:    Patient Supplied Answers To Last 2 VHI Questionnaires      11/16/2020     1:00 PM 8/15/2022     2:00 PM   Voice Handicap Index (VHI-10)   My voice makes it difficult for people to hear me 0 0   People have difficulty understanding me in a noisy room 1 0   My voice difficulties restrict my personal and social life.  1 0   I feel left out of conversations because of my voice 0 0   My voice problem causes me to lose income 1 0   I feel as though I have to strain to produce voice 1 0   The clarity of my voice is unpredictable 0 0   My voice problem upsets me 0 0   My voice makes me feel handicapped 1 0   People ask, \"What's wrong with your voice?\" 0 0   VHI-10 5 0     Patient Supplied Answers To Last 2 CSI Questionnaires      3/1/2019     7:39 AM 8/15/2022     2:00 PM   Cough Severity Index (CSI)   My cough is worse when I lie down 0 0   My coughing problem causes me to restrict my personal and social life 0 0   I tend to avoid places because of my cough problem 0 0   I feel " embarrassed because of my coughing problem 0 0   People ask, ''What's wrong?'' because I cough a lot 0 0   I run out of air when I cough 1 0   My coughing problem affects my voice 0 0   My coughing problem limits my physical activity 1 0   My coughing problem upsets me 1 0   People ask me if I am sick because I cough a lot 0 0   CSI Score 3 0     Patient Supplied Answers To Last 2 EAT Questionnaires      3/1/2019     7:40 AM 8/15/2022     2:00 PM   Eating Assessment Tool (EAT-10)   My swallowing problem has caused me to lose weight 1 0   My swallowing problem interferes with my ability to go out for meals 0 0   Swallowing liquids takes extra effort 1 0   Swallowing solids takes extra effort 1 0   Swallowing pills takes extra effort 1 0   Swallowing is painful 0 0   The pleasure of eating is affected by my swallowing 0 0   When I swallow food sticks in my throat 0 0   I cough when I eat 1 0   Swallowing is stressful 0 0   EAT-10 5 0       Laryngoscopy with stroboscopy:     Provider performing exam: Mel Fournier MD MPH    Informed consent: Informed verbal consent was obtained, which includes potential side-effects, risks, and benefits of the procedure.     Anesthetic: Topical anesthesia with 3% lidocaine and 0.25% phenylephrine was applied the nostrils bilaterally.     Scope type: A distal chip flexible laryngoscope was passed through the nare with halogen and xenon light source(s).     The laryngeal and pharyngeal structures were evaluated for gross appearance, mobility, function, and focal lesions / abnormalities of the associated mucosa.  Velar Function: complete closure without bubbling of secretions and no weakness observed   General Appearance  R vocal process lesion is resolved  Very small L vocal process lesion off the medial border of the arytenoid complex  Mild interarytenoid pachydermia  Secretions: WNL   Subglottis Appearance: visible portion is patent   R Arytenoid Abduction / Adduction: symmetrical  "and timely ab/adduction with appropriate range of motion   L Arytenoid Abduction / Adduction: \" \"   Mediolateral Compression: mild to moderate and often associated with increased rough voice quality and aperiodic vocal fold vibration  Anteroposterior Compression: \" \"  Vocal Fold Elongation: appropriate   Left (L) Vocal Fold Edge and Mucosa: diffuse erythema with smooth, straight vibratory edge  Right (R) Vocal Fold Edge: white with smooth and straight vibratory edge  Narrow Band Imaging: similar findings as halogen light source - more prominent, white lesion on the L lateral vocal process  Glottic Closure: complete   Phase Closure: predominantly open phase   Vertical Level of Approximation: true vocal fold appear to adduct at the same height   L Amplitude: WNL   R Amplitude: WNL  L Mucosal Wave: diffusely and mildly reduced   R Mucosal Wave: WNL   Phase Symmetry: symmetrical   Periodicity: intermittently aperiodic   Inhalation Phonation: similar findings to exhalation phonation       Impressions and Plan:     Gary is a 63-year-old male presenting today with dysphonia R49.0 secondary to laryngeal hyperfunction J38.7. Perceptually, his voice is mild to moderately rough and strained with some instances where his voice was sounding fairly clear. Laryngoscopy today demonstrated resolution of his R vocal process lesion, a very small white lesion on the L lateral vocal process, laryngeal hyperfunction, and intermittent aperiodic vocal fold vibration associated with increased rough voice quality. At this time, Gary's voice is doing well; however, given his laryngeal history, it is recommended that he continue to follow-up with Dr. Fournier and the [facility] Voice SLP team intermittently to ensure that progress has been maintained and that he is continuing voice exercises as appropriate. He has an upcoming appointment with Dr. Winter on 12/4/23 and will see Dr. Fournier in 3- 4 months. Gary is in agreement with this plan " of care, and previously-established goals will continue to be addressed.      Billed Procedures:    No charge facility fee, as no skilled SLP services were needed  Assessment and treatment time: 38 minutes  Chart review, interpretation of testing, documentation preparation, etc: 16 minutes      Thank you for allowing me to participate in this patient's care,    Kassandra Coleman MS CCC-SLP  Speech-Language Pathologist  [facility] Voice [facility]  Department of Otolaryngology - Head and Neck Surgery  [facility]  [email or web address]  Direct: [phone]  [phone]    *This note may have been completed using [id number] dictation software, so errors may exist. Please contact me for clarification if needed*

## 2023-11-17 NOTE — PROGRESS NOTES
Dear Colleague:  Gary Iyer recently returned for follow-up at the [facility]. My clinic note from our visit is enclosed below.  Speech recognition software may have been used in the documentation below; input is reviewed before signature to the best of my ability.     I appreciate the ongoing opportunity to participate in this patient's care.    Please feel free to contact me with any questions.      Sincerely yours,  Mel Fournier M.D., M.P.H.  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  He later returned to the operating room on February 9, 2017 for an area of new irregularity of the left true vocal fold.  Pathology showed severe dysplasia with negative but close margins (for moderate, but not severe dysplasia).  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    We then observed a slight recurrence of leukoplakia/granuloma in the same location where he had this in 2018. This resolved with conservative management. Subsequently, he had allergies which were provoking coughing/throat-clearing, and he was again noted to have a granuloma. He was set up for refresher session(s) of speech therapy with Misty Clark, PhD, CCC-SLP and we discussed nasal hygiene as well. This led to near complete resolution of the granuloma, but subsequently he was noted to have slightly increased irritation along the left medial arytenoid wall.  Then he developed an upper respiratory infection and has been coughing, and had some shortness of breath. He states he was COVID negative. His family had noted him grunting and throat-clearing frequently.    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    Today's updates:  - voice has been doing fine  - swallowing and breathing are normal  - still doing exercises from Candido Winter, PhD, CCC-SLP   - no new PMH/PSH      MEDICATIONS:     Current Outpatient Medications   Medication Sig Dispense Refill    ACCU-CHEK GUIDE test strip TEST ONCE DAILY 100 strip 1    alcohol swab prep pads USE TO SWAB AREA OF INJECTION/OLIMPIA 100 each 1    allopurinol (ZYLOPRIM) 300 MG tablet TAKE 1 TABLET(300 MG) BY MOUTH DAILY 90 tablet 3    amLODIPine (NORVASC) 2.5 MG tablet Take 1 tablet (2.5 mg) by mouth daily 90 tablet 3    aspirin 81 MG tablet Take 1 tablet by mouth daily.      atorvastatin (LIPITOR) 20 MG tablet Take 1 tablet (20 mg) by mouth daily 90 tablet 3    blood glucose (ACCU-CHEK GUIDE) test strip Use to test blood sugar 1 times daily or as directed. 100 strip 11    blood glucose calibration (NO BRAND SPECIFIED) solution To accompany: Blood Glucose Monitor Brands: per insurance. 1 Bottle 3    Blood Glucose Monitoring Suppl (ACCU-CHEK GUIDE ME) w/Device KIT USE TO TEST ONCE DAILY 1 kit 0    calcipotriene (DOVONEX) 0.005 % external cream Mix with efudex and apply twice daily to hands and arms for 12 days 60 g 3    cetirizine (ZYRTEC) 10 MG tablet TAKE 1 TABLET(10 MG) BY MOUTH DAILY 90 tablet 3    fluorouracil (EFUDEX) 5 % external cream Mix with dovonex and apply twice daily to hands and arms for 12 days 40 g 1    hydrochlorothiazide (HYDRODIURIL) 50 MG tablet Take 1 tablet (50 mg) by mouth daily 90 tablet 3    hydrocortisone (CORTAID) 1 % external ointment Apply topically 2 times daily as needed 30 g 1    insulin pen needle (31G X 6 MM) 31G X 6 MM miscellaneous Use one pen needle daily or as directed. ( for Victoza ) 90 each 3    ketoconazole (NIZORAL) 2 % external cream Apply daily to affected area on face, armpits,  groin. 60 g 1    ketoconazole (NIZORAL) 2 % external cream Apply topically 2 times daily 60 g 0    ketoconazole (NIZORAL) 2 % external shampoo Leave on face, beard for 3-5 minutes then rinse. Use 2-3 times weekly. 120 mL 3    losartan (COZAAR) 100 MG tablet Take 1 tablet (100 mg) by mouth daily 90 tablet 1    metFORMIN (GLUCOPHAGE) 1000 MG tablet TAKE 1 TABLET(1000 MG) BY MOUTH TWICE DAILY WITH MEALS 180 tablet 0    methocarbamol (ROBAXIN) 500 MG tablet Take 1 tablet (500 mg) by mouth 2 times daily as needed for muscle spasms 60 tablet 0    metroNIDAZOLE (METROCREAM) 0.75 % external cream Apply topically 2 times daily as needed (to rosacea areas) 45 g 1    Misc. Devices (SUPPOSITORY MOLD 2GM) MISC 1 suppository every 12 hours Nifedipine 4 mg suppository, one rectally every 12 hours 100 each 4    Multiple Vitamin (DAILY MULTIVITAMIN PO) Take by mouth daily      nystatin (MYCOSTATIN) 355840 UNIT/GM external powder Apply topically 2 times daily Prevention of rash 60 g 0    omeprazole (PRILOSEC) 20 MG DR capsule Take 1 capsule (20 mg) by mouth 2 times daily 180 capsule 3    order for DME Autocpap 10-16 cm 1 Units 0    OZEMPIC, 0.25 OR 0.5 MG/DOSE, 2 MG/3ML pen INJECT 0.5 MG UNDER THE SKIN EVERY 7 DAYS. 3 mL 3    PARoxetine (PAXIL) 40 MG tablet TAKE 1 TABLET(40 MG) BY MOUTH EVERY MORNING 90 tablet 3    polyethylene glycol (MIRALAX) 17 GM/Dose powder Take 17 g (1 capful) by mouth daily 850 g 3    psyllium 0.52 G capsule Take 1 capsule (0.52 g) by mouth daily 100 capsule 3    RESERPINE-HYDROCHLOROTHIAZIDE OR daily      tamsulosin (FLOMAX) 0.4 MG capsule Take 2 capsules (0.8 mg) by mouth daily TAKE 2 CAPSULES BY MOUTH DAILY 180 capsule 3    thin (NO BRAND SPECIFIED) lancets Use with lanceting device. To accompany: Blood Glucose Monitor Brands: per insurance. 100 each 11    triamcinolone (KENALOG) 0.1 % external cream Apply topically 2 times daily as needed for irritation 30 g 0       ALLERGIES:  No Known Allergies    NEW  PMH/PSH: None    REVIEW OF SYSTEMS:  The patient completed a comprehensive 11 point review of systems (below), which was reviewed. Positives are as noted below.  Patient Supplied Answers to Review of Systems  Noncontributory      PHYSICAL EXAM:  General: The patient was alert and conversant, and in no acute distress.    Oral cavity/oropharynx: No masses or lesions. Dentition unchanged since prior. Tongue mobility and palate elevation intact and symmetric.  Neck: No palpable cervical lymphadenopathy, no significant tenderness to palpation of the thyrohyoid space, which was narrow. No obvious thyroid abnormality.  Resp: Breathing comfortably, no stridor or stertor.  Neuro: Symmetric facial function. Other cranial nerve function as documented above.  Psych: Normal affect, pleasant and cooperative.  Voice/speech: Mild to moderate dysphonia characterized by glottal leblanc.      Procedure:   Flexible fiberoptic laryngoscopy and laryngovideostroboscopy  Indications: This procedure was warranted to evaluate the patient's laryngeal anatomy and function. Risks, benefits, and alternatives were discussed.  Description: After written informed consent was obtained, a time-out was performed to confirm patient identity, procedure, and procedure site. Topical 2% lidocaine and oxymetazoline were applied to the nasal cavities. I performed the endoscopy and no complications were apparent. Continuous and stroboscopic light were utilized to assess routine phonation and variable frequency phonation.  Performed by: Mel Fournier MD MPH  SLP: Kassandra Coleman MS CCC-SLP   Findings: Normal nasopharynx. Normal base of tongue, valleculae, and epiglottis. Vocal fold mobility: right: normal; left: normal. Medial edges of the true vocal folds: smooth and straight.  Left true vocal fold with mild erythema, no focal lesions.  Glissade produced appropriate elongation. Mucosa of false vocal folds, aryepiglottic folds, piriform sinuses, and posterior  glottis unremarkable overall, with a small focal area of leukoplakia along left medial arytenoid where it has previously been noted intermittently. Airway was patent.  Similar findings on NBI.    The addition of stroboscopy allowed evaluation of the mucosal wave.   Amplitude: right: mildly decreased; left: mildly decreased. Symmetry: intermittent symmetry. Closure pattern: complete. Closure plane: at glottic level. Phase distribution: normal.                            IMPRESSION AND PLAN:   Gary Iyer returns with resolution of the previously noted right posterior granuloma and a tiny area of mucosal irritation along the left medial arytenoid.    We encouraged him to continue focusing on airflow when speaking, and to continue to be mindful about high impact coughing/ throat-clearing. He will continue working with Candido Winter, PhD, CCC-SLP on therapy. He also did some learning today with Kassandra Coleman, MS CCC-SLP regarding techniques as well as strategies for integrating therapy techniques into daily life.    He will return to see me in 6 months for a checkup, or sooner as needed.    I spent a total of 17 minutes on 11/17/2023 in chart review, review of tests, patient visit, documentation, care coordination, and/or discussion with other providers about the issues documented above, separate from any documented procedure(s).

## 2023-11-28 NOTE — PROGRESS NOTES
SUBJECTIVE:   Gary Iyer is a 57 year old male who presents to clinic today for the following health issues:       Tired, weakness, not wanting to do much for quite sometime now    none    Problem list and histories reviewed & adjusted, as indicated.  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 No distress.   HENT:   Head: Normocephalic and atraumatic.   Eyes: Conjunctivae are normal.   Neck: Carotid bruit is not present.   Cardiovascular: Normal rate, regular rhythm, normal heart sounds and intact distal pulses.  Exam reveals no gallop and no friction rub.    No murmur heard.  Pulmonary/Chest: Effort normal and breath sounds normal. No respiratory distress. He has no wheezes. He has no rales.   Abdominal: Soft. There is no tenderness.   Musculoskeletal: He exhibits no edema.   Neurological: He is alert and oriented to person, place, and time.   Skin: He is not diaphoretic.   Psychiatric: Mood and affect normal.   no tremor when holding hands out in front        ASSESSMENT / PLAN:  (F32.1) Moderate major depression (H)  (primary encounter diagnosis)  Comment: patient needs to see counselor or therapist.  He will call for initial  Patient calling questioning bleeding on IUD. Mirena was placed 9/8/23. Patient stated she has been bleeding/spotting for about 2 weeks. Needs to change tampons every 6 hours approximately.     Notified patient that this is normal on this IUD. May take months to a year for period to regulate or even stop. She is not used to having period on an IUD. Informed patient that if she soaks a tampon or pad in an hour, to follow up.     Questions answered. Patient verbalized understanding.    appointment.  Plan: MENTAL HEALTH REFERRAL        He is on paroxetine 20 mg for anxiety but with worsening anxiety ( and the depression ) we will increase to 40 mg.  He was on 40 mg in past.     (F41.9) Anxiety  Comment: see above   Plan: MENTAL HEALTH REFERRAL             (I10) Hypertension goal BP (blood pressure) < 140/90  Comment: on recheck, at goal   Plan: monitor     (E11.9) Type 2 diabetes mellitus without complication, without long-term current use of insulin (H)  Comment: recheck hemoglobin a1c   Plan: Hemoglobin A1c, Albumin Random Urine         Quantitative with Creat Ratio             (R53.83) Fatigue, unspecified type  Comment: check   Plan: TSH with free T4 reflex, CBC with platelets         differential             (E78.5) Hyperlipidemia LDL goal <70  Comment: fasting today   Plan: Comprehensive metabolic panel, Lipid panel         reflex to direct LDL             Of note, I clarified with patient that we don't do disability evaluation here.  He can certainly request that his medical records from here be sent to wherever he wishes.  He will  request for records form.      I reviewed the patient's medications, allergies, medical history, family history, and social history.    Finesse Beal MD

## 2023-12-04 ENCOUNTER — TELEPHONE (OUTPATIENT)
Dept: OTOLARYNGOLOGY | Facility: CLINIC | Age: 63
End: 2023-12-04

## 2023-12-04 NOTE — TELEPHONE ENCOUNTER
Patient needs to be rescheduled for their virtual visit due to Reason for Reschedule: pt thought appoint was in January and unable to do today    Appointment mode: Video  Provider: Candido Winter SLP

## 2023-12-17 NOTE — PROGRESS NOTES
Outpatient Speech Language Therapy Evaluation  PLAN OF TREATMENT FOR OUTPATIENT REHABILITATION  (RE-CERTIFICATION FOR SPEECH THERAPY)  Patient's Last Name, First Name, M.I.  YOB: 1960  Gary Iyer                           Provider's Name  Misty Clark, SLP Medical Record No.  [id number]                               Onset Date:  6/3/21   Start of Care Date: 6/3/21     Type: Speech Language Therapy Medical Diagnosis: Dysphonia.                        Therapy Diagnosis:  Dysphonia.   Visits from SOC:  3   _________________________________________________________________________________  Plan of Treatment:   Speech therapy    Monthly, one-hour sessions for three months.    Please see goals from the Plan of Care in the 6/3/21 progress note.    _________________________________________________________________________________    I CERTIFY THE NEED FOR THESE SERVICES FURNISHED UNDER        THIS PLAN OF TREATMENT AND WHILE UNDER MY CARE     (Physician attestation of this document indicates review and certification of the therapy plan).     Certification date from: 9/2/21  Certification date to: 12/1/21    Referring Provider: Mel Fournier      Initial (On Arrival)

## 2024-01-06 DIAGNOSIS — R39.12 BENIGN PROSTATIC HYPERPLASIA WITH WEAK URINARY STREAM: ICD-10-CM

## 2024-01-06 DIAGNOSIS — N40.1 BENIGN PROSTATIC HYPERPLASIA WITH WEAK URINARY STREAM: ICD-10-CM

## 2024-01-06 DIAGNOSIS — E11.9 TYPE 2 DIABETES MELLITUS WITHOUT COMPLICATION, WITHOUT LONG-TERM CURRENT USE OF INSULIN (H): ICD-10-CM

## 2024-01-06 DIAGNOSIS — F41.9 ANXIETY: Chronic | ICD-10-CM

## 2024-01-08 RX ORDER — TAMSULOSIN HYDROCHLORIDE 0.4 MG/1
0.8 CAPSULE ORAL DAILY
Qty: 180 CAPSULE | Refills: 0 | Status: SHIPPED | OUTPATIENT
Start: 2024-01-08 | End: 2024-04-05

## 2024-01-08 RX ORDER — PAROXETINE 40 MG/1
TABLET, FILM COATED ORAL
Qty: 90 TABLET | Refills: 0 | Status: SHIPPED | OUTPATIENT
Start: 2024-01-08 | End: 2024-04-05

## 2024-01-24 ENCOUNTER — PATIENT OUTREACH (OUTPATIENT)
Dept: GERIATRIC MEDICINE | Facility: CLINIC | Age: 64
End: 2024-01-24
Payer: COMMERCIAL

## 2024-01-24 NOTE — PROGRESS NOTES
[facility] Six-Month Telephone Assessment    6 month telephone assessment completed on 1/24/24.    ER visits: No  Hospitalizations: No  TCU stays: No  Significant health status changes: no  Falls/Injuries: No  ADL/IADL changes: No  Changes in services: No    Caregiver Assessment follow up:  NA    Goals: See POC in chart for goal progress documentation.      Will see member in 6 months for an annual health risk assessment.   Encouraged member to call CC with any questions or concerns in the meantime.   Juany Mondragon RN PHN  [facility] Care Coordinator  [phone]

## 2024-02-05 ENCOUNTER — PATIENT OUTREACH (OUTPATIENT)
Dept: GERIATRIC MEDICINE | Facility: CLINIC | Age: 64
End: 2024-02-05
Payer: COMMERCIAL

## 2024-02-05 NOTE — PROGRESS NOTES
2/5/24 EKATERINA received a call from Gary. He states he called the [facility] and they gave him CC as his . He wants to know how CC is involved.  EKATERINA explained he is on the [employer] MSHO program starting in July. CC had called him then and explained the program but he did not want another CC at the time and refused the home visit.   His primary CC is Sanna Rueda [phone]. Gary states he is familiar with [facility]. He asked if he was on EW because his PCA agency thinks he does not have PCA after age 65. EKATERINA said that is not true. He continues with PCA and he is on the CADI program not EW.   He also asked CC about his son who was on [place] [employer] and they cut his services. Gary states his son lives in [facility]. EKATERINA suggested that his son call [facility] and speak with someone in the [place] [employer] dept. CC gave him [facility] phone number.    Gary thanked  for the information. He will contact [facility] about the PCA.  EKATREINA asked him who his PCA agency is as this was very unclear in July.  Gary states he does not know. He will contact [facility] but states she is new and is just learning everything and is slow but she gets the work done.  EKATERINA told Gary he can call this CC for questions to se if CC can help in any way.    CC checked [employer] and he is still open to MA/MSHO thru UC[employer] and CADI.  CC checked MMIS and he did have a [employer] assessment done in December 2023.  CC called Sanna TOBAR at the number CC has for her [phone]. No answer, CC [facility].  Juany Mondragon RN N  [facility] Coordinator  [phone]

## 2024-02-23 ENCOUNTER — PATIENT OUTREACH (OUTPATIENT)
Dept: GERIATRIC MEDICINE | Facility: CLINIC | Age: 64
End: 2024-02-23
Payer: COMMERCIAL

## 2024-02-23 NOTE — LETTER
February 23, 2024    GARY HENNING  [address] [place] [address]      Dear Gary:    As a member of [facility] ([facility] ([facility]), you are provided a care coordinator. I will be your new care coordinator as of 3/1/2024. I will be calling you soon to see how you are doing and determine your needs.    If you have any questions, please feel free to call me at [phone]. If you reach my voice mail, please leave a message and your phone number. If you are hearing impaired, please call the [place] Relay at 881 or [phone] (exbqzz-as-jikpxb relay service).    I look forward to speaking with you soon.    Sincerely,      Enid Baer RN, BSN, N  [phone]  [email or web address] is a health plan that contracts with both Medicare and the [place] Medical Assistance (Medicaid) program to provide benefits of both programs to enrollees. Enrollment in [facility] depends on contract renewal.      [facility]  [id number] DHS Approved ([id number])  [id number] (11/18)

## 2024-02-23 NOTE — PROGRESS NOTES
[facility] Care Coordination Contact    Internal CC change effective 3/1/2024.  Mailed member CC Change letter.  Additional tasks to be completed by CMS include: update database & EPIC, enter CC Change in MMIS, and move member file.    Sheri Nieto  Case Management Specialist  [facility]  [phone]

## 2024-02-28 ENCOUNTER — PATIENT OUTREACH (OUTPATIENT)
Dept: GERIATRIC MEDICINE | Facility: CLINIC | Age: 64
End: 2024-02-28
Payer: COMMERCIAL

## 2024-02-28 NOTE — PROGRESS NOTES
2/28/24 CC received a letter from [facility] stating member did not get MA renewal paperwork in to the [facility] and MA will stop 2/29/24.  CC called Gary and no answer,TRC asking if he did get his renewal paperwork in.  Juany Mondragon RN LINDAN  [facility] Coordinator  [phone]

## 2024-03-01 ENCOUNTER — PATIENT OUTREACH (OUTPATIENT)
Dept: GERIATRIC MEDICINE | Facility: CLINIC | Age: 64
End: 2024-03-01

## 2024-03-01 ENCOUNTER — DOCUMENTATION ONLY (OUTPATIENT)
Dept: FAMILY MEDICINE | Facility: CLINIC | Age: 64
End: 2024-03-01

## 2024-03-01 ENCOUNTER — LAB (OUTPATIENT)
Dept: LAB | Facility: CLINIC | Age: 64
End: 2024-03-01
Payer: COMMERCIAL

## 2024-03-01 DIAGNOSIS — I10 HYPERTENSION GOAL BP (BLOOD PRESSURE) < 140/90: Primary | Chronic | ICD-10-CM

## 2024-03-01 DIAGNOSIS — E11.9 TYPE 2 DIABETES MELLITUS WITHOUT COMPLICATION, WITHOUT LONG-TERM CURRENT USE OF INSULIN (H): ICD-10-CM

## 2024-03-01 DIAGNOSIS — R53.83 FATIGUE, UNSPECIFIED TYPE: ICD-10-CM

## 2024-03-01 DIAGNOSIS — I10 HYPERTENSION GOAL BP (BLOOD PRESSURE) < 140/90: Chronic | ICD-10-CM

## 2024-03-01 LAB
BASOPHILS # BLD AUTO: 0 10E3/UL (ref 0–0.2)
BASOPHILS NFR BLD AUTO: 0 %
EOSINOPHIL # BLD AUTO: 0.3 10E3/UL (ref 0–0.7)
EOSINOPHIL NFR BLD AUTO: 3 %
ERYTHROCYTE [DISTWIDTH] IN BLOOD BY AUTOMATED COUNT: 15.5 % (ref 10–15)
HBA1C MFR BLD: 6.1 % (ref 0–5.6)
HCT VFR BLD AUTO: 35.8 % (ref 40–53)
HGB BLD-MCNC: 11.7 G/DL (ref 13.3–17.7)
IMM GRANULOCYTES # BLD: 0 10E3/UL
IMM GRANULOCYTES NFR BLD: 0 %
LYMPHOCYTES # BLD AUTO: 2.5 10E3/UL (ref 0.8–5.3)
LYMPHOCYTES NFR BLD AUTO: 31 %
MCH RBC QN AUTO: 29.8 PG (ref 26.5–33)
MCHC RBC AUTO-ENTMCNC: 32.7 G/DL (ref 31.5–36.5)
MCV RBC AUTO: 91 FL (ref 78–100)
MONOCYTES # BLD AUTO: 0.9 10E3/UL (ref 0–1.3)
MONOCYTES NFR BLD AUTO: 11 %
NEUTROPHILS # BLD AUTO: 4.5 10E3/UL (ref 1.6–8.3)
NEUTROPHILS NFR BLD AUTO: 55 %
PLATELET # BLD AUTO: 272 10E3/UL (ref 150–450)
RBC # BLD AUTO: 3.92 10E6/UL (ref 4.4–5.9)
WBC # BLD AUTO: 8.1 10E3/UL (ref 4–11)

## 2024-03-01 PROCEDURE — 36415 COLL VENOUS BLD VENIPUNCTURE: CPT

## 2024-03-01 PROCEDURE — 80053 COMPREHEN METABOLIC PANEL: CPT

## 2024-03-01 PROCEDURE — 83036 HEMOGLOBIN GLYCOSYLATED A1C: CPT

## 2024-03-01 PROCEDURE — 85025 COMPLETE CBC W/AUTO DIFF WBC: CPT

## 2024-03-01 NOTE — PROGRESS NOTES
Gary Iyer has an upcoming lab appointment:    Future Appointments   Date Time Provider Department Center   3/1/2024  1:15 PM FZ LAB [facility]   4/29/2024  4:00 PM Candido Winter, SLP [facility]     Patient is scheduled for the following lab(s):     There is no order available. Please review and place either future orders or HMPO (Review of Health Maintenance Protocol Orders), as appropriate.    Health Maintenance Due   Topic    ANNUAL REVIEW OF HM ORDERS     A1C      Ruben Manuel

## 2024-03-01 NOTE — PROGRESS NOTES
[facility] Care Coordination Contact    Care coordinator reached out to member to introduce self as new care coordinator.  Member took down contact information.  Care coordinator explained role as member also is open to CADI and has a different  for that.  Care coordinator inquired about members insurance as he received notification of MA lapse.  Care coordinator confirmed that member is active on MA through [employer] and member reports that he was informed that he now has a spend down.  He reports that he is keeping his MA with the spend down as he needs this for CADI.  Care coordinator educated that next outreach will be in 3 to 4 months, but that member can call sooner if there are questions/concerns.    Enid Baer RN   [facility]  [phone]

## 2024-03-01 NOTE — PROGRESS NOTES
Patient has lab appointment at 1:15 PM today.  He is checking on status of orders for A1C and CMP    Carlos Morris RN  [facility]

## 2024-03-02 LAB
ALBUMIN SERPL BCG-MCNC: 4.2 G/DL (ref 3.5–5.2)
ALP SERPL-CCNC: 52 U/L (ref 40–150)
ALT SERPL W P-5'-P-CCNC: 68 U/L (ref 0–70)
ANION GAP SERPL CALCULATED.3IONS-SCNC: 12 MMOL/L (ref 7–15)
AST SERPL W P-5'-P-CCNC: 57 U/L (ref 0–45)
BILIRUB SERPL-MCNC: 0.5 MG/DL
BUN SERPL-MCNC: 11.8 MG/DL (ref 8–23)
CALCIUM SERPL-MCNC: 9.3 MG/DL (ref 8.8–10.2)
CHLORIDE SERPL-SCNC: 98 MMOL/L (ref 98–107)
CREAT SERPL-MCNC: 0.75 MG/DL (ref 0.67–1.17)
DEPRECATED HCO3 PLAS-SCNC: 28 MMOL/L (ref 22–29)
EGFRCR SERPLBLD CKD-EPI 2021: >90 ML/MIN/1.73M2
GLUCOSE SERPL-MCNC: 101 MG/DL (ref 70–99)
POTASSIUM SERPL-SCNC: 4.4 MMOL/L (ref 3.4–5.3)
PROT SERPL-MCNC: 8.3 G/DL (ref 6.4–8.3)
SODIUM SERPL-SCNC: 138 MMOL/L (ref 135–145)

## 2024-03-03 NOTE — RESULT ENCOUNTER NOTE
Liver test AST is better.  Other liver tests normal.    Red blood count ( hemoglobin ) a little low again.    Diabetes test ( hemoglobin a1c ) is excellent.    Finesse Beal MD

## 2024-03-19 ENCOUNTER — TELEPHONE (OUTPATIENT)
Dept: OTOLARYNGOLOGY | Facility: CLINIC | Age: 64
End: 2024-03-19
Payer: COMMERCIAL

## 2024-04-05 DIAGNOSIS — E78.5 HYPERLIPIDEMIA LDL GOAL <70: Chronic | ICD-10-CM

## 2024-04-05 DIAGNOSIS — R39.12 BENIGN PROSTATIC HYPERPLASIA WITH WEAK URINARY STREAM: ICD-10-CM

## 2024-04-05 DIAGNOSIS — I10 HYPERTENSION GOAL BP (BLOOD PRESSURE) < 140/90: ICD-10-CM

## 2024-04-05 DIAGNOSIS — M10.9 GOUT, UNSPECIFIED CAUSE, UNSPECIFIED CHRONICITY, UNSPECIFIED SITE: ICD-10-CM

## 2024-04-05 DIAGNOSIS — F41.9 ANXIETY: Chronic | ICD-10-CM

## 2024-04-05 DIAGNOSIS — E11.9 TYPE 2 DIABETES MELLITUS WITHOUT COMPLICATION, WITHOUT LONG-TERM CURRENT USE OF INSULIN (H): ICD-10-CM

## 2024-04-05 DIAGNOSIS — N40.1 BENIGN PROSTATIC HYPERPLASIA WITH WEAK URINARY STREAM: ICD-10-CM

## 2024-04-05 RX ORDER — ALLOPURINOL 300 MG/1
TABLET ORAL
Qty: 90 TABLET | Refills: 0 | Status: SHIPPED | OUTPATIENT
Start: 2024-04-05 | End: 2024-05-22

## 2024-04-05 RX ORDER — ATORVASTATIN CALCIUM 20 MG/1
20 TABLET, FILM COATED ORAL DAILY
Qty: 90 TABLET | Refills: 0 | Status: SHIPPED | OUTPATIENT
Start: 2024-04-05 | End: 2024-05-22

## 2024-04-05 RX ORDER — PAROXETINE 40 MG/1
TABLET, FILM COATED ORAL
Qty: 90 TABLET | Refills: 0 | Status: SHIPPED | OUTPATIENT
Start: 2024-04-05 | End: 2024-05-22

## 2024-04-05 RX ORDER — HYDROCHLOROTHIAZIDE 50 MG/1
50 TABLET ORAL DAILY
Qty: 90 TABLET | Refills: 0 | Status: SHIPPED | OUTPATIENT
Start: 2024-04-05 | End: 2024-05-22

## 2024-04-05 RX ORDER — TAMSULOSIN HYDROCHLORIDE 0.4 MG/1
0.8 CAPSULE ORAL DAILY
Qty: 180 CAPSULE | Refills: 0 | Status: SHIPPED | OUTPATIENT
Start: 2024-04-05 | End: 2024-05-22

## 2024-04-05 RX ORDER — AMLODIPINE BESYLATE 2.5 MG/1
2.5 TABLET ORAL DAILY
Qty: 90 TABLET | Refills: 0 | Status: SHIPPED | OUTPATIENT
Start: 2024-04-05 | End: 2024-05-22

## 2024-05-15 ENCOUNTER — PATIENT OUTREACH (OUTPATIENT)
Dept: GERIATRIC MEDICINE | Facility: CLINIC | Age: 64
End: 2024-05-15

## 2024-05-15 NOTE — PROGRESS NOTES
TRANSITIONS OF CARE (TARA) LOG    TARA tasks should be completed by the CC within one (1) business day of notification of each transition. Follow up contact with member is required after return to their usual care setting.  Note:  If CC finds out about the transitions fifteen (15) days or more after the member has returned to their usual care setting, no TARA log is needed. However, the CC should check in with the member to discuss the transition process, any changes needed to the care plan and document it in a case note.     Member Name:  Gary [facility]yer [facility] Member ID[facility] [id number]   Product: [facility] Care Coordinator Contact:  Enid Baer RN, BSN, PHN Agency/County/Care System: [facility]   Transition Communication Actions from Care Management Contact   Transition #1   Notification Date: 5/15/24 Transition Date:   5/14/24 Transition From: Home     Is this the member s usual care setting?               YES Transition To: Hospital, [facility]    Transition Type:  Unplanned    Documentation from conversation with the member/responsible party, provider, discharging and receiving facility:   Date: 5/15/24: Received notification of admission to hospital with dx of SOB/acute hypoxic respiratory failure.  CC contacted Hospital /discharge planner,  (PATRICIO Appiah on [address] [place] [phone] for Name and Phone Number) and left a message with this CC contact information, reviewed community POC as well requested to be notified of concerns, care conferences and discharge planning.  On 5/16/24 care coordinator placed another call to members unit ([facility]) at [facility] and confirmed that he is still inpatient, but was not able to get through to his nurse.  No further updates at this time. 5/17/24 care coordinator placed another call to [address] and was transferred to [facility]'s nurse voicemail.  A Message was left requesting call back with update and discharge plans. Care coordinator received call  back on 5/17/24 from PATRICIO Appiah and she reports that member will be discharging today with family.  Time of discharge is unknown.  He has declined home care referral.  Care coordinator updated PATRICIO that member does have 3.5 hours per day of personal care attendent.  Care coordinator will reach out to member at home upon official discharge from hospital.   CC reached out to [facility]  Clair Tyrese  regarding transition and left a message requesting a return call.  Reviewed and update care plan as needed.  Transition log initiated.   PCP, Finesse Beal, notified of hospitalization via EMR.     Transition #2   Notification Date: 5/21/24 Transition Date:   5/17/24 Transition From: Hospital, [facility]      Is this the member s usual care setting?               no Transition To: Home   Transition Type:  Planned    Documentation from conversation with the member/responsible party, provider, discharging and receiving facility:   Date: 5/21/24: Received notification of transition to home.  CC contacted member on 5/21/24 and reviewed discharge summary.  Member has a follow-up appointment with PCP in 7 days: Yes: scheduled on 5/22/24    Member has had a change in condition: No  Home visit needed: No  Care plan reviewed and updated.  The following home based services PCA were resumed.  New referrals placed: No    Care coordinator spoke to member and discussed that metformin is currently on hold.  Member will discuss this further with PCP at office visit tomorrow.  Member also had some questions regarding his supplemental benefits with [employer].  He reports that he is considering [employer], but unsure if he wants to make this switch.  Care coordinator encouraged member to call [employer] Line to compare the two plans and make and informed decision.  Member was thankful for the call.     Transition log completed.   PCP, Finesse Beal, aware of transition back to home as member has office visit that was scheduled  today, but moved to tomorrow.    Enid Baer RN   [facility]  [phone]           *RETURN TO USUAL CARE SETTING: *Complete tasks below when the member is discharging TO their usual care setting within one (1) business day of notification..      For situations where the Care Coordinator is notified of the discharge prior to the date of discharge, the Care Coordinator must follow up with the member or designated representative to confirm that discharge actually occurred and discuss required TARA tasks as outlined in the TARA Instructions.  (This includes situations where it may be a  new  usual care setting for the member. (i.e., a community member who decides upon permanent nursing home placement following hospitalization and rehab).    Discuss with Member/Responsible Party:    Check  Yes  - if the member, family member and/or SNF/facility staff manages the following:    If  No  provide explanation in the comments section.          Date completed: 5/21/24 Communicated with member or their designated representative about the following:  care transition process; about changes to the member s health status; plan of care updates; education about transitions and how to prevent unplanned transitions/readmissions    Four Pillars for Optimal Transition:    Check  Yes  - if the member, family member and/or SNF/facility staff manages the following:    If  No  provide explanation in the comments section.          [x]  Yes     []  No Does the member have a follow-up appointment scheduled with primary care or specialist? (Mental health hospitalizations--the appt. should be w/in 7 days)              For mental health hospitalizations:  []  Yes     []  No     Does the member have a follow-up appointment scheduled with a mental health practitioner within 7 days of discharge?  [x]  Yes     []  No     Has a medication review been completed with member? If no, refer to PCP, home care nurse, MTM, pharmacist  [x]  Yes     []   No     Can the member manage their medications or is there a system in place to manage medications (e.g. home care set-up)?         [x]  Yes     []  No     Can the member verbalize warning signs and symptoms to watch for and how to respond?  [x]  Yes     []  No     Does the member have a copy of and understand their discharge instructions?  If no, assist to obtain copy of discharge instructions, review discharge instructions, and assist to contact PCP to discuss questions about their recent hospitalization.  [x]  Yes     []  No     Does the member have adequate food, housing and transportation?  If no, add goal and discuss additional supports available to the member                                                                                                                                                                                 [x]  Yes     []  No     Is the member safe in their home?  If no, document needs and support provided                                                                                                                                                                          []  Yes     [x]  No     Are there any concerns of vulnerability, abuse, or neglect?  If yes, document concerns and actions taken by Care Coordinator as a mandated                                                                                                                                                                              [x]  Yes     []  No     Does the member use a Personal Health Care Record?  Check  Yes  if visit summary, discharge summary, and/or healthcare summary are being used as a PHR.                                                                                                                                                                                  [x]  Yes     []  No     Have you reviewed the discharge summary with the member? If  No  provide explanation in comments.  [x]   Yes     []  No     Have you updated the member s care plan/support plan? Add new diagnosis, medications, treatments, goals & interventions, as applicable. If No, provide explanation in comments.    Comments:    no comment       Notes from conversation with the member/responsible party, provider, discharging and receiving facility (as applicable): MARIBELL Baer RN   [facility]  [phone]

## 2024-05-22 ENCOUNTER — OFFICE VISIT (OUTPATIENT)
Dept: FAMILY MEDICINE | Facility: CLINIC | Age: 64
End: 2024-05-22
Payer: COMMERCIAL

## 2024-05-22 VITALS
DIASTOLIC BLOOD PRESSURE: 73 MMHG | RESPIRATION RATE: 18 BRPM | HEIGHT: 70 IN | HEART RATE: 98 BPM | TEMPERATURE: 97.1 F | BODY MASS INDEX: 33.25 KG/M2 | WEIGHT: 232.25 LBS | OXYGEN SATURATION: 98 % | SYSTOLIC BLOOD PRESSURE: 117 MMHG

## 2024-05-22 DIAGNOSIS — Z91.09 ENVIRONMENTAL ALLERGIES: ICD-10-CM

## 2024-05-22 DIAGNOSIS — K60.2 ANAL FISSURE: ICD-10-CM

## 2024-05-22 DIAGNOSIS — I10 HYPERTENSION GOAL BP (BLOOD PRESSURE) < 140/90: ICD-10-CM

## 2024-05-22 DIAGNOSIS — R80.9 MICROALBUMINURIA: ICD-10-CM

## 2024-05-22 DIAGNOSIS — F41.9 ANXIETY: Chronic | ICD-10-CM

## 2024-05-22 DIAGNOSIS — E78.5 HYPERLIPIDEMIA LDL GOAL <70: Chronic | ICD-10-CM

## 2024-05-22 DIAGNOSIS — R39.12 BENIGN PROSTATIC HYPERPLASIA WITH WEAK URINARY STREAM: ICD-10-CM

## 2024-05-22 DIAGNOSIS — N52.9 ERECTILE DYSFUNCTION, UNSPECIFIED ERECTILE DYSFUNCTION TYPE: ICD-10-CM

## 2024-05-22 DIAGNOSIS — Z00.00 ENCOUNTER FOR MEDICARE ANNUAL WELLNESS EXAM: Primary | ICD-10-CM

## 2024-05-22 DIAGNOSIS — B35.6 TINEA CRURIS: ICD-10-CM

## 2024-05-22 DIAGNOSIS — N40.1 BENIGN PROSTATIC HYPERPLASIA WITH WEAK URINARY STREAM: ICD-10-CM

## 2024-05-22 DIAGNOSIS — M10.9 GOUT, UNSPECIFIED CAUSE, UNSPECIFIED CHRONICITY, UNSPECIFIED SITE: ICD-10-CM

## 2024-05-22 PROCEDURE — 99213 OFFICE O/P EST LOW 20 MIN: CPT | Mod: 25 | Performed by: FAMILY MEDICINE

## 2024-05-22 PROCEDURE — 99397 PER PM REEVAL EST PAT 65+ YR: CPT | Performed by: FAMILY MEDICINE

## 2024-05-22 RX ORDER — ATORVASTATIN CALCIUM 20 MG/1
20 TABLET, FILM COATED ORAL DAILY
Qty: 90 TABLET | Refills: 1 | Status: SHIPPED | OUTPATIENT
Start: 2024-05-22

## 2024-05-22 RX ORDER — AMLODIPINE BESYLATE 2.5 MG/1
2.5 TABLET ORAL DAILY
Qty: 90 TABLET | Refills: 1 | Status: SHIPPED | OUTPATIENT
Start: 2024-05-22

## 2024-05-22 RX ORDER — POLYETHYLENE GLYCOL 3350 17 G/17G
17 POWDER, FOR SOLUTION ORAL DAILY PRN
Qty: 850 G | Refills: 3 | Status: SHIPPED | OUTPATIENT
Start: 2024-05-22

## 2024-05-22 RX ORDER — PAROXETINE 40 MG/1
TABLET, FILM COATED ORAL
Qty: 90 TABLET | Refills: 1 | Status: SHIPPED | OUTPATIENT
Start: 2024-05-22

## 2024-05-22 RX ORDER — CETIRIZINE HYDROCHLORIDE 10 MG/1
10 TABLET ORAL DAILY
Qty: 90 TABLET | Refills: 3 | Status: SHIPPED | OUTPATIENT
Start: 2024-05-22

## 2024-05-22 RX ORDER — TADALAFIL 20 MG/1
TABLET ORAL
Qty: 10 TABLET | Refills: 1 | Status: SHIPPED | OUTPATIENT
Start: 2024-05-22

## 2024-05-22 RX ORDER — LOSARTAN POTASSIUM 100 MG/1
100 TABLET ORAL DAILY
Qty: 90 TABLET | Refills: 1 | Status: SHIPPED | OUTPATIENT
Start: 2024-05-22

## 2024-05-22 RX ORDER — HYDROCHLOROTHIAZIDE 50 MG/1
50 TABLET ORAL DAILY
Qty: 90 TABLET | Refills: 1 | Status: SHIPPED | OUTPATIENT
Start: 2024-05-22

## 2024-05-22 RX ORDER — TAMSULOSIN HYDROCHLORIDE 0.4 MG/1
0.8 CAPSULE ORAL DAILY
Qty: 180 CAPSULE | Refills: 1 | Status: SHIPPED | OUTPATIENT
Start: 2024-05-22

## 2024-05-22 RX ORDER — ALLOPURINOL 300 MG/1
TABLET ORAL
Qty: 90 TABLET | Refills: 1 | Status: SHIPPED | OUTPATIENT
Start: 2024-05-22

## 2024-05-22 ASSESSMENT — PAIN SCALES - GENERAL: PAINLEVEL: NO PAIN (0)

## 2024-05-22 NOTE — PATIENT INSTRUCTIONS
"See us in 2-3 months in clinic; check labs at that time    Work hard on healthy diet/ exercise/ weight loss    Stay off metformin    Continue other meds    Try to minimize / avoid alcohol    Watch on for side effects on cialis      Preventive Care Advice   This is general advice we often give to help people stay healthy. Your care team may have specific advice just for you. Please talk to your care team about your own preventive care needs.  Lifestyle  Exercise at least 150 minutes each week (30 minutes a day, 5 days a week).  Do muscle strengthening activities 2 days a week. These help control your weight and prevent disease.  No smoking.  Wear sunscreen to prevent skin cancer.  Have your home tested for radon every 2 to 5 years. Radon is a colorless, odorless gas that can harm your lungs. To learn more, go to www.health.[facility] and search for \"Radon in Homes.\"  Keep guns unloaded and locked up in a safe place like a safe or gun vault, or, use a gun lock and hide the keys. Always lock away bullets separately. To learn more, visit [place].gov and search for \"safe gun storage.\"  Nutrition  Eat 5 or more servings of fruits and vegetables each day.  Try wheat bread, brown rice and whole grain pasta (instead of white bread, rice, and pasta).  Get enough calcium and vitamin D. Check the label on foods and aim for 100% of the RDA (recommended daily allowance).  Regular exams  Have a dental exam and cleaning every 6 months.  See your health care team every year to talk about:  Any changes in your health.  Any medicines your care team has prescribed.  Preventive care, family planning, and ways to prevent chronic diseases.  Shots (vaccines)   HPV shots (up to age 26), if you've never had them before.  Hepatitis B shots (up to age 59), if you've never had them before.  COVID-19 shot: Get this shot when it's due.  Flu shot: Get a flu shot every year.  Tetanus shot: Get a tetanus shot every 10 years.  Pneumococcal, hepatitis " A, and RSV shots: Ask your care team if you need these based on your risk.  Shingles shot (for age 50 and up).  General health tests  Diabetes screening:  Starting at age 35, Get screened for diabetes at least every 3 years.  If you are younger than age 35, ask your care team if you should be screened for diabetes.  Cholesterol test: At age 39, start having a cholesterol test every 5 years, or more often if advised.  Bone density scan (DEXA): At age 50, ask your care team if you should have this scan for osteoporosis (brittle bones).  Hepatitis C: Get tested at least once in your life.  Abdominal aortic aneurysm screening: Talk to your doctor about having this screening if you:  Have ever smoked; and  Are biologically male; and  Are between the ages of 65 and 75.  STIs (sexually transmitted infections)  Before age 24: Ask your care team if you should be screened for STIs.  After age 24: Get screened for STIs if you're at risk. You are at risk for STIs (including HIV) if:  You are sexually active with more than one person.  You don't use condoms every time.  You or a partner was diagnosed with a sexually transmitted infection.  If you are at risk for HIV, ask about PrEP medicine to prevent HIV.  Get tested for HIV at least once in your life, whether you are at risk for HIV or not.  Cancer screening tests  Cervical cancer screening: If you have a cervix, begin getting regular cervical cancer screening tests at age 21. Most people who have regular screenings with normal results can stop after age 65. Talk about this with your provider.  Breast cancer scan (mammogram): If you've ever had breasts, begin having regular mammograms starting at age 40. This is a scan to check for breast cancer.  Colon cancer screening: It is important to start screening for colon cancer at age 45.  Have a colonoscopy test every 10 years (or more often if you're at risk) Or, ask your provider about stool tests like a FIT test every year or  Cologuard test every 3 years.  To learn more about your testing options, visit: www.Vozeeme/614812.pdf.  For help making a decision, visit: veroinca/lw89209.  Prostate cancer screening test: If you have a prostate and are age 55 to 69, ask your provider if you would benefit from a yearly prostate cancer screening test.  Lung cancer screening: If you are a current or former smoker age 50 to 80, ask your care team if ongoing lung cancer screenings are right for you.  For informational purposes only. Not to replace the advice of your health care provider. Copyright   2023 [facility]. All rights reserved. Clinically reviewed by the [facility] Transitions Program. [employer] 801312 - REV 04/24.

## 2024-05-22 NOTE — PROGRESS NOTES
Kamryn Vega is a 63 year old, presenting for the following health issues:  Hospital F/U and Wellness Visit        5/22/2024    12:05 PM   Additional Questions   Roomed by Elisa Chandra     [facility]         Hospital Follow-up Visit:    Hospital/Nursing Home/IP Rehab Facility:  [facility]  Date of Admission: 05/14/2024  Date of Discharge: 05/18/2024  Reason(s) for Admission: lightheaded  Was the patient in the ICU or did the patient experience delirium during hospitalization?  No  Do you have any other stressors you would like to discuss with your provider? No    Problems taking medications regularly:  None  Medication changes since discharge: None  Problems adhering to non-medication therapy:  None    Summary of hospitalization:  CareEverywhere information obtained and reviewed  Diagnostic Tests/Treatments reviewed.  Follow up needed: none  Other Healthcare Providers Involved in Patient s Care:         None  Update since discharge: improved.         Plan of care communicated with patient             Annual Wellness Visit      Patient has been advised of split billing requirements and indicates understanding: Yes          Health Care Directive  Patient does not have a Health Care Directive or Living Will: Discussed advance care planning with patient; however, patient declined at this time.  In general, how would you rate your overall physical health? good  Do you have a special diet?  Regular (no restrictions)         No data to display              Do you see a dentist two times every year?  (!) NO  The patient was instructed to see the dentist every 6 months.   Have you been more tired than usual lately?  No  If you drink alcohol do you typically have >3 drinks per day or >7 drinks per week? No  Do you have a current opioid prescription? No  Do you use any other controlled substances or medications that are not prescribed by a provider? None  Social History     Tobacco Use    Smoking status: Former     Current  packs/day: 0.00     Average packs/day: 1 pack/day for 25.0 years (25.0 ttl pk-yrs)     Types: Cigarettes     Start date: 1975     Quit date: 2000     Years since quittin.7     Passive exposure: Past    Smokeless tobacco: Never   Vaping Use    Vaping status: Never Used   Substance Use Topics    Alcohol use: Not Currently     Alcohol/week: 0.0 standard drinks of alcohol     Comment: rare    Drug use: No       Needs assistance for the following daily activities: no assistance needed  Which of the following safety concerns are present in your home?  none identified   Do you (or your family members) have any concerns about your safety while driving?  No  Do you have any of the following hearing concerns?: No hearing concerns  In the past 6 months, have you been bothered by leaking of urine? No           2023   Fall Risk   Fallen 2 or more times in the past year? No          Today's PHQ-2 Score:       2024    12:06 PM   PHQ-2 (  [employer])   Q1: Little interest or pleasure in doing things 0   Q2: Feeling down, depressed or hopeless 0   PHQ-2 Score 0   Q1: Little interest or pleasure in doing things Not at all   Q2: Feeling down, depressed or hopeless Not at all   PHQ-2 Score 0       Last PSA:   PSA   Date Value Ref Range Status   2020 0.57 0 - 4 ug/L Final     Comment:     Assay Method:  Chemiluminescence using Siemens Vista analyzer     Prostate Specific Antigen Screen   Date Value Ref Range Status   2023 0.77 0.00 - 4.00 ug/L Final     ASCVD Risk   The 10-year ASCVD risk score (Divya CALLAWAY, et al., 2019) is: 18.7%    Values used to calculate the score:      Age: 63 years      Sex: Male      Is Non- : No      Diabetic: Yes      Tobacco smoker: No      Systolic Blood Pressure: 117 mmHg      Is BP treated: Yes      HDL Cholesterol: 53 mg/dL      Total Cholesterol: 152 mg/dL            Reviewed and updated as needed this visit by Provider                     Feeling okay now  Reviewed discharge summary in detail    Lots of false alarms in hospital    [facility] as needed, at least once weeky    Current providers sharing in care for this patient include:  Patient Care Team:  Finesse Beal MD as PCP - General  Enma Herrera RN as Continuity Care Coordinator (ENT-Otolaryngology)  Mel Fournier MD as MD (Otolaryngology)  Amanuel Barger MD (Inactive) as MD (Otolaryngology)  Tomy Granger MD as MD (INTERNAL MEDICINE - ENDOCRINOLOGY, DIABETES & METABOLISM)  Bijal Sorto RN as Registered Nurse (Otolaryngology)  Mel Fournier MD as Assigned Surgical Provider  Finesse Beal MD as Assigned PCP  Misty Clark, SLP as Speech Language Pathologist (Speech Language/Path)  Mel Fournier MD as Referring Physician (Otolaryngology)  Danelle Alba RD as Diabetes Educator (Dietitian, Registered)  Tessa Louis PA-C as Physician Assistant (Family Medicine)  Candido Winter, SLP as Speech Language Pathologist (Speech Language/Path)  Adrien Yip OD as MD (Optometrist)  Wendy Baer, RN as Lead Care Coordinator (Primary Care - CC)    The following health maintenance items are reviewed in Epic and correct as of today:  Health Maintenance   Topic Date Due    ANNUAL REVIEW OF HM ORDERS  04/12/2023    MEDICARE ANNUAL WELLNESS VISIT  07/21/2023    AORTIC ANEURYSM SCREENING (SYSTEM ASSIGNED)  Never done    DIABETIC FOOT EXAM  03/03/2024    COVID-19 Vaccine (7 - 2023-24 season) 05/21/2024    EYE EXAM  08/07/2024    LIPID  08/29/2024    MICROALBUMIN  08/29/2024    A1C  09/01/2024    FALL RISK ASSESSMENT  09/01/2024    Pneumococcal Vaccine: 65+ Years (3 of 3 - PPSV23 or PCV20) 10/02/2024    BMP  03/01/2025    DTAP/TDAP/TD IMMUNIZATION (4 - Td or Tdap) 07/29/2028    ADVANCE CARE PLANNING  05/22/2029    COLORECTAL CANCER SCREENING  07/27/2032    HEPATITIS C SCREENING  Completed    HIV SCREENING   "Completed    PHQ-2 (once per calendar year)  Completed    INFLUENZA VACCINE  Completed    ZOSTER IMMUNIZATION  Completed    RSV VACCINE (Pregnancy & 60+)  Completed    IPV IMMUNIZATION  Aged Out    HPV IMMUNIZATION  Aged Out    MENINGITIS IMMUNIZATION  Aged Out    RSV MONOCLONAL ANTIBODY  Aged Out       Appropriate preventive services were discussed with this patient, including applicable screening as appropriate for fall prevention, nutrition, physical activity, Tobacco-use cessation, weight loss and cognition.  Checklist reviewing preventive services available has been given to the patient.           5/22/2024   Mini Cog   Clock Draw Score 2 Normal   3 Item Recall 3 objects recalled   Mini Cog Total Score 5                Walking twice daily for 10-15 minutes      Objective    /73 (BP Location: Right arm, Patient Position: Chair, Cuff Size: Adult Regular)   Pulse 98   Temp 97.1  F (36.2  C) (Temporal)   Resp 18   Ht 1.778 m (5' 10\")   Wt 105.3 kg (232 lb 4 oz)   SpO2 98%   BMI 33.32 kg/m    Body mass index is 33.32 kg/m .  Physical Exam   GENERAL: alert and no distress  EYES: Eyes grossly normal to inspection, PERRL and conjunctivae and sclerae normal  HENT: ear canals and TM's normal, nose and mouth without ulcers or lesions  NECK: no adenopathy, no asymmetry, masses, or scars  RESP: lungs clear to auscultation - no rales, rhonchi or wheezes  CV: regular rate and rhythm, normal S1 S2, no S3 or S4, no murmur, click or rub, no peripheral edema  ABDOMEN: soft, nontender, no hepatosplenomegaly, no masses and bowel sounds normal  MS: no gross musculoskeletal defects noted, no edema  SKIN: no suspicious lesions or rashes  NEURO: Normal strength and tone, mentation intact and speech normal  PSYCH: mentation appears normal, affect normal/bright       1. Encounter for Medicare annual wellness exam    2. Anal fissure    3. Gout, unspecified cause, unspecified chronicity, unspecified site    4. Hyperlipidemia " LDL goal <70    5. Hypertension goal BP (blood pressure) < 140/90    6. Environmental allergies    7. Anxiety    8. Benign prostatic hyperplasia with weak urinary stream    9. Microalbuminuria    10. Erectile dysfunction, unspecified erectile dysfunction type    11. Tinea cruris      Patient stable currently   Most of the extensive testing in hospital was reassuring  Only med change was stopping metformin  In 2-3 month see us back and check labs then   Keep working on healthy diet/exercise and wt loss  Advise cutting way back or stopping etoh  Refill needed meds  Patient wanted to try cialis. Tried viagra in past.  If any bad side effects let us know right away, discussed in detail.   Use the ketoconazole cream and/ or antifungal powder on groin area etc           Signed Electronically by: Finesse Beal MD

## 2024-06-03 DIAGNOSIS — E11.9 TYPE 2 DIABETES MELLITUS WITHOUT COMPLICATION, WITHOUT LONG-TERM CURRENT USE OF INSULIN (H): ICD-10-CM

## 2024-06-03 DIAGNOSIS — E66.01 MORBID OBESITY (H): ICD-10-CM

## 2024-06-03 RX ORDER — SEMAGLUTIDE 0.68 MG/ML
INJECTION, SOLUTION SUBCUTANEOUS
Qty: 3 ML | Refills: 2 | Status: SHIPPED | OUTPATIENT
Start: 2024-06-03 | End: 2024-09-09

## 2024-06-05 ENCOUNTER — PATIENT OUTREACH (OUTPATIENT)
Dept: GERIATRIC MEDICINE | Facility: CLINIC | Age: 64
End: 2024-06-05
Payer: COMMERCIAL

## 2024-06-05 NOTE — LETTER
June 6, 2024    GARY HENNING  [address] [place] [place] [address]        Dear Gary:    As a member of [facility], we offer a health risk assessment at no cost to you. I know you don't want to have the assessment right now. If you change your mind, please call me at the number below.    Who performs the health risk assessment?  A [facility] Care Coordinator performs the assessment. Our Care Coordinators can also help you understand your benefits. They can tell you about services to aid you at home, such as managing your care with your doctors if your health worsens.    Our Care Coordinators are here for you if you need:  Support for activities you used to do by yourself (including making meals, bathing, and paying bills)  Equipment for bathroom or home safety  Help finding a new place to live  Information on staying healthy, preventing falls and immunizations    Questions?  If you have questions, or you would like to do the assessment, call me at [phone]. TTY users call [phone]. I'm here from 8am to 5pm. I may reach out to you again soon.      Sincerely,          Enid Baer RN, BSN, PHN  [phone]  [email or web address]    <[id number] accepted    [id number] (12/2021)  [id number]>

## 2024-06-05 NOTE — PROGRESS NOTES
[facility] Care Coordination Contact      [facility] Refusal Telephone Assessment    Member refused home visit HRA on 6/5/24 (reason: member reports that he does not need assessment and continues with his CADI CM/assessments).    ER visits: No  Hospitalizations: Yes -  [facility]   Health concerns: No  Falls/Injuries: No  ADL/IADL Dependencies: Yes.  Has personal care attendent services.  Remains open to CADI Waiver        Member currently receiving the following home care services:   personal care attendent (3.5hours/day)  Member currently receiving the following community resources:    Informal support(s):  CADI CM    Advanced Care Planning discussion, complete code section.    [facility] sponsored benefits: Shared information re: Silver Sneakers/gym memberships, ASA, Calcium +D.    Follow-Up Plan: Member informed of future contact, plan to f/u with member with a 6 month telephone assessment and offer a home visit.  Contact information shared with member and family, encouraged member to call with any questions or concerns at any time.    This CC note routed to PCP, Finesse Beal, EDGAR   [facility]  [phone]

## 2024-06-05 NOTE — Clinical Note
Que Beal,  FYI:  Dylon refused annual assessment today.  Care coordinator is required to notify the PCP when members refuse annual health risk assessment.  He continues with CADI and has a separate CM for that.  He also continues with 3.5hours of personal care attendent per day.   Thank you, EDGAR Rossi

## 2024-06-06 NOTE — PROGRESS NOTES
"Per CC, mailed client a \"Refusal of Home Visit\" letter.    Saige Vasquez  Case Management Specialist   [facility]  [phone]    "

## 2024-06-19 ENCOUNTER — OFFICE VISIT (OUTPATIENT)
Dept: FAMILY MEDICINE | Facility: CLINIC | Age: 64
End: 2024-06-19
Payer: COMMERCIAL

## 2024-06-19 VITALS
SYSTOLIC BLOOD PRESSURE: 115 MMHG | HEART RATE: 114 BPM | RESPIRATION RATE: 16 BRPM | TEMPERATURE: 97.9 F | DIASTOLIC BLOOD PRESSURE: 69 MMHG | OXYGEN SATURATION: 97 % | WEIGHT: 232 LBS | BODY MASS INDEX: 33.21 KG/M2 | HEIGHT: 70 IN

## 2024-06-19 DIAGNOSIS — J98.59 MEDIASTINAL MASS: ICD-10-CM

## 2024-06-19 DIAGNOSIS — E04.1 THYROID NODULE: ICD-10-CM

## 2024-06-19 DIAGNOSIS — J01.90 ACUTE SINUSITIS WITH COEXISTING CONDITION, NEED PROPHYLACTIC TREATMENT: Primary | ICD-10-CM

## 2024-06-19 DIAGNOSIS — Z91.09 ENVIRONMENTAL ALLERGIES: ICD-10-CM

## 2024-06-19 DIAGNOSIS — Z85.21 HX OF LARYNGEAL CANCER: ICD-10-CM

## 2024-06-19 PROCEDURE — 99214 OFFICE O/P EST MOD 30 MIN: CPT | Performed by: FAMILY MEDICINE

## 2024-06-19 ASSESSMENT — PAIN SCALES - GENERAL: PAINLEVEL: NO PAIN (0)

## 2024-06-19 NOTE — PROGRESS NOTES
"Kamryn Vega is a 63 year old, presenting for the following health issues:  Throat Problem        6/19/2024    12:00 PM   Additional Questions   Roomed by Elisa Chandra     History of Present Illness       Reason for visit:  Throat problem    He eats 0-1 servings of fruits and vegetables daily.He consumes 0 sweetened beverage(s) daily.He exercises with enough effort to increase his heart rate 9 or less minutes per day.  He exercises with enough effort to increase his heart rate 3 or less days per week.   He is taking medications regularly.           Throat bothering    Hurts to swallow    Not eating much     Able to swallow    Not getting stuck    Drinking fluids okay    No fever/ chills    2-3 days of throat pain    No change in voice in last week     Taking cetirizine daily    Patient may still get some allergy symptoms           Objective    /69 (BP Location: Left arm, Patient Position: Chair, Cuff Size: Adult Regular)   Pulse 114   Temp 97.9  F (36.6  C) (Temporal)   Resp 16   Ht 1.778 m (5' 10\")   Wt 105.2 kg (232 lb)   SpO2 97%   BMI 33.29 kg/m    Body mass index is 33.29 kg/m .  Physical Exam  Constitutional:       Appearance: He is well-developed.   HENT:      Head: Normocephalic and atraumatic.      Right Ear: Tympanic membrane, ear canal and external ear normal.      Left Ear: Tympanic membrane, ear canal and external ear normal.      Nose: Nose normal.      Mouth/Throat:      Pharynx: Oropharynx is clear.      Comments: Tongue coated.  Some drainage posteriorly.  No distinct submandib, maxillary or frontal sinus tenderness.    Eyes:      Conjunctiva/sclera: Conjunctivae normal.   Neck:      Vascular: No carotid bruit.   Cardiovascular:      Rate and Rhythm: Normal rate and regular rhythm.      Heart sounds: Normal heart sounds.   Pulmonary:      Effort: Pulmonary effort is normal. No respiratory distress.      Breath sounds: Normal breath sounds.   Neurological:      Mental " Status: He is alert and oriented to person, place, and time.      Cranial Nerves: No cranial nerve deficit.   Psychiatric:         Speech: Speech normal.         Behavior: Behavior normal.      No pitting edema  Radials symmetric       ASSESSMENT / PLAN:  (J01.90) Acute sinusitis with coexisting condition, need prophylactic treatment  (primary encounter diagnosis)  Comment: discussed in detail.  Symptoms getting worse. Likely sinus involvement.  Cover with antibiotics.  Follow up prn.   Plan: amoxicillin-clavulanate (AUGMENTIN) 875-125 MG         tablet             (J98.59) Mediastinal mass  Comment: went over reports from May [facility].  Radiologist advised recheck this in 1-2 months. 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    Finesse Beal MD          Signed Electronically by: Finesse Beal MD

## 2024-06-19 NOTE — PATIENT INSTRUCTIONS
Take antibiotics    Continue allergy med cetirizine    Stay well hydrated    Schedule chest CT for July    Schedule thyroid ultrasound anytime

## 2024-06-20 NOTE — PROGRESS NOTES
Outpatient Speech Language Therapy Evaluation  PLAN OF TREATMENT FOR OUTPATIENT REHABILITATION  (COMPLETE FOR INITIAL CLAIMS ONLY)  Patient's Last Name, First Name, M.I.  YOB: 1960  Gary Iyer                           Provider's Name  Travis Mcdaniels, SLP Medical Record No.  [id number]                               Onset Date:  8/16/22   Start of Care Date: 8/16/22     Type: Speech Language Therapy Medical Diagnosis: Dysphonia [R49.0]                        Therapy Diagnosis:  Dysphonia [R49.0]   Visits from [facility]:  1   _________________________________________________________________________________  Plan of Treatment:   Speech therapy    Gary Iyer was seen for brief consultation in conjunction with a visit to Dr. Fournier today.  Please refer to the physician s dictation for a more complete history and impressions.  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  In today's exam, this had resolved, but a new area of whitish tissue that seems to be associated with irritation was observed on the right arytenoid.  The patient was observed to be very frequently holding his breath in a semi-Valsalva/auto PEEP laryngeal position, which is likely contributing to posterior laryngeal irritation.     A course of speech therapy is recommended in order to help him optimize laryngeal configuration in order to avoid excess irritation.    Goals:  Patient goal:   1. To resolve the vocal fold lesions    Long-term goal(s): In 6 months, Mr. Iyer will:  3.  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     Certification date from: 8/16/22  Certification date to: 11/14/22    Referring Provider: Dr. Mel Fournier  Signature requested 6/20/2024

## 2024-06-20 NOTE — PROGRESS NOTES
Outpatient Speech Language Therapy Evaluation  PLAN OF TREATMENT FOR OUTPATIENT REHABILITATION  (COMPLETE FOR INITIAL CLAIMS ONLY)  Patient's Last Name, First Name, M.I.                     YOB: 1960  Gary Iyer                           Provider's Name  Travis Mcdaniels, SLP Medical Record No.  [id number]                                Onset Date:  8/16/22    Start of Care Date: 8/16/22      Type: Speech Language Therapy Medical Diagnosis: Dysphonia [R49.0]                        Therapy Diagnosis:  Dysphonia [R49.0]    Visits from [facility]:  4   _________________________________________________________________________________  Plan of Treatment:      Goals:  Patient goal:   1. To resolve the vocal fold lesions     Long-term goal(s): In 6 months, Mr. Iyer will:  3. 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      Certification date from: 2/14/23  Certification date to: 5/14/23     Referring Provider: Dr. Mel Fournier         Signature requested 6/20/2024

## 2024-06-20 NOTE — PROGRESS NOTES
Outpatient Speech Language Therapy Evaluation  PLAN OF TREATMENT FOR OUTPATIENT REHABILITATION  (COMPLETE FOR INITIAL CLAIMS ONLY)  Patient's Last Name, First Name, M.I.  YOB: 1960  Gary Iyer                           Provider's Name  Travis Mcdaniels, SLP Medical Record No.  [id number]                               Onset Date:  8/16/22   Start of Care Date: 8/16/22     Type: Speech Language Therapy Medical Diagnosis: Dysphonia [R49.0]                        Therapy Diagnosis:  Dysphonia [R49.0]   Visits from [facility]:  3   _________________________________________________________________________________  Plan of Treatment:     Goals:  Patient goal:   1. To resolve the vocal fold lesions    Long-term goal(s): In 6 months, Mr. Iyer will:  3. 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     Certification date from: 11/15/22  Certification date to: 2/13/23    Referring Provider: Dr. Mel Fournier  Signature requested 6/20/2024

## 2024-06-20 NOTE — PROGRESS NOTES
Outpatient Speech Language Therapy Evaluation  PLAN OF TREATMENT FOR OUTPATIENT REHABILITATION  (COMPLETE FOR INITIAL CLAIMS ONLY)  Patient's Last Name, First Name, M.I.                     YOB: 1960  Gary Iyer                           Provider's Name  Travis Mcdaniels, SLP Medical Record No.  [id number]                                Onset Date:  8/16/22    Start of Care Date: 8/16/22      Type: Speech Language Therapy Medical Diagnosis: Dysphonia [R49.0]                        Therapy Diagnosis:  Dysphonia [R49.0]    [facility]:  4   _________________________________________________________________________________  Plan of Treatment:      Goals:  Patient goal:   1. To resolve the vocal fold lesions     Long-term goal(s): In 6 months, Mr. Iyer will:  3. 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      Certification date from: 5/15/23  Certification date to: 8/13/23     Referring Provider: Dr. Mel Fournier         Signature requested 6/20/2024

## 2024-07-02 ENCOUNTER — TELEPHONE (OUTPATIENT)
Dept: FAMILY MEDICINE | Facility: CLINIC | Age: 64
End: 2024-07-02

## 2024-07-02 ENCOUNTER — ANCILLARY PROCEDURE (OUTPATIENT)
Dept: ULTRASOUND IMAGING | Facility: CLINIC | Age: 64
End: 2024-07-02
Attending: FAMILY MEDICINE
Payer: COMMERCIAL

## 2024-07-02 DIAGNOSIS — E04.1 THYROID NODULE: Primary | ICD-10-CM

## 2024-07-02 DIAGNOSIS — E04.1 THYROID NODULE: ICD-10-CM

## 2024-07-02 PROCEDURE — 76536 US EXAM OF HEAD AND NECK: CPT | Mod: TC | Performed by: RADIOLOGY

## 2024-07-02 NOTE — RESULT ENCOUNTER NOTE
Emiliano Vega  The ultrasound shows the nodule in thyroid is getting bigger  It meets criteria for fine needle aspiration to get a tissue sample  I put in order for interventional radiology consult/ procedure  Call the interventional radiology department at [facility] and see if you can get that scheduled  Take care  Finesse Beal MD

## 2024-07-08 ENCOUNTER — OFFICE VISIT (OUTPATIENT)
Dept: URGENT CARE | Facility: URGENT CARE | Age: 64
End: 2024-07-08
Payer: COMMERCIAL

## 2024-07-08 VITALS
DIASTOLIC BLOOD PRESSURE: 77 MMHG | TEMPERATURE: 97 F | RESPIRATION RATE: 18 BRPM | HEART RATE: 119 BPM | WEIGHT: 233 LBS | OXYGEN SATURATION: 97 % | BODY MASS INDEX: 33.43 KG/M2 | SYSTOLIC BLOOD PRESSURE: 128 MMHG

## 2024-07-08 DIAGNOSIS — B37.0 ORAL CANDIDIASIS: Primary | ICD-10-CM

## 2024-07-08 PROCEDURE — 99213 OFFICE O/P EST LOW 20 MIN: CPT

## 2024-07-08 RX ORDER — NYSTATIN 100000/ML
SUSPENSION, ORAL (FINAL DOSE FORM) ORAL
Qty: 480 ML | Refills: 0 | Status: SHIPPED | OUTPATIENT
Start: 2024-07-08 | End: 2024-08-07

## 2024-07-08 NOTE — RESULT ENCOUNTER NOTE
Emiliano Vega  I see that you have biopsy scheduled for thyroid   Good job scheduling that   We will see what results show  Take care  Finesse Beal MD

## 2024-07-08 NOTE — PROGRESS NOTES
Assessment & Plan   (B37.0) Oral candidiasis  (primary encounter diagnosis)  Plan: nystatin (MYCOSTATIN) [id number] UNIT/ML suspension    Informed the patient to use the medication as prescribed and follow-up with his primary care provider should symptoms persist.  Patient acknowledged his understanding of the above plan.    The use of Dragon/Good4U dictation services may have been used to construct the content in this note; any grammatical or spelling errors are non-intentional. Please contact the author of this note directly if you are in need of any clarification.      [facility] is a 63 year old male who presents to clinic today for the following health issues:  Chief Complaint   Patient presents with    Mouth/Lip Problem     Tongue pain and yellow spots on tongue. Noticed about 3 days ago.     HPI  Patient reports having tongue discomfort and noticing yellow-whitish discoloration on the tongue approximately 3 days ago.    ROS:  Negative except noted above.    Review of Systems        Objective    /77 (BP Location: Left arm, Patient Position: Sitting, Cuff Size: Adult Large)   Pulse 119   Temp 97  F (36.1  C) (Tympanic)   Resp 18   Wt 105.7 kg (233 lb)   SpO2 97%   BMI 33.43 kg/m    Physical Exam   GENERAL: alert and no distress  EYES: Eyes grossly normal to inspection, PERRL and conjunctivae and sclerae normal  HENT: yellowish white patch on dorsum of tongue  SKIN: no suspicious lesions or rashes

## 2024-07-08 NOTE — PATIENT INSTRUCTIONS
Use the medication as prescribed.  Follow up with your primary care provider should symptoms persist.

## 2024-07-29 ENCOUNTER — ANCILLARY PROCEDURE (OUTPATIENT)
Dept: ULTRASOUND IMAGING | Facility: CLINIC | Age: 64
End: 2024-07-29
Attending: FAMILY MEDICINE
Payer: COMMERCIAL

## 2024-07-29 DIAGNOSIS — E04.1 THYROID NODULE: ICD-10-CM

## 2024-07-29 PROCEDURE — 99207 FINE NEEDLE ASPIRATE: CPT | Performed by: PATHOLOGY

## 2024-07-29 PROCEDURE — 10005 FNA BX W/US GDN 1ST LES: CPT

## 2024-07-31 ENCOUNTER — TELEPHONE (OUTPATIENT)
Dept: FAMILY MEDICINE | Facility: CLINIC | Age: 64
End: 2024-07-31
Payer: COMMERCIAL

## 2024-07-31 DIAGNOSIS — E04.1 THYROID NODULE: Primary | ICD-10-CM

## 2024-07-31 NOTE — TELEPHONE ENCOUNTER
I called patient and discussed in detail    Inconclusive pathology     Needs repeat thyroid FNA in 4-6 weeks    Did another order    Patient will schedule    Finesse Beal MD

## 2024-07-31 NOTE — RESULT ENCOUNTER NOTE
Not enough cells seen to make a diagnosis.    They advise we repeat this in 4-6 weeks.    I will call in the next few days to discuss.    Finesse Beal MD

## 2024-08-02 ENCOUNTER — OFFICE VISIT (OUTPATIENT)
Dept: OTOLARYNGOLOGY | Facility: CLINIC | Age: 64
End: 2024-08-02
Payer: COMMERCIAL

## 2024-08-02 ENCOUNTER — VIRTUAL VISIT (OUTPATIENT)
Dept: OTOLARYNGOLOGY | Facility: CLINIC | Age: 64
End: 2024-08-02
Payer: COMMERCIAL

## 2024-08-02 VITALS
SYSTOLIC BLOOD PRESSURE: 136 MMHG | BODY MASS INDEX: 33.79 KG/M2 | DIASTOLIC BLOOD PRESSURE: 76 MMHG | HEIGHT: 70 IN | WEIGHT: 236 LBS | HEART RATE: 120 BPM

## 2024-08-02 DIAGNOSIS — J38.7 LEUKOPLAKIA OF LARYNX: ICD-10-CM

## 2024-08-02 DIAGNOSIS — Z85.21 HX OF LARYNGEAL CANCER: Primary | ICD-10-CM

## 2024-08-02 DIAGNOSIS — R49.0 DYSPHONIA: ICD-10-CM

## 2024-08-02 DIAGNOSIS — R49.0 DYSPHONIA: Primary | ICD-10-CM

## 2024-08-02 PROCEDURE — 31579 LARYNGOSCOPY TELESCOPIC: CPT | Performed by: OTOLARYNGOLOGY

## 2024-08-02 PROCEDURE — 99207 PR NO BILLABLE SERVICE THIS VISIT: CPT | Mod: 95 | Performed by: SPEECH-LANGUAGE PATHOLOGIST

## 2024-08-02 PROCEDURE — 99213 OFFICE O/P EST LOW 20 MIN: CPT | Mod: 25 | Performed by: OTOLARYNGOLOGY

## 2024-08-02 RX ORDER — DOCUSATE SODIUM 100 MG/1
100 CAPSULE, LIQUID FILLED ORAL AT BEDTIME
COMMUNITY

## 2024-08-02 NOTE — PROGRESS NOTES
"[facility]  Clinical Voice and Upper Airway Evaluation Report    Patient's Name: Gary Iyer  Date of Evaluation: 8/2/24  Providing SLP: Kassandra Coleman MS CCC-SLP  Seen in Conjunction With: Mel Fournier MD MPH  Insurance Coverage: [facility]   Chief Complaint: Dysphonia  Session Location: Gary was seen via telehealth today.     The patient has been notified and verbally consented to the following statements:   This video visit will be conducted between you and your provider.  Patient has opted to conduct today's video visit vs an in-person appointment, and is not able to attend due to possible exposure to COVID-19.    If during the course of the call the provider feels a video visit is not appropriate, you will not be charged for this service.     Provider has received verbal consent for billable virtual visit from the patient? Yes  Preferred method for receiving information: MyColorScreen  Call initiated at: 10:58 AM  Call ended at: 11:30 AM  Platform used to conduct today's virtual appointment: AM Well Video  Location of provider: Residence   Location of patient: [facility] [place]       Patient History:     Gary is a 62-year-old male who is a longstanding patient of Dr. Fournier's and Dr. Winter's secondary to recurrent laryngeal squamous cell carcinoma, laryngeal hyperfunction, and vocal process granuloma. He has been working with Dr. Winter intermittently. His impressions from their most recent therapy session on 5/16/23 included:     \"Mr. Iyer presents today with good maintenance of previously learned strategies for cough suppression and avoiding other activities that cause posterior glottic constriction.  In addition, he learned strategies for reduced posterior glottic impact during phonation.  He had a very difficult time reproducing flow mode phonation, which has been successful for him in the past, and thus we replaced this today with cup " "and bubbles phonation.  He was able to perform these with acceptable accuracy, but had minimal carryover to conversational speech.     The primary consequence of lower flow phonation during today's session appeared to be vocal roughness, but not vocal strain.  The patient is not upset by his voice quality.  He is scheduled for repeat laryngeal exam on 6-23.  If at that time, it appears that his speaking technique is contributing to ongoing granulation tissue, then we will pursue a more intensive course of voice specific therapy.  Please note, these future therapy sessions should be in person, as it appears that there may be some audio challenges at the patient's home that make voice aspects of virtual therapy challenging.\"    Gary presents for repeat laryngoscopy today with Dr. Fournier. He feels that his voice has been doing okay at this time. During his various courses of voice therapy, he endorses that he is diligent with exercises for a few weeks after ending his session but then will lose interest and stop doing them. For example, he reported today that he resumed his exercises a couple of days before today's appointment. He reports having a URI a couple of weeks ago with some coughing. He denies dysphagia and dyspnea at this time.\"    During his last visit with Dr. Fournier on 6/2/23, his R vocal process lesion was larger in size compared to previously, and he underwent 2 additional sessions of voice therapy with my colleague, Candido Winter.    Impressions from most recent SLP visit in November 2023 with Kassandra Coleman MS CCC-SLP:  \"Gary is a 63-year-old male presenting today with dysphonia R49.0 secondary to laryngeal hyperfunction J38.7. Perceptually, his voice is mild to moderately rough and strained with some instances where his voice was sounding fairly clear. Laryngoscopy today demonstrated resolution of his R vocal process lesion, a very small white lesion on the L lateral vocal process, laryngeal " "hyperfunction, and intermittent aperiodic vocal fold vibration associated with increased rough voice quality. At this time, Gary's voice is doing well; however, given his laryngeal history, it is recommended that he continue to follow-up with Dr. Fournier and the [facility] Voice SLP team intermittently to ensure that progress has been maintained and that he is continuing voice exercises as appropriate. He has an upcoming appointment with Dr. Winter on 12/4/23 and will see Dr. Fournier in 3- 4 months. Gary is in agreement with this plan of care, and previously-established goals will continue to be addressed.\"      Updated patient history:    Was hospitalized in May at [facility] for acute hypoxic respiratory failure. Breathing has recovered  Had oral thrush last month and resolved with Nystatin  Had an FNA for thyroid growth on Monday but didn't have enough cells for pathology. Will repeat in 1 month  Voice has been stable without much voice exercises  Denies concerns with coughing/throat clearing and swallowing      Quality of Life Questionnaires:    Patient Supplied Answers To Last 2 VHI Questionnaires      11/16/2020     1:00 PM 8/15/2022     2:00 PM   Voice Handicap Index (VHI-10)   My voice makes it difficult for people to hear me 0 0   People have difficulty understanding me in a noisy room 1 0   My voice difficulties restrict my personal and social life.  1 0   I feel left out of conversations because of my voice 0 0   My voice problem causes me to lose income 1 0   I feel as though I have to strain to produce voice 1 0   The clarity of my voice is unpredictable 0 0   My voice problem upsets me 0 0   My voice makes me feel handicapped 1 0   People ask, \"What's wrong with your voice?\" 0 0   VHI-10 5 0     Patient Supplied Answers To Last 2 CSI Questionnaires      3/1/2019     7:39 AM 8/15/2022     2:00 PM   Cough Severity Index (CSI)   My cough is worse when I lie down 0 0   My coughing problem causes me to " restrict my personal and social life 0 0   I tend to avoid places because of my cough problem 0 0   I feel embarrassed because of my coughing problem 0 0   People ask, ''What's wrong?'' because I cough a lot 0 0   I run out of air when I cough 1 0   My coughing problem affects my voice 0 0   My coughing problem limits my physical activity 1 0   My coughing problem upsets me 1 0   People ask me if I am sick because I cough a lot 0 0   CSI Score 3 0     Patient Supplied Answers To Last 2 EAT Questionnaires      3/1/2019     7:40 AM 8/15/2022     2:00 PM   Eating Assessment Tool (EAT-10)   My swallowing problem has caused me to lose weight 1 0   My swallowing problem interferes with my ability to go out for meals 0 0   Swallowing liquids takes extra effort 1 0   Swallowing solids takes extra effort 1 0   Swallowing pills takes extra effort 1 0   Swallowing is painful 0 0   The pleasure of eating is affected by my swallowing 0 0   When I swallow food sticks in my throat 0 0   I cough when I eat 1 0   Swallowing is stressful 0 0   EAT-10 5 0       Laryngoscopy with stroboscopy:    Provider performing exam: Mel Fournier MD MPH    Informed consent: Informed verbal consent was obtained, which includes potential side-effects, risks, and benefits of the procedure.     Anesthetic: Topical anesthesia with 3% lidocaine and 0.25% phenylephrine was applied the nostrils bilaterally.     Scope type: A distal chip flexible laryngoscope was passed through the nare with halogen and xenon light source(s).     The laryngeal and pharyngeal structures were evaluated for gross appearance, mobility, function, and focal lesions / abnormalities of the associated mucosa.  Velar Function: not assessed today  General Appearance: mild interarytenoid pachydermia  Secretions: somewhat sticky on the true vocal folds   Subglottis Appearance: visible portion is patent   R Arytenoid Abduction / Adduction: symmetrical and timely ab/adduction with  "appropriate range of motion   L Arytenoid Abduction / Adduction: \" \"   Mediolateral Compression: moderate with phonation  Anteroposterior Compression: \" \"  Vocal Fold Elongation: appropriate   Left (L) Vocal Fold Edge and Mucosa: slight erythema along the vibratory edge with mild fullness at the L vocal process  Right (R) Vocal Fold Edge: white with smooth and straight vibratory edge and minimally prominent varix at the midfold  Narrow Band Imaging: similar findings as halogen light source  Glottic Closure: complete   Phase Closure: predominantly open phase   Vertical Level of Approximation: true vocal fold appear to adduct at the same height   L Amplitude: WNL   R Amplitude: WNL  L Mucosal Wave: diffusely and mildly reduced   R Mucosal Wave: WNL   Phase Symmetry: symmetrical   Periodicity: periodic  Inhalation Phonation: similar findings to exhalation phonation       Impressions and Plan:     Gary is a 64-year-old male presenting today with dysphonia R49.0. Perceptually, his voice is mild to moderately rough and strained with some instances where his voice was sounding fairly clear. Laryngoscopy today demonstrated resolution of his R vocal process lesion and a very small white lesion on the L lateral vocal process. At this time, continued surveillance with Dr. Fournier has been recommended. No additional SLP services are needed at this time.      Billed Procedures:    No charge facility fee, as no skilled SLP services were needed  Assessment and treatment time: 32 minutes  Chart review, interpretation of testing, documentation preparation, etc: 10 minutes      Thank you for allowing me to participate in this patient's care,    Kassandra Coleman MS CCC-SLP  Speech-Language Pathologist  [facility]  Department of Otolaryngology - Head and Neck Surgery  [facility]  [email or web address]  Direct: [phone]  [phone]    *This note may have been completed using " [id number] dictation software, so errors may exist. Please contact me for clarification if needed*

## 2024-08-02 NOTE — LETTER
8/2/2024      RE: Gary Iyer  [address] [address]       Dear Colleague:  Gary Iyer recently returned for follow-up at the [facility]. My clinic note from our visit is enclosed below.  Speech recognition software may have been used in the documentation below; input is reviewed before signature to the best of my ability.     I appreciate the ongoing opportunity to participate in this patient's care.    Please feel free to contact me with any questions.      Sincerely yours,  Mel Fournier M.D., M.P.H.  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  He later returned to the operating room on February 9, 2017 for an area of new irregularity of the left true vocal fold.  Pathology showed severe dysplasia with negative but close margins (for moderate, but not severe dysplasia).  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    We then observed a slight recurrence of leukoplakia/granuloma in the same location where he had this in 2018. This resolved with conservative management. Subsequently, he had allergies which were provoking coughing/throat-clearing, and he was again noted to have a granuloma. He was set up for refresher session(s) of speech therapy with Misty Clark, PhD, CCC-SLP and we discussed nasal hygiene as well. This led to near complete resolution of the granuloma, but subsequently he was noted to have slightly increased irritation along the left medial arytenoid wall.  Then he developed an upper respiratory infection and has been coughing, and had some shortness of breath. He states he was COVID  negative. His family had noted him grunting and throat-clearing frequently.    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      Today's updates:  - was hospitalized in May for illness, high lactate, concern for bacteremia  - having a thyroid workup due to incidental observations during complex hospital stay  - also reports various tests as part of various concerns during same hospital stay, but workups were overall noncontributory  - now off metformin as that was thought to be contributing to his lab findings  - voice stable  - swallow stable  - breathing is now back to normal      MEDICATIONS:     Current Outpatient Medications   Medication Sig Dispense Refill     ACCU-CHEK GUIDE test strip TEST ONCE DAILY 100 strip 1     alcohol swab prep pads USE TO SWAB AREA OF INJECTION/OLIMPIA 100 each 1     allopurinol (ZYLOPRIM) 300 MG tablet TAKE 1 TABLET(300 MG) BY MOUTH DAILY 90 tablet 1     amLODIPine (NORVASC) 2.5 MG tablet Take 1 tablet (2.5 mg) by mouth daily 90 tablet 1     aspirin 81 MG tablet Take 1 tablet by mouth daily.       atorvastatin (LIPITOR) 20 MG tablet Take 1 tablet (20 mg) by mouth daily 90 tablet 1     blood glucose (ACCU-CHEK GUIDE) test strip Use to test blood sugar 1 times daily or as directed. 100 strip 11     blood glucose calibration (NO BRAND SPECIFIED) solution To accompany: Blood Glucose Monitor Brands: per insurance. 1 Bottle 3     Blood Glucose Monitoring Suppl (ACCU-CHEK GUIDE ME) w/Device KIT USE TO TEST ONCE DAILY 1 kit 0     calcipotriene (DOVONEX) 0.005 % external cream Mix with efudex and apply twice daily to hands and arms for 12 days 60 g 3     cetirizine (ZYRTEC) 10 MG tablet Take 1 tablet (10 mg) by mouth daily 90 tablet 3     docusate sodium (DSS) 100 MG capsule Take 100 mg by mouth at bedtime       fluorouracil (EFUDEX) 5 % external cream Mix with dovonex and apply  twice daily to hands and arms for 12 days 40 g 1     hydrochlorothiazide (HYDRODIURIL) 50 MG tablet Take 1 tablet (50 mg) by mouth daily 90 tablet 1     hydrocortisone (CORTAID) 1 % external ointment Apply topically 2 times daily as needed 30 g 1     insulin pen needle (31G X 6 MM) 31G X 6 MM miscellaneous Use one pen needle daily or as directed. ( for Victoza ) 90 each 3     ketoconazole (NIZORAL) 2 % external cream Apply daily to affected area on face, armpits, groin. 60 g 1     ketoconazole (NIZORAL) 2 % external shampoo Leave on face, beard for 3-5 minutes then rinse. Use 2-3 times weekly. 120 mL 3     losartan (COZAAR) 100 MG tablet Take 1 tablet (100 mg) by mouth daily 90 tablet 1     methocarbamol (ROBAXIN) 500 MG tablet Take 1 tablet (500 mg) by mouth 2 times daily as needed for muscle spasms 60 tablet 0     metroNIDAZOLE (METROCREAM) 0.75 % external cream Apply topically 2 times daily as needed (to rosacea areas) 45 g 1     Misc. Devices (SUPPOSITORY MOLD 2GM) MISC 1 suppository every 12 hours Nifedipine 4 mg suppository, one rectally every 12 hours 100 each 4     Multiple Vitamin (DAILY MULTIVITAMIN PO) Take by mouth daily       nystatin (MYCOSTATIN) 709607 UNIT/GM external powder Apply topically 2 times daily Prevention of rash 60 g 0     nystatin (MYCOSTATIN) 111408 UNIT/ML suspension Swish and swallow 5 ml orally four times a day 480 mL 0     omeprazole (PRILOSEC) 20 MG DR capsule Take 1 capsule (20 mg) by mouth 2 times daily 180 capsule 3     order for DME Autocpap 10-16 cm 1 Units 0     OZEMPIC, 0.25 OR 0.5 MG/DOSE, 2 MG/3ML pen INJECT 0.5 MG UNDER THE SKIN EVERY 7 DAYS. 3 mL 2     PARoxetine (PAXIL) 40 MG tablet TAKE 1 TABLET(40 MG) BY MOUTH EVERY MORNING 90 tablet 1     polyethylene glycol (MIRALAX) 17 GM/Dose powder Take 17 g by mouth daily as needed for constipation 850 g 3     psyllium 0.52 G capsule Take 1 capsule (0.52 g) by mouth daily 100 capsule 3     tadalafil (ADCIRCA/CIALIS) 20 MG tablet  1/2 to 1 po as needed up to twice per week for erectile dysfunction 10 tablet 1     tamsulosin (FLOMAX) 0.4 MG capsule Take 2 capsules (0.8 mg) by mouth daily 180 capsule 1     thin (NO BRAND SPECIFIED) lancets Use with lanceting device. To accompany: Blood Glucose Monitor Brands: per insurance. 100 each 11     triamcinolone (KENALOG) 0.1 % external cream Apply topically 2 times daily as needed for irritation 30 g 0       ALLERGIES:  No Known Allergies    NEW PMH/PSH: None    REVIEW OF SYSTEMS:  The patient completed a comprehensive 11 point review of systems (below), which was reviewed. Positives are as noted below.  Patient Supplied Answers to Review of Systems Noncontributory       PHYSICAL EXAM:  General: The patient was alert and conversant, and in no acute distress.    Oral cavity/oropharynx: No masses or lesions. Dentition unchanged since prior. Tongue mobility and palate elevation intact and symmetric.  Neck: No palpable cervical lymphadenopathy, no significant tenderness to palpation of the thyrohyoid space, which was narrow. No obvious thyroid abnormality.  Resp: Breathing comfortably, no stridor or stertor.  Neuro: Symmetric facial function. Other cranial nerve function as documented above.  Psych: Normal affect, pleasant and cooperative.  Voice/speech: Highly variable voice quality, sometimes clear, sometimes with roughness or diplophonia.    Procedure:   Flexible fiberoptic laryngoscopy and laryngovideostroboscopy  Indications: This procedure was warranted to evaluate the patient's laryngeal anatomy and function. Risks, benefits, and alternatives were discussed.  Description: After written informed consent was obtained, a time-out was performed to confirm patient identity, procedure, and procedure site. Topical 2% lidocaine and oxymetazoline were applied to the nasal cavities. I performed the endoscopy and no complications were apparent. Continuous and stroboscopic light were utilized to assess routine  phonation and variable frequency phonation.  Performed by: Mel Fournier MD MPH  SLP: Kassandra Coleman MS CCC-SLP   Findings: Normal nasopharynx. Normal base of tongue, valleculae, and epiglottis. Vocal fold mobility: right: normal; left: normal. Medial edges of the true vocal folds: smooth and straight on the right; stable hypervascularity and erythema on the left. No focal mucosal lesions were observed on the true vocal folds; mild contour irregularity and mild subtle leukoplakia of the left true vocal fold, stable compared to prior. Glissade produced appropriate elongation. Mucosa of false vocal folds, aryepiglottic folds, piriform sinuses, and posterior glottis unremarkable. Airway was patent. Similar findings on NBI.     The addition of stroboscopy allowed evaluation of the mucosal wave.   Amplitude: right: normal; left: mildly decreased. Symmetry: intermittent symmetry. Closure pattern: complete. Closure plane: at glottic level. Phase distribution: normal.                                  IMPRESSION AND PLAN:   Gary Iyer returns with no evidence of disease; the left vocal fold has some mild erythema/irregularity that appears comparable to prior exams. He is also undergoing a thyroid workup.    Plan:  RTC 3-4 months or sooner as needed.    I spent a total of 23 minutes on 8/2/2024 in chart review, review of tests, patient visit, documentation, care coordination, and/or discussion with other providers about the issues documented above, separate from any documented procedure(s).      Mel Fournier MD

## 2024-08-02 NOTE — PROGRESS NOTES
Dear Colleague:  Gary Iyer recently returned for follow-up at the [facility]. My clinic note from our visit is enclosed below.  Speech recognition software may have been used in the documentation below; input is reviewed before signature to the best of my ability.     I appreciate the ongoing opportunity to participate in this patient's care.    Please feel free to contact me with any questions.      Sincerely yours,  Mel Fournier M.D., M.P.H.  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  He later returned to the operating room on February 9, 2017 for an area of new irregularity of the left true vocal fold.  Pathology showed severe dysplasia with negative but close margins (for moderate, but not severe dysplasia).  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    We then observed a slight recurrence of leukoplakia/granuloma in the same location where he had this in 2018. This resolved with conservative management. Subsequently, he had allergies which were provoking coughing/throat-clearing, and he was again noted to have a granuloma. He was set up for refresher session(s) of speech therapy with Misty Clark, PhD, CCC-SLP and we discussed nasal hygiene as well. This led to near complete resolution of the granuloma, but subsequently he was noted to have slightly increased irritation along the left medial arytenoid wall.  Then he developed an upper respiratory infection and has been coughing, and had some shortness of breath. He states he was COVID negative. His family had noted him grunting and throat-clearing frequently.    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      Today's updates:  - was hospitalized in May for illness, high lactate, concern for bacteremia  - having a thyroid workup due to incidental observations during complex hospital stay  - also reports various tests as part of various concerns during same hospital stay, but workups were overall noncontributory  - now off metformin as that was thought to be contributing to his lab findings  - voice stable  - swallow stable  - breathing is now back to normal      MEDICATIONS:     Current Outpatient Medications   Medication Sig Dispense Refill    ACCU-CHEK GUIDE test strip TEST ONCE DAILY 100 strip 1    alcohol swab prep pads USE TO SWAB AREA OF INJECTION/OLIMPIA 100 each 1    allopurinol (ZYLOPRIM) 300 MG tablet TAKE 1 TABLET(300 MG) BY MOUTH DAILY 90 tablet 1    amLODIPine (NORVASC) 2.5 MG tablet Take 1 tablet (2.5 mg) by mouth daily 90 tablet 1    aspirin 81 MG tablet Take 1 tablet by mouth daily.      atorvastatin (LIPITOR) 20 MG tablet Take 1 tablet (20 mg) by mouth daily 90 tablet 1    blood glucose (ACCU-CHEK GUIDE) test strip Use to test blood sugar 1 times daily or as directed. 100 strip 11    blood glucose calibration (NO BRAND SPECIFIED) solution To accompany: Blood Glucose Monitor Brands: per insurance. 1 Bottle 3    Blood Glucose Monitoring Suppl (ACCU-CHEK GUIDE ME) w/Device KIT USE TO TEST ONCE DAILY 1 kit 0    calcipotriene (DOVONEX) 0.005 % external cream Mix with efudex and apply twice daily to hands and arms for 12 days 60 g 3    cetirizine (ZYRTEC) 10 MG tablet Take 1 tablet (10 mg) by mouth daily 90 tablet 3    docusate sodium (DSS) 100 MG capsule Take 100 mg by mouth at bedtime      fluorouracil (EFUDEX) 5 % external cream Mix with dovonex and apply twice daily to hands and arms for 12 days 40 g 1    hydrochlorothiazide (HYDRODIURIL) 50 MG tablet  Take 1 tablet (50 mg) by mouth daily 90 tablet 1    hydrocortisone (CORTAID) 1 % external ointment Apply topically 2 times daily as needed 30 g 1    insulin pen needle (31G X 6 MM) 31G X 6 MM miscellaneous Use one pen needle daily or as directed. ( for Victoza ) 90 each 3    ketoconazole (NIZORAL) 2 % external cream Apply daily to affected area on face, armpits, groin. 60 g 1    ketoconazole (NIZORAL) 2 % external shampoo Leave on face, beard for 3-5 minutes then rinse. Use 2-3 times weekly. 120 mL 3    losartan (COZAAR) 100 MG tablet Take 1 tablet (100 mg) by mouth daily 90 tablet 1    methocarbamol (ROBAXIN) 500 MG tablet Take 1 tablet (500 mg) by mouth 2 times daily as needed for muscle spasms 60 tablet 0    metroNIDAZOLE (METROCREAM) 0.75 % external cream Apply topically 2 times daily as needed (to rosacea areas) 45 g 1    Misc. Devices (SUPPOSITORY MOLD 2GM) MISC 1 suppository every 12 hours Nifedipine 4 mg suppository, one rectally every 12 hours 100 each 4    Multiple Vitamin (DAILY MULTIVITAMIN PO) Take by mouth daily      nystatin (MYCOSTATIN) 045545 UNIT/GM external powder Apply topically 2 times daily Prevention of rash 60 g 0    nystatin (MYCOSTATIN) 040300 UNIT/ML suspension Swish and swallow 5 ml orally four times a day 480 mL 0    omeprazole (PRILOSEC) 20 MG DR capsule Take 1 capsule (20 mg) by mouth 2 times daily 180 capsule 3    order for DME Autocpap 10-16 cm 1 Units 0    OZEMPIC, 0.25 OR 0.5 MG/DOSE, 2 MG/3ML pen INJECT 0.5 MG UNDER THE SKIN EVERY 7 DAYS. 3 mL 2    PARoxetine (PAXIL) 40 MG tablet TAKE 1 TABLET(40 MG) BY MOUTH EVERY MORNING 90 tablet 1    polyethylene glycol (MIRALAX) 17 GM/Dose powder Take 17 g by mouth daily as needed for constipation 850 g 3    psyllium 0.52 G capsule Take 1 capsule (0.52 g) by mouth daily 100 capsule 3    tadalafil (ADCIRCA/CIALIS) 20 MG tablet 1/2 to 1 po as needed up to twice per week for erectile dysfunction 10 tablet 1    tamsulosin (FLOMAX) 0.4 MG capsule  Take 2 capsules (0.8 mg) by mouth daily 180 capsule 1    thin (NO BRAND SPECIFIED) lancets Use with lanceting device. To accompany: Blood Glucose Monitor Brands: per insurance. 100 each 11    triamcinolone (KENALOG) 0.1 % external cream Apply topically 2 times daily as needed for irritation 30 g 0       ALLERGIES:  No Known Allergies    NEW PMH/PSH: None    REVIEW OF SYSTEMS:  The patient completed a comprehensive 11 point review of systems (below), which was reviewed. Positives are as noted below.  Patient Supplied Answers to Review of Systems Noncontributory       PHYSICAL EXAM:  General: The patient was alert and conversant, and in no acute distress.    Oral cavity/oropharynx: No masses or lesions. Dentition unchanged since prior. Tongue mobility and palate elevation intact and symmetric.  Neck: No palpable cervical lymphadenopathy, no significant tenderness to palpation of the thyrohyoid space, which was narrow. No obvious thyroid abnormality.  Resp: Breathing comfortably, no stridor or stertor.  Neuro: Symmetric facial function. Other cranial nerve function as documented above.  Psych: Normal affect, pleasant and cooperative.  Voice/speech: Highly variable voice quality, sometimes clear, sometimes with roughness or diplophonia.    Procedure:   Flexible fiberoptic laryngoscopy and laryngovideostroboscopy  Indications: This procedure was warranted to evaluate the patient's laryngeal anatomy and function. Risks, benefits, and alternatives were discussed.  Description: After written informed consent was obtained, a time-out was performed to confirm patient identity, procedure, and procedure site. Topical 2% lidocaine and oxymetazoline were applied to the nasal cavities. I performed the endoscopy and no complications were apparent. Continuous and stroboscopic light were utilized to assess routine phonation and variable frequency phonation.  Performed by: Mel Fournier MD MPH  SLP: Kassandra Coleman MS CCC-SLP    Findings: Normal nasopharynx. Normal base of tongue, valleculae, and epiglottis. Vocal fold mobility: right: normal; left: normal. Medial edges of the true vocal folds: smooth and straight on the right; stable hypervascularity and erythema on the left. No focal mucosal lesions were observed on the true vocal folds; mild contour irregularity and mild subtle leukoplakia of the left true vocal fold, stable compared to prior. Glissade produced appropriate elongation. Mucosa of false vocal folds, aryepiglottic folds, piriform sinuses, and posterior glottis unremarkable. Airway was patent. Similar findings on NBI.     The addition of stroboscopy allowed evaluation of the mucosal wave.   Amplitude: right: normal; left: mildly decreased. Symmetry: intermittent symmetry. Closure pattern: complete. Closure plane: at glottic level. Phase distribution: normal.                                  IMPRESSION AND PLAN:   Gary Iyer returns with no evidence of disease; the left vocal fold has some mild erythema/irregularity that appears comparable to prior exams. He is also undergoing a thyroid workup.    Plan:  RTC 3-4 months or sooner as needed.    I spent a total of 23 minutes on 8/2/2024 in chart review, review of tests, patient visit, documentation, care coordination, and/or discussion with other providers about the issues documented above, separate from any documented procedure(s).

## 2024-08-02 NOTE — PATIENT INSTRUCTIONS
1.  You were seen in the ENT Clinic today by . If you have any questions or concerns after your appointment, please call [phone]. Press option #1 for scheduling related needs. Press option #3 for Nurse advice.    2. .  Plan is to return to clinic 3-4 months      Lisa Hazel LPN  [phone]  [facility] - Otolaryngology

## 2024-08-06 ENCOUNTER — ANCILLARY PROCEDURE (OUTPATIENT)
Dept: CT IMAGING | Facility: CLINIC | Age: 64
End: 2024-08-06
Attending: FAMILY MEDICINE
Payer: COMMERCIAL

## 2024-08-06 DIAGNOSIS — J98.59 MEDIASTINAL MASS: ICD-10-CM

## 2024-08-06 PROCEDURE — 71260 CT THORAX DX C+: CPT | Mod: TC | Performed by: RADIOLOGY

## 2024-08-06 RX ORDER — IOPAMIDOL 755 MG/ML
100 INJECTION, SOLUTION INTRAVASCULAR ONCE
Status: COMPLETED | OUTPATIENT
Start: 2024-08-06 | End: 2024-08-06

## 2024-08-06 RX ADMIN — IOPAMIDOL 100 ML: 755 INJECTION, SOLUTION INTRAVASCULAR at 11:24

## 2024-08-07 LAB
CREAT BLD-MCNC: 1 MG/DL (ref 0.7–1.3)
EGFRCR SERPLBLD CKD-EPI 2021: >60 ML/MIN/1.73M2

## 2024-08-07 PROCEDURE — 82565 ASSAY OF CREATININE: CPT

## 2024-08-07 NOTE — RESULT ENCOUNTER NOTE
Good news     Ct scan is stable    The mass in the upper chest has not changed since 2017    Finesse Beal MD

## 2024-08-20 ENCOUNTER — TELEPHONE (OUTPATIENT)
Dept: FAMILY MEDICINE | Facility: CLINIC | Age: 64
End: 2024-08-20
Payer: COMMERCIAL

## 2024-08-20 DIAGNOSIS — E11.9 TYPE 2 DIABETES MELLITUS WITHOUT COMPLICATION, WITHOUT LONG-TERM CURRENT USE OF INSULIN (H): ICD-10-CM

## 2024-08-20 DIAGNOSIS — R53.83 FATIGUE, UNSPECIFIED TYPE: ICD-10-CM

## 2024-08-20 DIAGNOSIS — Z12.5 SCREENING FOR PROSTATE CANCER: ICD-10-CM

## 2024-08-20 DIAGNOSIS — E78.5 HYPERLIPIDEMIA LDL GOAL <70: Primary | Chronic | ICD-10-CM

## 2024-08-20 NOTE — TELEPHONE ENCOUNTER
This is fine    I put in lab orders    Patient can schedule fasting lab appointment    Please inform patient    Finesse Beal MD

## 2024-08-20 NOTE — TELEPHONE ENCOUNTER
Routing to PCP to advise on orders.    No HMPO orders have been signed.    Aminah Gross RN  [facility]

## 2024-08-20 NOTE — TELEPHONE ENCOUNTER
Order/Referral Request    Who is requesting: Patient     Orders being requested: Patient wants to have labs sent over/ordered for A1C, liver/kidney function, potassium, and cholesterol -- and anything more PCP finds necessary for patient to have     Reason service is needed/diagnosis: Has diabetes, wants to check his blood work/levels     When are orders needed by: ASAP     Has this been discussed with Provider: Yes    Does patient have a preference on a Group/Provider/Facility? N/A     Does patient have an appointment scheduled?: No    Where to send orders:  Phone call would be ideal     Could we send this information to you in [facility] or would you prefer to receive a phone call?:   Patient would prefer a phone call   Okay to leave a detailed message?: Yes at Cell number on file:    Telephone Information:   Mobile [phone]

## 2024-08-26 ENCOUNTER — LAB (OUTPATIENT)
Dept: LAB | Facility: CLINIC | Age: 64
End: 2024-08-26
Payer: COMMERCIAL

## 2024-08-26 DIAGNOSIS — R53.83 FATIGUE, UNSPECIFIED TYPE: ICD-10-CM

## 2024-08-26 DIAGNOSIS — E11.9 TYPE 2 DIABETES MELLITUS WITHOUT COMPLICATION, WITHOUT LONG-TERM CURRENT USE OF INSULIN (H): ICD-10-CM

## 2024-08-26 DIAGNOSIS — E78.5 HYPERLIPIDEMIA LDL GOAL <70: Chronic | ICD-10-CM

## 2024-08-26 DIAGNOSIS — Z12.5 SCREENING FOR PROSTATE CANCER: ICD-10-CM

## 2024-08-26 LAB
ALBUMIN SERPL BCG-MCNC: 4.2 G/DL (ref 3.5–5.2)
ALP SERPL-CCNC: 68 U/L (ref 40–150)
ALT SERPL W P-5'-P-CCNC: 78 U/L (ref 0–70)
ANION GAP SERPL CALCULATED.3IONS-SCNC: 12 MMOL/L (ref 7–15)
AST SERPL W P-5'-P-CCNC: 78 U/L (ref 0–45)
BASOPHILS # BLD AUTO: 0 10E3/UL (ref 0–0.2)
BASOPHILS NFR BLD AUTO: 1 %
BILIRUB SERPL-MCNC: 0.4 MG/DL
BUN SERPL-MCNC: 9.5 MG/DL (ref 8–23)
CALCIUM SERPL-MCNC: 9.3 MG/DL (ref 8.8–10.4)
CHLORIDE SERPL-SCNC: 96 MMOL/L (ref 98–107)
CHOLEST SERPL-MCNC: 152 MG/DL
CREAT SERPL-MCNC: 0.9 MG/DL (ref 0.67–1.17)
EGFRCR SERPLBLD CKD-EPI 2021: >90 ML/MIN/1.73M2
EOSINOPHIL # BLD AUTO: 0.3 10E3/UL (ref 0–0.7)
EOSINOPHIL NFR BLD AUTO: 4 %
ERYTHROCYTE [DISTWIDTH] IN BLOOD BY AUTOMATED COUNT: 15.2 % (ref 10–15)
FASTING STATUS PATIENT QL REPORTED: YES
FASTING STATUS PATIENT QL REPORTED: YES
GLUCOSE SERPL-MCNC: 155 MG/DL (ref 70–99)
HBA1C MFR BLD: 6.6 % (ref 0–5.6)
HCO3 SERPL-SCNC: 28 MMOL/L (ref 22–29)
HCT VFR BLD AUTO: 35.5 % (ref 40–53)
HDLC SERPL-MCNC: 59 MG/DL
HGB BLD-MCNC: 11.5 G/DL (ref 13.3–17.7)
IMM GRANULOCYTES # BLD: 0 10E3/UL
IMM GRANULOCYTES NFR BLD: 0 %
LDLC SERPL CALC-MCNC: 72 MG/DL
LYMPHOCYTES # BLD AUTO: 2.3 10E3/UL (ref 0.8–5.3)
LYMPHOCYTES NFR BLD AUTO: 34 %
MCH RBC QN AUTO: 28.8 PG (ref 26.5–33)
MCHC RBC AUTO-ENTMCNC: 32.4 G/DL (ref 31.5–36.5)
MCV RBC AUTO: 89 FL (ref 78–100)
MONOCYTES # BLD AUTO: 0.7 10E3/UL (ref 0–1.3)
MONOCYTES NFR BLD AUTO: 11 %
NEUTROPHILS # BLD AUTO: 3.5 10E3/UL (ref 1.6–8.3)
NEUTROPHILS NFR BLD AUTO: 51 %
NONHDLC SERPL-MCNC: 93 MG/DL
PLATELET # BLD AUTO: 274 10E3/UL (ref 150–450)
POTASSIUM SERPL-SCNC: 3.8 MMOL/L (ref 3.4–5.3)
PROT SERPL-MCNC: 8.6 G/DL (ref 6.4–8.3)
PSA SERPL DL<=0.01 NG/ML-MCNC: 0.81 NG/ML (ref 0–4.5)
RBC # BLD AUTO: 3.99 10E6/UL (ref 4.4–5.9)
SODIUM SERPL-SCNC: 136 MMOL/L (ref 135–145)
TRIGL SERPL-MCNC: 103 MG/DL
TSH SERPL DL<=0.005 MIU/L-ACNC: 2.73 UIU/ML (ref 0.3–4.2)
WBC # BLD AUTO: 6.9 10E3/UL (ref 4–11)

## 2024-08-26 PROCEDURE — 83036 HEMOGLOBIN GLYCOSYLATED A1C: CPT

## 2024-08-26 PROCEDURE — 85025 COMPLETE CBC W/AUTO DIFF WBC: CPT

## 2024-08-26 PROCEDURE — 36415 COLL VENOUS BLD VENIPUNCTURE: CPT

## 2024-08-26 PROCEDURE — 80061 LIPID PANEL: CPT

## 2024-08-26 PROCEDURE — G0103 PSA SCREENING: HCPCS

## 2024-08-26 PROCEDURE — 84443 ASSAY THYROID STIM HORMONE: CPT

## 2024-08-26 PROCEDURE — 80053 COMPREHEN METABOLIC PANEL: CPT

## 2024-08-28 ENCOUNTER — TELEPHONE (OUTPATIENT)
Dept: FAMILY MEDICINE | Facility: CLINIC | Age: 64
End: 2024-08-28
Payer: COMMERCIAL

## 2024-08-28 DIAGNOSIS — E04.1 THYROID NODULE: Primary | ICD-10-CM

## 2024-08-28 NOTE — RESULT ENCOUNTER NOTE
Diabetes test ( hemoglobin a1c ) is fine    Liver tests ( ALT and AST ) a little higher than last time    Hemoglobin ( red blood count ) low but stable.    Advise you see us in clinic in the next couple months for clinic appointment    Finesse Beal MD

## 2024-09-03 ENCOUNTER — ANCILLARY PROCEDURE (OUTPATIENT)
Dept: ULTRASOUND IMAGING | Facility: CLINIC | Age: 64
End: 2024-09-03
Attending: FAMILY MEDICINE
Payer: COMMERCIAL

## 2024-09-03 DIAGNOSIS — E04.1 THYROID NODULE: ICD-10-CM

## 2024-09-03 PROCEDURE — 88173 CYTOPATH EVAL FNA REPORT: CPT | Mod: TC | Performed by: FAMILY MEDICINE

## 2024-09-03 PROCEDURE — 10005 FNA BX W/US GDN 1ST LES: CPT | Performed by: STUDENT IN AN ORGANIZED HEALTH CARE EDUCATION/TRAINING PROGRAM

## 2024-09-03 PROCEDURE — 88173 CYTOPATH EVAL FNA REPORT: CPT | Mod: 26 | Performed by: PATHOLOGY

## 2024-09-03 RX ORDER — LIDOCAINE HYDROCHLORIDE 10 MG/ML
5 INJECTION, SOLUTION INFILTRATION; PERINEURAL ONCE
Status: COMPLETED | OUTPATIENT
Start: 2024-09-03 | End: 2024-09-03

## 2024-09-03 RX ADMIN — LIDOCAINE HYDROCHLORIDE 3 ML: 10 INJECTION, SOLUTION INFILTRATION; PERINEURAL at 10:08

## 2024-09-05 ENCOUNTER — TELEPHONE (OUTPATIENT)
Dept: FAMILY MEDICINE | Facility: CLINIC | Age: 64
End: 2024-09-05
Payer: COMMERCIAL

## 2024-09-06 NOTE — RESULT ENCOUNTER NOTE
Hi Mr Iyer    I just tried to call    Good news     No evidence of cancer on the biopsy    Sample was adequate for evaluation this time    Advise you see me in clinic in about 4-6 months.      Come in sooner if needed.    Take care.    Finesse Beal MD

## 2024-09-06 NOTE — TELEPHONE ENCOUNTER
I tried to call patient about biopsy results ( benign) but patient not available.  Left message that I will send detailed my chart result note.    Finesse Beal MD

## 2024-09-07 DIAGNOSIS — E66.01 MORBID OBESITY (H): ICD-10-CM

## 2024-09-07 DIAGNOSIS — E11.9 TYPE 2 DIABETES MELLITUS WITHOUT COMPLICATION, WITHOUT LONG-TERM CURRENT USE OF INSULIN (H): ICD-10-CM

## 2024-09-09 RX ORDER — SEMAGLUTIDE 0.68 MG/ML
INJECTION, SOLUTION SUBCUTANEOUS
Qty: 3 ML | Refills: 1 | Status: SHIPPED | OUTPATIENT
Start: 2024-09-09

## 2024-09-11 ENCOUNTER — VIRTUAL VISIT (OUTPATIENT)
Dept: FAMILY MEDICINE | Facility: CLINIC | Age: 64
End: 2024-09-11
Payer: COMMERCIAL

## 2024-09-11 DIAGNOSIS — R79.89 ELEVATED LFTS: ICD-10-CM

## 2024-09-11 DIAGNOSIS — E04.1 THYROID NODULE: ICD-10-CM

## 2024-09-11 DIAGNOSIS — E11.9 TYPE 2 DIABETES MELLITUS WITHOUT COMPLICATION, WITHOUT LONG-TERM CURRENT USE OF INSULIN (H): Primary | ICD-10-CM

## 2024-09-11 PROCEDURE — 99442 PR PHYSICIAN TELEPHONE EVALUATION 11-20 MIN: CPT | Mod: 95 | Performed by: FAMILY MEDICINE

## 2024-09-11 NOTE — PROGRESS NOTES
Gary is a 64 year old who is being evaluated via a billable video visit.    What phone number would you like to be contacted at? [phone]  How would you like to obtain your AVS? Rossana Vega is a 64 year old, presenting for the following health issues:  Results        9/11/2024    10:41 AM   Additional Questions   Roomed by Elisa Chandra     Video Start Time: 10:59 AM    History of Present Illness       Reason for visit:  Results   He is taking medications regularly.   Feeling fine    No fever / chills  No neck pain where biopsy was done    Energy level okay    Patient has had at least two biopsies of thyroid , one 2017 and one just recently     Patient states he had one other one before 2017    So 3 biopsies of thyroid, all benign    On ozempic             Objective       Patient sounds fine; had to do phone visit    Vitals:  No vitals were obtained today due to virtual visit.    Physical Exam      ASSESSMENT / PLAN:  (E11.9) Type 2 diabetes mellitus without complication, without long-term current use of insulin (H)  (primary encounter diagnosis)  Comment: latest hemoglobin a1c okay, 6.6, on ozempic   Plan: no change     (E04.1) Thyroid nodule  Comment: pathology reassuring.    Plan: repeat ultrasound in a year     (R79.89) Elevated LFTs  Comment: good to check liver ultrasound   Plan: US Abdomen Limited        Patient to schedule      VIDEO DID NOT CONNECT THIS WAS  A PHONE VISIT       I reviewed the patient's medications, allergies, medical history, family history, and social history.    Finesse Beal MD        Video-Visit Details    Type of service:  Video Visit   Video End Time:11:18 AM  Originating Location (pt. Location): Home    Distant Location (provider location):  On-site  Platform used for Video Visit: Edgardo  Signed Electronically by: Finesse Beal MD

## 2024-09-13 ENCOUNTER — ANCILLARY PROCEDURE (OUTPATIENT)
Dept: ULTRASOUND IMAGING | Facility: CLINIC | Age: 64
End: 2024-09-13
Attending: FAMILY MEDICINE
Payer: COMMERCIAL

## 2024-09-13 DIAGNOSIS — R79.89 ELEVATED LFTS: ICD-10-CM

## 2024-09-13 PROCEDURE — 76705 ECHO EXAM OF ABDOMEN: CPT | Mod: TC | Performed by: INTERNAL MEDICINE

## 2024-09-14 NOTE — RESULT ENCOUNTER NOTE
"You have \"fatty liver\".  Best treatment for this is weight loss.    We should recheck the liver function tests in about 6 months.    Finesse Beal MD  "

## 2024-10-23 DIAGNOSIS — L21.9 DERMATITIS, SEBORRHEIC: ICD-10-CM

## 2024-10-24 RX ORDER — KETOCONAZOLE 20 MG/G
CREAM TOPICAL
Qty: 60 G | Refills: 1 | Status: SHIPPED | OUTPATIENT
Start: 2024-10-24

## 2024-10-24 NOTE — TELEPHONE ENCOUNTER
I sent in prescription.  Thanks  Finesse Beal MD   DISPLAY PLAN FREE TEXT DISPLAY PLAN FREE TEXT DISPLAY PLAN FREE TEXT DISPLAY PLAN FREE TEXT

## 2024-11-02 DIAGNOSIS — E11.9 TYPE 2 DIABETES MELLITUS WITHOUT COMPLICATION, WITHOUT LONG-TERM CURRENT USE OF INSULIN (H): ICD-10-CM

## 2024-11-02 DIAGNOSIS — E66.01 MORBID OBESITY (H): ICD-10-CM

## 2024-11-04 RX ORDER — SEMAGLUTIDE 0.68 MG/ML
INJECTION, SOLUTION SUBCUTANEOUS
Qty: 3 ML | Refills: 1 | Status: SHIPPED | OUTPATIENT
Start: 2024-11-04 | End: 2024-11-13

## 2024-11-13 ENCOUNTER — PATIENT OUTREACH (OUTPATIENT)
Dept: GERIATRIC MEDICINE | Facility: CLINIC | Age: 64
End: 2024-11-13

## 2024-11-13 ENCOUNTER — OFFICE VISIT (OUTPATIENT)
Dept: FAMILY MEDICINE | Facility: CLINIC | Age: 64
End: 2024-11-13
Payer: COMMERCIAL

## 2024-11-13 VITALS
HEART RATE: 109 BPM | HEIGHT: 70 IN | OXYGEN SATURATION: 95 % | RESPIRATION RATE: 16 BRPM | SYSTOLIC BLOOD PRESSURE: 106 MMHG | WEIGHT: 238.5 LBS | TEMPERATURE: 97.5 F | BODY MASS INDEX: 34.14 KG/M2 | DIASTOLIC BLOOD PRESSURE: 64 MMHG

## 2024-11-13 DIAGNOSIS — R80.9 TYPE 2 DIABETES MELLITUS WITH DIABETIC MICROALBUMINURIA, WITHOUT LONG-TERM CURRENT USE OF INSULIN (H): Primary | ICD-10-CM

## 2024-11-13 DIAGNOSIS — R80.9 MICROALBUMINURIA: ICD-10-CM

## 2024-11-13 DIAGNOSIS — M10.9 GOUT, UNSPECIFIED CAUSE, UNSPECIFIED CHRONICITY, UNSPECIFIED SITE: ICD-10-CM

## 2024-11-13 DIAGNOSIS — R39.12 BENIGN PROSTATIC HYPERPLASIA WITH WEAK URINARY STREAM: ICD-10-CM

## 2024-11-13 DIAGNOSIS — I10 HYPERTENSION GOAL BP (BLOOD PRESSURE) < 140/90: ICD-10-CM

## 2024-11-13 DIAGNOSIS — Z86.19 HISTORY OF THRUSH: ICD-10-CM

## 2024-11-13 DIAGNOSIS — K76.0 FATTY LIVER: ICD-10-CM

## 2024-11-13 DIAGNOSIS — E66.9 OBESITY, UNSPECIFIED CLASS, UNSPECIFIED OBESITY TYPE, UNSPECIFIED WHETHER SERIOUS COMORBIDITY PRESENT: ICD-10-CM

## 2024-11-13 DIAGNOSIS — E11.29 TYPE 2 DIABETES MELLITUS WITH DIABETIC MICROALBUMINURIA, WITHOUT LONG-TERM CURRENT USE OF INSULIN (H): Primary | ICD-10-CM

## 2024-11-13 DIAGNOSIS — Z91.09 ENVIRONMENTAL ALLERGIES: ICD-10-CM

## 2024-11-13 DIAGNOSIS — E78.5 HYPERLIPIDEMIA LDL GOAL <70: Chronic | ICD-10-CM

## 2024-11-13 DIAGNOSIS — N40.1 BENIGN PROSTATIC HYPERPLASIA WITH WEAK URINARY STREAM: ICD-10-CM

## 2024-11-13 PROCEDURE — 99214 OFFICE O/P EST MOD 30 MIN: CPT | Performed by: FAMILY MEDICINE

## 2024-11-13 RX ORDER — HYDROCHLOROTHIAZIDE 50 MG/1
50 TABLET ORAL DAILY
Qty: 90 TABLET | Refills: 1 | Status: SHIPPED | OUTPATIENT
Start: 2024-11-13

## 2024-11-13 RX ORDER — CETIRIZINE HYDROCHLORIDE 10 MG/1
10 TABLET ORAL DAILY
Qty: 90 TABLET | Refills: 3 | Status: SHIPPED | OUTPATIENT
Start: 2024-11-13

## 2024-11-13 RX ORDER — TAMSULOSIN HYDROCHLORIDE 0.4 MG/1
0.8 CAPSULE ORAL DAILY
Qty: 180 CAPSULE | Refills: 1 | Status: SHIPPED | OUTPATIENT
Start: 2024-11-13

## 2024-11-13 RX ORDER — LOSARTAN POTASSIUM 100 MG/1
100 TABLET ORAL DAILY
Qty: 90 TABLET | Refills: 1 | Status: SHIPPED | OUTPATIENT
Start: 2024-11-13

## 2024-11-13 RX ORDER — AMLODIPINE BESYLATE 2.5 MG/1
2.5 TABLET ORAL DAILY
Qty: 90 TABLET | Refills: 1 | Status: SHIPPED | OUTPATIENT
Start: 2024-11-13

## 2024-11-13 RX ORDER — ALLOPURINOL 300 MG/1
TABLET ORAL
Qty: 90 TABLET | Refills: 1 | Status: SHIPPED | OUTPATIENT
Start: 2024-11-13

## 2024-11-13 RX ORDER — ATORVASTATIN CALCIUM 20 MG/1
20 TABLET, FILM COATED ORAL DAILY
Qty: 90 TABLET | Refills: 1 | Status: SHIPPED | OUTPATIENT
Start: 2024-11-13

## 2024-11-13 ASSESSMENT — PAIN SCALES - GENERAL: PAINLEVEL_OUTOF10: MODERATE PAIN (5)

## 2024-11-13 NOTE — PATIENT INSTRUCTIONS
Schedule with diabetes educator    Increase ozempic to 1 mg weekly    Other meds as is    Keep working on healthy diet/exercise and weight loss    See us in 2-3 months, sooner if needed

## 2024-11-13 NOTE — PROGRESS NOTES
"      Kamryn Vega is a 64 year old, presenting for the following health issues:  Results        11/13/2024     4:56 PM   Additional Questions   Roomed by Elisa Chandra     History of Present Illness       Reason for visit:  Test results    He eats 2-3 servings of fruits and vegetables daily.He consumes 0 sweetened beverage(s) daily.He exercises with enough effort to increase his heart rate 10 to 19 minutes per day.  He exercises with enough effort to increase his heart rate 7 days per week.   He is taking medications regularly.      Tries to do some walking     A little light weight lifting/ stretching    Trying to eat healthy    Appetite variable          Objective    /64 (BP Location: Right arm, Patient Position: Chair, Cuff Size: Adult Regular)   Pulse 109   Temp 97.5  F (36.4  C) (Temporal)   Resp 16   Ht 1.778 m (5' 10\")   Wt 108.2 kg (238 lb 8 oz)   SpO2 95%   BMI 34.22 kg/m    Body mass index is 34.22 kg/m .  Physical Exam  Constitutional:       Appearance: He is well-developed.   HENT:      Head: Normocephalic and atraumatic.   Eyes:      Conjunctiva/sclera: Conjunctivae normal.   Neck:      Vascular: No carotid bruit.   Cardiovascular:      Rate and Rhythm: Normal rate and regular rhythm.      Heart sounds: Normal heart sounds.   Pulmonary:      Effort: Pulmonary effort is normal. No respiratory distress.      Breath sounds: Normal breath sounds.   Neurological:      Mental Status: He is alert and oriented to person, place, and time.      Cranial Nerves: No cranial nerve deficit.   Psychiatric:         Speech: Speech normal.         Behavior: Behavior normal.         Oral mucosa and tongue look normal today    Radials symmetric    No edema    No labs needed today  Went over recent labs, ultrasound, and pathology results       ASSESSMENT / PLAN:  (E11.29,  R80.9) Type 2 diabetes mellitus with diabetic microalbuminuria, without long-term current use of insulin (H)  (primary encounter " diagnosis)  Comment: go up to 1 mg dose, see diab ed.  Patient needs to lose wt especially given fatty liver   Plan: Semaglutide, 1 MG/DOSE, (OZEMPIC) 4 MG/3ML pen,        Adult Diabetes Education  Referral             (I10) Hypertension goal BP (blood pressure) < 140/90  Comment: blood pressure okay   Plan: amLODIPine (NORVASC) 2.5 MG tablet,         hydrochlorothiazide (HYDRODIURIL) 50 MG tablet,        losartan (COZAAR) 100 MG tablet        Refill meds     (M10.9) Gout, unspecified cause, unspecified chronicity, unspecified site  Comment:    Plan: allopurinol (ZYLOPRIM) 300 MG tablet        Refill ; no gout flares     (E78.5) Hyperlipidemia LDL goal <70  Comment: refill   Plan: atorvastatin (LIPITOR) 20 MG tablet             (Z91.09) Environmental allergies  Comment: refill   Plan: cetirizine (ZYRTEC) 10 MG tablet             (R80.9) Microalbuminuria  Comment: refill   Plan: losartan (COZAAR) 100 MG tablet             (N40.1,  R39.12) Benign prostatic hyperplasia with weak urinary stream  Comment: refill   Plan: tamsulosin (FLOMAX) 0.4 MG capsule             (K76.0) Fatty liver  Comment: discussed   Plan: really work on wt loss     (E66.9) Obesity, unspecified class, unspecified obesity type, unspecified whether serious comorbidity present  Comment: as above   Plan: as above     (Z86.19) History of thrush  Comment: no thrush on exam here   Plan: follow up prn       I reviewed the patient's medications, allergies, medical history, family history, and social history.    Finesse Beal MD              Signed Electronically by: Finesse Beal MD

## 2024-11-13 NOTE — PROGRESS NOTES
[facility] Care Coordination Contact    Care coordinator received call from [facility] who left a VM requesting care coordinator call back.  Care coordinator called and was informed that member's personal care attendent assessment was due in Oct and he does not have renewed services for Nov as the agency has not received any assessments. Care coordinator explained that her role is the care coordinator with member's health plan and that the [employer] CM would be responsible for this.  Care coordinator provided [facility] with the CAD CM name and phone #.  They will follow up on behalf of the member.     Enid Baer RN   [facility]  [phone]

## 2024-12-02 ENCOUNTER — OFFICE VISIT (OUTPATIENT)
Dept: OTOLARYNGOLOGY | Facility: CLINIC | Age: 64
End: 2024-12-02
Attending: OTOLARYNGOLOGY
Payer: COMMERCIAL

## 2024-12-02 VITALS
WEIGHT: 236.5 LBS | HEART RATE: 118 BPM | SYSTOLIC BLOOD PRESSURE: 131 MMHG | OXYGEN SATURATION: 97 % | BODY MASS INDEX: 33.86 KG/M2 | HEIGHT: 70 IN | DIASTOLIC BLOOD PRESSURE: 70 MMHG

## 2024-12-02 DIAGNOSIS — R49.0 DYSPHONIA: ICD-10-CM

## 2024-12-02 DIAGNOSIS — G47.33 OBSTRUCTIVE SLEEP APNEA: Chronic | ICD-10-CM

## 2024-12-02 DIAGNOSIS — J38.7 LARYNGEAL MASS: Primary | ICD-10-CM

## 2024-12-02 DIAGNOSIS — Z85.21 HX OF LARYNGEAL CANCER: ICD-10-CM

## 2024-12-02 PROCEDURE — 31579 LARYNGOSCOPY TELESCOPIC: CPT | Performed by: OTOLARYNGOLOGY

## 2024-12-02 PROCEDURE — 99214 OFFICE O/P EST MOD 30 MIN: CPT | Mod: 25 | Performed by: OTOLARYNGOLOGY

## 2024-12-02 RX ORDER — LISINOPRIL 30 MG/1
30 TABLET ORAL DAILY
COMMUNITY

## 2024-12-02 ASSESSMENT — PAIN SCALES - GENERAL: PAINLEVEL_OUTOF10: NO PAIN (0)

## 2024-12-02 NOTE — PROGRESS NOTES
Dear Colleague:  Gary Iyer recently returned for follow-up at the [facility]. My clinic note from our visit is enclosed below.  Speech recognition software may have been used in the documentation below; input is reviewed before signature to the best of my ability.     I appreciate the ongoing opportunity to participate in this patient's care.    Please feel free to contact me with any questions.      Sincerely yours,  Mel Fournier M.D., M.P.H.  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  He later returned to the operating room on February 9, 2017 for an area of new irregularity of the left true vocal fold.  Pathology showed severe dysplasia with negative but close margins (for moderate, but not severe dysplasia).  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    We then observed a slight recurrence of leukoplakia/granuloma in the same location where he had this in 2018. This resolved with conservative management. Subsequently, he had allergies which were provoking coughing/throat-clearing, and he was again noted to have a granuloma. He was set up for refresher session(s) of speech therapy with Misty Clark, PhD, CCC-SLP and we discussed nasal hygiene as well. This led to near complete resolution of the granuloma, but subsequently he was noted to have slightly increased irritation along the left medial arytenoid wall.  Then he developed an upper respiratory infection and has been coughing, and had some shortness of breath. He states he was COVID negative. His family had noted him grunting and throat-clearing frequently.    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 In May 2024 he was hospitalized with high lactate, concern for bacteremia.    Today's updates:  - voice, swallow, breathing are doing fine  - was treated for oral thrush a few months ago  - has had some coughing lately, but does not feel like it's a huge amount  - no new PMH/PSH otherwise      MEDICATIONS:     Current Outpatient Medications   Medication Sig Dispense Refill    ACCU-CHEK GUIDE test strip TEST ONCE DAILY 100 strip 1    alcohol swab prep pads USE TO SWAB AREA OF INJECTION/OLIMPIA 100 each 1    allopurinol (ZYLOPRIM) 300 MG tablet TAKE 1 TABLET(300 MG) BY MOUTH DAILY 90 tablet 1    amLODIPine (NORVASC) 2.5 MG tablet Take 1 tablet (2.5 mg) by mouth daily. 90 tablet 1    aspirin 81 MG tablet Take 1 tablet by mouth daily.      atorvastatin (LIPITOR) 20 MG tablet Take 1 tablet (20 mg) by mouth daily. 90 tablet 1    blood glucose (ACCU-CHEK GUIDE) test strip Use to test blood sugar 1 times daily or as directed. 100 strip 11    blood glucose calibration (NO BRAND SPECIFIED) solution To accompany: Blood Glucose Monitor Brands: per insurance. 1 Bottle 3    Blood Glucose Monitoring Suppl (ACCU-CHEK GUIDE ME) w/Device KIT USE TO TEST ONCE DAILY 1 kit 0    calcipotriene (DOVONEX) 0.005 % external cream Mix with efudex and apply twice daily to hands and arms for 12 days 60 g 3    cetirizine (ZYRTEC) 10 MG tablet Take 1 tablet (10 mg) by mouth daily. 90 tablet 3    docusate sodium (DSS) 100 MG capsule Take 100 mg by mouth at bedtime      fluorouracil (EFUDEX) 5 % external cream Mix with dovonex and apply twice daily to hands and arms for 12 days 40 g 1    hydrochlorothiazide (HYDRODIURIL) 50 MG tablet Take 1 tablet (50 mg) by mouth daily. 90 tablet 1    hydrocortisone (CORTAID) 1 % external ointment Apply topically 2 times daily as needed 30 g 1    insulin pen needle  (31G X 6 MM) 31G X 6 MM miscellaneous Use one pen needle daily or as directed. ( for Victoza ) 90 each 3    ketoconazole (NIZORAL) 2 % external cream Apply daily to affected area on face, armpits, groin. 60 g 1    ketoconazole (NIZORAL) 2 % external shampoo Leave on face, beard for 3-5 minutes then rinse. Use 2-3 times weekly. 120 mL 3    lisinopril (ZESTRIL) 30 MG tablet Take 30 mg by mouth daily.      losartan (COZAAR) 100 MG tablet Take 1 tablet (100 mg) by mouth daily. 90 tablet 1    metroNIDAZOLE (METROCREAM) 0.75 % external cream Apply topically 2 times daily as needed (to rosacea areas) 45 g 1    Misc. Devices (SUPPOSITORY MOLD 2GM) MISC 1 suppository every 12 hours Nifedipine 4 mg suppository, one rectally every 12 hours 100 each 4    Multiple Vitamin (DAILY MULTIVITAMIN PO) Take by mouth daily      nystatin (MYCOSTATIN) 318361 UNIT/GM external powder Apply topically 2 times daily Prevention of rash 60 g 0    omeprazole (PRILOSEC) 20 MG DR capsule TAKE 1 CAPSULE(20 MG) BY MOUTH TWICE DAILY 180 capsule 1    order for DME Autocpap 10-16 cm 1 Units 0    PARoxetine (PAXIL) 40 MG tablet TAKE 1 TABLET(40 MG) BY MOUTH EVERY MORNING 90 tablet 1    polyethylene glycol (MIRALAX) 17 GM/Dose powder Take 17 g by mouth daily as needed for constipation 850 g 3    psyllium 0.52 G capsule Take 1 capsule (0.52 g) by mouth daily 100 capsule 3    Semaglutide, 1 MG/DOSE, (OZEMPIC) 4 MG/3ML pen Inject 1 mg subcutaneously every 7 days. 3 mL 2    tadalafil (ADCIRCA/CIALIS) 20 MG tablet 1/2 to 1 po as needed up to twice per week for erectile dysfunction 10 tablet 1    tamsulosin (FLOMAX) 0.4 MG capsule Take 2 capsules (0.8 mg) by mouth daily. 180 capsule 1    thin (NO BRAND SPECIFIED) lancets Use with lanceting device. To accompany: Blood Glucose Monitor Brands: per insurance. 100 each 11    triamcinolone (KENALOG) 0.1 % external cream Apply topically 2 times daily as needed for irritation 30 g 0       ALLERGIES:  No Known  Allergies    NEW PMH/PSH: As above      REVIEW OF SYSTEMS:  The patient completed a comprehensive 11 point review of systems (below), which was reviewed. Positives are as noted below.  Patient Supplied Answers to Review of Systems NA       PHYSICAL EXAM:  General: The patient was alert and conversant, and in no acute distress.    Oral cavity/oropharynx: No masses or lesions. Dentition unchanged since prior. Tongue mobility and palate elevation intact and symmetric.  Neck: No palpable cervical lymphadenopathy, no significant tenderness to palpation of the thyrohyoid space, which was mildly narrow, mildly indistinct due to habitus. No obvious thyroid abnormality.  Resp: Breathing comfortably, no stridor or stertor.  Neuro: Symmetric facial function. Other cranial nerve function as documented above.  Psych: Normal affect, pleasant and cooperative.  Voice/speech: Mild dysphonia characterized by breathiness, roughness, and strain.      Procedure:   Flexible fiberoptic laryngoscopy and laryngovideostroboscopy  Indications: This procedure was warranted to evaluate the patient's laryngeal anatomy and function. Risks, benefits, and alternatives were discussed.  Description: After written informed consent was obtained, a time-out was performed to confirm patient identity, procedure, and procedure site. Topical 2% lidocaine and oxymetazoline were applied to the nasal cavities. I performed the endoscopy and no complications were apparent. Continuous and stroboscopic light were utilized to assess routine phonation and variable frequency phonation.  Performed by: Mel Fournier MD MPH  Findings: Normal nasopharynx. Normal base of tongue, valleculae, and epiglottis. Vocal fold mobility: right: normal; left: normal. Medial edges of the true vocal folds: smooth and straight. No focal mucosal lesions were observed on the true vocal folds themselves, but there is a new irregular pedunculated highly mobile mass along the left  posterior true vocal fold. Glissade produced appropriate elongation. Mucosa of false vocal folds, aryepiglottic folds, piriform sinuses, and posterior glottis otherwise unremarkable. Airway was patent. Similar findings on virtual chromoendoscopy.    The addition of stroboscopy allowed evaluation of the mucosal wave.   Amplitude: right: normal; left: mildly decreased. Symmetry: associated asymmetry. Closure pattern: complete. Closure plane: at glottic level. Phase distribution: normal.                                      IMPRESSION AND PLAN:   Gary Iyer returns with a new left posterior vocal fold lesion that is pedunculated and highly mobile. The surface does look irregular, but the location and mobility are potentially consistent with granuloma.    He previously had a very similar lesion in 2016 which resolved with voice therapy. He has also had multiple pre-granulomatous changes that have improved with voice therapy.    Given his prior successes as well as preference to avoid general anesthesia, we agreed to hold off on excision for now.    Plan:  1) schedule OR for MDL and excision, possible steroid injection, possible CO2 laser. Would not recommend pursuing this under local anesthesia because of vascularity of stalk.  2) plan RTC to clinic within a few weeks prior to planned OR, and we can cancel OR if the lesion is improving or resolved  3) he would like a refresher session of speech therapy if at all possible, so this was also requested    I spent a total of 35 minutes on 12/2/2024 in chart review, review of tests, patient visit, documentation, care coordination, and/or discussion with other providers about the issues documented above, separate from any documented procedure(s).

## 2024-12-02 NOTE — NURSING NOTE
"Chief Complaint   Patient presents with    Consult   Blood pressure 131/70, pulse 118, height 1.778 m (5' 10\"), weight 107.3 kg (236 lb 8 oz), SpO2 97%. Marty Mcgrath, EMT    "

## 2024-12-02 NOTE — PATIENT INSTRUCTIONS
1.  You were seen in the ENT Clinic today by . If you have any questions or concerns after your appointment, please call [phone]. Press option #1 for scheduling related needs. Press option #3 for Nurse advice.    2.   has recommended the following:   - schedule MDL under local anesthesia   - schedule a refresher session of voice therapy   -return to clinic prior to surgery or recheck to determine if need to proceed with surgery    3.  Plan is to return to clinic for post operative follow up. KEYUR Hazel LPN  [phone]  [facility] - Otolaryngology

## 2024-12-02 NOTE — LETTER
12/2/2024      RE: Gary Iyer  [address] [id number]       Dear Colleague:  Gary Iyer recently returned for follow-up at the [facility]. My clinic note from our visit is enclosed below.  Speech recognition software may have been used in the documentation below; input is reviewed before signature to the best of my ability.     I appreciate the ongoing opportunity to participate in this patient's care.    Please feel free to contact me with any questions.      Sincerely yours,  Mel Fournier M.D., M.P.H.  , Laryngology  Director, 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  He later returned to the operating room on February 9, 2017 for an area of new irregularity of the left true vocal fold.  Pathology showed severe dysplasia with negative but close margins (for moderate, but not severe dysplasia).  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    We then observed a slight recurrence of leukoplakia/granuloma in the same location where he had this in 2018. This resolved with conservative management. Subsequently, he had allergies which were provoking coughing/throat-clearing, and he was again noted to have a granuloma. He was set up for refresher session(s) of speech therapy with Misty Clark, PhD, CCC-SLP and we discussed nasal hygiene as well. This led to near complete resolution of the granuloma, but subsequently he was noted to have slightly increased irritation along the left medial arytenoid wall.  Then he developed an upper respiratory infection and has been coughing, and had some shortness of breath. He states he was COVID  negative. His family had noted him grunting and throat-clearing frequently.    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 In May 2024 he was hospitalized with high lactate, concern for bacteremia.    Today's updates:  - voice, swallow, breathing are doing fine  - was treated for oral thrush a few months ago  - has had some coughing lately, but does not feel like it's a huge amount  - no new PMH/PSH otherwise      MEDICATIONS:     Current Outpatient Medications   Medication Sig Dispense Refill     ACCU-CHEK GUIDE test strip TEST ONCE DAILY 100 strip 1     alcohol swab prep pads USE TO SWAB AREA OF INJECTION/OLIMPIA 100 each 1     allopurinol (ZYLOPRIM) 300 MG tablet TAKE 1 TABLET(300 MG) BY MOUTH DAILY 90 tablet 1     amLODIPine (NORVASC) 2.5 MG tablet Take 1 tablet (2.5 mg) by mouth daily. 90 tablet 1     aspirin 81 MG tablet Take 1 tablet by mouth daily.       atorvastatin (LIPITOR) 20 MG tablet Take 1 tablet (20 mg) by mouth daily. 90 tablet 1     blood glucose (ACCU-CHEK GUIDE) test strip Use to test blood sugar 1 times daily or as directed. 100 strip 11     blood glucose calibration (NO BRAND SPECIFIED) solution To accompany: Blood Glucose Monitor Brands: per insurance. 1 Bottle 3     Blood Glucose Monitoring Suppl (ACCU-CHEK GUIDE ME) w/Device KIT USE TO TEST ONCE DAILY 1 kit 0     calcipotriene (DOVONEX) 0.005 % external cream Mix with efudex and apply twice daily to hands and arms for 12 days 60 g 3     cetirizine (ZYRTEC) 10 MG tablet Take 1 tablet (10 mg) by mouth daily. 90 tablet 3     docusate sodium (DSS) 100 MG capsule Take 100 mg by mouth at bedtime       fluorouracil (EFUDEX) 5 % external cream Mix with dovonex and apply twice daily to hands and arms for 12 days 40 g 1     hydrochlorothiazide (HYDRODIURIL) 50 MG tablet Take 1 tablet (50 mg) by mouth daily. 90 tablet 1     hydrocortisone  (CORTAID) 1 % external ointment Apply topically 2 times daily as needed 30 g 1     insulin pen needle (31G X 6 MM) 31G X 6 MM miscellaneous Use one pen needle daily or as directed. ( for Victoza ) 90 each 3     ketoconazole (NIZORAL) 2 % external cream Apply daily to affected area on face, armpits, groin. 60 g 1     ketoconazole (NIZORAL) 2 % external shampoo Leave on face, beard for 3-5 minutes then rinse. Use 2-3 times weekly. 120 mL 3     lisinopril (ZESTRIL) 30 MG tablet Take 30 mg by mouth daily.       losartan (COZAAR) 100 MG tablet Take 1 tablet (100 mg) by mouth daily. 90 tablet 1     metroNIDAZOLE (METROCREAM) 0.75 % external cream Apply topically 2 times daily as needed (to rosacea areas) 45 g 1     Misc. Devices (SUPPOSITORY MOLD 2GM) MISC 1 suppository every 12 hours Nifedipine 4 mg suppository, one rectally every 12 hours 100 each 4     Multiple Vitamin (DAILY MULTIVITAMIN PO) Take by mouth daily       nystatin (MYCOSTATIN) 331546 UNIT/GM external powder Apply topically 2 times daily Prevention of rash 60 g 0     omeprazole (PRILOSEC) 20 MG DR capsule TAKE 1 CAPSULE(20 MG) BY MOUTH TWICE DAILY 180 capsule 1     order for DME Autocpap 10-16 cm 1 Units 0     PARoxetine (PAXIL) 40 MG tablet TAKE 1 TABLET(40 MG) BY MOUTH EVERY MORNING 90 tablet 1     polyethylene glycol (MIRALAX) 17 GM/Dose powder Take 17 g by mouth daily as needed for constipation 850 g 3     psyllium 0.52 G capsule Take 1 capsule (0.52 g) by mouth daily 100 capsule 3     Semaglutide, 1 MG/DOSE, (OZEMPIC) 4 MG/3ML pen Inject 1 mg subcutaneously every 7 days. 3 mL 2     tadalafil (ADCIRCA/CIALIS) 20 MG tablet 1/2 to 1 po as needed up to twice per week for erectile dysfunction 10 tablet 1     tamsulosin (FLOMAX) 0.4 MG capsule Take 2 capsules (0.8 mg) by mouth daily. 180 capsule 1     thin (NO BRAND SPECIFIED) lancets Use with lanceting device. To accompany: Blood Glucose Monitor Brands: per insurance. 100 each 11     triamcinolone (KENALOG)  0.1 % external cream Apply topically 2 times daily as needed for irritation 30 g 0       ALLERGIES:  No Known Allergies    NEW PMH/PSH: As above      REVIEW OF SYSTEMS:  The patient completed a comprehensive 11 point review of systems (below), which was reviewed. Positives are as noted below.  Patient Supplied Answers to Review of Systems NA       PHYSICAL EXAM:  General: The patient was alert and conversant, and in no acute distress.    Oral cavity/oropharynx: No masses or lesions. Dentition unchanged since prior. Tongue mobility and palate elevation intact and symmetric.  Neck: No palpable cervical lymphadenopathy, no significant tenderness to palpation of the thyrohyoid space, which was mildly narrow, mildly indistinct due to habitus. No obvious thyroid abnormality.  Resp: Breathing comfortably, no stridor or stertor.  Neuro: Symmetric facial function. Other cranial nerve function as documented above.  Psych: Normal affect, pleasant and cooperative.  Voice/speech: Mild dysphonia characterized by breathiness, roughness, and strain.      Procedure:   Flexible fiberoptic laryngoscopy and laryngovideostroboscopy  Indications: This procedure was warranted to evaluate the patient's laryngeal anatomy and function. Risks, benefits, and alternatives were discussed.  Description: After written informed consent was obtained, a time-out was performed to confirm patient identity, procedure, and procedure site. Topical 2% lidocaine and oxymetazoline were applied to the nasal cavities. I performed the endoscopy and no complications were apparent. Continuous and stroboscopic light were utilized to assess routine phonation and variable frequency phonation.  Performed by: Mel Fournier MD MPH  Findings: Normal nasopharynx. Normal base of tongue, valleculae, and epiglottis. Vocal fold mobility: right: normal; left: normal. Medial edges of the true vocal folds: smooth and straight. No focal mucosal lesions were observed on the  true vocal folds themselves, but there is a new irregular pedunculated highly mobile mass along the left posterior true vocal fold. Glissade produced appropriate elongation. Mucosa of false vocal folds, aryepiglottic folds, piriform sinuses, and posterior glottis otherwise unremarkable. Airway was patent. Similar findings on virtual chromoendoscopy.    The addition of stroboscopy allowed evaluation of the mucosal wave.   Amplitude: right: normal; left: mildly decreased. Symmetry: associated asymmetry. Closure pattern: complete. Closure plane: at glottic level. Phase distribution: normal.                                      IMPRESSION AND PLAN:   Gary Iyer returns with a new left posterior vocal fold lesion that is pedunculated and highly mobile. The surface does look irregular, but the location and mobility are potentially consistent with granuloma.    He previously had a very similar lesion in 2016 which resolved with voice therapy. He has also had multiple pre-granulomatous changes that have improved with voice therapy.    Given his prior successes as well as preference to avoid general anesthesia, we agreed to hold off on excision for now.    Plan:  1) schedule OR for MDL and excision, possible steroid injection, possible CO2 laser. Would not recommend pursuing this under local anesthesia because of vascularity of stalk.  2) plan RTC to clinic within a few weeks prior to planned OR, and we can cancel OR if the lesion is improving or resolved  3) he would like a refresher session of speech therapy if at all possible, so this was also requested    I spent a total of 35 minutes on 12/2/2024 in chart review, review of tests, patient visit, documentation, care coordination, and/or discussion with other providers about the issues documented above, separate from any documented procedure(s).      Mel Fournier MD

## 2024-12-06 ENCOUNTER — VIRTUAL VISIT (OUTPATIENT)
Dept: OTOLARYNGOLOGY | Facility: CLINIC | Age: 64
End: 2024-12-06
Payer: COMMERCIAL

## 2024-12-06 DIAGNOSIS — R49.0 DYSPHONIA: Primary | ICD-10-CM

## 2024-12-06 DIAGNOSIS — J38.7 LARYNGEAL MASS: ICD-10-CM

## 2024-12-06 NOTE — PROGRESS NOTES
Gary Iyer is a 64 year old male who is being evaluated via a billable video visit.      Gary has been notified and verbally consented to the following:   This video visit will be conducted between you and your provider.  Patient has opted to conduct today's video visit vs an in-person appointment.   Video visits are billed at different rates depending on your insurance coverage. Please reach out to your insurance provider with any questions.   If during the course of the call the provider feels the appointment is not appropriate, you will not be charged for this service.  Provider has received verbal consent for billable virtual visit from the patient? Yes  Will anyone else be joining your video visit? No    Call initiated at: 1:33 PM   Type of Visit Platform Used: WonderHowTo Video  Location of provider: Home  Location of patient: [facility]  Elijah Baker Jr., M.D., F.A.C.S.  Mel Fournier M.D., M.P.H.  Alma Bang M.D.  [facility].FELIX., M.M. (voice), M.A., CCC-SLP  Sam Carlisle, Ph.D., M.M., -SLP  Misty Clark, Ph.D., CCC-SLP  Candido Winter, Ph.D., CCC-SLP  Kassandra Coleman, M.S., CCC-SLP  Zack Mcdaniels M.M., M.A., CCC-SLP  EZEQUIEL Lopez (voice), M.S., CCC-[facility]  VOICE/SPEECH/BREATHING RE-EVALUATION AND THERAPY PROGRESS REPORT    Patient: Gary Iyer  Date of Service: 12/6/2024    Date of Last Service: 12/2/24 (clinic visit with Dr. Fournier)  Referring physician: Dr. Mel Fournier  Initial evaluation: 12/6/24 (for this episode of care)  Therapy Session #1  Total number of Visits: 1 (for this episode)    I had the pleasure of seeing Mr. 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  He was last seen by me in 2021, and had a course of therpay with my  associate, Dr. 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  He has been successful in resolving granulomatous lesions, in order to avoid unnecessary biopsy.    Mr. Iyer was seen in clinic by my associate, Kassandra Coleman, on 8/2/24; at that time the evaluation indicated that he had resolved his most recent lesion, and no further therapy was advised.  Mr. Iyer saw Dr. Fournier on 12/2/ for routine follow-up; this time the laryngeal exam showed an irregular lesion on the left vocal process.  He and Dr. Fournier have requested a refresher session of speech therapy to help him reduce vocal fold impact and hopefully resolve the lesion.  Re-evaluation is warranted today, as there has been a clear exacerbation of his vocal status.       Mr. Iyer also states that:  He doesn't think his throat-clearing has been significant  He hasn't had any reason to do a lot of throat-clearing, coughing, or grunting  After seeing the lesion on his left vocal process, he has more desire to cough or clear  He has not been practicing any exercises since his clinic visit 4 days ago     OBJECTIVE FINDINGS  PERCEPTUAL EVALUATION (13956)  VOICE/ SPEECH/ NON-COMMUNICATIVE LARYNGEAL BEHAVIORS EVALUATION  An evaluation of the voice was accomplished today; salient features are as follows:    Breathing pattern:   Difficult to fully assess, due to telephone connection, but it appears that respiration is often not coordinated with phonation  Cough/ Throat clear:  None observed today  No overt tension is evident.  Voice quality is characterized by:  Inconsistent mild to marked roughness   There is often an additional noise component that is difficult to characterize  Phonation is generally pressed, with inadequate airflow  Bursts of smoother phonation  Habitual pitch was not formally tested, but is variable, from very low to appropriate  He has poor awareness of his pitch  Loudness is mildly reduced for the  setting  Both attempts at louder and softer phonation can result in rougher or clear quality; highly inconsistent  He is able to demonstrate less press and better airflow, and the quality is usually more smooth  In general, the quality is consistent with overall mild irregular stiffness of the vocal fold mucosa, with some lack of complete glottic closure, complicated by a lesion that is pedunculated and variably interferes with posterior glottic closure, or flips to either the inferior or superior surface, and allows complete posterior closure during phonation, thus affecting vibratory characteristics    THERAPEUTIC ACTIVITIES  Today Mr. Iyer participated in the following therapeutic activities:  Did some troubleshooting for possible sources of changed voice use or high impact to the vocal folds  With discussion, he noted that he has been very constipated lately, and agrees that he has been straining/grunting frequently for extended periods.  He is committed to doing a better job with laxatives and stool softeners, so he can avoid the straining  I provided instruction for impact-reduction strategies in all facets of his life.  He agrees that he should decrease his highly animated phone conversations for a while  Other strategies were familiar to him, but he was glad for the reminders  In response to his questions, I provided explanations of the best kinds of lozenges to use to avoid throat clearing  Sugar-free gum and wet snacks like grapes were a novel idea for him .  Practiced easy-onset phrases with gentle aspirate onsets  He will return to a regimen of regular practice, to help remind him to use easy onset and gentle phonation  He did express concern because of the difference size and shape of the current lesion, and his need to schedule any biopsy around a planned trip to [facility] that will necessarily involve extensive voice use  I provided therapy materials by email (pt requested) to help facilitate  practice.    ASSESSMENT / PLAN  IMPRESSIONS:  This evaluation has resulted in the following diagnosis/diagnoses for Mr. 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  He demonstrates a Good prognosis for improvement given adherence to therapeutic recommendations. Therapeutic   Positive indicators: commitment to process; diagnosis is known to respond to functional treatment;   Negative indicators: none  However, complete resolution of the lesion with speech therapy alone cannot be guaranteed, as the nature of the lesion is as yet unknown.    We began therapy today, working on strategies to improve vocal health and technique  Mr. Iyer will continue therapy as Dr. Fournier deems appropriate (this will be done with a different SLP, as I am retiring prior to his next visit with Dr. Fournier.    TREATMENT PLAN  Speech therapy    DURATION/FREQUENCY OF TREATMENT  A single session, accomplished today; possible further sessions as directed  Dr. Fournier, depending on findings from the next laryngeal exam    GOALS  Patient goal:    To resolve the vocal fold lesion    Short-term goal(s): Within the first session, Mr. Iyer will:  -- demonstrate understanding of a regimen of greatly reduced vocal fold impact, most especially a decrease in straining, as well as ability to use exercises to find a low-impact phonatory technique    Long-term goal(s): In 6 months, Mr. Iyer will:  Demonstrate a 90% reduction of the vocal  fold lesion by laryngeal exam       Certification period: 12/6/24 - 3/6/24    This treatment plan was developed with the patient who agreed with the recommendations.    TOTAL SERVICE TIME: 56 minutes  EVALUATION OF VOICE AND RESONANCE (42940)  TREATMENT OF SPEECH, LANGUAGE, VOICE, COMMUNICATION, and/or AUDITORY PROCESSING DISORDER (98667)      Next Clinic Appt: unknown  Plan for SLP: join visit to assess potential need for further therapy    Misty Clark, Ph.D., CCC-SLP  Speech-Language Pathologist  Director, [facility] Voice [facility]  She/her/hers

## 2024-12-06 NOTE — LETTER
12/6/2024       RE: Gary Iyer  [address] [facility] [address]     Dear Colleague,    Thank you for referring your patient, Gary Iyer, to the [facility] VOICE CLINIC [place] [facility]. Please see a copy of my visit note below.    Gary Iyer is a 64 year old male who is being evaluated via a billable video visit.      Gary has been notified and verbally consented to the following:   This video visit will be conducted between you and your provider.  Patient has opted to conduct today's video visit vs an in-person appointment.   Video visits are billed at different rates depending on your insurance coverage. Please reach out to your insurance provider with any questions.   If during the course of the call the provider feels the appointment is not appropriate, you will not be charged for this service.  Provider has received verbal consent for billable virtual visit from the patient? Yes  Will anyone else be joining your video visit? No    Call initiated at: 1:33 PM   Type of Visit Platform Used: [employer]  Location of provider: Home  Location of patient: [facility] VOICE [facility]  Elijah Baker Jr., M.D., F.A.C.S.  Mel Forunier M.D., M.P.H.  Alma Bang M.D.  [facility].MATT, M.M. (voice), M.A., CCC-SLP  Sam Carlisle, Ph.D., M.M., -SLP  Misty Clark, Ph.D., CCC-SLP  Candido Winter, Ph.D., CCC-SLP  LUIS ENRIQUE RizzoS., CCC-SLP  Zack Mcdaniels M.M., M.A., CCC-SLP  EZEQUIEL Lopez (voice), M.S., CCC-SLP    [facility]  VOICE/SPEECH/BREATHING RE-EVALUATION AND THERAPY PROGRESS REPORT    Patient: Gary Iyer  Date of Service: 12/6/2024    Date of Last Service: 12/2/24 (clinic visit with Dr. Fournier)  Referring physician: Dr. Mel Fournier  Initial evaluation: 12/6/24 (for this episode of care)  Therapy Session #1  Total number of Visits: 1 (for this episode)    I had the pleasure of seeing Mr.  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  He was last seen by me in 2021, and had a course of therpay with my associate, Dr. 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  He has been successful in resolving granulomatous lesions, in order to avoid unnecessary biopsy.    Mr. Iyer was seen in clinic by my associate, Kassandra Coleman, on 8/2/24; at that time the evaluation indicated that he had resolved his most recent lesion, and no further therapy was advised.  Mr. Iyer saw Dr. Fournier on 12/2/ for routine follow-up; this time the laryngeal exam showed an irregular lesion on the left vocal process.  He and Dr. Fournier have requested a refresher session of speech therapy to help him reduce vocal fold impact and hopefully resolve the lesion.  Re-evaluation is warranted today, as there has been a clear exacerbation of his vocal status.       Mr. Iyer also states that:  He doesn't think his throat-clearing has been significant  He hasn't had any reason to do a lot of throat-clearing, coughing, or grunting  After seeing the lesion on his left vocal process, he has more desire to cough or clear  He has not been practicing any exercises since his clinic visit 4 days ago     OBJECTIVE FINDINGS  PERCEPTUAL EVALUATION (22920)  VOICE/ SPEECH/ NON-COMMUNICATIVE LARYNGEAL BEHAVIORS EVALUATION  An evaluation of the voice was accomplished today; salient features are as follows:    Breathing pattern:   Difficult to fully assess, due to telephone connection, but it appears that respiration is often not coordinated with phonation  Cough/ Throat clear:  None observed today  No overt tension is evident.  Voice quality is characterized by:  Inconsistent mild to marked roughness   There is often an additional noise  component that is difficult to characterize  Phonation is generally pressed, with inadequate airflow  Bursts of smoother phonation  Habitual pitch was not formally tested, but is variable, from very low to appropriate  He has poor awareness of his pitch  Loudness is mildly reduced for the setting  Both attempts at louder and softer phonation can result in rougher or clear quality; highly inconsistent  He is able to demonstrate less press and better airflow, and the quality is usually more smooth  In general, the quality is consistent with overall mild irregular stiffness of the vocal fold mucosa, with some lack of complete glottic closure, complicated by a lesion that is pedunculated and variably interferes with posterior glottic closure, or flips to either the inferior or superior surface, and allows complete posterior closure during phonation, thus affecting vibratory characteristics    THERAPEUTIC ACTIVITIES  Today Mr. Iyer participated in the following therapeutic activities:  Did some troubleshooting for possible sources of changed voice use or high impact to the vocal folds  With discussion, he noted that he has been very constipated lately, and agrees that he has been straining/grunting frequently for extended periods.  He is committed to doing a better job with laxatives and stool softeners, so he can avoid the straining  I provided instruction for impact-reduction strategies in all facets of his life.  He agrees that he should decrease his highly animated phone conversations for a while  Other strategies were familiar to him, but he was glad for the reminders  In response to his questions, I provided explanations of the best kinds of lozenges to use to avoid throat clearing  Sugar-free gum and wet snacks like grapes were a novel idea for him .  Practiced easy-onset phrases with gentle aspirate onsets  He will return to a regimen of regular practice, to help remind him to use easy onset and gentle  phonation  He did express concern because of the difference size and shape of the current lesion, and his need to schedule any biopsy around a planned trip to [facility] that will necessarily involve extensive voice use  I provided therapy materials by email (pt requested) to help facilitate practice.    ASSESSMENT / PLAN  IMPRESSIONS:  This evaluation has resulted in the following diagnosis/diagnoses for Mr. 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  He demonstrates a Good prognosis for improvement given adherence to therapeutic recommendations. Therapeutic   Positive indicators: commitment to process; diagnosis is known to respond to functional treatment;   Negative indicators: none  However, complete resolution of the lesion with speech therapy alone cannot be guaranteed, as the nature of the lesion is as yet unknown.    We began therapy today, working on strategies to improve vocal health and technique  Mr. Iyer will continue therapy as Dr. Fournier deems appropriate (this will be done with a different SLP, as I am retiring prior to his next visit with Dr. Fournier.    TREATMENT PLAN  Speech therapy    DURATION/FREQUENCY OF TREATMENT  A single session, accomplished today; possible further sessions as directed  Dr. Fournier, depending on findings from the next laryngeal exam    GOALS  Patient goal:    To resolve the vocal fold lesion    Short-term goal(s): Within the first session, Mr. Iyer will:  --  demonstrate understanding of a regimen of greatly reduced vocal fold impact, most especially a decrease in straining, as well as ability to use exercises to find a low-impact phonatory technique    Long-term goal(s): In 6 months, Mr. Iyer will:  Demonstrate a 90% reduction of the vocal fold lesion by laryngeal exam       Certification period: 12/6/24 - 3/6/24    This treatment plan was developed with the patient who agreed with the recommendations.    TOTAL SERVICE TIME: 56 minutes  EVALUATION OF VOICE AND RESONANCE (36176)  TREATMENT OF SPEECH, LANGUAGE, VOICE, COMMUNICATION, and/or AUDITORY PROCESSING DISORDER (61960)      Next Clinic Appt: unknown  Plan for SLP: join visit to assess potential need for further therapy    Misty Clark, Ph.D., [facility]-SLP  Speech-Language Pathologist  Director, [facility] Voice Clinic  She/her/hers                                                                                         Outpatient Speech Language Therapy Evaluation  PLAN OF TREATMENT FOR OUTPATIENT REHABILITATION  (COMPLETE FOR INITIAL CLAIMS ONLY)  Patient's Last Name, First Name, M.I.  YOB: 1960  Gary Iyer                           Provider's Name  [facility] Medical Record No.  [id number]                               Onset Date:  12/06/24   Start of Care Date: 12/06/24     Type: Speech Language Therapy Medical Diagnosis: Dysphonia.                        Therapy Diagnosis:  Dysphonia.   Visits from SOC:  1   _________________________________________________________________________________  Plan of Treatment:   Speech therapy  RECOMMENDATIONS:   A course of speech therapy is recommended to promote reduction of vocal fold impact to help resolve the lesions.  He demonstrates a Good prognosis for improvement given adherence to therapeutic recommendations. Therapeutic   Positive indicators: commitment to process; diagnosis is known to respond to functional treatment;   Negative  indicators: none  However, complete resolution of the lesion with speech therapy alone cannot be guaranteed, as the nature of the lesion is as yet unknown.    We began therapy today, working on strategies to improve vocal health and technique  Mr. Iyer will continue therapy as Dr. Fournier deems appropriate (this will be done with a different SLP, as I am retiring prior to his next visit with Dr. Fournier.    TREATMENT PLAN  Speech therapy    DURATION/FREQUENCY OF TREATMENT  A single session, accomplished today; possible further sessions as directed  Dr. Fournier, depending on findings from the next laryngeal exam    GOALS  Patient goal:    To resolve the vocal fold lesion    Short-term goal(s): Within the first session, Mr. Iyer will:  -- demonstrate understanding of a regimen of greatly reduced vocal fold impact, most especially a decrease in straining, as well as ability to use exercises to find a low-impact phonatory technique    Long-term goal(s): In 6 months, Mr. Iyer will:  Demonstrate a 90% reduction of the vocal fold lesion by laryngeal exam     _________________________________________________________________________________    I CERTIFY THE NEED FOR THESE SERVICES FURNISHED UNDER        THIS PLAN OF TREATMENT AND WHILE UNDER MY CARE     (Physician attestation of this document indicates review and certification of the therapy plan).     Certification date from: 12/06/24  Certification date to: 3/6/25    Referring Provider: Mel Fournier MD        Again, thank you for allowing me to participate in the care of your patient.      Sincerely,    Misty Clark, SLP

## 2024-12-09 ENCOUNTER — TELEPHONE (OUTPATIENT)
Dept: OTOLARYNGOLOGY | Facility: CLINIC | Age: 64
End: 2024-12-09
Payer: COMMERCIAL

## 2024-12-09 NOTE — PROGRESS NOTES
Outpatient Speech Language Therapy Evaluation  PLAN OF TREATMENT FOR OUTPATIENT REHABILITATION  (COMPLETE FOR INITIAL CLAIMS ONLY)  Patient's Last Name, First Name, M.I.  YOB: 1960  Gary Iyer                           Provider's Name  Misty Clark, SLP Medical Record No.  [id number]                               Onset Date:  12/06/24   Start of Care Date: 12/06/24     Type: Speech Language Therapy Medical Diagnosis: Dysphonia.                        Therapy Diagnosis:  Dysphonia.   Visits from SOC:  1   _________________________________________________________________________________  Plan of Treatment:   Speech therapy  RECOMMENDATIONS:   A course of speech therapy is recommended to promote reduction of vocal fold impact to help resolve the lesions.  He demonstrates a Good prognosis for improvement given adherence to therapeutic recommendations. Therapeutic   Positive indicators: commitment to process; diagnosis is known to respond to functional treatment;   Negative indicators: none  However, complete resolution of the lesion with speech therapy alone cannot be guaranteed, as the nature of the lesion is as yet unknown.    We began therapy today, working on strategies to improve vocal health and technique  Mr. Iyer will continue therapy as Dr. Fournier deems appropriate (this will be done with a different SLP, as I am retiring prior to his next visit with Dr. Fournier.    TREATMENT PLAN  Speech therapy    DURATION/FREQUENCY OF TREATMENT  A single session, accomplished today; possible further sessions as directed  Dr. Fournier, depending on findings from the next laryngeal exam    GOALS  Patient goal:    To resolve the vocal fold lesion    Short-term goal(s): Within the first session, Mr. Iyer will:  -- demonstrate understanding of a regimen of greatly reduced vocal fold impact, most especially a decrease in  straining, as well as ability to use exercises to find a low-impact phonatory technique    Long-term goal(s): In 6 months, Mr. Iyer will:  Demonstrate a 90% reduction of the vocal fold lesion by laryngeal exam     _________________________________________________________________________________    I CERTIFY THE NEED FOR THESE SERVICES FURNISHED UNDER        THIS PLAN OF TREATMENT AND WHILE UNDER MY CARE     (Physician attestation of this document indicates review and certification of the therapy plan).     Certification date from: 12/06/24  Certification date to: 3/6/25    Referring Provider: Mel Fournier MD      Agree with note.  Mel Fournier MD  Signed 12/11/2024

## 2024-12-09 NOTE — TELEPHONE ENCOUNTER
[facility] Call Center    Phone Message    May a detailed message be left on voicemail: yes     Reason for Call: Other: Pt said he's supposed to have another appointment with Irlanda prior to his upcoming surgery, no appointments available, please call him to discuss if this is needed or not, thanks     Action Taken: Other: ENT    Travel Screening: Not Applicable     Date of Service:

## 2024-12-09 NOTE — TELEPHONE ENCOUNTER
Patient confirmed scheduled appointment:  Date: 1/27  Time: 1230  Provider: Irlanda  Location: [facility]   Testing/imaging:   Additional notes:

## 2024-12-17 ENCOUNTER — PATIENT OUTREACH (OUTPATIENT)
Dept: GERIATRIC MEDICINE | Facility: CLINIC | Age: 64
End: 2024-12-17
Payer: COMMERCIAL

## 2024-12-17 NOTE — PROGRESS NOTES
[facility] Care Coordination Contact      [facility] Six-Month Telephone Assessment    6 month telephone assessment completed on 12/17/24.    ER visits: No  Hospitalizations: No  TCU stays: No  Significant health status changes: No.   Falls/Injuries: No  ADL/IADL changes: No  Changes in services: No    Caregiver Assessment follow up:  care coordinator educated on MA paperwork (renewal) which will be due in 6 to 8 weeks per letter care coordinator received today.  Care coordinator educated to keep watch on mailings from [facility] regarding renewal process and timelines.  Member reports that he has surgery coming up and and that everything is in place for this.  He also reports that his personal care attendent has been ongoing and corrected.  Earlier in Nov, care coordinator had received information from the personal care attendent agency that they had not received auth for his ongoing personal care attendent.  Waiver SILVANA was notified who took care of the auth.     Goals: See Support Plan for goal progress documentation.      Will see member in 6 months for an annual health risk assessment.   Encouraged member to call CC with any questions or concerns in the meantime.     Enid Baer RN   [facility]  [phone]

## 2025-01-27 ENCOUNTER — OFFICE VISIT (OUTPATIENT)
Dept: OTOLARYNGOLOGY | Facility: CLINIC | Age: 65
End: 2025-01-27
Payer: COMMERCIAL

## 2025-01-27 VITALS
HEIGHT: 70 IN | WEIGHT: 235 LBS | DIASTOLIC BLOOD PRESSURE: 72 MMHG | SYSTOLIC BLOOD PRESSURE: 129 MMHG | HEART RATE: 120 BPM | BODY MASS INDEX: 33.64 KG/M2

## 2025-01-27 DIAGNOSIS — R49.0 DYSPHONIA: ICD-10-CM

## 2025-01-27 DIAGNOSIS — J38.3 VOCAL PROCESS GRANULOMA: ICD-10-CM

## 2025-01-27 DIAGNOSIS — Z85.21 HX OF LARYNGEAL CANCER: Chronic | ICD-10-CM

## 2025-01-27 DIAGNOSIS — R49.0 DYSPHONIA: Primary | ICD-10-CM

## 2025-01-27 DIAGNOSIS — J38.7 LARYNGEAL MASS: Primary | ICD-10-CM

## 2025-01-27 DIAGNOSIS — J38.7 LARYNGEAL HYPERFUNCTION: ICD-10-CM

## 2025-01-27 DIAGNOSIS — Z85.21 HX OF LARYNGEAL CANCER: ICD-10-CM

## 2025-01-27 DIAGNOSIS — E11.9 TYPE 2 DIABETES MELLITUS WITHOUT COMPLICATION, WITHOUT LONG-TERM CURRENT USE OF INSULIN (H): ICD-10-CM

## 2025-01-27 PROCEDURE — 99215 OFFICE O/P EST HI 40 MIN: CPT | Mod: 25 | Performed by: OTOLARYNGOLOGY

## 2025-01-27 PROCEDURE — 99207 PR NO BILLABLE SERVICE THIS VISIT: CPT | Performed by: SPEECH-LANGUAGE PATHOLOGIST

## 2025-01-27 PROCEDURE — 31575 DIAGNOSTIC LARYNGOSCOPY: CPT | Performed by: OTOLARYNGOLOGY

## 2025-01-27 NOTE — PATIENT INSTRUCTIONS
1.  You were seen in the ENT Clinic today by . If you have any questions or concerns after your appointment, please call [phone]. Press option #1 for scheduling related needs. Press option #3 for Nurse advice.    2.   has recommended the following:   - proceed with surgery as scheduled   - pre-operative and post operative speech therapy   - post operative vocal rest   - continue to use your PPI    3.  Plan is to return to clinic for post operative follow up as scheduled 3/3/25.      Lisa Hazel LPN  [phone]  [facility] Otolaryngology

## 2025-01-27 NOTE — LETTER
1/27/2025      RE: Gary Iyer  [address] [address]       Dear Colleague:  Gary Iyer recently returned for follow-up at the [facility]. My clinic note from our visit is enclosed below.  Speech recognition software may have been used in the documentation below; input is reviewed before signature to the best of my ability.     I appreciate the ongoing opportunity to participate in this patient's care.    Please feel free to contact me with any questions.      Sincerely yours,  Mel Fournier M.D., M.P.H.  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  He later returned to the operating room on February 9, 2017 for an area of new irregularity of the left true vocal fold.  Pathology showed severe dysplasia with negative but close margins (for moderate, but not severe dysplasia).  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    We then observed a slight recurrence of leukoplakia/granuloma in the same location where he had this in 2018. This resolved with conservative management. Subsequently, he had allergies which were provoking coughing/throat-clearing, and he was again noted to have a granuloma. He was set up for refresher session(s) of speech therapy with Misty Clark, PhD, CCC-SLP and we discussed nasal hygiene as well. This led to near complete resolution of the granuloma, but subsequently he was noted to have slightly increased irritation along the left medial arytenoid wall.  Then he developed an upper respiratory infection and has been coughing, and had some shortness of breath. He states he was COVID  negative. His family had noted him grunting and throat-clearing frequently.    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 In May 2024 he was hospitalized with high lactate, concern for bacteremia. As of Dec 2024 we noted new left posterior vocal fold lesion that was pedunculated and highly mobile. The surface looked irregular, but the location and mobility were potentially consistent with granuloma, similar to prior findings he had in 2016.    Today's updates:  - worked with Misty Clark, PhD, CCC-SLP for one more session of speech therapy; he has been trying to speak more slowly and gently since then and has reduced his voice use by 50-70%; constipation is a bit reduced too  - voice, swallow, breathing are stable  - very rarely having reflux symptoms  - no new PMH/PSH      MEDICATIONS:     Current Outpatient Medications   Medication Sig Dispense Refill     ACCU-CHEK GUIDE test strip TEST ONCE DAILY 100 strip 1     alcohol swab prep pads USE TO SWAB AREA OF INJECTION/OLIMPIA 100 each 1     allopurinol (ZYLOPRIM) 300 MG tablet TAKE 1 TABLET(300 MG) BY MOUTH DAILY 90 tablet 1     amLODIPine (NORVASC) 2.5 MG tablet Take 1 tablet (2.5 mg) by mouth daily. 90 tablet 1     aspirin 81 MG tablet Take 1 tablet by mouth daily.       atorvastatin (LIPITOR) 20 MG tablet Take 1 tablet (20 mg) by mouth daily. 90 tablet 1     blood glucose (ACCU-CHEK GUIDE) test strip Use to test blood sugar 1 times daily or as directed. 100 strip 11     blood glucose calibration (NO BRAND SPECIFIED) solution To accompany: Blood Glucose Monitor Brands: per insurance. 1 Bottle 3     Blood Glucose Monitoring Suppl (ACCU-CHEK GUIDE ME) w/Device KIT USE TO TEST ONCE DAILY 1 kit 0     calcipotriene (DOVONEX) 0.005 % external cream Mix with efudex and apply twice daily to hands and arms for 12 days 60 g 3     cetirizine (ZYRTEC) 10 MG  tablet Take 1 tablet (10 mg) by mouth daily. 90 tablet 3     docusate sodium (DSS) 100 MG capsule Take 100 mg by mouth at bedtime       fluorouracil (EFUDEX) 5 % external cream Mix with dovonex and apply twice daily to hands and arms for 12 days 40 g 1     hydrochlorothiazide (HYDRODIURIL) 50 MG tablet Take 1 tablet (50 mg) by mouth daily. 90 tablet 1     hydrocortisone (CORTAID) 1 % external ointment Apply topically 2 times daily as needed 30 g 1     insulin pen needle (31G X 6 MM) 31G X 6 MM miscellaneous Use one pen needle daily or as directed. ( for Victoza ) 90 each 3     ketoconazole (NIZORAL) 2 % external cream Apply daily to affected area on face, armpits, groin. 60 g 1     ketoconazole (NIZORAL) 2 % external shampoo Leave on face, beard for 3-5 minutes then rinse. Use 2-3 times weekly. 120 mL 3     lisinopril (ZESTRIL) 30 MG tablet Take 30 mg by mouth daily.       losartan (COZAAR) 100 MG tablet Take 1 tablet (100 mg) by mouth daily. 90 tablet 1     metroNIDAZOLE (METROCREAM) 0.75 % external cream Apply topically 2 times daily as needed (to rosacea areas) 45 g 1     Misc. Devices (SUPPOSITORY MOLD 2GM) MISC 1 suppository every 12 hours Nifedipine 4 mg suppository, one rectally every 12 hours 100 each 4     Multiple Vitamin (DAILY MULTIVITAMIN PO) Take by mouth daily       nystatin (MYCOSTATIN) 002133 UNIT/GM external powder Apply topically 2 times daily Prevention of rash 60 g 0     omeprazole (PRILOSEC) 20 MG DR capsule TAKE 1 CAPSULE(20 MG) BY MOUTH TWICE DAILY 180 capsule 1     order for DME Autocpap 10-16 cm 1 Units 0     PARoxetine (PAXIL) 40 MG tablet TAKE 1 TABLET(40 MG) BY MOUTH EVERY MORNING 90 tablet 1     polyethylene glycol (MIRALAX) 17 GM/Dose powder Take 17 g by mouth daily as needed for constipation 850 g 3     psyllium 0.52 G capsule Take 1 capsule (0.52 g) by mouth daily 100 capsule 3     Semaglutide, 1 MG/DOSE, (OZEMPIC) 4 MG/3ML pen Inject 1 mg subcutaneously every 7 days. 3 mL 2      tadalafil (ADCIRCA/CIALIS) 20 MG tablet 1/2 to 1 po as needed up to twice per week for erectile dysfunction 10 tablet 1     tamsulosin (FLOMAX) 0.4 MG capsule Take 2 capsules (0.8 mg) by mouth daily. 180 capsule 1     thin (NO BRAND SPECIFIED) lancets Use with lanceting device. To accompany: Blood Glucose Monitor Brands: per insurance. 100 each 11     triamcinolone (KENALOG) 0.1 % external cream Apply topically 2 times daily as needed for irritation 30 g 0       ALLERGIES:  No Known Allergies    NEW PMH/PSH: None    REVIEW OF SYSTEMS:  The patient completed a comprehensive 11 point review of systems (below), which was reviewed. Positives are as noted below.  Patient Supplied Answers to Review of Systems Noncontributory         PHYSICAL EXAM:  General: The patient was alert and conversant, and in no acute distress.    Neck: No palpable cervical lymphadenopathy, no significant tenderness to palpation of the thyrohyoid space, which was narrow. No obvious thyroid abnormality.  Resp: Breathing comfortably, no stridor or stertor.  Neuro: Symmetric facial function. Other cranial nerve function as documented above.  Psych: Normal affect, pleasant and cooperative.  Voice/speech: Mild to moderate dysphonia characterized by breathiness, roughness, and strain.      Procedure:   Flexible fiberoptic laryngoscopy  Indications: This procedure was warranted to evaluate the patient's laryngeal anatomy and function. Risks, benefits, and alternatives were discussed.  Description: After written informed consent was obtained, a time-out was performed to confirm patient identity, procedure, and procedure site. Topical 2% lidocaine and oxymetazoline were applied to the nasal cavities. I performed the endoscopy and no complications were apparent.  Performed by: Mel Fournier MD MPH  SLP: Kassandra Coleman MS CCC-SLP   Findings: Normal nasopharynx. Normal base of tongue, valleculae, and epiglottis. The right true vocal fold demonstrated  normal mobility. The left true vocal fold demonstrated normal mobility. The medial edges of the true vocal folds appeared smooth and straight overall; left posterior true vocal fold notable for greater prominence of irregular exophytic tissue; anterior portion is smoother.  Glissade produced appropriate elongation. Mucosa of the false vocal folds, aryepiglottic folds, piriform sinuses, and posterior glottis unremarkable. Airway was patent. Similar findings on virtual chromoendoscopy.                                          IMPRESSION AND PLAN:   Gary Iyer returns with worsening of the left posterior vocal fold changes despite substantially reducing voice use and treating his constipation. He has reflux symptoms only very rarely. Based on these findings as well as his past history of laryngeal cancer, I recommended that we consider moving forward with a surgical excision.    He seemed to prefer to wait, given his prior history of having granuloma resolution. We discussed that the risk of waiting is that the lesion may continue to enlarge; we also acknowledged that it is also possible the mass will reduce over time. We also discussed that if this is an uncomplicated granuloma, a significant change in laryngeal behavior would be needed to produce an improvement in his laryngeal findings. We discussed that if he could make some additional behavioral change, we could consider a follow-up in 4-6 weeks to see if the lesion was reducing in size. After some reflection, he felt that it was unlikely there was a residual behavioral contributor that he could significantly change, so we agreed to move forward with surgery as scheduled.    We also discussed the importance of post-op vocal rest and conservation, and I recommended at least one session of speech therapy pre-operatively to make sure he is clear on what to do to optimize his healing post-op. We had an extended discussion of the importance of managing his laryngeal  behavior in the post-op period, with the goal of minimizing his risk of immediate lesion recurrence. He will also continue his PPI.    I spent a total of 42 minutes on 1/27/2025 in chart review, review of tests, patient visit, documentation, care coordination, and/or discussion with other providers about the issues documented above, separate from any documented procedure(s).      Mel Fournier MD

## 2025-01-27 NOTE — PROGRESS NOTES
Dear Colleague:  Gary Iyer recently returned for follow-up at the [facility]. My clinic note from our visit is enclosed below.  Speech recognition software may have been used in the documentation below; input is reviewed before signature to the best of my ability.     I appreciate the ongoing opportunity to participate in this patient's care.    Please feel free to contact me with any questions.      Sincerely yours,  Mel Fournier M.D., M.P.H.  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  He later returned to the operating room on February 9, 2017 for an area of new irregularity of the left true vocal fold.  Pathology showed severe dysplasia with negative but close margins (for moderate, but not severe dysplasia).  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    We then observed a slight recurrence of leukoplakia/granuloma in the same location where he had this in 2018. This resolved with conservative management. Subsequently, he had allergies which were provoking coughing/throat-clearing, and he was again noted to have a granuloma. He was set up for refresher session(s) of speech therapy with Misty Clark, PhD, CCC-SLP and we discussed nasal hygiene as well. This led to near complete resolution of the granuloma, but subsequently he was noted to have slightly increased irritation along the left medial arytenoid wall.  Then he developed an upper respiratory infection and has been coughing, and had some shortness of breath. He states he was COVID negative. His family had noted him grunting and throat-clearing frequently.    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 In May 2024 he was hospitalized with high lactate, concern for bacteremia. As of Dec 2024 we noted new left posterior vocal fold lesion that was pedunculated and highly mobile. The surface looked irregular, but the location and mobility were potentially consistent with granuloma, similar to prior findings he had in 2016.    Today's updates:  - worked with Misty Clark, PhD, CCC-SLP for one more session of speech therapy; he has been trying to speak more slowly and gently since then and has reduced his voice use by 50-70%; constipation is a bit reduced too  - voice, swallow, breathing are stable  - very rarely having reflux symptoms  - no new PMH/PSH      MEDICATIONS:     Current Outpatient Medications   Medication Sig Dispense Refill    ACCU-CHEK GUIDE test strip TEST ONCE DAILY 100 strip 1    alcohol swab prep pads USE TO SWAB AREA OF INJECTION/OLIMPIA 100 each 1    allopurinol (ZYLOPRIM) 300 MG tablet TAKE 1 TABLET(300 MG) BY MOUTH DAILY 90 tablet 1    amLODIPine (NORVASC) 2.5 MG tablet Take 1 tablet (2.5 mg) by mouth daily. 90 tablet 1    aspirin 81 MG tablet Take 1 tablet by mouth daily.      atorvastatin (LIPITOR) 20 MG tablet Take 1 tablet (20 mg) by mouth daily. 90 tablet 1    blood glucose (ACCU-CHEK GUIDE) test strip Use to test blood sugar 1 times daily or as directed. 100 strip 11    blood glucose calibration (NO BRAND SPECIFIED) solution To accompany: Blood Glucose Monitor Brands: per insurance. 1 Bottle 3    Blood Glucose Monitoring Suppl (ACCU-CHEK GUIDE ME) w/Device KIT USE TO TEST ONCE DAILY 1 kit 0    calcipotriene (DOVONEX) 0.005 % external cream Mix with efudex and apply twice daily to hands and arms for 12 days 60 g 3    cetirizine (ZYRTEC) 10 MG tablet Take 1 tablet (10 mg) by mouth daily. 90 tablet 3    docusate sodium (DSS) 100 MG capsule  Take 100 mg by mouth at bedtime      fluorouracil (EFUDEX) 5 % external cream Mix with dovonex and apply twice daily to hands and arms for 12 days 40 g 1    hydrochlorothiazide (HYDRODIURIL) 50 MG tablet Take 1 tablet (50 mg) by mouth daily. 90 tablet 1    hydrocortisone (CORTAID) 1 % external ointment Apply topically 2 times daily as needed 30 g 1    insulin pen needle (31G X 6 MM) 31G X 6 MM miscellaneous Use one pen needle daily or as directed. ( for Victoza ) 90 each 3    ketoconazole (NIZORAL) 2 % external cream Apply daily to affected area on face, armpits, groin. 60 g 1    ketoconazole (NIZORAL) 2 % external shampoo Leave on face, beard for 3-5 minutes then rinse. Use 2-3 times weekly. 120 mL 3    lisinopril (ZESTRIL) 30 MG tablet Take 30 mg by mouth daily.      losartan (COZAAR) 100 MG tablet Take 1 tablet (100 mg) by mouth daily. 90 tablet 1    metroNIDAZOLE (METROCREAM) 0.75 % external cream Apply topically 2 times daily as needed (to rosacea areas) 45 g 1    Misc. Devices (SUPPOSITORY MOLD 2GM) MISC 1 suppository every 12 hours Nifedipine 4 mg suppository, one rectally every 12 hours 100 each 4    Multiple Vitamin (DAILY MULTIVITAMIN PO) Take by mouth daily      nystatin (MYCOSTATIN) 906052 UNIT/GM external powder Apply topically 2 times daily Prevention of rash 60 g 0    omeprazole (PRILOSEC) 20 MG DR capsule TAKE 1 CAPSULE(20 MG) BY MOUTH TWICE DAILY 180 capsule 1    order for DME Autocpap 10-16 cm 1 Units 0    PARoxetine (PAXIL) 40 MG tablet TAKE 1 TABLET(40 MG) BY MOUTH EVERY MORNING 90 tablet 1    polyethylene glycol (MIRALAX) 17 GM/Dose powder Take 17 g by mouth daily as needed for constipation 850 g 3    psyllium 0.52 G capsule Take 1 capsule (0.52 g) by mouth daily 100 capsule 3    Semaglutide, 1 MG/DOSE, (OZEMPIC) 4 MG/3ML pen Inject 1 mg subcutaneously every 7 days. 3 mL 2    tadalafil (ADCIRCA/CIALIS) 20 MG tablet 1/2 to 1 po as needed up to twice per week for erectile dysfunction 10 tablet 1     tamsulosin (FLOMAX) 0.4 MG capsule Take 2 capsules (0.8 mg) by mouth daily. 180 capsule 1    thin (NO BRAND SPECIFIED) lancets Use with lanceting device. To accompany: Blood Glucose Monitor Brands: per insurance. 100 each 11    triamcinolone (KENALOG) 0.1 % external cream Apply topically 2 times daily as needed for irritation 30 g 0       ALLERGIES:  No Known Allergies    NEW PMH/PSH: None    REVIEW OF SYSTEMS:  The patient completed a comprehensive 11 point review of systems (below), which was reviewed. Positives are as noted below.  Patient Supplied Answers to Review of Systems Noncontributory         PHYSICAL EXAM:  General: The patient was alert and conversant, and in no acute distress.    Neck: No palpable cervical lymphadenopathy, no significant tenderness to palpation of the thyrohyoid space, which was narrow. No obvious thyroid abnormality.  Resp: Breathing comfortably, no stridor or stertor.  Neuro: Symmetric facial function. Other cranial nerve function as documented above.  Psych: Normal affect, pleasant and cooperative.  Voice/speech: Mild to moderate dysphonia characterized by breathiness, roughness, and strain.      Procedure:   Flexible fiberoptic laryngoscopy  Indications: This procedure was warranted to evaluate the patient's laryngeal anatomy and function. Risks, benefits, and alternatives were discussed.  Description: After written informed consent was obtained, a time-out was performed to confirm patient identity, procedure, and procedure site. Topical 2% lidocaine and oxymetazoline were applied to the nasal cavities. I performed the endoscopy and no complications were apparent.  Performed by: Mel Fournier MD MPH  SLP: Kassandra Coleman MS CCC-SLP   Findings: Normal nasopharynx. Normal base of tongue, valleculae, and epiglottis. The right true vocal fold demonstrated normal mobility. The left true vocal fold demonstrated normal mobility. The medial edges of the true vocal folds appeared  smooth and straight overall; left posterior true vocal fold notable for greater prominence of irregular exophytic tissue; anterior portion is smoother.  Glissade produced appropriate elongation. Mucosa of the false vocal folds, aryepiglottic folds, piriform sinuses, and posterior glottis unremarkable. Airway was patent. Similar findings on virtual chromoendoscopy.                                          IMPRESSION AND PLAN:   Gary Iyer returns with worsening of the left posterior vocal fold changes despite substantially reducing voice use and treating his constipation. He has reflux symptoms only very rarely. Based on these findings as well as his past history of laryngeal cancer, I recommended that we consider moving forward with a surgical excision.    He seemed to prefer to wait, given his prior history of having granuloma resolution. We discussed that the risk of waiting is that the lesion may continue to enlarge; we also acknowledged that it is also possible the mass will reduce over time. We also discussed that if this is an uncomplicated granuloma, a significant change in laryngeal behavior would be needed to produce an improvement in his laryngeal findings. We discussed that if he could make some additional behavioral change, we could consider a follow-up in 4-6 weeks to see if the lesion was reducing in size. After some reflection, he felt that it was unlikely there was a residual behavioral contributor that he could significantly change, so we agreed to move forward with surgery as scheduled.    We also discussed the importance of post-op vocal rest and conservation, and I recommended at least one session of speech therapy pre-operatively to make sure he is clear on what to do to optimize his healing post-op. We had an extended discussion of the importance of managing his laryngeal behavior in the post-op period, with the goal of minimizing his risk of immediate lesion recurrence. He will also  continue his PPI.    I spent a total of 42 minutes on 1/27/2025 in chart review, review of tests, patient visit, documentation, care coordination, and/or discussion with other providers about the issues documented above, separate from any documented procedure(s).

## 2025-01-27 NOTE — PROGRESS NOTES
[facility]  Clinical Voice and Upper Airway Evaluation Report    Patient's Name: Gary Iyer  Date of Evaluation: 1/27/2025  Providing SLP: Kassandra Coleman MS CCC-SLP  Seen in Conjunction With: ***, MD - ***, [facility]-SLP  Referring Provider and Facility: ***  Insurance Coverage: *** (Certification Dates: *** - ***)  Chief Complaint: ***  Evaluation Location: [facility]  Others in Attendance for the Evaluation: ***  Time of Evaluation: *** - ***      Patient History: *** is a ***-year-old *** who presents today for evaluation of ***.     Dysphonia:   Onset: ***  Progression: ***  Concerns  ***  Patterns: ***  Improves with: ***  Worsens with: ***  Previous voice therapy or other interventions: ***  Voice use considerations: ***    Dyspnea:   Onset: ***  Progression: ***  Respiratory conditions: ***  Inhaled treatments: ***  Most recent PFTs: ***  Concerns  More difficulty with ***  Noisy on ***  *** sensation in the ***  ***  Triggers: ***  Length of onset: ***  Length of recovery: ***    Dysphagia:   Onset: ***  Progression: ***  Concerns  ***  Difficult items: ***  Weight loss: ***  Recent pneumonias/chest infections: ***  Length of meal time: ***  Previous swallow study results: ***  GERD symptoms: ***    Cough / Throat Clearing:   Onset: ***  Progression: ***  Concerns  More *** than ***  Frequency: ***  Triggers: ***  Improves with: ***    Throat Pain:  Onset: ***  Progression: ***  Concerns  ***  Patterns: ***  Improves with: ***  Worsens with: ***    Medical / Surgical History:   ***    Other Medical Evaluations, Testing, and Treatments:   ***    Cognitive Status / Ability to Comprehend Directions: ***    Barriers to Progress: ***    Daily Water Intake: ***    Caffeine Intake: ***    Other Beverage Intake: ***    Alcohol Intake: ***    Past / Current Tobacco Use / Exposure: ***    Current Diet: ***    Weight: ***    Work / School Status: ***        Quality of Life  "Questionnaires:    PATIENT REPORTED MEASURES:       No data to display                    3/1/2019     7:38 AM 11/16/2020     1:00 PM 8/15/2022     2:00 PM   Voice Handicap Index (VHI-10)   My voice makes it difficult for people to hear me 1  0 0   People have difficulty understanding me in a noisy room 1  1 0   My voice difficulties restrict my personal and social life.  2  1 0   I feel left out of conversations because of my voice 2  0 0   My voice problem causes me to lose income 2  1 0   I feel as though I have to strain to produce voice 2  1 0   The clarity of my voice is unpredictable 1  0 0   My voice problem upsets me 2  0 0   My voice makes me feel handicapped 2  1 0   People ask, \"What's wrong with your voice?\" 2  0 0   VHI-10 17 5 0       Proxy-reported           2/26/2018     2:36 PM 3/1/2019     7:39 AM 8/15/2022     2:00 PM   Cough Severity Index (CSI)   My cough is worse when I lie down 1  0  0   My coughing problem causes me to restrict my personal and social life 1  0  0   I tend to avoid places because of my cough problem 1  0  0   I feel embarrassed because of my coughing problem 1  0  0   People ask, ''What's wrong?'' because I cough a lot 1  0  0   I run out of air when I cough 1  1  0   My coughing problem affects my voice 1  0  0   My coughing problem limits my physical activity 1  1  0   My coughing problem upsets me 1  1  0   People ask me if I am sick because I cough a lot 1  0  0   CSI Score 10 3 0       Proxy-reported           2/26/2018     2:35 PM 3/1/2019     7:40 AM 8/15/2022     2:00 PM   Eating Assessment Tool (EAT-10)   My swallowing problem has caused me to lose weight 0  1  0   My swallowing problem interferes with my ability to go out for meals 0  0  0   Swallowing liquids takes extra effort 0  1  0   Swallowing solids takes extra effort 0  1  0   Swallowing pills takes extra effort 0  1  0   Swallowing is painful 0  0  0   The pleasure of eating is affected by my swallowing 0  " "0  0   When I swallow food sticks in my throat 0  0  0   I cough when I eat 1  1  0   Swallowing is stressful 0  0  0   EAT-10 1 5 0       Proxy-reported            No data to display                SINGING VOICE HANDICAP INDEX -10  (SVHI10)  It takes a lot of effort sing    I am unsure of what will come out when I sing    My voice \"gives out\" on me while I am singing    My singing voice upsets me    I have no confidence in my singing voice    I have trouble making my voice do what I want it to do    I have to \"push\" to produce my voice when singing    My singing voice tires easily    I feel something is missing in my life because of my inability to sing    I am unable to use my \"high voice\"    Total         Perceptual Analysis (33348): Evaluation of Voice / Speech / Non-Communicative Laryngeal Behaviors    The GRBAS is a perceptual rating of voice change. 0 indicates no impairment, 3 indicates a severe impairment. \"C\" and \"I\" may be used to signify if these feature was observed consistently or intermittently respectively. This is a rating based on clinical judgement of disordered voice quality.  G ( *** ) General Dysphonia     R ( *** ) Roughness     B ( *** ) Breathiness     A ( *** ) Asthenia     S ( *** ) Strain  Additional information: ***    Stimuli for differential diagnosis of spasmodic dysphonia and vocal tremor:  Sustained vowel: ***  Voiced-loaded stimuli (e.g. \"We were away a year ago,\" counting from 80-90): ***  Voiceless-loaded stimuli (e.g. \"How hard did he hit him?,\" counting from 50-60): ***  Singing: ***    *Validity of this measure may be slightly reduced when performed via acoustic signal from virtual visit*    Laryngeal palpation:   Thyrohyoid space: ***  Suprahyoid region: ***  Laryngeal lateralization: ***    Additional observations:   Cough/ Throat Clear: {coughthroatclear:902176}    Breathing patterns: appropriate at rest ***  Overt tension: \" \" ***  Habitual pitch: WNL compared to age- and " "gender-matched peers ***  Pitch range: \" \" ***  Resonance: ***  Loudness: appropriate ***    The Milstein Breathing Pattern Assessment Index (M-BPAI) is an evaluation of respiratory mechanics in patients with breathing pattern disorder and exercise-induced laryngeal obstruction. Patients are cued to: 1) take one fast and deep breath in through the mouth, hold it for one second, then breathe out x3 and 2) take four deep and fast breaths in rapid succession. A score at or above 8 indicates presence of breathing pattern disorder.  Chest Expansion (no = 0, yes = 1) ( *** )  Abdominal Movement (out = 0, none = 1, in = 2) ( *** )  Shoulder Displacement (none = 0, mild 0-0.5\" = 1, moderate 0.5-1.5\" = 2, significant > 1.5-3\") ( *** )  Strap Muscles (none = 1, mild = 1, moderate = 2, significant = 3) ( *** )  Head Tilt (no = 0, yes = 3) ( *** )  Stridor (no = 0, yes = 3) ( *** )  Total Score: ***      Acoustic and Aerodynamic Analysis (35366):    Voice samples were recorded and analyzed within [employer] software with aerodynamic information taken in [employer] PAS software. *** 52 modifier will be used for billing purposes, as aerodynamic evaluation could not be completed    Normative data:  Jitter %: less than 1  Shimmer dB: less than 0.35  NHR: less than 0.194  Average f0 in connected speech: 170-220 female, 100-150 male  CPPS: greater than 19.10 for Praat    Interpretation:  ***  Acoustic findings of *** elevated perturbation features*** are consistent with perceptual finding of ***  Aerodynamic finding of *** low air flow and *** elevated peak air flow pressure are consistent with patient reported increased effort.      Laryngoscopy with*** stroboscopy:    Provider performing exam: ***, MD / Kassandra Coleman, MS CCC-SLP    Informed consent: Informed verbal consent was obtained, which includes potential side-effects, risks, and benefits of the procedure.     Anesthetic: Topical anesthesia with 3% lidocaine and 0.25% " "phenylephrine was applied the nostrils bilaterally. Viscous lidocaine 4% was applied to the tip of the endoscope.    Scope type: A distal chip flexible laryngoscope was passed through the nare with halogen *** light source(s).    Scope serial number: ***    The laryngeal and pharyngeal structures were evaluated for gross appearance, mobility, function, and focal lesions / abnormalities of the associated mucosa.  Velar Function: complete closure without bubbling of secretions and no weakness observed ***  General Appearance: WNL ***  Secretions: WNL ***  Subglottis Appearance: visible portion is patent ***  R Arytenoid Abduction / Adduction: symmetrical and timely ab/adduction with appropriate range of motion ***  L Arytenoid Abduction / Adduction: \" \" ***  Mediolateral Compression: WNL ***  Anteroposterior Compression: WNL ***  Vocal Fold Elongation: appropriate ***  Left (L) Vocal Fold Edge and Mucosa: white with smooth and straight appearance ***  Right (R) Vocal Fold Edge and Mucosa: \" \" ***  Narrow Band Imaging: demonstrates similar findings as halogen light ***  Glottic Closure: complete ***  Phase Closure: predominantly open phase ***  Vertical Level of Approximation: true vocal fold appear to adduct at the same height ***  L Amplitude: WNL ***  R Amplitude: WNL***  L Mucosal Wave: WNL ***  R Mucosal Wave: WNL ***  Phase Symmetry: symmetrical ***  Periodicity: periodic ***  Inhalation Phonation: similar findings to exhalation phonation ***  Other Observations: ***  Patient was asked to mimic how it felt and sounded when they were experiencing dyspnea, and this demonstrated *** inappropriate vocal fold adduction of ***% of the glottis with *** hooding of the arytenoid complexes and adduction of the true vocal folds, yielding stridor  Therapeutic Probes: ***  Humming resulted in ***  Flow/high airflow stimuli resulted in ***  Glottal coup resulted in ***  Increased volume resulted in ***  Rescue breathing " "techniques utilizing brisk, nasal inhalation and pursed lip inspiration with prolonged, high pressure exhalation revealed *** maximal vocal fold abduction with maintenance of the glottic airway during exhalation  A coughing attack was triggered during laryngoscopy today, and pulsed \"sh\" resulted in reduced length and severity of the attack    FEES: Evaluation was performed by *** and Dr. Bang. Impressions from this clinical document indicates: \"***\"       Therapeutic Techniques Attempted (90120 for Individual Speech Therapy):    ***       Impressions and Plan:     *** is a ***-year-old *** presenting today with *** secondary to ***. Perceptually, ***. Laryngoscopy today demonstrated ***. Therefore, *** has been recommended. ***. *** is in agreement with this plan of care.     RESEARCH: A warm introduction was *** provided regarding participation in laryngology research studies. This patient is *** interested in being contacted.    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 *** is in agreement with this plan of care and plans to check with her insurance about coverage prior to beginning sessions. - Goals: to improve and maintain a healthy " voice quality, to understand the problem and fix it as much as possible, report resolution of symptoms, and use of optimal voice quality and comfort to meet personal, social, and professional needs, 90% of the time during a typical week of vocal activities      Goals:    VCD/PVFM  Gradually decrease VCD episodes to increase quality of life and ability to participate in high level cardio activity without limitations  Perform rescue breathing techniques while asymptomatic to improve automatic response when experiencing dyspnea  Perform rescue breathing techniques before and during exercise to prevent dyspnea/severe VCD attack  Perform rescue breathing during exercise or when exposed to a trigger item to resolve a VCD attack or coughing episode  Continue participating in high level cardio activity while performing rescue breathing to resolve dyspnea  Utilize abdominal breathing techniques in targeted activities (e.g. physical exercise, communication, high-level phonation/pitch range navigation exercises, etc.)  Rebalance laryngeal musculature and strengthen inspiratory muscles resulting in decreased laryngeal sensitivity and decreased frequency of VCD episodes.?  Demonstrate appropriate vocal hygiene including, but not limited to, adequate hydration and integrating behavioral and dietary changes for GERD into their daily life.?   Recoordinate respiratory and laryngeal musculature to resume activities of daily living.?   Patient will perform advanced breathing exercises with the respiratory  focusing on inspiratory muscle strengthening with minimal assistance 90% of the time.  Patient will demonstrate abdominal focused breathing technique in targeted exercises with minimal assistance 90% of the time.?   Patient will demonstrate abdominal focused breathing technique with physical activity with minimal assistance 90% of the time.  Gradually reduce inappropriate and excessive inhaler use  Demonstrate a 50% reduction  in Dyspnea Index score  Patient will express satisfaction with their breathing and their ability to participate in social, personal, and work/school functions without limitation    Chronic Cough  Decrease cough in all activities of daily living using compensation strategies  Independently implement a cough suppression strategy when cough trigger is sensed 90% of the time.?   Decrease the number of coughs per day by 50%   Demonstrate increased tolerance of a chemical and/or environmental irritant as evidenced by increased exposure (decreased proximity and/or increased strength) to that irritant by 50%   Demonstrate a 50% reduction in Cough Severity Index score  Patient will express satisfaction with their coughing and their ability to participate in social, personal, and work/school functions without limitation     Voice  Coordinate phonatory and respiratory subsystems, demonstrating adequate independence for activities of daily communication   Decrease extrinsic laryngeal muscle activity during communication events   Improve voice quality in all activities of daily living using, as measured by a minimum of a 50% point reduction on the Voice Handicap Index-10 or Singing VHI  Demonstrate appropriate vocal hygiene including, but not limited to, adequate hydration, avoiding vocal abuse, integrating behavioral and dietary changes for GERD into their daily life?.   Incorporate behaviors to reduce irritation and promote laryngeal healing and prevent recurrence.?   Implement behavioral strategies to reduce laryngopharyngeal reflux. ?   Independently maintain a forward focus voice placement 90% of the time during conversational speech.  Independently perform the Vocal Function Exercises, rebalancing respiration, phonation, and resonance 90% of the time  Perform High Level Phonation and Pitch Range Navigation Exercises independently 90% of the time  Patient will express satisfaction with their voice quality and function and  their ability to participate in social, personal, and work/school functions without limitation    Perioperative  Adhere to complete vocal rest recommendations in the acute post-operative period  Gradually increase voice use following the complete voice rest period  Adhere to vocal hygiene recommendations including smoking cessation, hydration, avoidance of caffeine and alcohol, and behavioral reflux precautions  Perform rescue breathing techniques several times daily  Incorporate SOVT exercises gradually in the immediate post-operative period   Participate in pre- and post-operative voice therapy      Billed Procedures:    Laryngoscopy without stroboscopy 71393  Laryngoscopy with stroboscopy 16943  Individual speech therapy session 66332  Perceptual voice assessment 03441  Laryngeal function studies 58988 with 52 modifier  Assessment and treatment time: *** minutes  Chart review, interpretation of testing, documentation preparation, etc: 1 minutes      Thank you for allowing me to participate in this patient's care,    Kassandra Coleman MS CCC-SLP  Speech-Language Pathologist  [facility]  Department of Otolaryngology - Head and Neck Surgery  [facility]  [email or web address]  Direct: [phone]  [phone]    *This note may have been completed using [id number] dictation software, so errors may exist. Please contact me for clarification if needed*

## 2025-01-28 ENCOUNTER — TELEPHONE (OUTPATIENT)
Dept: PHARMACY | Facility: OTHER | Age: 65
End: 2025-01-28
Payer: COMMERCIAL

## 2025-01-28 NOTE — TELEPHONE ENCOUNTER
QUINN Recruitment: [facility] insurance     Referral outreach attempt #1 on January 28, 2025      Outcome: Patient cannot talk. He's going to have surgery in a couple of weeks and will need to rest his voice after. He requests we call back in a couple of months.     Da Aguirre [facility]

## 2025-01-29 ENCOUNTER — TELEPHONE (OUTPATIENT)
Dept: OTOLARYNGOLOGY | Facility: CLINIC | Age: 65
End: 2025-01-29

## 2025-01-29 ENCOUNTER — OFFICE VISIT (OUTPATIENT)
Dept: FAMILY MEDICINE | Facility: CLINIC | Age: 65
End: 2025-01-29
Payer: COMMERCIAL

## 2025-01-29 VITALS
HEART RATE: 107 BPM | OXYGEN SATURATION: 97 % | WEIGHT: 238.5 LBS | RESPIRATION RATE: 16 BRPM | TEMPERATURE: 97.3 F | DIASTOLIC BLOOD PRESSURE: 83 MMHG | BODY MASS INDEX: 34.14 KG/M2 | SYSTOLIC BLOOD PRESSURE: 139 MMHG | HEIGHT: 70 IN

## 2025-01-29 DIAGNOSIS — Z01.818 PREOP GENERAL PHYSICAL EXAM: Primary | ICD-10-CM

## 2025-01-29 DIAGNOSIS — I10 HYPERTENSION GOAL BP (BLOOD PRESSURE) < 140/90: Chronic | ICD-10-CM

## 2025-01-29 DIAGNOSIS — J38.7 LARYNGEAL MASS: ICD-10-CM

## 2025-01-29 DIAGNOSIS — E11.9 TYPE 2 DIABETES MELLITUS WITHOUT COMPLICATION, WITHOUT LONG-TERM CURRENT USE OF INSULIN (H): ICD-10-CM

## 2025-01-29 LAB
BASOPHILS # BLD AUTO: 0.1 10E3/UL (ref 0–0.2)
BASOPHILS NFR BLD AUTO: 1 %
EOSINOPHIL # BLD AUTO: 0.1 10E3/UL (ref 0–0.7)
EOSINOPHIL NFR BLD AUTO: 2 %
ERYTHROCYTE [DISTWIDTH] IN BLOOD BY AUTOMATED COUNT: 15.3 % (ref 10–15)
EST. AVERAGE GLUCOSE BLD GHB EST-MCNC: 137 MG/DL
HBA1C MFR BLD: 6.4 % (ref 0–5.6)
HCT VFR BLD AUTO: 36.2 % (ref 40–53)
HGB BLD-MCNC: 11.7 G/DL (ref 13.3–17.7)
IMM GRANULOCYTES # BLD: 0 10E3/UL
IMM GRANULOCYTES NFR BLD: 0 %
LYMPHOCYTES # BLD AUTO: 2.3 10E3/UL (ref 0.8–5.3)
LYMPHOCYTES NFR BLD AUTO: 30 %
MCH RBC QN AUTO: 29 PG (ref 26.5–33)
MCHC RBC AUTO-ENTMCNC: 32.3 G/DL (ref 31.5–36.5)
MCV RBC AUTO: 90 FL (ref 78–100)
MONOCYTES # BLD AUTO: 1 10E3/UL (ref 0–1.3)
MONOCYTES NFR BLD AUTO: 12 %
NEUTROPHILS # BLD AUTO: 4.2 10E3/UL (ref 1.6–8.3)
NEUTROPHILS NFR BLD AUTO: 55 %
PLATELET # BLD AUTO: 288 10E3/UL (ref 150–450)
RBC # BLD AUTO: 4.03 10E6/UL (ref 4.4–5.9)
WBC # BLD AUTO: 7.6 10E3/UL (ref 4–11)

## 2025-01-29 PROCEDURE — 99214 OFFICE O/P EST MOD 30 MIN: CPT | Performed by: FAMILY MEDICINE

## 2025-01-29 PROCEDURE — 83036 HEMOGLOBIN GLYCOSYLATED A1C: CPT | Performed by: FAMILY MEDICINE

## 2025-01-29 PROCEDURE — 85025 COMPLETE CBC W/AUTO DIFF WBC: CPT | Performed by: FAMILY MEDICINE

## 2025-01-29 PROCEDURE — 36415 COLL VENOUS BLD VENIPUNCTURE: CPT | Performed by: FAMILY MEDICINE

## 2025-01-29 PROCEDURE — 80053 COMPREHEN METABOLIC PANEL: CPT | Performed by: FAMILY MEDICINE

## 2025-01-29 ASSESSMENT — PAIN SCALES - GENERAL: PAINLEVEL_OUTOF10: NO PAIN (0)

## 2025-01-29 NOTE — PROGRESS NOTES
Preoperative Evaluation  [facility]  [address] [place] [address]  [place] [address]  Phone: [phone]  Primary Provider: Finesse Beal MD  Pre-op Performing Provider: Finesse Beal MD  Jan 29, 2025 1/29/2025   Surgical Information   What procedure is being done? bioposy throat    Facility or Hospital where procedure/surgery will be performed: [facility]    Who is doing the procedure / surgery? dr hoffman    Date of surgery / procedure: feb 13    Time of surgery / procedure: 12    Where do you plan to recover after surgery? at home with family        Proxy-reported     Fax number for surgical facility: Note does not need to be faxed, will be available electronically in Epic.        Kamryn Vega is a 64 year old, presenting for the following:  Pre-Op Exam          1/29/2025     2:00 PM   Additional Questions   Roomed by Elisa Chandra     Via the [employer], the patient has reported the following services have been completed -Eye Exam: [place] 2023-03-20, this information has not been sent to the abstraction team.  HPI related to upcoming procedure: 64 year old male with long history of laryngeal problems including cancer/ scar tissue etc. No to have procedure per ENT for laryngeal mass.         1/29/2025   Pre-Op Questionnaire   Have you ever had a heart attack or stroke? No    Have you ever had surgery on your heart or blood vessels, such as a stent placement, a coronary artery bypass, or surgery on an artery in your head, neck, heart, or legs? No    Do you have chest pain with activity? No    Do you have a history of heart failure? No    Do you currently have a cold, bronchitis or symptoms of other infection? No    Do you have a cough, shortness of breath, or wheezing? No    Do you or anyone in your family have previous history of blood clots? No    Do you or does anyone in your family have a serious bleeding problem such as prolonged  bleeding following surgeries or cuts? No    Have you ever had problems with anemia or been told to take iron pills? No    Have you had any abnormal blood loss such as black, tarry or bloody stools? No    Have you ever had a blood transfusion? No    Are you willing to have a blood transfusion if it is medically needed before, during, or after your surgery? (!) yes    Have you or any of your relatives ever had problems with anesthesia? No    Do you have sleep apnea, excessive snoring or daytime drowsiness? (!) UNKNOWN    Do you have any artifical heart valves or other implanted medical devices like a pacemaker, defibrillator, or continuous glucose monitor? No    Do you have artificial joints? No    Are you allergic to latex? No        Proxy-reported     Health Care Directive  Patient does not have a Health Care Directive; we discussed it and I encouraged him to pursue this.     Preoperative Review of   Patient not on any controlled substance            Patient Active Problem List    Diagnosis Date Noted    Morbid obesity (H) 04/12/2022     Priority: Medium    Obesity, unspecified 11/06/2018     Priority: Medium    Chronic cough 12/12/2017     Priority: Medium    Gout, unspecified cause, unspecified chronicity, unspecified site 10/06/2017     Priority: Medium    Obstructive sleep apnea- moderate- severe (AHI 25, 82)      Priority: Medium     Initial sleep study 2000s, not available for review.  Sleep study [facility] 5/2012 (216#)  AHI 25, .9, low O2 32% (probably artifact by hypnogram). True low O2 probably upper 70s on CPAP during REM. CPAP 8 cm with fragmented sleep during supine REM.   Study Date: 3/14/2017- (227.0 lbs)- Titration complicated by high leaks on several masks. Most of titrations done for RERAs and flow limitations. The final  pressure achieved was 15 cmH2O with a residual AHI of 0.  REM supine seen at 12 cm with good control of events. Lowest oxygen saturation 84.1%.  Time spent less  than or equal to 88% was 0.9 minutes. PLM index 72.6.  The PLM Arousal Index was 24. Cyclic alternating pattern of arousal with and without whole body movements and leg movements seen.   9/29/2021 [facility] Study (225 #) - TST 82 minutes AHI 82.0, .6,  Supine AHI n/a, REM AHI n/a, Low O2 79.8%, Time Spent <=88% 4.8 minutes / Time Spent <=89% 6.8 minutes.      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 Less than 50% diameter stenosis of the left ICA relative to the distal ICA diameter.      SAMARIA (generalized anxiety disorder) 06/13/2013     Priority: Medium    Hypertension goal BP (blood pressure) < 140/90 11/30/2012     Priority: Medium    Hyperlipidemia LDL goal <70 05/04/2012     Priority: Medium    Low ferritin 03/30/2017     Priority: Low    Periodic limb movements of sleep 03/29/2017     Priority: Low    Benign prostatic hypertrophy 03/07/2017     Priority: Low      Past Medical History:   Diagnosis Date    Diabetes (H)     2yr    Hypertension     Lesion of larynx 12/12/2017    Leokoplakia. s/p Flexible fiberoptic transnasal laryngoscopy with endoscopic injection, laryngeal biopsy    Multinodular goiter (nontoxic) 06/10/2013    Sleep apnea 8yr    Squamous cell carcinoma of skin, unspecified      Past Surgical History:   Procedure Laterality Date     COLONOSCOPY  06/22/2012    Repeat Colonoscopy in 10 yrs.     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    HEAD & NECK SURGERY  01/25/2011    callous removal from throat    LARYNGOSCOPY WITH BIOPSY(IES)  01/25/2011    LARYNGOSCOPY WITH BIOPSY(IES) N/A 02/09/2017    Procedure: LARYNGOSCOPY WITH BIOPSY(IES);  Surgeon: Mel Fournier MD;  Location: [facility] OR    LARYNGOSCOPY WITH MICROSCOPE N/A 02/09/2017    Procedure: LARYNGOSCOPY WITH MICROSCOPE;  Surgeon: Mel Fournier MD;  Location: [facility] OR    LASER CO2 LARYNGOSCOPY, COMPLEX  06/27/2013    Procedure: LASER CO2 LARYNGOSCOPY, COMPLEX;  Micro Direct Laryngoscopy With Micro Flap Excision Of Lesion Lumenis Co2 Laser ;  Surgeon: Mel Fournier MD;  Location: [facility] OR    LASER CO2 LESION ORAL N/A 02/09/2017    Procedure: LASER CO2 LESION ORAL;  Surgeon: Mel Fournier MD;  Location: [facility] OR    ORTHOPEDIC SURGERY  03/2016    left tibia orif with abbi 2-2016    VASECTOMY  02/2003     Current Outpatient Medications   Medication Sig Dispense Refill    ACCU-CHEK GUIDE test strip TEST ONCE DAILY 100 strip 1    alcohol swab prep pads USE TO SWAB AREA OF INJECTION/OLIMPIA 100 each 1    allopurinol (ZYLOPRIM) 300 MG tablet TAKE 1 TABLET(300 MG) BY MOUTH DAILY 90 tablet 1    amLODIPine (NORVASC) 2.5 MG tablet Take 1 tablet (2.5 mg) by mouth daily. 90 tablet 1    aspirin 81 MG tablet Take 1 tablet by mouth daily.      atorvastatin (LIPITOR) 20 MG tablet Take 1 tablet (20 mg) by mouth daily. 90 tablet 1    blood glucose (ACCU-CHEK GUIDE) test strip Use to test blood sugar 1 times daily or as directed. 100 strip 11    blood glucose calibration (NO BRAND SPECIFIED) solution To accompany: Blood Glucose Monitor Brands: per insurance. 1 Bottle 3    Blood Glucose Monitoring Suppl (ACCU-CHEK GUIDE ME) w/Device KIT USE TO TEST ONCE DAILY 1 kit 0    calcipotriene (DOVONEX) 0.005 % external cream Mix with efudex and apply twice daily to hands and arms for 12 days  60 g 3    cetirizine (ZYRTEC) 10 MG tablet Take 1 tablet (10 mg) by mouth daily. 90 tablet 3    docusate sodium (DSS) 100 MG capsule Take 100 mg by mouth at bedtime      fluorouracil (EFUDEX) 5 % external cream Mix with dovonex and apply twice daily to hands and arms for 12 days 40 g 1    hydrochlorothiazide (HYDRODIURIL) 50 MG tablet Take 1 tablet (50 mg) by mouth daily. 90 tablet 1    hydrocortisone (CORTAID) 1 % external ointment Apply topically 2 times daily as needed 30 g 1    insulin pen needle (31G X 6 MM) 31G X 6 MM miscellaneous Use one pen needle daily or as directed. ( for Victoza ) 90 each 3    ketoconazole (NIZORAL) 2 % external cream Apply daily to affected area on face, armpits, groin. 60 g 1    ketoconazole (NIZORAL) 2 % external shampoo Leave on face, beard for 3-5 minutes then rinse. Use 2-3 times weekly. 120 mL 3    lisinopril (ZESTRIL) 30 MG tablet Take 30 mg by mouth daily.      losartan (COZAAR) 100 MG tablet Take 1 tablet (100 mg) by mouth daily. 90 tablet 1    metroNIDAZOLE (METROCREAM) 0.75 % external cream Apply topically 2 times daily as needed (to rosacea areas) 45 g 1    Misc. Devices (SUPPOSITORY MOLD 2GM) MISC 1 suppository every 12 hours Nifedipine 4 mg suppository, one rectally every 12 hours 100 each 4    Multiple Vitamin (DAILY MULTIVITAMIN PO) Take by mouth daily      nystatin (MYCOSTATIN) 653517 UNIT/GM external powder Apply topically 2 times daily Prevention of rash 60 g 0    omeprazole (PRILOSEC) 20 MG DR capsule TAKE 1 CAPSULE(20 MG) BY MOUTH TWICE DAILY 180 capsule 1    order for DME Autocpap 10-16 cm 1 Units 0    PARoxetine (PAXIL) 40 MG tablet TAKE 1 TABLET(40 MG) BY MOUTH EVERY MORNING 90 tablet 1    polyethylene glycol (MIRALAX) 17 GM/Dose powder Take 17 g by mouth daily as needed for constipation 850 g 3    psyllium 0.52 G capsule Take 1 capsule (0.52 g) by mouth daily 100 capsule 3    Semaglutide, 1 MG/DOSE, (OZEMPIC) 4 MG/3ML pen Inject 1 mg subcutaneously every 7  "days. 3 mL 2    tadalafil (ADCIRCA/CIALIS) 20 MG tablet 1/2 to 1 po as needed up to twice per week for erectile dysfunction 10 tablet 1    tamsulosin (FLOMAX) 0.4 MG capsule Take 2 capsules (0.8 mg) by mouth daily. 180 capsule 1    thin (NO BRAND SPECIFIED) lancets Use with lanceting device. To accompany: Blood Glucose Monitor Brands: per insurance. 100 each 11    triamcinolone (KENALOG) 0.1 % external cream Apply topically 2 times daily as needed for irritation 30 g 0   Note patient is on losartan, not lisinopril    No Known Allergies     Social History     Tobacco Use    Smoking status: Former     Current packs/day: 0.00     Average packs/day: 1 pack/day for 25.0 years (25.0 ttl pk-yrs)     Types: Cigarettes     Start date: 1975     Quit date: 2000     Years since quittin.4     Passive exposure: Past    Smokeless tobacco: Never   Substance Use Topics    Alcohol use: Not Currently     Alcohol/week: 0.0 standard drinks of alcohol     Comment: rare       History   Drug Use No             Review of Systems  Constitutional, HEENT, cardiovascular, pulmonary, GI, , musculoskeletal, neuro, skin, endocrine and psych systems are negative, except as otherwise noted.  No recent illnesses  Swallowing okay and breathing okay  Voice has been off    No cardiac history     No chest pain/ breathing problems    No bleeding/ clotting disorders    No anesthesia problems    Taking miralax about twice a week for bowels    Sugar good ( last hemoglobin a1c 2024 6.6 )    Objective    /83 (BP Location: Right arm, Patient Position: Chair, Cuff Size: Adult Large)   Pulse 107   Temp 97.3  F (36.3  C) (Temporal)   Resp 16   Ht 1.778 m (5' 10\")   Wt 108.2 kg (238 lb 8 oz)   SpO2 97%   BMI 34.22 kg/m     Estimated body mass index is 34.22 kg/m  as calculated from the following:    Height as of this encounter: 1.778 m (5' 10\").    Weight as of this encounter: 108.2 kg (238 lb 8 oz).  Physical Exam  GENERAL: alert " and no distress  EYES: Eyes grossly normal to inspection, PERRL and conjunctivae and sclerae normal  HENT: ear canals and TM's normal, nose and mouth without ulcers or lesions  NECK: no adenopathy, no asymmetry, masses, or scars  RESP: lungs clear to auscultation - no rales, rhonchi or wheezes  CV: regular rate and rhythm, normal S1 S2, no S3 or S4, no murmur, click or rub, no peripheral edema  ABDOMEN: soft, nontender, no hepatosplenomegaly, no masses and bowel sounds normal  MS: no gross musculoskeletal defects noted, no edema  SKIN: no suspicious lesions or rashes  NEURO: Normal strength and tone, mentation intact and speech normal  PSYCH: mentation appears normal, affect normal/bright    Recent Labs   Lab Test 08/26/24  1109 08/07/24  0942 03/01/24  1305   HGB 11.5*  --  11.7*     --  272     --  138   POTASSIUM 3.8  --  4.4   CR 0.90 1.0 0.75   A1C 6.6*  --  6.1*        Diagnostics    Labs pending    See echo  from last year       ASSESSMENT / PLAN:  (Z01.818) Preop general physical exam  (primary encounter diagnosis)  Comment: labs pending;  patient should be okay for procedure.  Has had quite a few of this type of procedure in past   Plan: as above     (J38.7) Laryngeal mass  Comment: to have ent procedure   Plan: as above    (E11.9) Type 2 diabetes mellitus without complication, without long-term current use of insulin (H)  Comment: only diabetes med is ozempic   Plan: hold this for over one week prior to procedure    (I10) Hypertension goal BP (blood pressure) < 140/90  Comment: blood pressure okay but good to hold hydrochlorothiazide and losartan the am of procedure.  Do take amlodipine that am.    Plan:  as above     Hold aspirin one week prior          I reviewed the patient's medications, allergies, medical history, family history, and social history.    Finesse Beal MD      Revised Cardiac Risk Index (RCRI)  The patient has the following serious cardiovascular risks for perioperative  complications:   - No serious cardiac risks = 0 points     RCRI Interpretation: 0 points: Class I (very low risk - 0.4% complication rate)         Signed Electronically by: Finesse Beal MD  A copy of this evaluation report is provided to the requesting physician.

## 2025-01-29 NOTE — PATIENT INSTRUCTIONS
Hold Ozempic for over one week ( don't take the Saturday before )    Hold aspirin one week prior    Hold hydrochlorothiazide and losartan the day of procedure    Do take amlodipine that morning with sip of water     Follow instructions from surgery center about not eating and drinking before     We will send you lab results

## 2025-01-29 NOTE — TELEPHONE ENCOUNTER
Spoke with patient to schedule 1 rsv or rsvv visit with a voice speech language pathologist prior to procedure upcoming with Dr. Fournier on 2/13. Pt is also scheduled for post procedure voice therapy for 3 RSV or RSVV appointments 2 weeks apart starting the week of February 17th. Pt requested link be texted to him. Sent message to voice SLPs to inform of 2/27 start date vs ideal.  Deedee Healy on 1/29/2025 at 9:35 AM

## 2025-01-30 LAB
ALBUMIN SERPL BCG-MCNC: 4.1 G/DL (ref 3.5–5.2)
ALP SERPL-CCNC: 68 U/L (ref 40–150)
ALT SERPL W P-5'-P-CCNC: 52 U/L (ref 0–70)
ANION GAP SERPL CALCULATED.3IONS-SCNC: 10 MMOL/L (ref 7–15)
AST SERPL W P-5'-P-CCNC: 55 U/L (ref 0–45)
BILIRUB SERPL-MCNC: 0.5 MG/DL
BUN SERPL-MCNC: 11 MG/DL (ref 8–23)
CALCIUM SERPL-MCNC: 9.8 MG/DL (ref 8.8–10.4)
CHLORIDE SERPL-SCNC: 96 MMOL/L (ref 98–107)
CREAT SERPL-MCNC: 0.8 MG/DL (ref 0.67–1.17)
EGFRCR SERPLBLD CKD-EPI 2021: >90 ML/MIN/1.73M2
GLUCOSE SERPL-MCNC: 110 MG/DL (ref 70–99)
HCO3 SERPL-SCNC: 29 MMOL/L (ref 22–29)
POTASSIUM SERPL-SCNC: 4 MMOL/L (ref 3.4–5.3)
PROT SERPL-MCNC: 9.3 G/DL (ref 6.4–8.3)
SODIUM SERPL-SCNC: 135 MMOL/L (ref 135–145)

## 2025-01-30 NOTE — RESULT ENCOUNTER NOTE
The liver tests ( AST and ALT ) are better.     Diabetes test ( hemoglobin a1c ) is fine.    Other labs are stable.    Okay for procedure.    Finesse Beal MD

## 2025-02-03 ENCOUNTER — TELEPHONE (OUTPATIENT)
Dept: FAMILY MEDICINE | Facility: CLINIC | Age: 65
End: 2025-02-03
Payer: COMMERCIAL

## 2025-02-03 NOTE — TELEPHONE ENCOUNTER
Pt calling     He was wondering if his labs were ok- let him know that his labs are considered stable comparatively.    He also said that he had an episode when he stood up from eating a big meal, he got a bit lightheaded and had to sit back down.  Explained what ortho hypotension was, ways to avoid this and if he experiences symptoms like this again along with headache, chest pain, difficulty breathing, he should call us right away.      Pt verbalized understanding      EDGAR Freed    Triage Nurse  [facility]

## 2025-02-04 ENCOUNTER — VIRTUAL VISIT (OUTPATIENT)
Dept: OTOLARYNGOLOGY | Facility: CLINIC | Age: 65
End: 2025-02-04
Payer: COMMERCIAL

## 2025-02-04 DIAGNOSIS — J38.3 VOCAL PROCESS GRANULOMA: ICD-10-CM

## 2025-02-04 DIAGNOSIS — R49.0 DYSPHONIA: Primary | ICD-10-CM

## 2025-02-04 DIAGNOSIS — Z85.21 HX OF LARYNGEAL CANCER: ICD-10-CM

## 2025-02-04 NOTE — PROGRESS NOTES
"Gary Iyer is a 64 year old male who is being evaluated via a billable video visit.      Gary has been notified and verbally consented to the following:   This video visit will be conducted between you and your provider.  Patient has opted to conduct today's video visit vs an in-person appointment.   Video visits are billed at different rates depending on your insurance coverage. Please reach out to your insurance provider with any questions.   If during the course of the call the provider feels the appointment is not appropriate, you will not be charged for this service.  Provider has received verbal consent for billable virtual visit from the patient? Yes  Will anyone else be joining your video visit? No    Call initiated at: 10:00   Type of Visit Platform Used: [employer] Video  Location of provider: [facility]  Location of patient: [facility]  THERAPY NOTE (CPT 29512)    Patient: Gary Iyer  Date of Service: 2/4/2025  Visit / Treatment number: 3/ 2  Certification Period: 12/6/24 - 3/6/25  Referring physician: Dr. Mel Fournier  Impressions from most recent evaluation:  Patient was seen most recently by Kassandra Coleman CCC-SLP from 1/27/2025.  At that time additional course of speech therapy was felt to be warranted relative to worsening in his granuloma.  Please see Kassandra's note from that day for full details.    SUBJECTIVE:  Since the patient's last session, they report the following:   Overall symptoms are about the same  Surgery is scheduled for 2/13/2025    OBJECTIVE:  PATIENT REPORTED MEASURES:  Patient Specific Goal Metrics:       No data to display                *see end of note for standardized measures*    THERAPEUTIC ACTIVITIES      Post surgical protocol  2-4 days of total voice rest (potentially more as determined at time of surgery)  Strategies for implementation discussed  \"trial run\" recommended in advance of surgery to figure out hurdles before-hand  Patient " "states that there no one outside of his family that he will have to make aware of voice rest  Discussion of constipation  Continue fiber  Timing of miralax  Avoiding coughing and throat clearing  Suppression techniques  us  Use of gentle non-whispered voice, with emphasis on trying to maintain reduced effort rather than \"forcing\" a clear quality  Patient had some difficulty describing what he had learned from other clinicians.  Focus on more flowing voice\" was what he could remember from his last sessions with Dr. Clark.  Cues for flowing and calm quality were most facilitating today  Ramp up schedule following voice rest  Alternate forms of communcation  Wound-healing / pliability exercises  Semi-Occluded Vocal Tract (SOVT) exercises instructed to reduce laryngeal tension, promote vocal fold pliability, and coordinate respiration and phonation  Straw with water resistance was found to be most facilitating  Greatly decreased strain was noted in these contexts.  Sustained phonation, and voice vs. voiceless productions used to promote easy voicing and raise awareness of laryngeal tension  Ascending and descending glides utilized to promote vocal fold pliability  A schedule for their use in conjunction with voice ramp up was presented.  Education regarding phonotraumatic behaviors and instruction of strategies and techniques to avoid their use  Coughing  Valsalva  Whispering  Voice use outside of ramp-up schedule  Vocal hygiene education  Systemic hydration  Topical hydration  A regimen for home practice was instructed.  I provided a MyChart message of today's therapeutic activities to facilitate practice.    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 Gary had a productive session of therapy " "today focusing on clear postsurgical regimen and low impact voicing.  He had some difficulty recalling strategies that were provided in the past, and the importance of tying accuracy to his own perception was emphasized encouraging him to use descriptions in English or Horace as seems most applicable.  Copies of the perioperative recommendations were sent via [employer] as well as physical male to improve accuracy of practice as an audio recording of semi-occluded vocal tract exercises were sent with the [employer] message.    PLAN: I will see Gary in approximately 1 week postoperatively at which point we will gauge and advance perioperative regimen.   For practice goals see AVS.     TOTAL SERVICE TIME: 45 minutes  TREATMENT (88625)  NO CHARGE FACILITY FEE (87776)    Travis Mcdaniels M.M., M.A., CCC-SLP  Speech-Language Pathologist  Certificate of Vocology  [phone]    *this report was created in part through the use of computerized dictation software, and though reviewed following completion, some typographic errors may persist.  If there is confusion regarding any of this notes contents, please contact me for clarification.*    Patient Supplied Answers To Last 2 VHI Questionnaires      11/16/2020     1:00 PM 8/15/2022     2:00 PM   Voice Handicap Index (VHI-10)   My voice makes it difficult for people to hear me 0 0   People have difficulty understanding me in a noisy room 1 0   My voice difficulties restrict my personal and social life.  1 0   I feel left out of conversations because of my voice 0 0   My voice problem causes me to lose income 1 0   I feel as though I have to strain to produce voice 1 0   The clarity of my voice is unpredictable 0 0   My voice problem upsets me 0 0   My voice makes me feel handicapped 1 0   People ask, \"What's wrong with your voice?\" 0 0   VHI-10 5 0        Patient Supplied Answers To Last 2 CSI Questionnaires      3/1/2019     7:39 AM 8/15/2022     2:00 PM   Cough Severity Index " (CSI)   My cough is worse when I lie down 0  0   My coughing problem causes me to restrict my personal and social life 0  0   I tend to avoid places because of my cough problem 0  0   I feel embarrassed because of my coughing problem 0  0   People ask, ''What's wrong?'' because I cough a lot 0  0   I run out of air when I cough 1  0   My coughing problem affects my voice 0  0   My coughing problem limits my physical activity 1  0   My coughing problem upsets me 1  0   People ask me if I am sick because I cough a lot 0  0   CSI Score 3 0       Proxy-reported        Patient Supplied Answers To Last 2 EAT Questionnaires      3/1/2019     7:40 AM 8/15/2022     2:00 PM   Eating Assessment Tool (EAT-10)   My swallowing problem has caused me to lose weight 1  0   My swallowing problem interferes with my ability to go out for meals 0  0   Swallowing liquids takes extra effort 1  0   Swallowing solids takes extra effort 1  0   Swallowing pills takes extra effort 1  0   Swallowing is painful 0  0   The pleasure of eating is affected by my swallowing 0  0   When I swallow food sticks in my throat 0  0   I cough when I eat 1  0   Swallowing is stressful 0  0   EAT-10 5 0       Proxy-reported        Patient Supplied Answers to Dyspnea Index Questionnaire:       No data to display

## 2025-02-04 NOTE — PATIENT INSTRUCTIONS
Emiliano Vega,    It was a pleasure to see you today.  I have the exercise sheets listed below, and will be sending you a physical copy of this message later today.  The [employer] message also includes an audio recording of me doing the straw type exercises.  For you the biggest goal is avoiding any sense of effort when you use your voice, avoiding coughing and throat clearing as well as bearing down that can cause the surgical area to take extra trauma.  Practiced these things over the next 9 days so that you feel confident you can use them, and if you have any questions please reach out via [employer] to me!    -Zack        _______________________________________________________________________________  Post Surgical Voice Care  This information details the specific techniques you will need to use to change your activity in order to ensure optimal healing after your vocal fold surgery.  Healing after surgery may take several weeks to months.  Your ability to increase your voice use will be determined by the results of your ongoing laryngeal examinations, and your visits with your speech-language pathologist.            Voice Use Protocol Following Surgery:  1) _~5__ days of total voice rest (number of days determined definitively at the time of your procedure)  2) Avoid all high-impact vocal fold behaviors during voice rest and the gradual increase in voice use.  In addition to following the protocol above, this means:  NO heavy lifting, grunting, or hard bearing down (nothing that would make your face red by pushing hard). To do this, we need to push the vocal folds together very tightly, which you will want to avoid while there is a wound on your vocal fold after surgery. *If you have to pick u-p something heavy just exhale on a  shhh  sound on the way out!  Keep your constipation in check.  Use your fiber and intermittent miralax as you have been (these medications need you to stay well hydrated so drink plenty of water  with them!)  NO whispering or whistling. These are also high-impact activities on your vocal folds, and may complicate the healing process.  NO coughing, throat clearing, sneezing, etc. These activities are produced by squeezing the vocal folds together to build up pressure in the lungs and then blasting the vocal folds open again.  This is traumatic to the vocal folds, particularly when you are trying to heal after surgery.  TO AVOID COUGHING/THROAT CLEARING:  Drink plenty of water  Use a humidifier over night   Breathe in steam   sit in the bathroom with the shower on hot  boil a pot of water and pull it off the stove drape a towel over it and breathe in  Gargle salt water 2x per day or as needed if helpful (see recipe later in this list)  IF you still find yourself needing to cough suppress and substitute!  Practice these techniques 2 times per day like a fire drill until you know them like the back of your hand.   Anytime you feel an itchy throat sensation and the urge to cough or clear your throat try any or all of them.    Sip of water and swallow (hot, cold, carbonated)  Hard Swallow (tip your chin down and swallow hard!)  Lozenges (avoid menthol), Gum, Hard Candy (anything that stimulates saliva)  Gargle  Sniff or pursed lip inhale and exhale on  Shhh  like shushing a baby or like blowing at a candle  Soft cough (AKA blowing out the candle with one BIG puff of air) then  swallow  Good old fashiond distraction! Count to 15 silently, say the alphabet backwards silently, etc.   3) Following total voice rest: minimal voice use that gradually increases and tissue mobilization  Vocal Ramp-up! (assuming 3 days of voice rest)   DAY 4:  3 minutes of voice use per hour  DAY 5:  5 minutes of voice use per hour   DAY 6: 7 minutes of voice use per hour  DAY 7: 10 minutes of voice use per hour  DAY 8: 15 minutes of voice use per hour  Continue to increase this way until you come for your follow-up appointment  1 minute of  "the periods of voice use above should be used for gentle mobilization exercises ( cup and bubble ) you should do this AT LEAST 5 times per day. See the description below  The remainder of the periods of voice use you should use a  calm, smooth  \"going with the Flow like you learned from DD  When you speak, maintain this easy, relaxed voicing at a soft volume  It should be EASY in your throat, with no tightness or strain  Focus on the easy feeling in your throat rather than the sound.  If you feel discomfort or your voice becomes more hoarse, fatigues, or becomes more effortful with use, back off and rest your voice    TISSUE MOBILIZATION / CUP AND BUBBLE EXERCISES  WHY:  Though these exercises seems (and feels) silly they are helpful for a number of reasons.  First, they make you use your air generously and consistently, helping you to coordinate your breath and your voice.  Second, they lengthen and narrow the vocal tract with the straw.  This narrowing (or semi-occlusion in scientific terms) creates back pressure in your throat which has been shown to help the vocal folds vibrate more easily and reduce how hard some of the other muscles are squeezing.    To practice breathing: Start off in a forward leaning position with your elbows on your knees.  Feel the expansion into your belly and ribcage as you inhale and gentle contraction in the same area as you exhale.  Let yourself sit back up and maintain that pattern.    HOW:    Straw with Water - Fill the cup (or bottle) about 2 inches full of water. Blow bubbles through the straw while keeping your voice on. (kind of like making an  ooooo  sound through straw).Keep the water bubbling the whole time. That means your air is moving consistently.      Straw Phonation - make sound through the straw (like it's a kazoo).  Make sure you are using your air freely.  You can hold your hand in front of the straw to test, and you should be able to feel the air moving.    Or you " can use a tongue or lip bubble!    Feel how open and relaxed your throat feels when you practice these sounds. If the bubbling or air stops or it gets tight, don't sweat it.  Just breath, relax, and start again.  Across all the exercises make sure you are getting a nice low breath and feeling the steady inward motion of low abdominal muscles when making sound.    Aren't sure if you are doing it right? Ask yourself these three questions:  Does it feel easy?  Is the airflow consistent?  Do you feel that forward buzz?      What sounds to make:    Start off with just bubbles to give yourself a point of comparison for how little you need to work in your throat    Single pitches - use any comfortable pitch and sustain the note for as long as it feels free and easy, and your lips or tongue are bubbling.        Sighs - glide from high to low like you are sitting down in a comfortable chair after a long day of work.  The goal on this one is the low pitch, so don't worry about starting too high. This is about the low note        [place]gen - Start on a medium pitch and glide up just a bit and back down   The goal on this one is the high pitch. This is about the high note    Important Reminders:  1) Stay hydrated  Drink water until your urine is pale or clear  Use steam 1-2 times per day (heat up water in a bowl or pot, put a towel over your head and breathe in the steam)  Consider getting a humidifier for your bedroom  Gargle water 2x daily or more as feels good  Saline recipe:  6-8 oz glass   warm water   feel for right temperature (not too hot or cold)  warm enough to dissolve the salt    tsp. kosher salt, or sea salt   Additives in table salt can cause irritation/ discomfort.    tsp. baking soda  Tilt your head side to side  Wiggle your tongue around  Help the water get to all the nooks and crannies  2) Make plans to accommodate for your voice use restrictions following surgery.  Just like surgery on any other part of your  body, it may take many weeks/months for your vocal folds to fully heal.  If you had surgery on your knee, you wouldn't be able to walk for a while, and you would ease back into walking rather than running a marathon the first day you are able to use your knee again.  When you have surgery on your vocal folds, you won't be able to use your voice for a while, and you will have to ease back into using your voice rather than fully resuming your vocal activities straight away.  Writing, texting, and e-mail are good alternative forms of communication.  Try to minimize environmental noise when you are using your easy, soft voice.  Position yourself an arm's length distance away from people so it's easier for them to hear you.  Are you able to take time off from work?  Are there tasks you could do at work that don't require you to use your voice?  What are other ways you could accommodate for your voice use restrictions at work?  Where do you anticipate encountering problems in adhering to the post-surgery protocol?  What are potential solutions?  3) Make sure you have scheduled your follow-up speech therapy sessions BEFORE you go on voice rest.  You will see your speech-language pathologist at your first follow-up visit with your surgeon, but you will need further appointments, according to the schedule you determine at your pre-surgical speech therapy appointment.  Speech-Language Pathologist:    [email or web address]   [phone]

## 2025-02-04 NOTE — LETTER
2/4/2025       RE: Gary Iyer  [address]  [place] [address]     Dear Colleague,    Thank you for referring your patient, Gary Iyer, to the [facility]ICE [facility] [place] at [facility]. Please see a copy of my visit note below.    Gary Iyer is a 64 year old male who is being evaluated via a billable video visit.      Gary has been notified and verbally consented to the following:   This video visit will be conducted between you and your provider.  Patient has opted to conduct today's video visit vs an in-person appointment.   Video visits are billed at different rates depending on your insurance coverage. Please reach out to your insurance provider with any questions.   If during the course of the call the provider feels the appointment is not appropriate, you will not be charged for this service.  Provider has received verbal consent for billable virtual visit from the patient? Yes  Will anyone else be joining your video visit? No    Call initiated at: 10:00   Type of Visit Platform Used: [employer]  Location of provider: [facility]  Location of patient: [facility] VOICE [facility]  THERAPY NOTE (CPT 04446)    Patient: Gary Iyer  Date of Service: 2/4/2025  Visit / Treatment number: 3/ 2  Certification Period: 12/6/24 - 3/6/25  Referring physician: Dr. Mel Fournier  Impressions from most recent evaluation:  Patient was seen most recently by Kassandra Coelman CCC-SLP from 1/27/2025.  At that time additional course of speech therapy was felt to be warranted relative to worsening in his granuloma.  Please see Kassandra's note from that day for full details.    SUBJECTIVE:  Since the patient's last session, they report the following:   Overall symptoms are about the same  Surgery is scheduled for 2/13/2025    OBJECTIVE:  PATIENT REPORTED MEASURES:  Patient Specific Goal Metrics:       No data to display           "      *see end of note for standardized measures*    THERAPEUTIC ACTIVITIES      Post surgical protocol  2-4 days of total voice rest (potentially more as determined at time of surgery)  Strategies for implementation discussed  \"trial run\" recommended in advance of surgery to figure out hurdles before-hand  Patient states that there no one outside of his family that he will have to make aware of voice rest  Discussion of constipation  Continue fiber  Timing of miralax  Avoiding coughing and throat clearing  Suppression techniques  us  Use of gentle non-whispered voice, with emphasis on trying to maintain reduced effort rather than \"forcing\" a clear quality  Patient had some difficulty describing what he had learned from other clinicians.  Focus on more flowing voice\" was what he could remember from his last sessions with Dr. Clark.  Cues for flowing and calm quality were most facilitating today  Ramp up schedule following voice rest  Alternate forms of communcation  Wound-healing / pliability exercises  Semi-Occluded Vocal Tract (SOVT) exercises instructed to reduce laryngeal tension, promote vocal fold pliability, and coordinate respiration and phonation  Straw with water resistance was found to be most facilitating  Greatly decreased strain was noted in these contexts.  Sustained phonation, and voice vs. voiceless productions used to promote easy voicing and raise awareness of laryngeal tension  Ascending and descending glides utilized to promote vocal fold pliability  A schedule for their use in conjunction with voice ramp up was presented.  Education regarding phonotraumatic behaviors and instruction of strategies and techniques to avoid their use  Coughing  Valsalva  Whispering  Voice use outside of ramp-up schedule  Vocal hygiene education  Systemic hydration  Topical hydration  A regimen for home practice was instructed.  I provided a MyChart message of today's therapeutic activities to facilitate " practice.    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 Gary had a productive session of therapy today focusing on clear postsurgical regimen and low impact voicing.  He had some difficulty recalling strategies that were provided in the past, and the importance of tying accuracy to his own perception was emphasized encouraging him to use descriptions in English or Horace as seems most applicable.  Copies of the perioperative recommendations were sent via [employer] as well as physical male to improve accuracy of practice as an audio recording of semi-occluded vocal tract exercises were sent with the [employer] message.    PLAN: I will see Gary in approximately 1 week postoperatively at which point we will gauge and advance perioperative regimen.   For practice goals see AVS.     TOTAL SERVICE TIME: 45 minutes  TREATMENT (33615)  NO CHARGE FACILITY FEE (77299)    Travis Mcdaniels M.M., M.A., CCC-SLP  Speech-Language Pathologist  Certificate of Vocology  [phone]    *this report was created in part through the use of computerized dictation software, and though reviewed following completion, some typographic errors may persist.  If there is confusion regarding any of this notes contents, please contact me for clarification.*    Patient Supplied Answers To Last 2 VHI Questionnaires      11/16/2020     1:00 PM 8/15/2022     2:00 PM   Voice Handicap Index (VHI-10)   My voice makes it difficult for people to hear me 0 0   People have difficulty understanding me in a noisy room 1 0   My voice difficulties restrict my personal and social life.  1 0   I feel left out of conversations because of my voice 0 0   My voice problem causes me to lose income 1 0   I feel as though I have to strain to produce  "voice 1 0   The clarity of my voice is unpredictable 0 0   My voice problem upsets me 0 0   My voice makes me feel handicapped 1 0   People ask, \"What's wrong with your voice?\" 0 0   VHI-10 5 0        Patient Supplied Answers To Last 2 CSI Questionnaires      3/1/2019     7:39 AM 8/15/2022     2:00 PM   Cough Severity Index (CSI)   My cough is worse when I lie down 0  0   My coughing problem causes me to restrict my personal and social life 0  0   I tend to avoid places because of my cough problem 0  0   I feel embarrassed because of my coughing problem 0  0   People ask, ''What's wrong?'' because I cough a lot 0  0   I run out of air when I cough 1  0   My coughing problem affects my voice 0  0   My coughing problem limits my physical activity 1  0   My coughing problem upsets me 1  0   People ask me if I am sick because I cough a lot 0  0   CSI Score 3 0       Proxy-reported        Patient Supplied Answers To Last 2 EAT Questionnaires      3/1/2019     7:40 AM 8/15/2022     2:00 PM   Eating Assessment Tool (EAT-10)   My swallowing problem has caused me to lose weight 1  0   My swallowing problem interferes with my ability to go out for meals 0  0   Swallowing liquids takes extra effort 1  0   Swallowing solids takes extra effort 1  0   Swallowing pills takes extra effort 1  0   Swallowing is painful 0  0   The pleasure of eating is affected by my swallowing 0  0   When I swallow food sticks in my throat 0  0   I cough when I eat 1  0   Swallowing is stressful 0  0   EAT-10 5 0       Proxy-reported        Patient Supplied Answers to Dyspnea Index Questionnaire:       No data to display                Again, thank you for allowing me to participate in the care of your patient.      Sincerely,    Travis Mcdaniels, SLP    "

## 2025-02-10 ENCOUNTER — TELEPHONE (OUTPATIENT)
Dept: OTOLARYNGOLOGY | Facility: CLINIC | Age: 65
End: 2025-02-10
Payer: COMMERCIAL

## 2025-02-10 DIAGNOSIS — E11.29 TYPE 2 DIABETES MELLITUS WITH DIABETIC MICROALBUMINURIA, WITHOUT LONG-TERM CURRENT USE OF INSULIN (H): ICD-10-CM

## 2025-02-10 DIAGNOSIS — R80.9 TYPE 2 DIABETES MELLITUS WITH DIABETIC MICROALBUMINURIA, WITHOUT LONG-TERM CURRENT USE OF INSULIN (H): ICD-10-CM

## 2025-02-10 RX ORDER — SEMAGLUTIDE 1.34 MG/ML
INJECTION, SOLUTION SUBCUTANEOUS
Qty: 3 ML | Refills: 2 | Status: ON HOLD | OUTPATIENT
Start: 2025-02-10

## 2025-02-10 NOTE — TELEPHONE ENCOUNTER
Writer called and spoke to pt who stated that he has a dental infection and his dentist sent him in an abx to start taking. Writer stated that pt can plan to have surgery on Thursday with Dr. Fournier but ultimately it will be up to the anaesthesia team that day if they can proceed. Pt expressed understanding.      Lula Montenegro RN

## 2025-02-10 NOTE — TELEPHONE ENCOUNTER
[facility] Call Center    Phone Message    May a detailed message be left on voicemail: yes     Reason for Call: Other: Pt said he has some questions after he went to the dentist regarding some infection, he would like to discuss this further, thanks     Action Taken: Other: ENT    Travel Screening: Not Applicable     Date of Service:

## 2025-02-13 ENCOUNTER — HOSPITAL ENCOUNTER (OUTPATIENT)
Facility: CLINIC | Age: 65
End: 2025-02-13
Attending: OTOLARYNGOLOGY | Admitting: OTOLARYNGOLOGY
Payer: COMMERCIAL

## 2025-02-13 VITALS
HEART RATE: 125 BPM | BODY MASS INDEX: 34.12 KG/M2 | WEIGHT: 238.32 LBS | RESPIRATION RATE: 25 BRPM | DIASTOLIC BLOOD PRESSURE: 106 MMHG | OXYGEN SATURATION: 94 % | TEMPERATURE: 97.6 F | SYSTOLIC BLOOD PRESSURE: 121 MMHG | HEIGHT: 70 IN

## 2025-02-13 LAB
GLUCOSE BLDC GLUCOMTR-MCNC: 143 MG/DL (ref 70–99)
GLUCOSE BLDC GLUCOMTR-MCNC: 176 MG/DL (ref 70–99)

## 2025-02-13 PROCEDURE — 370N000017 HC ANESTHESIA TECHNICAL FEE, PER MIN: Performed by: OTOLARYNGOLOGY

## 2025-02-13 PROCEDURE — 250N000025 HC SEVOFLURANE, PER MIN: Performed by: OTOLARYNGOLOGY

## 2025-02-13 PROCEDURE — 88360 TUMOR IMMUNOHISTOCHEM/MANUAL: CPT | Mod: 26 | Performed by: STUDENT IN AN ORGANIZED HEALTH CARE EDUCATION/TRAINING PROGRAM

## 2025-02-13 PROCEDURE — 88305 TISSUE EXAM BY PATHOLOGIST: CPT | Mod: 26 | Performed by: STUDENT IN AN ORGANIZED HEALTH CARE EDUCATION/TRAINING PROGRAM

## 2025-02-13 PROCEDURE — 250N000011 HC RX IP 250 OP 636: Performed by: ANESTHESIOLOGY

## 2025-02-13 PROCEDURE — 88305 TISSUE EXAM BY PATHOLOGIST: CPT | Mod: TC | Performed by: OTOLARYNGOLOGY

## 2025-02-13 PROCEDURE — 250N000009 HC RX 250: Performed by: OTOLARYNGOLOGY

## 2025-02-13 PROCEDURE — 710N000012 HC RECOVERY PHASE 2, PER MINUTE: Performed by: OTOLARYNGOLOGY

## 2025-02-13 PROCEDURE — 360N000077 HC SURGERY LEVEL 4, PER MIN: Performed by: OTOLARYNGOLOGY

## 2025-02-13 PROCEDURE — 250N000011 HC RX IP 250 OP 636: Performed by: OTOLARYNGOLOGY

## 2025-02-13 PROCEDURE — 272N000001 HC OR GENERAL SUPPLY STERILE: Performed by: OTOLARYNGOLOGY

## 2025-02-13 PROCEDURE — 82962 GLUCOSE BLOOD TEST: CPT

## 2025-02-13 PROCEDURE — 999N000141 HC STATISTIC PRE-PROCEDURE NURSING ASSESSMENT: Performed by: OTOLARYNGOLOGY

## 2025-02-13 PROCEDURE — 710N000009 HC RECOVERY PHASE 1, LEVEL 1, PER MIN: Performed by: OTOLARYNGOLOGY

## 2025-02-13 RX ORDER — DEXAMETHASONE SODIUM PHOSPHATE 4 MG/ML
10 INJECTION, SOLUTION INTRA-ARTICULAR; INTRALESIONAL; INTRAMUSCULAR; INTRAVENOUS; SOFT TISSUE ONCE
Status: DISCONTINUED | OUTPATIENT
Start: 2025-02-13 | End: 2025-02-13 | Stop reason: HOSPADM

## 2025-02-13 RX ORDER — DEXAMETHASONE SODIUM PHOSPHATE 4 MG/ML
4 INJECTION, SOLUTION INTRA-ARTICULAR; INTRALESIONAL; INTRAMUSCULAR; INTRAVENOUS; SOFT TISSUE
Status: DISCONTINUED | OUTPATIENT
Start: 2025-02-13 | End: 2025-02-18 | Stop reason: HOSPADM

## 2025-02-13 RX ORDER — DEXAMETHASONE SODIUM PHOSPHATE 10 MG/ML
INJECTION, SOLUTION INTRA-ARTICULAR; INTRALESIONAL; INTRAMUSCULAR; INTRAVENOUS; SOFT TISSUE PRN
Status: DISCONTINUED | OUTPATIENT
Start: 2025-02-13 | End: 2025-02-13 | Stop reason: HOSPADM

## 2025-02-13 RX ORDER — NALOXONE HYDROCHLORIDE 0.4 MG/ML
0.1 INJECTION, SOLUTION INTRAMUSCULAR; INTRAVENOUS; SUBCUTANEOUS
Status: DISCONTINUED | OUTPATIENT
Start: 2025-02-13 | End: 2025-02-13 | Stop reason: HOSPADM

## 2025-02-13 RX ORDER — EPINEPHRINE 0.1 MG/ML
INJECTION INTRAVENOUS PRN
Status: DISCONTINUED | OUTPATIENT
Start: 2025-02-13 | End: 2025-02-13 | Stop reason: HOSPADM

## 2025-02-13 RX ORDER — HYDROMORPHONE HCL IN WATER/PF 6 MG/30 ML
0.2 PATIENT CONTROLLED ANALGESIA SYRINGE INTRAVENOUS EVERY 5 MIN PRN
Status: DISCONTINUED | OUTPATIENT
Start: 2025-02-13 | End: 2025-02-13 | Stop reason: HOSPADM

## 2025-02-13 RX ORDER — OXYCODONE HYDROCHLORIDE 10 MG/1
10 TABLET ORAL
Status: DISCONTINUED | OUTPATIENT
Start: 2025-02-13 | End: 2025-02-18 | Stop reason: HOSPADM

## 2025-02-13 RX ORDER — KETOROLAC TROMETHAMINE 30 MG/ML
15 INJECTION, SOLUTION INTRAMUSCULAR; INTRAVENOUS
Status: DISCONTINUED | OUTPATIENT
Start: 2025-02-13 | End: 2025-02-13 | Stop reason: HOSPADM

## 2025-02-13 RX ORDER — HYDROMORPHONE HCL IN WATER/PF 6 MG/30 ML
0.4 PATIENT CONTROLLED ANALGESIA SYRINGE INTRAVENOUS EVERY 5 MIN PRN
Status: DISCONTINUED | OUTPATIENT
Start: 2025-02-13 | End: 2025-02-13 | Stop reason: HOSPADM

## 2025-02-13 RX ORDER — ONDANSETRON 2 MG/ML
4 INJECTION INTRAMUSCULAR; INTRAVENOUS EVERY 30 MIN PRN
Status: DISCONTINUED | OUTPATIENT
Start: 2025-02-13 | End: 2025-02-13 | Stop reason: HOSPADM

## 2025-02-13 RX ORDER — ONDANSETRON 4 MG/1
4 TABLET, ORALLY DISINTEGRATING ORAL EVERY 30 MIN PRN
Status: DISCONTINUED | OUTPATIENT
Start: 2025-02-13 | End: 2025-02-18 | Stop reason: HOSPADM

## 2025-02-13 RX ORDER — NALOXONE HYDROCHLORIDE 0.4 MG/ML
0.1 INJECTION, SOLUTION INTRAMUSCULAR; INTRAVENOUS; SUBCUTANEOUS
Status: DISCONTINUED | OUTPATIENT
Start: 2025-02-13 | End: 2025-02-18 | Stop reason: HOSPADM

## 2025-02-13 RX ORDER — OXYCODONE HYDROCHLORIDE 5 MG/1
5 TABLET ORAL
Status: DISCONTINUED | OUTPATIENT
Start: 2025-02-13 | End: 2025-02-18 | Stop reason: HOSPADM

## 2025-02-13 RX ORDER — ONDANSETRON 4 MG/1
4 TABLET, ORALLY DISINTEGRATING ORAL EVERY 30 MIN PRN
Status: DISCONTINUED | OUTPATIENT
Start: 2025-02-13 | End: 2025-02-13 | Stop reason: HOSPADM

## 2025-02-13 RX ORDER — FENTANYL CITRATE 50 UG/ML
50 INJECTION, SOLUTION INTRAMUSCULAR; INTRAVENOUS EVERY 5 MIN PRN
Status: DISCONTINUED | OUTPATIENT
Start: 2025-02-13 | End: 2025-02-13 | Stop reason: HOSPADM

## 2025-02-13 RX ORDER — LIDOCAINE HYDROCHLORIDE 40 MG/ML
SOLUTION TOPICAL PRN
Status: DISCONTINUED | OUTPATIENT
Start: 2025-02-13 | End: 2025-02-13 | Stop reason: HOSPADM

## 2025-02-13 RX ORDER — DEXAMETHASONE SODIUM PHOSPHATE 4 MG/ML
4 INJECTION, SOLUTION INTRA-ARTICULAR; INTRALESIONAL; INTRAMUSCULAR; INTRAVENOUS; SOFT TISSUE
Status: DISCONTINUED | OUTPATIENT
Start: 2025-02-13 | End: 2025-02-13 | Stop reason: HOSPADM

## 2025-02-13 RX ORDER — ONDANSETRON 2 MG/ML
4 INJECTION INTRAMUSCULAR; INTRAVENOUS EVERY 30 MIN PRN
Status: DISCONTINUED | OUTPATIENT
Start: 2025-02-13 | End: 2025-02-18 | Stop reason: HOSPADM

## 2025-02-13 RX ORDER — AMPICILLIN AND SULBACTAM 2; 1 G/1; G/1
3 INJECTION, POWDER, FOR SOLUTION INTRAMUSCULAR; INTRAVENOUS
Status: COMPLETED | OUTPATIENT
Start: 2025-02-13 | End: 2025-02-13

## 2025-02-13 RX ORDER — FENTANYL CITRATE 50 UG/ML
25 INJECTION, SOLUTION INTRAMUSCULAR; INTRAVENOUS EVERY 5 MIN PRN
Status: DISCONTINUED | OUTPATIENT
Start: 2025-02-13 | End: 2025-02-13 | Stop reason: HOSPADM

## 2025-02-13 RX ORDER — AMPICILLIN AND SULBACTAM 1; .5 G/1; G/1
1.5 INJECTION, POWDER, FOR SOLUTION INTRAMUSCULAR; INTRAVENOUS SEE ADMIN INSTRUCTIONS
Status: DISCONTINUED | OUTPATIENT
Start: 2025-02-13 | End: 2025-02-13 | Stop reason: HOSPADM

## 2025-02-13 RX ORDER — SODIUM CHLORIDE, SODIUM LACTATE, POTASSIUM CHLORIDE, CALCIUM CHLORIDE 600; 310; 30; 20 MG/100ML; MG/100ML; MG/100ML; MG/100ML
INJECTION, SOLUTION INTRAVENOUS CONTINUOUS
Status: DISCONTINUED | OUTPATIENT
Start: 2025-02-13 | End: 2025-02-13 | Stop reason: HOSPADM

## 2025-02-13 RX ADMIN — ONDANSETRON 4 MG: 2 INJECTION, SOLUTION INTRAMUSCULAR; INTRAVENOUS at 16:55

## 2025-02-13 RX ADMIN — AMPICILLIN SODIUM AND SULBACTAM SODIUM 3 G: 2; 1 INJECTION, POWDER, FOR SOLUTION INTRAMUSCULAR; INTRAVENOUS at 10:49

## 2025-02-13 ASSESSMENT — ACTIVITIES OF DAILY LIVING (ADL)
ADLS_ACUITY_SCORE: 35
ADLS_ACUITY_SCORE: 34
ADLS_ACUITY_SCORE: 35
ADLS_ACUITY_SCORE: 34
ADLS_ACUITY_SCORE: 32
ADLS_ACUITY_SCORE: 35
ADLS_ACUITY_SCORE: 32
ADLS_ACUITY_SCORE: 35

## 2025-02-13 NOTE — OP NOTE
PROCEDURE(S):  Microdirect laryngoscopy with excision/ablation of laryngeal lesions, CO2 laser  steroid injection      PRE-OPERATIVE DIAGNOSIS:   Laryngeal mass [J38.7]  Hx of laryngeal cancer [Z85.21]  Obstructive sleep apnea [G47.33]    POST-OPERATIVE DIAGNOSIS:   As above      SURGEON: Mel Fournier MD MPH    ANAESTHESIA: General endotracheal, 5-O laser-safe tube    INDICATIONS FOR PROCEDURE:   Gary Iyer is a 64 year old year old male with a history of laryngeal cancer who was noted to have a new laryngeal mass; although it appeared to be grossly consistent with granuloma, it was noted to progress and enlarge despite significant laryngeal behavior modification by the patient. Given the progression as well as his history we discussed the option of microdirect laryngoscopy with biopsy/removal of the mass. The patient wished to proceed with surgery and presented for the procedure(s) listed above. We reviewed perioperative risks, including bleeding/infection/pain, injury to surrounding structures, potential changes to tongue/voice/swallow function, risk of needing additional procedures (e.g., due to persistence or recurrence), and risks of anesthesia (including heart attack, stroke, death). He elected to proceed and written informed consent was performed.    DESCRIPTION OF PROCEDURE:   The patient was brought to the operating room and placed supine. A time-out was called to verify patient identity, operative site, and planned procedure. The operative site was prepped in the usual clean fashion. Moist gauze eye pads were placed and secured. A head wrap was placed, and custom molded thermoplastic tooth guards were placed. Direct laryngoscopy was performed with an Ossoff-Pilling laryngoscope, which was placed in suspension. The vocal folds were examined with 0 and 70 degree telescopes. The operating microscope was then brought into position.     Laser safety precautions were utilized at all times, including eye  protection for the patient and staff, use of wet towels, and appropriately minimized FiO2. As needed, moistened cottonoids or laser-safe backstops were used to protect the endotracheal tube from the laser. The Lumenis CO2 Accublade laser was attached to the microscope and used at a depth setting of 1 and 7 Amos. In a line setting, the laser was used to remove multiple aspects of the mass, including two rounded portions as well as a smaller inferior and irregular portion. Cottonoids soaked in 1:10,000 epinephrine were sparingly used to maintain hemostasis.     Following removal of the tissue, dexamethasone was injected into the wound bed to reduce the risk of immediate post-operative granuloma formation.    As needed during the procedure and at the conclusion of the procedure, the operating microscope was moved out of position and the 0 and 70 degree rigid endoscopes were again used to examine the vocal folds and confirm completion of the stated objectives of the procedure. After cottonoid and sponge counts were confirmed correct, 2 cc of 4% lidocaine were applied transglottically for laryngotracheal anesthesia. The laryngoscope and tooth guards were removed. The lips, teeth, and tongue were examined and no injuries were noted. Care of the patient was returned to Anesthesia.      FINDINGS:   Mildly challenging mask, MAC4 Glidescope intubation needed . Mildly challenging laryngeal exposure with Ossoff Pilling laryngoscope. Multilobulated rounded masses, left posterior true vocal fold; grossly compatible with granuloma except for the inferior portion which had a small area of focused irregularity. No other distinct lesions noted.     SPECIMEN(S):   Left posterior true vocal fold lesion     DRAINS: None    ESTIMATED BLOOD LOSS: Less than 10 ml    COMPLICATIONS: None    DISPOSITION: Stable to PACU    ATTENDING PRESENCE STATEMENT:  I was present and participating for the entire procedure from beginning to  completion.

## 2025-02-13 NOTE — BRIEF OP NOTE
[facility]    Brief Operative Note    Pre-operative diagnosis: Laryngeal mass [J38.7]  Hx of laryngeal cancer [Z85.21]  Obstructive sleep apnea [G47.33]  Post-operative diagnosis As above    Procedure: Microdirect laryngoscopy with excision/ablation of laryngeal lesions, CO2 laser, steroid injection    Surgeon: Mel Fournier MD  Anesthesia: General   Estimated Blood Loss: Minimal    Drains:   Specimens:   ID Type Source Tests Collected by Time Destination   1 : left posterior true vocal fold lesion Tissue Other SURGICAL PATHOLOGY EXAM Mel Fournier MD 2/13/2025  3:26 PM      Findings:   Mildly challenging mask, MAC4 Glidescope intubation needed . Mildly challenging laryngeal exposure with Ossoff Pilling laryngoscope. Multilobulated rounded masses with some associated irregularity, left posterior true vocal fold; grossly compatible with granuloma except for the inferior portion which had a small area of focused irregularity. No other distinct lesions noted.  Complications: None.  Implants: * No implants in log *

## 2025-02-14 LAB
PATH REPORT.COMMENTS IMP SPEC: ABNORMAL
PATH REPORT.COMMENTS IMP SPEC: YES
PATH REPORT.FINAL DX SPEC: ABNORMAL
PATH REPORT.GROSS SPEC: ABNORMAL
PATH REPORT.MICROSCOPIC SPEC OTHER STN: ABNORMAL
PATH REPORT.RELEVANT HX SPEC: ABNORMAL
PHOTO IMAGE: ABNORMAL

## 2025-02-14 ASSESSMENT — ACTIVITIES OF DAILY LIVING (ADL)
ADLS_ACUITY_SCORE: 34

## 2025-02-14 NOTE — PROGRESS NOTES
Surgeon came to PACU bedside, she is aware pt is unable to keep up his O2 sats on his own and is requiring supplemental O2 1-2L. Pt continues to desat down to 85% with the O2 on, pt is wide awake and is using his Incentive spirometer.  Pt has sleep apnea but does not use his CPAP at night and has not for 3 years.  Surgeon stated to continue to monitor and discuss further needs with anesthesia, if patient needs to stay overnight, call the resident on call.

## 2025-02-14 NOTE — DISCHARGE INSTRUCTIONS
Contacting your Doctor -   To contact a doctor, call Dr Fournier at  the ENT- Otolaryngology Clinic at [phone]   or:  [phone] and ask for the resident on call for Otolaryngology (answered 24 hours a day)   Emergency Department:  [place] [place]: [phone]  [facility]: [phone] 911 if you are in need of immediate or emergent help

## 2025-02-14 NOTE — PROGRESS NOTES
Discharge instructions printed and reviewed with patient and son.  All questions answered at this time.  Discharge prescriptions *NONE All belongings returned to patient at this time.

## 2025-02-14 NOTE — PROGRESS NOTES
Pt up in chair at bedside, O2 sats improving with sitting upright.  Pt weaned off O2 and is now on room air with O2 sats staying above 90%.  Discussed with PAR doc and he said ok to discharge home tonight.  Pt said his wife has oxygen at home she only needs occasionally, he will use it overnight while sleeping.

## 2025-02-15 ASSESSMENT — ACTIVITIES OF DAILY LIVING (ADL)
ADLS_ACUITY_SCORE: 34

## 2025-02-16 ASSESSMENT — ACTIVITIES OF DAILY LIVING (ADL)
ADLS_ACUITY_SCORE: 34

## 2025-02-17 ASSESSMENT — ACTIVITIES OF DAILY LIVING (ADL)
ADLS_ACUITY_SCORE: 34

## 2025-02-18 DIAGNOSIS — C32.0 VOCAL CORD CANCER (H): Primary | ICD-10-CM

## 2025-02-19 NOTE — PROGRESS NOTES
Gary Iyer is a 64 year old male who is being evaluated via a billable video visit.      Gary has been notified and verbally consented to the following:   This video visit will be conducted between you and your provider.  Patient has opted to conduct today's video visit vs an in-person appointment.   Video visits are billed at different rates depending on your insurance coverage. Please reach out to your insurance provider with any questions.   If during the course of the call the provider feels the appointment is not appropriate, you will not be charged for this service.  Provider has received verbal consent for billable virtual visit from the patient? Yes  Will anyone else be joining your video visit?     Call initiated at: 2:52   Type of Visit Platform Used: [employer] Video  Location of provider: Home  Location of patient: [facility]  THERAPY NOTE (CPT 75701)    Patient: Gary Iyer  Date of Service: 2/20/2025  Visit / Treatment number: 4 / 3  Certification Period: 12/6/24 - 3/6/25  Referring physician: Dr. Mel Fournier  Impressions from most recent evaluation:  Patient was seen most recently by Kassandra Coleman CCC-SLP from 1/27/2025.  At that time additional course of speech therapy was felt to be warranted relative to worsening in his granuloma.  Please see Kassandra's note from that day for full details.    SUBJECTIVE:  Since the patient's last session, they report the following:   Began using his voice again the day before yesterday after 5 days of voice rest  Was able to do voice rest 95% of the time  5% is just when it slipped his mind  Wasn't sure if the post-op recommendations changed because of the cancer diagnosis    OBJECTIVE:    THERAPEUTIC ACTIVITIES    Counseling and Education:  Education regarding the need for ongoing focus on good wound healing even following the change in diagnosis    Periop recommendations reviewed with a focus on:  Gradual ramp up in voice reviewed and  described in the after visit summary for clarity  Reiterated multiple times over the course of the session  Easy  / gentle voice use  SOVT postures used to improve awareness of and access to ease and airflow in voiceing  Notably reduced strain focus on flowing voice  Patient was able to carry this over outside of exercises into speech with focus and intermittent clinician support    A regimen for home practice was instructed.  I provided an AVS of today's therapeutic activities to facilitate practice.    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 Gary had many questions today secondary to the new diagnosis of laryngeal squamous cell carcinoma.  We discussed that more information is needed before making any adjustments to plan of care, and our goal at this point is still to optimize healing as much as possible to allow for a clear visualization with Dr. Fournier in march.   The importance of an ongoing slow ramp-up was emphasized. Voice quality was significantly strained initially though significant improvement in clarity was able to be achieved with higher flow contexts building on S OV T.      PLAN: I will see Gary in approximately 1 weeks, at which point we will continue to optimize patterns of voice use postoperatively.   For practice goals see AVS.     TOTAL SERVICE TIME: 35 minutes  TREATMENT (25641)  NO CHARGE FACILITY FEE (55034)    Travis Mcdaniels M.M., M.A., CCC-SLP  Speech-Language Pathologist  Certificate of Vocology  [phone]    *this report was created in part through the use of computerized dictation software, and though reviewed following completion, some typographic errors may persist.  If there is confusion regarding any of this notes contents,  "please contact me for clarification.*    Patient Supplied Answers To Last 2 VHI Questionnaires      11/16/2020     1:00 PM 8/15/2022     2:00 PM   Voice Handicap Index (VHI-10)   My voice makes it difficult for people to hear me 0 0   People have difficulty understanding me in a noisy room 1 0   My voice difficulties restrict my personal and social life.  1 0   I feel left out of conversations because of my voice 0 0   My voice problem causes me to lose income 1 0   I feel as though I have to strain to produce voice 1 0   The clarity of my voice is unpredictable 0 0   My voice problem upsets me 0 0   My voice makes me feel handicapped 1 0   People ask, \"What's wrong with your voice?\" 0 0   VHI-10 5 0        Patient Supplied Answers To Last 2 CSI Questionnaires      3/1/2019     7:39 AM 8/15/2022     2:00 PM   Cough Severity Index (CSI)   My cough is worse when I lie down 0  0   My coughing problem causes me to restrict my personal and social life 0  0   I tend to avoid places because of my cough problem 0  0   I feel embarrassed because of my coughing problem 0  0   People ask, ''What's wrong?'' because I cough a lot 0  0   I run out of air when I cough 1  0   My coughing problem affects my voice 0  0   My coughing problem limits my physical activity 1  0   My coughing problem upsets me 1  0   People ask me if I am sick because I cough a lot 0  0   CSI Score 3 0       Proxy-reported        Patient Supplied Answers To Last 2 EAT Questionnaires      3/1/2019     7:40 AM 8/15/2022     2:00 PM   Eating Assessment Tool (EAT-10)   My swallowing problem has caused me to lose weight 1  0   My swallowing problem interferes with my ability to go out for meals 0  0   Swallowing liquids takes extra effort 1  0   Swallowing solids takes extra effort 1  0   Swallowing pills takes extra effort 1  0   Swallowing is painful 0  0   The pleasure of eating is affected by my swallowing 0  0   When I swallow food sticks in my throat 0  0 "   I cough when I eat 1  0   Swallowing is stressful 0  0   EAT-10 5 0       Proxy-reported        Patient Supplied Answers to Dyspnea Index Questionnaire:       No data to display

## 2025-02-20 ENCOUNTER — VIRTUAL VISIT (OUTPATIENT)
Dept: OTOLARYNGOLOGY | Facility: CLINIC | Age: 65
End: 2025-02-20
Payer: COMMERCIAL

## 2025-02-20 DIAGNOSIS — Z85.21 HX OF LARYNGEAL CANCER: ICD-10-CM

## 2025-02-20 DIAGNOSIS — J38.3 VOCAL PROCESS GRANULOMA: ICD-10-CM

## 2025-02-20 DIAGNOSIS — R49.0 DYSPHONIA: Primary | ICD-10-CM

## 2025-02-20 NOTE — PATIENT INSTRUCTIONS
Emiliano Vega    Even with the new diagnosis, we want the tissues of your larynx to heal as best we can.  This makes it clearer what is being seen on exam when Dr. Fournier will take a look at the level of your larynx on March 3.  I want you to continue to slowly ramp up how much you are using your voice.  Within the each of the time frames listed below you do not have to use your voice the whole time, but you must rest your voice after this..  For example today at 4:00 you can use your voice from 4:00 to 4:05 after which you will rest your voice until 5:00 and repeat the same process.  The regimen is as follows:     Today-5 minutes every hour  Tomorrow-7 minutes every hour  Saturday-10 minutes of every hour  Sunday-13 minutes of every hour  Monday-15 minutes of every hour  Tuesday-you can return to using your voice gently off and on throughout the hour but always backing off if you feel any strain or discomfort.      The first minute or two of each period of voice use practice with the straw as described below:   Start with the straw in the water  Focus is on ease and flowing air (generous bubbles)  Do a low sustained note  Allow the note to glide up and down gently  Try counting through the straw (english or Horace) with generous bubbles  Try to count or say something while finding the same feeling    What ever time is left in the period of voice use you can use your voice for reading or talking but try and find the same flowing feeling.    I will see you in a week to hear how things are going    -Zack

## 2025-02-20 NOTE — LETTER
2/20/2025       RE: Gary Iyer  [address] [facility]  [place] [address]     Dear Colleague,    Thank you for referring your patient, Gary Iyer, to the [facility]. Please see a copy of my visit note below.    Gary Iyer is a 64 year old male who is being evaluated via a billable video visit.      Gary has been notified and verbally consented to the following:   This video visit will be conducted between you and your provider.  Patient has opted to conduct today's video visit vs an in-person appointment.   Video visits are billed at different rates depending on your insurance coverage. Please reach out to your insurance provider with any questions.   If during the course of the call the provider feels the appointment is not appropriate, you will not be charged for this service.  Provider has received verbal consent for billable virtual visit from the patient? Yes  Will anyone else be joining your video visit?     Call initiated at: 2:52   Type of Visit Platform Used: [employer]  Location of provider: Home  Location of patient: [facility]ICE [facility]  THERAPY NOTE (CPT 06060)    Patient: Gary Iyer  Date of Service: 2/20/2025  Visit / Treatment number: 4 / 3  Certification Period: 12/6/24 - 3/6/25  Referring physician: Dr. Mel Fournier  Impressions from most recent evaluation:  Patient was seen most recently by Kassandra Coleman CCC-SLP from 1/27/2025.  At that time additional course of speech therapy was felt to be warranted relative to worsening in his granuloma.  Please see Kassandra's note from that day for full details.    SUBJECTIVE:  Since the patient's last session, they report the following:   Began using his voice again the day before yesterday after 5 days of voice rest  Was able to do voice rest 95% of the time  5% is just when it slipped his mind  Wasn't sure if the post-op recommendations  changed because of the cancer diagnosis    OBJECTIVE:    THERAPEUTIC ACTIVITIES    Counseling and Education:  Education regarding the need for ongoing focus on good wound healing even following the change in diagnosis    Periop recommendations reviewed with a focus on:  Gradual ramp up in voice reviewed and described in the after visit summary for clarity  Reiterated multiple times over the course of the session  Easy  / gentle voice use  SOVT postures used to improve awareness of and access to ease and airflow in voiceing  Notably reduced strain focus on flowing voice  Patient was able to carry this over outside of exercises into speech with focus and intermittent clinician support    A regimen for home practice was instructed.  I provided an AVS of today's therapeutic activities to facilitate practice.    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 Gary had many questions today secondary to the new diagnosis of laryngeal squamous cell carcinoma.  We discussed that more information is needed before making any adjustments to plan of care, and our goal at this point is still to optimize healing as much as possible to allow for a clear visualization with Dr. Fournier in march.   The importance of an ongoing slow ramp-up was emphasized. Voice quality was significantly strained initially though significant improvement in clarity was able to be achieved with higher flow contexts building on S OV T.      PLAN: I will see Gary in approximately 1 weeks, at which point we will continue to optimize patterns of voice use postoperatively.   For practice goals see AVS.     TOTAL SERVICE TIME: 35 minutes  TREATMENT (75110)  NO CHARGE FACILITY FEE (75308)    Travis Mcdaniels M.M.,  "M.A., [facility]-SLP  Speech-Language Pathologist  Certificate of Vocology  [phone]    *this report was created in part through the use of computerized dictation software, and though reviewed following completion, some typographic errors may persist.  If there is confusion regarding any of this notes contents, please contact me for clarification.*    Patient Supplied Answers To Last 2 VHI Questionnaires      11/16/2020     1:00 PM 8/15/2022     2:00 PM   Voice Handicap Index (VHI-10)   My voice makes it difficult for people to hear me 0 0   People have difficulty understanding me in a noisy room 1 0   My voice difficulties restrict my personal and social life.  1 0   I feel left out of conversations because of my voice 0 0   My voice problem causes me to lose income 1 0   I feel as though I have to strain to produce voice 1 0   The clarity of my voice is unpredictable 0 0   My voice problem upsets me 0 0   My voice makes me feel handicapped 1 0   People ask, \"What's wrong with your voice?\" 0 0   VHI-10 5 0        Patient Supplied Answers To Last 2 CSI Questionnaires      3/1/2019     7:39 AM 8/15/2022     2:00 PM   Cough Severity Index (CSI)   My cough is worse when I lie down 0  0   My coughing problem causes me to restrict my personal and social life 0  0   I tend to avoid places because of my cough problem 0  0   I feel embarrassed because of my coughing problem 0  0   People ask, ''What's wrong?'' because I cough a lot 0  0   I run out of air when I cough 1  0   My coughing problem affects my voice 0  0   My coughing problem limits my physical activity 1  0   My coughing problem upsets me 1  0   People ask me if I am sick because I cough a lot 0  0   CSI Score 3 0       Proxy-reported        Patient Supplied Answers To Last 2 EAT Questionnaires      3/1/2019     7:40 AM 8/15/2022     2:00 PM   Eating Assessment Tool (EAT-10)   My swallowing problem has caused me to lose weight 1  0   My swallowing problem " interferes with my ability to go out for meals 0  0   Swallowing liquids takes extra effort 1  0   Swallowing solids takes extra effort 1  0   Swallowing pills takes extra effort 1  0   Swallowing is painful 0  0   The pleasure of eating is affected by my swallowing 0  0   When I swallow food sticks in my throat 0  0   I cough when I eat 1  0   Swallowing is stressful 0  0   EAT-10 5 0       Proxy-reported        Patient Supplied Answers to Dyspnea Index Questionnaire:       No data to display                Again, thank you for allowing me to participate in the care of your patient.      Sincerely,    Travis Mcdaniels, SLP

## 2025-02-20 NOTE — NURSING NOTE
Current patient location: [address] [place] [id number]    Is the patient currently in the state of [place]? YES    Visit mode: VIDEO    If the visit is dropped, the patient can be reconnected by:VIDEO VISIT: Text to cell phone:   Telephone Information:   Mobile [phone]       Will anyone else be joining the visit? NO  (If patient encounters technical issues they should call [phone]    Are changes needed to the allergy or medication list? No    Are refills needed on medications prescribed by this physician? NO    Rooming Documentation:  Not applicable    Reason for visit: No chief complaint on file.    Ricardo HARPF

## 2025-02-24 ENCOUNTER — TELEPHONE (OUTPATIENT)
Dept: FAMILY MEDICINE | Facility: CLINIC | Age: 65
End: 2025-02-24
Payer: COMMERCIAL

## 2025-02-24 DIAGNOSIS — F41.9 ANXIETY: Primary | ICD-10-CM

## 2025-02-24 NOTE — TELEPHONE ENCOUNTER
Dr. Beal    Patient called stated he has never had a PET scan before and has an upcoming PET 3/4. He stated has had medication in past for MRI and asking if you recommend he takes the medication for PET? Per patient ENT suggested benadryl but patient would like PCP advice.     [facility] [address] [place],  EDGAR [place]  [facility]

## 2025-02-25 ENCOUNTER — VIRTUAL VISIT (OUTPATIENT)
Dept: SLEEP MEDICINE | Facility: CLINIC | Age: 65
End: 2025-02-25
Payer: COMMERCIAL

## 2025-02-25 VITALS — WEIGHT: 230 LBS | BODY MASS INDEX: 32.93 KG/M2 | HEIGHT: 70 IN

## 2025-02-25 DIAGNOSIS — G47.33 OSA (OBSTRUCTIVE SLEEP APNEA): Primary | ICD-10-CM

## 2025-02-25 PROCEDURE — 1126F AMNT PAIN NOTED NONE PRSNT: CPT | Mod: 95 | Performed by: PHYSICIAN ASSISTANT

## 2025-02-25 PROCEDURE — 98002 SYNCH AUDIO-VIDEO NEW MOD 45: CPT | Performed by: PHYSICIAN ASSISTANT

## 2025-02-25 RX ORDER — DIPHENHYDRAMINE HCL 25 MG
CAPSULE ORAL
Qty: 2 CAPSULE | Refills: 0 | Status: SHIPPED | OUTPATIENT
Start: 2025-02-25

## 2025-02-25 ASSESSMENT — SLEEP AND FATIGUE QUESTIONNAIRES
HOW LIKELY ARE YOU TO NOD OFF OR FALL ASLEEP WHILE SITTING AND TALKING TO SOMEONE: WOULD NEVER DOZE
HOW LIKELY ARE YOU TO NOD OFF OR FALL ASLEEP WHILE WATCHING TV: SLIGHT CHANCE OF DOZING
HOW LIKELY ARE YOU TO NOD OFF OR FALL ASLEEP WHILE SITTING AND READING: SLIGHT CHANCE OF DOZING
HOW LIKELY ARE YOU TO NOD OFF OR FALL ASLEEP WHILE SITTING INACTIVE IN A PUBLIC PLACE: SLIGHT CHANCE OF DOZING
HOW LIKELY ARE YOU TO NOD OFF OR FALL ASLEEP WHEN YOU ARE A PASSENGER IN A CAR FOR AN HOUR WITHOUT A BREAK: SLIGHT CHANCE OF DOZING
HOW LIKELY ARE YOU TO NOD OFF OR FALL ASLEEP IN A CAR, WHILE STOPPED FOR A FEW MINUTES IN TRAFFIC: WOULD NEVER DOZE
HOW LIKELY ARE YOU TO NOD OFF OR FALL ASLEEP WHILE SITTING QUIETLY AFTER LUNCH WITHOUT ALCOHOL: SLIGHT CHANCE OF DOZING
HOW LIKELY ARE YOU TO NOD OFF OR FALL ASLEEP WHILE LYING DOWN TO REST IN THE AFTERNOON WHEN CIRCUMSTANCES PERMIT: MODERATE CHANCE OF DOZING

## 2025-02-25 ASSESSMENT — PAIN SCALES - GENERAL: PAINLEVEL_OUTOF10: NO PAIN (0)

## 2025-02-25 NOTE — TELEPHONE ENCOUNTER
Spoke with patient. Relayed provider's message as written. Patient verbalized understanding and has no further questions at this time.    VIJAY NguyễnN RN  [facility]

## 2025-02-25 NOTE — TELEPHONE ENCOUNTER
Pt verbalizes nervousness and is open to utilizing benadryl if PCP ok's it.    Radha Billingsley RN on 2/25/2025 at 10:20 AM

## 2025-02-25 NOTE — TELEPHONE ENCOUNTER
I sent in prescription for two pills.  Take one pill one hour prior, and then if needed a 2nd 15 minutes prior.  May cause sedation.      Please inform patient      Finesse Beal MD

## 2025-02-25 NOTE — TELEPHONE ENCOUNTER
The PET scan is less claustrophobic than mri.  If he can tolerate CT scan without meds he should be okay for PET scan.    Please inform patient. If he thinks he needs a med to get through it, send message back.    Thanks.    Finesse Beal MD

## 2025-02-25 NOTE — NURSING NOTE
Current patient location: [address] [place] [phone]    Is the patient currently in the state of [place]? YES    Visit mode: VIDEO    If the visit is dropped, the patient can be reconnected by:VIDEO VISIT: Text to cell phone:   Telephone Information:   Mobile [phone]       Will anyone else be joining the visit? NO  (If patient encounters technical issues they should call [phone])    Are changes needed to the allergy or medication list? No    Are refills needed on medications prescribed by this physician? NO    Rooming Documentation:  Questionnaire(s) completed    Reason for visit: Consult    Sagrario BRANDT

## 2025-02-25 NOTE — PROGRESS NOTES
Virtual Visit Details    Type of service:  Video Visit   Video Start Time: 3:16 PM  Video End Time:3:46 AM    Originating Location (pt. Location): Home    Distant Location (provider location):  On-site  Platform used for Video Visit: [facility]    Outpatient Sleep Medicine Consultation:      Name: Gary Iyer MRN# [id number]   Age: 64 year old YOB: 1960     Date of Consultation: February 25, 2025  Consultation is requested by: Referred Self, MD  No address on file Referred Self  Primary care provider: Finesse Beal       Reason for Sleep Consult:     Gary Iyer is sent by Referred Self for a sleep consultation regarding obstructive sleep apnea.    Patient s Reason for visit  Gary Iyer main reason for visit:  had surgery last week, was told by recovery room to take care of his sleep apnea.   Patient states problem(s) started:    Gary Iyer's goals for this visit:             Assessment and Plan:   Summary Sleep Diagnoses & Recommendations:   Severe obstructive sleep apnea, currently not treated-  He presents with snoring, witnessed apnea, daytime sleepiness.   History of initiating and discontinuing CPAP.   Today, we reviewed options.   He wants to re-initiate CPAP.  Comprehensive DME order placed    Comorbid Diagnoses:  Obesity  DM type 2  HTN      Summary Recommendations:  Orders Placed This Encounter   Procedures    Comprehensive DME       Summary Counseling:    Obstructive Sleep Apnea Reviewed  Complications of Untreated Sleep Apnea Reviewed      Medical Decision-making:   Educational materials provided in instructions    Total time spent reviewing medical records, history and physical examination, review of previous testing and interpretation as well as documentation on this date:45 minutes    CC: Referred Self          History of Present Illness:     Gary Iyer presents to re-establish care.     DME: [facility] ([place])     Initial sleep study 2000s, not available for review. He cannot recall  why first study was done, probably was done for snoring. He tried CPAP for a day or so and then quit.     Sleep study [facility] 5/2012 (216#)  AHI 25, .9, low O2 32% (probably artifact by hypnogram). True low O2 probably upper 70s on CPAP during REM. CPAP 8cm with fragmented sleep during supine REM.     He tried CPAP for 2-3 days but 'could not sleep' and again stopped using it.     He initially presented to [facility]  in 2017 with symptoms of fatigue (ESS 0), nocturnal awakenings, leg shaking. Comorbid diabetes mellitus, hypertension.    - Sleep onset difficulties. Suspect multifactorial: delayed sleep phase, psychophysiologic insomnia, poor sleep hygiene.   - Nocturnal leg kicking/stiffness. This may be related to his obstructive sleep apnea. Nocturnal seizure disorder could also be considerered.       Study Date: 3/14/2017- (227.0 lbs)   - Titration complicated by high leaks on several different mask types.   - Titrated from 5 cmH2O up to 15 cmH2O.  Most of titrations done for RERAs and flow limitations. The final  pressure was 15 cmH2O with a residual AHI of 0 events per hour.  REM supine seen at 12 cm with good control of events.   - Lowest oxygen saturation was 84.1%.  Time spent less than or equal to 88% was 0.9 minutes.   - PLM index was 72.6 movements per hour.  PLM Arousal Index was 24.4 per hour.   - Cyclic alternating pattern of arousal with and without whole body movements and leg movements was seen.     Recommend autoCPAP 10-16 cm     He returned 5/2021 not using CPAP, stopped for unclear reasons with symptoms of long sleep times (ESS 1). Comorbid hypertension, diabetes mellitus. Recommend restart CPAP 10-16 cmH20, but patient has been told by DME that he needs to complete another study    9/29/2021 [facility] Sleep Study (225 #) - TST 82 minutes. AHI 82.0, .6, Supine AHI n/a, REM AHI n/a, Low O2 79.8%, Time Spent <=88% 4.8 minutes / Time Spent <=89% 6.8  "minutes.    Last sleep medicine follow up was with Dr. Ochoa on 1/4/2022 and was doing ok on CPAP.     He stopped CPAP because he thought he was \"fixed\". Does not know why he stopped.     Do you use a CPAP Machine at home:  no    SLEEP-WAKE SCHEDULE:     TIME IN BED:    1) Work/School Days:    Do you work or go to school?    What time do you usually get into bed?    About how long does it take you to fall asleep?    How often do you have trouble falling asleep?    How often do you wake up during the night?    Do you work days/evenings/nights/rotating shifts?    What wakes you up at night?    How often do you have trouble falling back to sleep?    About how long does it take to fall back to sleep?    What do you usually do if you have trouble getting back to sleep?    What time do you usually get out of bed to start your day?    Do you use an alarm?    2) Weekends/Non-work Days/All Other Days    What time do you usually get into bed? 11 pm   About how long does it take you to fall asleep? Any hour   What time do you usually get out of bed to start your day? 8   Do you use an alarm? No   SLEEP NEED    On average, about how much sleep do you think you get?    About how much sleep do you think you need?    SLEEP POSITION    Which sleep positions do you prefer? Side   Do you do any of the following activities in bed? Eat    Watch TV    Other   If other, what? Phone   How often do you take a nap on purpose? 1   About how long are your naps? An hour   Do you feel better after naps? Yes   How often do you doze off unintentionally? 7   Have you ever had a driving accident or near-miss due to sleepiness/drowsiness? No   SLEEP DISRUPTIONS:    1) Breathing/Snoring:    Do you snore? Yes   Do other people complain about your snoring? No   Have you been told you stop breathing in your sleep? Yes   Do you have issues with any of the following? Getting up to urinate more than once   2) Movement:    Do you get pain, discomfort, with " an urge to move? No   Does it happen when you are resting? No   Does it get better if you move around? Yes   Does it happen more at night? No   Have you been told you kick your legs at night? No   3) Behaviors in Sleep:    Have you ever experienced any of the following during your sleep? Kicking or punching   Do you ever experience sudden muscle weakness during the day? No   4) Is there anything else you would like your sleep provider to know:    CAFFEINE, ALCOHOL AND OTHER SUBSTANCES:    How many caffeinated beverages (coffee, tea, soda, energy drinks) per day? 3   What time of day is your last caffeine use? 6   List any prescribed or over the counter stimulants that you take: Mrntolin   List any prescribed or over the counter sleep medication you take:    List previous sleep medications you have tried:    Do you drink alcohol to help you sleep? No   Do you drink alcohol near bedtime? No             SCALES:    EPWORTH SLEEPINESS SCALE         2/25/2025     3:04 PM    Montrose Sleepiness Scale ( CRIS Jade  [id number]<br>ESS - [place]/English - Final version - 21 Nov 07 - [facility].)   Sitting and reading Slight chance of dozing   Watching TV Slight chance of dozing   Sitting, inactive in a public place (e.g. a theatre or a meeting) Slight chance of dozing   As a passenger in a car for an hour without a break Slight chance of dozing   Lying down to rest in the afternoon when circumstances permit Moderate chance of dozing   Sitting and talking to someone Would never doze   Sitting quietly after a lunch without alcohol Slight chance of dozing   In a car, while stopped for a few minutes in traffic Would never doze   Montrose Score (MC) 7   Montrose Score (Sleep) 7        Proxy-reported         INSOMNIA SEVERITY INDEX (GRIFFIN)          2/24/2025    12:37 PM   Insomnia Severity Index (GRIFFIN)   Difficulty falling asleep 2   Difficulty staying asleep 1   Problems waking up too early 2   How SATISFIED/DISSATISFIED are you  "with your CURRENT sleep pattern? 2   How NOTICEABLE to others do you think your sleep problem is in terms of impairing the quality of your life? 1   How WORRIED/DISTRESSED are you about your current sleep problem? 1   To what extent do you consider your sleep problem to INTERFERE with your daily functioning (e.g. daytime fatigue, mood, ability to function at work/daily chores, concentration, memory, mood, etc.) CURRENTLY? 1   GRIFFIN Total Score 10        Patient-reported       Guidelines for Scoring/Interpretation:  Total score categories:  0-7 = No clinically significant insomnia   8-14 = Subthreshold insomnia   15-21 = Clinical insomnia (moderate severity)  22-28 = Clinical insomnia (severe)  Used via courtesy of www.[employer].va.gov with permission from Howard Quintana PhD., [facility]      STOP BANG         3/3/2025     3:19 PM   STOP BANG Questionnaire (  2008, the American Society of Anesthesiologists, Inc. [employer]   [facility]: 131/61         GAD7        7/17/2020     8:53 AM   SAMARIA-7    1. Feeling nervous, anxious, or on edge 0   2. Not being able to stop or control worrying 1   3. Worrying too much about different things 1   4. Trouble relaxing 0   5. Being so restless that it is hard to sit still 0   6. Becoming easily annoyed or irritable 0   7. Feeling afraid, as if something awful might happen 1   SAMARIA-7 Total Score 3   If you checked any problems, how difficult have they made it for you to do your work, take care of things at home, or get along with other people? Not difficult at all         CAGE-AID         No data to display                CAGE-AID reprinted with permission from the [place] Medical Journal, ЕКАТЕРИНА Jama. and KAYLEE Molina, \"Conjoint screening questionnaires for alcohol and drug abuse\" [place] Medical Journal 94: 135-140, 1995.      PATIENT HEALTH QUESTIONNAIRE-9 (PHQ - 9)        2/26/2021     4:52 PM   PHQ-9 ([employer])   1.  Little interest or pleasure in " doing things 0   2.  Feeling down, depressed, or hopeless 0   3.  Trouble falling or staying asleep, or sleeping too much 1   4.  Feeling tired or having little energy 1   5.  Poor appetite or overeating 0   6.  Feeling bad about yourself - or that you are a failure or have let yourself or your family down 0   7.  Trouble concentrating on things, such as reading the newspaper or watching television 0   8.  Moving or speaking so slowly that other people could have noticed. Or the opposite - being so fidgety or restless that you have been moving around a lot more than usual 0   9.  Thoughts that you would be better off dead, or of hurting yourself in some way 0   PHQ-9 Total Score 2   If you checked off any problems, how difficult have these problems made it for you to do your work, take care of things at home, or get along with other people? Not difficult at all   6.  Feeling bad about yourself 0   7.  Trouble concentrating 0   8.  Moving slowly or restless 0   9.  Suicidal or self-harm thoughts 0   Difficulty at work, home, or with people Not difficult at all       Developed by Prince Eisenberg, Bonnie Molina, Elder Camp and colleagues, with an educational genaro from [employer] No permission required to reproduce, translate, display or distribute.        Allergies:    No Known Allergies    Medications:    Current Outpatient Medications   Medication Sig Dispense Refill    ACCU-CHEK GUIDE test strip TEST ONCE DAILY 100 strip 1    alcohol swab prep pads USE TO SWAB AREA OF INJECTION/OLIMPIA 100 each 1    allopurinol (ZYLOPRIM) 300 MG tablet TAKE 1 TABLET(300 MG) BY MOUTH DAILY 90 tablet 1    amLODIPine (NORVASC) 2.5 MG tablet Take 1 tablet (2.5 mg) by mouth daily. 90 tablet 1    aspirin 81 MG tablet Take 1 tablet by mouth daily.      atorvastatin (LIPITOR) 20 MG tablet Take 1 tablet (20 mg) by mouth daily. 90 tablet 1    blood glucose (ACCU-CHEK GUIDE) test strip Use to test blood sugar 1 times daily or as  directed. 100 strip 11    blood glucose calibration (NO BRAND SPECIFIED) solution To accompany: Blood Glucose Monitor Brands: per insurance. 1 Bottle 3    Blood Glucose Monitoring Suppl (ACCU-CHEK GUIDE ME) w/Device KIT USE TO TEST ONCE DAILY 1 kit 0    calcipotriene (DOVONEX) 0.005 % external cream Mix with efudex and apply twice daily to hands and arms for 12 days 60 g 3    cetirizine (ZYRTEC) 10 MG tablet Take 1 tablet (10 mg) by mouth daily. 90 tablet 3    diphenhydrAMINE (BENADRYL) 25 MG capsule 1 po one hour prior to procedure.  May take another 15 minutes prior if needed. 2 capsule 0    docusate sodium (DSS) 100 MG capsule Take 100 mg by mouth at bedtime      fluorouracil (EFUDEX) 5 % external cream Mix with dovonex and apply twice daily to hands and arms for 12 days 40 g 1    hydrochlorothiazide (HYDRODIURIL) 50 MG tablet Take 1 tablet (50 mg) by mouth daily. 90 tablet 1    hydrocortisone (CORTAID) 1 % external ointment Apply topically 2 times daily as needed 30 g 1    insulin pen needle (31G X 6 MM) 31G X 6 MM miscellaneous Use one pen needle daily or as directed. ( for Victoza ) 90 each 3    ketoconazole (NIZORAL) 2 % external cream Apply daily to affected area on face, armpits, groin. 60 g 1    ketoconazole (NIZORAL) 2 % external shampoo Leave on face, beard for 3-5 minutes then rinse. Use 2-3 times weekly. 120 mL 3    losartan (COZAAR) 100 MG tablet Take 1 tablet (100 mg) by mouth daily. 90 tablet 1    metroNIDAZOLE (METROCREAM) 0.75 % external cream Apply topically 2 times daily as needed (to rosacea areas) 45 g 1    Misc. Devices (SUPPOSITORY MOLD 2GM) MISC 1 suppository every 12 hours Nifedipine 4 mg suppository, one rectally every 12 hours 100 each 4    Multiple Vitamin (DAILY MULTIVITAMIN PO) Take by mouth daily      nystatin (MYCOSTATIN) 141630 UNIT/GM external powder Apply topically 2 times daily Prevention of rash 60 g 0    omeprazole (PRILOSEC) 20 MG DR capsule TAKE 1 CAPSULE(20 MG) BY MOUTH  TWICE DAILY 180 capsule 1    order for DME Autocpap 10-16 cm 1 Units 0    OZEMPIC, 1 MG/DOSE, 4 MG/3ML pen INJECT 1MG SUBCUTANEOUSLY EVERY 7 DAYS 3 mL 2    PARoxetine (PAXIL) 40 MG tablet TAKE 1 TABLET(40 MG) BY MOUTH EVERY MORNING 90 tablet 1    polyethylene glycol (MIRALAX) 17 GM/Dose powder Take 17 g by mouth daily as needed for constipation 850 g 3    psyllium 0.52 G capsule Take 1 capsule (0.52 g) by mouth daily 100 capsule 3    tadalafil (ADCIRCA/CIALIS) 20 MG tablet 1/2 to 1 po as needed up to twice per week for erectile dysfunction 10 tablet 1    tamsulosin (FLOMAX) 0.4 MG capsule Take 2 capsules (0.8 mg) by mouth daily. 180 capsule 1    thin (NO BRAND SPECIFIED) lancets Use with lanceting device. To accompany: Blood Glucose Monitor Brands: per insurance. 100 each 11    triamcinolone (KENALOG) 0.1 % external cream Apply topically 2 times daily as needed for irritation 30 g 0       Problem List:  Patient Active Problem List    Diagnosis Date Noted    Morbid obesity (H) 04/12/2022     Priority: Medium    Obesity, unspecified 11/06/2018     Priority: Medium    Chronic cough 12/12/2017     Priority: Medium    Gout, unspecified cause, unspecified chronicity, unspecified site 10/06/2017     Priority: Medium    Obstructive sleep apnea- moderate- severe (AHI 25, 82)      Priority: Medium     Initial sleep study 2000s, not available for review.  Sleep study [facility] 5/2012 (216#)  AHI 25, .9, low O2 32% (probably artifact by hypnogram). True low O2 probably upper 70s on CPAP during REM. CPAP 8 cm with fragmented sleep during supine REM.   Study Date: 3/14/2017- (227.0 lbs)- Titration complicated by high leaks on several masks. Most of titrations done for RERAs and flow limitations. The final  pressure achieved was 15 cmH2O with a residual AHI of 0.  REM supine seen at 12 cm with good control of events. Lowest oxygen saturation 84.1%.  Time spent less than or equal to 88% was 0.9 minutes. PLM index  72.6.  The PLM Arousal Index was 24. Cyclic alternating pattern of arousal with and without whole body movements and leg movements seen.   9/29/2021 [facility] (225 #) - TST 82 minutes AHI 82.0, .6,  Supine AHI n/a, REM AHI n/a, Low O2 79.8%, Time Spent <=88% 4.8 minutes / Time Spent <=89% 6.8 minutes.      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 Less than 50% diameter stenosis of the left ICA relative to the distal ICA diameter.      SAMARIA (generalized anxiety disorder) 06/13/2013     Priority: Medium    Hypertension goal BP (blood pressure) < 140/90 11/30/2012     Priority: Medium    Hyperlipidemia LDL goal <70 05/04/2012     Priority: Medium    Low ferritin 03/30/2017     Priority: Low    Periodic limb movements of sleep 03/29/2017     Priority: Low    Benign prostatic hypertrophy 03/07/2017     Priority: Low        Past Medical/Surgical History:  Past Medical History:   Diagnosis Date    Diabetes (H)     2yr    Hypertension     Lesion of larynx 12/12/2017    Leokoplakia. s/p Flexible fiberoptic transnasal laryngoscopy with endoscopic injection, laryngeal biopsy    Multinodular goiter (nontoxic) 06/10/2013    Sleep apnea 8yr    Squamous cell carcinoma of skin, unspecified      Past Surgical History:   Procedure Laterality Date    COLONOSCOPY   2012    Repeat Colonoscopy in 10 yrs.     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    HEAD & NECK SURGERY  2011    callous removal from throat    INJECT STEROID (LOCATION) N/A 2025    Procedure: steroid injection;  Surgeon: Mel Fournier MD;  Location: [facility] OR    LARYNGOSCOPY WITH BIOPSY(IES)  2011    LARYNGOSCOPY WITH BIOPSY(IES) N/A 2017    Procedure: LARYNGOSCOPY WITH BIOPSY(IES);  Surgeon: Mel Fournier MD;  Location: [facility] OR    LARYNGOSCOPY WITH MICROSCOPE N/A 2017    Procedure: LARYNGOSCOPY WITH MICROSCOPE;  Surgeon: Mel Fournier MD;  Location: [facility] OR    LASER CO2 LARYNGOSCOPY N/A 2025    Procedure: Microdirect laryngoscopy with excision/ablation of laryngeal lesions, CO2 laser;  Surgeon: Mel Fournier MD;  Location: [facility] OR    LASER CO2 LARYNGOSCOPY, COMPLEX  2013    Procedure: LASER CO2 LARYNGOSCOPY, COMPLEX;  Micro Direct Laryngoscopy With Micro Flap Excision Of Lesion Lumenis Co2 Laser ;  Surgeon: Mel Fournier MD;  Location: [facility] OR    LASER CO2 LESION ORAL N/A 2017    Procedure: LASER CO2 LESION ORAL;  Surgeon: Mel Fournier MD;  Location: [facility] OR    ORTHOPEDIC SURGERY  2016    left tibia orif with abbi -    VASECTOMY  2003       Social History:  Social History     Socioeconomic History    Marital status:      Spouse name: Not on file    Number of children: 3    Years of education: Not on file    Highest education level: Not on file   Occupational History    Not on file   Tobacco Use    Smoking status: Former     Current packs/day: 0.00     Average packs/day: 1 pack/day for 25.0 years (25.0 ttl pk-yrs)     Types: Cigarettes     Start date: 1975     Quit date: 2000     Years since quittin.5     Passive exposure: Past    Smokeless tobacco: Never   Vaping Use    Vaping status: Never Used   Substance and Sexual Activity    Alcohol use: Yes     Comment: rare    Drug  use: No    Sexual activity: Yes     Partners: Female   Other Topics Concern    Parent/sibling w/ CABG, MI or angioplasty before 65F 55M? No   Social History Narrative    Not on file     Social Drivers of Health     Financial Resource Strain: Not on file   Food Insecurity: Not on file   Transportation Needs: Not on file   Physical Activity: Not on file   Stress: Not on file   Social Connections: Unknown (2024)    Received from [facility] [facility]    Social Connections     Frequency of Communication with Friends and Family: Not on file   Interpersonal Safety: Unknown (2025)    Interpersonal Safety     Do you feel physically and emotionally safe where you currently live?: Patient unable to answer     Within the past 12 months, have you been hit, slapped, kicked or otherwise physically hurt by someone?: Patient unable to answer     Within the past 12 months, have you been humiliated or emotionally abused in other ways by your partner or ex-partner?: Patient unable to answer   Housing Stability: Not on file       Family History:  Family History   Problem Relation Age of Onset    C.A.D. Father          MI age 44    Hypertension Mother          93 old age    Diabetes No family hx of     Cerebrovascular Disease No family hx of     Cancer No family hx of     Glaucoma No family hx of     Macular Degeneration No family hx of        Review of Systems:  A complete review of systems reviewed by me is negative with the exeption of what has been mentioned in the history of present illness.  In the last TWO WEEKS have you experienced any of the following symptoms?  Fevers: (Patient-Rptd) No  Night Sweats: (Patient-Rptd) No  Weight Gain: (Patient-Rptd) No  Pain at Night: (Patient-Rptd) No  Double Vision: (Patient-Rptd) No  Changes in Vision: (Patient-Rptd) No  Difficulty Breathing through Nose: (Patient-Rptd) No  Sore Throat in Morning: (Patient-Rptd) No  Dry Mouth in the Morning:  "(Patient-Rptd) No  Shortness of Breath Lying Flat: (Patient-Rptd) No  Shortness of Breath With Activity: (Patient-Rptd) No  Awakening with Shortness of Breath: (Patient-Rptd) No  Heart Racing at Night: (Patient-Rptd) No  Swelling in Feet or Legs: (Patient-Rptd) Yes  Diarrhea at Night: (Patient-Rptd) No  Heartburn at Night: (Patient-Rptd) No  Urinating More than Once at Night: (Patient-Rptd) Yes  Losing Control of Urine at Night: (Patient-Rptd) No  Joint Pains at Night: (Patient-Rptd) No  Headaches in Morning: (Patient-Rptd) No  Weakness in Arms or Legs: (Patient-Rptd) No  Depressed Mood: (Patient-Rptd) No  Anxiety: (Patient-Rptd) No     Physical Examination:  Vitals: Ht 1.778 m (5' 10\")   Wt 104.3 kg (230 lb)   BMI 33.00 kg/m    BMI= Body mass index is 33 kg/m .           GENERAL APPEARANCE: alert and no distress  EYES: Eyes grossly normal to inspection  NECK: no asymmetry, masses, or scars  RESP: breathing is non-labored   NEURO: mentation intact and speech normal  PSYCH: affect normal/bright  Mallampati Class:   Tonsillar Stage:          Data: All pertinent previous laboratory data reviewed     Recent Labs   Lab Test 02/13/25  1853 02/13/25  1028 01/29/25  1443 08/26/24  1109   NA  --   --  135 136   POTASSIUM  --   --  4.0 3.8   CHLORIDE  --   --  96* 96*   CO2  --   --  29 28   ANIONGAP  --   --  10 12   * 143* 110* 155*   BUN  --   --  11.0 9.5   CR  --   --  0.80 0.90   TRUMAN  --   --  9.8 9.3       Recent Labs   Lab Test 01/29/25  1443   WBC 7.6   RBC 4.03*   HGB 11.7*   HCT 36.2*   MCV 90   MCH 29.0   MCHC 32.3   RDW 15.3*          Recent Labs   Lab Test 01/29/25  1443   PROTTOTAL 9.3*   ALBUMIN 4.1   BILITOTAL 0.5   ALKPHOS 68   AST 55*   ALT 52       TSH   Date Value   08/26/2024 2.73 uIU/mL   03/03/2023 2.72 mU/L   02/14/2022 2.23 mU/L   03/10/2021 2.42 mU/L   07/17/2020 5.97 mU/L (H)       No results found for: \"UAMP\", \"UBARB\", \"BENZODIAZEUR\", \"UCANN\", \"UCOC\", \"OPIT\", \"UPCP\"    Iron Sat " "Index   Date/Time Value Ref Range Status   04/12/2022 03:02 PM 7 (L) 15 - 46 % Final     Ferritin   Date/Time Value Ref Range Status   04/12/2022 03:02 PM 14 (L) 26 - 388 ng/mL Final   03/29/2017 11:11 AM 46 26 - 388 ng/mL Final       No results found for: \"PH\", \"PHARTERIAL\", \"PO2\", \"[id number]\", \"SAT\", \"PCO2\", \"HCO3\", \"BASEEXCESS\", \"TAJ\", \"BEB\"    @LABRCNTIPR(phv:4,pco2v:4,po2v:4,hco3v:4,oseas:4,o2per:4)@    Echocardiology: No results found for this or any previous visit (from the past 4320 hours).    Chest x-ray: No results found for this or any previous visit from the past 365 days.      Chest CT:   CT Chest w Contrast 08/06/2024    Narrative  CT CHEST W CONTRAST 8/6/2024 11:42 AM    CLINICAL HISTORY: pt had mediastinal mass on CT at [facility] 5-14-24.  radiology advised repeat in 1-3 month to recheck; Mediastinal mass  TECHNIQUE: CT chest with IV contrast. Multiplanar reformats were  obtained. Dose reduction techniques were used.    CONTRAST: 100mL of Isovue 370    COMPARISON: 5/14/2024, 9/8/2017    FINDINGS:  LUNGS AND PLEURA: Lungs are clear. No pleural effusion. Small  calcified granuloma in the left lower lobe. No suspicious pulmonary  nodules or masses.    MEDIASTINUM/AXILLAE: Stable 2.4 cm nodule in the isthmus, unchanged  since 2017. Stable mass in the upper mediastinum measuring 4.8 x 3.5  cm, previously 4.4 x 3.3 cm in 2017 and 5.0 x 3.5 cm on 5/14/2024,  most consistent with substernal goiter. No lymphadenopathy. Few  calcified mediastinal lymph nodes, the sequela of prior granulomatous  disease. No thoracic aortic aneurysm. 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    UPPER ABDOMEN: Hepatic steatosis.    MUSCULOSKELETAL: Unremarkable.    Impression  IMPRESSION:  1.  An upper anterior mediastinal partially calcified mass has not  significantly changed since 2017, most consistent with substernal  thyroid goiter. Stable nodules in the thyroid " gland.    PAULINE SINCLAIR MD      SYSTEM ID:  [id number]        ISRRAEL Hadley 2/25/2025

## 2025-03-03 ENCOUNTER — OFFICE VISIT (OUTPATIENT)
Dept: OTOLARYNGOLOGY | Facility: CLINIC | Age: 65
End: 2025-03-03
Payer: COMMERCIAL

## 2025-03-03 ENCOUNTER — PATIENT OUTREACH (OUTPATIENT)
Dept: OTOLARYNGOLOGY | Facility: CLINIC | Age: 65
End: 2025-03-03

## 2025-03-03 VITALS
SYSTOLIC BLOOD PRESSURE: 131 MMHG | DIASTOLIC BLOOD PRESSURE: 61 MMHG | BODY MASS INDEX: 34.15 KG/M2 | HEART RATE: 68 BPM | WEIGHT: 238 LBS | OXYGEN SATURATION: 97 %

## 2025-03-03 DIAGNOSIS — C32.9 LARYNGEAL CANCER (H): ICD-10-CM

## 2025-03-03 DIAGNOSIS — R49.0 DYSPHONIA: ICD-10-CM

## 2025-03-03 DIAGNOSIS — E11.9 TYPE 2 DIABETES MELLITUS WITHOUT COMPLICATION, WITHOUT LONG-TERM CURRENT USE OF INSULIN (H): ICD-10-CM

## 2025-03-03 DIAGNOSIS — R49.0 DYSPHONIA: Primary | ICD-10-CM

## 2025-03-03 DIAGNOSIS — J38.7 LARYNGEAL MASS: ICD-10-CM

## 2025-03-03 DIAGNOSIS — G47.33 OBSTRUCTIVE SLEEP APNEA: ICD-10-CM

## 2025-03-03 DIAGNOSIS — C32.9 LARYNGEAL CANCER (H): Primary | ICD-10-CM

## 2025-03-03 DIAGNOSIS — J38.7 LEUKOPLAKIA OF LARYNX: ICD-10-CM

## 2025-03-03 PROCEDURE — 1126F AMNT PAIN NOTED NONE PRSNT: CPT | Performed by: OTOLARYNGOLOGY

## 2025-03-03 PROCEDURE — 3075F SYST BP GE 130 - 139MM HG: CPT | Performed by: OTOLARYNGOLOGY

## 2025-03-03 PROCEDURE — 3078F DIAST BP <80 MM HG: CPT | Performed by: OTOLARYNGOLOGY

## 2025-03-03 PROCEDURE — 99213 OFFICE O/P EST LOW 20 MIN: CPT | Mod: 25 | Performed by: OTOLARYNGOLOGY

## 2025-03-03 ASSESSMENT — PAIN SCALES - GENERAL: PAINLEVEL_OUTOF10: NO PAIN (0)

## 2025-03-03 NOTE — PATIENT INSTRUCTIONS
1.  You were seen in the ENT Clinic today by . If you have any questions or concerns after your appointment, please call [phone]. Press option #1 for scheduling related needs. Press option #3 for Nurse advice.    2.   has recommended the following:   - continue speech therapy    3.  Plan is to return to clinic in 4-6 weeks      Lisa Hazel LPN  [phone]  [facility] - Otolaryngology

## 2025-03-03 NOTE — PROGRESS NOTES
Called patient to offer earlier appointment time. Patient was agreeable and verbalized understanding of the situation. Clair Hodges RN on 3/3/2025 at 10:30 AM

## 2025-03-03 NOTE — PROGRESS NOTES
"[facility]  Clinical Voice and Upper Airway Evaluation Report    Patient's Name: Gary Iyer  Date of Evaluation: 3/3/2025  Providing SLP: Sam Carlisle, MM, PhD, CF-SLP  Seen in Conjunction With: Dr. Mel Fournier  Referring Provider and Facility: Mel Fournier MD  Insurance Coverage: [facility]  (Certification Dates: 12/6/24 - 3/6/25)   Chief Complaint: Dysphonia  Evaluation Location: [facility] [place]  Others in Attendance for the Evaluation: None  Time of Evaluation: 1717 - 2209      Patient History: Gary Iyer is a 64 year old-year-old male who presents today for evaluation of dysphonia following dysphonia. He is s/p resection of a \"pedunculated and highly mobile\" invasive SCC and is here today for follow-up evaluation.    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 Perceptually, his voice is moderately weak with fine roughness. Laryngoscopy today demonstrated increased size of the L vocal process lesion despite a significant reduction in voice use. Therefore, continued voice reduction and phonotrauma is recommended; however, this would require an even greater reduction than he has already been doing. As more conversation developed regarding interventions, all parties agreed that surgical intervention would be appropriate.  Perioperative voice care will be necessary to avoid recurrence of the posterior laryngeal lesion.     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 Gary reports that symptoms have been improving to some degree in conjunction with vocal ramp up. Today, " we trialed many exercises focusing on easy, flowing voice, however, he reported not feeling the sensation. He benefited from resonant voice therapy exercises, which perceptually demonstrated the least strain and roughness. Additionally, he reported that RVT tasks felt the easiest, but in the context of other comments it's unclear if he felt these felt the easiest sensation wise or just the easiest to complete. Discussed continuing with vocal ramp up, and avoiding Valsalva with physical activity so that healing is as optimized as possible before MD appointment.       Dysphonia:   Today, he states that voice therapy has been helpful overall, but that sometimes having a lot of exercises can be overwhelming. He is trying to adhere to vocal rest recommendations, and has been completing his HEP (straw phonation and RVT).  He most enjoys straw phonation with water resistance.    Dyspnea: The patient denies any breathing difficulties.    Dysphagia: The patient denies any swallowing difficulties.    Cough / Throat Clearing: The patient denies any cough/throat clearing symptoms; however, he reports being worried about sneezing a few times post-surgery.  I tried to reassure him that we expect that he will sneeze at times.    Throat Pain: The patient denies any throat pain symptoms.    Globus Sensation: The patient denies any globus sensation symptoms.    Quality of Life Questionnaires:    PATIENT REPORTED MEASURES:       No data to display                    3/1/2019     7:38 AM 11/16/2020     1:00 PM 8/15/2022     2:00 PM   Voice Handicap Index (VHI-10)   My voice makes it difficult for people to hear me 1  0 0   People have difficulty understanding me in a noisy room 1  1 0   My voice difficulties restrict my personal and social life.  2  1 0   I feel left out of conversations because of my voice 2  0 0   My voice problem causes me to lose income 2  1 0   I feel as though I have to strain to produce voice 2  1 0   The  "clarity of my voice is unpredictable 1  0 0   My voice problem upsets me 2  0 0   My voice makes me feel handicapped 2  1 0   People ask, \"What's wrong with your voice?\" 2  0 0   VHI-10 17 5 0       Proxy-reported           2/26/2018     2:36 PM 3/1/2019     7:39 AM 8/15/2022     2:00 PM   Cough Severity Index (CSI)   My cough is worse when I lie down 1  0  0   My coughing problem causes me to restrict my personal and social life 1  0  0   I tend to avoid places because of my cough problem 1  0  0   I feel embarrassed because of my coughing problem 1  0  0   People ask, ''What's wrong?'' because I cough a lot 1  0  0   I run out of air when I cough 1  1  0   My coughing problem affects my voice 1  0  0   My coughing problem limits my physical activity 1  1  0   My coughing problem upsets me 1  1  0   People ask me if I am sick because I cough a lot 1  0  0   CSI Score 10 3 0       Proxy-reported           2/26/2018     2:35 PM 3/1/2019     7:40 AM 8/15/2022     2:00 PM   Eating Assessment Tool (EAT-10)   My swallowing problem has caused me to lose weight 0  1  0   My swallowing problem interferes with my ability to go out for meals 0  0  0   Swallowing liquids takes extra effort 0  1  0   Swallowing solids takes extra effort 0  1  0   Swallowing pills takes extra effort 0  1  0   Swallowing is painful 0  0  0   The pleasure of eating is affected by my swallowing 0  0  0   When I swallow food sticks in my throat 0  0  0   I cough when I eat 1  1  0   Swallowing is stressful 0  0  0   EAT-10 1 5 0       Proxy-reported            No data to display              Perceptual Analysis (91661): Evaluation of Voice / Speech / Non-Communicative Laryngeal Behaviors    The GRBAS is a perceptual rating of voice change. 0 indicates no impairment, 3 indicates a severe impairment. \"C\" and \"I\" may be used to signify if these feature was observed consistently or intermittently respectively. This is a rating based on clinical " "judgement of disordered voice quality.  G ( 2 ) General Dysphonia     R ( 2 ) Roughness    B ( 0 ) Breathiness     A ( 0 ) Asthenia     S ( 2 ) Strain  Additional information: Somewhat \"wet\" sounding voice at times.    Laryngeal palpation: Narrow and tight thyrohyoid space per Dr. Fournier's evaluation.    Additional observations:   Cough/ Throat Clear: not observed today    Breathing patterns: appropriate at rest   Overt tension: \" \"   Habitual pitch: WNL  Pitch range: \" \"  Resonance: Laryngeal-pharyngeal  Loudness: Mildly reduced    Laryngoscopy with stroboscopy:    Provider performing exam: Dr. Mel Fournier    Informed consent: Informed verbal consent was obtained, which includes potential side-effects, risks, and benefits of the procedure.     Anesthetic: Topical 2% lidocaine and oxymetazoline were applied to the nasal cavities bilaterally. The scope was passed through the right nostril.    Scope type: A distal chip flexible laryngoscope was passed through the nare with halogen and xenon light source(s).    The laryngeal and pharyngeal structures were evaluated for gross appearance, mobility, function, and focal lesions / abnormalities of the associated mucosa.    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   Right (R) Vocal Fold Edge: white color, straight edge Left (L) Vocal Fold Edge: whitish pink color, grossly straight edge with subtle irregularity along the posterior 1/3   Glottic Closure: complete    R Arytenoid Ab/Adduction: Normal L Arytenoid Ab/Adduction: Normal      Mediolateral Compression: Frequently severe compression (Intermittently Mild compression) Anterior-Posterior Compression: Mild compression   Narrow Band Imaging: Consistent with halogen light findings   Additional Observations: none     Stroboscopic Observations   R Amplitude: WNL (~100%) L Amplitude: Moderately reduced (~75%)   R Mucosal Wave: WNL (~100%) L Mucosal Wave:Mildly reduced " (~75%)   Phase Symmetry: Grossly symmetrical Periodicity: Grossly regular   Phase Closure: Closed phase predominates Vertical Level of Approximation: On plane   Additional Observations: none     Therapeutic Probes:   Humming (with moderate verbal and modeling cues) resulted in reduced supraglottic hyperfunction.  Flow/high airflow stimuli resulted in minimally reduced supraglottic hyperfunction.  Yawn/sigh stimuli resulted in reduced supraglottic hyperfunction and subtly improved amplitude of vibration.    Impressions and Plan:     Gary Iyer is a 64 year old-year-old male presenting today with Dysphonia [R49.0] secondary to Laryngeal Cancer [C32.9]. Perceptually, he demonstrated moderate dysphonia characterized by moderate roughness and moderate strain. 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 Therefore, continued voice therapy has been recommended.  Dr. Fournier has ordered follow-up evaluation in 4-6 weeks. Mr. Iyer is in agreement with this plan of care.     Goals:    Coordinate phonatory and respiratory subsystems, demonstrating adequate independence for activities of daily communication   Decrease extrinsic laryngeal muscle activity during communication events   Improve voice quality in all activities of daily living using, as measured by a minimum of a 50% point reduction on the Voice Handicap Index-10 or Singing VHI  Demonstrate appropriate vocal hygiene including, but not limited to, adequate hydration, avoiding vocal abuse, integrating behavioral and dietary changes for GERD into their daily life?.   Incorporate behaviors to reduce irritation and promote laryngeal healing and prevent recurrence.?   Implement behavioral strategies to reduce laryngopharyngeal reflux. ?   Independently maintain a forward focus voice placement 90% of the time  during conversational speech.  Independently perform the Vocal Function Exercises, rebalancing respiration, phonation, and resonance 90% of the time  Perform High Level Phonation and Pitch Range Navigation Exercises independently 90% of the time  Patient will express satisfaction with their voice quality and function and their ability to participate in social, personal, and work/school functions without limitation  Adhere to complete vocal rest recommendations in the acute post-operative period  Gradually increase voice use following the complete voice rest period  Adhere to vocal hygiene recommendations including smoking cessation, hydration, avoidance of caffeine and alcohol, and behavioral reflux precautions  Perform rescue breathing techniques several times daily  Incorporate SOVT exercises gradually in the immediate post-operative period   Participate in pre- and post-operative voice therapy    Billed Procedures:    Perceptual voice assessment 14902  Assessment and treatment time: 34 minutes  Chart review, interpretation of testing, documentation preparation, etc: 10 minutes      Thank you for allowing me to participate in this patient's care,    Sam Carlisle M.M., Ph.D., CF-SLP  Clinical Fellow in Voice and Upper Airway Speech-Language Pathology  [facility]  [email or web address]  Pronouns: he/him

## 2025-03-03 NOTE — PROGRESS NOTES
Dear Colleague:  Gary Iyer recently returned for follow-up at the [facility]. My clinic note from our visit is enclosed below.  Speech recognition software may have been used in the documentation below; input is reviewed before signature to the best of my ability.     I appreciate the ongoing opportunity to participate in this patient's care.    Please feel free to contact me with any questions.      Sincerely yours,  Mel Fournier M.D., M.P.H.  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  He later returned to the operating room on February 9, 2017 for an area of new irregularity of the left true vocal fold.  Pathology showed severe dysplasia with negative but close margins (for moderate, but not severe dysplasia).  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    We then observed a slight recurrence of leukoplakia/granuloma in the same location where he had this in 2018. This resolved with conservative management. Subsequently, he had allergies which were provoking coughing/throat-clearing, and he was again noted to have a granuloma. He was set up for refresher session(s) of speech therapy with Misty Clark, PhD, CCC-SLP and we discussed nasal hygiene as well. This led to near complete resolution of the granuloma, but subsequently he was noted to have slightly increased irritation along the left medial arytenoid wall.  Then he developed an upper respiratory infection and has been coughing, and had some shortness of breath. He states he was COVID negative. His family had noted him grunting and throat-clearing frequently.    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 In May 2024 he was hospitalized with high lactate, concern for bacteremia. As of Dec 2024 we noted new left posterior vocal fold lesion that was pedunculated and highly mobile. The surface looked irregular, but the location and mobility were potentially consistent with granuloma, similar to prior findings he had in 2016. The lesion continued to look consistent with a granuloma, but became larger despite him significantly limiting his voice use, so we proceeded to the OR on 2/13/25 for resection. Unexpectedly, pathology was read as invasive squamous cell carcinoma for the lobular components and at least CIS at the base of the lesion.      Today's updates:  - PET scan is pending  - Feels he followed the voice restrictions/exercises pretty well; is working with Travis Mcdaniels MM, MA, CCC-SLP for therapy. Feels there are too many exercises.  - no new concerns other than the pathology findings        MEDICATIONS:     Current Outpatient Medications   Medication Sig Dispense Refill    ACCU-CHEK GUIDE test strip TEST ONCE DAILY 100 strip 1    alcohol swab prep pads USE TO SWAB AREA OF INJECTION/OLIMPIA 100 each 1    allopurinol (ZYLOPRIM) 300 MG tablet TAKE 1 TABLET(300 MG) BY MOUTH DAILY 90 tablet 1    amLODIPine (NORVASC) 2.5 MG tablet Take 1 tablet (2.5 mg) by mouth daily. 90 tablet 1    aspirin 81 MG tablet Take 1 tablet by mouth daily.      atorvastatin (LIPITOR) 20 MG tablet Take 1 tablet (20 mg) by mouth daily. 90 tablet 1    blood glucose (ACCU-CHEK GUIDE) test strip Use to test blood sugar 1 times daily or as directed. 100 strip 11    blood glucose calibration (NO BRAND SPECIFIED) solution To accompany: Blood Glucose Monitor Brands: per insurance. 1 Bottle 3    Blood Glucose Monitoring Suppl (ACCU-CHEK GUIDE ME) w/Device KIT USE TO TEST ONCE DAILY 1 kit 0     calcipotriene (DOVONEX) 0.005 % external cream Mix with efudex and apply twice daily to hands and arms for 12 days 60 g 3    cetirizine (ZYRTEC) 10 MG tablet Take 1 tablet (10 mg) by mouth daily. 90 tablet 3    diphenhydrAMINE (BENADRYL) 25 MG capsule 1 po one hour prior to procedure.  May take another 15 minutes prior if needed. 2 capsule 0    docusate sodium (DSS) 100 MG capsule Take 100 mg by mouth at bedtime      fluorouracil (EFUDEX) 5 % external cream Mix with dovonex and apply twice daily to hands and arms for 12 days 40 g 1    hydrochlorothiazide (HYDRODIURIL) 50 MG tablet Take 1 tablet (50 mg) by mouth daily. 90 tablet 1    hydrocortisone (CORTAID) 1 % external ointment Apply topically 2 times daily as needed 30 g 1    insulin pen needle (31G X 6 MM) 31G X 6 MM miscellaneous Use one pen needle daily or as directed. ( for Victoza ) 90 each 3    ketoconazole (NIZORAL) 2 % external cream Apply daily to affected area on face, armpits, groin. 60 g 1    ketoconazole (NIZORAL) 2 % external shampoo Leave on face, beard for 3-5 minutes then rinse. Use 2-3 times weekly. 120 mL 3    losartan (COZAAR) 100 MG tablet Take 1 tablet (100 mg) by mouth daily. 90 tablet 1    metroNIDAZOLE (METROCREAM) 0.75 % external cream Apply topically 2 times daily as needed (to rosacea areas) 45 g 1    Misc. Devices (SUPPOSITORY MOLD 2GM) MISC 1 suppository every 12 hours Nifedipine 4 mg suppository, one rectally every 12 hours 100 each 4    Multiple Vitamin (DAILY MULTIVITAMIN PO) Take by mouth daily      nystatin (MYCOSTATIN) 721063 UNIT/GM external powder Apply topically 2 times daily Prevention of rash 60 g 0    omeprazole (PRILOSEC) 20 MG DR capsule TAKE 1 CAPSULE(20 MG) BY MOUTH TWICE DAILY 180 capsule 1    order for DME Autocpap 10-16 cm 1 Units 0    OZEMPIC, 1 MG/DOSE, 4 MG/3ML pen INJECT 1MG SUBCUTANEOUSLY EVERY 7 DAYS 3 mL 2    PARoxetine (PAXIL) 40 MG tablet TAKE 1 TABLET(40 MG) BY MOUTH EVERY MORNING 90 tablet 1     polyethylene glycol (MIRALAX) 17 GM/Dose powder Take 17 g by mouth daily as needed for constipation 850 g 3    psyllium 0.52 G capsule Take 1 capsule (0.52 g) by mouth daily 100 capsule 3    tadalafil (ADCIRCA/CIALIS) 20 MG tablet 1/2 to 1 po as needed up to twice per week for erectile dysfunction 10 tablet 1    tamsulosin (FLOMAX) 0.4 MG capsule Take 2 capsules (0.8 mg) by mouth daily. 180 capsule 1    thin (NO BRAND SPECIFIED) lancets Use with lanceting device. To accompany: Blood Glucose Monitor Brands: per insurance. 100 each 11    triamcinolone (KENALOG) 0.1 % external cream Apply topically 2 times daily as needed for irritation 30 g 0       ALLERGIES:  No Known Allergies    NEW PMH/PSH: None    REVIEW OF SYSTEMS:  The patient completed a comprehensive 11 point review of systems (below), which was reviewed. Positives are as noted below.  Patient Supplied Answers to Review of Systems Noncontributory       PHYSICAL EXAM:  General: The patient was alert and conversant, and in no acute distress.    Oral cavity/oropharynx: No masses or lesions. Tongue mobility and palate elevation intact and symmetric.  Neck: No palpable cervical lymphadenopathy, no significant tenderness to palpation of the thyrohyoid space, which was narrow. No obvious thyroid abnormality.  Resp: Breathing comfortably, no stridor or stertor.  Neuro: Symmetric facial function. Other cranial nerve function as documented above.  Psych: Normal affect, pleasant and cooperative.  Voice/speech: Mild to moderate dysphonia characterized by breathiness, roughness, strain, and pitch instability .      Procedure:   Flexible fiberoptic laryngoscopy and laryngovideostroboscopy  Indications: This procedure was warranted to evaluate the patient's laryngeal anatomy and function. Risks, benefits, and alternatives were discussed.  Description: After written informed consent was obtained, a time-out was performed to confirm patient identity, procedure, and procedure  site. Topical 2% lidocaine and oxymetazoline were applied to the nasal cavities. I performed the endoscopy and no complications were apparent. Continuous and stroboscopic light were utilized to assess routine phonation and variable frequency phonation.  Performed by: Mel Fournier MD MPH  SLP: Sam Carlisle, Ph.D., M.M., CF-SLP   Findings: Normal nasopharynx. Normal base of tongue, valleculae, and epiglottis. Vocal fold mobility: right: normal; left: normal. Medial edges of the true vocal folds: smooth and straight.  Scattered foci of leukoplakia, left medial posterior true vocal fold.  Glissade produced appropriate elongation. There was moderate supraglottic recruitment with connected speech. Mucosa of false vocal folds, aryepiglottic folds, piriform sinuses, and posterior glottis unremarkable. Airway was patent. Response to the therapy probes was good. Additional findings on virtual chromoendoscopy: none.    The addition of stroboscopy allowed evaluation of the mucosal wave.   Amplitude: right: mildly decreased; left: normal. Symmetry: associated asymmetry. Closure pattern: complete. Closure plane: at glottic level. Phase distribution: normal.                                              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 He was noted to have good vocal fold vibration already at the surgical site. We discussed that the pathological findings and the clinical findings paint a bit of a disparate picture with the clinical findings looking more favorable than the pathological findings. Nonetheless we need to take the pathological findings seriously. We discussed the options of planning now for further resection vs starting with close surveillance. After some discussion we agreed upon the latter, and noted we will have a low threshold for further resection given the pathology findings.    Plan:  1) PET/CT is  pending; we will update him on results, and will also let him know if the plan needs to be changed moving forward based on the results.  2) Continue SLP exercises, sessions with Travis Mcdaniels MM, MA, CCC-SLP. Given that he felt he had too many exercises to do, we encouraged him to prioritize cup/bubbles as he feels most comfortable with that exercise.  3) RTC 4-6 weeks for a laryngeal exam recheck, or sooner as needed. Depending on the findings at that time, we may schedule a repeat MDL.    I spent a total of 22 minutes on 3/3/2025 in chart review, review of tests, patient visit, documentation, care coordination, and/or discussion with other providers about the issues documented above, separate from any documented procedure(s).

## 2025-03-03 NOTE — LETTER
3/3/2025      RE: Gary yIer  [address] [facility] [address]       Dear Colleague:  Gary Iyer recently returned for follow-up at the [facility]. My clinic note from our visit is enclosed below.  Speech recognition software may have been used in the documentation below; input is reviewed before signature to the best of my ability.     I appreciate the ongoing opportunity to participate in this patient's care.    Please feel free to contact me with any questions.      Sincerely yours,  Mel Fournier M.D., M.P.H.  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  He later returned to the operating room on February 9, 2017 for an area of new irregularity of the left true vocal fold.  Pathology showed severe dysplasia with negative but close margins (for moderate, but not severe dysplasia).  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    We then observed a slight recurrence of leukoplakia/granuloma in the same location where he had this in 2018. This resolved with conservative management. Subsequently, he had allergies which were provoking coughing/throat-clearing, and he was again noted to have a granuloma. He was set up for refresher session(s) of speech therapy with Misty Clark, PhD, CCC-SLP and we discussed nasal hygiene as well. This led to near complete resolution of the granuloma, but subsequently he was noted to have slightly increased irritation along the left medial arytenoid wall.  Then he developed an upper respiratory infection and has been coughing, and had some shortness of breath. He states he was COVID  negative. His family had noted him grunting and throat-clearing frequently.    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 In May 2024 he was hospitalized with high lactate, concern for bacteremia. As of Dec 2024 we noted new left posterior vocal fold lesion that was pedunculated and highly mobile. The surface looked irregular, but the location and mobility were potentially consistent with granuloma, similar to prior findings he had in 2016. The lesion continued to look consistent with a granuloma, but became larger despite him significantly limiting his voice use, so we proceeded to the OR on 2/13/25 for resection. Unexpectedly, pathology was read as invasive squamous cell carcinoma for the lobular components and at least CIS at the base of the lesion.      Today's updates:  - PET scan is pending  - Feels he followed the voice restrictions/exercises pretty well; is working with Travis Mcdaniels MM, MA, CCC-SLP for therapy. Feels there are too many exercises.  - no new concerns other than the pathology findings        MEDICATIONS:     Current Outpatient Medications   Medication Sig Dispense Refill     ACCU-CHEK GUIDE test strip TEST ONCE DAILY 100 strip 1     alcohol swab prep pads USE TO SWAB AREA OF INJECTION/OLIMPIA 100 each 1     allopurinol (ZYLOPRIM) 300 MG tablet TAKE 1 TABLET(300 MG) BY MOUTH DAILY 90 tablet 1     amLODIPine (NORVASC) 2.5 MG tablet Take 1 tablet (2.5 mg) by mouth daily. 90 tablet 1     aspirin 81 MG tablet Take 1 tablet by mouth daily.       atorvastatin (LIPITOR) 20 MG tablet Take 1 tablet (20 mg) by mouth daily. 90 tablet 1     blood glucose (ACCU-CHEK GUIDE) test strip Use to test blood sugar 1 times daily or as directed. 100 strip 11     blood glucose calibration (NO BRAND SPECIFIED) solution To accompany: Blood Glucose Monitor Brands: per insurance. 1 Bottle 3     Blood  Glucose Monitoring Suppl (ACCU-CHEK GUIDE ME) w/Device KIT USE TO TEST ONCE DAILY 1 kit 0     calcipotriene (DOVONEX) 0.005 % external cream Mix with efudex and apply twice daily to hands and arms for 12 days 60 g 3     cetirizine (ZYRTEC) 10 MG tablet Take 1 tablet (10 mg) by mouth daily. 90 tablet 3     diphenhydrAMINE (BENADRYL) 25 MG capsule 1 po one hour prior to procedure.  May take another 15 minutes prior if needed. 2 capsule 0     docusate sodium (DSS) 100 MG capsule Take 100 mg by mouth at bedtime       fluorouracil (EFUDEX) 5 % external cream Mix with dovonex and apply twice daily to hands and arms for 12 days 40 g 1     hydrochlorothiazide (HYDRODIURIL) 50 MG tablet Take 1 tablet (50 mg) by mouth daily. 90 tablet 1     hydrocortisone (CORTAID) 1 % external ointment Apply topically 2 times daily as needed 30 g 1     insulin pen needle (31G X 6 MM) 31G X 6 MM miscellaneous Use one pen needle daily or as directed. ( for Victoza ) 90 each 3     ketoconazole (NIZORAL) 2 % external cream Apply daily to affected area on face, armpits, groin. 60 g 1     ketoconazole (NIZORAL) 2 % external shampoo Leave on face, beard for 3-5 minutes then rinse. Use 2-3 times weekly. 120 mL 3     losartan (COZAAR) 100 MG tablet Take 1 tablet (100 mg) by mouth daily. 90 tablet 1     metroNIDAZOLE (METROCREAM) 0.75 % external cream Apply topically 2 times daily as needed (to rosacea areas) 45 g 1     Misc. Devices (SUPPOSITORY MOLD 2GM) MISC 1 suppository every 12 hours Nifedipine 4 mg suppository, one rectally every 12 hours 100 each 4     Multiple Vitamin (DAILY MULTIVITAMIN PO) Take by mouth daily       nystatin (MYCOSTATIN) 065704 UNIT/GM external powder Apply topically 2 times daily Prevention of rash 60 g 0     omeprazole (PRILOSEC) 20 MG DR capsule TAKE 1 CAPSULE(20 MG) BY MOUTH TWICE DAILY 180 capsule 1     order for DME Autocpap 10-16 cm 1 Units 0     OZEMPIC, 1 MG/DOSE, 4 MG/3ML pen INJECT 1MG SUBCUTANEOUSLY EVERY 7 DAYS  3 mL 2     PARoxetine (PAXIL) 40 MG tablet TAKE 1 TABLET(40 MG) BY MOUTH EVERY MORNING 90 tablet 1     polyethylene glycol (MIRALAX) 17 GM/Dose powder Take 17 g by mouth daily as needed for constipation 850 g 3     psyllium 0.52 G capsule Take 1 capsule (0.52 g) by mouth daily 100 capsule 3     tadalafil (ADCIRCA/CIALIS) 20 MG tablet 1/2 to 1 po as needed up to twice per week for erectile dysfunction 10 tablet 1     tamsulosin (FLOMAX) 0.4 MG capsule Take 2 capsules (0.8 mg) by mouth daily. 180 capsule 1     thin (NO BRAND SPECIFIED) lancets Use with lanceting device. To accompany: Blood Glucose Monitor Brands: per insurance. 100 each 11     triamcinolone (KENALOG) 0.1 % external cream Apply topically 2 times daily as needed for irritation 30 g 0       ALLERGIES:  No Known Allergies    NEW PMH/PSH: None    REVIEW OF SYSTEMS:  The patient completed a comprehensive 11 point review of systems (below), which was reviewed. Positives are as noted below.  Patient Supplied Answers to Review of Systems Noncontributory       PHYSICAL EXAM:  General: The patient was alert and conversant, and in no acute distress.    Oral cavity/oropharynx: No masses or lesions. Tongue mobility and palate elevation intact and symmetric.  Neck: No palpable cervical lymphadenopathy, no significant tenderness to palpation of the thyrohyoid space, which was narrow. No obvious thyroid abnormality.  Resp: Breathing comfortably, no stridor or stertor.  Neuro: Symmetric facial function. Other cranial nerve function as documented above.  Psych: Normal affect, pleasant and cooperative.  Voice/speech: Mild to moderate dysphonia characterized by breathiness, roughness, strain, and pitch instability .      Procedure:   Flexible fiberoptic laryngoscopy and laryngovideostroboscopy  Indications: This procedure was warranted to evaluate the patient's laryngeal anatomy and function. Risks, benefits, and alternatives were discussed.  Description: After written  informed consent was obtained, a time-out was performed to confirm patient identity, procedure, and procedure site. Topical 2% lidocaine and oxymetazoline were applied to the nasal cavities. I performed the endoscopy and no complications were apparent. Continuous and stroboscopic light were utilized to assess routine phonation and variable frequency phonation.  Performed by: Mel Fournier MD MPH  SLP: Sam Carlisle, Ph.D., M.M., CF-SLP   Findings: Normal nasopharynx. Normal base of tongue, valleculae, and epiglottis. Vocal fold mobility: right: normal; left: normal. Medial edges of the true vocal folds: smooth and straight.  Scattered foci of leukoplakia, left medial posterior true vocal fold.  Glissade produced appropriate elongation. There was moderate supraglottic recruitment with connected speech. Mucosa of false vocal folds, aryepiglottic folds, piriform sinuses, and posterior glottis unremarkable. Airway was patent. Response to the therapy probes was good. Additional findings on virtual chromoendoscopy: none.    The addition of stroboscopy allowed evaluation of the mucosal wave.   Amplitude: right: mildly decreased; left: normal. Symmetry: associated asymmetry. Closure pattern: complete. Closure plane: at glottic level. Phase distribution: normal.                                              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 He was noted to have good vocal fold vibration already at the surgical site. We discussed that the pathological findings and the clinical findings paint a bit of a disparate picture with the clinical findings looking more favorable than the pathological findings. Nonetheless we need to take the pathological findings seriously. We discussed the options of planning now for further resection vs starting with close surveillance. After some discussion we agreed upon the latter, and  noted we will have a low threshold for further resection given the pathology findings.    Plan:  1) PET/CT is pending; we will update him on results, and will also let him know if the plan needs to be changed moving forward based on the results.  2) Continue SLP exercises, sessions with Travis Mcdaniels MM, MA, CCC-SLP. Given that he felt he had too many exercises to do, we encouraged him to prioritize cup/bubbles as he feels most comfortable with that exercise.  3) RTC 4-6 weeks for a laryngeal exam recheck, or sooner as needed. Depending on the findings at that time, we may schedule a repeat MDL.    I spent a total of 22 minutes on 3/3/2025 in chart review, review of tests, patient visit, documentation, care coordination, and/or discussion with other providers about the issues documented above, separate from any documented procedure(s).    Mel Fournier MD

## 2025-03-04 ENCOUNTER — HOSPITAL ENCOUNTER (OUTPATIENT)
Dept: PET IMAGING | Facility: HOSPITAL | Age: 65
Discharge: HOME | End: 2025-03-04
Attending: OTOLARYNGOLOGY
Payer: COMMERCIAL

## 2025-03-04 DIAGNOSIS — C32.0 VOCAL CORD CANCER (H): ICD-10-CM

## 2025-03-04 LAB
CREAT BLD-MCNC: 0.8 MG/DL (ref 0.7–1.3)
EGFRCR SERPLBLD CKD-EPI 2021: >60 ML/MIN/1.73M2
GLUCOSE BLDC GLUCOMTR-MCNC: 138 MG/DL (ref 70–99)

## 2025-03-04 PROCEDURE — 82565 ASSAY OF CREATININE: CPT

## 2025-03-04 PROCEDURE — 250N000011 HC RX IP 250 OP 636: Performed by: OTOLARYNGOLOGY

## 2025-03-04 PROCEDURE — 70491 CT SOFT TISSUE NECK W/DYE: CPT

## 2025-03-04 PROCEDURE — 343N000001 HC RX 343 MED OP 636: Performed by: OTOLARYNGOLOGY

## 2025-03-04 PROCEDURE — 82962 GLUCOSE BLOOD TEST: CPT

## 2025-03-04 PROCEDURE — A9552 F18 FDG: HCPCS | Performed by: OTOLARYNGOLOGY

## 2025-03-04 PROCEDURE — 71260 CT THORAX DX C+: CPT

## 2025-03-04 PROCEDURE — 78816 PET IMAGE W/CT FULL BODY: CPT | Mod: PI

## 2025-03-04 RX ORDER — FLUDEOXYGLUCOSE F 18 200 MCI/ML
9-18 INJECTION, SOLUTION INTRAVENOUS ONCE
Status: COMPLETED | OUTPATIENT
Start: 2025-03-04 | End: 2025-03-04

## 2025-03-04 RX ORDER — IOPAMIDOL 755 MG/ML
117 INJECTION, SOLUTION INTRAVASCULAR ONCE
Status: COMPLETED | OUTPATIENT
Start: 2025-03-04 | End: 2025-03-04

## 2025-03-04 RX ADMIN — FLUDEOXYGLUCOSE F 18 13.96 MILLICURIE: 200 INJECTION, SOLUTION INTRAVENOUS at 08:48

## 2025-03-04 RX ADMIN — IOPAMIDOL 117 ML: 755 INJECTION, SOLUTION INTRAVENOUS at 09:43

## 2025-03-10 LAB
PATH REPORT.ADDENDUM SPEC: ABNORMAL
PATH REPORT.COMMENTS IMP SPEC: ABNORMAL
PATH REPORT.COMMENTS IMP SPEC: YES
PATH REPORT.FINAL DX SPEC: ABNORMAL
PATH REPORT.GROSS SPEC: ABNORMAL
PATH REPORT.MICROSCOPIC SPEC OTHER STN: ABNORMAL
PATH REPORT.RELEVANT HX SPEC: ABNORMAL
PHOTO IMAGE: ABNORMAL

## 2025-03-12 NOTE — PROGRESS NOTES
"Gary Iyer is a 64 year old male who is being evaluated via a billable video visit.      Gary has been notified and verbally consented to the following:   This video visit will be conducted between you and your provider.  Patient has opted to conduct today's video visit vs an in-person appointment.   Video visits are billed at different rates depending on your insurance coverage. Please reach out to your insurance provider with any questions.   If during the course of the call the provider feels the appointment is not appropriate, you will not be charged for this service.  Provider has received verbal consent for billable virtual visit from the patient? Yes  Will anyone else be joining your video visit? Amy Damon  Clinician    Call initiated at: 9:05   Type of Visit Platform Used: [employer] Video  Location of provider: [facility]  Location of patient: [facility]  THERAPY NOTE (CPT 16703)    Patient: Gary Iyer  Date of Service: 3/13/2025  Visit / Treatment number: 7 / 5  Certification Period: 3/7/25 - 3/5/25  Referring physician: Dr. 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 Perceptually, he demonstrated moderate dysphonia characterized by moderate roughness and moderate strain. 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 Therefore, continued voice therapy has been recommended.  Dr. Fournier has ordered follow-up evaluation in 4-6 weeks. Mr. Iyer is in agreement with this plan of care. \"    SUBJECTIVE:  Since the patient's last session, they report the following:   Overall symptoms are ***  Feels his voice is back to " "normal      OBJECTIVE:  PATIENT REPORTED MEASURES:  Patient Specific Goal Metrics:       No data to display            *see end of note for standardized measures*    THERAPEUTIC ACTIVITIES    Counseling and Education:  ***Asked many questions about the nature of their symptoms, and I answered all of these thoroughly.    ***    ***A regimen for home practice was instructed.  I provided a MyChart message of today's therapeutic activities to facilitate practice.    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 Gary ***.    PLAN: I will see Gary in *** weeks, at which point we will ***.   For practice goals see AVS.     TOTAL SERVICE TIME: *** minutes  TREATMENT (45923)  NO CHARGE FACILITY FEE (64536)    Travis Mcdaniels M.M., M.A., CCC-SLP  Speech-Language Pathologist  Certificate of Vocology  [phone]    *this report was created in part through the use of computerized dictation software, and though reviewed following completion, some typographic errors may persist.  If there is confusion regarding any of this notes contents, please contact me for clarification.*    Patient Supplied Answers To Last 2 VHI Questionnaires      11/16/2020     1:00 PM 8/15/2022     2:00 PM   Voice Handicap Index (VHI-10)   My voice makes it difficult for people to hear me 0 0   People have difficulty understanding me in a noisy room 1 0   My voice difficulties restrict my personal and social life.  1 0   I feel left out of conversations because of my voice 0 0   My voice problem causes me to lose income 1 0   I feel as though I have to strain to produce voice 1 0   The clarity of my voice is unpredictable 0 0   My voice problem upsets me 0 0   My voice makes me feel handicapped 1 0   People ask, \"What's wrong with your voice?\" 0 0 "   VHI-10 5 0        Patient Supplied Answers To Last 2 CSI Questionnaires      3/1/2019     7:39 AM 8/15/2022     2:00 PM   Cough Severity Index (CSI)   My cough is worse when I lie down 0  0   My coughing problem causes me to restrict my personal and social life 0  0   I tend to avoid places because of my cough problem 0  0   I feel embarrassed because of my coughing problem 0  0   People ask, ''What's wrong?'' because I cough a lot 0  0   I run out of air when I cough 1  0   My coughing problem affects my voice 0  0   My coughing problem limits my physical activity 1  0   My coughing problem upsets me 1  0   People ask me if I am sick because I cough a lot 0  0   CSI Score 3 0       Proxy-reported        Patient Supplied Answers To Last 2 EAT Questionnaires      3/1/2019     7:40 AM 8/15/2022     2:00 PM   Eating Assessment Tool (EAT-10)   My swallowing problem has caused me to lose weight 1  0   My swallowing problem interferes with my ability to go out for meals 0  0   Swallowing liquids takes extra effort 1  0   Swallowing solids takes extra effort 1  0   Swallowing pills takes extra effort 1  0   Swallowing is painful 0  0   The pleasure of eating is affected by my swallowing 0  0   When I swallow food sticks in my throat 0  0   I cough when I eat 1  0   Swallowing is stressful 0  0   EAT-10 5 0       Proxy-reported        Patient Supplied Answers to Dyspnea Index Questionnaire:       No data to display

## 2025-03-13 ENCOUNTER — VIRTUAL VISIT (OUTPATIENT)
Dept: OTOLARYNGOLOGY | Facility: CLINIC | Age: 65
End: 2025-03-13
Payer: COMMERCIAL

## 2025-03-13 ENCOUNTER — OFFICE VISIT (OUTPATIENT)
Dept: FAMILY MEDICINE | Facility: CLINIC | Age: 65
End: 2025-03-13
Payer: COMMERCIAL

## 2025-03-13 VITALS
HEIGHT: 70 IN | HEART RATE: 101 BPM | RESPIRATION RATE: 16 BRPM | TEMPERATURE: 97 F | OXYGEN SATURATION: 100 % | BODY MASS INDEX: 34.56 KG/M2 | DIASTOLIC BLOOD PRESSURE: 73 MMHG | WEIGHT: 241.38 LBS | SYSTOLIC BLOOD PRESSURE: 117 MMHG

## 2025-03-13 DIAGNOSIS — R39.12 BENIGN PROSTATIC HYPERPLASIA WITH WEAK URINARY STREAM: ICD-10-CM

## 2025-03-13 DIAGNOSIS — N40.1 BENIGN PROSTATIC HYPERPLASIA WITH WEAK URINARY STREAM: ICD-10-CM

## 2025-03-13 DIAGNOSIS — E78.5 HYPERLIPIDEMIA LDL GOAL <70: Chronic | ICD-10-CM

## 2025-03-13 DIAGNOSIS — M10.9 GOUT, UNSPECIFIED CAUSE, UNSPECIFIED CHRONICITY, UNSPECIFIED SITE: Primary | ICD-10-CM

## 2025-03-13 DIAGNOSIS — E55.9 VITAMIN D DEFICIENCY DISEASE: ICD-10-CM

## 2025-03-13 DIAGNOSIS — E04.1 THYROID NODULE: ICD-10-CM

## 2025-03-13 DIAGNOSIS — D64.9 ANEMIA, UNSPECIFIED TYPE: ICD-10-CM

## 2025-03-13 DIAGNOSIS — F41.9 ANXIETY: ICD-10-CM

## 2025-03-13 DIAGNOSIS — R93.7 ABNORMAL CT OF THORACIC SPINE: ICD-10-CM

## 2025-03-13 DIAGNOSIS — E11.9 TYPE 2 DIABETES MELLITUS WITHOUT COMPLICATION, WITHOUT LONG-TERM CURRENT USE OF INSULIN (H): ICD-10-CM

## 2025-03-13 LAB
BASOPHILS # BLD AUTO: 0.1 10E3/UL (ref 0–0.2)
BASOPHILS NFR BLD AUTO: 1 %
CHOLEST SERPL-MCNC: 155 MG/DL
EOSINOPHIL # BLD AUTO: 0.2 10E3/UL (ref 0–0.7)
EOSINOPHIL NFR BLD AUTO: 3 %
ERYTHROCYTE [DISTWIDTH] IN BLOOD BY AUTOMATED COUNT: 15.3 % (ref 10–15)
EST. AVERAGE GLUCOSE BLD GHB EST-MCNC: 143 MG/DL
FASTING STATUS PATIENT QL REPORTED: ABNORMAL
FERRITIN SERPL-MCNC: 16 NG/ML (ref 31–409)
FOLATE SERPL-MCNC: 26 NG/ML (ref 4.6–34.8)
HBA1C MFR BLD: 6.6 % (ref 0–5.6)
HCT VFR BLD AUTO: 35 % (ref 40–53)
HDLC SERPL-MCNC: 49 MG/DL
HGB BLD-MCNC: 11.1 G/DL (ref 13.3–17.7)
IMM GRANULOCYTES # BLD: 0 10E3/UL
IMM GRANULOCYTES NFR BLD: 0 %
IRON BINDING CAPACITY (ROCHE): 410 UG/DL (ref 240–430)
IRON SATN MFR SERPL: 11 % (ref 15–46)
IRON SERPL-MCNC: 45 UG/DL (ref 61–157)
LDLC SERPL CALC-MCNC: 66 MG/DL
LYMPHOCYTES # BLD AUTO: 2.1 10E3/UL (ref 0.8–5.3)
LYMPHOCYTES NFR BLD AUTO: 30 %
MCH RBC QN AUTO: 28.8 PG (ref 26.5–33)
MCHC RBC AUTO-ENTMCNC: 31.7 G/DL (ref 31.5–36.5)
MCV RBC AUTO: 91 FL (ref 78–100)
MONOCYTES # BLD AUTO: 0.9 10E3/UL (ref 0–1.3)
MONOCYTES NFR BLD AUTO: 12 %
NEUTROPHILS # BLD AUTO: 3.7 10E3/UL (ref 1.6–8.3)
NEUTROPHILS NFR BLD AUTO: 53 %
NONHDLC SERPL-MCNC: 106 MG/DL
PLATELET # BLD AUTO: 239 10E3/UL (ref 150–450)
RBC # BLD AUTO: 3.86 10E6/UL (ref 4.4–5.9)
TRIGL SERPL-MCNC: 200 MG/DL
TSH SERPL DL<=0.005 MIU/L-ACNC: 1.93 UIU/ML (ref 0.3–4.2)
URATE SERPL-MCNC: 4.2 MG/DL (ref 3.4–7)
VIT B12 SERPL-MCNC: 448 PG/ML (ref 232–1245)
VIT D+METAB SERPL-MCNC: 42 NG/ML (ref 20–50)
WBC # BLD AUTO: 7 10E3/UL (ref 4–11)

## 2025-03-13 RX ORDER — ALPRAZOLAM 0.5 MG
TABLET ORAL
Qty: 2 TABLET | Refills: 0 | Status: SHIPPED | OUTPATIENT
Start: 2025-03-13

## 2025-03-13 ASSESSMENT — PAIN SCALES - GENERAL: PAINLEVEL_OUTOF10: NO PAIN (0)

## 2025-03-13 NOTE — PROGRESS NOTES
"  Assessment & Plan     (M10.9) Gout, unspecified cause, unspecified chronicity, unspecified site  (primary encounter diagnosis)  Comment: check level ; on allopurinol, no flares   Plan: Uric acid        As above     (E11.9) Type 2 diabetes mellitus without complication, without long-term current use of insulin (H)  Comment: check lab again, increase ozempic to 2 mg more for weight than sugar control   Plan: Adult Eye  Referral, Albumin Random         Urine Quantitative with Creat Ratio,         Semaglutide, 2 MG/DOSE, (OZEMPIC) 8 MG/3ML pen,        Hemoglobin A1c             (E04.1) Thyroid nodule  Comment: check labs, patient to schedule a follow up ultrasound ( had biopsy done last year )   Plan: US Thyroid, TSH with free T4 reflex             (D64.9) Anemia, unspecified type  Comment: check labs   Plan: CBC with Platelets & Differential, Iron and         iron binding capacity, Ferritin, Vitamin B12,         Folate                (E78.5) Hyperlipidemia LDL goal <70  Comment: check   Plan: Lipid panel reflex to direct LDL Fasting,         Comprehensive metabolic panel             (N40.1,  R39.12) Benign prostatic hyperplasia with weak urinary stream  Comment: on tamsulosin, stable   Plan: discussed     (R93.7) Abnormal CT of thoracic spine  Comment: as per radiology recommendation, ordered mri   Plan: MR Thoracic Spine w/o & w Contrast             (F41.9) Anxiety  Comment: use this to help get through mri; patient to have somebody  else drive   Plan: ALPRAZolam (XANAX) 0.5 MG tablet             (E55.9) Vitamin D deficiency disease  Comment: check   Plan: Vitamin D Deficiency                   BMI  Estimated body mass index is 34.63 kg/m  as calculated from the following:    Height as of this encounter: 1.778 m (5' 10\").    Weight as of this encounter: 109.5 kg (241 lb 6 oz).   Weight management plan: Discussed healthy diet and exercise guidelines           Kamryn Vega is a 64 year old, presenting " "for the following health issues:  Results (Follow up on CT scan results)        3/13/2025    10:49 AM   Additional Questions   Roomed by Elisa Chandra     History of Present Illness       Reason for visit:  Referred by ent  Symptom onset:  More than a month  Symptoms include:  Thyroid  Symptom intensity:  Moderate  Symptom progression:  Staying the same  Had these symptoms before:  Yes  Has tried/received treatment for these symptoms:  No   He is taking medications regularly.       Weak stream urinary     Twice at night to urinate at least     Not real frequent urination during day         Objective    /73 (BP Location: Left arm, Patient Position: Chair, Cuff Size: Adult Large)   Pulse 101   Temp 97  F (36.1  C) (Temporal)   Resp 16   Ht 1.778 m (5' 10\")   Wt 109.5 kg (241 lb 6 oz)   SpO2 100%   BMI 34.63 kg/m    Body mass index is 34.63 kg/m .  Physical Exam    Full physical not done     Mentation and affect are fine    No tremor of speech or extremity  No swelling anterior neck         Reviewed recent labs and imaging tests in detail         Signed Electronically by: Finesse Beal MD    "

## 2025-03-13 NOTE — PATIENT INSTRUCTIONS
We will send you lab results    Schedule thyroid ultrasound    Schedule mri thoracic spine  Can take alprazolam prior to test to help anxiety / claustrophobia, will cause sedation. Have somebody else drive.

## 2025-03-13 NOTE — PATIENT INSTRUCTIONS
Emiliano Vega,    Watch out for causing irritation to the back of your throat in everyday activities  Keep yourself regular with regards to constipation so you don't have to bear down as much  Follow the recommendations of your treating provider  Avoid grunting as you pick things up or change positions  Exhale and puff your cheeks as you lift or change position to avoid bearing down  Check-in as you while you exert yourself  Make sure to have steady breathing with exertion  Can be mouth or nose  Anytime you notice yourself holding your breath, go back to breathing in and out easily    Easy Voice  Start with a gentle humm or the straw with big bubbles  Take comfy breath and hum on a comfy pitch   Focus on an easy feeling  Spend a minute gently gliding up and down and doing easy sustained notes.   Breathe frequently and focus on that easy feeling  Everyday phrases  *every phrase you'll say twice. First with a gentle humm + the phrase, and second just the phrase in an easy way. Breathe between each*  Mmhmm - like answering a yes or no question  Hum + Hello  Hello  Hum + This is Gary  This is Gary  Hum + What are we having for breakfast  What are we having for breakfast  Hum + Can I have that please?  Can I have that please?  Hum + What do you need from the store?  What do you need from the store?  You can change this list if you find there is something that you say more frequently.

## 2025-03-21 ENCOUNTER — HOSPITAL ENCOUNTER (OUTPATIENT)
Dept: ULTRASOUND IMAGING | Facility: HOSPITAL | Age: 65
Discharge: HOME | End: 2025-03-21
Attending: FAMILY MEDICINE
Payer: COMMERCIAL

## 2025-03-21 ENCOUNTER — HOSPITAL ENCOUNTER (OUTPATIENT)
Dept: MRI IMAGING | Facility: HOSPITAL | Age: 65
Discharge: HOME | End: 2025-03-21
Attending: FAMILY MEDICINE
Payer: COMMERCIAL

## 2025-03-21 DIAGNOSIS — E04.1 THYROID NODULE: ICD-10-CM

## 2025-03-21 DIAGNOSIS — R93.7 ABNORMAL CT OF THORACIC SPINE: ICD-10-CM

## 2025-03-21 PROCEDURE — 72157 MRI CHEST SPINE W/O & W/DYE: CPT

## 2025-03-21 PROCEDURE — 255N000002 HC RX 255 OP 636: Performed by: FAMILY MEDICINE

## 2025-03-21 PROCEDURE — 76536 US EXAM OF HEAD AND NECK: CPT

## 2025-03-21 PROCEDURE — A9585 GADOBUTROL INJECTION: HCPCS | Performed by: FAMILY MEDICINE

## 2025-03-21 RX ORDER — GADOBUTROL 604.72 MG/ML
0.1 INJECTION INTRAVENOUS ONCE
Status: COMPLETED | OUTPATIENT
Start: 2025-03-21 | End: 2025-03-21

## 2025-03-21 RX ADMIN — GADOBUTROL 10.95 ML: 604.72 INJECTION INTRAVENOUS at 11:36

## 2025-03-22 NOTE — RESULT ENCOUNTER NOTE
No new worrisome findings.  The nodule that was biopsied is smaller than last time.    Advise we recheck this in one year.    Finesse Beal MD

## 2025-03-22 NOTE — RESULT ENCOUNTER NOTE
Good news.  No real worrisome findings on mri of thoracic spine.    Some common arthritis type changes seen.    Finesse Beal MD

## 2025-03-26 ENCOUNTER — OFFICE VISIT (OUTPATIENT)
Dept: OPTOMETRY | Facility: CLINIC | Age: 65
End: 2025-03-26
Payer: COMMERCIAL

## 2025-03-26 DIAGNOSIS — H52.4 HYPEROPIA WITH PRESBYOPIA OF BOTH EYES: Primary | ICD-10-CM

## 2025-03-26 DIAGNOSIS — H52.03 HYPEROPIA WITH PRESBYOPIA OF BOTH EYES: Primary | ICD-10-CM

## 2025-03-26 DIAGNOSIS — E11.9 TYPE 2 DIABETES MELLITUS WITHOUT COMPLICATION, WITHOUT LONG-TERM CURRENT USE OF INSULIN (H): ICD-10-CM

## 2025-03-26 PROCEDURE — 92015 DETERMINE REFRACTIVE STATE: CPT

## 2025-03-26 PROCEDURE — 92014 COMPRE OPH EXAM EST PT 1/>: CPT

## 2025-03-26 ASSESSMENT — CONF VISUAL FIELD
OD_INFERIOR_NASAL_RESTRICTION: 0
OD_NORMAL: 1
OD_SUPERIOR_NASAL_RESTRICTION: 0
OS_NORMAL: 1
OD_SUPERIOR_TEMPORAL_RESTRICTION: 0
OD_INFERIOR_TEMPORAL_RESTRICTION: 0
OS_INFERIOR_NASAL_RESTRICTION: 0
OS_SUPERIOR_NASAL_RESTRICTION: 0
OS_INFERIOR_TEMPORAL_RESTRICTION: 0
OS_SUPERIOR_TEMPORAL_RESTRICTION: 0

## 2025-03-26 ASSESSMENT — REFRACTION_MANIFEST
OS_AXIS: 177
OD_AXIS: 058
OS_SPHERE: +1.25
OS_CYLINDER: +0.50
OS_SPHERE: +1.00
OS_CYLINDER: +0.50
OD_AXIS: 010
METHOD_AUTOREFRACTION: 1
OD_SPHERE: +0.75
OS_ADD: +2.50
OD_CYLINDER: +0.75
OS_AXIS: 179
OD_SPHERE: +1.00
OD_CYLINDER: +0.75
OD_ADD: +2.50

## 2025-03-26 ASSESSMENT — VISUAL ACUITY
OS_SC+: +2
OS_SC: 20/40
METHOD: SNELLEN - LINEAR
OD_SC: 20/30
OS_SC: 20/40
OD_SC+: +2
OS_PH_SC: 20/30
OS_PH_SC+: +2
OD_SC: 20/25

## 2025-03-26 ASSESSMENT — EXTERNAL EXAM - RIGHT EYE: OD_EXAM: BROW PTOSIS

## 2025-03-26 ASSESSMENT — EXTERNAL EXAM - LEFT EYE: OS_EXAM: BROW PTOSIS

## 2025-03-26 ASSESSMENT — TONOMETRY
IOP_METHOD: TONOPEN
OD_IOP_MMHG: 21
OS_IOP_MMHG: 21

## 2025-03-26 ASSESSMENT — KERATOMETRY
OS_K1POWER_DIOPTERS: 43.50
OS_AXISANGLE_DEGREES: 4
OD_K1POWER_DIOPTERS: 42.75
OD_K2POWER_DIOPTERS: 44.75
OS_K2POWER_DIOPTERS: 44.50
OD_AXISANGLE2_DEGREES: 92
OS_AXISANGLE2_DEGREES: 94
OD_AXISANGLE_DEGREES: 2

## 2025-03-26 ASSESSMENT — SLIT LAMP EXAM - LIDS
COMMENTS: DERMATOCHALASIS
COMMENTS: DERMATOCHALASIS

## 2025-03-26 ASSESSMENT — REFRACTION_WEARINGRX
OD_SPHERE: +3.00
OS_SPHERE: +3.00

## 2025-03-26 ASSESSMENT — CUP TO DISC RATIO
OS_RATIO: 0.3
OD_RATIO: 0.3

## 2025-03-26 NOTE — LETTER
3/26/2025      Gary Iyer  [address] [place] [address]      Dear Colleague,    Thank you for referring your patient, Gary Iyer, to the [facility]. Please see a copy of my visit note below.    Chief Complaint   Patient presents with     Diabetic Eye Exam        Chief Complaint(s) and History of Present Illness(es)       Diabetic Eye Exam              Diabetes Type: Type 2 and on insulin    Duration: 8 years    Blood Sugars: is controlled                   Lab Results   Component Value Date    A1C 6.6 03/13/2025    A1C 6.4 01/29/2025    A1C 6.6 08/26/2024    A1C 6.1 03/01/2024    A1C 6.0 08/29/2023    A1C 7.4 03/10/2021    A1C 6.9 11/25/2020    A1C 7.0 07/17/2020    A1C 7.1 08/30/2019    A1C 6.6 03/22/2019            Last Eye Exam: 2023  Dilated Previously: Yes    What are you currently using to see?  Readers +3.00- did not bring with today     Distance Vision Acuity: Satisfied with vision    Near Vision Acuity: Satisfied with vision while reading and using computer with readers    Eye Comfort: good  Do you use eye drops? : No      Denelle Luis - Optometric Assistant     Medical, surgical and family histories reviewed and updated 3/26/2025.       OBJECTIVE: See Ophthalmology exam    ASSESSMENT:    ICD-10-CM    1. Hyperopia with presbyopia of both eyes  H52.03     H52.4       2. Type 2 diabetes mellitus without complication, without long-term current use of insulin (H)  E11.9 Adult Eye  Referral          PLAN:    Gary Iyer aware  eye exam results will be sent to Finesse Beal  Patient Instructions   Hyperopia with presbyopia, both eyes: Updated bifocal glasses prescription for full-time wear.  Monitor annually.  T2DM: No retinopathy on today's exam.  Patient educated on condition. Stressed importance of close BS/BP monitoring, diet/exercise, medication compliance, and regular visits with PCP. Monitor annually.      Elizabeth Thomas, OD      Again, thank  you for allowing me to participate in the care of your patient.        Sincerely,        Elizabeth Thomas OD    Electronically signed

## 2025-03-26 NOTE — PROGRESS NOTES
Chief Complaint   Patient presents with    Diabetic Eye Exam        Chief Complaint(s) and History of Present Illness(es)       Diabetic Eye Exam              Diabetes Type: Type 2 and on insulin    Duration: 8 years    Blood Sugars: is controlled                   Lab Results   Component Value Date    A1C 6.6 03/13/2025    A1C 6.4 01/29/2025    A1C 6.6 08/26/2024    A1C 6.1 03/01/2024    A1C 6.0 08/29/2023    A1C 7.4 03/10/2021    A1C 6.9 11/25/2020    A1C 7.0 07/17/2020    A1C 7.1 08/30/2019    A1C 6.6 03/22/2019            Last Eye Exam: 2023  Dilated Previously: Yes    What are you currently using to see?  Readers +3.00- did not bring with today     Distance Vision Acuity: Satisfied with vision    Near Vision Acuity: Satisfied with vision while reading and using computer with readers    Eye Comfort: good  Do you use eye drops? : No      Denelle Luis - Optometric Assistant     Medical, surgical and family histories reviewed and updated 3/26/2025.       OBJECTIVE: See Ophthalmology exam    ASSESSMENT:    ICD-10-CM    1. Hyperopia with presbyopia of both eyes  H52.03     H52.4       2. Type 2 diabetes mellitus without complication, without long-term current use of insulin (H)  E11.9 Adult Eye  Referral          PLAN:    Gary Iyer aware  eye exam results will be sent to F[facility]  Patient Instructions   Hyperopia with presbyopia, both eyes: Updated bifocal glasses prescription for full-time wear.  Monitor annually.  T2DM: No retinopathy on today's exam.  Patient educated on condition. Stressed importance of close BS/BP monitoring, diet/exercise, medication compliance, and regular visits with PCP. Monitor annually.      Elizabeth Thomas, OD

## 2025-03-26 NOTE — PATIENT INSTRUCTIONS
Hyperopia with presbyopia, both eyes: Updated bifocal glasses prescription for full-time wear.  Monitor annually.  T2DM: No retinopathy on today's exam.  Patient educated on condition. Stressed importance of close BS/BP monitoring, diet/exercise, medication compliance, and regular visits with PCP. Monitor annually.

## 2025-03-27 ENCOUNTER — VIRTUAL VISIT (OUTPATIENT)
Dept: OTOLARYNGOLOGY | Facility: CLINIC | Age: 65
End: 2025-03-27
Payer: COMMERCIAL

## 2025-03-27 DIAGNOSIS — R49.0 DYSPHONIA: Primary | ICD-10-CM

## 2025-03-27 DIAGNOSIS — C32.9 LARYNGEAL CANCER (H): ICD-10-CM

## 2025-03-27 DIAGNOSIS — J38.3 VOCAL PROCESS GRANULOMA: ICD-10-CM

## 2025-03-27 NOTE — Clinical Note
Gary Has been diligent in trying to do his exercises and use of an audio recording that I made for him seems to have improved the accuracy (at least based on today's visit).  I do not feel that more sessions are necessary from my perspective at this time given good adherence and a clear concise practice regimen, but I think it is fair to leave that open until you see how things look on Monday.  Kassandra if you are willing to be present for continuity of care that would be great, though I am not sure a repeat perceptual eval is genuinely indicated at this time.

## 2025-03-27 NOTE — LETTER
"3/27/2025       RE: Gary Iyer  [address]  [place] [address]     Dear Colleague,    Thank you for referring your patient, Gary Iyer, to the [facility] [place] [facility]. Please see a copy of my visit note below.    Gary Iyer is a 64 year old male who is being evaluated via a billable video visit.      Gary has been notified and verbally consented to the following:   This video visit will be conducted between you and your provider.  Patient has opted to conduct today's video visit vs an in-person appointment.   Video visits are billed at different rates depending on your insurance coverage. Please reach out to your insurance provider with any questions.   If during the course of the call the provider feels the appointment is not appropriate, you will not be charged for this service.  Provider has received verbal consent for billable virtual visit from the patient? Yes  Will anyone else be joining your video visit? No    Call initiated at: 1:00   Type of Visit Platform Used: [employer]  Location of provider: [facility] Voice [facility]  Location of patient: [facility] VOICE [facility]  THERAPY NOTE (CPT 71993)    Patient: Gary Iyer  Date of Service: 3/27/2025  Visit / Treatment number: 8 / 6   Certification Period: 3/7/25 - 6/5/25  Referring physician: Dr. 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 Perceptually, he demonstrated moderate dysphonia characterized by moderate roughness and moderate strain. 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 Therefore, continued voice therapy " "has been recommended.  Dr. Fournier has ordered follow-up evaluation in 4-6 weeks. Mr. Iyer is in agreement with this plan of care. \"     SUBJECTIVE:  Since the patient's last session, they report the following:   Overall symptoms are a little better  Found the exercises helpful  Wonders \"how long he is going to use his voice like this\"   Denies throat pain, cough/throat clearing,     OBJECTIVE:    THERAPEUTIC ACTIVITIES    Counseling and Education:  Asked many questions about the nature of their symptoms, and I answered all of these thoroughly.      Exercises to promote access to easy phonation and reduced vocal fold/laryngeal impact  Demonstrated previously assigned exercises  Found that humming preior to saying things is helpful  Was able to demonstrate exhalation during activity independently  Has been told that he occasionally holds his breath with activity  Friends and family are helping remind him to breathe in these cases  Patient was able to independently demonstrate easy forward resonant hum with no appreciable strain  Slight roughness noted as pitch ascended or descended significantly, but significant ease and clarity noted in modal range  Patient was able to carry this over using previously trained therapeutic strategies to word and short phrase level  Notably decreased strain and roughness in speech during today's session    Given strong progress during the recent interval based on the prior exercise regimen, this was left unchanged with patient encouraged to continue to prioritize it to use daily at this time.  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 Gary has notably improved clarity of voicing during today's visit and reports that the most recent exercises have been more easily manageable.  He found the exercise recording to be helpful as well.  He was able to demonstrate these exercises with good accuracy during today's session and reports no other signs of laryngeal irritation in the form of throat discomfort or increased cough which bodes well alongside improved ease in voicing.    PLAN: Gary will be seen by Dr. Fournier on 3/31/2025.  Based on today's session and good maintenance additional therapy is not needed based on patient report and audio perceptual quality, but final determination will be left until after his upcoming follow-up with Dr. Fournier on 3/31/2025.  Speech services may be present for continuity and coordination of care.   For practice goals see AVS.     TOTAL SERVICE TIME: 25 minutes  TREATMENT (97075)  NO CHARGE FACILITY FEE (47342)    Travis Mcdaniels M.M., M.A., CCC-SLP  Speech-Language Pathologist  Certificate of Vocology  [phone]    *this report was created in part through the use of computerized dictation software, and though reviewed following completion, some typographic errors may persist.  If there is confusion regarding any of this notes contents, please contact me for clarification.*    Patient Supplied Answers To Last 2 VHI Questionnaires      8/15/2022     2:00 PM 3/27/2025    12:44 PM   Voice Handicap Index (VHI-10)   My voice makes it difficult for people to hear me 0 0    People have difficulty understanding me in a noisy room 0 0    My voice difficulties restrict my personal and social life.  0 0    I feel left out of conversations because of my voice 0 0    My voice problem causes me to lose income 0 0    I feel as though I have to strain to produce  "voice 0 2    The clarity of my voice is unpredictable 0 0    My voice problem upsets me 0 0    My voice makes me feel handicapped 0 0    People ask, \"What's wrong with your voice?\" 0 0    VHI-10 0 2        Proxy-reported        Patient Supplied Answers To Last 2 CSI Questionnaires      3/1/2019     7:39 AM 8/15/2022     2:00 PM   Cough Severity Index (CSI)   My cough is worse when I lie down 0  0   My coughing problem causes me to restrict my personal and social life 0  0   I tend to avoid places because of my cough problem 0  0   I feel embarrassed because of my coughing problem 0  0   People ask, ''What's wrong?'' because I cough a lot 0  0   I run out of air when I cough 1  0   My coughing problem affects my voice 0  0   My coughing problem limits my physical activity 1  0   My coughing problem upsets me 1  0   People ask me if I am sick because I cough a lot 0  0   CSI Score 3 0       Proxy-reported        Patient Supplied Answers To Last 2 EAT Questionnaires      3/1/2019     7:40 AM 8/15/2022     2:00 PM   Eating Assessment Tool (EAT-10)   My swallowing problem has caused me to lose weight 1  0   My swallowing problem interferes with my ability to go out for meals 0  0   Swallowing liquids takes extra effort 1  0   Swallowing solids takes extra effort 1  0   Swallowing pills takes extra effort 1  0   Swallowing is painful 0  0   The pleasure of eating is affected by my swallowing 0  0   When I swallow food sticks in my throat 0  0   I cough when I eat 1  0   Swallowing is stressful 0  0   EAT-10 5 0       Proxy-reported        Patient Supplied Answers to Dyspnea Index Questionnaire:       No data to display                Again, thank you for allowing me to participate in the care of your patient.      Sincerely,    Travis Mcdaniels, SLP    "

## 2025-03-27 NOTE — PROGRESS NOTES
"Gary Iyer is a 64 year old male who is being evaluated via a billable video visit.      Gary has been notified and verbally consented to the following:   This video visit will be conducted between you and your provider.  Patient has opted to conduct today's video visit vs an in-person appointment.   Video visits are billed at different rates depending on your insurance coverage. Please reach out to your insurance provider with any questions.   If during the course of the call the provider feels the appointment is not appropriate, you will not be charged for this service.  Provider has received verbal consent for billable virtual visit from the patient? Yes  Will anyone else be joining your video visit? No    Call initiated at: 1:00   Type of Visit Platform Used: Social IQ (Social Influence Quotient) Video  Location of provider: [facility] Voice [facility]  Location of patient: [facility] VOICE CLINIC  THERAPY NOTE (CPT 54424)    Patient: Gary Iyer  Date of Service: 3/27/2025  Visit / Treatment number: 8 / 6   Certification Period: 3/7/25 - 6/5/25  Referring physician: Dr. 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 Perceptually, he demonstrated moderate dysphonia characterized by moderate roughness and moderate strain. 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 Therefore, continued voice therapy has been recommended.  Dr. Fournier has ordered follow-up evaluation in 4-6 weeks. Mr. Iyer is in agreement with this plan of care. \"     SUBJECTIVE:  Since the patient's last session, they report the following:   Overall symptoms are a little better  Found the exercises helpful  Wonders \"how long he is " "going to use his voice like this\"   Denies throat pain, cough/throat clearing,     OBJECTIVE:    THERAPEUTIC ACTIVITIES    Counseling and Education:  Asked many questions about the nature of their symptoms, and I answered all of these thoroughly.      Exercises to promote access to easy phonation and reduced vocal fold/laryngeal impact  Demonstrated previously assigned exercises  Found that humming preior to saying things is helpful  Was able to demonstrate exhalation during activity independently  Has been told that he occasionally holds his breath with activity  Friends and family are helping remind him to breathe in these cases  Patient was able to independently demonstrate easy forward resonant hum with no appreciable strain  Slight roughness noted as pitch ascended or descended significantly, but significant ease and clarity noted in modal range  Patient was able to carry this over using previously trained therapeutic strategies to word and short phrase level  Notably decreased strain and roughness in speech during today's session    Given strong progress during the recent interval based on the prior exercise regimen, this was left unchanged with patient encouraged to continue to prioritize it to use daily at this time.  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 Gary has notably improved clarity of voicing during today's visit and reports that the most recent exercises have been more easily " "manageable.  He found the exercise recording to be helpful as well.  He was able to demonstrate these exercises with good accuracy during today's session and reports no other signs of laryngeal irritation in the form of throat discomfort or increased cough which bodes well alongside improved ease in voicing.    PLAN: Gary will be seen by Dr. Fournier on 3/31/2025.  Based on today's session and good maintenance additional therapy is not needed based on patient report and audio perceptual quality, but final determination will be left until after his upcoming follow-up with Dr. Fournier on 3/31/2025.  Speech services may be present for continuity and coordination of care.   For practice goals see AVS.     TOTAL SERVICE TIME: 25 minutes  TREATMENT (67469)  NO CHARGE FACILITY FEE (29852)    Travis Mcdaniels M.M., M.A., CCC-SLP  Speech-Language Pathologist  Certificate of Vocology  [phone]    *this report was created in part through the use of computerized dictation software, and though reviewed following completion, some typographic errors may persist.  If there is confusion regarding any of this notes contents, please contact me for clarification.*    Patient Supplied Answers To Last 2 VHI Questionnaires      8/15/2022     2:00 PM 3/27/2025    12:44 PM   Voice Handicap Index (VHI-10)   My voice makes it difficult for people to hear me 0 0    People have difficulty understanding me in a noisy room 0 0    My voice difficulties restrict my personal and social life.  0 0    I feel left out of conversations because of my voice 0 0    My voice problem causes me to lose income 0 0    I feel as though I have to strain to produce voice 0 2    The clarity of my voice is unpredictable 0 0    My voice problem upsets me 0 0    My voice makes me feel handicapped 0 0    People ask, \"What's wrong with your voice?\" 0 0    VHI-10 0 2        Proxy-reported        Patient Supplied Answers To Last 2 CSI Questionnaires      3/1/2019     " 7:39 AM 8/15/2022     2:00 PM   Cough Severity Index (CSI)   My cough is worse when I lie down 0  0   My coughing problem causes me to restrict my personal and social life 0  0   I tend to avoid places because of my cough problem 0  0   I feel embarrassed because of my coughing problem 0  0   People ask, ''What's wrong?'' because I cough a lot 0  0   I run out of air when I cough 1  0   My coughing problem affects my voice 0  0   My coughing problem limits my physical activity 1  0   My coughing problem upsets me 1  0   People ask me if I am sick because I cough a lot 0  0   CSI Score 3 0       Proxy-reported        Patient Supplied Answers To Last 2 EAT Questionnaires      3/1/2019     7:40 AM 8/15/2022     2:00 PM   Eating Assessment Tool (EAT-10)   My swallowing problem has caused me to lose weight 1  0   My swallowing problem interferes with my ability to go out for meals 0  0   Swallowing liquids takes extra effort 1  0   Swallowing solids takes extra effort 1  0   Swallowing pills takes extra effort 1  0   Swallowing is painful 0  0   The pleasure of eating is affected by my swallowing 0  0   When I swallow food sticks in my throat 0  0   I cough when I eat 1  0   Swallowing is stressful 0  0   EAT-10 5 0       Proxy-reported        Patient Supplied Answers to Dyspnea Index Questionnaire:       No data to display

## 2025-03-31 ENCOUNTER — OFFICE VISIT (OUTPATIENT)
Dept: OTOLARYNGOLOGY | Facility: CLINIC | Age: 65
End: 2025-03-31
Payer: COMMERCIAL

## 2025-03-31 VITALS
BODY MASS INDEX: 34.36 KG/M2 | HEIGHT: 70 IN | DIASTOLIC BLOOD PRESSURE: 83 MMHG | HEART RATE: 119 BPM | SYSTOLIC BLOOD PRESSURE: 151 MMHG | WEIGHT: 240 LBS

## 2025-03-31 DIAGNOSIS — C32.9 LARYNGEAL CANCER (H): Primary | ICD-10-CM

## 2025-03-31 DIAGNOSIS — E11.9 TYPE 2 DIABETES MELLITUS WITHOUT COMPLICATION, WITHOUT LONG-TERM CURRENT USE OF INSULIN (H): ICD-10-CM

## 2025-03-31 DIAGNOSIS — R49.0 DYSPHONIA: Primary | ICD-10-CM

## 2025-03-31 DIAGNOSIS — J38.3 OTHER DISEASES OF VOCAL CORDS: ICD-10-CM

## 2025-03-31 PROCEDURE — 3079F DIAST BP 80-89 MM HG: CPT | Performed by: OTOLARYNGOLOGY

## 2025-03-31 PROCEDURE — 99213 OFFICE O/P EST LOW 20 MIN: CPT | Mod: 25 | Performed by: OTOLARYNGOLOGY

## 2025-03-31 PROCEDURE — 3077F SYST BP >= 140 MM HG: CPT | Performed by: OTOLARYNGOLOGY

## 2025-03-31 PROCEDURE — 31579 LARYNGOSCOPY TELESCOPIC: CPT | Performed by: OTOLARYNGOLOGY

## 2025-03-31 NOTE — PATIENT INSTRUCTIONS
1.  You were seen in the ENT Clinic today by . If you have any questions or concerns after your appointment, please call [phone]. Press option #1 for scheduling related needs. Press option #3 for Nurse advice.    2. Plan is to return to clinic in 2 months      Lisa Hazel LPN  [phone]  [facility] - Otolaryngology

## 2025-03-31 NOTE — PROGRESS NOTES
Dear Colleague:  Gary Iyer recently returned for follow-up at the [facility]. My clinic note from our visit is enclosed below.  Speech recognition software may have been used in the documentation below; input is reviewed before signature to the best of my ability.     I appreciate the ongoing opportunity to participate in this patient's care.    Please feel free to contact me with any questions.      Sincerely yours,  Mel Fournier M.D., M.P.H.  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  He later returned to the operating room on February 9, 2017 for an area of new irregularity of the left true vocal fold.  Pathology showed severe dysplasia with negative but close margins (for moderate, but not severe dysplasia).  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    We then observed a slight recurrence of leukoplakia/granuloma in the same location where he had this in 2018. This resolved with conservative management. Subsequently, he had allergies which were provoking coughing/throat-clearing, and he was again noted to have a granuloma. He was set up for refresher session(s) of speech therapy with Msity Clark, PhD, CCC-SLP and we discussed nasal hygiene as well. This led to near complete resolution of the granuloma, but subsequently he was noted to have slightly increased irritation along the left medial arytenoid wall.  Then he developed an upper respiratory infection and has been coughing, and had some shortness of breath. He states he was COVID negative. His family had noted him grunting and throat-clearing frequently.    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 In May 2024 he was hospitalized with high lactate, concern for bacteremia. As of Dec 2024 we noted new left posterior vocal fold lesion that was pedunculated and highly mobile. The surface looked irregular, but the location and mobility were potentially consistent with granuloma, similar to prior findings he had in 2016. The lesion continued to look consistent with a granuloma, but became larger despite him significantly limiting his voice use, so we proceeded to the OR on 2/13/25 for resection. Unexpectedly, pathology was read as invasive squamous cell carcinoma for the lobular components and at least CIS at the base of the lesion.    PET/CT March 2025: no apparent laryngeal disease; indeterminate T3 sclerotic lesion.  MRI Thoracic spine March 2025: T3 vertebral body with presumed benign lesion. Substernal goiter.      Today's updates:  - voice is stable  - swallowing, breathing are fine  - working with Travis Mcdaniels MM, MA, CCC-SLP for therapy  - no new PMH/PSH      MEDICATIONS:     Current Outpatient Medications   Medication Sig Dispense Refill    ACCU-CHEK GUIDE test strip TEST ONCE DAILY 100 strip 1    alcohol swab prep pads USE TO SWAB AREA OF INJECTION/OLIMPIA 100 each 1    allopurinol (ZYLOPRIM) 300 MG tablet TAKE 1 TABLET(300 MG) BY MOUTH DAILY 90 tablet 1    amLODIPine (NORVASC) 2.5 MG tablet Take 1 tablet (2.5 mg) by mouth daily. 90 tablet 1    aspirin 81 MG tablet Take 1 tablet by mouth daily.      atorvastatin (LIPITOR) 20 MG tablet Take 1 tablet (20 mg) by mouth daily. 90 tablet 1    blood glucose (ACCU-CHEK GUIDE) test strip Use to test blood sugar 1 times daily or as directed. 100 strip 11    blood glucose calibration (NO BRAND SPECIFIED) solution To accompany: Blood Glucose Monitor Brands: per insurance. 1 Bottle 3    Blood Glucose Monitoring  Suppl (ACCU-CHEK GUIDE ME) w/Device KIT USE TO TEST ONCE DAILY 1 kit 0    calcipotriene (DOVONEX) 0.005 % external cream Mix with efudex and apply twice daily to hands and arms for 12 days 60 g 3    cetirizine (ZYRTEC) 10 MG tablet Take 1 tablet (10 mg) by mouth daily. 90 tablet 3    docusate sodium (DSS) 100 MG capsule Take 100 mg by mouth at bedtime      fluorouracil (EFUDEX) 5 % external cream Mix with dovonex and apply twice daily to hands and arms for 12 days 40 g 1    hydrochlorothiazide (HYDRODIURIL) 50 MG tablet Take 1 tablet (50 mg) by mouth daily. 90 tablet 1    hydrocortisone (CORTAID) 1 % external ointment Apply topically 2 times daily as needed 30 g 1    insulin pen needle (31G X 6 MM) 31G X 6 MM miscellaneous Use one pen needle daily or as directed. ( for Victoza ) 90 each 3    ketoconazole (NIZORAL) 2 % external cream Apply daily to affected area on face, armpits, groin. 60 g 1    ketoconazole (NIZORAL) 2 % external shampoo Leave on face, beard for 3-5 minutes then rinse. Use 2-3 times weekly. 120 mL 3    losartan (COZAAR) 100 MG tablet Take 1 tablet (100 mg) by mouth daily. 90 tablet 1    metroNIDAZOLE (METROCREAM) 0.75 % external cream Apply topically 2 times daily as needed (to rosacea areas) 45 g 1    Misc. Devices (SUPPOSITORY MOLD 2GM) MISC 1 suppository every 12 hours Nifedipine 4 mg suppository, one rectally every 12 hours 100 each 4    Multiple Vitamin (DAILY MULTIVITAMIN PO) Take by mouth daily      nystatin (MYCOSTATIN) 430310 UNIT/GM external powder Apply topically 2 times daily Prevention of rash 60 g 0    omeprazole (PRILOSEC) 20 MG DR capsule TAKE 1 CAPSULE(20 MG) BY MOUTH TWICE DAILY 180 capsule 1    order for DME Autocpap 10-16 cm 1 Units 0    PARoxetine (PAXIL) 40 MG tablet TAKE 1 TABLET(40 MG) BY MOUTH EVERY MORNING 90 tablet 1    polyethylene glycol (MIRALAX) 17 GM/Dose powder Take 17 g by mouth daily as needed for constipation 850 g 3    psyllium 0.52 G capsule Take 1 capsule  (0.52 g) by mouth daily 100 capsule 3    Semaglutide, 2 MG/DOSE, (OZEMPIC) 8 MG/3ML pen Inject 2 mg subcutaneously every 7 days. 9 mL 3    tadalafil (ADCIRCA/CIALIS) 20 MG tablet 1/2 to 1 po as needed up to twice per week for erectile dysfunction 10 tablet 1    tamsulosin (FLOMAX) 0.4 MG capsule Take 2 capsules (0.8 mg) by mouth daily. 180 capsule 1    thin (NO BRAND SPECIFIED) lancets Use with lanceting device. To accompany: Blood Glucose Monitor Brands: per insurance. 100 each 11    triamcinolone (KENALOG) 0.1 % external cream Apply topically 2 times daily as needed for irritation 30 g 0    ALPRAZolam (XANAX) 0.5 MG tablet 1-2 po prior to mri if needed 2 tablet 0    diphenhydrAMINE (BENADRYL) 25 MG capsule 1 po one hour prior to procedure.  May take another 15 minutes prior if needed. 2 capsule 0       ALLERGIES:  No Known Allergies    NEW PMH/PSH: None    REVIEW OF SYSTEMS:  The patient completed a comprehensive 11 point review of systems (below), which was reviewed. Positives are as noted below.  Patient Supplied Answers to Review of Systems Noncontributory     PHYSICAL EXAM:  General: The patient was alert and conversant, and in no acute distress.    Oral cavity/oropharynx: No masses or lesions. Dentition unchanged since prior. Tongue mobility and palate elevation intact and symmetric.  Neck: No palpable cervical lymphadenopathy, no significant tenderness to palpation of the thyrohyoid space, although overall habitus made landmarks indistinct. No obvious thyroid abnormality.  Resp: Breathing comfortably, no stridor or stertor.  Neuro: Symmetric facial function. Other cranial nerve function as documented above.  Psych: Normal affect, pleasant and cooperative.  Voice/speech: Mild dysphonia characterized by breathiness and roughness.    Procedure:   Flexible fiberoptic laryngoscopy and laryngovideostroboscopy  Indications: This procedure was warranted to evaluate the patient's laryngeal anatomy and function.  Risks, benefits, and alternatives were discussed.  Description: After written informed consent was obtained, a time-out was performed to confirm patient identity, procedure, and procedure site. Topical 2% lidocaine and oxymetazoline were applied to the nasal cavities. I performed the endoscopy and no complications were apparent. Continuous and stroboscopic light were utilized to assess routine phonation and variable frequency phonation.  Performed by: Mel Fournier MD MPH  Findings: Normal nasopharynx. Normal base of tongue, valleculae, and epiglottis. Vocal fold mobility: right: normal; left: normal. Medial edges of the true vocal folds: smooth and straight.  Thin linear leukoplakia along left medial true vocal fold, stable compared to prior.  Glissade produced appropriate elongation. Mucosa of false vocal folds, aryepiglottic folds, piriform sinuses, and posterior glottis unremarkable. Airway was patent.     Similar findings on virtual chromoendoscopy.    The addition of stroboscopy allowed evaluation of the mucosal wave.   Amplitude: right: mildly decreased; left: mildly decreased. Symmetry: intermittent symmetry. Closure pattern: complete. Closure plane: at glottic level. Phase distribution: normal.                                      IMPRESSION AND PLAN:   Gary Iyer returns with a stable exam; there is still a faint area of linear leukoplakia at the prior surgical site.    Discussed potential advantages and disadvantages of further MDL with excision/ablation at present, given the pathology results from last time; for now he would prefer watchful waiting which is reasonable given that it appears to be stable so far. He understands that we would have a low threshold for returning to the OR with any apparent worsening of the exam.    Plan RTC in ~2 months, or sooner as needed.    I spent a total of 21 minutes on 3/31/2025 in chart review, review of tests, patient visit, documentation, care coordination,  and/or discussion with other providers about the issues documented above, separate from any documented procedure(s).

## 2025-03-31 NOTE — LETTER
3/31/2025      RE: Gary Iyer  [address] [address]       Dear Colleague:  Gary Iyer recently returned for follow-up at the [facility]. My clinic note from our visit is enclosed below.  Speech recognition software may have been used in the documentation below; input is reviewed before signature to the best of my ability.     I appreciate the ongoing opportunity to participate in this patient's care.    Please feel free to contact me with any questions.      Sincerely yours,  Mel Fournier M.D., M.P.H.  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  He later returned to the operating room on February 9, 2017 for an area of new irregularity of the left true vocal fold.  Pathology showed severe dysplasia with negative but close margins (for moderate, but not severe dysplasia).  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    We then observed a slight recurrence of leukoplakia/granuloma in the same location where he had this in 2018. This resolved with conservative management. Subsequently, he had allergies which were provoking coughing/throat-clearing, and he was again noted to have a granuloma. He was set up for refresher session(s) of speech therapy with Misty Clark, PhD, CCC-SLP and we discussed nasal hygiene as well. This led to near complete resolution of the granuloma, but subsequently he was noted to have slightly increased irritation along the left medial arytenoid wall.  Then he developed an upper respiratory infection and has been coughing, and had some shortness of breath. He states he was COVID  negative. His family had noted him grunting and throat-clearing frequently.    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 In May 2024 he was hospitalized with high lactate, concern for bacteremia. As of Dec 2024 we noted new left posterior vocal fold lesion that was pedunculated and highly mobile. The surface looked irregular, but the location and mobility were potentially consistent with granuloma, similar to prior findings he had in 2016. The lesion continued to look consistent with a granuloma, but became larger despite him significantly limiting his voice use, so we proceeded to the OR on 2/13/25 for resection. Unexpectedly, pathology was read as invasive squamous cell carcinoma for the lobular components and at least CIS at the base of the lesion.    PET/CT March 2025: no apparent laryngeal disease; indeterminate T3 sclerotic lesion.  MRI Thoracic spine March 2025: T3 vertebral body with presumed benign lesion. Substernal goiter.      Today's updates:  - voice is stable  - swallowing, breathing are fine  - working with Travis Mcdaniels, MM, MA, CCC-SLP for therapy  - no new PMH/PSH      MEDICATIONS:     Current Outpatient Medications   Medication Sig Dispense Refill     ACCU-CHEK GUIDE test strip TEST ONCE DAILY 100 strip 1     alcohol swab prep pads USE TO SWAB AREA OF INJECTION/OLIMPIA 100 each 1     allopurinol (ZYLOPRIM) 300 MG tablet TAKE 1 TABLET(300 MG) BY MOUTH DAILY 90 tablet 1     amLODIPine (NORVASC) 2.5 MG tablet Take 1 tablet (2.5 mg) by mouth daily. 90 tablet 1     aspirin 81 MG tablet Take 1 tablet by mouth daily.       atorvastatin (LIPITOR) 20 MG tablet Take 1 tablet (20 mg) by mouth daily. 90 tablet 1     blood glucose (ACCU-CHEK GUIDE) test strip Use to test blood sugar 1 times daily or as directed. 100 strip 11     blood glucose calibration (NO BRAND SPECIFIED) solution To  accompany: Blood Glucose Monitor Brands: per insurance. 1 Bottle 3     Blood Glucose Monitoring Suppl (ACCU-CHEK GUIDE ME) w/Device KIT USE TO TEST ONCE DAILY 1 kit 0     calcipotriene (DOVONEX) 0.005 % external cream Mix with efudex and apply twice daily to hands and arms for 12 days 60 g 3     cetirizine (ZYRTEC) 10 MG tablet Take 1 tablet (10 mg) by mouth daily. 90 tablet 3     docusate sodium (DSS) 100 MG capsule Take 100 mg by mouth at bedtime       fluorouracil (EFUDEX) 5 % external cream Mix with dovonex and apply twice daily to hands and arms for 12 days 40 g 1     hydrochlorothiazide (HYDRODIURIL) 50 MG tablet Take 1 tablet (50 mg) by mouth daily. 90 tablet 1     hydrocortisone (CORTAID) 1 % external ointment Apply topically 2 times daily as needed 30 g 1     insulin pen needle (31G X 6 MM) 31G X 6 MM miscellaneous Use one pen needle daily or as directed. ( for Victoza ) 90 each 3     ketoconazole (NIZORAL) 2 % external cream Apply daily to affected area on face, armpits, groin. 60 g 1     ketoconazole (NIZORAL) 2 % external shampoo Leave on face, beard for 3-5 minutes then rinse. Use 2-3 times weekly. 120 mL 3     losartan (COZAAR) 100 MG tablet Take 1 tablet (100 mg) by mouth daily. 90 tablet 1     metroNIDAZOLE (METROCREAM) 0.75 % external cream Apply topically 2 times daily as needed (to rosacea areas) 45 g 1     Misc. Devices (SUPPOSITORY MOLD 2GM) MISC 1 suppository every 12 hours Nifedipine 4 mg suppository, one rectally every 12 hours 100 each 4     Multiple Vitamin (DAILY MULTIVITAMIN PO) Take by mouth daily       nystatin (MYCOSTATIN) 945916 UNIT/GM external powder Apply topically 2 times daily Prevention of rash 60 g 0     omeprazole (PRILOSEC) 20 MG DR capsule TAKE 1 CAPSULE(20 MG) BY MOUTH TWICE DAILY 180 capsule 1     order for DME Autocpap 10-16 cm 1 Units 0     PARoxetine (PAXIL) 40 MG tablet TAKE 1 TABLET(40 MG) BY MOUTH EVERY MORNING 90 tablet 1     polyethylene glycol (MIRALAX) 17 GM/Dose  powder Take 17 g by mouth daily as needed for constipation 850 g 3     psyllium 0.52 G capsule Take 1 capsule (0.52 g) by mouth daily 100 capsule 3     Semaglutide, 2 MG/DOSE, (OZEMPIC) 8 MG/3ML pen Inject 2 mg subcutaneously every 7 days. 9 mL 3     tadalafil (ADCIRCA/CIALIS) 20 MG tablet 1/2 to 1 po as needed up to twice per week for erectile dysfunction 10 tablet 1     tamsulosin (FLOMAX) 0.4 MG capsule Take 2 capsules (0.8 mg) by mouth daily. 180 capsule 1     thin (NO BRAND SPECIFIED) lancets Use with lanceting device. To accompany: Blood Glucose Monitor Brands: per insurance. 100 each 11     triamcinolone (KENALOG) 0.1 % external cream Apply topically 2 times daily as needed for irritation 30 g 0     ALPRAZolam (XANAX) 0.5 MG tablet 1-2 po prior to mri if needed 2 tablet 0     diphenhydrAMINE (BENADRYL) 25 MG capsule 1 po one hour prior to procedure.  May take another 15 minutes prior if needed. 2 capsule 0       ALLERGIES:  No Known Allergies    NEW PMH/PSH: None    REVIEW OF SYSTEMS:  The patient completed a comprehensive 11 point review of systems (below), which was reviewed. Positives are as noted below.  Patient Supplied Answers to Review of Systems Noncontributory     PHYSICAL EXAM:  General: The patient was alert and conversant, and in no acute distress.    Oral cavity/oropharynx: No masses or lesions. Dentition unchanged since prior. Tongue mobility and palate elevation intact and symmetric.  Neck: No palpable cervical lymphadenopathy, no significant tenderness to palpation of the thyrohyoid space, although overall habitus made landmarks indistinct. No obvious thyroid abnormality.  Resp: Breathing comfortably, no stridor or stertor.  Neuro: Symmetric facial function. Other cranial nerve function as documented above.  Psych: Normal affect, pleasant and cooperative.  Voice/speech: Mild dysphonia characterized by breathiness and roughness.    Procedure:   Flexible fiberoptic laryngoscopy and  laryngovideostroboscopy  Indications: This procedure was warranted to evaluate the patient's laryngeal anatomy and function. Risks, benefits, and alternatives were discussed.  Description: After written informed consent was obtained, a time-out was performed to confirm patient identity, procedure, and procedure site. Topical 2% lidocaine and oxymetazoline were applied to the nasal cavities. I performed the endoscopy and no complications were apparent. Continuous and stroboscopic light were utilized to assess routine phonation and variable frequency phonation.  Performed by: Mel Fournier MD MPH  Findings: Normal nasopharynx. Normal base of tongue, valleculae, and epiglottis. Vocal fold mobility: right: normal; left: normal. Medial edges of the true vocal folds: smooth and straight.  Thin linear leukoplakia along left medial true vocal fold, stable compared to prior.  Glissade produced appropriate elongation. Mucosa of false vocal folds, aryepiglottic folds, piriform sinuses, and posterior glottis unremarkable. Airway was patent.     Similar findings on virtual chromoendoscopy.    The addition of stroboscopy allowed evaluation of the mucosal wave.   Amplitude: right: mildly decreased; left: mildly decreased. Symmetry: intermittent symmetry. Closure pattern: complete. Closure plane: at glottic level. Phase distribution: normal.                                      IMPRESSION AND PLAN:   Gary Iyer returns with a stable exam; there is still a faint area of linear leukoplakia at the prior surgical site.    Discussed potential advantages and disadvantages of further MDL with excision/ablation at present, given the pathology results from last time; for now he would prefer watchful waiting which is reasonable given that it appears to be stable so far. He understands that we would have a low threshold for returning to the OR with any apparent worsening of the exam.    Plan RTC in ~2 months, or sooner as needed.    I  spent a total of 21 minutes on 3/31/2025 in chart review, review of tests, patient visit, documentation, care coordination, and/or discussion with other providers about the issues documented above, separate from any documented procedure(s).      Mel Fournier MD

## 2025-03-31 NOTE — PROGRESS NOTES
No skilled SLP services were provided today    Kassandra Coleman MS CCC-SLP  Speech-Language Pathologist  [facility]  Department of Otolaryngology - Head and Neck Surgery  [facility]  Direct: [phone]  [phone]  Fax: [phone]

## 2025-04-07 ENCOUNTER — PATIENT OUTREACH (OUTPATIENT)
Dept: OTOLARYNGOLOGY | Facility: CLINIC | Age: 65
End: 2025-04-07
Payer: COMMERCIAL

## 2025-04-07 ENCOUNTER — TELEPHONE (OUTPATIENT)
Dept: OTOLARYNGOLOGY | Facility: CLINIC | Age: 65
End: 2025-04-07
Payer: COMMERCIAL

## 2025-04-07 NOTE — PROGRESS NOTES
Patient returned call wondering if he would switch from a follow up appointment to surgery. Writer will reach out to care team to discuss options for follow up care. Writer will reach back out to patient with plan from care team. Patient was agreeable and verbalized understanding of the situation. Clair Hodges RN on 4/7/2025 at 1:27 PM

## 2025-04-07 NOTE — TELEPHONE ENCOUNTER
[facility] Call Center    Phone Message    May a detailed message be left on voicemail: yes     Reason for Call: Other: Patient called today in regards to his June apt. He made it sound like surgery was previously discussed as a possibility for his throat/vocal cords. He was hoping to get that scheduled or talk to the care team about it. Is anyone able to follow up with this? Thank you!     Action Taken: Message routed to:  [facility] ENT    Travel Screening: Not Applicable     Date of Service:

## 2025-04-07 NOTE — TELEPHONE ENCOUNTER
Called patient to let him know that in the last office visit the care team discussed surgery vs observation, but ended up on a 2 month follow up. Writer let patient know that if he would like to change his mind and do surgery to call back and writer would speak with care team. LVM and provided direct number or call back. Clair Hodges RN on 4/7/2025 at 1:17 PM

## 2025-04-08 ENCOUNTER — APPOINTMENT (OUTPATIENT)
Dept: OPTOMETRY | Facility: CLINIC | Age: 65
End: 2025-04-08
Payer: COMMERCIAL

## 2025-04-08 DIAGNOSIS — R49.0 DYSPHONIA: ICD-10-CM

## 2025-04-08 DIAGNOSIS — J38.7 LARYNGEAL GRANULOMA: ICD-10-CM

## 2025-04-08 DIAGNOSIS — C32.9 LARYNGEAL CANCER (H): Primary | ICD-10-CM

## 2025-04-08 DIAGNOSIS — J38.7 LEUKOPLAKIA OF LARYNX: ICD-10-CM

## 2025-04-08 DIAGNOSIS — E11.9 TYPE 2 DIABETES MELLITUS WITHOUT COMPLICATION, WITHOUT LONG-TERM CURRENT USE OF INSULIN (H): ICD-10-CM

## 2025-04-08 PROCEDURE — 92340 FIT SPECTACLES MONOFOCAL: CPT

## 2025-04-09 ENCOUNTER — TELEPHONE (OUTPATIENT)
Dept: OTOLARYNGOLOGY | Facility: CLINIC | Age: 65
End: 2025-04-09
Payer: COMMERCIAL

## 2025-04-09 NOTE — TELEPHONE ENCOUNTER
Patient confirmed scheduled appointment:     Date: 5/6/25  Time: 2:00PM  Appointment Type: return slp voice virtual  Provider: Zack Mcdaniels  Location: [facility]  Additional Notes:

## 2025-04-09 NOTE — TELEPHONE ENCOUNTER
Left patient a voicemail to schedule surgery for Microdirect laryngoscopy with excision/ablation of laryngeal lesions, possible biopsies, possible CO2 laser with Dr. Fournier - Left Surgery Scheduling line for callback [phone]    Nancy Zurita on 4/9/2025 at 9:21 AM

## 2025-04-09 NOTE — TELEPHONE ENCOUNTER
Scheduled surgery with Dr. Fournier on 5/8/2025    Did patient/provider have any scheduling preferences? Yes: Dr. Fournier requested 4-6 weeks out    Does patient want a sooner date? No    Spoke with: Patient    Surgery is located at [facility] OR    Patient will be seen for their H&P by their PCP Finesse Beal MD within 30 days of surgery - Confirmed PCP on file is up to date     Does patient need any appointments before upcoming surgery? Yes: Per case request Would benefit from another speech therapy refresher session shortly before surgery, if possible.      Anesthesia type: General    Requested Imaging required for surgery: No    Patient needs scheduled for their 3 week post op    Patient will receive their surgery packet via [employer] per their preference    Patient was not provided a start time for surgery & is aware they will receive this information 2-3 days before surgery    Additional comments: Patient was instructed to call back with any further questions or concerns.     Nancy Zurita on 4/9/2025 at 10:52 AM

## 2025-04-14 ENCOUNTER — MYC MEDICAL ADVICE (OUTPATIENT)
Dept: SLEEP MEDICINE | Facility: CLINIC | Age: 65
End: 2025-04-14
Payer: COMMERCIAL

## 2025-04-16 ASSESSMENT — SLEEP AND FATIGUE QUESTIONNAIRES
HOW LIKELY ARE YOU TO NOD OFF OR FALL ASLEEP WHILE SITTING AND TALKING TO SOMEONE: WOULD NEVER DOZE
HOW LIKELY ARE YOU TO NOD OFF OR FALL ASLEEP IN A CAR, WHILE STOPPED FOR A FEW MINUTES IN TRAFFIC: WOULD NEVER DOZE
HOW LIKELY ARE YOU TO NOD OFF OR FALL ASLEEP WHILE SITTING QUIETLY AFTER LUNCH WITHOUT ALCOHOL: WOULD NEVER DOZE
HOW LIKELY ARE YOU TO NOD OFF OR FALL ASLEEP WHILE SITTING AND READING: WOULD NEVER DOZE
HOW LIKELY ARE YOU TO NOD OFF OR FALL ASLEEP WHILE WATCHING TV: WOULD NEVER DOZE
HOW LIKELY ARE YOU TO NOD OFF OR FALL ASLEEP WHILE SITTING INACTIVE IN A PUBLIC PLACE: WOULD NEVER DOZE
HOW LIKELY ARE YOU TO NOD OFF OR FALL ASLEEP WHEN YOU ARE A PASSENGER IN A CAR FOR AN HOUR WITHOUT A BREAK: WOULD NEVER DOZE
HOW LIKELY ARE YOU TO NOD OFF OR FALL ASLEEP WHILE LYING DOWN TO REST IN THE AFTERNOON WHEN CIRCUMSTANCES PERMIT: MODERATE CHANCE OF DOZING

## 2025-04-17 ENCOUNTER — OFFICE VISIT (OUTPATIENT)
Dept: SLEEP MEDICINE | Facility: CLINIC | Age: 65
End: 2025-04-17
Payer: COMMERCIAL

## 2025-04-17 VITALS
BODY MASS INDEX: 34.22 KG/M2 | OXYGEN SATURATION: 93 % | WEIGHT: 239 LBS | HEART RATE: 105 BPM | DIASTOLIC BLOOD PRESSURE: 66 MMHG | HEIGHT: 70 IN | SYSTOLIC BLOOD PRESSURE: 122 MMHG

## 2025-04-17 DIAGNOSIS — G47.33 OBSTRUCTIVE SLEEP APNEA: Primary | Chronic | ICD-10-CM

## 2025-04-17 DIAGNOSIS — R79.0 LOW FERRITIN: ICD-10-CM

## 2025-04-17 DIAGNOSIS — G47.61 PERIODIC LIMB MOVEMENTS OF SLEEP: Chronic | ICD-10-CM

## 2025-04-17 NOTE — PROGRESS NOTES
Sleep Apnea - Follow-up Visit:    Impression/Plan:  Moderate obstructive sleep apnea-  Excellent CPAP compliance and AHI is well controlled on CPAP 10-16 cm/H20. Daytime symptoms are improved.   Continue current treatment.   Comprehensive DME order placed.        Gary Iyer will follow up in about 1 year    30 minutes spent on day of encounter doing chart review,  history and exam, counseling, coordinating plan of care, documentation and further activities as noted above.       Jenny Gore PA-C  Sleep Medicine       History of Present Illness:  Chief Complaint   Patient presents with    CPAP Follow Up       Gary Iyer presents for follow-up of their moderate sleep apnea, managed with CPAP.     DME: [facility]     Initial sleep study 2000s, not available for review. He cannot recall why first study was done, probably was done for snoring. He tried CPAP for a day or so and then quit.     Sleep study [facility] 5/2012 (216#)  AHI 25, .9, low O2 32% (probably artifact by hypnogram). True low O2 probably upper 70s on CPAP during REM. CPAP 8cm with fragmented sleep during supine REM.     He tried CPAP for 2-3 days but 'could not sleep' and again stopped using it.     He initially presented to [facility]  in 2017 with symptoms of fatigue (ESS 0), nocturnal awakenings, leg shaking. Comorbid diabetes mellitus, hypertension.    - Sleep onset difficulties. Suspect multifactorial: delayed sleep phase, psychophysiologic insomnia, poor sleep hygiene.   - Nocturnal leg kicking/stiffness. This may be related to his obstructive sleep apnea. Nocturnal seizure disorder could also be considerered.       Study Date: 3/14/2017- (227.0 lbs)   - Titration complicated by high leaks on several different mask types.   - Titrated from 5 cmH2O up to 15 cmH2O.  Most of titrations done for RERAs and flow limitations. The final  pressure was 15 cmH2O with a residual AHI of 0 events per hour.  REM supine seen at  12 cm with good control of events.   - Lowest oxygen saturation was 84.1%.  Time spent less than or equal to 88% was 0.9 minutes.   - PLM index was 72.6 movements per hour.  PLM Arousal Index was 24.4 per hour.   - Cyclic alternating pattern of arousal with and without whole body movements and leg movements was seen.     Recommend autoCPAP 10-16 cm     He returned 5/2021 not using CPAP, stopped for unclear reasons with symptoms of long sleep times (ESS 1). Comorbid hypertension, diabetes mellitus. Recommend restart CPAP 10-16 cmH20, but patient has been told by DME that he needs to complete another study    9/29/2021 [facility] Study (225 #) - TST 82 minutes. AHI 82.0, .6, Supine AHI n/a, REM AHI n/a, Low O2 79.8%, Time Spent <=88% 4.8 minutes / Time Spent <=89% 6.8 minutes.    Do you use a CPAP Machine at home: (Patient-Rptd) Yes  Overall, on a scale of 0-10 how would you rate your CPAP (0 poor, 10 great):      What type of mask do you use: (Patient-Rptd) Nasal Pillow  Is your mask comfortable: (Patient-Rptd) Yes  If not, why:      Is your mask leaking: (Patient-Rptd) No  If yes, where do you feel it:    How many night per week does the mask leak (0-7):      Do you notice snoring with mask on: (Patient-Rptd) No  Do you notice gasping arousals with mask on: (Patient-Rptd) No  Are you having significant oral or nasal dryness: (Patient-Rptd) No  Is the pressure setting comfortable: (Patient-Rptd) Yes  If not, why:      What is your typical bedtime: (Patient-Rptd) 11  How long does it take you to go to sleep on PAP therapy: (Patient-Rptd) Half  hour  What time do you typically get out of bed for the day: (Patient-Rptd) 8  How many hours on average per night are you using PAP therapy: (Patient-Rptd) 8  How many hours are you sleeping per night: (Patient-Rptd) 8  Do you feel well rested in the morning: (Patient-Rptd) Yes      ResMed   Auto-PAP 10.0 - 16.0 cmH2O 30 day usage data:    90% of days with  > 4 hours of use. 1/30 days with no use.   Average use 7 hours and 46 minutes per day.   95%ile Leak 23.15 L/min.   CPAP 95% pressure 13.5 cm.   AHI 2.1 events per hour.       EPWORTH SLEEPINESS SCALE         4/16/2025    10:36 AM    [place] Sleepiness Scale ( CRIS Jade  [id number]<br>ESS - [place]/English - Final version - 21 Nov 07 - [facility].)   Sitting and reading Would never doze   Watching TV Would never doze   Sitting, inactive in a public place (e.g. a theatre or a meeting) Would never doze   As a passenger in a car for an hour without a break Would never doze   Lying down to rest in the afternoon when circumstances permit Moderate chance of dozing   Sitting and talking to someone Would never doze   Sitting quietly after a lunch without alcohol Would never doze   In a car, while stopped for a few minutes in traffic Would never doze   [place] Score (MC) 2   [place] Score (Sleep) 2        Patient-reported       INSOMNIA SEVERITY INDEX (GRIFFIN)          4/16/2025    10:28 AM   Insomnia Severity Index (GRIFFIN)   Difficulty falling asleep 1   Difficulty staying asleep 1   Problems waking up too early 1   How SATISFIED/DISSATISFIED are you with your CURRENT sleep pattern? 2   How NOTICEABLE to others do you think your sleep problem is in terms of impairing the quality of your life? 1   How WORRIED/DISTRESSED are you about your current sleep problem? 1   To what extent do you consider your sleep problem to INTERFERE with your daily functioning (e.g. daytime fatigue, mood, ability to function at work/daily chores, concentration, memory, mood, etc.) CURRENTLY? 1   GRIFFIN Total Score 8        Patient-reported       Guidelines for Scoring/Interpretation:  Total score categories:  0-7 = No clinically significant insomnia   8-14 = Subthreshold insomnia   15-21 = Clinical insomnia (moderate severity)  22-28 = Clinical insomnia (severe)  Used via courtesy of www."[employer]"th.va.gov with permission from Howard Quintana PhD.,  [facility]        Past medical/surgical history, family history, social history, medications and allergies were reviewed.        Problem List:  Patient Active Problem List    Diagnosis Date Noted    Morbid obesity (H) 04/12/2022     Priority: Medium    Obesity, unspecified 11/06/2018     Priority: Medium    Chronic cough 12/12/2017     Priority: Medium    Gout, unspecified cause, unspecified chronicity, unspecified site 10/06/2017     Priority: Medium    Obstructive sleep apnea- moderate- severe (AHI 25, 82)      Priority: Medium     Initial sleep study 2000s, not available for review.  Sleep study [facility] 5/2012 (216#)  AHI 25, .9, low O2 32% (probably artifact by hypnogram). True low O2 probably upper 70s on CPAP during REM. CPAP 8 cm with fragmented sleep during supine REM.   Study Date: 3/14/2017- (227.0 lbs)- Titration complicated by high leaks on several masks. Most of titrations done for RERAs and flow limitations. The final  pressure achieved was 15 cmH2O with a residual AHI of 0.  REM supine seen at 12 cm with good control of events. Lowest oxygen saturation 84.1%.  Time spent less than or equal to 88% was 0.9 minutes. PLM index 72.6.  The PLM Arousal Index was 24. Cyclic alternating pattern of arousal with and without whole body movements and leg movements seen.   9/29/2021 [facility] Sleep Study (225 #) - TST 82 minutes AHI 82.0, .6,  Supine AHI n/a, REM AHI n/a, Low O2 79.8%, Time Spent <=88% 4.8 minutes / Time Spent <=89% 6.8 minutes.      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 Less than 50% diameter stenosis of the left ICA relative to the distal ICA diameter.      SAMARIA (generalized anxiety disorder) 06/13/2013     Priority: Medium    Hypertension goal BP (blood pressure) < 140/90 11/30/2012     Priority: Medium    Hyperlipidemia LDL goal <70 05/04/2012     Priority: Medium    Low ferritin 03/30/2017     Priority: Low    Periodic limb movements of sleep 03/29/2017     Priority: Low    Benign prostatic hypertrophy 03/07/2017     Priority: Low        BP (!) 145/80   Pulse 105   Ht 1.778 m (5' 10\")   Wt 108.4 kg (239 lb)   SpO2 93%   BMI 34.29 kg/m     "

## 2025-04-17 NOTE — NURSING NOTE
1 year appointment reminder message was created and will be sent out 2 - 3 months prior to next appointment due date. [employer]

## 2025-04-17 NOTE — NURSING NOTE
"Chief Complaint   Patient presents with    CPAP Follow Up       Initial BP (!) 145/80   Pulse 105   Ht 1.778 m (5' 10\")   Wt 108.4 kg (239 lb)   SpO2 93%   BMI 34.29 kg/m   Estimated body mass index is 34.29 kg/m  as calculated from the following:    Height as of this encounter: 1.778 m (5' 10\").    Weight as of this encounter: 108.4 kg (239 lb).    Medication Reconciliation: complete    Neck circumference: 19.6 inches / 50 centimeters.    DME: Jewels Higgins CMA on 4/17/2025 at 9:53 AM      "

## 2025-04-17 NOTE — PATIENT INSTRUCTIONS
Your Body mass index is 34.29 kg/m .  Weight management is a personal decision.  If you are interested in exploring weight loss strategies, the following discussion covers the approaches that may be successful. Body mass index (BMI) is one way to tell whether you are at a healthy weight, overweight, or obese. It measures your weight in relation to your height.  A BMI of 18.5 to 24.9 is in the healthy range. A person with a BMI of 25 to 29.9 is considered overweight, and someone with a BMI of 30 or greater is considered obese. More than two-thirds of American adults are considered overweight or obese.  Being overweight or obese increases the risk for further weight gain. Excess weight may lead to heart disease and diabetes.  Creating and following plans for healthy eating and physical activity may help you improve your health.  Weight control is part of healthy lifestyle and includes exercise, emotional health, and healthy eating habits. Careful eating habits lifelong are the mainstay of weight control. Though there are significant health benefits from weight loss, long-term weight loss with diet alone may be very difficult to achieve- studies show long-term success with dietary management in less than 10% of people. Attaining a healthy weight may be especially difficult to achieve in those with severe obesity. In some cases, medications, devices and surgical management might be considered.  What can you do?  If you are overweight or obese and are interested in methods for weight loss, you should discuss this with your provider.   Consider reducing daily calorie intake by 500 calories.   Keep a food journal.   Avoiding skipping meals, consider cutting portions instead.    Diet combined with exercise helps maintain muscle while optimizing fat loss. Strength training is particularly important for building and maintaining muscle mass. Exercise helps reduce stress, increase energy, and improves fitness. Increasing  exercise without diet control, however, may not burn enough calories to loose weight.     Start walking three days a week 10-20 minutes at a time  Work towards walking thirty minutes five days a week   Eventually, increase the speed of your walking for 1-2 minutes at time    In addition, we recommend that you review healthy lifestyles and methods for weight loss available through the National Institutes of Health patient information sites:  http://win.niddk.nih.gov/publications/index.htm    And look into health and wellness programs that may be available through your health insurance provider, employer, local community center, or [employer].

## 2025-04-18 PROBLEM — R80.9 TYPE 2 DIABETES MELLITUS WITH DIABETIC MICROALBUMINURIA, WITHOUT LONG-TERM CURRENT USE OF INSULIN (H): Status: ACTIVE | Noted: 2017-02-08

## 2025-04-18 PROBLEM — E11.29 TYPE 2 DIABETES MELLITUS WITH DIABETIC MICROALBUMINURIA, WITHOUT LONG-TERM CURRENT USE OF INSULIN (H): Status: ACTIVE | Noted: 2017-02-08

## 2025-04-18 NOTE — H&P (VIEW-ONLY)
Preoperative Evaluation  [facility]  [address] [place] [address]  [place] [address]  Phone: [phone]  Primary Provider: Finesse Beal MD  Pre-op Performing Provider: Finesse Beal MD  Apr 18, 2025 4/16/2025   Surgical Information   What procedure is being done? Vocal chord   Facility or Hospital where procedure/surgery will be performed: [facility]   Who is doing the procedure / surgery? Dr rubio no   Date of surgery / procedure: May 8   Time of surgery / procedure: Not known   Where do you plan to recover after surgery? at home with family     Fax number for surgical facility: Note does not need to be faxed, will be available electronically in Epic.    Assessment & Plan     The proposed surgical procedure is considered LOW risk.    (Z01.818) Preop general physical exam  (primary encounter diagnosis)  Comment: check labs.  Patient should be okay for procedure.   Plan: Basic metabolic panel, CBC with Platelets &         Differential             (C32.9) Laryngeal cancer (H)  Comment: to have procedure per ENT   Plan: as above     (N40.1,  R39.12) Benign prostatic hyperplasia with weak urinary stream  Comment: needs refill   Plan: tamsulosin (FLOMAX) 0.4 MG capsule             (N52.9) Erectile dysfunction, unspecified erectile dysfunction type  Comment: needs refill   Plan: tadalafil (ADCIRCA/CIALIS) 20 MG tablet             (I10) Hypertension goal BP (blood pressure) < 140/90  Comment: needs refill   Plan: losartan (COZAAR) 100 MG tablet             (R80.9) Microalbuminuria  Comment: as above   Plan: losartan (COZAAR) 100 MG tablet             (E11.29,  R80.9) Type 2 diabetes mellitus with diabetic microalbuminuria, without long-term current use of insulin (H)  Comment: well controlled, hemoglobin a1c 6.6 recently   Plan: as above      Hold aspirin one week prior    Hold losartan and hydrochlorothiazide day of procedure but take amlodipine  that day    Hold ozempic for over one  week (  he takes this med on Saturday so hold the Saturday prior to procedure )              - No identified additional risk factors other than previously addressed         Recommendation  Approval given to proceed with proposed procedure, without further diagnostic evaluation.        Kamryn Vega is a 64 year old, presenting for the following:  Pre-Op Exam          4/18/2025    11:31 AM   Additional Questions   Roomed by Elisa Chandra     HPI: 64 year old male with long history of laryngeal cancer.  Has had active surveillance.  Biopsy in February showed some carcinoma present.  To have procedure per ENT.         4/16/2025   Pre-Op Questionnaire   Have you ever had a heart attack or stroke? No   Have you ever had surgery on your heart or blood vessels, such as a stent placement, a coronary artery bypass, or surgery on an artery in your head, neck, heart, or legs? No   Do you have chest pain with activity? No   Do you have a history of heart failure? No   Do you currently have a cold, bronchitis or symptoms of other infection? No   Do you have a cough, shortness of breath, or wheezing? No   Do you or anyone in your family have previous history of blood clots? No   Do you or does anyone in your family have a serious bleeding problem such as prolonged bleeding following surgeries or cuts? No   Have you ever had problems with anemia or been told to take iron pills? (!) YES  patient on one over the counter iron pill daily   Have you had any abnormal blood loss such as black, tarry or bloody stools? No   Have you ever had a blood transfusion? No   Are you willing to have a blood transfusion if it is medically needed before, during, or after your surgery? (!) yes   Have you or any of your relatives ever had problems with anesthesia? No   Do you have sleep apnea, excessive snoring or daytime drowsiness? (!) YES   Do you have a CPAP machine? Yes   Do you have any artifical heart valves or other implanted medical  devices like a pacemaker, defibrillator, or continuous glucose monitor? No   Do you have artificial joints? No   Are you allergic to latex? No     Advance Care Planning    Patient working on the health care directive     Preoperative Review of   Patient not on any controlled substances             Patient Active Problem List    Diagnosis Date Noted    Morbid obesity (H) 04/12/2022     Priority: Medium    Obesity, unspecified 11/06/2018     Priority: Medium    Chronic cough 12/12/2017     Priority: Medium    Gout, unspecified cause, unspecified chronicity, unspecified site 10/06/2017     Priority: Medium    Obstructive sleep apnea- moderate- severe (AHI 25, 82)      Priority: Medium     Initial sleep study 2000s, not available for review.  Sleep study [facility] 5/2012 (216#)  AHI 25, .9, low O2 32% (probably artifact by hypnogram). True low O2 probably upper 70s on CPAP during REM. CPAP 8 cm with fragmented sleep during supine REM.   Study Date: 3/14/2017- (227.0 lbs)- Titration complicated by high leaks on several masks. Most of titrations done for RERAs and flow limitations. The final  pressure achieved was 15 cmH2O with a residual AHI of 0.  REM supine seen at 12 cm with good control of events. Lowest oxygen saturation 84.1%.  Time spent less than or equal to 88% was 0.9 minutes. PLM index 72.6.  The PLM Arousal Index was 24. Cyclic alternating pattern of arousal with and without whole body movements and leg movements seen.   9/29/2021 [facility] Sleep Study (225 #) - TST 82 minutes AHI 82.0, .6,  Supine AHI n/a, REM AHI n/a, Low O2 79.8%, Time Spent <=88% 4.8 minutes / Time Spent <=89% 6.8 minutes.      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 Less than 50% diameter stenosis of the left ICA relative to the distal ICA diameter.      SAMARIA (generalized anxiety disorder) 06/13/2013     Priority: Medium    Hypertension goal BP (blood pressure) < 140/90 11/30/2012     Priority: Medium    Hyperlipidemia LDL goal <70 05/04/2012     Priority: Medium    Low ferritin 03/30/2017     Priority: Low    Periodic limb movements of sleep 03/29/2017     Priority: Low    Benign prostatic hypertrophy 03/07/2017     Priority: Low      Past Medical History:   Diagnosis Date    Diabetes (H)     2yr    Hypertension     Lesion of larynx 12/12/2017    Leokoplakia. s/p Flexible fiberoptic transnasal laryngoscopy with endoscopic injection, laryngeal biopsy    Multinodular goiter (nontoxic) 06/10/2013    Sleep apnea 8yr    Squamous cell carcinoma of skin, unspecified      Past Surgical History:   Procedure Laterality Date    COLONOSCOPY  06/22/2012    Repeat Colonoscopy in 10 yrs.     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    HEAD & NECK SURGERY  01/25/2011    callous removal from throat    INJECT STEROID (LOCATION) N/A 2/13/2025    Procedure: steroid injection;  Surgeon: Mel Fournier MD;  Location: [facility] OR    LARYNGOSCOPY WITH BIOPSY(IES)  01/25/2011    LARYNGOSCOPY WITH BIOPSY(IES) N/A 02/09/2017    Procedure: LARYNGOSCOPY WITH BIOPSY(IES);  Surgeon: Mel Fournier MD;  [facility] OR    LARYNGOSCOPY WITH MICROSCOPE N/A 02/09/2017    Procedure: LARYNGOSCOPY WITH MICROSCOPE;  Surgeon: Mel Fournier MD;  [facility]    LASER CO2 LARYNGOSCOPY N/A  2/13/2025    Procedure: Microdirect laryngoscopy with excision/ablation of laryngeal lesions, CO2 laser;  Surgeon: Mel Fournier MD;  Location: [facility] OR    LASER CO2 LARYNGOSCOPY, COMPLEX  06/27/2013    Procedure: LASER CO2 LARYNGOSCOPY, COMPLEX;  Micro Direct Laryngoscopy With Micro Flap Excision Of Lesion Lumenis Co2 Laser ;  Surgeon: Mel Fournier MD;  Location: [facility] OR    LASER CO2 LESION ORAL N/A 02/09/2017    Procedure: LASER CO2 LESION ORAL;  Surgeon: Mel Fournier MD;  [facility]    ORTHOPEDIC SURGERY  03/2016    left tibia orif with abbi 2-2016    VASECTOMY  02/2003     Current Outpatient Medications   Medication Sig Dispense Refill    ACCU-CHEK GUIDE test strip TEST ONCE DAILY 100 strip 1    alcohol swab prep pads USE TO SWAB AREA OF INJECTION/OLIMPIA 100 each 1    allopurinol (ZYLOPRIM) 300 MG tablet TAKE 1 TABLET(300 MG) BY MOUTH DAILY 90 tablet 1    amLODIPine (NORVASC) 2.5 MG tablet TAKE 1 TABLET(2.5 MG) BY MOUTH DAILY 90 tablet 1    aspirin 81 MG tablet Take 1 tablet by mouth daily.      atorvastatin (LIPITOR) 20 MG tablet Take 1 tablet (20 mg) by mouth daily. 90 tablet 1    blood glucose (ACCU-CHEK GUIDE) test strip Use to test blood sugar 1 times daily or as directed. 100 strip 11    blood glucose calibration (NO BRAND SPECIFIED) solution To accompany: Blood Glucose Monitor Brands: per insurance. 1 Bottle 3    Blood Glucose Monitoring Suppl (ACCU-CHEK GUIDE ME) w/Device KIT USE TO TEST ONCE DAILY 1 kit 0    calcipotriene (DOVONEX) 0.005 % external cream Mix with efudex and apply twice daily to hands and arms for 12 days 60 g 3    cetirizine (ZYRTEC) 10 MG tablet Take 1 tablet (10 mg) by mouth daily. 90 tablet 3    docusate sodium (DSS) 100 MG capsule Take 100 mg by mouth at bedtime      fluorouracil (EFUDEX) 5 % external cream Mix with dovonex and apply twice daily to hands and arms for 12 days 40 g 1    hydrochlorothiazide (HYDRODIURIL) 50 MG tablet TAKE 1 TABLET(50  MG) BY MOUTH DAILY 90 tablet 1    hydrocortisone (CORTAID) 1 % external ointment Apply topically 2 times daily as needed 30 g 1    insulin pen needle (31G X 6 MM) 31G X 6 MM miscellaneous Use one pen needle daily or as directed. ( for Victoza ) 90 each 3    ketoconazole (NIZORAL) 2 % external cream Apply daily to affected area on face, armpits, groin. 60 g 1    ketoconazole (NIZORAL) 2 % external shampoo Leave on face, beard for 3-5 minutes then rinse. Use 2-3 times weekly. 120 mL 3    losartan (COZAAR) 100 MG tablet Take 1 tablet (100 mg) by mouth daily. 90 tablet 1    metroNIDAZOLE (METROCREAM) 0.75 % external cream Apply topically 2 times daily as needed (to rosacea areas) 45 g 1    Misc. Devices (SUPPOSITORY MOLD 2GM) MISC 1 suppository every 12 hours Nifedipine 4 mg suppository, one rectally every 12 hours 100 each 4    Multiple Vitamin (DAILY MULTIVITAMIN PO) Take by mouth daily      nystatin (MYCOSTATIN) 118539 UNIT/GM external powder Apply topically 2 times daily Prevention of rash 60 g 0    omeprazole (PRILOSEC) 20 MG DR capsule TAKE 1 CAPSULE(20 MG) BY MOUTH TWICE DAILY 180 capsule 1    order for DME Autocpap 10-16 cm 1 Units 0    PARoxetine (PAXIL) 40 MG tablet TAKE 1 TABLET(40 MG) BY MOUTH EVERY MORNING 90 tablet 1    polyethylene glycol (MIRALAX) 17 GM/Dose powder Take 17 g by mouth daily as needed for constipation 850 g 3    psyllium 0.52 G capsule Take 1 capsule (0.52 g) by mouth daily 100 capsule 3    Semaglutide, 2 MG/DOSE, (OZEMPIC) 8 MG/3ML pen Inject 2 mg subcutaneously every 7 days. 9 mL 3    tadalafil (ADCIRCA/CIALIS) 20 MG tablet 1/2 to 1 po as needed up to twice per week for erectile dysfunction 10 tablet 1    tamsulosin (FLOMAX) 0.4 MG capsule Take 2 capsules (0.8 mg) by mouth daily. 180 capsule 1    thin (NO BRAND SPECIFIED) lancets Use with lanceting device. To accompany: Blood Glucose Monitor Brands: per insurance. 100 each 11    triamcinolone (KENALOG) 0.1 % external cream Apply  "topically 2 times daily as needed for irritation 30 g 0   Patient taking one over the counter iron pill daily     No Known Allergies     Social History     Tobacco Use    Smoking status: Former     Current packs/day: 0.00     Average packs/day: 1 pack/day for 25.0 years (25.0 ttl pk-yrs)     Types: Cigarettes     Start date: 1975     Quit date: 2000     Years since quittin.6     Passive exposure: Past    Smokeless tobacco: Never   Substance Use Topics    Alcohol use: Not Currently     Comment: rare       History   Drug Use No             Review of Systems  Constitutional, HEENT, cardiovascular, pulmonary, GI, , musculoskeletal, neuro, skin, endocrine and psych systems are negative, except as otherwise noted.    No recent illnesses    No cardiac history     No chest pain/ breathing problems    No history of anesthesia problems    No bleeding/ clotting disorders        Objective    /63 (BP Location: Right arm, Patient Position: Chair, Cuff Size: Adult Large)   Pulse 109   Temp 97  F (36.1  C) (Temporal)   Resp 16   Ht 1.778 m (5' 10\")   Wt 108.4 kg (239 lb)   SpO2 98%   BMI 34.29 kg/m     Estimated body mass index is 34.29 kg/m  as calculated from the following:    Height as of this encounter: 1.778 m (5' 10\").    Weight as of this encounter: 108.4 kg (239 lb).  Physical Exam  GENERAL: alert and no distress  EYES: Eyes grossly normal to inspection, PERRL and conjunctivae and sclerae normal  HENT: ear canals and TM's normal, nose and mouth without ulcers or lesions  NECK: no adenopathy, no asymmetry, masses, or scars  RESP: lungs clear to auscultation - no rales, rhonchi or wheezes  CV: regular rate and rhythm, normal S1 S2, no S3 or S4, no murmur, click or rub, no peripheral edema  ABDOMEN: soft, nontender, no hepatosplenomegaly, no masses and bowel sounds normal  MS: no gross musculoskeletal defects noted, no edema  SKIN: no suspicious lesions or rashes  NEURO: Normal strength and tone, " mentation intact and speech normal  PSYCH: mentation appears normal, affect normal/bright    Recent Labs   Lab Test 03/13/25  1131 03/04/25  0844 01/29/25  1443   HGB 11.1*  --  11.7*     --  288     --  135   POTASSIUM 3.8  --  4.0   CR 0.78 0.8 0.80   A1C 6.6*  --  6.4*        Diagnostics    Labs pending  Last hemoglobin a1c fine  Echo done 5-16-24 [facility], reassuring      Revised Cardiac Risk Index (RCRI)  The patient has the following serious cardiovascular risks for perioperative complications:   - No serious cardiac risks = 0 points     RCRI Interpretation: 0 points: Class I (very low risk - 0.4% complication rate)         Signed Electronically by: Finesse Beal MD  A copy of this evaluation report is provided to the requesting physician.

## 2025-04-19 ENCOUNTER — HEALTH MAINTENANCE LETTER (OUTPATIENT)
Age: 65
End: 2025-04-19

## 2025-04-20 NOTE — TELEPHONE ENCOUNTER
Patient called and needs order from foot exam faxed to a new place because he has not heard back from original place (unspecified) that he submitted his order for diabetic shoes from. Patient did not know the name or fax number of where he wanted said orders sent. If patient calls back please retrieve the fax number and name of where he wants the orders sent to.     Gemma Brian -    [facility]    
Patient returned call.    Fax number is [phone] ([facility])    Estephania Luke,     
The orders have been faxed to Fax number [phone] ([facility]).      Gemma Brian -    [facility]    
20-Apr-2025

## 2025-04-22 ENCOUNTER — PATIENT OUTREACH (OUTPATIENT)
Dept: CARE COORDINATION | Facility: CLINIC | Age: 65
End: 2025-04-22
Payer: COMMERCIAL

## 2025-05-01 ENCOUNTER — PATIENT OUTREACH (OUTPATIENT)
Dept: GERIATRIC MEDICINE | Facility: CLINIC | Age: 65
End: 2025-05-01
Payer: COMMERCIAL

## 2025-05-01 NOTE — PROGRESS NOTES
[facility] Care Coordination Contact      [facility] Refusal Telephone Assessment    Member refused home visit HRA on 5/1/25 (reason: receives services under CAD).    ER visits: No  Hospitalizations: No  Health concerns: Has upcoming surgery for larengeal cancers.  Reports this will be a same day procedure.  No concerns presently.  Services provided by [facility] are going well per Gary.   Falls/Injuries: No  ADL/IADL Dependencies: receives personal care attendent for personal cares.         Member currently receiving the following home care services:   No  Member currently receiving the following community resources:    Informal support(s):  Under [facility] has HMK (7 hours per week), In-Home Services (25 hours per week), and personal care attendent (3.5 hours per day).     Advanced Care Planning discussion, complete code section.    Health Plan sponsored benefits: [facility]: Shared information regarding [employer]. Reviewed preventative health screening and health plan supplemental benefits/incentives. Reviewed medication disposal form and transition of care member handout.    Follow-Up Plan: Member informed of future contact, plan to f/u with member with a 6 month telephone assessment and offer a home visit.  Contact information shared with member and family, encouraged member to call with any questions or concerns at any time.    This CC note routed to PCP, Finesse Beal, EDGAR   [facility]  [phone]

## 2025-05-01 NOTE — Clinical Note
Que Beal,  FYI: Gary was a refusal of the in home health risk assessment again this year.  He continues with [facility] services and has annual visit from his CADI CM.  Reports no concerns presently.    Thank you, Enid HERNÁNDEZ care coordinator

## 2025-05-06 ENCOUNTER — VIRTUAL VISIT (OUTPATIENT)
Dept: OTOLARYNGOLOGY | Facility: CLINIC | Age: 65
End: 2025-05-06
Payer: COMMERCIAL

## 2025-05-06 ENCOUNTER — PATIENT OUTREACH (OUTPATIENT)
Dept: CARE COORDINATION | Facility: CLINIC | Age: 65
End: 2025-05-06

## 2025-05-06 DIAGNOSIS — J38.3 VOCAL PROCESS GRANULOMA: ICD-10-CM

## 2025-05-06 DIAGNOSIS — J38.7 LEUKOPLAKIA OF LARYNX: ICD-10-CM

## 2025-05-06 DIAGNOSIS — R49.0 DYSPHONIA: Primary | ICD-10-CM

## 2025-05-06 DIAGNOSIS — C32.9 LARYNGEAL CANCER (H): ICD-10-CM

## 2025-05-06 NOTE — NURSING NOTE
Current patient location: [address] [place] [id number]    Is the patient currently in the state of [place]? YES    Visit mode: VIDEO    If the visit is dropped, the patient can be reconnected by:VIDEO VISIT: Text to cell phone:   Telephone Information:   Mobile [phone]       Will anyone else be joining the visit? NO  (If patient encounters technical issues they should call [phone]    Are changes needed to the allergy or medication list? No    Are refills needed on medications prescribed by this physician? NO    Rooming Documentation:  Unable to complete questionnaire(s) due to time    Reason for visit: No chief complaint on file.    Ricardo HARPF

## 2025-05-06 NOTE — PROGRESS NOTES
"Gary Iyer is a 64 year old male who is being evaluated via a billable video visit.      Gary has been notified and verbally consented to the following:   This video visit will be conducted between you and your provider.  Patient has opted to conduct today's video visit vs an in-person appointment.   Video visits are billed at different rates depending on your insurance coverage. Please reach out to your insurance provider with any questions.   If during the course of the call the provider feels the appointment is not appropriate, you will not be charged for this service.  Provider has received verbal consent for billable virtual visit from the patient? Yes  Will anyone else be joining your video visit? No    Call initiated at: 2:05   Type of Visit Platform Used: [employer] Video  Location of provider: Home  Location of patient: [facility] VOICE CLINIC  THERAPY NOTE (CPT 92862)    Patient: Gary Iyer  Date of Service: 5/6/2025  Visit / Treatment number: 10 / 7  Certification Period: 3/7/25 - 6/5/25  Referring physician: Dr. 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 Perceptually, he demonstrated moderate dysphonia characterized by moderate roughness and moderate strain. 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 Therefore, continued voice therapy has been recommended.  Dr. Fournier has ordered follow-up evaluation in 4-6 weeks. Mr. Iyer is in agreement with this plan of care. \"    SUBJECTIVE:  Since the patient's last session, they report the following:   Stated that he talked to his family and ultimately decided that pursuing   Feels that symptomatically he is not " "experiencing anything     OBJECTIVE:    THERAPEUTIC ACTIVITIES    Counseling and Education:  Asked questions regarding possible issues surrounding reflux  Patient denies any reflux symptoms  Will follow-up with Dr. Fournier regarding value in increasing dosage for period of time following surgery given OTC dose currently  Patient encouraged to use general precautions of avoiding eating and drinking within a couple of hours of going to bed or rigorous activity  Avoiding triggering foods if they exist    Post surgical protocol  2-3 days of total voice rest (potentially more as determined at time of surgery)  Strategies for implementation discussed  \"trial run\" recommended in advance of surgery to figure out hurdles before-hand  Use of gentle non-whispered voice, with emphasis on trying to maintain reduced effort rather than \"forcing\" a clear quality  Gentle home prior to voicing was most facilitating in the past and was used again today with good effect  Ramp up schedule following voice rest  Alternate forms of communcation  Wound-healing / pliability exercises  Semi-Occluded Vocal Tract (SOVT) exercises instructed to reduce laryngeal tension, promote vocal fold pliability, and coordinate respiration and phonation  Based on prior therapeutic work gentle humming or straw were recommended   Humming was most facilitating today   Good accuracy with minimal support   Education regarding phonotraumatic behaviors and instruction of strategies and techniques to avoid their use  Coughing -fortunately this was a minimal issue but techniques are provided just in case  Valsalva  Encouraged to follow-up with his primary care for constipation management if this has continued to be an issue  Whispering  Voice use outside of ramp-up schedule  Vocal hygiene education  Systemic hydration  Topical hydration  A regimen for home practice was instructed.  I provided a MyChart message of today's therapeutic activities to facilitate " "practice.    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 Gary had a good preoperative therapeutic session reviewing the postsurgical recommendations.  He feels confident in his ability to manage postoperative care, but was encouraged to maintain contact and reach out if he has any questions via [employer] nethart.    PLAN: I will see Gary at his follow-up with Dr. Fournier on 6/6 or sooner as schedule allows.  If I am unable to see him in the shorter postoperative period contact will be maintained via [employer] nethart to ensure accuracy.   For practice goals see AVS.     TOTAL SERVICE TIME: 45 minutes  TREATMENT (88851)  NO CHARGE FACILITY FEE (53547)    Travis Mcdaniels M.M., M.A., CCC-SLP  Speech-Language Pathologist  Certificate of Vocology  [phone]    *this report was created in part through the use of computerized dictation software, and though reviewed following completion, some typographic errors may persist.  If there is confusion regarding any of this notes contents, please contact me for clarification.*    Patient Supplied Answers To Last 2 VHI Questionnaires      8/15/2022     2:00 PM 3/27/2025    12:44 PM   Voice Handicap Index (VHI-10)   My voice makes it difficult for people to hear me 0 0    People have difficulty understanding me in a noisy room 0 0    My voice difficulties restrict my personal and social life.  0 0    I feel left out of conversations because of my voice 0 0    My voice problem causes me to lose income 0 0    I feel as though I have to strain to produce voice 0 2    The clarity of my voice is unpredictable 0 0    My voice problem upsets me 0 0    My voice makes me feel handicapped 0 0    People ask, \"What's wrong with your voice?\" 0 0    VHI-10 0 2        Proxy-reported "        Patient Supplied Answers To Last 2 CSI Questionnaires      3/1/2019     7:39 AM 8/15/2022     2:00 PM   Cough Severity Index (CSI)   My cough is worse when I lie down 0  0   My coughing problem causes me to restrict my personal and social life 0  0   I tend to avoid places because of my cough problem 0  0   I feel embarrassed because of my coughing problem 0  0   People ask, ''What's wrong?'' because I cough a lot 0  0   I run out of air when I cough 1  0   My coughing problem affects my voice 0  0   My coughing problem limits my physical activity 1  0   My coughing problem upsets me 1  0   People ask me if I am sick because I cough a lot 0  0   CSI Score 3 0       Proxy-reported        Patient Supplied Answers To Last 2 EAT Questionnaires      3/1/2019     7:40 AM 8/15/2022     2:00 PM   Eating Assessment Tool (EAT-10)   My swallowing problem has caused me to lose weight 1  0   My swallowing problem interferes with my ability to go out for meals 0  0   Swallowing liquids takes extra effort 1  0   Swallowing solids takes extra effort 1  0   Swallowing pills takes extra effort 1  0   Swallowing is painful 0  0   The pleasure of eating is affected by my swallowing 0  0   When I swallow food sticks in my throat 0  0   I cough when I eat 1  0   Swallowing is stressful 0  0   EAT-10 5 0       Proxy-reported        Patient Supplied Answers to Dyspnea Index Questionnaire:       No data to display

## 2025-05-06 NOTE — PATIENT INSTRUCTIONS
Emiliano Vega,    It was good to see you today.  I have your post operative recommendations listed below.  There is a lot of information there, but the gist of it is he will have a period of voice rest, a faster ramp up to using your voice more normally, and essentially the same kinds of techniques to take good care of the tissue and remind you how to use it with low impact.  I have attached a recording to help remind you about the latter.  Take a look through them and reach out if you have any questions    - Zack    _______________________________________________________________________________  Post Surgical Voice Care  This information details the specific techniques you will need to use to change your activity in order to ensure optimal healing after your vocal fold surgery.  Healing after surgery may take several weeks to months.  Your ability to increase your voice use will be determined by the results of your ongoing laryngeal examinations, and your visits with your speech-language pathologist.            Voice Use Protocol Following Surgery:  1) _2-3__ days of total voice rest (number of days determined definitively at the time of your procedure, but mentally plan for a longer period so that you are prepared either way)  2) Avoid all high-impact vocal fold behaviors during voice rest and the gradual increase in voice use.  In addition to following the protocol above, this means:  Watch out for causing irritation to the back of your throat in everyday activities  Keep yourself regular with regards to constipation so you don't have to bear down as much  Follow the recommendations of your treating provider  Avoid grunting as you pick things up or change positions  Exhale and puff your cheeks as you lift or change position to avoid bearing down  Check-in as you while you exert yourself  Make sure to have steady breathing with exertion  Can be mouth or nose  Anytime you notice yourself holding your breath, go back to  breathing in and out easily  NO whispering or whistling. These are also high-impact activities on your vocal folds, and may complicate the healing process.  NO coughing, throat clearing, sneezing, etc. These activities are produced by squeezing the vocal folds together to build up pressure in the lungs and then blasting the vocal folds open again.  This is traumatic to the vocal folds, particularly when you are trying to heal after surgery.  If you find that coughing is an issue this time where it has not been in the past see the section at the close of this handout          3) Following total voice rest: minimal voice use that gradually increases and tissue mobilization  Vocal Ramp-up! (assuming 3 days of voice rest)   DAY 4:  3 minutes of voice use per hour  DAY 5:  5 minutes of voice use per hour   DAY 6: 7 minutes of voice use per hour  DAY 7: 10 minutes of voice use per hour  DAY 8: 15 minutes of voice use per hour  Continue to increase this way until you come for your follow-up appointment  1 minute of the periods of voice use above should be used for gentle mobilization exercises ( cup and bubble ) you should do this AT LEAST 5 times per day. See the description below  The remainder of the periods of voice use you should use a  easy, soft    Start by taking a deep, diaphragmatic breath and sighing ( hun,   ha,  or through a straw)  When you speak, maintain this easy, relaxed voicing at a soft volume  It is NOT a whisper, but the softest voice you can make without whispering  It should be EASY in your throat, with no tightness or strain  Your voice may sound hoarse following surgery as your vocal folds heal.  Do NOT try to make your voice sound normal.  Focus on the easy feeling in your throat rather than the sound.  If your voice becomes more hoarse, fatigues, or becomes more effortful with use, back off and rest your voice  TISSUE MOBILIZATION / CUP AND BUBBLE EXERCISES    HOW:    Straw with Water - Fill the  "cup (or bottle) about 2 inches full of water. Blow bubbles through the straw while keeping your voice on. (kind of like making an  ooooo  sound through straw).Keep the water bubbling the whole time. That means your air is moving consistently.      Straw Phonation - make sound through the straw (like it's a kazoo).  Make sure you are using your air freely.  You can hold your hand in front of the straw to test, and you should be able to feel the air moving.    Oftentimes for you a gentle \"Hum\" has been the easiest option    Feel how open and relaxed your throat feels when you practice these sounds. If the bubbling or air stops or it gets tight, don't sweat it.  Just breath, relax, and start again.  Across all the exercises make sure you are getting a nice low breath and feeling the steady inward motion of low abdominal muscles when making sound.    Aren't sure if you are doing it right? Ask yourself these three questions:  Does it feel easy?  Is the airflow consistent?  Do you feel that forward buzz?      What sounds to make:    Start off with just bubbles to give yourself a point of comparison for how little you need to work in your throat    Single pitches - use any comfortable pitch and sustain the note for as long as it feels free and easy, and your lips or tongue are bubbling.        Sighs - glide from high to low like you are sitting down in a comfortable chair after a long day of work.  The goal on this one is the low pitch, so don't worry about starting too high. This is about the low note    [place] - Start on a medium pitch and glide up just a bit and back down   The goal on this one is the high pitch. This is about the high note      Otherwise when you are allowed to use your voice to try and do so in a very gentle way the following was something we used previously to find that easy voice:  Everyday phrases  *every phrase you'll say twice. First with a gentle humm + the phrase, and second just the phrase in " an easy way. Breathe between each*  Mmhmm - like answering a yes or no question  Hum + Hello  Hello  Hum + This is Gary  This is Gary  Hum + What are we having for breakfast  What are we having for breakfast  Hum + Can I have that please?  Can I have that please?  Hum + What do you need from the store?  What do you need from the store?  You can change this list if you find there is something that you say more frequently.      Important Reminders:  1) Stay hydrated  Drink water until your urine is pale or clear  Use steam 1-2 times per day (heat up water in a bowl or pot, put a towel over your head and breathe in the steam)  Consider getting a humidifier for your bedroom      Back-up plan  - Don't worry about this unless you find you are coughing or feeling the urge to cough a lot    1) TO AVOID COUGHING/THROAT CLEARING:  Drink plenty of water  Use a humidifier over night or make sure your CPAP humidifier is working  Breathe in steam   sit in the bathroom with the shower on hot  boil a pot of water and pull it off the stove drape a towel over it and breathe in  Gargle salt water 2x per day or as needed if helpful (see recipe later in this list)  IF you still find yourself needing to cough suppress and substitute!    Practice these techniques 2 times per day like a fire drill until you know them like the back of your hand.   Anytime you feel an itchy throat sensation and the urge to cough or clear your throat try any or all of them.    Sip of water and swallow (hot, cold, carbonated)  Hard Swallow (tip your chin down and swallow hard!)  Lozenges (avoid menthol), Gum, Hard Candy (anything that stimulates saliva)  Gargle  Sniff or pursed lip inhale and exhale on  Shhh  like shushing a baby or like blowing at a candle  Light glottic coup (gentle throat clear like you are saying  eh eh eh )  Soft cough (hairball maneuver AKA big capital H) then  swallow  Good old fashiond distraction! Count to 15 silently, say the  alphabet backwards silently, etc.     2) If your throat feels irritated  Gargle water 2x daily or more as feels good  Saline recipe:  6-8 oz glass   warm water   feel for right temperature (not too hot or cold)  warm enough to dissolve the salt    tsp. kosher salt, or sea salt   Additives in table salt can cause irritation/ discomfort.    tsp. baking soda  Tilt your head side to side  Wiggle your tongue around  Help the water get to all the nooks and crannies

## 2025-05-06 NOTE — LETTER
"5/6/2025       RE: Gary Iyer  [address] [facility] [address]     Dear Colleague,    Thank you for referring your patient, Gary Iyer, to the [facility]ICE CL[facility] [place] at [facility]. Please see a copy of my visit note below.    Gary Iyer is a 64 year old male who is being evaluated via a billable video visit.      Gary has been notified and verbally consented to the following:   This video visit will be conducted between you and your provider.  Patient has opted to conduct today's video visit vs an in-person appointment.   Video visits are billed at different rates depending on your insurance coverage. Please reach out to your insurance provider with any questions.   If during the course of the call the provider feels the appointment is not appropriate, you will not be charged for this service.  Provider has received verbal consent for billable virtual visit from the patient? Yes  Will anyone else be joining your video visit? No    Call initiated at: 2:05   Type of Visit Platform Used: [employer]  Location of provider: Home  Location of patient: [facility] VOICE CLINIC  THERAPY NOTE (CPT 54755)    Patient: Gary Iyer  Date of Service: 5/6/2025  Visit / Treatment number: 10 / 7  Certification Period: 3/7/25 - 6/5/25  Referring physician: Dr. 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 Perceptually, he demonstrated moderate dysphonia characterized by moderate roughness and moderate strain. 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 Therefore, continued voice therapy has been recommended. " " Dr. Fournier has ordered follow-up evaluation in 4-6 weeks. Mr. Iyer is in agreement with this plan of care. \"    SUBJECTIVE:  Since the patient's last session, they report the following:   Stated that he talked to his family and ultimately decided that pursuing   Feels that symptomatically he is not experiencing anything     OBJECTIVE:    THERAPEUTIC ACTIVITIES    Counseling and Education:  Asked questions regarding possible issues surrounding reflux  Patient denies any reflux symptoms  Will follow-up with Dr. Fournier regarding value in increasing dosage for period of time following surgery given OTC dose currently  Patient encouraged to use general precautions of avoiding eating and drinking within a couple of hours of going to bed or rigorous activity  Avoiding triggering foods if they exist    Post surgical protocol  2-3 days of total voice rest (potentially more as determined at time of surgery)  Strategies for implementation discussed  \"trial run\" recommended in advance of surgery to figure out hurdles before-hand  Use of gentle non-whispered voice, with emphasis on trying to maintain reduced effort rather than \"forcing\" a clear quality  Gentle home prior to voicing was most facilitating in the past and was used again today with good effect  Ramp up schedule following voice rest  Alternate forms of communcation  Wound-healing / pliability exercises  Semi-Occluded Vocal Tract (SOVT) exercises instructed to reduce laryngeal tension, promote vocal fold pliability, and coordinate respiration and phonation  Based on prior therapeutic work gentle humming or straw were recommended   Humming was most facilitating today   Good accuracy with minimal support   Education regarding phonotraumatic behaviors and instruction of strategies and techniques to avoid their use  Coughing -fortunately this was a minimal issue but techniques are provided just in case  Valsalva  Encouraged to follow-up with his primary care for " constipation management if this has continued to be an issue  Whispering  Voice use outside of ramp-up schedule  Vocal hygiene education  Systemic hydration  Topical hydration  A regimen for home practice was instructed.  I provided a [employer] medichart message of today's therapeutic activities to facilitate practice.    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 Gary had a good preoperative therapeutic session reviewing the postsurgical recommendations.  He feels confident in his ability to manage postoperative care, but was encouraged to maintain contact and reach out if he has any questions via MyChart.    PLAN: I will see Gary at his follow-up with Dr. Fournier on 6/6 or sooner as schedule allows.  If I am unable to see him in the shorter postoperative period contact will be maintained via [employer] medichart to ensure accuracy.   For practice goals see AVS.     TOTAL SERVICE TIME: 45 minutes  TREATMENT (09057)  NO CHARGE FACILITY FEE (73602)    Travis Mcdaniels M.M., M.A., CCC-SLP  Speech-Language Pathologist  Certificate of Vocology  [phone]    *this report was created in part through the use of computerized dictation software, and though reviewed following completion, some typographic errors may persist.  If there is confusion regarding any of this notes contents, please contact me for clarification.*    Patient Supplied Answers To Last 2 VHI Questionnaires      8/15/2022     2:00 PM 3/27/2025    12:44 PM   Voice Handicap Index (VHI-10)   My voice makes it difficult for people to hear me 0 0    People have difficulty understanding me in a noisy room 0 0    My voice difficulties restrict my personal and social life.  0 0    I feel left out of conversations because of my voice 0 0    My voice problem  "causes me to lose income 0 0    I feel as though I have to strain to produce voice 0 2    The clarity of my voice is unpredictable 0 0    My voice problem upsets me 0 0    My voice makes me feel handicapped 0 0    People ask, \"What's wrong with your voice?\" 0 0    VHI-10 0 2        Proxy-reported        Patient Supplied Answers To Last 2 CSI Questionnaires      3/1/2019     7:39 AM 8/15/2022     2:00 PM   Cough Severity Index (CSI)   My cough is worse when I lie down 0  0   My coughing problem causes me to restrict my personal and social life 0  0   I tend to avoid places because of my cough problem 0  0   I feel embarrassed because of my coughing problem 0  0   People ask, ''What's wrong?'' because I cough a lot 0  0   I run out of air when I cough 1  0   My coughing problem affects my voice 0  0   My coughing problem limits my physical activity 1  0   My coughing problem upsets me 1  0   People ask me if I am sick because I cough a lot 0  0   CSI Score 3 0       Proxy-reported        Patient Supplied Answers To Last 2 EAT Questionnaires      3/1/2019     7:40 AM 8/15/2022     2:00 PM   Eating Assessment Tool (EAT-10)   My swallowing problem has caused me to lose weight 1  0   My swallowing problem interferes with my ability to go out for meals 0  0   Swallowing liquids takes extra effort 1  0   Swallowing solids takes extra effort 1  0   Swallowing pills takes extra effort 1  0   Swallowing is painful 0  0   The pleasure of eating is affected by my swallowing 0  0   When I swallow food sticks in my throat 0  0   I cough when I eat 1  0   Swallowing is stressful 0  0   EAT-10 5 0       Proxy-reported        Patient Supplied Answers to Dyspnea Index Questionnaire:       No data to display                Again, thank you for allowing me to participate in the care of your patient.      Sincerely,    Travis Mcdaniels, SLP    "

## 2025-05-07 ENCOUNTER — ANESTHESIA EVENT (OUTPATIENT)
Dept: SURGERY | Facility: CLINIC | Age: 65
End: 2025-05-07
Payer: COMMERCIAL

## 2025-05-07 RX ORDER — ONDANSETRON 2 MG/ML
4 INJECTION INTRAMUSCULAR; INTRAVENOUS EVERY 30 MIN PRN
OUTPATIENT
Start: 2025-05-07

## 2025-05-07 RX ORDER — NALOXONE HYDROCHLORIDE 0.4 MG/ML
0.1 INJECTION, SOLUTION INTRAMUSCULAR; INTRAVENOUS; SUBCUTANEOUS
OUTPATIENT
Start: 2025-05-07

## 2025-05-07 RX ORDER — OXYCODONE HYDROCHLORIDE 10 MG/1
10 TABLET ORAL
Refills: 0 | OUTPATIENT
Start: 2025-05-07

## 2025-05-07 RX ORDER — ONDANSETRON 4 MG/1
4 TABLET, ORALLY DISINTEGRATING ORAL EVERY 30 MIN PRN
OUTPATIENT
Start: 2025-05-07

## 2025-05-07 RX ORDER — OXYCODONE HYDROCHLORIDE 5 MG/1
5 TABLET ORAL
Refills: 0 | OUTPATIENT
Start: 2025-05-07

## 2025-05-07 RX ORDER — DEXAMETHASONE SODIUM PHOSPHATE 4 MG/ML
4 INJECTION, SOLUTION INTRA-ARTICULAR; INTRALESIONAL; INTRAMUSCULAR; INTRAVENOUS; SOFT TISSUE
OUTPATIENT
Start: 2025-05-07

## 2025-05-07 NOTE — ANESTHESIA PREPROCEDURE EVALUATION
Anesthesia Pre-Procedure Evaluation    Patient: Gary Iyer   MRN: [id number] : 1960          Procedure : Procedure(s):  Microdirect laryngoscopy with excision/ablation of laryngeal lesions, possible biopsies, possible CO2 laser         Past Medical History:   Diagnosis Date    Diabetes (H)     2yr    Hypertension     Lesion of larynx 2017    Leokoplakia. s/p Flexible fiberoptic transnasal laryngoscopy with endoscopic injection, laryngeal biopsy    Multinodular goiter (nontoxic) 06/10/2013    Sleep apnea 8yr    Squamous cell carcinoma of skin, unspecified       Past Surgical History:   Procedure Laterality Date    COLONOSCOPY  2012    Repeat Colonoscopy in 10 yrs.     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    HEAD & NECK SURGERY  2011    callous removal from throat    INJECT STEROID (LOCATION) N/A 2025    Procedure: steroid injection;  Surgeon: Mel Fournier MD;  Location: [facility] OR    LARYNGOSCOPY WITH BIOPSY(IES)  2011    LARYNGOSCOPY WITH BIOPSY(IES) N/A 2017    Procedure: LARYNGOSCOPY WITH BIOPSY(IES);  Surgeon: Mel Fournier MD;  [facility]    LARYNGOSCOPY WITH MICROSCOPE N/A 2017    Procedure: LARYNGOSCOPY WITH MICROSCOPE;  Surgeon: Mel Fournier MD;  [facility]    LASER CO2 LARYNGOSCOPY N/A 2025    Procedure: Microdirect laryngoscopy with excision/ablation of laryngeal lesions, CO2 laser;  Surgeon: Mel Fournier MD;  Location: [facility] OR    LASER CO2 LARYNGOSCOPY, COMPLEX  2013    Procedure: LASER CO2 LARYNGOSCOPY, COMPLEX;  Micro Direct Laryngoscopy With Micro Flap Excision Of Lesion Lumenis Co2 Laser ;  Surgeon: Mel Forunier MD;  Location: [facility] OR    LASER CO2 LESION ORAL N/A 2017    Procedure: LASER CO2 LESION ORAL;  Surgeon: Mel Fournier MD;  [facility]: [facility]    ORTHOPEDIC SURGERY  2016    left tibia orif with abbi -    VASECTOMY  2003      No Known  Allergies   Social History     Tobacco Use    Smoking status: Former     Current packs/day: 0.00     Average packs/day: 1 pack/day for 25.0 years (25.0 ttl pk-yrs)     Types: Cigarettes     Start date: 1975     Quit date: 2000     Years since quittin.6     Passive exposure: Past    Smokeless tobacco: Never   Substance Use Topics    Alcohol use: Not Currently     Comment: rare      Wt Readings from Last 1 Encounters:   25 108.4 kg (239 lb)        Anesthesia Evaluation            ROS/MED HX  ENT/Pulmonary: Comment: Hx of laryngeal cancer  Dysphonia    (+) sleep apnea, doesn't use CPAP,                                      Neurologic:  - neg neurologic ROS     Cardiovascular: Comment: Hx of carotid stenosis  B/l carotid ultrasound 3/2023 - Less than 50% diameter stenosis of right ICA relative to distal ICA diameter, unchanged; Less than 50% diameter stenosis of left ICA relative to distal ICA diameter, unchanged    (+) Dyslipidemia hypertension- -   -  - -                                      METS/Exercise Tolerance:     Hematologic:       Musculoskeletal:  - neg musculoskeletal ROS     GI/Hepatic:       Renal/Genitourinary:       Endo:     (+)  type II DM,   Using insulin,     thyroid problem,     Obesity,       Psychiatric/Substance Use:     (+) psychiatric history anxiety       Infectious Disease:       Malignancy:   (+) Malignancy, History of Skin.    Other:              Physical Exam  Airway  Mallampati: II  TM distance: >3 FB  Neck ROM: full    Cardiovascular - normal exam  Rhythm: regular  Rate: normal rate   Comments: Hx of carotid stenosis  B/l carotid ultrasound 3/2023 - Less than 50% diameter stenosis of right ICA relative to distal ICA diameter, unchanged; Less than 50% diameter stenosis of left ICA relative to distal ICA diameter, unchanged  Dental   (+) Modest Abnormalities - crowns, retainers, 1 or 2 missing teeth      Pulmonary - normal examBreath sounds clear to auscultation         Neurological - normal exam  He appears awake, alert and oriented x3.    Other Findings       OUTSIDE LABS:  CBC:   Lab Results   Component Value Date    WBC 6.8 04/18/2025    WBC 7.0 03/13/2025    HGB 11.7 (L) 04/18/2025    HGB 11.1 (L) 03/13/2025    HCT 37.2 (L) 04/18/2025    HCT 35.0 (L) 03/13/2025     04/18/2025     03/13/2025     BMP:   Lab Results   Component Value Date     04/18/2025     03/13/2025    POTASSIUM 3.8 04/18/2025    POTASSIUM 3.8 03/13/2025    CHLORIDE 98 04/18/2025    CHLORIDE 97 (L) 03/13/2025    CO2 27 04/18/2025    CO2 26 03/13/2025    BUN 9.2 04/18/2025    BUN 11.6 03/13/2025    CR 0.82 04/18/2025    CR 0.78 03/13/2025     (H) 04/18/2025     (H) 03/13/2025     COAGS:   Lab Results   Component Value Date    INR 1.00 07/25/2010     POC:   Lab Results   Component Value Date     (H) 02/09/2017     HEPATIC:   Lab Results   Component Value Date    ALBUMIN 3.9 03/13/2025    PROTTOTAL 8.7 (H) 03/13/2025    ALT 39 03/13/2025    AST 36 03/13/2025    ALKPHOS 65 03/13/2025    BILITOTAL 0.3 03/13/2025     OTHER:   Lab Results   Component Value Date    A1C 6.6 (H) 03/13/2025    TRUMAN 9.7 04/18/2025    TSH 1.93 03/13/2025    T4 1.08 07/17/2020       Anesthesia Plan    ASA Status:  3    Anesthesia Type: general.   Induction: intravenous.   Techniques and Equipment:     - Airway:   Planned airway equipment includes video laryngoscope.     - Monitoring Plan: standard ASA monitoring, train of four monitoring, processed EEG monitor.     Consents    Anesthesia Plan(s) and associated risks, benefits, and realistic alternatives discussed. Questions answered and patient/representative(s) expressed understanding.     - Discussed: anesthesiologist, resident, surgeon     - Discussed with:  Patient       Blood Consent:      - Discussed with: patient.     Postoperative Care    Pain management: non-narcotic analgesics, plan for postoperative opioid use, multimodal  "analgesia.        Comments:                 Mp MADERA Ma, MD    I have reviewed the pertinent notes and labs in the chart from the past 30 days and (re)examined the patient.  Any updates or changes from those notes are reflected in this note.    Clinically Significant Risk Factors Present on Admission                   # Hypertension: Noted on problem list          # DMII: A1C = 6.6 % (Ref range: 0.0 - 5.6 %) within past 6 months    # Obesity: Estimated body mass index is 34.29 kg/m  as calculated from the following:    Height as of 4/18/25: 1.778 m (5' 10\").    Weight as of 4/18/25: 108.4 kg (239 lb).                    "

## 2025-05-08 ENCOUNTER — HOSPITAL ENCOUNTER (OUTPATIENT)
Facility: CLINIC | Age: 65
Discharge: HOME | End: 2025-05-08
Attending: OTOLARYNGOLOGY | Admitting: OTOLARYNGOLOGY
Payer: COMMERCIAL

## 2025-05-08 ENCOUNTER — ANESTHESIA (OUTPATIENT)
Dept: SURGERY | Facility: CLINIC | Age: 65
End: 2025-05-08
Payer: COMMERCIAL

## 2025-05-08 VITALS
HEART RATE: 114 BPM | SYSTOLIC BLOOD PRESSURE: 134 MMHG | HEIGHT: 70 IN | BODY MASS INDEX: 35.07 KG/M2 | OXYGEN SATURATION: 99 % | WEIGHT: 244.93 LBS | DIASTOLIC BLOOD PRESSURE: 93 MMHG | RESPIRATION RATE: 18 BRPM | TEMPERATURE: 98.1 F

## 2025-05-08 LAB
GLUCOSE BLDC GLUCOMTR-MCNC: 132 MG/DL (ref 70–99)
GLUCOSE BLDC GLUCOMTR-MCNC: 189 MG/DL (ref 70–99)
GLUCOSE BLDC GLUCOMTR-MCNC: 217 MG/DL (ref 70–99)
GLUCOSE BLDC GLUCOMTR-MCNC: 232 MG/DL (ref 70–99)

## 2025-05-08 PROCEDURE — 272N000001 HC OR GENERAL SUPPLY STERILE: Performed by: OTOLARYNGOLOGY

## 2025-05-08 PROCEDURE — 250N000009 HC RX 250

## 2025-05-08 PROCEDURE — 250N000012 HC RX MED GY IP 250 OP 636 PS 637

## 2025-05-08 PROCEDURE — 360N000077 HC SURGERY LEVEL 4, PER MIN: Performed by: OTOLARYNGOLOGY

## 2025-05-08 PROCEDURE — 250N000011 HC RX IP 250 OP 636

## 2025-05-08 PROCEDURE — 250N000025 HC SEVOFLURANE, PER MIN: Performed by: OTOLARYNGOLOGY

## 2025-05-08 PROCEDURE — 250N000011 HC RX IP 250 OP 636: Performed by: OTOLARYNGOLOGY

## 2025-05-08 PROCEDURE — 370N000017 HC ANESTHESIA TECHNICAL FEE, PER MIN: Performed by: OTOLARYNGOLOGY

## 2025-05-08 PROCEDURE — 250N000013 HC RX MED GY IP 250 OP 250 PS 637

## 2025-05-08 PROCEDURE — 88305 TISSUE EXAM BY PATHOLOGIST: CPT | Mod: TC | Performed by: OTOLARYNGOLOGY

## 2025-05-08 PROCEDURE — 88305 TISSUE EXAM BY PATHOLOGIST: CPT | Mod: 26 | Performed by: STUDENT IN AN ORGANIZED HEALTH CARE EDUCATION/TRAINING PROGRAM

## 2025-05-08 PROCEDURE — 710N000010 HC RECOVERY PHASE 1, LEVEL 2, PER MIN: Performed by: OTOLARYNGOLOGY

## 2025-05-08 PROCEDURE — 710N000012 HC RECOVERY PHASE 2, PER MINUTE: Performed by: OTOLARYNGOLOGY

## 2025-05-08 PROCEDURE — 82962 GLUCOSE BLOOD TEST: CPT

## 2025-05-08 PROCEDURE — 258N000003 HC RX IP 258 OP 636

## 2025-05-08 PROCEDURE — 31541 LARYNSCOP W/TUMR EXC + SCOPE: CPT | Performed by: OTOLARYNGOLOGY

## 2025-05-08 PROCEDURE — 999N000141 HC STATISTIC PRE-PROCEDURE NURSING ASSESSMENT: Performed by: OTOLARYNGOLOGY

## 2025-05-08 RX ORDER — LIDOCAINE 40 MG/G
CREAM TOPICAL
Status: DISCONTINUED | OUTPATIENT
Start: 2025-05-08 | End: 2025-05-08 | Stop reason: HOSPADM

## 2025-05-08 RX ORDER — FENTANYL CITRATE 50 UG/ML
25 INJECTION, SOLUTION INTRAMUSCULAR; INTRAVENOUS EVERY 5 MIN PRN
Status: DISCONTINUED | OUTPATIENT
Start: 2025-05-08 | End: 2025-05-08 | Stop reason: HOSPADM

## 2025-05-08 RX ORDER — ONDANSETRON 2 MG/ML
4 INJECTION INTRAMUSCULAR; INTRAVENOUS EVERY 30 MIN PRN
Status: DISCONTINUED | OUTPATIENT
Start: 2025-05-08 | End: 2025-05-08 | Stop reason: HOSPADM

## 2025-05-08 RX ORDER — ONDANSETRON 4 MG/1
4 TABLET, ORALLY DISINTEGRATING ORAL EVERY 30 MIN PRN
Status: DISCONTINUED | OUTPATIENT
Start: 2025-05-08 | End: 2025-05-08 | Stop reason: HOSPADM

## 2025-05-08 RX ORDER — DEXAMETHASONE SODIUM PHOSPHATE 4 MG/ML
4 INJECTION, SOLUTION INTRA-ARTICULAR; INTRALESIONAL; INTRAMUSCULAR; INTRAVENOUS; SOFT TISSUE
Status: DISCONTINUED | OUTPATIENT
Start: 2025-05-08 | End: 2025-05-08 | Stop reason: HOSPADM

## 2025-05-08 RX ORDER — LIDOCAINE HYDROCHLORIDE 20 MG/ML
INJECTION, SOLUTION INFILTRATION; PERINEURAL PRN
Status: DISCONTINUED | OUTPATIENT
Start: 2025-05-08 | End: 2025-05-08

## 2025-05-08 RX ORDER — EPINEPHRINE 0.1 MG/ML
INJECTION INTRAVENOUS PRN
Status: DISCONTINUED | OUTPATIENT
Start: 2025-05-08 | End: 2025-05-08 | Stop reason: HOSPADM

## 2025-05-08 RX ORDER — ONDANSETRON 2 MG/ML
INJECTION INTRAMUSCULAR; INTRAVENOUS PRN
Status: DISCONTINUED | OUTPATIENT
Start: 2025-05-08 | End: 2025-05-08

## 2025-05-08 RX ORDER — HYDROMORPHONE HCL IN WATER/PF 6 MG/30 ML
0.4 PATIENT CONTROLLED ANALGESIA SYRINGE INTRAVENOUS EVERY 5 MIN PRN
Status: DISCONTINUED | OUTPATIENT
Start: 2025-05-08 | End: 2025-05-08 | Stop reason: HOSPADM

## 2025-05-08 RX ORDER — AMPICILLIN AND SULBACTAM 1; .5 G/1; G/1
1.5 INJECTION, POWDER, FOR SOLUTION INTRAMUSCULAR; INTRAVENOUS SEE ADMIN INSTRUCTIONS
Status: DISCONTINUED | OUTPATIENT
Start: 2025-05-08 | End: 2025-05-08 | Stop reason: HOSPADM

## 2025-05-08 RX ORDER — FENTANYL CITRATE 50 UG/ML
INJECTION, SOLUTION INTRAMUSCULAR; INTRAVENOUS PRN
Status: DISCONTINUED | OUTPATIENT
Start: 2025-05-08 | End: 2025-05-08

## 2025-05-08 RX ORDER — HYDROMORPHONE HCL IN WATER/PF 6 MG/30 ML
0.2 PATIENT CONTROLLED ANALGESIA SYRINGE INTRAVENOUS EVERY 5 MIN PRN
Status: DISCONTINUED | OUTPATIENT
Start: 2025-05-08 | End: 2025-05-08 | Stop reason: HOSPADM

## 2025-05-08 RX ORDER — ACETAMINOPHEN 325 MG/1
975 TABLET ORAL ONCE
Status: COMPLETED | OUTPATIENT
Start: 2025-05-08 | End: 2025-05-08

## 2025-05-08 RX ORDER — SODIUM CHLORIDE, SODIUM LACTATE, POTASSIUM CHLORIDE, CALCIUM CHLORIDE 600; 310; 30; 20 MG/100ML; MG/100ML; MG/100ML; MG/100ML
INJECTION, SOLUTION INTRAVENOUS CONTINUOUS PRN
Status: DISCONTINUED | OUTPATIENT
Start: 2025-05-08 | End: 2025-05-08

## 2025-05-08 RX ORDER — NALOXONE HYDROCHLORIDE 0.4 MG/ML
0.1 INJECTION, SOLUTION INTRAMUSCULAR; INTRAVENOUS; SUBCUTANEOUS
Status: DISCONTINUED | OUTPATIENT
Start: 2025-05-08 | End: 2025-05-08 | Stop reason: HOSPADM

## 2025-05-08 RX ORDER — PROPOFOL 10 MG/ML
INJECTION, EMULSION INTRAVENOUS PRN
Status: DISCONTINUED | OUTPATIENT
Start: 2025-05-08 | End: 2025-05-08

## 2025-05-08 RX ORDER — FENTANYL CITRATE 50 UG/ML
50 INJECTION, SOLUTION INTRAMUSCULAR; INTRAVENOUS EVERY 5 MIN PRN
Status: DISCONTINUED | OUTPATIENT
Start: 2025-05-08 | End: 2025-05-08 | Stop reason: HOSPADM

## 2025-05-08 RX ORDER — DEXAMETHASONE SODIUM PHOSPHATE 4 MG/ML
INJECTION, SOLUTION INTRA-ARTICULAR; INTRALESIONAL; INTRAMUSCULAR; INTRAVENOUS; SOFT TISSUE PRN
Status: DISCONTINUED | OUTPATIENT
Start: 2025-05-08 | End: 2025-05-08

## 2025-05-08 RX ORDER — SODIUM CHLORIDE, SODIUM LACTATE, POTASSIUM CHLORIDE, CALCIUM CHLORIDE 600; 310; 30; 20 MG/100ML; MG/100ML; MG/100ML; MG/100ML
INJECTION, SOLUTION INTRAVENOUS CONTINUOUS
Status: DISCONTINUED | OUTPATIENT
Start: 2025-05-08 | End: 2025-05-08 | Stop reason: HOSPADM

## 2025-05-08 RX ORDER — AMPICILLIN AND SULBACTAM 2; 1 G/1; G/1
3 INJECTION, POWDER, FOR SOLUTION INTRAMUSCULAR; INTRAVENOUS
Status: COMPLETED | OUTPATIENT
Start: 2025-05-08 | End: 2025-05-08

## 2025-05-08 RX ADMIN — FENTANYL CITRATE 50 MCG: 50 INJECTION INTRAMUSCULAR; INTRAVENOUS at 11:15

## 2025-05-08 RX ADMIN — LIDOCAINE HYDROCHLORIDE 100 MG: 20 INJECTION, SOLUTION INFILTRATION; PERINEURAL at 10:55

## 2025-05-08 RX ADMIN — ACETAMINOPHEN 975 MG: 325 TABLET ORAL at 10:08

## 2025-05-08 RX ADMIN — Medication 100 MG: at 12:27

## 2025-05-08 RX ADMIN — ONDANSETRON 4 MG: 2 INJECTION, SOLUTION INTRAMUSCULAR; INTRAVENOUS at 14:11

## 2025-05-08 RX ADMIN — SODIUM CHLORIDE, SODIUM LACTATE, POTASSIUM CHLORIDE, AND CALCIUM CHLORIDE: .6; .31; .03; .02 INJECTION, SOLUTION INTRAVENOUS at 10:49

## 2025-05-08 RX ADMIN — PROPOFOL 50 MG: 10 INJECTION, EMULSION INTRAVENOUS at 10:58

## 2025-05-08 RX ADMIN — HYDROMORPHONE HYDROCHLORIDE 0.5 MG: 1 INJECTION, SOLUTION INTRAMUSCULAR; INTRAVENOUS; SUBCUTANEOUS at 11:12

## 2025-05-08 RX ADMIN — Medication 100 MG: at 12:32

## 2025-05-08 RX ADMIN — Medication 20 MG: at 11:17

## 2025-05-08 RX ADMIN — DEXAMETHASONE SODIUM PHOSPHATE 10 MG: 4 INJECTION, SOLUTION INTRAMUSCULAR; INTRAVENOUS at 11:04

## 2025-05-08 RX ADMIN — FENTANYL CITRATE 50 MCG: 50 INJECTION INTRAMUSCULAR; INTRAVENOUS at 12:12

## 2025-05-08 RX ADMIN — PROPOFOL 150 MG: 10 INJECTION, EMULSION INTRAVENOUS at 10:55

## 2025-05-08 RX ADMIN — INSULIN ASPART 3 UNITS: 100 INJECTION, SOLUTION INTRAVENOUS; SUBCUTANEOUS at 13:59

## 2025-05-08 RX ADMIN — ONDANSETRON 4 MG: 2 INJECTION INTRAMUSCULAR; INTRAVENOUS at 12:24

## 2025-05-08 RX ADMIN — Medication 100 MG: at 12:24

## 2025-05-08 RX ADMIN — AMPICILLIN SODIUM AND SULBACTAM SODIUM 3 G: 2; 1 INJECTION, POWDER, FOR SOLUTION INTRAMUSCULAR; INTRAVENOUS at 11:12

## 2025-05-08 RX ADMIN — FENTANYL CITRATE 50 MCG: 50 INJECTION INTRAMUSCULAR; INTRAVENOUS at 10:58

## 2025-05-08 RX ADMIN — FENTANYL CITRATE 50 MCG: 50 INJECTION INTRAMUSCULAR; INTRAVENOUS at 10:55

## 2025-05-08 RX ADMIN — Medication 50 MG: at 10:56

## 2025-05-08 RX ADMIN — Medication 30 MG: at 11:46

## 2025-05-08 ASSESSMENT — ACTIVITIES OF DAILY LIVING (ADL)
ADLS_ACUITY_SCORE: 32
ADLS_ACUITY_SCORE: 32
ADLS_ACUITY_SCORE: 34
ADLS_ACUITY_SCORE: 32
ADLS_ACUITY_SCORE: 32
ADLS_ACUITY_SCORE: 34
ADLS_ACUITY_SCORE: 34

## 2025-05-08 NOTE — BRIEF OP NOTE
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]  Post-operative diagnosis Same as pre-operative diagnosis    Procedure: Microdirect laryngoscopy with excision/ablation of laryngeal lesions, biopsies, CO2 laser, N/A - Throat    Surgeon:    * Mel Fournier MD - Primary  Anesthesia: General   Estimated Blood Loss: Less than 10 ml    Drains: None  Specimens:   ID Type Source Tests Collected by Time Destination   1 : left inferior medial mid true vocal fold Tissue Other SURGICAL PATHOLOGY EXAM Mel Fournier MD 5/8/2025 12:19 PM    2 : left true vocal fold lesion Tissue Other SURGICAL PATHOLOGY EXAM Mel Fournier MD 5/8/2025 12:27 PM      Findings:   Easy mask with 2 hands; hyperflexed blade for videolaryngoscopic intubation. Mildly challenging laryngeal exposure with Ossoff Pilling laryngoscope. Left medial and infraglottic true vocal fold with minimal mucosal irregularity at prior resection site, but with more noticeable leukoplakia anterior to that region, extending to just short of the anterior commissure and with a component that was infraglottic right at the anterior commissure. All of the visualized abnormalities were excised while preserving the vocal ligament (specimen 2); a separate small biopsy was taken (specimen 1) of the left inferior medial mid true vocal fold, which appeared grossly unremarkable.  Complications: None.  Implants: * No implants in log *

## 2025-05-08 NOTE — OP NOTE
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 We discussed options including close surveillance with a low threshold for further resection vs further resection.  The patient wished to proceed with surgery and presented for the procedure(s) listed above. Perioperative risks were reviewed, including bleeding/infection/pain, injury to surrounding structures, potential changes to tongue/voice/swallow function, risk of needing additional procedures (e.g., due to persistence or recurrence), and risks of anesthesia (including heart attack, stroke, death). He elected to proceed and written informed consent was performed.    DESCRIPTION OF PROCEDURE:   The patient was brought to the operating room and placed supine. A time-out was called to verify patient identity, operative site, and planned procedure. The operative site was prepped in the usual clean fashion. Moist gauze eye pads were placed and secured. A head wrap was placed, and custom molded thermoplastic tooth guards were placed. Direct laryngoscopy was performed with an Ossoff-Pilling laryngoscope, which was placed in suspension. The vocal folds were examined with 0 and 70 degree telescopes. The operating microscope was then brought into position.    Laser safety precautions  were utilized at all times, including eye protection for the patient and staff, use of wet towels, and appropriately minimized FiO2. As needed, moistened cottonoids or laser-safe backstops were used to protect the endotracheal tube from the laser. The Lumenis CO2 Accublade laser was attached to the microscope and used at a depth setting of 1 and 7 Amos.     With the laser in a line setting, the medial face of the left true vocal fold was excised, including the area of the prior lesion and extending anteriorly to just short of the anterior commissure, as there were multiple foci of leukoplakia observed therein. Care was taken to avoid injury to the vocal ligament. Anteriorly, there was an infraglottic component of mild leukoplakia associated with some firmness that could represent a lesion vs fibrosis; this was included in the resection. Once removed, the specimen was oriented and pinned for submission to pathology.    Next, an area of mild prominence along the left inferior medial mid true vocal fold was excised and submitted for pathology as well. The mucosal surface in this area was grossly unremarkable.    Cottonoids soaked in 1:10,000 epinephrine were sparingly used to maintain hemostasis. As needed during the procedure and at the conclusion of the procedure, the operating microscope was moved out of position and the 0 and 70 degree rigid endoscopes were again used to examine the vocal folds and confirm completion of the stated objectives of the procedure. After cottonoid and sponge counts were confirmed correct, 2 cc of 4% lidocaine were applied transglottically for laryngotracheal anesthesia. The laryngoscope and tooth guards were removed. The lips, teeth, and tongue were examined and no injuries were noted. Care of the patient was returned to Anesthesia.    FINDINGS:   Easy mask with 2 hands; hyperflexed blade (4) used for videolaryngoscopic intubation. Mildly challenging laryngeal exposure with Ossoff  Pilling laryngoscope. Left medial and infraglottic true vocal fold with minimal mucosal irregularity at prior resection site, but with more noticeable leukoplakia anterior to that region, extending to just short of the anterior commissure and with a firmer component that was infraglottic at the left anterior commissure. All of the visualized abnormalities were excised (specimen 2) while preserving the vocal ligament; a separate small biopsy was taken (specimen 1) of the left inferior medial mid true vocal fold, which appeared grossly unremarkable. Minimal presumed leukoplakia noted along right medial mid true vocal fold.    SPECIMEN(S):   ID Type   1 : left inferior medial mid true vocal fold Tissue   2 : left true vocal fold lesion Tissue      DRAINS: None    ESTIMATED BLOOD LOSS: Less than 10 ml    COMPLICATIONS: None    DISPOSITION: Stable to PACU    ATTENDING PRESENCE STATEMENT:  I was present and participating for the entire procedure from beginning to completion.

## 2025-05-08 NOTE — OR NURSING
Pacu resident, Karlos Norris notified pt meets criteria at 1530- he will put in in sign out when able. Pts blood sugar 189 resident okay for pt to go home.

## 2025-05-08 NOTE — ANESTHESIA PROCEDURE NOTES
Airway       Patient location during procedure: OR       Procedure Start/Stop Times: 5/8/2025 10:58 AM  Staff -        Anesthesiologist:  Brandon Sadler MD       Resident/Fellow: Mp Hood MD       Performed By: resident  Consent for Airway        Urgency: elective  Indications and Patient Condition       Indications for airway management: palak-procedural       Induction type:intravenous       Mask difficulty assessment: 3 - difficult mask (inadequate, unstable, or two providers) +/- NMBA    Final Airway Details       Final airway type: endotracheal airway       Successful airway: Laser  Endotracheal Airway Details        ETT size (mm): 5.0       Cuffed: yes       Successful intubation technique: video laryngoscopy       VL Blade Size: Glidescope 4       Grade View of Cords: 3       Adjucts: stylet       Position: Left       Measured from: gums/teeth       Secured at (cm): 22       Bite block used: None    Post intubation assessment        Placement verified by: capnometry, equal breath sounds and chest rise        Number of attempts at approach: 1       Number of other approaches attempted: 0       Secured with: tape       Ease of procedure: easy       Dentition: Intact    Medication(s) Administered   Medication Administration Time: 5/8/2025 10:58 AM    Additional Comments       Difficult visualization of cords despite hyperangle blade. Anterior vocal cords.

## 2025-05-08 NOTE — ANESTHESIA CARE TRANSFER NOTE
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    Anesthesia Type:   No value filed.     Note:    Oropharynx: oropharynx clear of all foreign objects  Level of Consciousness: awake  Oxygen Supplementation: face mask  Level of Supplemental Oxygen (L/min / FiO2): 10  Independent Airway: airway patency satisfactory and stable  Dentition: dentition unchanged  Vital Signs Stable: post-procedure vital signs reviewed and stable  Report to RN Given: handoff report given  Patient transferred to: PACU    Handoff Report: Identifed the Patient, Identified the Reponsible Provider, Reviewed the pertinent medical history, Discussed the surgical course, Reviewed Intra-OP anesthesia mangement and issues during anesthesia, Set expectations for post-procedure period and Allowed opportunity for questions and acknowledgement of understanding      Vitals:  Vitals Value Taken Time   /82 05/08/25 12:45   Temp     Pulse 117 05/08/25 12:49   Resp 15 05/08/25 12:49   SpO2 100 % 05/08/25 12:49   Vitals shown include unfiled device data.    Electronically Signed By: ESPINOZA Sow CRNA  May 8, 2025  12:49 PM

## 2025-05-08 NOTE — DISCHARGE INSTRUCTIONS
Contacting your Doctor -   To contact a doctor, call Dr. Mel Fournier at [phone] or:  [phone] and ask for the resident on call for Laser Otolaryngology (answered 24 hours a day)   Emergency Department:  [facility]: [phone]  [facility]: [phone] 911 if you are in need of immediate or emergent help

## 2025-05-12 LAB
PATH REPORT.COMMENTS IMP SPEC: NORMAL
PATH REPORT.COMMENTS IMP SPEC: NORMAL
PATH REPORT.FINAL DX SPEC: NORMAL
PATH REPORT.GROSS SPEC: NORMAL
PATH REPORT.MICROSCOPIC SPEC OTHER STN: NORMAL
PATH REPORT.RELEVANT HX SPEC: NORMAL
PHOTO IMAGE: NORMAL

## 2025-05-13 ENCOUNTER — TELEPHONE (OUTPATIENT)
Dept: OTOLARYNGOLOGY | Facility: CLINIC | Age: 65
End: 2025-05-13
Payer: COMMERCIAL

## 2025-05-13 NOTE — TELEPHONE ENCOUNTER
spoke with patient to r/s 6/6 post op appt with Irlanda as provider will not be available at 1 pm. Pt is rescheduled. Pt has another appt scheduled previously for follow up with Dr. Fournier on 6/9, appt is probably not needed. Cancelled appt sent message to clinic to confirm.  Deedee Healy on 5/13/2025 at 1:36 PM

## 2025-05-14 NOTE — ANESTHESIA POSTPROCEDURE EVALUATION
Patient: Gary Iyer    Procedure: Procedure(s):  Microdirect laryngoscopy with excision/ablation of laryngeal lesions, biopsies, CO2 laser       Anesthesia Type:  No value filed.    Note:  Disposition: Outpatient   Postop Pain Control: Uneventful            Sign Out: Well controlled pain   PONV: No   Neuro/Psych: Uneventful            Sign Out: Acceptable/Baseline neuro status   Airway/Respiratory: Uneventful            Sign Out: Acceptable/Baseline resp. status   CV/Hemodynamics: Uneventful            Sign Out: Acceptable CV status; No obvious hypovolemia; No obvious fluid overload   Other NRE: NONE   DID A NON-ROUTINE EVENT OCCUR? No           Last vitals:  Vitals Value Taken Time   /90 05/08/25 15:30   Temp 36.4  C (97.6  F) 05/08/25 15:30   Pulse 117 05/08/25 15:30   Resp 16 05/08/25 15:30   SpO2 93 % 05/08/25 15:30       Electronically Signed By: Brandon Sadler MD  May 14, 2025  8:10 AM

## 2025-05-19 ENCOUNTER — TELEPHONE (OUTPATIENT)
Dept: FAMILY MEDICINE | Facility: CLINIC | Age: 65
End: 2025-05-19
Payer: COMMERCIAL

## 2025-05-19 NOTE — TELEPHONE ENCOUNTER
Pt calling to state he has a lab result ready to be viewed on [employer]. RN informed no new labs were completed recently with pcp. Pt verbalized understanding and was frustrated he received a text message from [employer] stating he did. Transferred pt to [employer] help line for further assistance with [employer] management.     Denisha Valadez RN on 5/19/2025 at 11:45 AM

## 2025-05-19 NOTE — TELEPHONE ENCOUNTER
Pt got transferred to [employer] support line, unable to further help pt to find a test result that the clinic told pt is not available.    Adv that I will put in a message for clinic to call him with results when they are ready.

## 2025-05-21 NOTE — TELEPHONE ENCOUNTER
The pending results were ordered by Dr. Fournier. Team, please ask the patient to reach out to ENT for results.     Thanks,  EDGAR Medina  [facility]

## 2025-05-28 NOTE — TELEPHONE ENCOUNTER
Emiliano Self and Dr. Fournier,     The most recent result in the chart from what I can see ( I have both un resulted and resulted checked) is from 5/8/25 for blood sugars and pathology. I don't see notes in those results to read to the patient but I also am not sure how ENT goes over their results with patients. The only other results from PCP were back in 4/18/25 for ENT surgery which is now complete. I am assuming he sees the most recent result from 5/8/25. If those results are okay primary care can call and tell him this or give him message about these results? Otherwise, I am not sure what other results could be popping up in his mychart. Please let us know about the 5/8/25 results and we can call him to let him know about those and we can reiterate that 4/18 results were stable for surgery per PCP.     I apologize for not responding sooner. I did not see this come through to me until today. It might be helpful to send reply to [place] nurse pool so it doesn't get missed. Please let us know and we will call this patient.     Thanks,  EDGAR Medina  [facility]

## 2025-06-04 DIAGNOSIS — E78.5 HYPERLIPIDEMIA LDL GOAL <70: Chronic | ICD-10-CM

## 2025-06-04 RX ORDER — ATORVASTATIN CALCIUM 20 MG/1
20 TABLET, FILM COATED ORAL DAILY
Qty: 90 TABLET | Refills: 2 | Status: SHIPPED | OUTPATIENT
Start: 2025-06-04

## 2025-06-04 RX ORDER — ATORVASTATIN CALCIUM 20 MG/1
20 TABLET, FILM COATED ORAL DAILY
Qty: 90 TABLET | Refills: 1 | OUTPATIENT
Start: 2025-06-04

## 2025-06-04 NOTE — TELEPHONE ENCOUNTER
I spoke with the patient and notified of below. Per patient he is not sure why [employer] told him there were new labs but as long as there are no major concerns he is not worried. We discussed diet and limiting/avoiding simple carbs to help the blood sugar levels as well as staying active daily by taking walks and in general making healthy choices. Patient verbalized understanding and stated he makes sure not to miss any doses of his insulin too. I told pt if [employer] keeps sending messages he will have to talk to [employer] support line and have the IT's look into this to help fix it. Pt verbalized understanding. No further concerns at this time.     Thanks,  EDGAR Medina  [facility]

## 2025-06-04 NOTE — TELEPHONE ENCOUNTER
Medication Question or Refill    Contacts       Contact Date/Time Type Contact Phone/Fax    06/04/2025 12:22 PM CDT Phone (Incoming) Gary Iyer (Self) [phone] (M)            What medication are you calling about (include dose and sig)?: Atorvastatin Calcium 20 MG Oral Tablet     Preferred Pharmacy:   [id number] - [facility] [address] OF [facility] [address]   [address]  [facility] [address]  Phone: [phone] Fax: [phone]        Controlled Substance Agreement on file:   CSA -- Patient Level:    CSA: None found at the patient level.       Who prescribed the medication?: PCP     Do you need a refill? Yes    When did you use the medication last? 06/04/25    Patient offered an appointment? No    Do you have any questions or concerns?  No      Could we send this information to you in [facility] or would you prefer to receive a phone call?:   No preference   Okay to leave a detailed message?: Yes at Cell number on file:    Telephone Information:   Mobile [phone]

## 2025-06-05 NOTE — PROGRESS NOTES
[facility] VOICE CLINIC    Patient: Gary Iyer  Date of Visit: 6/6/2025  Visit / Treatment number: 11 / 7  Certification Period: 6/6/25 - 9/4/25    CHIEF COMPLAINT: post-op voice    HISTORY  Gary Iyer is a 64 year old year old gentleman, who was seen for follow-up evaluation in conjunction with a visit to Dr. Mel Fournier.      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  He is well-known to our clinic and was seen across many years for observation.  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  Patient underwent operative speech therapy with Travis Mcdaniels, UMESH-SLP and was able to manage postoperative course adequately, though modest awareness of patterns of voice use as had been noted previously in speech therapy makes substantive gains in terms of voice quality challenging.    INTERVAL HISTORY:    5/8/25 - author: Zack Mcdaniels - MDL excision and ablation of left laryngeal leukoplakia with Dr. Fournier.  No complications.  Pathology benign    6/6/2025 - author: Zack Mcdaniels - Had ~4 days of voice rest. Recovery went well. Did his exercises for the first few weeks and has gradually phased back their use.     OBJECTIVE  PATIENT REPORTED MEASURES       No data to display              Patient Supplied Answers To Last 2 VHI Questionnaires      8/15/2022     2:00 PM 3/27/2025    12:44 PM   Voice Handicap Index (VHI-10)   My voice makes it difficult for people to hear me 0 0    People have difficulty understanding me in a noisy room 0 0    My voice difficulties restrict my personal and social life.  0 0    I feel left out of conversations because of my voice 0 0    My voice problem causes me to lose  "income 0 0    I feel as though I have to strain to produce voice 0 2    The clarity of my voice is unpredictable 0 0    My voice problem upsets me 0 0    My voice makes me feel handicapped 0 0    People ask, \"What's wrong with your voice?\" 0 0    VHI-10 0 2        Proxy-reported        Patient Supplied Answers To Last 2 CSI Questionnaires      3/1/2019     7:39 AM 8/15/2022     2:00 PM   Cough Severity Index (CSI)   My cough is worse when I lie down 0  0   My coughing problem causes me to restrict my personal and social life 0  0   I tend to avoid places because of my cough problem 0  0   I feel embarrassed because of my coughing problem 0  0   People ask, ''What's wrong?'' because I cough a lot 0  0   I run out of air when I cough 1  0   My coughing problem affects my voice 0  0   My coughing problem limits my physical activity 1  0   My coughing problem upsets me 1  0   People ask me if I am sick because I cough a lot 0  0   CSI Score 3 0       Proxy-reported        Patient Supplied Answers To Last 2 EAT Questionnaires      3/1/2019     7:40 AM 8/15/2022     2:00 PM   Eating Assessment Tool (EAT-10)   My swallowing problem has caused me to lose weight 1   0    My swallowing problem interferes with my ability to go out for meals 0   0    Swallowing liquids takes extra effort 1   0    Swallowing solids takes extra effort 1   0    Swallowing pills takes extra effort 1   0    Swallowing is painful 0   0    The pleasure of eating is affected by my swallowing 0   0    When I swallow food sticks in my throat 0   0    I cough when I eat 1   0    Swallowing is stressful 0   0    EAT-10 5  0        Proxy-reported    Data saved with a previous flowsheet row definition        Patient Supplied Answers to Dyspnea Index Questionnaire:       No data to display                PERCEPTUAL EVALUATION (CPT 13942)  POSTURE / TENSION:   neck and shoulders    BREATHING:   excessive thoracic muscle use pattern    VOICE:  Roughness: Mild " to moderate Intermittent  Breathiness: Minimal  Strain: Mild Intermittent  Loudness  Conversational speech:  Mildly reduced  Pitch:  Conversational speech:  Mildly elevated  Pitch glide:   limited range (consistent with prior evaluations/ treatments)  Poor pitch awareness and ability to follow clinician cueing with regard to pitch  Resonance:  Conversational speech:  laryngeal pharyngeal resonance  Singing vs. Speech:  essentially consistent across contexts    COUGH/THROAT CLEARING:  Not observed    THERAPY PROBES: Improvement was elicited with coordination of respiration and phonation    LARYNGEAL EXAMINATION  Procedure: Flexible endoscopy with chip-tip technology with stroboscopy, left nostril; topical anesthesia with 3% Lidocaine and 0.25% phenylephrine was applied.   Performed by: Dr. Mel Fournier  The laryngeal and pharyngeal structures were evaluated for gross appearance, mobility, function, and focal lesions / abnormalities of the associated mucosa.  Stroboscopy was warranted to evaluate closure, symmetry, and vibratory characteristics of the vocal folds.  All findings were within normal limits with the exception of the following salient features:   Moderately thick secretions in the immediate subglottis and scattered diffusely above the glottis  Right true vocal fold appears within normal limits  Left true vocal fold demonstrates mild irregularity with tendency to accumulate secretions infracordal of the vibratory margin  With phonatory tasks there is variable supraglottic recruitment with left greater than right ventricular recruitment  Decreased hyperfunction is noted in higher flow contexts such as aspirate onsets  For variable accuracy with task repetition following clinician model  Stroboscopy demonstrates:  Right true vocal fold within normal limits  Left true vocal fold demonstrates mild to moderate reduction in amplitude with corresponding reduction of mucosal wave across the vibratory  "length  Associated phase asymmetry  Transient aperiodicity    The laryngeal exam was reviewed with Mr. Iyer, and I provided pertinent explanations, as well as written and oral information.    ASSESSMENT / PLAN  IMPRESSIONS: Gary Iyer presents today with stable dysphonia (R49.0) and appropriate postoperative healing following repeat MDL with excision and ablation of laryngeal lesions on 5/8/2025 with Dr. Fournier.  Laryngeal examination today shows thickened secretions/mucus in the immediate subglottis, but good postoperative healing.  Stroboscopy does demonstrate ongoing stiffness of the left true vocal fold with decreased amplitude and mucosal wave across vibratory length.  Audio perceptual quality is consistent with prior evaluations and is primarily characterized by consistent roughness and breathiness.  Of note patient's greatest range of pitch variation was in the context of singing \"happy birthday\" which may point towards benefit in the use of well-known songs versus abstract tasks therapeutically.     RECOMMENDATIONS:   Ongoing medically necessary speech therapy continues to be warranted optimize vocal technique and optimize surgical results  Up to 4 additional sessions may be required over the next 6 to 9 weeks  He demonstrates a Good prognosis for improvement given adherence to therapeutic recommendations. Therapeutic   Positive indicators: diagnosis is known to respond to treatment, high level of commitment  Negative indicators: Modest awareness of patterns of use    GOALS:  Patient goal:   To understand the problem and fix it as much as possible  To promote optimal healing from surgery    Short-term goal(s): Within the first 4 sessions, Mr. Iyer:  will demonstrate semi-occluded vocal tract (SOVT) exercises with at least 80% accuracy with no clinician support  will accurately identify target vs. habitual voice quality during therapy tasks in 4 out of 5 trials with no clinician support  will " demonstrate the ability to alternate between target and habitual voice quality given clinician cue 75% of the time during therapy tasks    Long-term goal(s): In 6 months, Mr. Iyer will:  2 weeks of typical activity in which she is not bothered by voice quality, and can report independent use of therapeutic exercises to promote ongoing mucosal health    Certification period: 6/6/25 - 9/4/25    This treatment plan was developed with the patient who agreed with the recommendations.      TOTAL SERVICE TIME: 30 minutes  EVALUATION OF VOICE AND RESONANCE (98710)  NO CHARGE FACILITY FEE (32764)    Travis Mcdaniels M.M., M.A., CCC-SLP  Speech-Language Pathologist  Certificate of Vocology  [phone]    *this report was created in part through the use of computerized dictation software, and though reviewed following completion, some typographic errors may persist.  If there is confusion regarding any of this notes contents, please contact me for clarification.*

## 2025-06-06 ENCOUNTER — OFFICE VISIT (OUTPATIENT)
Dept: OTOLARYNGOLOGY | Facility: CLINIC | Age: 65
End: 2025-06-06
Payer: COMMERCIAL

## 2025-06-06 DIAGNOSIS — C32.9 LARYNGEAL CANCER (H): ICD-10-CM

## 2025-06-06 DIAGNOSIS — J38.3 VOCAL PROCESS GRANULOMA: ICD-10-CM

## 2025-06-06 DIAGNOSIS — R49.0 DYSPHONIA: Primary | ICD-10-CM

## 2025-06-08 DIAGNOSIS — L21.9 DERMATITIS, SEBORRHEIC: ICD-10-CM

## 2025-06-08 PROBLEM — E66.9 OBESITY, UNSPECIFIED: Chronic | Status: RESOLVED | Noted: 2018-11-06 | Resolved: 2025-06-08

## 2025-06-09 RX ORDER — KETOCONAZOLE 20 MG/G
CREAM TOPICAL
Qty: 60 G | Refills: 0 | Status: SHIPPED | OUTPATIENT
Start: 2025-06-09

## 2025-06-10 NOTE — PROGRESS NOTES
"                                                                                 Outpatient Speech Language Therapy Evaluation  PLAN OF TREATMENT FOR OUTPATIENT REHABILITATION  (COMPLETE FOR INITIAL CLAIMS ONLY)  Patient's Last Name, First Name, M.I.  YOB: 1960  Gary Iyer                           Provider's Name  Travis Mcdaniels, SLP Medical Record No.  [id number]                               Onset Date:  6/06/25   Start of Care Date: 6/06/25     Type: Speech Language Therapy Medical Diagnosis: Dysphonia (R49.0)                        Therapy Diagnosis: Dysphonia (R49.0)   Visits from SOC:  11 (7 treatment)   _________________________________________________________________________________  Plan of Treatment:   Speech therapy    IMPRESSIONS: Gary Iyer presents today with stable dysphonia (R49.0) and appropriate postoperative healing following repeat MDL with excision and ablation of laryngeal lesions on 5/8/2025 with Dr. Fournier.  Laryngeal examination today shows thickened secretions/mucus in the immediate subglottis, but good postoperative healing.  Stroboscopy does demonstrate ongoing stiffness of the left true vocal fold with decreased amplitude and mucosal wave across vibratory length.  Audio perceptual quality is consistent with prior evaluations and is primarily characterized by consistent roughness and breathiness.  Of note patient's greatest range of pitch variation was in the context of singing \"happy birthday\" which may point towards benefit in the use of well-known songs versus abstract tasks therapeutically.     RECOMMENDATIONS:   Ongoing medically necessary speech therapy continues to be warranted optimize vocal technique and optimize surgical results  Up to 4 additional sessions may be required over the next 6 to 9 weeks  He demonstrates a Good prognosis for improvement given adherence to therapeutic recommendations. Therapeutic   Positive indicators: diagnosis is known to " respond to treatment, high level of commitment  Negative indicators: Modest awareness of patterns of use    GOALS:  Patient goal:   To understand the problem and fix it as much as possible  To promote optimal healing from surgery    Short-term goal(s): Within the first 4 sessions, Mr. Iyer:  will demonstrate semi-occluded vocal tract (SOVT) exercises with at least 80% accuracy with no clinician support  will accurately identify target vs. habitual voice quality during therapy tasks in 4 out of 5 trials with no clinician support  will demonstrate the ability to alternate between target and habitual voice quality given clinician cue 75% of the time during therapy tasks    Long-term goal(s): In 6 months, Mr. Iyer will:  2 weeks of typical activity in which she is not bothered by voice quality, and can report independent use of therapeutic exercises to promote ongoing mucosal health  _________________________________________________________________________________    I CERTIFY THE NEED FOR THESE SERVICES FURNISHED UNDER        THIS PLAN OF TREATMENT AND WHILE UNDER MY CARE     (Physician attestation of this document indicates review and certification of the therapy plan).     Certification date from: 6/06/25  Certification date to: 9/4/25    Referring Provider: Mel Fournier

## 2025-06-23 ENCOUNTER — PATIENT OUTREACH (OUTPATIENT)
Dept: CARE COORDINATION | Facility: CLINIC | Age: 65
End: 2025-06-23
Payer: COMMERCIAL

## 2025-06-25 ENCOUNTER — RESULTS FOLLOW-UP (OUTPATIENT)
Dept: FAMILY MEDICINE | Facility: CLINIC | Age: 65
End: 2025-06-25

## 2025-06-25 ENCOUNTER — PATIENT OUTREACH (OUTPATIENT)
Dept: CARE COORDINATION | Facility: CLINIC | Age: 65
End: 2025-06-25

## 2025-06-25 ENCOUNTER — HOSPITAL ENCOUNTER (OUTPATIENT)
Dept: CARDIOLOGY | Facility: CLINIC | Age: 65
Discharge: HOME | End: 2025-06-25
Attending: FAMILY MEDICINE
Payer: COMMERCIAL

## 2025-06-25 DIAGNOSIS — R06.09 DYSPNEA ON EXERTION: ICD-10-CM

## 2025-06-25 PROCEDURE — 93018 CV STRESS TEST I&R ONLY: CPT | Performed by: INTERNAL MEDICINE

## 2025-06-25 PROCEDURE — 93325 DOPPLER ECHO COLOR FLOW MAPG: CPT | Mod: TC

## 2025-06-25 PROCEDURE — 93321 DOPPLER ECHO F-UP/LMTD STD: CPT | Mod: 26 | Performed by: INTERNAL MEDICINE

## 2025-06-25 PROCEDURE — 255N000002 HC RX 255 OP 636: Performed by: INTERNAL MEDICINE

## 2025-06-25 PROCEDURE — 93350 STRESS TTE ONLY: CPT | Mod: 26 | Performed by: INTERNAL MEDICINE

## 2025-06-25 PROCEDURE — 93325 DOPPLER ECHO COLOR FLOW MAPG: CPT | Mod: 26 | Performed by: INTERNAL MEDICINE

## 2025-06-25 PROCEDURE — 93016 CV STRESS TEST SUPVJ ONLY: CPT | Performed by: INTERNAL MEDICINE

## 2025-06-25 RX ADMIN — PERFLUTREN 5 ML (DILUTED): 6.52 INJECTION, SUSPENSION INTRAVENOUS at 11:19

## 2025-06-30 ENCOUNTER — ANCILLARY PROCEDURE (OUTPATIENT)
Dept: ULTRASOUND IMAGING | Facility: CLINIC | Age: 65
End: 2025-06-30
Attending: FAMILY MEDICINE
Payer: COMMERCIAL

## 2025-06-30 DIAGNOSIS — I65.23 BILATERAL CAROTID ARTERY STENOSIS: ICD-10-CM

## 2025-06-30 PROCEDURE — 93880 EXTRACRANIAL BILAT STUDY: CPT | Mod: TC | Performed by: RADIOLOGY

## 2025-07-01 ENCOUNTER — VIRTUAL VISIT (OUTPATIENT)
Dept: OTOLARYNGOLOGY | Facility: CLINIC | Age: 65
End: 2025-07-01
Payer: COMMERCIAL

## 2025-07-01 DIAGNOSIS — J38.3 VOCAL PROCESS GRANULOMA: ICD-10-CM

## 2025-07-01 DIAGNOSIS — C32.9 LARYNGEAL CANCER (H): ICD-10-CM

## 2025-07-01 DIAGNOSIS — R49.0 DYSPHONIA: Primary | ICD-10-CM

## 2025-07-01 NOTE — PROGRESS NOTES
"Gary Iyer is a 64 year old male who is being evaluated via a billable video visit.      Gary has been notified and verbally consented to the following:   This video visit will be conducted between you and your provider.  Patient has opted to conduct today's video visit vs an in-person appointment.   Video visits are billed at different rates depending on your insurance coverage. Please reach out to your insurance provider with any questions.   If during the course of the call the provider feels the appointment is not appropriate, you will not be charged for this service.  Provider has received verbal consent for billable virtual visit from the patient? Yes  Will anyone else be joining your video visit? No    Call initiated at: 12:03   Type of Visit Platform Used: [email or web address] Video  Location of provider: [facility]  Location of patient: [facility] VOICE [facility]  THERAPY NOTE (CPT 21313)    Patient: Gary Iyer  Date of Service: 7/1/2025  Visit / Treatment number: 12 / 8  Certification Period: 6/6/25-9/4/25  Referring physician: Dr. Mel Fournier  Impressions from most recent evaluation:  \"Gary Iyer presents today with stable dysphonia (R49.0) and appropriate postoperative healing following repeat MDL with excision and ablation of laryngeal lesions on 5/8/2025 with Dr. Fournier.  Laryngeal examination today shows thickened secretions/mucus in the immediate subglottis, but good postoperative healing.  Stroboscopy does demonstrate ongoing stiffness of the left true vocal fold with decreased amplitude and mucosal wave across vibratory length.  Audio perceptual quality is consistent with prior evaluations and is primarily characterized by consistent roughness and breathiness.  Of note patient's greatest range of pitch variation was in the context of singing \"happy birthday\" which may point towards benefit in the use of well-known songs versus abstract tasks therapeutically. \"    SUBJECTIVE:  Since " "the patient's last session, they report the following:   Overall symptoms are \"a good deal better\" to the point that voice is not bothering him \"at all\"  Has been doing the straw exercises a few times a day.     OBJECTIVE:  PATIENT REPORTED MEASURES:  *see end of note for standardized measures*    THERAPEUTIC ACTIVITIES    Counseling and Education:  Does feel that reminders via mychart would be useful    Exercises to reduce impact during daily life   Low effort voicing  Avoiding valsalva  Grunting during daily life  Effortful bowel movement  Work with PCP    Exercises to improve access to low effort voicing  Demonstrated SOVT  Easy sustains  Sang songs  Overall good accuracy with modest strain  Airflow alone was explored as a means of raising awareness with modest benefit  Low impact voicing was explored but not felt to be overly facilitating  Use of resonant facilitating phrases such as \"umm\" and \"mhmm\" were more helpful and he was encouraged to incorporate these throughout his day    A regimen for home practice was instructed.  I provided a [employer] message of today's therapeutic activities to facilitate practice.    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 Gary feels that his symptoms have resolved and outward since though he still wants to optimize his healing.  Given challenges with memory and task complexity in past sessions simplified exercise regimen was developed with the patient and recorded.  Additionally multiple reminder messages were sent between now and his follow-up with Dr. Fournier.  He reported that these techniques would be helpful and was able to demonstrate exercises with adequate accuracy today.    PLAN: will be seen in follow-up with  " "[place] the end of August.  Repeat perceptual evaluation with subsequent discharge from therapy should symptoms maintain at current level is warranted at that time.    For practice goals see AVS.     TOTAL SERVICE TIME: 35 minutes  TREATMENT (86203)  NO CHARGE FACILITY FEE (38761)    Travis Mcdaniels M.M., M.A., CCC-SLP  Speech-Language Pathologist  Certificate of Vocology  [phone]    *this report was created in part through the use of computerized dictation software, and though reviewed following completion, some typographic errors may persist.  If there is confusion regarding any of this notes contents, please contact me for clarification.*    Patient Supplied Answers To Last 2 VHI Questionnaires      3/27/2025    12:44 PM 7/1/2025    11:48 AM   Voice Handicap Index (VHI-10)   My voice makes it difficult for people to hear me 0  0    People have difficulty understanding me in a noisy room 0  0    My voice difficulties restrict my personal and social life.  0  0    I feel left out of conversations because of my voice 0  0    My voice problem causes me to lose income 0  0    I feel as though I have to strain to produce voice 2  0    The clarity of my voice is unpredictable 0  0    My voice problem upsets me 0  0    My voice makes me feel handicapped 0  0    People ask, \"What's wrong with your voice?\" 0  0    VHI-10 2  0        Proxy-reported        Patient Supplied Answers To Last 2 CSI Questionnaires      3/1/2019     7:39 AM 8/15/2022     2:00 PM   Cough Severity Index (CSI)   My cough is worse when I lie down 0  0   My coughing problem causes me to restrict my personal and social life 0  0   I tend to avoid places because of my cough problem 0  0   I feel embarrassed because of my coughing problem 0  0   People ask, ''What's wrong?'' because I cough a lot 0  0   I run out of air when I cough 1  0   My coughing problem affects my voice 0  0   My coughing problem limits my physical activity 1  0   My " coughing problem upsets me 1  0   People ask me if I am sick because I cough a lot 0  0   CSI Score 3 0       Proxy-reported        Patient Supplied Answers To Last 2 EAT Questionnaires      3/1/2019     7:40 AM 8/15/2022     2:00 PM   Eating Assessment Tool (EAT-10)   My swallowing problem has caused me to lose weight 1   0    My swallowing problem interferes with my ability to go out for meals 0   0    Swallowing liquids takes extra effort 1   0    Swallowing solids takes extra effort 1   0    Swallowing pills takes extra effort 1   0    Swallowing is painful 0   0    The pleasure of eating is affected by my swallowing 0   0    When I swallow food sticks in my throat 0   0    I cough when I eat 1   0    Swallowing is stressful 0   0    EAT-10 5  0        Proxy-reported    Data saved with a previous flowsheet row definition        Patient Supplied Answers to Dyspnea Index Questionnaire:       No data to display

## 2025-07-01 NOTE — PATIENT INSTRUCTIONS
"Emiliano Vega,    It was good to see you today, and I am glad that things are going well.  Below is a quick summary of what you should continue to do to take good care of your throat.  I tried to keep it as short and concise as I could.  I also attached an audio recording that you can practice with if it is useful.  You will get messages from me every couple of weeks as a way of checking in on how things are going between now and when you see Dr. Fournier.  Try to practice the straw type exercises 2-3 times a day but it should not take you more than a couple of minutes each time.    Reach out with any questions,    - Zack    EXERCISES:    Practice gentle voicing 2-3 times a day  Straw and water seems like it is the easiest thing for you  What you are doing today sounded good so keep it up!  A mix of easy sustained notes (single pitches) And singing songs through the straw is is the best fit   If you do not have a straw with you at a given time and want to practice use the humming sound  When you are talking to people during her daily life trying to include easy sounds like  \"Ummm\"  \"Mmhmm\"    Watch out for causing irritation to the back of your throat in everyday activities  Keep yourself regular with regards to constipation so you don't have to bear down as much  Follow the recommendations of your treating provider  Avoid grunting as you pick things up or change positions  Exhale and puff your cheeks as you lift or change position to avoid bearing down  "

## 2025-07-01 NOTE — LETTER
"7/1/2025       RE: Gary Iyer  [address] [facility] [address]     Dear Colleague,    Thank you for referring your patient, Gary Iyer, to the [facility] [place] [facility]. Please see a copy of my visit note below.    Gary Iyer is a 64 year old male who is being evaluated via a billable video visit.      Gary has been notified and verbally consented to the following:   This video visit will be conducted between you and your provider.  Patient has opted to conduct today's video visit vs an in-person appointment.   Video visits are billed at different rates depending on your insurance coverage. Please reach out to your insurance provider with any questions.   If during the course of the call the provider feels the appointment is not appropriate, you will not be charged for this service.  Provider has received verbal consent for billable virtual visit from the patient? Yes  Will anyone else be joining your video visit? No    Call initiated at: 12:03   Type of Visit Platform Used: MXP4  Location of provider: [facility]  Location of patient: [facility] VOICE [facility]  THERAPY NOTE (CPT 56647)    Patient: Gary Iyer  Date of Service: 7/1/2025  Visit / Treatment number: 12 / 8  Certification Period: 6/6/25-9/4/25  Referring physician: Dr. Mel Fournier  Impressions from most recent evaluation:  \"Gary Iyer presents today with stable dysphonia (R49.0) and appropriate postoperative healing following repeat MDL with excision and ablation of laryngeal lesions on 5/8/2025 with Dr. Fournier.  Laryngeal examination today shows thickened secretions/mucus in the immediate subglottis, but good postoperative healing.  Stroboscopy does demonstrate ongoing stiffness of the left true vocal fold with decreased amplitude and mucosal wave across vibratory length.  Audio perceptual quality is consistent with prior " "evaluations and is primarily characterized by consistent roughness and breathiness.  Of note patient's greatest range of pitch variation was in the context of singing \"happy birthday\" which may point towards benefit in the use of well-known songs versus abstract tasks therapeutically. \"    SUBJECTIVE:  Since the patient's last session, they report the following:   Overall symptoms are \"a good deal better\" to the point that voice is not bothering him \"at all\"  Has been doing the straw exercises a few times a day.     OBJECTIVE:  PATIENT REPORTED MEASURES:  *see end of note for standardized measures*    THERAPEUTIC ACTIVITIES    Counseling and Education:  Does feel that reminders via 3D Formshart would be useful    Exercises to reduce impact during daily life   Low effort voicing  Avoiding valsalva  Grunting during daily life  Effortful bowel movement  Work with PCP    Exercises to improve access to low effort voicing  Demonstrated SOVT  Easy sustains  Sang songs  Overall good accuracy with modest strain  Airflow alone was explored as a means of raising awareness with modest benefit  Low impact voicing was explored but not felt to be overly facilitating  Use of resonant facilitating phrases such as \"umm\" and \"mhmm\" were more helpful and he was encouraged to incorporate these throughout his day    A regimen for home practice was instructed.  I provided a Venda message of today's therapeutic activities to facilitate practice.    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 Gary feels that his symptoms have resolved and outward since though he still wants to optimize his healing.  Given challenges with memory and task complexity in past sessions simplified exercise regimen " "was developed with the patient and recorded.  Additionally multiple reminder messages were sent between now and his follow-up with Dr. Fournier.  He reported that these techniques would be helpful and was able to demonstrate exercises with adequate accuracy today.    PLAN: will be seen in follow-up with Dr. Fournierat the end of August.  Repeat perceptual evaluation with subsequent discharge from therapy should symptoms maintain at current level is warranted at that time.    For practice goals see AVS.     TOTAL SERVICE TIME: 35 minutes  TREATMENT (90206)  NO CHARGE FACILITY FEE (82284)    Travis Mcdaniels M.M., M.A., CCC-SLP  Speech-Language Pathologist  Certificate of Vocology  [phone]    *this report was created in part through the use of computerized dictation software, and though reviewed following completion, some typographic errors may persist.  If there is confusion regarding any of this notes contents, please contact me for clarification.*    Patient Supplied Answers To Last 2 VHI Questionnaires      3/27/2025    12:44 PM 7/1/2025    11:48 AM   Voice Handicap Index (VHI-10)   My voice makes it difficult for people to hear me 0  0    People have difficulty understanding me in a noisy room 0  0    My voice difficulties restrict my personal and social life.  0  0    I feel left out of conversations because of my voice 0  0    My voice problem causes me to lose income 0  0    I feel as though I have to strain to produce voice 2  0    The clarity of my voice is unpredictable 0  0    My voice problem upsets me 0  0    My voice makes me feel handicapped 0  0    People ask, \"What's wrong with your voice?\" 0  0    VHI-10 2  0        Proxy-reported        Patient Supplied Answers To Last 2 CSI Questionnaires      3/1/2019     7:39 AM 8/15/2022     2:00 PM   Cough Severity Index (CSI)   My cough is worse when I lie down 0  0   My coughing problem causes me to restrict my personal and social life 0  0   I tend to " avoid places because of my cough problem 0  0   I feel embarrassed because of my coughing problem 0  0   People ask, ''What's wrong?'' because I cough a lot 0  0   I run out of air when I cough 1  0   My coughing problem affects my voice 0  0   My coughing problem limits my physical activity 1  0   My coughing problem upsets me 1  0   People ask me if I am sick because I cough a lot 0  0   CSI Score 3 0       Proxy-reported        Patient Supplied Answers To Last 2 EAT Questionnaires      3/1/2019     7:40 AM 8/15/2022     2:00 PM   Eating Assessment Tool (EAT-10)   My swallowing problem has caused me to lose weight 1   0    My swallowing problem interferes with my ability to go out for meals 0   0    Swallowing liquids takes extra effort 1   0    Swallowing solids takes extra effort 1   0    Swallowing pills takes extra effort 1   0    Swallowing is painful 0   0    The pleasure of eating is affected by my swallowing 0   0    When I swallow food sticks in my throat 0   0    I cough when I eat 1   0    Swallowing is stressful 0   0    EAT-10 5  0        Proxy-reported    Data saved with a previous flowsheet row definition        Patient Supplied Answers to Dyspnea Index Questionnaire:       No data to display                Again, thank you for allowing me to participate in the care of your patient.      Sincerely,    Travis Mcdaniels, SLP

## 2025-07-07 ENCOUNTER — PATIENT OUTREACH (OUTPATIENT)
Dept: CARE COORDINATION | Facility: CLINIC | Age: 65
End: 2025-07-07
Payer: COMMERCIAL

## 2025-07-12 ENCOUNTER — HEALTH MAINTENANCE LETTER (OUTPATIENT)
Age: 65
End: 2025-07-12

## 2025-07-18 DIAGNOSIS — E11.9 TYPE 2 DIABETES MELLITUS WITHOUT COMPLICATION, WITHOUT LONG-TERM CURRENT USE OF INSULIN (H): ICD-10-CM

## 2025-07-18 RX ORDER — LANCETS
EACH MISCELLANEOUS
Qty: 100 EACH | Refills: 11 | Status: SHIPPED | OUTPATIENT
Start: 2025-07-18

## 2025-07-18 RX ORDER — GLUCOSAMINE HCL/CHONDROITIN SU 500-400 MG
CAPSULE ORAL
Qty: 100 EACH | Refills: 1 | Status: SHIPPED | OUTPATIENT
Start: 2025-07-18

## 2025-07-18 RX ORDER — BLOOD-GLUCOSE METER
1 EACH MISCELLANEOUS 2 TIMES DAILY
Qty: 1 KIT | Refills: 0 | Status: SHIPPED | OUTPATIENT
Start: 2025-07-18

## 2025-07-18 NOTE — TELEPHONE ENCOUNTER
Medication Question or Refill    Contacts       Contact Date/Time Type Contact Phone/Fax    07/18/2025 10:04 AM CDT Phone (Incoming) Gary Iyer (Self) [phone] (M)            What medication are you calling about (include dose and sig)?: Blood Glucose Monitoring Suppl (ACCU-CHEK GUIDE ME) w/Device KIT, ACCU-CHEK GUIDE test strip, alcohol swab prep pads and thin (NO BRAND SPECIFIED) lancets     Preferred Pharmacy:   [id number] - [facility] [address] [place] [address]  [address]  [facility] [address]  Phone: [phone] Fax: [phone]    Controlled Substance Agreement on file:   CSA -- Patient Level:    CSA: None found at the patient level.       Who prescribed the medication?: Dr. Beal    Do you need a refill? Yes    When did you use the medication last?     Patient offered an appointment? No    Do you have any questions or concerns?  Yes: Patient was not sure if there were other things that he needed regarding his diabetes?    Could we send this information to you in [facility] or would you prefer to receive a phone call?:   Patient would prefer a phone call   Okay to leave a detailed message?: Yes at Home number on file [phone] (home)    Estephania Luke,

## 2025-07-22 ENCOUNTER — TELEPHONE (OUTPATIENT)
Dept: FAMILY MEDICINE | Facility: CLINIC | Age: 65
End: 2025-07-22
Payer: COMMERCIAL

## 2025-07-22 DIAGNOSIS — M10.9 GOUT, UNSPECIFIED CAUSE, UNSPECIFIED CHRONICITY, UNSPECIFIED SITE: ICD-10-CM

## 2025-07-22 DIAGNOSIS — E78.5 HYPERLIPIDEMIA LDL GOAL <70: Chronic | ICD-10-CM

## 2025-07-22 DIAGNOSIS — R39.12 BENIGN PROSTATIC HYPERPLASIA WITH WEAK URINARY STREAM: ICD-10-CM

## 2025-07-22 DIAGNOSIS — N52.9 ERECTILE DYSFUNCTION, UNSPECIFIED ERECTILE DYSFUNCTION TYPE: ICD-10-CM

## 2025-07-22 DIAGNOSIS — N40.1 BENIGN PROSTATIC HYPERPLASIA WITH WEAK URINARY STREAM: ICD-10-CM

## 2025-07-22 DIAGNOSIS — R49.0 DYSPHONIA: ICD-10-CM

## 2025-07-22 DIAGNOSIS — I10 HYPERTENSION GOAL BP (BLOOD PRESSURE) < 140/90: ICD-10-CM

## 2025-07-22 DIAGNOSIS — E11.9 TYPE 2 DIABETES MELLITUS WITHOUT COMPLICATION, WITHOUT LONG-TERM CURRENT USE OF INSULIN (H): ICD-10-CM

## 2025-07-22 DIAGNOSIS — Z91.09 ENVIRONMENTAL ALLERGIES: ICD-10-CM

## 2025-07-22 DIAGNOSIS — F41.9 ANXIETY: Chronic | ICD-10-CM

## 2025-07-22 DIAGNOSIS — J38.7 LEUKOPLAKIA OF LARYNX: ICD-10-CM

## 2025-07-22 DIAGNOSIS — R80.9 MICROALBUMINURIA: ICD-10-CM

## 2025-07-22 NOTE — TELEPHONE ENCOUNTER
Received incoming fax from [facility] pharmacy stating patient pharmacy ([facility]) is closing. [facility] pharmacy has tried to transfer all prescriptions for patient but unfortunately only received 4. Per patient he is missing 10+ medications. Please send new Rx for all medication on medication list.    Please advise.  Claire Johnson CMA

## 2025-07-23 RX ORDER — TAMSULOSIN HYDROCHLORIDE 0.4 MG/1
0.8 CAPSULE ORAL DAILY
Qty: 180 CAPSULE | Refills: 1 | Status: SHIPPED | OUTPATIENT
Start: 2025-07-23

## 2025-07-23 RX ORDER — HYDROCHLOROTHIAZIDE 50 MG/1
50 TABLET ORAL DAILY
Qty: 90 TABLET | Refills: 1 | Status: SHIPPED | OUTPATIENT
Start: 2025-07-23

## 2025-07-23 RX ORDER — ALLOPURINOL 300 MG/1
1 TABLET ORAL
Qty: 90 TABLET | Refills: 1 | Status: SHIPPED | OUTPATIENT
Start: 2025-07-23

## 2025-07-23 RX ORDER — PAROXETINE 40 MG/1
TABLET, FILM COATED ORAL
Qty: 90 TABLET | Refills: 1 | Status: SHIPPED | OUTPATIENT
Start: 2025-07-23

## 2025-07-23 RX ORDER — OMEPRAZOLE 20 MG/1
20 CAPSULE, DELAYED RELEASE ORAL 2 TIMES DAILY
Qty: 180 CAPSULE | Refills: 1 | Status: SHIPPED | OUTPATIENT
Start: 2025-07-23

## 2025-07-23 RX ORDER — CETIRIZINE HYDROCHLORIDE 10 MG/1
10 TABLET ORAL DAILY
Qty: 90 TABLET | Refills: 3 | Status: SHIPPED | OUTPATIENT
Start: 2025-07-23

## 2025-07-23 RX ORDER — TADALAFIL 20 MG/1
TABLET ORAL
Qty: 10 TABLET | Refills: 1 | Status: SHIPPED | OUTPATIENT
Start: 2025-07-23

## 2025-07-23 RX ORDER — AMLODIPINE BESYLATE 2.5 MG/1
2.5 TABLET ORAL DAILY
Qty: 90 TABLET | Refills: 1 | Status: SHIPPED | OUTPATIENT
Start: 2025-07-23

## 2025-07-23 RX ORDER — ATORVASTATIN CALCIUM 20 MG/1
20 TABLET, FILM COATED ORAL DAILY
Qty: 90 TABLET | Refills: 1 | Status: SHIPPED | OUTPATIENT
Start: 2025-07-23

## 2025-07-23 RX ORDER — LOSARTAN POTASSIUM 100 MG/1
100 TABLET ORAL DAILY
Qty: 90 TABLET | Refills: 1 | Status: SHIPPED | OUTPATIENT
Start: 2025-07-23

## 2025-08-20 ENCOUNTER — ALLIED HEALTH/NURSE VISIT (OUTPATIENT)
Dept: EDUCATION SERVICES | Facility: CLINIC | Age: 65
End: 2025-08-20
Attending: FAMILY MEDICINE
Payer: COMMERCIAL

## 2025-08-20 DIAGNOSIS — R80.9 TYPE 2 DIABETES MELLITUS WITH DIABETIC MICROALBUMINURIA, WITHOUT LONG-TERM CURRENT USE OF INSULIN (H): ICD-10-CM

## 2025-08-20 DIAGNOSIS — E11.29 TYPE 2 DIABETES MELLITUS WITH DIABETIC MICROALBUMINURIA, WITHOUT LONG-TERM CURRENT USE OF INSULIN (H): ICD-10-CM

## 2025-08-20 PROCEDURE — G0108 DIAB MANAGE TRN  PER INDIV: HCPCS | Mod: AE | Performed by: DIETITIAN, REGISTERED

## 2025-08-21 ENCOUNTER — PATIENT OUTREACH (OUTPATIENT)
Dept: CARE COORDINATION | Facility: CLINIC | Age: 65
End: 2025-08-21
Payer: COMMERCIAL

## 2025-08-25 ENCOUNTER — OFFICE VISIT (OUTPATIENT)
Dept: OTOLARYNGOLOGY | Facility: CLINIC | Age: 65
End: 2025-08-25
Payer: COMMERCIAL

## 2025-08-25 ENCOUNTER — PATIENT OUTREACH (OUTPATIENT)
Dept: CARE COORDINATION | Facility: CLINIC | Age: 65
End: 2025-08-25

## 2025-08-25 VITALS — HEIGHT: 70 IN | WEIGHT: 245 LBS | BODY MASS INDEX: 35.07 KG/M2

## 2025-08-25 DIAGNOSIS — C32.9 LARYNGEAL CANCER (H): Primary | ICD-10-CM

## 2025-08-25 DIAGNOSIS — H91.90 HEARING LOSS, UNSPECIFIED HEARING LOSS TYPE, UNSPECIFIED LATERALITY: ICD-10-CM

## 2025-08-25 DIAGNOSIS — R49.0 DYSPHONIA: ICD-10-CM

## 2025-08-25 PROCEDURE — 31579 LARYNGOSCOPY TELESCOPIC: CPT | Performed by: OTOLARYNGOLOGY

## 2025-08-25 PROCEDURE — 99213 OFFICE O/P EST LOW 20 MIN: CPT | Mod: 25 | Performed by: OTOLARYNGOLOGY

## 2025-08-27 ENCOUNTER — TELEPHONE (OUTPATIENT)
Dept: FAMILY MEDICINE | Facility: CLINIC | Age: 65
End: 2025-08-27

## 2025-08-27 ENCOUNTER — OFFICE VISIT (OUTPATIENT)
Dept: OTOLARYNGOLOGY | Facility: CLINIC | Age: 65
End: 2025-08-27
Payer: COMMERCIAL

## 2025-08-27 ENCOUNTER — OFFICE VISIT (OUTPATIENT)
Dept: AUDIOLOGY | Facility: CLINIC | Age: 65
End: 2025-08-27
Attending: OTOLARYNGOLOGY
Payer: COMMERCIAL

## 2025-08-27 DIAGNOSIS — H91.90 HEARING LOSS, UNSPECIFIED HEARING LOSS TYPE, UNSPECIFIED LATERALITY: ICD-10-CM

## 2025-08-27 DIAGNOSIS — J38.7 LEUKOPLAKIA OF LARYNX: ICD-10-CM

## 2025-08-27 DIAGNOSIS — H90.11 CONDUCTIVE HEARING LOSS OF RIGHT EAR WITH UNRESTRICTED HEARING OF LEFT EAR: Primary | ICD-10-CM

## 2025-08-27 DIAGNOSIS — C32.9 LARYNGEAL CANCER (H): ICD-10-CM

## 2025-08-27 DIAGNOSIS — R49.0 DYSPHONIA: Primary | ICD-10-CM

## 2025-09-01 ENCOUNTER — PATIENT OUTREACH (OUTPATIENT)
Dept: CARE COORDINATION | Facility: CLINIC | Age: 65
End: 2025-09-01
Payer: COMMERCIAL

## 2025-09-03 ENCOUNTER — PATIENT OUTREACH (OUTPATIENT)
Dept: CARE COORDINATION | Facility: CLINIC | Age: 65
End: 2025-09-03
Payer: COMMERCIAL

## (undated) DEVICE — DRSG TELFA 3X8" 1238

## (undated) DEVICE — SUCTION MANIFOLD NEPTUNE 2 SYS 4 PORT 0702-020-000

## (undated) DEVICE — ENDO TOOTH QUICK GUARD CONFORMING PROTECTION

## (undated) DEVICE — SPONGE COTTONOID 1/2X1 1/2" 1-STRING 80-1404

## (undated) DEVICE — SYR 01ML 27GA 0.5" NDL TBC 309623

## (undated) DEVICE — NDL BLUNT 18GA 1.5" W/O FILTER 305180

## (undated) DEVICE — GOWN SM/MED DISP 9505

## (undated) DEVICE — BLADE KNIFE BEAVER SICKLE EDGE 5.0MM 377300

## (undated) DEVICE — SOL WATER IRRIG 1000ML BOTTLE 2F7114

## (undated) DEVICE — PACK ENT ENDOSCOPY CUSTOM ASC

## (undated) DEVICE — LINEN TOWEL PACK X5 5464

## (undated) DEVICE — SPONGE COTTONOID 1/2X1/2" 80-1400

## (undated) DEVICE — ESU GROUND PAD ADULT W/CORD E7507

## (undated) DEVICE — Device

## (undated) DEVICE — TUBING SMOKE EVAC .635CMX3M SEA3705

## (undated) DEVICE — SYR PISTON URETHRAL 60ML 68000

## (undated) DEVICE — JELLY LUBRICATING SURGILUBE 2OZ TUBE

## (undated) DEVICE — PACK ENT ENDOSCOPY UMMC

## (undated) DEVICE — GLOVE BIOGEL PI ULTRATOUCH G SZ 6.5 42165

## (undated) DEVICE — EYE DRSG PAD OVAL

## (undated) DEVICE — DRSG EYE PAD STERILE 1.63X2.63" NON21600

## (undated) DEVICE — NDL BUTTERFLY 25GA .75" 367298

## (undated) DEVICE — BASIN SET SINGLE STERILE 13752-624

## (undated) DEVICE — GLOVE PROTEXIS POWDER FREE SMT 6.5  2D72PT65X

## (undated) DEVICE — SOL NACL 0.9% 10ML VIAL 0409-4888-02

## (undated) DEVICE — DRAPE U SPLIT 74X120" 29440

## (undated) DEVICE — SOL NACL 0.9% IRRIG 1000ML BOTTLE 2F7124

## (undated) DEVICE — SYR 03ML LL W/O NDL 309657

## (undated) DEVICE — NDL COUNTER 20CT 31142493

## (undated) DEVICE — SPONGE COTTONOID 1/2X1/2" 20-04S

## (undated) DEVICE — SPONGE RAY-TEC 4X8" 7318

## (undated) DEVICE — NDL BLUNT 18GA 1" W/O FILTER 305181

## (undated) DEVICE — SPONGE COTTONOID 1/4X1/4" 80-1399

## (undated) RX ORDER — LIDOCAINE HYDROCHLORIDE AND EPINEPHRINE 10; 10 MG/ML; UG/ML
INJECTION, SOLUTION INFILTRATION; PERINEURAL
Status: DISPENSED
Start: 2017-12-08

## (undated) RX ORDER — HYDROMORPHONE HYDROCHLORIDE 1 MG/ML
INJECTION, SOLUTION INTRAMUSCULAR; INTRAVENOUS; SUBCUTANEOUS
Status: DISPENSED
Start: 2025-05-08

## (undated) RX ORDER — DEXAMETHASONE SODIUM PHOSPHATE 4 MG/ML
INJECTION, SOLUTION INTRA-ARTICULAR; INTRALESIONAL; INTRAMUSCULAR; INTRAVENOUS; SOFT TISSUE
Status: DISPENSED
Start: 2025-05-08

## (undated) RX ORDER — FENTANYL CITRATE 50 UG/ML
INJECTION, SOLUTION INTRAMUSCULAR; INTRAVENOUS
Status: DISPENSED
Start: 2017-02-09

## (undated) RX ORDER — ESMOLOL HYDROCHLORIDE 10 MG/ML
INJECTION INTRAVENOUS
Status: DISPENSED
Start: 2017-02-09

## (undated) RX ORDER — FENTANYL CITRATE 50 UG/ML
INJECTION, SOLUTION INTRAMUSCULAR; INTRAVENOUS
Status: DISPENSED
Start: 2025-05-08

## (undated) RX ORDER — AMPICILLIN AND SULBACTAM 1; .5 G/1; G/1
INJECTION, POWDER, FOR SOLUTION INTRAMUSCULAR; INTRAVENOUS
Status: DISPENSED
Start: 2017-02-09

## (undated) RX ORDER — LIDOCAINE HYDROCHLORIDE 20 MG/ML
INJECTION, SOLUTION INFILTRATION; PERINEURAL
Status: DISPENSED
Start: 2017-12-08

## (undated) RX ORDER — FENTANYL CITRATE 50 UG/ML
INJECTION, SOLUTION INTRAMUSCULAR; INTRAVENOUS
Status: DISPENSED
Start: 2025-02-13

## (undated) RX ORDER — LABETALOL HYDROCHLORIDE 5 MG/ML
INJECTION, SOLUTION INTRAVENOUS
Status: DISPENSED
Start: 2017-02-09

## (undated) RX ORDER — ACETAMINOPHEN 325 MG/1
TABLET ORAL
Status: DISPENSED
Start: 2025-05-08

## (undated) RX ORDER — LIDOCAINE HYDROCHLORIDE 10 MG/ML
INJECTION, SOLUTION EPIDURAL; INFILTRATION; INTRACAUDAL; PERINEURAL
Status: DISPENSED
Start: 2025-05-08

## (undated) RX ORDER — PROPOFOL 10 MG/ML
INJECTION, EMULSION INTRAVENOUS
Status: DISPENSED
Start: 2025-05-08

## (undated) RX ORDER — LIDOCAINE HYDROCHLORIDE 40 MG/ML
SOLUTION TOPICAL
Status: DISPENSED
Start: 2017-02-09

## (undated) RX ORDER — LIDOCAINE HYDROCHLORIDE 10 MG/ML
INJECTION, SOLUTION EPIDURAL; INFILTRATION; INTRACAUDAL; PERINEURAL
Status: DISPENSED
Start: 2017-02-09

## (undated) RX ORDER — LIDOCAINE HYDROCHLORIDE 10 MG/ML
INJECTION, SOLUTION EPIDURAL; INFILTRATION; INTRACAUDAL; PERINEURAL
Status: DISPENSED
Start: 2024-09-03

## (undated) RX ORDER — HYDROCODONE BITARTRATE AND ACETAMINOPHEN 5; 325 MG/1; MG/1
TABLET ORAL
Status: DISPENSED
Start: 2017-02-09

## (undated) RX ORDER — PROPOFOL 10 MG/ML
INJECTION, EMULSION INTRAVENOUS
Status: DISPENSED
Start: 2017-02-09

## (undated) RX ORDER — ONDANSETRON 2 MG/ML
INJECTION INTRAMUSCULAR; INTRAVENOUS
Status: DISPENSED
Start: 2017-02-09

## (undated) RX ORDER — LIDOCAINE HYDROCHLORIDE 20 MG/ML
INJECTION, SOLUTION INFILTRATION; PERINEURAL
Status: DISPENSED
Start: 2019-03-01

## (undated) RX ORDER — FENTANYL CITRATE-0.9 % NACL/PF 10 MCG/ML
PLASTIC BAG, INJECTION (ML) INTRAVENOUS
Status: DISPENSED
Start: 2025-05-08

## (undated) RX ORDER — ONDANSETRON 2 MG/ML
INJECTION INTRAMUSCULAR; INTRAVENOUS
Status: DISPENSED
Start: 2025-05-08

## (undated) RX ORDER — AMPICILLIN AND SULBACTAM 2; 1 G/1; G/1
INJECTION, POWDER, FOR SOLUTION INTRAMUSCULAR; INTRAVENOUS
Status: DISPENSED
Start: 2025-05-08

## (undated) RX ORDER — AMPICILLIN AND SULBACTAM 2; 1 G/1; G/1
INJECTION, POWDER, FOR SOLUTION INTRAMUSCULAR; INTRAVENOUS
Status: DISPENSED
Start: 2025-02-13

## (undated) RX ORDER — GLYCOPYRROLATE 0.2 MG/ML
INJECTION INTRAMUSCULAR; INTRAVENOUS
Status: DISPENSED
Start: 2017-02-09

## (undated) RX ORDER — PROPOFOL 10 MG/ML
INJECTION, EMULSION INTRAVENOUS
Status: DISPENSED
Start: 2025-02-13

## (undated) RX ORDER — DEXAMETHASONE SODIUM PHOSPHATE 10 MG/ML
INJECTION, SOLUTION INTRAMUSCULAR; INTRAVENOUS
Status: DISPENSED
Start: 2017-02-09

## (undated) RX ORDER — ONDANSETRON 2 MG/ML
INJECTION INTRAMUSCULAR; INTRAVENOUS
Status: DISPENSED
Start: 2025-02-13

## (undated) RX ORDER — LIDOCAINE 40 MG/G
CREAM TOPICAL
Status: DISPENSED
Start: 2025-02-13